# Patient Record
Sex: FEMALE | Race: WHITE | Employment: OTHER | URBAN - METROPOLITAN AREA
[De-identification: names, ages, dates, MRNs, and addresses within clinical notes are randomized per-mention and may not be internally consistent; named-entity substitution may affect disease eponyms.]

---

## 2017-01-01 ENCOUNTER — APPOINTMENT (OUTPATIENT)
Dept: GENERAL RADIOLOGY | Facility: CLINIC | Age: 72
DRG: 003 | End: 2017-01-01
Attending: INTERNAL MEDICINE
Payer: COMMERCIAL

## 2017-01-01 ENCOUNTER — APPOINTMENT (OUTPATIENT)
Dept: PHYSICAL THERAPY | Facility: CLINIC | Age: 72
DRG: 003 | End: 2017-01-01
Payer: COMMERCIAL

## 2017-01-01 ENCOUNTER — TRANSFERRED RECORDS (OUTPATIENT)
Dept: HEALTH INFORMATION MANAGEMENT | Facility: CLINIC | Age: 72
End: 2017-01-01

## 2017-01-01 ENCOUNTER — APPOINTMENT (OUTPATIENT)
Dept: GENERAL RADIOLOGY | Facility: CLINIC | Age: 72
DRG: 177 | End: 2017-01-01
Attending: NURSE PRACTITIONER
Payer: COMMERCIAL

## 2017-01-01 ENCOUNTER — APPOINTMENT (OUTPATIENT)
Dept: CT IMAGING | Facility: CLINIC | Age: 72
DRG: 177 | End: 2017-01-01
Attending: NURSE PRACTITIONER
Payer: COMMERCIAL

## 2017-01-01 ENCOUNTER — APPOINTMENT (OUTPATIENT)
Dept: CT IMAGING | Facility: CLINIC | Age: 72
DRG: 163 | End: 2017-01-01
Attending: INTERNAL MEDICINE
Payer: COMMERCIAL

## 2017-01-01 ENCOUNTER — APPOINTMENT (OUTPATIENT)
Dept: PHYSICAL THERAPY | Facility: CLINIC | Age: 72
DRG: 177 | End: 2017-01-01
Attending: INTERNAL MEDICINE
Payer: COMMERCIAL

## 2017-01-01 ENCOUNTER — APPOINTMENT (OUTPATIENT)
Dept: GENERAL RADIOLOGY | Facility: CLINIC | Age: 72
DRG: 003 | End: 2017-01-01
Attending: SURGERY
Payer: COMMERCIAL

## 2017-01-01 ENCOUNTER — APPOINTMENT (OUTPATIENT)
Dept: SPEECH THERAPY | Facility: CLINIC | Age: 72
DRG: 163 | End: 2017-01-01
Attending: INTERNAL MEDICINE
Payer: COMMERCIAL

## 2017-01-01 ENCOUNTER — APPOINTMENT (OUTPATIENT)
Dept: INTERVENTIONAL RADIOLOGY/VASCULAR | Facility: CLINIC | Age: 72
End: 2017-01-01
Attending: NURSE PRACTITIONER
Payer: COMMERCIAL

## 2017-01-01 ENCOUNTER — APPOINTMENT (OUTPATIENT)
Dept: OCCUPATIONAL THERAPY | Facility: CLINIC | Age: 72
DRG: 163 | End: 2017-01-01
Attending: ANESTHESIOLOGY
Payer: COMMERCIAL

## 2017-01-01 ENCOUNTER — TELEPHONE (OUTPATIENT)
Dept: INTERVENTIONAL RADIOLOGY/VASCULAR | Facility: CLINIC | Age: 72
End: 2017-01-01

## 2017-01-01 ENCOUNTER — APPOINTMENT (OUTPATIENT)
Dept: CT IMAGING | Facility: CLINIC | Age: 72
DRG: 163 | End: 2017-01-01
Attending: SURGERY
Payer: COMMERCIAL

## 2017-01-01 ENCOUNTER — APPOINTMENT (OUTPATIENT)
Dept: PHYSICAL THERAPY | Facility: CLINIC | Age: 72
DRG: 003 | End: 2017-01-01
Attending: PHYSICIAN ASSISTANT
Payer: COMMERCIAL

## 2017-01-01 ENCOUNTER — HOSPITAL ENCOUNTER (INPATIENT)
Facility: CLINIC | Age: 72
LOS: 9 days | Discharge: NURSING, REHAB OR LONG-TERM CARE | DRG: 177 | End: 2017-09-28
Attending: INTERNAL MEDICINE | Admitting: INTERNAL MEDICINE
Payer: COMMERCIAL

## 2017-01-01 ENCOUNTER — APPOINTMENT (OUTPATIENT)
Dept: GENERAL RADIOLOGY | Facility: CLINIC | Age: 72
DRG: 163 | End: 2017-01-01
Attending: INTERNAL MEDICINE
Payer: COMMERCIAL

## 2017-01-01 ENCOUNTER — APPOINTMENT (OUTPATIENT)
Dept: SPEECH THERAPY | Facility: CLINIC | Age: 72
DRG: 003 | End: 2017-01-01
Attending: INTERNAL MEDICINE
Payer: COMMERCIAL

## 2017-01-01 ENCOUNTER — APPOINTMENT (OUTPATIENT)
Dept: PHYSICAL THERAPY | Facility: CLINIC | Age: 72
DRG: 163 | End: 2017-01-01
Attending: INTERNAL MEDICINE
Payer: COMMERCIAL

## 2017-01-01 ENCOUNTER — APPOINTMENT (OUTPATIENT)
Dept: CT IMAGING | Facility: CLINIC | Age: 72
DRG: 003 | End: 2017-01-01
Attending: INTERNAL MEDICINE
Payer: COMMERCIAL

## 2017-01-01 ENCOUNTER — APPOINTMENT (OUTPATIENT)
Dept: GENERAL RADIOLOGY | Facility: CLINIC | Age: 72
DRG: 177 | End: 2017-01-01
Attending: INTERNAL MEDICINE
Payer: COMMERCIAL

## 2017-01-01 ENCOUNTER — APPOINTMENT (OUTPATIENT)
Dept: GENERAL RADIOLOGY | Facility: CLINIC | Age: 72
DRG: 163 | End: 2017-01-01
Attending: PHYSICIAN ASSISTANT
Payer: COMMERCIAL

## 2017-01-01 ENCOUNTER — ANESTHESIA (OUTPATIENT)
Dept: SURGERY | Facility: CLINIC | Age: 72
DRG: 003 | End: 2017-01-01
Payer: COMMERCIAL

## 2017-01-01 ENCOUNTER — APPOINTMENT (OUTPATIENT)
Dept: CT IMAGING | Facility: CLINIC | Age: 72
DRG: 003 | End: 2017-01-01
Attending: EMERGENCY MEDICINE
Payer: COMMERCIAL

## 2017-01-01 ENCOUNTER — APPOINTMENT (OUTPATIENT)
Dept: CARDIOLOGY | Facility: CLINIC | Age: 72
DRG: 003 | End: 2017-01-01
Attending: INTERNAL MEDICINE
Payer: COMMERCIAL

## 2017-01-01 ENCOUNTER — APPOINTMENT (OUTPATIENT)
Dept: GENERAL RADIOLOGY | Facility: CLINIC | Age: 72
DRG: 177 | End: 2017-01-01
Attending: RADIOLOGY
Payer: COMMERCIAL

## 2017-01-01 ENCOUNTER — APPOINTMENT (OUTPATIENT)
Dept: GENERAL RADIOLOGY | Facility: CLINIC | Age: 72
DRG: 003 | End: 2017-01-01
Attending: PHYSICIAN ASSISTANT
Payer: COMMERCIAL

## 2017-01-01 ENCOUNTER — APPOINTMENT (OUTPATIENT)
Dept: GENERAL RADIOLOGY | Facility: CLINIC | Age: 72
DRG: 003 | End: 2017-01-01
Attending: HOSPITALIST
Payer: COMMERCIAL

## 2017-01-01 ENCOUNTER — APPOINTMENT (OUTPATIENT)
Dept: CT IMAGING | Facility: CLINIC | Age: 72
DRG: 177 | End: 2017-01-01
Attending: INTERNAL MEDICINE
Payer: COMMERCIAL

## 2017-01-01 ENCOUNTER — APPOINTMENT (OUTPATIENT)
Dept: ULTRASOUND IMAGING | Facility: CLINIC | Age: 72
DRG: 177 | End: 2017-01-01
Attending: NURSE PRACTITIONER
Payer: COMMERCIAL

## 2017-01-01 ENCOUNTER — APPOINTMENT (OUTPATIENT)
Dept: OCCUPATIONAL THERAPY | Facility: CLINIC | Age: 72
DRG: 163 | End: 2017-01-01
Attending: INTERNAL MEDICINE
Payer: COMMERCIAL

## 2017-01-01 ENCOUNTER — APPOINTMENT (OUTPATIENT)
Dept: GENERAL RADIOLOGY | Facility: CLINIC | Age: 72
DRG: 003 | End: 2017-01-01
Attending: ANESTHESIOLOGY
Payer: COMMERCIAL

## 2017-01-01 ENCOUNTER — HOSPITAL ENCOUNTER (INPATIENT)
Facility: CLINIC | Age: 72
LOS: 8 days | Discharge: NURSING, REHAB OR LONG-TERM CARE | DRG: 163 | End: 2017-08-03
Attending: INTERNAL MEDICINE | Admitting: INTERNAL MEDICINE
Payer: COMMERCIAL

## 2017-01-01 ENCOUNTER — APPOINTMENT (OUTPATIENT)
Dept: INTERVENTIONAL RADIOLOGY/VASCULAR | Facility: CLINIC | Age: 72
End: 2017-01-01
Attending: RADIOLOGY
Payer: COMMERCIAL

## 2017-01-01 ENCOUNTER — ANESTHESIA EVENT (OUTPATIENT)
Dept: SURGERY | Facility: CLINIC | Age: 72
DRG: 003 | End: 2017-01-01
Payer: COMMERCIAL

## 2017-01-01 ENCOUNTER — ANESTHESIA (OUTPATIENT)
Dept: SURGERY | Facility: CLINIC | Age: 72
DRG: 163 | End: 2017-01-01
Payer: COMMERCIAL

## 2017-01-01 ENCOUNTER — APPOINTMENT (OUTPATIENT)
Dept: CT IMAGING | Facility: CLINIC | Age: 72
End: 2017-01-01
Attending: EMERGENCY MEDICINE
Payer: COMMERCIAL

## 2017-01-01 ENCOUNTER — ANESTHESIA (OUTPATIENT)
Dept: INTENSIVE CARE | Facility: CLINIC | Age: 72
DRG: 003 | End: 2017-01-01
Payer: COMMERCIAL

## 2017-01-01 ENCOUNTER — APPOINTMENT (OUTPATIENT)
Dept: GENERAL RADIOLOGY | Facility: CLINIC | Age: 72
DRG: 003 | End: 2017-01-01
Attending: NURSE PRACTITIONER
Payer: COMMERCIAL

## 2017-01-01 ENCOUNTER — HOSPITAL ENCOUNTER (EMERGENCY)
Facility: CLINIC | Age: 72
Discharge: ANOTHER HOSPITAL | End: 2017-06-02
Attending: EMERGENCY MEDICINE | Admitting: EMERGENCY MEDICINE
Payer: COMMERCIAL

## 2017-01-01 ENCOUNTER — HOSPITAL ENCOUNTER (INPATIENT)
Facility: CLINIC | Age: 72
LOS: 18 days | Discharge: NURSING, REHAB OR LONG-TERM CARE | DRG: 003 | End: 2017-06-20
Attending: EMERGENCY MEDICINE | Admitting: SURGERY
Payer: COMMERCIAL

## 2017-01-01 ENCOUNTER — HOSPITAL ENCOUNTER (OUTPATIENT)
Facility: CLINIC | Age: 72
Discharge: NURSING, REHAB OR LONG-TERM CARE | End: 2017-09-14
Attending: RADIOLOGY | Admitting: RADIOLOGY
Payer: COMMERCIAL

## 2017-01-01 ENCOUNTER — APPOINTMENT (OUTPATIENT)
Dept: CARDIOLOGY | Facility: CLINIC | Age: 72
DRG: 177 | End: 2017-01-01
Attending: NURSE PRACTITIONER
Payer: COMMERCIAL

## 2017-01-01 ENCOUNTER — APPOINTMENT (OUTPATIENT)
Dept: INTERVENTIONAL RADIOLOGY/VASCULAR | Facility: CLINIC | Age: 72
DRG: 177 | End: 2017-01-01
Attending: SURGERY
Payer: COMMERCIAL

## 2017-01-01 ENCOUNTER — APPOINTMENT (OUTPATIENT)
Dept: NUCLEAR MEDICINE | Facility: CLINIC | Age: 72
DRG: 003 | End: 2017-01-01
Attending: INTERNAL MEDICINE
Payer: COMMERCIAL

## 2017-01-01 ENCOUNTER — ANESTHESIA EVENT (OUTPATIENT)
Dept: SURGERY | Facility: CLINIC | Age: 72
DRG: 163 | End: 2017-01-01
Payer: COMMERCIAL

## 2017-01-01 ENCOUNTER — ANESTHESIA EVENT (OUTPATIENT)
Dept: INTENSIVE CARE | Facility: CLINIC | Age: 72
DRG: 003 | End: 2017-01-01
Payer: COMMERCIAL

## 2017-01-01 ENCOUNTER — APPOINTMENT (OUTPATIENT)
Dept: SPEECH THERAPY | Facility: CLINIC | Age: 72
DRG: 003 | End: 2017-01-01
Payer: COMMERCIAL

## 2017-01-01 ENCOUNTER — APPOINTMENT (OUTPATIENT)
Dept: SPEECH THERAPY | Facility: CLINIC | Age: 72
DRG: 163 | End: 2017-01-01
Attending: NURSE PRACTITIONER
Payer: COMMERCIAL

## 2017-01-01 VITALS
DIASTOLIC BLOOD PRESSURE: 70 MMHG | SYSTOLIC BLOOD PRESSURE: 135 MMHG | OXYGEN SATURATION: 100 % | WEIGHT: 179.68 LBS | RESPIRATION RATE: 20 BRPM | BODY MASS INDEX: 28.88 KG/M2 | HEIGHT: 66 IN | TEMPERATURE: 98.9 F

## 2017-01-01 VITALS
HEIGHT: 66 IN | TEMPERATURE: 98.8 F | OXYGEN SATURATION: 100 % | SYSTOLIC BLOOD PRESSURE: 97 MMHG | BODY MASS INDEX: 23.7 KG/M2 | RESPIRATION RATE: 14 BRPM | DIASTOLIC BLOOD PRESSURE: 50 MMHG | WEIGHT: 147.49 LBS

## 2017-01-01 VITALS
HEART RATE: 93 BPM | OXYGEN SATURATION: 100 % | RESPIRATION RATE: 15 BRPM | HEIGHT: 67 IN | SYSTOLIC BLOOD PRESSURE: 155 MMHG | WEIGHT: 167.77 LBS | BODY MASS INDEX: 26.33 KG/M2 | DIASTOLIC BLOOD PRESSURE: 80 MMHG | TEMPERATURE: 99.8 F

## 2017-01-01 VITALS
OXYGEN SATURATION: 96 % | DIASTOLIC BLOOD PRESSURE: 57 MMHG | SYSTOLIC BLOOD PRESSURE: 100 MMHG | RESPIRATION RATE: 14 BRPM

## 2017-01-01 VITALS
RESPIRATION RATE: 18 BRPM | SYSTOLIC BLOOD PRESSURE: 106 MMHG | OXYGEN SATURATION: 96 % | HEART RATE: 96 BPM | DIASTOLIC BLOOD PRESSURE: 71 MMHG | TEMPERATURE: 98.4 F

## 2017-01-01 DIAGNOSIS — R21 RASH: Primary | ICD-10-CM

## 2017-01-01 DIAGNOSIS — K65.9: ICD-10-CM

## 2017-01-01 DIAGNOSIS — Z98.890 H/O CHEST TUBE PLACEMENT: ICD-10-CM

## 2017-01-01 DIAGNOSIS — R11.0 NAUSEA: ICD-10-CM

## 2017-01-01 DIAGNOSIS — N28.9 RENAL INSUFFICIENCY: ICD-10-CM

## 2017-01-01 DIAGNOSIS — I10 ESSENTIAL HYPERTENSION: ICD-10-CM

## 2017-01-01 DIAGNOSIS — A04.72 C. DIFFICILE COLITIS: ICD-10-CM

## 2017-01-01 DIAGNOSIS — K59.00 CONSTIPATION, UNSPECIFIED CONSTIPATION TYPE: ICD-10-CM

## 2017-01-01 DIAGNOSIS — Z78.9 DEEP VEIN THROMBOSIS (DVT) PROPHYLAXIS PRESCRIBED AT DISCHARGE: ICD-10-CM

## 2017-01-01 DIAGNOSIS — D72.819 LEUKOPENIA, UNSPECIFIED TYPE: Primary | ICD-10-CM

## 2017-01-01 DIAGNOSIS — R10.84 ABDOMINAL PAIN, GENERALIZED: ICD-10-CM

## 2017-01-01 DIAGNOSIS — T14.8XXA OPEN WOUND: ICD-10-CM

## 2017-01-01 DIAGNOSIS — K65.1 PERITONEAL ABSCESS (H): ICD-10-CM

## 2017-01-01 DIAGNOSIS — B37.9 CANDIDA INFECTION: ICD-10-CM

## 2017-01-01 DIAGNOSIS — E87.70 EDEMA DUE TO HYPERVOLEMIA: ICD-10-CM

## 2017-01-01 DIAGNOSIS — G47.01 INSOMNIA DUE TO MEDICAL CONDITION: ICD-10-CM

## 2017-01-01 DIAGNOSIS — R10.84 GENERALIZED ABDOMINAL PAIN: Primary | ICD-10-CM

## 2017-01-01 DIAGNOSIS — F51.02 ADJUSTMENT INSOMNIA: ICD-10-CM

## 2017-01-01 DIAGNOSIS — E87.29 INCREASED ANION GAP METABOLIC ACIDOSIS: ICD-10-CM

## 2017-01-01 DIAGNOSIS — K55.9 MESENTERIC ISCHEMIA (H): ICD-10-CM

## 2017-01-01 DIAGNOSIS — E46 PROTEIN-CALORIE MALNUTRITION (H): ICD-10-CM

## 2017-01-01 DIAGNOSIS — J90 PLEURAL EFFUSION: ICD-10-CM

## 2017-01-01 DIAGNOSIS — Z99.11 ON MECHANICALLY ASSISTED VENTILATION (H): ICD-10-CM

## 2017-01-01 DIAGNOSIS — K65.1 PERITONEAL ABSCESS (H): Primary | ICD-10-CM

## 2017-01-01 LAB
ABO + RH BLD: NORMAL
ACID FAST STN SPEC QL: NORMAL
ALBUMIN FLD-MCNC: 0 G/DL
ALBUMIN SERPL-MCNC: 1.2 G/DL (ref 3.4–5)
ALBUMIN SERPL-MCNC: 1.3 G/DL (ref 3.4–5)
ALBUMIN SERPL-MCNC: 1.5 G/DL (ref 3.4–5)
ALBUMIN SERPL-MCNC: 1.7 G/DL (ref 3.4–5)
ALBUMIN SERPL-MCNC: 1.7 G/DL (ref 3.4–5)
ALBUMIN SERPL-MCNC: 1.8 G/DL (ref 3.4–5)
ALBUMIN SERPL-MCNC: 1.9 G/DL (ref 3.4–5)
ALBUMIN SERPL-MCNC: 2 G/DL (ref 3.4–5)
ALBUMIN SERPL-MCNC: 2.1 G/DL (ref 3.4–5)
ALBUMIN SERPL-MCNC: 2.2 G/DL (ref 3.4–5)
ALBUMIN SERPL-MCNC: 2.3 G/DL (ref 3.4–5)
ALBUMIN SERPL-MCNC: 2.4 G/DL (ref 3.4–5)
ALBUMIN SERPL-MCNC: 2.6 G/DL (ref 3.4–5)
ALBUMIN SERPL-MCNC: 2.6 G/DL (ref 3.4–5)
ALBUMIN SERPL-MCNC: 2.7 G/DL (ref 3.4–5)
ALBUMIN SERPL-MCNC: 2.7 G/DL (ref 3.4–5)
ALBUMIN SERPL-MCNC: 2.8 G/DL (ref 3.4–5)
ALBUMIN SERPL-MCNC: 3.6 G/DL (ref 3.4–5)
ALBUMIN UR-MCNC: 30 MG/DL
ALBUMIN UR-MCNC: NEGATIVE MG/DL
ALP SERPL-CCNC: 106 U/L (ref 40–150)
ALP SERPL-CCNC: 107 U/L (ref 40–150)
ALP SERPL-CCNC: 108 U/L (ref 40–150)
ALP SERPL-CCNC: 111 U/L (ref 40–150)
ALP SERPL-CCNC: 115 U/L (ref 40–150)
ALP SERPL-CCNC: 123 U/L (ref 40–150)
ALP SERPL-CCNC: 143 U/L (ref 40–150)
ALP SERPL-CCNC: 154 U/L (ref 40–150)
ALP SERPL-CCNC: 187 U/L (ref 40–150)
ALP SERPL-CCNC: 51 U/L (ref 40–150)
ALP SERPL-CCNC: 55 U/L (ref 40–150)
ALP SERPL-CCNC: 57 U/L (ref 40–150)
ALP SERPL-CCNC: 65 U/L (ref 40–150)
ALP SERPL-CCNC: 65 U/L (ref 40–150)
ALP SERPL-CCNC: 67 U/L (ref 40–150)
ALP SERPL-CCNC: 70 U/L (ref 40–150)
ALP SERPL-CCNC: 85 U/L (ref 40–150)
ALP SERPL-CCNC: 86 U/L (ref 40–150)
ALP SERPL-CCNC: 92 U/L (ref 40–150)
ALP SERPL-CCNC: 96 U/L (ref 40–150)
ALP SERPL-CCNC: 96 U/L (ref 40–150)
ALT SERPL W P-5'-P-CCNC: 10 U/L (ref 0–50)
ALT SERPL W P-5'-P-CCNC: 11 U/L (ref 0–50)
ALT SERPL W P-5'-P-CCNC: 12 U/L (ref 0–50)
ALT SERPL W P-5'-P-CCNC: 13 U/L (ref 0–50)
ALT SERPL W P-5'-P-CCNC: 13 U/L (ref 0–50)
ALT SERPL W P-5'-P-CCNC: 16 U/L (ref 0–50)
ALT SERPL W P-5'-P-CCNC: 17 U/L (ref 0–50)
ALT SERPL W P-5'-P-CCNC: 17 U/L (ref 0–50)
ALT SERPL W P-5'-P-CCNC: 19 U/L (ref 0–50)
ALT SERPL W P-5'-P-CCNC: 21 U/L (ref 0–50)
ALT SERPL W P-5'-P-CCNC: 22 U/L (ref 0–50)
ALT SERPL W P-5'-P-CCNC: 26 U/L (ref 0–50)
ALT SERPL W P-5'-P-CCNC: 33 U/L (ref 0–50)
ALT SERPL W P-5'-P-CCNC: 38 U/L (ref 0–50)
ALT SERPL W P-5'-P-CCNC: 42 U/L (ref 0–50)
ALT SERPL W P-5'-P-CCNC: 8 U/L (ref 0–50)
ALT SERPL W P-5'-P-CCNC: 9 U/L (ref 0–50)
ALT SERPL W P-5'-P-CCNC: 9 U/L (ref 0–50)
AMYLASE FLD-CCNC: 10 U/L
AMYLASE FLD-CCNC: 60 U/L
AMYLASE FLD-CCNC: 9 U/L
ANION GAP SERPL CALCULATED.3IONS-SCNC: 10 MMOL/L (ref 3–14)
ANION GAP SERPL CALCULATED.3IONS-SCNC: 11 MMOL/L (ref 3–14)
ANION GAP SERPL CALCULATED.3IONS-SCNC: 12 MMOL/L (ref 3–14)
ANION GAP SERPL CALCULATED.3IONS-SCNC: 12 MMOL/L (ref 3–14)
ANION GAP SERPL CALCULATED.3IONS-SCNC: 13 MMOL/L (ref 3–14)
ANION GAP SERPL CALCULATED.3IONS-SCNC: 15 MMOL/L (ref 3–14)
ANION GAP SERPL CALCULATED.3IONS-SCNC: 6 MMOL/L (ref 3–14)
ANION GAP SERPL CALCULATED.3IONS-SCNC: 7 MMOL/L (ref 3–14)
ANION GAP SERPL CALCULATED.3IONS-SCNC: 8 MMOL/L (ref 3–14)
ANION GAP SERPL CALCULATED.3IONS-SCNC: 9 MMOL/L (ref 3–14)
APPEARANCE FLD: NORMAL
APPEARANCE FLD: NORMAL
APPEARANCE UR: ABNORMAL
APPEARANCE UR: CLEAR
APTT PPP: 46 SEC (ref 22–37)
AST SERPL W P-5'-P-CCNC: 13 U/L (ref 0–45)
AST SERPL W P-5'-P-CCNC: 13 U/L (ref 0–45)
AST SERPL W P-5'-P-CCNC: 14 U/L (ref 0–45)
AST SERPL W P-5'-P-CCNC: 15 U/L (ref 0–45)
AST SERPL W P-5'-P-CCNC: 16 U/L (ref 0–45)
AST SERPL W P-5'-P-CCNC: 17 U/L (ref 0–45)
AST SERPL W P-5'-P-CCNC: 18 U/L (ref 0–45)
AST SERPL W P-5'-P-CCNC: 19 U/L (ref 0–45)
AST SERPL W P-5'-P-CCNC: 19 U/L (ref 0–45)
AST SERPL W P-5'-P-CCNC: 20 U/L (ref 0–45)
AST SERPL W P-5'-P-CCNC: 25 U/L (ref 0–45)
AST SERPL W P-5'-P-CCNC: 29 U/L (ref 0–45)
AST SERPL W P-5'-P-CCNC: 29 U/L (ref 0–45)
AST SERPL W P-5'-P-CCNC: 30 U/L (ref 0–45)
AST SERPL W P-5'-P-CCNC: 39 U/L (ref 0–45)
AST SERPL W P-5'-P-CCNC: 41 U/L (ref 0–45)
AST SERPL W P-5'-P-CCNC: 49 U/L (ref 0–45)
AST SERPL W P-5'-P-CCNC: 52 U/L (ref 0–45)
AST SERPL W P-5'-P-CCNC: 56 U/L (ref 0–45)
AST SERPL W P-5'-P-CCNC: 58 U/L (ref 0–45)
BACTERIA #/AREA URNS HPF: ABNORMAL /HPF
BACTERIA #/AREA URNS HPF: ABNORMAL /HPF
BACTERIA SPEC CULT: ABNORMAL
BACTERIA SPEC CULT: NO GROWTH
BACTERIA SPEC CULT: NORMAL
BASE DEFICIT BLDA-SCNC: 3.6 MMOL/L
BASE DEFICIT BLDA-SCNC: 4.2 MMOL/L
BASE DEFICIT BLDA-SCNC: 5 MMOL/L
BASE DEFICIT BLDA-SCNC: 5.2 MMOL/L
BASE DEFICIT BLDA-SCNC: 5.4 MMOL/L
BASE DEFICIT BLDA-SCNC: 5.7 MMOL/L
BASE DEFICIT BLDA-SCNC: 7.1 MMOL/L
BASE DEFICIT BLDV-SCNC: 1.6 MMOL/L
BASE DEFICIT BLDV-SCNC: 3.4 MMOL/L
BASE DEFICIT BLDV-SCNC: 5 MMOL/L
BASE EXCESS BLDV CALC-SCNC: 1.6 MMOL/L
BASE EXCESS BLDV CALC-SCNC: 5.3 MMOL/L
BASOPHILS # BLD AUTO: 0 10E9/L (ref 0–0.2)
BASOPHILS # BLD AUTO: 0.1 10E9/L (ref 0–0.2)
BASOPHILS NFR BLD AUTO: 0 %
BASOPHILS NFR BLD AUTO: 0.1 %
BASOPHILS NFR BLD AUTO: 0.2 %
BASOPHILS NFR BLD AUTO: 0.2 %
BASOPHILS NFR BLD AUTO: 0.6 %
BASOPHILS NFR BLD AUTO: 0.6 %
BASOPHILS NFR BLD AUTO: 1 %
BASOPHILS NFR BLD AUTO: 1.6 %
BILIRUB DIRECT SERPL-MCNC: 0.1 MG/DL (ref 0–0.2)
BILIRUB DIRECT SERPL-MCNC: 0.2 MG/DL (ref 0–0.2)
BILIRUB DIRECT SERPL-MCNC: <0.1 MG/DL (ref 0–0.2)
BILIRUB FLD-MCNC: 1.6 MG/DL
BILIRUB SERPL-MCNC: 0.2 MG/DL (ref 0.2–1.3)
BILIRUB SERPL-MCNC: 0.2 MG/DL (ref 0.2–1.3)
BILIRUB SERPL-MCNC: 0.3 MG/DL (ref 0.2–1.3)
BILIRUB SERPL-MCNC: 0.4 MG/DL (ref 0.2–1.3)
BILIRUB SERPL-MCNC: 0.5 MG/DL (ref 0.2–1.3)
BILIRUB SERPL-MCNC: 0.9 MG/DL (ref 0.2–1.3)
BILIRUB UR QL STRIP: NEGATIVE
BLD GP AB SCN SERPL QL: NORMAL
BLD PROD TYP BPU: NORMAL
BLD UNIT ID BPU: 0
BLOOD BANK CMNT PATIENT-IMP: NORMAL
BLOOD PRODUCT CODE: NORMAL
BPU ID: NORMAL
BUN SERPL-MCNC: 36 MG/DL (ref 7–30)
BUN SERPL-MCNC: 39 MG/DL (ref 7–30)
BUN SERPL-MCNC: 40 MG/DL (ref 7–30)
BUN SERPL-MCNC: 41 MG/DL (ref 7–30)
BUN SERPL-MCNC: 41 MG/DL (ref 7–30)
BUN SERPL-MCNC: 42 MG/DL (ref 7–30)
BUN SERPL-MCNC: 44 MG/DL (ref 7–30)
BUN SERPL-MCNC: 44 MG/DL (ref 7–30)
BUN SERPL-MCNC: 45 MG/DL (ref 7–30)
BUN SERPL-MCNC: 46 MG/DL (ref 7–30)
BUN SERPL-MCNC: 47 MG/DL (ref 7–30)
BUN SERPL-MCNC: 48 MG/DL (ref 7–30)
BUN SERPL-MCNC: 49 MG/DL (ref 7–30)
BUN SERPL-MCNC: 51 MG/DL (ref 7–30)
BUN SERPL-MCNC: 51 MG/DL (ref 7–30)
BUN SERPL-MCNC: 52 MG/DL (ref 7–30)
BUN SERPL-MCNC: 53 MG/DL (ref 7–30)
BUN SERPL-MCNC: 55 MG/DL (ref 7–30)
BUN SERPL-MCNC: 55 MG/DL (ref 7–30)
BUN SERPL-MCNC: 57 MG/DL (ref 7–30)
BUN SERPL-MCNC: 63 MG/DL (ref 7–30)
BUN SERPL-MCNC: 66 MG/DL (ref 7–30)
BUN SERPL-MCNC: 67 MG/DL (ref 7–30)
BUN SERPL-MCNC: 72 MG/DL (ref 7–30)
BUN SERPL-MCNC: 73 MG/DL (ref 7–30)
BUN SERPL-MCNC: 81 MG/DL (ref 7–30)
BUN SERPL-MCNC: 87 MG/DL (ref 7–30)
BURR CELLS BLD QL SMEAR: SLIGHT
C DIFF TOX B STL QL: NORMAL
C DIFF TOX B STL QL: POSITIVE
CA-I BLD-MCNC: 4.3 MG/DL (ref 4.4–5.2)
CA-I BLD-MCNC: 4.5 MG/DL (ref 4.4–5.2)
CA-I BLD-MCNC: 4.5 MG/DL (ref 4.4–5.2)
CALCIUM SERPL-MCNC: 6.9 MG/DL (ref 8.5–10.1)
CALCIUM SERPL-MCNC: 7.5 MG/DL (ref 8.5–10.1)
CALCIUM SERPL-MCNC: 7.6 MG/DL (ref 8.5–10.1)
CALCIUM SERPL-MCNC: 7.7 MG/DL (ref 8.5–10.1)
CALCIUM SERPL-MCNC: 7.7 MG/DL (ref 8.5–10.1)
CALCIUM SERPL-MCNC: 7.8 MG/DL (ref 8.5–10.1)
CALCIUM SERPL-MCNC: 7.9 MG/DL (ref 8.5–10.1)
CALCIUM SERPL-MCNC: 8 MG/DL (ref 8.5–10.1)
CALCIUM SERPL-MCNC: 8 MG/DL (ref 8.5–10.1)
CALCIUM SERPL-MCNC: 8.1 MG/DL (ref 8.5–10.1)
CALCIUM SERPL-MCNC: 8.2 MG/DL (ref 8.5–10.1)
CALCIUM SERPL-MCNC: 8.3 MG/DL (ref 8.5–10.1)
CALCIUM SERPL-MCNC: 8.4 MG/DL (ref 8.5–10.1)
CALCIUM SERPL-MCNC: 8.4 MG/DL (ref 8.5–10.1)
CALCIUM SERPL-MCNC: 8.5 MG/DL (ref 8.5–10.1)
CALCIUM SERPL-MCNC: 8.6 MG/DL (ref 8.5–10.1)
CALCIUM SERPL-MCNC: 8.6 MG/DL (ref 8.5–10.1)
CALCIUM SERPL-MCNC: 8.7 MG/DL (ref 8.5–10.1)
CALCIUM SERPL-MCNC: 8.7 MG/DL (ref 8.5–10.1)
CALCIUM SERPL-MCNC: 8.8 MG/DL (ref 8.5–10.1)
CALCIUM SERPL-MCNC: 8.9 MG/DL (ref 8.5–10.1)
CALCIUM SERPL-MCNC: 8.9 MG/DL (ref 8.5–10.1)
CALCIUM SERPL-MCNC: 9.4 MG/DL (ref 8.5–10.1)
CHLORIDE SERPL-SCNC: 100 MMOL/L (ref 94–109)
CHLORIDE SERPL-SCNC: 101 MMOL/L (ref 94–109)
CHLORIDE SERPL-SCNC: 102 MMOL/L (ref 94–109)
CHLORIDE SERPL-SCNC: 103 MMOL/L (ref 94–109)
CHLORIDE SERPL-SCNC: 103 MMOL/L (ref 94–109)
CHLORIDE SERPL-SCNC: 104 MMOL/L (ref 94–109)
CHLORIDE SERPL-SCNC: 105 MMOL/L (ref 94–109)
CHLORIDE SERPL-SCNC: 107 MMOL/L (ref 94–109)
CHLORIDE SERPL-SCNC: 109 MMOL/L (ref 94–109)
CHLORIDE SERPL-SCNC: 109 MMOL/L (ref 94–109)
CHLORIDE SERPL-SCNC: 110 MMOL/L (ref 94–109)
CHLORIDE SERPL-SCNC: 111 MMOL/L (ref 94–109)
CHLORIDE SERPL-SCNC: 112 MMOL/L (ref 94–109)
CHLORIDE SERPL-SCNC: 113 MMOL/L (ref 94–109)
CHLORIDE SERPL-SCNC: 114 MMOL/L (ref 94–109)
CHLORIDE SERPL-SCNC: 114 MMOL/L (ref 94–109)
CHLORIDE SERPL-SCNC: 115 MMOL/L (ref 94–109)
CHLORIDE SERPL-SCNC: 93 MMOL/L (ref 94–109)
CHLORIDE SERPL-SCNC: 97 MMOL/L (ref 94–109)
CHLORIDE SERPL-SCNC: 98 MMOL/L (ref 94–109)
CHLORIDE SERPL-SCNC: 98 MMOL/L (ref 94–109)
CHLORIDE SERPL-SCNC: 99 MMOL/L (ref 94–109)
CHLORIDE SERPL-SCNC: 99 MMOL/L (ref 94–109)
CO2 BLD-SCNC: 23 MMOL/L (ref 21–28)
CO2 BLDCOV-SCNC: 16 MMOL/L (ref 21–28)
CO2 SERPL-SCNC: 15 MMOL/L (ref 20–32)
CO2 SERPL-SCNC: 17 MMOL/L (ref 20–32)
CO2 SERPL-SCNC: 18 MMOL/L (ref 20–32)
CO2 SERPL-SCNC: 19 MMOL/L (ref 20–32)
CO2 SERPL-SCNC: 20 MMOL/L (ref 20–32)
CO2 SERPL-SCNC: 21 MMOL/L (ref 20–32)
CO2 SERPL-SCNC: 22 MMOL/L (ref 20–32)
CO2 SERPL-SCNC: 23 MMOL/L (ref 20–32)
CO2 SERPL-SCNC: 24 MMOL/L (ref 20–32)
CO2 SERPL-SCNC: 25 MMOL/L (ref 20–32)
CO2 SERPL-SCNC: 26 MMOL/L (ref 20–32)
CO2 SERPL-SCNC: 27 MMOL/L (ref 20–32)
CO2 SERPL-SCNC: 30 MMOL/L (ref 20–32)
CO2 SERPL-SCNC: 32 MMOL/L (ref 20–32)
CO2 SERPL-SCNC: 33 MMOL/L (ref 20–32)
COLLECT DURATION TIME UR: 1 H
COLLECT DURATION TIME UR: 24 H
COLOR FLD: NORMAL
COLOR FLD: YELLOW
COLOR UR AUTO: ABNORMAL
COLOR UR AUTO: YELLOW
COPATH REPORT: NORMAL
CORTIS SERPL-MCNC: 60.6 UG/DL (ref 4–22)
CREAT 24H UR-MRATE: 0.01 G/(24.H) (ref 0.8–1.8)
CREAT 24H UR-MRATE: 0.3 G/(24.H) (ref 0.8–1.8)
CREAT FLD-MCNC: 1.6 MG/DL
CREAT SERPL-MCNC: 0.82 MG/DL (ref 0.52–1.04)
CREAT SERPL-MCNC: 0.83 MG/DL (ref 0.52–1.04)
CREAT SERPL-MCNC: 0.85 MG/DL (ref 0.52–1.04)
CREAT SERPL-MCNC: 0.86 MG/DL (ref 0.52–1.04)
CREAT SERPL-MCNC: 0.87 MG/DL (ref 0.52–1.04)
CREAT SERPL-MCNC: 0.89 MG/DL (ref 0.52–1.04)
CREAT SERPL-MCNC: 0.9 MG/DL (ref 0.52–1.04)
CREAT SERPL-MCNC: 0.92 MG/DL (ref 0.52–1.04)
CREAT SERPL-MCNC: 0.92 MG/DL (ref 0.52–1.04)
CREAT SERPL-MCNC: 0.96 MG/DL (ref 0.52–1.04)
CREAT SERPL-MCNC: 1.11 MG/DL (ref 0.52–1.04)
CREAT SERPL-MCNC: 1.19 MG/DL (ref 0.52–1.04)
CREAT SERPL-MCNC: 1.21 MG/DL (ref 0.52–1.04)
CREAT SERPL-MCNC: 1.24 MG/DL (ref 0.52–1.04)
CREAT SERPL-MCNC: 1.3 MG/DL (ref 0.52–1.04)
CREAT SERPL-MCNC: 1.36 MG/DL (ref 0.52–1.04)
CREAT SERPL-MCNC: 1.37 MG/DL (ref 0.52–1.04)
CREAT SERPL-MCNC: 1.37 MG/DL (ref 0.52–1.04)
CREAT SERPL-MCNC: 1.38 MG/DL (ref 0.52–1.04)
CREAT SERPL-MCNC: 1.39 MG/DL (ref 0.52–1.04)
CREAT SERPL-MCNC: 1.41 MG/DL (ref 0.52–1.04)
CREAT SERPL-MCNC: 1.41 MG/DL (ref 0.52–1.04)
CREAT SERPL-MCNC: 1.45 MG/DL (ref 0.52–1.04)
CREAT SERPL-MCNC: 1.46 MG/DL (ref 0.52–1.04)
CREAT SERPL-MCNC: 1.49 MG/DL (ref 0.52–1.04)
CREAT SERPL-MCNC: 1.49 MG/DL (ref 0.52–1.04)
CREAT SERPL-MCNC: 1.55 MG/DL (ref 0.52–1.04)
CREAT SERPL-MCNC: 1.55 MG/DL (ref 0.52–1.04)
CREAT SERPL-MCNC: 1.57 MG/DL (ref 0.52–1.04)
CREAT SERPL-MCNC: 1.63 MG/DL (ref 0.52–1.04)
CREAT SERPL-MCNC: 1.64 MG/DL (ref 0.52–1.04)
CREAT SERPL-MCNC: 1.64 MG/DL (ref 0.52–1.04)
CREAT SERPL-MCNC: 1.65 MG/DL (ref 0.52–1.04)
CREAT SERPL-MCNC: 1.77 MG/DL (ref 0.52–1.04)
CREAT SERPL-MCNC: 1.77 MG/DL (ref 0.52–1.04)
CREAT SERPL-MCNC: 1.78 MG/DL (ref 0.52–1.04)
CREAT SERPL-MCNC: 1.87 MG/DL (ref 0.52–1.04)
CREAT SERPL-MCNC: 1.9 MG/DL (ref 0.52–1.04)
CREAT SERPL-MCNC: 1.97 MG/DL (ref 0.52–1.04)
CREAT SERPL-MCNC: 1.98 MG/DL (ref 0.52–1.04)
CREAT SERPL-MCNC: 2.01 MG/DL (ref 0.52–1.04)
CREAT SERPL-MCNC: 2.26 MG/DL (ref 0.52–1.04)
CREAT SERPL-MCNC: 2.31 MG/DL (ref 0.52–1.04)
CREAT SERPL-MCNC: 2.31 MG/DL (ref 0.52–1.04)
CREAT UR-MCNC: 10 MG/DL
CREAT UR-MCNC: 11 MG/DL
CREAT UR-MCNC: 115 MG/DL
CREAT UR-MCNC: 52 MG/DL
CRP SERPL-MCNC: 170 MG/L (ref 0–8)
DACRYOCYTES BLD QL SMEAR: SLIGHT
DEPRECATED CALCIDIOL+CALCIFEROL SERPL-MC: 18 UG/L (ref 20–75)
DIFFERENTIAL METHOD BLD: ABNORMAL
EOSINOPHIL # BLD AUTO: 0 10E9/L (ref 0–0.7)
EOSINOPHIL # BLD AUTO: 0.1 10E9/L (ref 0–0.7)
EOSINOPHIL # BLD AUTO: 0.2 10E9/L (ref 0–0.7)
EOSINOPHIL # BLD AUTO: 0.2 10E9/L (ref 0–0.7)
EOSINOPHIL # BLD AUTO: 0.3 10E9/L (ref 0–0.7)
EOSINOPHIL # BLD AUTO: 0.3 10E9/L (ref 0–0.7)
EOSINOPHIL # BLD AUTO: 0.6 10E9/L (ref 0–0.7)
EOSINOPHIL # BLD AUTO: 0.7 10E9/L (ref 0–0.7)
EOSINOPHIL NFR BLD AUTO: 0 %
EOSINOPHIL NFR BLD AUTO: 0.3 %
EOSINOPHIL NFR BLD AUTO: 0.5 %
EOSINOPHIL NFR BLD AUTO: 1 %
EOSINOPHIL NFR BLD AUTO: 1 %
EOSINOPHIL NFR BLD AUTO: 1.1 %
EOSINOPHIL NFR BLD AUTO: 1.2 %
EOSINOPHIL NFR BLD AUTO: 1.8 %
EOSINOPHIL NFR BLD AUTO: 2.8 %
EOSINOPHIL NFR BLD AUTO: 4 %
EOSINOPHIL NFR BLD AUTO: 4.5 %
EOSINOPHIL NFR BLD AUTO: 5.8 %
EOSINOPHIL SPEC QL WRIGHT STN: NORMAL
ERYTHROCYTE [DISTWIDTH] IN BLOOD BY AUTOMATED COUNT: 15.7 % (ref 10–15)
ERYTHROCYTE [DISTWIDTH] IN BLOOD BY AUTOMATED COUNT: 15.7 % (ref 10–15)
ERYTHROCYTE [DISTWIDTH] IN BLOOD BY AUTOMATED COUNT: 15.9 % (ref 10–15)
ERYTHROCYTE [DISTWIDTH] IN BLOOD BY AUTOMATED COUNT: 16 % (ref 10–15)
ERYTHROCYTE [DISTWIDTH] IN BLOOD BY AUTOMATED COUNT: 16.2 % (ref 10–15)
ERYTHROCYTE [DISTWIDTH] IN BLOOD BY AUTOMATED COUNT: 16.3 % (ref 10–15)
ERYTHROCYTE [DISTWIDTH] IN BLOOD BY AUTOMATED COUNT: 16.3 % (ref 10–15)
ERYTHROCYTE [DISTWIDTH] IN BLOOD BY AUTOMATED COUNT: 16.4 % (ref 10–15)
ERYTHROCYTE [DISTWIDTH] IN BLOOD BY AUTOMATED COUNT: 16.5 % (ref 10–15)
ERYTHROCYTE [DISTWIDTH] IN BLOOD BY AUTOMATED COUNT: 16.6 % (ref 10–15)
ERYTHROCYTE [DISTWIDTH] IN BLOOD BY AUTOMATED COUNT: 16.7 % (ref 10–15)
ERYTHROCYTE [DISTWIDTH] IN BLOOD BY AUTOMATED COUNT: 16.8 % (ref 10–15)
ERYTHROCYTE [DISTWIDTH] IN BLOOD BY AUTOMATED COUNT: 16.9 % (ref 10–15)
ERYTHROCYTE [DISTWIDTH] IN BLOOD BY AUTOMATED COUNT: 17 % (ref 10–15)
ERYTHROCYTE [DISTWIDTH] IN BLOOD BY AUTOMATED COUNT: 17 % (ref 10–15)
ERYTHROCYTE [DISTWIDTH] IN BLOOD BY AUTOMATED COUNT: 17.1 % (ref 10–15)
ERYTHROCYTE [DISTWIDTH] IN BLOOD BY AUTOMATED COUNT: 17.2 % (ref 10–15)
ERYTHROCYTE [DISTWIDTH] IN BLOOD BY AUTOMATED COUNT: 17.3 % (ref 10–15)
ERYTHROCYTE [DISTWIDTH] IN BLOOD BY AUTOMATED COUNT: 17.3 % (ref 10–15)
ERYTHROCYTE [DISTWIDTH] IN BLOOD BY AUTOMATED COUNT: 17.4 % (ref 10–15)
ERYTHROCYTE [DISTWIDTH] IN BLOOD BY AUTOMATED COUNT: 17.4 % (ref 10–15)
ERYTHROCYTE [DISTWIDTH] IN BLOOD BY AUTOMATED COUNT: 17.5 % (ref 10–15)
ERYTHROCYTE [DISTWIDTH] IN BLOOD BY AUTOMATED COUNT: 17.5 % (ref 10–15)
ERYTHROCYTE [DISTWIDTH] IN BLOOD BY AUTOMATED COUNT: 17.6 % (ref 10–15)
FERRITIN SERPL-MCNC: 487 NG/ML (ref 8–252)
FIBRINOGEN PPP-MCNC: 378 MG/DL (ref 200–420)
FRACT EXCRET NA UR+SERPL-RTO: 0.2 %
FRACT EXCRET NA UR+SERPL-RTO: 0.5 %
FUNGUS SPEC CULT: ABNORMAL
FUNGUS SPEC CULT: ABNORMAL
FUNGUS SPEC CULT: NORMAL
GFR SERPL CREATININE-BSD FRML MDRD: 21 ML/MIN/1.7M2
GFR SERPL CREATININE-BSD FRML MDRD: 24 ML/MIN/1.7M2
GFR SERPL CREATININE-BSD FRML MDRD: 25 ML/MIN/1.7M2
GFR SERPL CREATININE-BSD FRML MDRD: 25 ML/MIN/1.7M2
GFR SERPL CREATININE-BSD FRML MDRD: 26 ML/MIN/1.7M2
GFR SERPL CREATININE-BSD FRML MDRD: 26 ML/MIN/1.7M2
GFR SERPL CREATININE-BSD FRML MDRD: 28 ML/MIN/1.7M2
GFR SERPL CREATININE-BSD FRML MDRD: 31 ML/MIN/1.7M2
GFR SERPL CREATININE-BSD FRML MDRD: 32 ML/MIN/1.7M2
GFR SERPL CREATININE-BSD FRML MDRD: 33 ML/MIN/1.7M2
GFR SERPL CREATININE-BSD FRML MDRD: 33 ML/MIN/1.7M2
GFR SERPL CREATININE-BSD FRML MDRD: 34 ML/MIN/1.7M2
GFR SERPL CREATININE-BSD FRML MDRD: 34 ML/MIN/1.7M2
GFR SERPL CREATININE-BSD FRML MDRD: 35 ML/MIN/1.7M2
GFR SERPL CREATININE-BSD FRML MDRD: 36 ML/MIN/1.7M2
GFR SERPL CREATININE-BSD FRML MDRD: 37 ML/MIN/1.7M2
GFR SERPL CREATININE-BSD FRML MDRD: 38 ML/MIN/1.7M2
GFR SERPL CREATININE-BSD FRML MDRD: 40 ML/MIN/1.7M2
GFR SERPL CREATININE-BSD FRML MDRD: 43 ML/MIN/1.7M2
GFR SERPL CREATININE-BSD FRML MDRD: 44 ML/MIN/1.7M2
GFR SERPL CREATININE-BSD FRML MDRD: 45 ML/MIN/1.7M2
GFR SERPL CREATININE-BSD FRML MDRD: 48 ML/MIN/1.7M2
GFR SERPL CREATININE-BSD FRML MDRD: 57 ML/MIN/1.7M2
GFR SERPL CREATININE-BSD FRML MDRD: 60 ML/MIN/1.7M2
GFR SERPL CREATININE-BSD FRML MDRD: 60 ML/MIN/1.7M2
GFR SERPL CREATININE-BSD FRML MDRD: 62 ML/MIN/1.7M2
GFR SERPL CREATININE-BSD FRML MDRD: 63 ML/MIN/1.7M2
GFR SERPL CREATININE-BSD FRML MDRD: 64 ML/MIN/1.7M2
GFR SERPL CREATININE-BSD FRML MDRD: 65 ML/MIN/1.7M2
GFR SERPL CREATININE-BSD FRML MDRD: 66 ML/MIN/1.7M2
GFR SERPL CREATININE-BSD FRML MDRD: 68 ML/MIN/1.7M2
GFR SERPL CREATININE-BSD FRML MDRD: 69 ML/MIN/1.7M2
GLUCOSE BLDC GLUCOMTR-MCNC: 100 MG/DL (ref 70–99)
GLUCOSE BLDC GLUCOMTR-MCNC: 100 MG/DL (ref 70–99)
GLUCOSE BLDC GLUCOMTR-MCNC: 101 MG/DL (ref 70–99)
GLUCOSE BLDC GLUCOMTR-MCNC: 102 MG/DL (ref 70–99)
GLUCOSE BLDC GLUCOMTR-MCNC: 103 MG/DL (ref 70–99)
GLUCOSE BLDC GLUCOMTR-MCNC: 104 MG/DL (ref 70–99)
GLUCOSE BLDC GLUCOMTR-MCNC: 105 MG/DL (ref 70–99)
GLUCOSE BLDC GLUCOMTR-MCNC: 106 MG/DL (ref 70–99)
GLUCOSE BLDC GLUCOMTR-MCNC: 107 MG/DL (ref 70–99)
GLUCOSE BLDC GLUCOMTR-MCNC: 108 MG/DL (ref 70–99)
GLUCOSE BLDC GLUCOMTR-MCNC: 109 MG/DL (ref 70–99)
GLUCOSE BLDC GLUCOMTR-MCNC: 110 MG/DL (ref 70–99)
GLUCOSE BLDC GLUCOMTR-MCNC: 111 MG/DL (ref 70–99)
GLUCOSE BLDC GLUCOMTR-MCNC: 112 MG/DL (ref 70–99)
GLUCOSE BLDC GLUCOMTR-MCNC: 114 MG/DL (ref 70–99)
GLUCOSE BLDC GLUCOMTR-MCNC: 115 MG/DL (ref 70–99)
GLUCOSE BLDC GLUCOMTR-MCNC: 116 MG/DL (ref 70–99)
GLUCOSE BLDC GLUCOMTR-MCNC: 117 MG/DL (ref 70–99)
GLUCOSE BLDC GLUCOMTR-MCNC: 118 MG/DL (ref 70–99)
GLUCOSE BLDC GLUCOMTR-MCNC: 119 MG/DL (ref 70–99)
GLUCOSE BLDC GLUCOMTR-MCNC: 119 MG/DL (ref 70–99)
GLUCOSE BLDC GLUCOMTR-MCNC: 120 MG/DL (ref 70–99)
GLUCOSE BLDC GLUCOMTR-MCNC: 121 MG/DL (ref 70–99)
GLUCOSE BLDC GLUCOMTR-MCNC: 122 MG/DL (ref 70–99)
GLUCOSE BLDC GLUCOMTR-MCNC: 123 MG/DL (ref 70–99)
GLUCOSE BLDC GLUCOMTR-MCNC: 124 MG/DL (ref 70–99)
GLUCOSE BLDC GLUCOMTR-MCNC: 124 MG/DL (ref 70–99)
GLUCOSE BLDC GLUCOMTR-MCNC: 125 MG/DL (ref 70–99)
GLUCOSE BLDC GLUCOMTR-MCNC: 126 MG/DL (ref 70–99)
GLUCOSE BLDC GLUCOMTR-MCNC: 127 MG/DL (ref 70–99)
GLUCOSE BLDC GLUCOMTR-MCNC: 127 MG/DL (ref 70–99)
GLUCOSE BLDC GLUCOMTR-MCNC: 128 MG/DL (ref 70–99)
GLUCOSE BLDC GLUCOMTR-MCNC: 129 MG/DL (ref 70–99)
GLUCOSE BLDC GLUCOMTR-MCNC: 129 MG/DL (ref 70–99)
GLUCOSE BLDC GLUCOMTR-MCNC: 132 MG/DL (ref 70–99)
GLUCOSE BLDC GLUCOMTR-MCNC: 133 MG/DL (ref 70–99)
GLUCOSE BLDC GLUCOMTR-MCNC: 133 MG/DL (ref 70–99)
GLUCOSE BLDC GLUCOMTR-MCNC: 135 MG/DL (ref 70–99)
GLUCOSE BLDC GLUCOMTR-MCNC: 135 MG/DL (ref 70–99)
GLUCOSE BLDC GLUCOMTR-MCNC: 136 MG/DL (ref 70–99)
GLUCOSE BLDC GLUCOMTR-MCNC: 137 MG/DL (ref 70–99)
GLUCOSE BLDC GLUCOMTR-MCNC: 137 MG/DL (ref 70–99)
GLUCOSE BLDC GLUCOMTR-MCNC: 139 MG/DL (ref 70–99)
GLUCOSE BLDC GLUCOMTR-MCNC: 140 MG/DL (ref 70–99)
GLUCOSE BLDC GLUCOMTR-MCNC: 143 MG/DL (ref 70–99)
GLUCOSE BLDC GLUCOMTR-MCNC: 143 MG/DL (ref 70–99)
GLUCOSE BLDC GLUCOMTR-MCNC: 146 MG/DL (ref 70–99)
GLUCOSE BLDC GLUCOMTR-MCNC: 152 MG/DL (ref 70–99)
GLUCOSE BLDC GLUCOMTR-MCNC: 152 MG/DL (ref 70–99)
GLUCOSE BLDC GLUCOMTR-MCNC: 154 MG/DL (ref 70–99)
GLUCOSE BLDC GLUCOMTR-MCNC: 154 MG/DL (ref 70–99)
GLUCOSE BLDC GLUCOMTR-MCNC: 159 MG/DL (ref 70–99)
GLUCOSE BLDC GLUCOMTR-MCNC: 165 MG/DL (ref 70–99)
GLUCOSE BLDC GLUCOMTR-MCNC: 177 MG/DL (ref 70–99)
GLUCOSE BLDC GLUCOMTR-MCNC: 182 MG/DL (ref 70–99)
GLUCOSE BLDC GLUCOMTR-MCNC: 61 MG/DL (ref 70–99)
GLUCOSE BLDC GLUCOMTR-MCNC: 62 MG/DL (ref 70–99)
GLUCOSE BLDC GLUCOMTR-MCNC: 62 MG/DL (ref 70–99)
GLUCOSE BLDC GLUCOMTR-MCNC: 63 MG/DL (ref 70–99)
GLUCOSE BLDC GLUCOMTR-MCNC: 64 MG/DL (ref 70–99)
GLUCOSE BLDC GLUCOMTR-MCNC: 67 MG/DL (ref 70–99)
GLUCOSE BLDC GLUCOMTR-MCNC: 69 MG/DL (ref 70–99)
GLUCOSE BLDC GLUCOMTR-MCNC: 70 MG/DL (ref 70–99)
GLUCOSE BLDC GLUCOMTR-MCNC: 72 MG/DL (ref 70–99)
GLUCOSE BLDC GLUCOMTR-MCNC: 74 MG/DL (ref 70–99)
GLUCOSE BLDC GLUCOMTR-MCNC: 76 MG/DL (ref 70–99)
GLUCOSE BLDC GLUCOMTR-MCNC: 76 MG/DL (ref 70–99)
GLUCOSE BLDC GLUCOMTR-MCNC: 80 MG/DL (ref 70–99)
GLUCOSE BLDC GLUCOMTR-MCNC: 80 MG/DL (ref 70–99)
GLUCOSE BLDC GLUCOMTR-MCNC: 81 MG/DL (ref 70–99)
GLUCOSE BLDC GLUCOMTR-MCNC: 82 MG/DL (ref 70–99)
GLUCOSE BLDC GLUCOMTR-MCNC: 82 MG/DL (ref 70–99)
GLUCOSE BLDC GLUCOMTR-MCNC: 83 MG/DL (ref 70–99)
GLUCOSE BLDC GLUCOMTR-MCNC: 83 MG/DL (ref 70–99)
GLUCOSE BLDC GLUCOMTR-MCNC: 85 MG/DL (ref 70–99)
GLUCOSE BLDC GLUCOMTR-MCNC: 88 MG/DL (ref 70–99)
GLUCOSE BLDC GLUCOMTR-MCNC: 89 MG/DL (ref 70–99)
GLUCOSE BLDC GLUCOMTR-MCNC: 90 MG/DL (ref 70–99)
GLUCOSE BLDC GLUCOMTR-MCNC: 90 MG/DL (ref 70–99)
GLUCOSE BLDC GLUCOMTR-MCNC: 91 MG/DL (ref 70–99)
GLUCOSE BLDC GLUCOMTR-MCNC: 92 MG/DL (ref 70–99)
GLUCOSE BLDC GLUCOMTR-MCNC: 92 MG/DL (ref 70–99)
GLUCOSE BLDC GLUCOMTR-MCNC: 93 MG/DL (ref 70–99)
GLUCOSE BLDC GLUCOMTR-MCNC: 94 MG/DL (ref 70–99)
GLUCOSE BLDC GLUCOMTR-MCNC: 95 MG/DL (ref 70–99)
GLUCOSE BLDC GLUCOMTR-MCNC: 96 MG/DL (ref 70–99)
GLUCOSE BLDC GLUCOMTR-MCNC: 97 MG/DL (ref 70–99)
GLUCOSE BLDC GLUCOMTR-MCNC: 97 MG/DL (ref 70–99)
GLUCOSE BLDC GLUCOMTR-MCNC: 98 MG/DL (ref 70–99)
GLUCOSE BLDC GLUCOMTR-MCNC: 98 MG/DL (ref 70–99)
GLUCOSE BLDC GLUCOMTR-MCNC: 99 MG/DL (ref 70–99)
GLUCOSE FLD-MCNC: 80 MG/DL
GLUCOSE FLD-MCNC: NORMAL MG/DL
GLUCOSE SERPL-MCNC: 104 MG/DL (ref 70–99)
GLUCOSE SERPL-MCNC: 104 MG/DL (ref 70–99)
GLUCOSE SERPL-MCNC: 105 MG/DL (ref 70–99)
GLUCOSE SERPL-MCNC: 107 MG/DL (ref 70–99)
GLUCOSE SERPL-MCNC: 107 MG/DL (ref 70–99)
GLUCOSE SERPL-MCNC: 108 MG/DL (ref 70–99)
GLUCOSE SERPL-MCNC: 108 MG/DL (ref 70–99)
GLUCOSE SERPL-MCNC: 109 MG/DL (ref 70–99)
GLUCOSE SERPL-MCNC: 112 MG/DL (ref 70–99)
GLUCOSE SERPL-MCNC: 117 MG/DL (ref 70–99)
GLUCOSE SERPL-MCNC: 121 MG/DL (ref 70–99)
GLUCOSE SERPL-MCNC: 121 MG/DL (ref 70–99)
GLUCOSE SERPL-MCNC: 122 MG/DL (ref 70–99)
GLUCOSE SERPL-MCNC: 123 MG/DL (ref 70–99)
GLUCOSE SERPL-MCNC: 123 MG/DL (ref 70–99)
GLUCOSE SERPL-MCNC: 125 MG/DL (ref 70–99)
GLUCOSE SERPL-MCNC: 125 MG/DL (ref 70–99)
GLUCOSE SERPL-MCNC: 126 MG/DL (ref 70–99)
GLUCOSE SERPL-MCNC: 127 MG/DL (ref 70–99)
GLUCOSE SERPL-MCNC: 128 MG/DL (ref 70–99)
GLUCOSE SERPL-MCNC: 129 MG/DL (ref 70–99)
GLUCOSE SERPL-MCNC: 132 MG/DL (ref 70–99)
GLUCOSE SERPL-MCNC: 150 MG/DL (ref 70–99)
GLUCOSE SERPL-MCNC: 160 MG/DL (ref 70–99)
GLUCOSE SERPL-MCNC: 175 MG/DL (ref 70–99)
GLUCOSE SERPL-MCNC: 184 MG/DL (ref 70–99)
GLUCOSE SERPL-MCNC: 270 MG/DL (ref 70–99)
GLUCOSE SERPL-MCNC: 50 MG/DL (ref 70–99)
GLUCOSE SERPL-MCNC: 56 MG/DL (ref 70–99)
GLUCOSE SERPL-MCNC: 69 MG/DL (ref 70–99)
GLUCOSE SERPL-MCNC: 74 MG/DL (ref 70–99)
GLUCOSE SERPL-MCNC: 75 MG/DL (ref 70–99)
GLUCOSE SERPL-MCNC: 75 MG/DL (ref 70–99)
GLUCOSE SERPL-MCNC: 77 MG/DL (ref 70–99)
GLUCOSE SERPL-MCNC: 78 MG/DL (ref 70–99)
GLUCOSE SERPL-MCNC: 87 MG/DL (ref 70–99)
GLUCOSE SERPL-MCNC: 91 MG/DL (ref 70–99)
GLUCOSE SERPL-MCNC: 94 MG/DL (ref 70–99)
GLUCOSE SERPL-MCNC: 94 MG/DL (ref 70–99)
GLUCOSE SERPL-MCNC: 98 MG/DL (ref 70–99)
GLUCOSE SERPL-MCNC: NORMAL MG/DL (ref 70–99)
GLUCOSE UR STRIP-MCNC: NEGATIVE MG/DL
GRAM STN SPEC: ABNORMAL
GRAM STN SPEC: NORMAL
GRAN CASTS #/AREA URNS LPF: 3 /LPF
HBA1C MFR BLD: NORMAL % (ref 4.3–6)
HCO3 BLD-SCNC: 21 MMOL/L (ref 21–28)
HCO3 BLD-SCNC: 22 MMOL/L (ref 21–28)
HCO3 BLD-SCNC: 23 MMOL/L (ref 21–28)
HCO3 BLD-SCNC: 24 MMOL/L (ref 21–28)
HCO3 BLDV-SCNC: 23 MMOL/L (ref 21–28)
HCO3 BLDV-SCNC: 24 MMOL/L (ref 21–28)
HCO3 BLDV-SCNC: 25 MMOL/L (ref 21–28)
HCO3 BLDV-SCNC: 28 MMOL/L (ref 21–28)
HCO3 BLDV-SCNC: 30 MMOL/L (ref 21–28)
HCT VFR BLD AUTO: 18.7 % (ref 35–47)
HCT VFR BLD AUTO: 19 % (ref 35–47)
HCT VFR BLD AUTO: 19.8 % (ref 35–47)
HCT VFR BLD AUTO: 20.1 % (ref 35–47)
HCT VFR BLD AUTO: 20.2 % (ref 35–47)
HCT VFR BLD AUTO: 20.6 % (ref 35–47)
HCT VFR BLD AUTO: 20.8 % (ref 35–47)
HCT VFR BLD AUTO: 20.9 % (ref 35–47)
HCT VFR BLD AUTO: 21 % (ref 35–47)
HCT VFR BLD AUTO: 21.1 % (ref 35–47)
HCT VFR BLD AUTO: 21.3 % (ref 35–47)
HCT VFR BLD AUTO: 21.4 % (ref 35–47)
HCT VFR BLD AUTO: 21.4 % (ref 35–47)
HCT VFR BLD AUTO: 21.8 % (ref 35–47)
HCT VFR BLD AUTO: 22.1 % (ref 35–47)
HCT VFR BLD AUTO: 22.2 % (ref 35–47)
HCT VFR BLD AUTO: 22.3 % (ref 35–47)
HCT VFR BLD AUTO: 22.6 % (ref 35–47)
HCT VFR BLD AUTO: 22.8 % (ref 35–47)
HCT VFR BLD AUTO: 22.9 % (ref 35–47)
HCT VFR BLD AUTO: 23 % (ref 35–47)
HCT VFR BLD AUTO: 23.1 % (ref 35–47)
HCT VFR BLD AUTO: 23.2 % (ref 35–47)
HCT VFR BLD AUTO: 23.3 % (ref 35–47)
HCT VFR BLD AUTO: 23.4 % (ref 35–47)
HCT VFR BLD AUTO: 23.4 % (ref 35–47)
HCT VFR BLD AUTO: 23.5 % (ref 35–47)
HCT VFR BLD AUTO: 23.8 % (ref 35–47)
HCT VFR BLD AUTO: 24 % (ref 35–47)
HCT VFR BLD AUTO: 24 % (ref 35–47)
HCT VFR BLD AUTO: 24.3 % (ref 35–47)
HCT VFR BLD AUTO: 24.4 % (ref 35–47)
HCT VFR BLD AUTO: 24.5 % (ref 35–47)
HCT VFR BLD AUTO: 24.8 % (ref 35–47)
HCT VFR BLD AUTO: 25.2 % (ref 35–47)
HCT VFR BLD AUTO: 25.8 % (ref 35–47)
HCT VFR BLD AUTO: 26.9 % (ref 35–47)
HCT VFR BLD AUTO: 27.9 % (ref 35–47)
HCT VFR BLD AUTO: 28.4 % (ref 35–47)
HCT VFR BLD AUTO: 33.9 % (ref 35–47)
HCT VFR BLD CALC: 24 %PCV (ref 35–47)
HGB BLD CALC-MCNC: 8.2 G/DL (ref 11.7–15.7)
HGB BLD-MCNC: 10.8 G/DL (ref 11.7–15.7)
HGB BLD-MCNC: 6.2 G/DL (ref 11.7–15.7)
HGB BLD-MCNC: 6.5 G/DL (ref 11.7–15.7)
HGB BLD-MCNC: 6.6 G/DL (ref 11.7–15.7)
HGB BLD-MCNC: 6.8 G/DL (ref 11.7–15.7)
HGB BLD-MCNC: 6.8 G/DL (ref 11.7–15.7)
HGB BLD-MCNC: 7 G/DL (ref 11.7–15.7)
HGB BLD-MCNC: 7.2 G/DL (ref 11.7–15.7)
HGB BLD-MCNC: 7.2 G/DL (ref 11.7–15.7)
HGB BLD-MCNC: 7.3 G/DL (ref 11.7–15.7)
HGB BLD-MCNC: 7.5 G/DL (ref 11.7–15.7)
HGB BLD-MCNC: 7.6 G/DL (ref 11.7–15.7)
HGB BLD-MCNC: 7.6 G/DL (ref 11.7–15.7)
HGB BLD-MCNC: 7.7 G/DL (ref 11.7–15.7)
HGB BLD-MCNC: 7.8 G/DL (ref 11.7–15.7)
HGB BLD-MCNC: 7.9 G/DL (ref 11.7–15.7)
HGB BLD-MCNC: 8 G/DL (ref 11.7–15.7)
HGB BLD-MCNC: 8.1 G/DL (ref 11.7–15.7)
HGB BLD-MCNC: 8.2 G/DL (ref 11.7–15.7)
HGB BLD-MCNC: 8.2 G/DL (ref 11.7–15.7)
HGB BLD-MCNC: 8.3 G/DL (ref 11.7–15.7)
HGB BLD-MCNC: 8.4 G/DL (ref 11.7–15.7)
HGB BLD-MCNC: 8.4 G/DL (ref 11.7–15.7)
HGB BLD-MCNC: 8.5 G/DL (ref 11.7–15.7)
HGB BLD-MCNC: 8.6 G/DL (ref 11.7–15.7)
HGB BLD-MCNC: 8.6 G/DL (ref 11.7–15.7)
HGB BLD-MCNC: 9.1 G/DL (ref 11.7–15.7)
HGB BLD-MCNC: 9.3 G/DL (ref 11.7–15.7)
HGB UR QL STRIP: ABNORMAL
HGB UR QL STRIP: ABNORMAL
HGB UR QL STRIP: NEGATIVE
HYALINE CASTS #/AREA URNS LPF: 1 /LPF (ref 0–2)
IMM GRANULOCYTES # BLD: 0 10E9/L (ref 0–0.4)
IMM GRANULOCYTES # BLD: 0.1 10E9/L (ref 0–0.4)
IMM GRANULOCYTES # BLD: 0.3 10E9/L (ref 0–0.4)
IMM GRANULOCYTES # BLD: 0.3 10E9/L (ref 0–0.4)
IMM GRANULOCYTES # BLD: 0.4 10E9/L (ref 0–0.4)
IMM GRANULOCYTES NFR BLD: 0 %
IMM GRANULOCYTES NFR BLD: 0 %
IMM GRANULOCYTES NFR BLD: 0.2 %
IMM GRANULOCYTES NFR BLD: 0.3 %
IMM GRANULOCYTES NFR BLD: 0.3 %
IMM GRANULOCYTES NFR BLD: 0.6 %
IMM GRANULOCYTES NFR BLD: 1.5 %
IMM GRANULOCYTES NFR BLD: 2.3 %
IMM GRANULOCYTES NFR BLD: 3.5 %
INR PPP: 1.11 (ref 0.86–1.14)
INR PPP: 1.12 (ref 0.86–1.14)
INR PPP: 1.12 (ref 0.86–1.14)
INR PPP: 1.15 (ref 0.86–1.14)
INR PPP: 1.16 (ref 0.86–1.14)
INR PPP: 1.22 (ref 0.86–1.14)
INR PPP: 1.24 (ref 0.86–1.14)
INR PPP: 1.26 (ref 0.86–1.14)
INR PPP: 1.26 (ref 0.86–1.14)
INR PPP: 1.3 (ref 0.86–1.14)
INR PPP: 1.31 (ref 0.86–1.14)
INR PPP: 1.31 (ref 0.86–1.14)
INR PPP: 1.35 (ref 0.86–1.14)
INR PPP: 1.37 (ref 0.86–1.14)
INR PPP: 1.41 (ref 0.86–1.14)
INR PPP: 1.42 (ref 0.86–1.14)
INR PPP: 1.58 (ref 0.86–1.14)
INTERPRETATION ECG - MUSE: NORMAL
IRON SATN MFR SERPL: 13 % (ref 15–46)
IRON SERPL-MCNC: 20 UG/DL (ref 35–180)
KETONES UR STRIP-MCNC: 5 MG/DL
KETONES UR STRIP-MCNC: NEGATIVE MG/DL
KOH PREP SPEC: NORMAL
LACTATE BLD-SCNC: 0.8 MMOL/L (ref 0.7–2.1)
LACTATE BLD-SCNC: 0.9 MMOL/L (ref 0.7–2.1)
LACTATE BLD-SCNC: 1 MMOL/L (ref 0.7–2.1)
LACTATE BLD-SCNC: 1 MMOL/L (ref 0.7–2.1)
LACTATE BLD-SCNC: 1.1 MMOL/L (ref 0.7–2.1)
LACTATE BLD-SCNC: 1.3 MMOL/L (ref 0.7–2.1)
LACTATE BLD-SCNC: 1.4 MMOL/L (ref 0.7–2.1)
LACTATE BLD-SCNC: 1.5 MMOL/L (ref 0.7–2.1)
LACTATE BLD-SCNC: 1.5 MMOL/L (ref 0.7–2.1)
LACTATE BLD-SCNC: 1.9 MMOL/L (ref 0.7–2.1)
LACTATE BLD-SCNC: 3.1 MMOL/L (ref 0.7–2.1)
LACTATE BLD-SCNC: 3.5 MMOL/L (ref 0.7–2.1)
LDH FLD L TO P-CCNC: 399 U/L
LDH FLD L TO P-CCNC: 63 U/L
LDH SERPL L TO P-CCNC: 337 U/L (ref 81–234)
LEUKOCYTE ESTERASE UR QL STRIP: ABNORMAL
LEUKOCYTE ESTERASE UR QL STRIP: NEGATIVE
LIPASE SERPL-CCNC: 144 U/L (ref 73–393)
LIPASE SERPL-CCNC: 514 U/L (ref 73–393)
LYMPHOCYTES # BLD AUTO: 0.3 10E9/L (ref 0.8–5.3)
LYMPHOCYTES # BLD AUTO: 0.4 10E9/L (ref 0.8–5.3)
LYMPHOCYTES # BLD AUTO: 0.4 10E9/L (ref 0.8–5.3)
LYMPHOCYTES # BLD AUTO: 0.5 10E9/L (ref 0.8–5.3)
LYMPHOCYTES # BLD AUTO: 0.5 10E9/L (ref 0.8–5.3)
LYMPHOCYTES # BLD AUTO: 0.6 10E9/L (ref 0.8–5.3)
LYMPHOCYTES # BLD AUTO: 0.8 10E9/L (ref 0.8–5.3)
LYMPHOCYTES # BLD AUTO: 0.9 10E9/L (ref 0.8–5.3)
LYMPHOCYTES # BLD AUTO: 1.1 10E9/L (ref 0.8–5.3)
LYMPHOCYTES # BLD AUTO: 1.2 10E9/L (ref 0.8–5.3)
LYMPHOCYTES # BLD AUTO: 1.3 10E9/L (ref 0.8–5.3)
LYMPHOCYTES # BLD AUTO: 1.3 10E9/L (ref 0.8–5.3)
LYMPHOCYTES # BLD AUTO: 1.6 10E9/L (ref 0.8–5.3)
LYMPHOCYTES NFR BLD AUTO: 11.6 %
LYMPHOCYTES NFR BLD AUTO: 14 %
LYMPHOCYTES NFR BLD AUTO: 14 %
LYMPHOCYTES NFR BLD AUTO: 16.4 %
LYMPHOCYTES NFR BLD AUTO: 17.4 %
LYMPHOCYTES NFR BLD AUTO: 2 %
LYMPHOCYTES NFR BLD AUTO: 23.4 %
LYMPHOCYTES NFR BLD AUTO: 3.7 %
LYMPHOCYTES NFR BLD AUTO: 6 %
LYMPHOCYTES NFR BLD AUTO: 6.9 %
LYMPHOCYTES NFR BLD AUTO: 61 %
LYMPHOCYTES NFR BLD AUTO: 7.1 %
LYMPHOCYTES NFR BLD AUTO: 8 %
LYMPHOCYTES NFR BLD AUTO: 8.4 %
LYMPHOCYTES NFR BLD AUTO: 9 %
LYMPHOCYTES NFR FLD MANUAL: 42 %
LYMPHOCYTES NFR FLD MANUAL: 54 %
Lab: ABNORMAL
Lab: ABNORMAL
Lab: NORMAL
MAGNESIUM SERPL-MCNC: 1.4 MG/DL (ref 1.6–2.3)
MAGNESIUM SERPL-MCNC: 1.5 MG/DL (ref 1.6–2.3)
MAGNESIUM SERPL-MCNC: 1.6 MG/DL (ref 1.6–2.3)
MAGNESIUM SERPL-MCNC: 1.6 MG/DL (ref 1.6–2.3)
MAGNESIUM SERPL-MCNC: 1.7 MG/DL (ref 1.6–2.3)
MAGNESIUM SERPL-MCNC: 1.7 MG/DL (ref 1.6–2.3)
MAGNESIUM SERPL-MCNC: 1.8 MG/DL (ref 1.6–2.3)
MAGNESIUM SERPL-MCNC: 2 MG/DL (ref 1.6–2.3)
MAGNESIUM SERPL-MCNC: 2.1 MG/DL (ref 1.6–2.3)
MAGNESIUM SERPL-MCNC: 2.2 MG/DL (ref 1.6–2.3)
MAGNESIUM SERPL-MCNC: 2.3 MG/DL (ref 1.6–2.3)
MAGNESIUM SERPL-MCNC: 2.4 MG/DL (ref 1.6–2.3)
MAGNESIUM SERPL-MCNC: 2.6 MG/DL (ref 1.6–2.3)
MAGNESIUM SERPL-MCNC: 2.8 MG/DL (ref 1.6–2.3)
MAGNESIUM SERPL-MCNC: 3.3 MG/DL (ref 1.6–2.3)
MCH RBC QN AUTO: 27.4 PG (ref 26.5–33)
MCH RBC QN AUTO: 27.7 PG (ref 26.5–33)
MCH RBC QN AUTO: 27.8 PG (ref 26.5–33)
MCH RBC QN AUTO: 27.9 PG (ref 26.5–33)
MCH RBC QN AUTO: 28 PG (ref 26.5–33)
MCH RBC QN AUTO: 28.3 PG (ref 26.5–33)
MCH RBC QN AUTO: 28.6 PG (ref 26.5–33)
MCH RBC QN AUTO: 28.7 PG (ref 26.5–33)
MCH RBC QN AUTO: 28.8 PG (ref 26.5–33)
MCH RBC QN AUTO: 28.9 PG (ref 26.5–33)
MCH RBC QN AUTO: 29 PG (ref 26.5–33)
MCH RBC QN AUTO: 29.2 PG (ref 26.5–33)
MCH RBC QN AUTO: 29.2 PG (ref 26.5–33)
MCH RBC QN AUTO: 29.3 PG (ref 26.5–33)
MCH RBC QN AUTO: 29.4 PG (ref 26.5–33)
MCH RBC QN AUTO: 29.4 PG (ref 26.5–33)
MCH RBC QN AUTO: 29.5 PG (ref 26.5–33)
MCH RBC QN AUTO: 29.6 PG (ref 26.5–33)
MCH RBC QN AUTO: 29.6 PG (ref 26.5–33)
MCH RBC QN AUTO: 29.7 PG (ref 26.5–33)
MCH RBC QN AUTO: 29.8 PG (ref 26.5–33)
MCHC RBC AUTO-ENTMCNC: 31.3 G/DL (ref 31.5–36.5)
MCHC RBC AUTO-ENTMCNC: 31.5 G/DL (ref 31.5–36.5)
MCHC RBC AUTO-ENTMCNC: 31.6 G/DL (ref 31.5–36.5)
MCHC RBC AUTO-ENTMCNC: 31.6 G/DL (ref 31.5–36.5)
MCHC RBC AUTO-ENTMCNC: 31.9 G/DL (ref 31.5–36.5)
MCHC RBC AUTO-ENTMCNC: 31.9 G/DL (ref 31.5–36.5)
MCHC RBC AUTO-ENTMCNC: 32 G/DL (ref 31.5–36.5)
MCHC RBC AUTO-ENTMCNC: 32.4 G/DL (ref 31.5–36.5)
MCHC RBC AUTO-ENTMCNC: 32.5 G/DL (ref 31.5–36.5)
MCHC RBC AUTO-ENTMCNC: 32.5 G/DL (ref 31.5–36.5)
MCHC RBC AUTO-ENTMCNC: 32.6 G/DL (ref 31.5–36.5)
MCHC RBC AUTO-ENTMCNC: 32.6 G/DL (ref 31.5–36.5)
MCHC RBC AUTO-ENTMCNC: 32.7 G/DL (ref 31.5–36.5)
MCHC RBC AUTO-ENTMCNC: 32.8 G/DL (ref 31.5–36.5)
MCHC RBC AUTO-ENTMCNC: 32.9 G/DL (ref 31.5–36.5)
MCHC RBC AUTO-ENTMCNC: 33 G/DL (ref 31.5–36.5)
MCHC RBC AUTO-ENTMCNC: 33 G/DL (ref 31.5–36.5)
MCHC RBC AUTO-ENTMCNC: 33.2 G/DL (ref 31.5–36.5)
MCHC RBC AUTO-ENTMCNC: 33.3 G/DL (ref 31.5–36.5)
MCHC RBC AUTO-ENTMCNC: 33.3 G/DL (ref 31.5–36.5)
MCHC RBC AUTO-ENTMCNC: 33.8 G/DL (ref 31.5–36.5)
MCHC RBC AUTO-ENTMCNC: 34.1 G/DL (ref 31.5–36.5)
MCHC RBC AUTO-ENTMCNC: 34.1 G/DL (ref 31.5–36.5)
MCHC RBC AUTO-ENTMCNC: 34.2 G/DL (ref 31.5–36.5)
MCHC RBC AUTO-ENTMCNC: 34.3 G/DL (ref 31.5–36.5)
MCHC RBC AUTO-ENTMCNC: 34.5 G/DL (ref 31.5–36.5)
MCHC RBC AUTO-ENTMCNC: 34.6 G/DL (ref 31.5–36.5)
MCHC RBC AUTO-ENTMCNC: 34.7 G/DL (ref 31.5–36.5)
MCHC RBC AUTO-ENTMCNC: 34.9 G/DL (ref 31.5–36.5)
MCHC RBC AUTO-ENTMCNC: 35 G/DL (ref 31.5–36.5)
MCHC RBC AUTO-ENTMCNC: 35.1 G/DL (ref 31.5–36.5)
MCHC RBC AUTO-ENTMCNC: 35.2 G/DL (ref 31.5–36.5)
MCHC RBC AUTO-ENTMCNC: 35.4 G/DL (ref 31.5–36.5)
MCV RBC AUTO: 82 FL (ref 78–100)
MCV RBC AUTO: 83 FL (ref 78–100)
MCV RBC AUTO: 84 FL (ref 78–100)
MCV RBC AUTO: 85 FL (ref 78–100)
MCV RBC AUTO: 86 FL (ref 78–100)
MCV RBC AUTO: 87 FL (ref 78–100)
MCV RBC AUTO: 88 FL (ref 78–100)
MCV RBC AUTO: 89 FL (ref 78–100)
MCV RBC AUTO: 90 FL (ref 78–100)
MCV RBC AUTO: 91 FL (ref 78–100)
MCV RBC AUTO: 93 FL (ref 78–100)
METAMYELOCYTES # BLD: 0.1 10E9/L
METAMYELOCYTES # BLD: 0.2 10E9/L
METAMYELOCYTES # BLD: 0.4 10E9/L
METAMYELOCYTES # BLD: 0.6 10E9/L
METAMYELOCYTES NFR BLD MANUAL: 2 %
METAMYELOCYTES NFR BLD MANUAL: 3 %
METAMYELOCYTES NFR BLD MANUAL: 4 %
METAMYELOCYTES NFR BLD MANUAL: 8 %
MICRO REPORT STATUS: ABNORMAL
MICRO REPORT STATUS: NORMAL
MICROORGANISM SPEC CULT: ABNORMAL
MONOCYTES # BLD AUTO: 0 10E9/L (ref 0–1.3)
MONOCYTES # BLD AUTO: 0.1 10E9/L (ref 0–1.3)
MONOCYTES # BLD AUTO: 0.3 10E9/L (ref 0–1.3)
MONOCYTES # BLD AUTO: 0.4 10E9/L (ref 0–1.3)
MONOCYTES # BLD AUTO: 0.4 10E9/L (ref 0–1.3)
MONOCYTES # BLD AUTO: 0.5 10E9/L (ref 0–1.3)
MONOCYTES # BLD AUTO: 0.6 10E9/L (ref 0–1.3)
MONOCYTES # BLD AUTO: 0.7 10E9/L (ref 0–1.3)
MONOCYTES NFR BLD AUTO: 0 %
MONOCYTES NFR BLD AUTO: 1 %
MONOCYTES NFR BLD AUTO: 1.3 %
MONOCYTES NFR BLD AUTO: 1.8 %
MONOCYTES NFR BLD AUTO: 2.7 %
MONOCYTES NFR BLD AUTO: 2.7 %
MONOCYTES NFR BLD AUTO: 3 %
MONOCYTES NFR BLD AUTO: 3.3 %
MONOCYTES NFR BLD AUTO: 4 %
MONOCYTES NFR BLD AUTO: 4.1 %
MONOCYTES NFR BLD AUTO: 5.1 %
MONOCYTES NFR BLD AUTO: 5.4 %
MONOCYTES NFR BLD AUTO: 5.5 %
MONOS+MACROS NFR FLD MANUAL: 31 %
MONOS+MACROS NFR FLD MANUAL: 34 %
MRSA DNA SPEC QL NAA+PROBE: NEGATIVE
MRSA DNA SPEC QL NAA+PROBE: NORMAL
MUCOUS THREADS #/AREA URNS LPF: PRESENT /LPF
MYCOBACTERIUM SPEC CULT: NORMAL
MYCOBACTERIUM SPEC CULT: NORMAL
MYELOCYTES # BLD: 0.1 10E9/L
MYELOCYTES # BLD: 0.2 10E9/L
MYELOCYTES # BLD: 0.2 10E9/L
MYELOCYTES NFR BLD MANUAL: 0.5 %
MYELOCYTES NFR BLD MANUAL: 3 %
MYELOCYTES NFR BLD MANUAL: 3 %
NEUTROPHILS # BLD AUTO: 0.7 10E9/L (ref 1.6–8.3)
NEUTROPHILS # BLD AUTO: 1.3 10E9/L (ref 1.6–8.3)
NEUTROPHILS # BLD AUTO: 10.5 10E9/L (ref 1.6–8.3)
NEUTROPHILS # BLD AUTO: 12.2 10E9/L (ref 1.6–8.3)
NEUTROPHILS # BLD AUTO: 12.6 10E9/L (ref 1.6–8.3)
NEUTROPHILS # BLD AUTO: 13 10E9/L (ref 1.6–8.3)
NEUTROPHILS # BLD AUTO: 17.3 10E9/L (ref 1.6–8.3)
NEUTROPHILS # BLD AUTO: 2.6 10E9/L (ref 1.6–8.3)
NEUTROPHILS # BLD AUTO: 2.8 10E9/L (ref 1.6–8.3)
NEUTROPHILS # BLD AUTO: 20.8 10E9/L (ref 1.6–8.3)
NEUTROPHILS # BLD AUTO: 4.5 10E9/L (ref 1.6–8.3)
NEUTROPHILS # BLD AUTO: 5.5 10E9/L (ref 1.6–8.3)
NEUTROPHILS # BLD AUTO: 5.7 10E9/L (ref 1.6–8.3)
NEUTROPHILS # BLD AUTO: 5.7 10E9/L (ref 1.6–8.3)
NEUTROPHILS # BLD AUTO: 8.5 10E9/L (ref 1.6–8.3)
NEUTROPHILS NFR BLD AUTO: 28 %
NEUTROPHILS NFR BLD AUTO: 70.7 %
NEUTROPHILS NFR BLD AUTO: 71.6 %
NEUTROPHILS NFR BLD AUTO: 75 %
NEUTROPHILS NFR BLD AUTO: 79 %
NEUTROPHILS NFR BLD AUTO: 79.2 %
NEUTROPHILS NFR BLD AUTO: 79.9 %
NEUTROPHILS NFR BLD AUTO: 82.6 %
NEUTROPHILS NFR BLD AUTO: 85 %
NEUTROPHILS NFR BLD AUTO: 85 %
NEUTROPHILS NFR BLD AUTO: 87 %
NEUTROPHILS NFR BLD AUTO: 88 %
NEUTROPHILS NFR BLD AUTO: 89.3 %
NEUTROPHILS NFR BLD AUTO: 90.4 %
NEUTROPHILS NFR BLD AUTO: 92 %
NEUTS BAND NFR FLD MANUAL: 10 %
NEUTS BAND NFR FLD MANUAL: 24 %
NITRATE UR QL: NEGATIVE
NRBC # BLD AUTO: 0 10*3/UL
NRBC # BLD AUTO: 0.1 10*3/UL
NRBC BLD AUTO-RTO: 0 /100
NRBC BLD AUTO-RTO: 1 /100
NRBC BLD AUTO-RTO: 1 /100
NUM BPU REQUESTED: 1
NUM BPU REQUESTED: 2
NUM BPU REQUESTED: 3
O2/TOTAL GAS SETTING VFR VENT: 100 %
O2/TOTAL GAS SETTING VFR VENT: 40 %
O2/TOTAL GAS SETTING VFR VENT: 70 %
O2/TOTAL GAS SETTING VFR VENT: ABNORMAL %
OTHER CELLS FLD MANUAL: 5 %
OVALOCYTES BLD QL SMEAR: SLIGHT
OVALOCYTES BLD QL SMEAR: SLIGHT
OXYHGB MFR BLD: 100 % (ref 92–100)
OXYHGB MFR BLD: 85 % (ref 92–100)
OXYHGB MFR BLD: 94 % (ref 92–100)
OXYHGB MFR BLD: 98 % (ref 92–100)
OXYHGB MFR BLD: 99 % (ref 92–100)
OXYHGB MFR BLD: 99 % (ref 92–100)
OXYHGB MFR BLDV: 65 %
OXYHGB MFR BLDV: 72 %
OXYHGB MFR BLDV: 75 %
OXYHGB MFR BLDV: 76 %
OXYHGB MFR BLDV: 76 %
PCO2 BLD: 35 MM HG (ref 35–45)
PCO2 BLD: 37 MM HG (ref 35–45)
PCO2 BLD: 39 MM HG (ref 35–45)
PCO2 BLD: 43 MM HG (ref 35–45)
PCO2 BLD: 44 MM HG (ref 35–45)
PCO2 BLD: 46 MM HG (ref 35–45)
PCO2 BLD: 89 MM HG (ref 35–45)
PCO2 BLD: 89 MM HG (ref 35–45)
PCO2 BLDV: 39 MM HG (ref 40–50)
PCO2 BLDV: 41 MM HG (ref 40–50)
PCO2 BLDV: 45 MM HG (ref 40–50)
PCO2 BLDV: 46 MM HG (ref 40–50)
PCO2 BLDV: 53 MM HG (ref 40–50)
PCO2 BLDV: 87 MM HG (ref 40–50)
PH BLD: 7.02 PH (ref 7.35–7.45)
PH BLD: 7.05 PH (ref 7.35–7.45)
PH BLD: 7.28 PH (ref 7.35–7.45)
PH BLD: 7.29 PH (ref 7.35–7.45)
PH BLD: 7.29 PH (ref 7.35–7.45)
PH BLD: 7.33 PH (ref 7.35–7.45)
PH BLD: 7.39 PH (ref 7.35–7.45)
PH BLD: 7.41 PH (ref 7.35–7.45)
PH BLDV: 7.06 PH (ref 7.32–7.43)
PH BLDV: 7.21 PH (ref 7.32–7.43)
PH BLDV: 7.3 PH (ref 7.32–7.43)
PH BLDV: 7.33 PH (ref 7.32–7.43)
PH BLDV: 7.37 PH (ref 7.32–7.43)
PH BLDV: 7.43 PH (ref 7.32–7.43)
PH UR STRIP: 5 PH (ref 5–7)
PH UR STRIP: 5.5 PH (ref 5–7)
PHOSPHATE SERPL-MCNC: 1.7 MG/DL (ref 2.5–4.5)
PHOSPHATE SERPL-MCNC: 1.9 MG/DL (ref 2.5–4.5)
PHOSPHATE SERPL-MCNC: 2.2 MG/DL (ref 2.5–4.5)
PHOSPHATE SERPL-MCNC: 2.5 MG/DL (ref 2.5–4.5)
PHOSPHATE SERPL-MCNC: 2.5 MG/DL (ref 2.5–4.5)
PHOSPHATE SERPL-MCNC: 2.7 MG/DL (ref 2.5–4.5)
PHOSPHATE SERPL-MCNC: 3 MG/DL (ref 2.5–4.5)
PHOSPHATE SERPL-MCNC: 3.1 MG/DL (ref 2.5–4.5)
PHOSPHATE SERPL-MCNC: 3.1 MG/DL (ref 2.5–4.5)
PHOSPHATE SERPL-MCNC: 3.2 MG/DL (ref 2.5–4.5)
PHOSPHATE SERPL-MCNC: 3.8 MG/DL (ref 2.5–4.5)
PHOSPHATE SERPL-MCNC: 3.9 MG/DL (ref 2.5–4.5)
PHOSPHATE SERPL-MCNC: 4.1 MG/DL (ref 2.5–4.5)
PHOSPHATE SERPL-MCNC: 4.1 MG/DL (ref 2.5–4.5)
PHOSPHATE SERPL-MCNC: 4.2 MG/DL (ref 2.5–4.5)
PHOSPHATE SERPL-MCNC: 4.4 MG/DL (ref 2.5–4.5)
PHOSPHATE SERPL-MCNC: 4.5 MG/DL (ref 2.5–4.5)
PHOSPHATE SERPL-MCNC: 4.5 MG/DL (ref 2.5–4.5)
PHOSPHATE SERPL-MCNC: 4.6 MG/DL (ref 2.5–4.5)
PHOSPHATE SERPL-MCNC: 4.9 MG/DL (ref 2.5–4.5)
PHOSPHATE SERPL-MCNC: 5 MG/DL (ref 2.5–4.5)
PHOSPHATE SERPL-MCNC: 5 MG/DL (ref 2.5–4.5)
PHOSPHATE SERPL-MCNC: 5.1 MG/DL (ref 2.5–4.5)
PHOSPHATE SERPL-MCNC: 5.6 MG/DL (ref 2.5–4.5)
PLATELET # BLD AUTO: 151 10E9/L (ref 150–450)
PLATELET # BLD AUTO: 155 10E9/L (ref 150–450)
PLATELET # BLD AUTO: 170 10E9/L (ref 150–450)
PLATELET # BLD AUTO: 172 10E9/L (ref 150–450)
PLATELET # BLD AUTO: 172 10E9/L (ref 150–450)
PLATELET # BLD AUTO: 173 10E9/L (ref 150–450)
PLATELET # BLD AUTO: 194 10E9/L (ref 150–450)
PLATELET # BLD AUTO: 208 10E9/L (ref 150–450)
PLATELET # BLD AUTO: 209 10E9/L (ref 150–450)
PLATELET # BLD AUTO: 218 10E9/L (ref 150–450)
PLATELET # BLD AUTO: 218 10E9/L (ref 150–450)
PLATELET # BLD AUTO: 225 10E9/L (ref 150–450)
PLATELET # BLD AUTO: 226 10E9/L (ref 150–450)
PLATELET # BLD AUTO: 231 10E9/L (ref 150–450)
PLATELET # BLD AUTO: 237 10E9/L (ref 150–450)
PLATELET # BLD AUTO: 239 10E9/L (ref 150–450)
PLATELET # BLD AUTO: 267 10E9/L (ref 150–450)
PLATELET # BLD AUTO: 273 10E9/L (ref 150–450)
PLATELET # BLD AUTO: 282 10E9/L (ref 150–450)
PLATELET # BLD AUTO: 299 10E9/L (ref 150–450)
PLATELET # BLD AUTO: 300 10E9/L (ref 150–450)
PLATELET # BLD AUTO: 308 10E9/L (ref 150–450)
PLATELET # BLD AUTO: 321 10E9/L (ref 150–450)
PLATELET # BLD AUTO: 329 10E9/L (ref 150–450)
PLATELET # BLD AUTO: 332 10E9/L (ref 150–450)
PLATELET # BLD AUTO: 335 10E9/L (ref 150–450)
PLATELET # BLD AUTO: 337 10E9/L (ref 150–450)
PLATELET # BLD AUTO: 339 10E9/L (ref 150–450)
PLATELET # BLD AUTO: 341 10E9/L (ref 150–450)
PLATELET # BLD AUTO: 368 10E9/L (ref 150–450)
PLATELET # BLD AUTO: 411 10E9/L (ref 150–450)
PLATELET # BLD AUTO: 430 10E9/L (ref 150–450)
PLATELET # BLD AUTO: 453 10E9/L (ref 150–450)
PLATELET # BLD AUTO: 467 10E9/L (ref 150–450)
PLATELET # BLD AUTO: 471 10E9/L (ref 150–450)
PLATELET # BLD AUTO: 474 10E9/L (ref 150–450)
PLATELET # BLD AUTO: 487 10E9/L (ref 150–450)
PLATELET # BLD AUTO: 509 10E9/L (ref 150–450)
PLATELET # BLD AUTO: 518 10E9/L (ref 150–450)
PLATELET # BLD AUTO: 535 10E9/L (ref 150–450)
PLATELET # BLD AUTO: 549 10E9/L (ref 150–450)
PLATELET # BLD AUTO: 557 10E9/L (ref 150–450)
PLATELET # BLD AUTO: 611 10E9/L (ref 150–450)
PLATELET # BLD AUTO: 640 10E9/L (ref 150–450)
PLATELET # BLD EST: ABNORMAL 10*3/UL
PO2 BLD: 114 MM HG (ref 80–105)
PO2 BLD: 118 MM HG (ref 80–105)
PO2 BLD: 126 MM HG (ref 80–105)
PO2 BLD: 305 MM HG (ref 80–105)
PO2 BLD: 69 MM HG (ref 80–105)
PO2 BLD: 76 MM HG (ref 80–105)
PO2 BLD: 85 MM HG (ref 80–105)
PO2 BLD: 97 MM HG (ref 80–105)
PO2 BLDV: 14 MM HG (ref 25–47)
PO2 BLDV: 37 MM HG (ref 25–47)
PO2 BLDV: 39 MM HG (ref 25–47)
PO2 BLDV: 43 MM HG (ref 25–47)
PO2 BLDV: 43 MM HG (ref 25–47)
PO2 BLDV: 45 MM HG (ref 25–47)
POLYCHROMASIA BLD QL SMEAR: SLIGHT
POLYCHROMASIA BLD QL SMEAR: SLIGHT
POTASSIUM BLD-SCNC: 3.4 MMOL/L (ref 3.4–5.3)
POTASSIUM SERPL-SCNC: 3 MMOL/L (ref 3.4–5.3)
POTASSIUM SERPL-SCNC: 3.1 MMOL/L (ref 3.4–5.3)
POTASSIUM SERPL-SCNC: 3.2 MMOL/L (ref 3.4–5.3)
POTASSIUM SERPL-SCNC: 3.3 MMOL/L (ref 3.4–5.3)
POTASSIUM SERPL-SCNC: 3.5 MMOL/L (ref 3.4–5.3)
POTASSIUM SERPL-SCNC: 3.6 MMOL/L (ref 3.4–5.3)
POTASSIUM SERPL-SCNC: 3.7 MMOL/L (ref 3.4–5.3)
POTASSIUM SERPL-SCNC: 3.8 MMOL/L (ref 3.4–5.3)
POTASSIUM SERPL-SCNC: 3.9 MMOL/L (ref 3.4–5.3)
POTASSIUM SERPL-SCNC: 3.9 MMOL/L (ref 3.4–5.3)
POTASSIUM SERPL-SCNC: 4 MMOL/L (ref 3.4–5.3)
POTASSIUM SERPL-SCNC: 4.1 MMOL/L (ref 3.4–5.3)
POTASSIUM SERPL-SCNC: 4.3 MMOL/L (ref 3.4–5.3)
POTASSIUM SERPL-SCNC: 4.4 MMOL/L (ref 3.4–5.3)
POTASSIUM SERPL-SCNC: 4.5 MMOL/L (ref 3.4–5.3)
POTASSIUM SERPL-SCNC: 4.6 MMOL/L (ref 3.4–5.3)
POTASSIUM SERPL-SCNC: 4.7 MMOL/L (ref 3.4–5.3)
POTASSIUM SERPL-SCNC: 4.8 MMOL/L (ref 3.4–5.3)
POTASSIUM SERPL-SCNC: NORMAL MMOL/L (ref 3.4–5.3)
PREALB SERPL IA-MCNC: 10 MG/DL (ref 15–45)
PREALB SERPL IA-MCNC: 11 MG/DL (ref 15–45)
PREALB SERPL IA-MCNC: 4 MG/DL (ref 15–45)
PREALB SERPL IA-MCNC: 4 MG/DL (ref 15–45)
PREALB SERPL IA-MCNC: 6 MG/DL (ref 15–45)
PROCALCITONIN SERPL-MCNC: 0.45 NG/ML
PROCALCITONIN SERPL-MCNC: 0.52 NG/ML
PROCALCITONIN SERPL-MCNC: 0.56 NG/ML
PROCALCITONIN SERPL-MCNC: 0.71 NG/ML
PROCALCITONIN SERPL-MCNC: 0.74 NG/ML
PROCALCITONIN SERPL-MCNC: 1 NG/ML
PROCALCITONIN SERPL-MCNC: 1.56 NG/ML
PROCALCITONIN SERPL-MCNC: 22.18 NG/ML
PROCALCITONIN SERPL-MCNC: 61.38 NG/ML
PROCALCITONIN SERPL-MCNC: 72.24 NG/ML
PROT 24H UR-MRATE: 0.01 G/(24.H) (ref 0.04–0.23)
PROT FLD-MCNC: 0.9 G/DL
PROT FLD-MCNC: 3.2 G/DL
PROT SERPL-MCNC: 3.8 G/DL (ref 6.8–8.8)
PROT SERPL-MCNC: 4.3 G/DL (ref 6.8–8.8)
PROT SERPL-MCNC: 4.4 G/DL (ref 6.8–8.8)
PROT SERPL-MCNC: 4.6 G/DL (ref 6.8–8.8)
PROT SERPL-MCNC: 4.8 G/DL (ref 6.8–8.8)
PROT SERPL-MCNC: 4.9 G/DL (ref 6.8–8.8)
PROT SERPL-MCNC: 5.1 G/DL (ref 6.8–8.8)
PROT SERPL-MCNC: 5.2 G/DL (ref 6.8–8.8)
PROT SERPL-MCNC: 5.7 G/DL (ref 6.8–8.8)
PROT SERPL-MCNC: 6 G/DL (ref 6.8–8.8)
PROT SERPL-MCNC: 6.3 G/DL (ref 6.8–8.8)
PROT SERPL-MCNC: 6.4 G/DL (ref 6.8–8.8)
PROT SERPL-MCNC: 6.6 G/DL (ref 6.8–8.8)
PROT SERPL-MCNC: 6.7 G/DL (ref 6.8–8.8)
PROT SERPL-MCNC: 6.8 G/DL (ref 6.8–8.8)
PROT SERPL-MCNC: 6.9 G/DL (ref 6.8–8.8)
PROT SERPL-MCNC: 7 G/DL (ref 6.8–8.8)
PROT SERPL-MCNC: 7.1 G/DL (ref 6.8–8.8)
PROT UR-MCNC: 0.25 G/L
PROT/CREAT 24H UR: 2.27 G/G CR (ref 0–0.2)
RBC # BLD AUTO: 2.17 10E12/L (ref 3.8–5.2)
RBC # BLD AUTO: 2.25 10E12/L (ref 3.8–5.2)
RBC # BLD AUTO: 2.26 10E12/L (ref 3.8–5.2)
RBC # BLD AUTO: 2.34 10E12/L (ref 3.8–5.2)
RBC # BLD AUTO: 2.35 10E12/L (ref 3.8–5.2)
RBC # BLD AUTO: 2.36 10E12/L (ref 3.8–5.2)
RBC # BLD AUTO: 2.37 10E12/L (ref 3.8–5.2)
RBC # BLD AUTO: 2.37 10E12/L (ref 3.8–5.2)
RBC # BLD AUTO: 2.39 10E12/L (ref 3.8–5.2)
RBC # BLD AUTO: 2.39 10E12/L (ref 3.8–5.2)
RBC # BLD AUTO: 2.42 10E12/L (ref 3.8–5.2)
RBC # BLD AUTO: 2.48 10E12/L (ref 3.8–5.2)
RBC # BLD AUTO: 2.53 10E12/L (ref 3.8–5.2)
RBC # BLD AUTO: 2.54 10E12/L (ref 3.8–5.2)
RBC # BLD AUTO: 2.57 10E12/L (ref 3.8–5.2)
RBC # BLD AUTO: 2.6 10E12/L (ref 3.8–5.2)
RBC # BLD AUTO: 2.61 10E12/L (ref 3.8–5.2)
RBC # BLD AUTO: 2.62 10E12/L (ref 3.8–5.2)
RBC # BLD AUTO: 2.64 10E12/L (ref 3.8–5.2)
RBC # BLD AUTO: 2.65 10E12/L (ref 3.8–5.2)
RBC # BLD AUTO: 2.68 10E12/L (ref 3.8–5.2)
RBC # BLD AUTO: 2.69 10E12/L (ref 3.8–5.2)
RBC # BLD AUTO: 2.7 10E12/L (ref 3.8–5.2)
RBC # BLD AUTO: 2.73 10E12/L (ref 3.8–5.2)
RBC # BLD AUTO: 2.74 10E12/L (ref 3.8–5.2)
RBC # BLD AUTO: 2.74 10E12/L (ref 3.8–5.2)
RBC # BLD AUTO: 2.76 10E12/L (ref 3.8–5.2)
RBC # BLD AUTO: 2.78 10E12/L (ref 3.8–5.2)
RBC # BLD AUTO: 2.78 10E12/L (ref 3.8–5.2)
RBC # BLD AUTO: 2.81 10E12/L (ref 3.8–5.2)
RBC # BLD AUTO: 2.86 10E12/L (ref 3.8–5.2)
RBC # BLD AUTO: 2.87 10E12/L (ref 3.8–5.2)
RBC # BLD AUTO: 2.87 10E12/L (ref 3.8–5.2)
RBC # BLD AUTO: 2.88 10E12/L (ref 3.8–5.2)
RBC # BLD AUTO: 2.88 10E12/L (ref 3.8–5.2)
RBC # BLD AUTO: 2.92 10E12/L (ref 3.8–5.2)
RBC # BLD AUTO: 2.93 10E12/L (ref 3.8–5.2)
RBC # BLD AUTO: 3.09 10E12/L (ref 3.8–5.2)
RBC # BLD AUTO: 3.1 10E12/L (ref 3.8–5.2)
RBC # BLD AUTO: 3.18 10E12/L (ref 3.8–5.2)
RBC # BLD AUTO: 3.66 10E12/L (ref 3.8–5.2)
RBC #/AREA URNS AUTO: 1 /HPF (ref 0–2)
RBC #/AREA URNS AUTO: 4 /HPF (ref 0–2)
RBC #/AREA URNS AUTO: <1 /HPF (ref 0–2)
RBC MORPH BLD: ABNORMAL
RBC MORPH BLD: NORMAL
RBC MORPH BLD: NORMAL
RETICS # AUTO: 91.6 10E9/L (ref 25–95)
RETICS/RBC NFR AUTO: 2.9 % (ref 0.5–2)
SAO2 % BLDA FROM PO2: 98 % (ref 92–100)
SAO2 % BLDV FROM PO2: 13 %
SMUDGE CELLS BLD QL SMEAR: PRESENT
SODIUM BLD-SCNC: 132 MMOL/L (ref 133–144)
SODIUM SERPL-SCNC: 128 MMOL/L (ref 133–144)
SODIUM SERPL-SCNC: 130 MMOL/L (ref 133–144)
SODIUM SERPL-SCNC: 131 MMOL/L (ref 133–144)
SODIUM SERPL-SCNC: 132 MMOL/L (ref 133–144)
SODIUM SERPL-SCNC: 133 MMOL/L (ref 133–144)
SODIUM SERPL-SCNC: 133 MMOL/L (ref 133–144)
SODIUM SERPL-SCNC: 134 MMOL/L (ref 133–144)
SODIUM SERPL-SCNC: 135 MMOL/L (ref 133–144)
SODIUM SERPL-SCNC: 136 MMOL/L (ref 133–144)
SODIUM SERPL-SCNC: 137 MMOL/L (ref 133–144)
SODIUM SERPL-SCNC: 138 MMOL/L (ref 133–144)
SODIUM SERPL-SCNC: 139 MMOL/L (ref 133–144)
SODIUM SERPL-SCNC: 140 MMOL/L (ref 133–144)
SODIUM SERPL-SCNC: 141 MMOL/L (ref 133–144)
SODIUM SERPL-SCNC: 142 MMOL/L (ref 133–144)
SODIUM SERPL-SCNC: 142 MMOL/L (ref 133–144)
SODIUM SERPL-SCNC: 143 MMOL/L (ref 133–144)
SODIUM SERPL-SCNC: 144 MMOL/L (ref 133–144)
SODIUM SERPL-SCNC: 145 MMOL/L (ref 133–144)
SODIUM SERPL-SCNC: 146 MMOL/L (ref 133–144)
SODIUM SERPL-SCNC: 146 MMOL/L (ref 133–144)
SODIUM SERPL-SCNC: 147 MMOL/L (ref 133–144)
SODIUM UR-SCNC: 11 MMOL/L
SODIUM UR-SCNC: 11 MMOL/L
SODIUM UR-SCNC: 27 MMOL/L
SOURCE: ABNORMAL
SOURCE: ABNORMAL
SP GR UR STRIP: 1.01 (ref 1–1.03)
SP GR UR STRIP: 1.02 (ref 1–1.03)
SP GR UR STRIP: 1.03 (ref 1–1.03)
SPECIMEN EXP DATE BLD: NORMAL
SPECIMEN SOURCE FLD: NORMAL
SPECIMEN SOURCE: ABNORMAL
SPECIMEN SOURCE: NORMAL
SPECIMEN VOL UR: 2850 ML
SPECIMEN VOL UR: 50 ML
SQUAMOUS #/AREA URNS AUTO: 1 /HPF (ref 0–1)
SQUAMOUS #/AREA URNS AUTO: 1 /HPF (ref 0–1)
SQUAMOUS #/AREA URNS AUTO: <1 /HPF (ref 0–1)
TARGETS BLD QL SMEAR: ABNORMAL
TARGETS BLD QL SMEAR: ABNORMAL
TARGETS BLD QL SMEAR: SLIGHT
TIBC SERPL-MCNC: 152 UG/DL (ref 240–430)
TOXIC GRANULES BLD QL SMEAR: PRESENT
TRANSFUSION STATUS PATIENT QL: NORMAL
TRIGL SERPL-MCNC: 120 MG/DL
TRIGL SERPL-MCNC: 51 MG/DL
TRIGL SERPL-MCNC: 81 MG/DL
TROPONIN I SERPL-MCNC: NORMAL UG/L (ref 0–0.04)
TSH SERPL DL<=0.005 MIU/L-ACNC: 1.26 MU/L (ref 0.4–4)
URN SPEC COLLECT METH UR: ABNORMAL
UROBILINOGEN UR STRIP-MCNC: 2 MG/DL (ref 0–2)
UROBILINOGEN UR STRIP-MCNC: NORMAL MG/DL (ref 0–2)
VANCOMYCIN SERPL-MCNC: 13.3 MG/L
VANCOMYCIN SERPL-MCNC: 16.8 MG/L
VANCOMYCIN SERPL-MCNC: 19.4 MG/L
VANCOMYCIN SERPL-MCNC: 40.2 MG/L
VARIANT LYMPHS BLD QL SMEAR: PRESENT
WBC # BLD AUTO: 1.8 10E9/L (ref 4–11)
WBC # BLD AUTO: 10.3 10E9/L (ref 4–11)
WBC # BLD AUTO: 10.7 10E9/L (ref 4–11)
WBC # BLD AUTO: 10.8 10E9/L (ref 4–11)
WBC # BLD AUTO: 11 10E9/L (ref 4–11)
WBC # BLD AUTO: 11.1 10E9/L (ref 4–11)
WBC # BLD AUTO: 11.3 10E9/L (ref 4–11)
WBC # BLD AUTO: 11.8 10E9/L (ref 4–11)
WBC # BLD AUTO: 12 10E9/L (ref 4–11)
WBC # BLD AUTO: 12 10E9/L (ref 4–11)
WBC # BLD AUTO: 12.1 10E9/L (ref 4–11)
WBC # BLD AUTO: 12.2 10E9/L (ref 4–11)
WBC # BLD AUTO: 12.3 10E9/L (ref 4–11)
WBC # BLD AUTO: 12.6 10E9/L (ref 4–11)
WBC # BLD AUTO: 12.6 10E9/L (ref 4–11)
WBC # BLD AUTO: 12.7 10E9/L (ref 4–11)
WBC # BLD AUTO: 12.7 10E9/L (ref 4–11)
WBC # BLD AUTO: 13.3 10E9/L (ref 4–11)
WBC # BLD AUTO: 13.7 10E9/L (ref 4–11)
WBC # BLD AUTO: 13.9 10E9/L (ref 4–11)
WBC # BLD AUTO: 14.5 10E9/L (ref 4–11)
WBC # BLD AUTO: 14.8 10E9/L (ref 4–11)
WBC # BLD AUTO: 15.3 10E9/L (ref 4–11)
WBC # BLD AUTO: 15.8 10E9/L (ref 4–11)
WBC # BLD AUTO: 16 10E9/L (ref 4–11)
WBC # BLD AUTO: 16.2 10E9/L (ref 4–11)
WBC # BLD AUTO: 17.2 10E9/L (ref 4–11)
WBC # BLD AUTO: 17.8 10E9/L (ref 4–11)
WBC # BLD AUTO: 18.6 10E9/L (ref 4–11)
WBC # BLD AUTO: 19.1 10E9/L (ref 4–11)
WBC # BLD AUTO: 19.4 10E9/L (ref 4–11)
WBC # BLD AUTO: 2.6 10E9/L (ref 4–11)
WBC # BLD AUTO: 22.6 10E9/L (ref 4–11)
WBC # BLD AUTO: 3.3 10E9/L (ref 4–11)
WBC # BLD AUTO: 3.6 10E9/L (ref 4–11)
WBC # BLD AUTO: 5.3 10E9/L (ref 4–11)
WBC # BLD AUTO: 6 10E9/L (ref 4–11)
WBC # BLD AUTO: 6.3 10E9/L (ref 4–11)
WBC # BLD AUTO: 6.6 10E9/L (ref 4–11)
WBC # BLD AUTO: 7.5 10E9/L (ref 4–11)
WBC # BLD AUTO: 7.6 10E9/L (ref 4–11)
WBC # BLD AUTO: 7.7 10E9/L (ref 4–11)
WBC # BLD AUTO: 9.9 10E9/L (ref 4–11)
WBC # FLD AUTO: 82 /UL
WBC # FLD AUTO: NORMAL /UL
WBC #/AREA URNS AUTO: 1 /HPF (ref 0–2)
WBC #/AREA URNS AUTO: 2 /HPF (ref 0–2)
WBC #/AREA URNS AUTO: 3 /HPF (ref 0–2)
WBC #/AREA URNS AUTO: 6 /HPF (ref 0–2)
WBC #/AREA URNS AUTO: 7 /HPF (ref 0–2)
WBC #/AREA URNS AUTO: 7 /HPF (ref 0–2)
YEAST SPEC QL CULT: NORMAL

## 2017-01-01 PROCEDURE — 25000125 ZZHC RX 250: Performed by: INTERNAL MEDICINE

## 2017-01-01 PROCEDURE — 87040 BLOOD CULTURE FOR BACTERIA: CPT | Performed by: INTERNAL MEDICINE

## 2017-01-01 PROCEDURE — 94002 VENT MGMT INPAT INIT DAY: CPT

## 2017-01-01 PROCEDURE — 25000132 ZZH RX MED GY IP 250 OP 250 PS 637: Performed by: INTERNAL MEDICINE

## 2017-01-01 PROCEDURE — 83735 ASSAY OF MAGNESIUM: CPT | Performed by: PHYSICIAN ASSISTANT

## 2017-01-01 PROCEDURE — 40000275 ZZH STATISTIC RCP TIME EA 10 MIN

## 2017-01-01 PROCEDURE — 71010 XR CHEST PORT 1 VW: CPT

## 2017-01-01 PROCEDURE — 99232 SBSQ HOSP IP/OBS MODERATE 35: CPT | Performed by: INTERNAL MEDICINE

## 2017-01-01 PROCEDURE — 83735 ASSAY OF MAGNESIUM: CPT | Performed by: INTERNAL MEDICINE

## 2017-01-01 PROCEDURE — 25000132 ZZH RX MED GY IP 250 OP 250 PS 637: Performed by: SURGERY

## 2017-01-01 PROCEDURE — 25000132 ZZH RX MED GY IP 250 OP 250 PS 637

## 2017-01-01 PROCEDURE — 27210429 ZZH NUTRITION PRODUCT INTERMEDIATE LITER

## 2017-01-01 PROCEDURE — 40000008 ZZH STATISTIC AIRWAY CARE

## 2017-01-01 PROCEDURE — 80053 COMPREHEN METABOLIC PANEL: CPT | Performed by: INTERNAL MEDICINE

## 2017-01-01 PROCEDURE — 87075 CULTR BACTERIA EXCEPT BLOOD: CPT | Performed by: NURSE PRACTITIONER

## 2017-01-01 PROCEDURE — 40000281 ZZH STATISTIC TRANSPORT TIME EA 15 MIN

## 2017-01-01 PROCEDURE — 99291 CRITICAL CARE FIRST HOUR: CPT | Performed by: NURSE PRACTITIONER

## 2017-01-01 PROCEDURE — 99291 CRITICAL CARE FIRST HOUR: CPT | Performed by: INTERNAL MEDICINE

## 2017-01-01 PROCEDURE — P9047 ALBUMIN (HUMAN), 25%, 50ML: HCPCS | Performed by: INTERNAL MEDICINE

## 2017-01-01 PROCEDURE — 94640 AIRWAY INHALATION TREATMENT: CPT

## 2017-01-01 PROCEDURE — 94640 AIRWAY INHALATION TREATMENT: CPT | Mod: 76

## 2017-01-01 PROCEDURE — 89051 BODY FLUID CELL COUNT: CPT | Performed by: INTERNAL MEDICINE

## 2017-01-01 PROCEDURE — 00000146 ZZHCL STATISTIC GLUCOSE BY METER IP

## 2017-01-01 PROCEDURE — 83605 ASSAY OF LACTIC ACID: CPT | Performed by: INTERNAL MEDICINE

## 2017-01-01 PROCEDURE — 25000128 H RX IP 250 OP 636: Performed by: INTERNAL MEDICINE

## 2017-01-01 PROCEDURE — 94003 VENT MGMT INPAT SUBQ DAY: CPT

## 2017-01-01 PROCEDURE — 85004 AUTOMATED DIFF WBC COUNT: CPT | Performed by: SURGERY

## 2017-01-01 PROCEDURE — 85060 BLOOD SMEAR INTERPRETATION: CPT | Performed by: INTERNAL MEDICINE

## 2017-01-01 PROCEDURE — 40000854 ZZH STATISTIC SIMPLE TUBE INSERTION/CHARGE, PORT, CATH, FISTULOGRAM

## 2017-01-01 PROCEDURE — 0B113F4 BYPASS TRACHEA TO CUTANEOUS WITH TRACHEOSTOMY DEVICE, PERCUTANEOUS APPROACH: ICD-10-PCS | Performed by: THORACIC SURGERY (CARDIOTHORACIC VASCULAR SURGERY)

## 2017-01-01 PROCEDURE — 85610 PROTHROMBIN TIME: CPT | Performed by: INTERNAL MEDICINE

## 2017-01-01 PROCEDURE — 85610 PROTHROMBIN TIME: CPT | Performed by: RADIOLOGY

## 2017-01-01 PROCEDURE — 27210995 ZZH RX 272: Performed by: THORACIC SURGERY (CARDIOTHORACIC VASCULAR SURGERY)

## 2017-01-01 PROCEDURE — 3E1M38Z IRRIGATION OF PERITONEAL CAVITY USING IRRIGATING SUBSTANCE, PERCUTANEOUS APPROACH: ICD-10-PCS | Performed by: SURGERY

## 2017-01-01 PROCEDURE — 86901 BLOOD TYPING SEROLOGIC RH(D): CPT | Performed by: THORACIC SURGERY (CARDIOTHORACIC VASCULAR SURGERY)

## 2017-01-01 PROCEDURE — 25000128 H RX IP 250 OP 636: Performed by: NURSE PRACTITIONER

## 2017-01-01 PROCEDURE — 25000132 ZZH RX MED GY IP 250 OP 250 PS 637: Performed by: NURSE PRACTITIONER

## 2017-01-01 PROCEDURE — 25000128 H RX IP 250 OP 636: Performed by: SURGERY

## 2017-01-01 PROCEDURE — 25800025 ZZH RX 258: Performed by: SURGERY

## 2017-01-01 PROCEDURE — 25000125 ZZHC RX 250: Performed by: NURSE ANESTHETIST, CERTIFIED REGISTERED

## 2017-01-01 PROCEDURE — 85027 COMPLETE CBC AUTOMATED: CPT | Performed by: INTERNAL MEDICINE

## 2017-01-01 PROCEDURE — 83615 LACTATE (LD) (LDH) ENZYME: CPT | Performed by: INTERNAL MEDICINE

## 2017-01-01 PROCEDURE — 87205 SMEAR GRAM STAIN: CPT | Performed by: NURSE PRACTITIONER

## 2017-01-01 PROCEDURE — 20000003 ZZH R&B ICU

## 2017-01-01 PROCEDURE — 85025 COMPLETE CBC W/AUTO DIFF WBC: CPT | Performed by: INTERNAL MEDICINE

## 2017-01-01 PROCEDURE — 86850 RBC ANTIBODY SCREEN: CPT | Performed by: NURSE PRACTITIONER

## 2017-01-01 PROCEDURE — 87070 CULTURE OTHR SPECIMN AEROBIC: CPT | Performed by: INTERNAL MEDICINE

## 2017-01-01 PROCEDURE — 87181 SC STD AGAR DILUTION PER AGT: CPT | Performed by: SURGERY

## 2017-01-01 PROCEDURE — 97530 THERAPEUTIC ACTIVITIES: CPT | Mod: GP | Performed by: PHYSICAL THERAPIST

## 2017-01-01 PROCEDURE — P9047 ALBUMIN (HUMAN), 25%, 50ML: HCPCS | Performed by: NURSE PRACTITIONER

## 2017-01-01 PROCEDURE — 25000125 ZZHC RX 250: Performed by: THORACIC SURGERY (CARDIOTHORACIC VASCULAR SURGERY)

## 2017-01-01 PROCEDURE — 85014 HEMATOCRIT: CPT

## 2017-01-01 PROCEDURE — 83605 ASSAY OF LACTIC ACID: CPT | Performed by: HOSPITALIST

## 2017-01-01 PROCEDURE — 40000239 ZZH STATISTIC VAT ROUNDS

## 2017-01-01 PROCEDURE — 85045 AUTOMATED RETICULOCYTE COUNT: CPT | Performed by: SURGERY

## 2017-01-01 PROCEDURE — 84100 ASSAY OF PHOSPHORUS: CPT | Performed by: INTERNAL MEDICINE

## 2017-01-01 PROCEDURE — 36415 COLL VENOUS BLD VENIPUNCTURE: CPT | Performed by: INTERNAL MEDICINE

## 2017-01-01 PROCEDURE — 25000125 ZZHC RX 250: Performed by: SURGERY

## 2017-01-01 PROCEDURE — 37000009 ZZH ANESTHESIA TECHNICAL FEE, EACH ADDTL 15 MIN: Performed by: SURGERY

## 2017-01-01 PROCEDURE — 88304 TISSUE EXAM BY PATHOLOGIST: CPT | Performed by: SURGERY

## 2017-01-01 PROCEDURE — 36415 COLL VENOUS BLD VENIPUNCTURE: CPT | Performed by: RADIOLOGY

## 2017-01-01 PROCEDURE — 40000264 ECHO COMPLETE WITH OPTISON

## 2017-01-01 PROCEDURE — 87106 FUNGI IDENTIFICATION YEAST: CPT | Performed by: INTERNAL MEDICINE

## 2017-01-01 PROCEDURE — 00000155 ZZHCL STATISTIC H-CELL BLOCK W/STAIN: Performed by: NURSE PRACTITIONER

## 2017-01-01 PROCEDURE — 99222 1ST HOSP IP/OBS MODERATE 55: CPT | Performed by: INTERNAL MEDICINE

## 2017-01-01 PROCEDURE — 36000058 ZZH SURGERY LEVEL 3 EA 15 ADDTL MIN: Performed by: SURGERY

## 2017-01-01 PROCEDURE — 85027 COMPLETE CBC AUTOMATED: CPT | Performed by: SURGERY

## 2017-01-01 PROCEDURE — 97110 THERAPEUTIC EXERCISES: CPT | Mod: GP

## 2017-01-01 PROCEDURE — 40000193 ZZH STATISTIC PT WARD VISIT: Performed by: PHYSICAL THERAPIST

## 2017-01-01 PROCEDURE — 25000128 H RX IP 250 OP 636: Performed by: ANESTHESIOLOGY

## 2017-01-01 PROCEDURE — 81050 URINALYSIS VOLUME MEASURE: CPT | Performed by: NURSE PRACTITIONER

## 2017-01-01 PROCEDURE — 99222 1ST HOSP IP/OBS MODERATE 55: CPT | Mod: 57 | Performed by: SURGERY

## 2017-01-01 PROCEDURE — 40000170 ZZH STATISTIC PRE-PROCEDURE ASSESSMENT II: Performed by: SURGERY

## 2017-01-01 PROCEDURE — 84478 ASSAY OF TRIGLYCERIDES: CPT | Performed by: INTERNAL MEDICINE

## 2017-01-01 PROCEDURE — 83690 ASSAY OF LIPASE: CPT | Performed by: INTERNAL MEDICINE

## 2017-01-01 PROCEDURE — 74174 CTA ABD&PLVS W/CONTRAST: CPT

## 2017-01-01 PROCEDURE — 93005 ELECTROCARDIOGRAM TRACING: CPT

## 2017-01-01 PROCEDURE — 80048 BASIC METABOLIC PNL TOTAL CA: CPT | Performed by: NURSE PRACTITIONER

## 2017-01-01 PROCEDURE — 87205 SMEAR GRAM STAIN: CPT | Performed by: SURGERY

## 2017-01-01 PROCEDURE — 85027 COMPLETE CBC AUTOMATED: CPT | Performed by: NURSE PRACTITIONER

## 2017-01-01 PROCEDURE — 81001 URINALYSIS AUTO W/SCOPE: CPT | Performed by: INTERNAL MEDICINE

## 2017-01-01 PROCEDURE — 80202 ASSAY OF VANCOMYCIN: CPT | Performed by: INTERNAL MEDICINE

## 2017-01-01 PROCEDURE — 87077 CULTURE AEROBIC IDENTIFY: CPT | Performed by: NURSE PRACTITIONER

## 2017-01-01 PROCEDURE — 25000128 H RX IP 250 OP 636

## 2017-01-01 PROCEDURE — 82805 BLOOD GASES W/O2 SATURATION: CPT | Performed by: INTERNAL MEDICINE

## 2017-01-01 PROCEDURE — 86850 RBC ANTIBODY SCREEN: CPT | Performed by: INTERNAL MEDICINE

## 2017-01-01 PROCEDURE — 97162 PT EVAL MOD COMPLEX 30 MIN: CPT | Mod: GP

## 2017-01-01 PROCEDURE — 25000125 ZZHC RX 250: Performed by: PHYSICIAN ASSISTANT

## 2017-01-01 PROCEDURE — 49083 ABD PARACENTESIS W/IMAGING: CPT | Performed by: INTERNAL MEDICINE

## 2017-01-01 PROCEDURE — 71010 XR CHEST PORT 1 VW: CPT | Mod: 77

## 2017-01-01 PROCEDURE — 86900 BLOOD TYPING SEROLOGIC ABO: CPT | Performed by: THORACIC SURGERY (CARDIOTHORACIC VASCULAR SURGERY)

## 2017-01-01 PROCEDURE — 80048 BASIC METABOLIC PNL TOTAL CA: CPT | Performed by: PHYSICIAN ASSISTANT

## 2017-01-01 PROCEDURE — 87075 CULTR BACTERIA EXCEPT BLOOD: CPT | Performed by: SURGERY

## 2017-01-01 PROCEDURE — 75984 XRAY CONTROL CATHETER CHANGE: CPT

## 2017-01-01 PROCEDURE — 27210338 ZZH CIRCUIT HUMID FACE/TRACH MSK

## 2017-01-01 PROCEDURE — 86900 BLOOD TYPING SEROLOGIC ABO: CPT | Performed by: INTERNAL MEDICINE

## 2017-01-01 PROCEDURE — 99233 SBSQ HOSP IP/OBS HIGH 50: CPT | Performed by: INTERNAL MEDICINE

## 2017-01-01 PROCEDURE — 89051 BODY FLUID CELL COUNT: CPT | Performed by: NURSE PRACTITIONER

## 2017-01-01 PROCEDURE — 85610 PROTHROMBIN TIME: CPT | Performed by: NURSE PRACTITIONER

## 2017-01-01 PROCEDURE — 80053 COMPREHEN METABOLIC PANEL: CPT | Performed by: EMERGENCY MEDICINE

## 2017-01-01 PROCEDURE — 84295 ASSAY OF SERUM SODIUM: CPT

## 2017-01-01 PROCEDURE — 27211246 XR FEEDING TUBE PLACEMENT

## 2017-01-01 PROCEDURE — 27210339 ZZH CIRCUIT HUMIDITY W/CPAP BIP

## 2017-01-01 PROCEDURE — 88305 TISSUE EXAM BY PATHOLOGIST: CPT | Mod: 26 | Performed by: INTERNAL MEDICINE

## 2017-01-01 PROCEDURE — 40000940 XR CHEST PORT 1 VW

## 2017-01-01 PROCEDURE — 25000125 ZZHC RX 250

## 2017-01-01 PROCEDURE — 88112 CYTOPATH CELL ENHANCE TECH: CPT | Performed by: NURSE PRACTITIONER

## 2017-01-01 PROCEDURE — 36415 COLL VENOUS BLD VENIPUNCTURE: CPT | Performed by: SURGERY

## 2017-01-01 PROCEDURE — 00000155 ZZHCL STATISTIC H-CELL BLOCK W/STAIN: Performed by: INTERNAL MEDICINE

## 2017-01-01 PROCEDURE — 87186 SC STD MICRODIL/AGAR DIL: CPT | Performed by: NURSE PRACTITIONER

## 2017-01-01 PROCEDURE — 84145 PROCALCITONIN (PCT): CPT | Performed by: INTERNAL MEDICINE

## 2017-01-01 PROCEDURE — 25000125 ZZHC RX 250: Performed by: NURSE PRACTITIONER

## 2017-01-01 PROCEDURE — 99238 HOSP IP/OBS DSCHRG MGMT 30/<: CPT | Performed by: NURSE PRACTITIONER

## 2017-01-01 PROCEDURE — 82805 BLOOD GASES W/O2 SATURATION: CPT | Performed by: PHYSICIAN ASSISTANT

## 2017-01-01 PROCEDURE — 92597 ORAL SPEECH DEVICE EVAL: CPT | Mod: GN | Performed by: SPEECH-LANGUAGE PATHOLOGIST

## 2017-01-01 PROCEDURE — 87040 BLOOD CULTURE FOR BACTERIA: CPT | Performed by: FAMILY MEDICINE

## 2017-01-01 PROCEDURE — S0020 INJECTION, BUPIVICAINE HYDRO: HCPCS | Performed by: THORACIC SURGERY (CARDIOTHORACIC VASCULAR SURGERY)

## 2017-01-01 PROCEDURE — 85027 COMPLETE CBC AUTOMATED: CPT | Performed by: PHYSICIAN ASSISTANT

## 2017-01-01 PROCEDURE — 37000009 ZZH ANESTHESIA TECHNICAL FEE, EACH ADDTL 15 MIN: Performed by: THORACIC SURGERY (CARDIOTHORACIC VASCULAR SURGERY)

## 2017-01-01 PROCEDURE — 87641 MR-STAPH DNA AMP PROBE: CPT | Performed by: INTERNAL MEDICINE

## 2017-01-01 PROCEDURE — 97161 PT EVAL LOW COMPLEX 20 MIN: CPT | Mod: GP

## 2017-01-01 PROCEDURE — 82945 GLUCOSE OTHER FLUID: CPT | Performed by: NURSE PRACTITIONER

## 2017-01-01 PROCEDURE — 99221 1ST HOSP IP/OBS SF/LOW 40: CPT | Performed by: INTERNAL MEDICINE

## 2017-01-01 PROCEDURE — 80048 BASIC METABOLIC PNL TOTAL CA: CPT | Performed by: SURGERY

## 2017-01-01 PROCEDURE — 74176 CT ABD & PELVIS W/O CONTRAST: CPT

## 2017-01-01 PROCEDURE — 5A1945Z RESPIRATORY VENTILATION, 24-96 CONSECUTIVE HOURS: ICD-10-PCS | Performed by: INTERNAL MEDICINE

## 2017-01-01 PROCEDURE — 99231 SBSQ HOSP IP/OBS SF/LOW 25: CPT | Performed by: INTERNAL MEDICINE

## 2017-01-01 PROCEDURE — 97161 PT EVAL LOW COMPLEX 20 MIN: CPT | Mod: GP | Performed by: PHYSICAL THERAPIST

## 2017-01-01 PROCEDURE — 87102 FUNGUS ISOLATION CULTURE: CPT | Performed by: INTERNAL MEDICINE

## 2017-01-01 PROCEDURE — 12000007 ZZH R&B INTERMEDIATE

## 2017-01-01 PROCEDURE — 40000133 ZZH STATISTIC OT WARD VISIT: Performed by: OCCUPATIONAL THERAPIST

## 2017-01-01 PROCEDURE — 80048 BASIC METABOLIC PNL TOTAL CA: CPT | Performed by: INTERNAL MEDICINE

## 2017-01-01 PROCEDURE — 40000193 ZZH STATISTIC PT WARD VISIT

## 2017-01-01 PROCEDURE — 25000128 H RX IP 250 OP 636: Performed by: EMERGENCY MEDICINE

## 2017-01-01 PROCEDURE — C1769 GUIDE WIRE: HCPCS

## 2017-01-01 PROCEDURE — 84100 ASSAY OF PHOSPHORUS: CPT | Performed by: PHYSICIAN ASSISTANT

## 2017-01-01 PROCEDURE — 92507 TX SP LANG VOICE COMM INDIV: CPT | Mod: GN | Performed by: SPEECH-LANGUAGE PATHOLOGIST

## 2017-01-01 PROCEDURE — 86901 BLOOD TYPING SEROLOGIC RH(D): CPT | Performed by: INTERNAL MEDICINE

## 2017-01-01 PROCEDURE — 25000125 ZZHC RX 250: Performed by: EMERGENCY MEDICINE

## 2017-01-01 PROCEDURE — 84300 ASSAY OF URINE SODIUM: CPT | Performed by: INTERNAL MEDICINE

## 2017-01-01 PROCEDURE — 84134 ASSAY OF PREALBUMIN: CPT | Performed by: PHYSICIAN ASSISTANT

## 2017-01-01 PROCEDURE — 84134 ASSAY OF PREALBUMIN: CPT | Performed by: INTERNAL MEDICINE

## 2017-01-01 PROCEDURE — 25500064 ZZH RX 255 OP 636: Performed by: INTERNAL MEDICINE

## 2017-01-01 PROCEDURE — 37000008 ZZH ANESTHESIA TECHNICAL FEE, 1ST 30 MIN: Performed by: SURGERY

## 2017-01-01 PROCEDURE — 71000012 ZZH RECOVERY PHASE 1 LEVEL 1 FIRST HR: Performed by: SURGERY

## 2017-01-01 PROCEDURE — 36415 COLL VENOUS BLD VENIPUNCTURE: CPT | Performed by: NURSE PRACTITIONER

## 2017-01-01 PROCEDURE — 40000671 ZZH STATISTIC ANESTHESIA CASE

## 2017-01-01 PROCEDURE — 3E0L3GC INTRODUCTION OF OTHER THERAPEUTIC SUBSTANCE INTO PLEURAL CAVITY, PERCUTANEOUS APPROACH: ICD-10-PCS | Performed by: INTERNAL MEDICINE

## 2017-01-01 PROCEDURE — 25000128 H RX IP 250 OP 636: Performed by: HOSPITALIST

## 2017-01-01 PROCEDURE — 82570 ASSAY OF URINE CREATININE: CPT | Performed by: NURSE PRACTITIONER

## 2017-01-01 PROCEDURE — 82570 ASSAY OF URINE CREATININE: CPT | Performed by: INTERNAL MEDICINE

## 2017-01-01 PROCEDURE — 5A1955Z RESPIRATORY VENTILATION, GREATER THAN 96 CONSECUTIVE HOURS: ICD-10-PCS | Performed by: INTERNAL MEDICINE

## 2017-01-01 PROCEDURE — 83735 ASSAY OF MAGNESIUM: CPT | Performed by: SURGERY

## 2017-01-01 PROCEDURE — 27210905 ZZH KIT CR7

## 2017-01-01 PROCEDURE — 40000257 ZZH STATISTIC CONSULT NO CHARGE VASC ACCESS

## 2017-01-01 PROCEDURE — 83605 ASSAY OF LACTIC ACID: CPT

## 2017-01-01 PROCEDURE — 93010 ELECTROCARDIOGRAM REPORT: CPT | Performed by: INTERNAL MEDICINE

## 2017-01-01 PROCEDURE — 87077 CULTURE AEROBIC IDENTIFY: CPT | Performed by: INTERNAL MEDICINE

## 2017-01-01 PROCEDURE — 81050 URINALYSIS VOLUME MEASURE: CPT | Performed by: INTERNAL MEDICINE

## 2017-01-01 PROCEDURE — 85610 PROTHROMBIN TIME: CPT | Performed by: EMERGENCY MEDICINE

## 2017-01-01 PROCEDURE — 74000 XR ABDOMEN PORT F1 VW: CPT

## 2017-01-01 PROCEDURE — 85027 COMPLETE CBC AUTOMATED: CPT | Performed by: EMERGENCY MEDICINE

## 2017-01-01 PROCEDURE — 84132 ASSAY OF SERUM POTASSIUM: CPT | Performed by: INTERNAL MEDICINE

## 2017-01-01 PROCEDURE — 85025 COMPLETE CBC W/AUTO DIFF WBC: CPT | Performed by: HOSPITALIST

## 2017-01-01 PROCEDURE — 0FT44ZZ RESECTION OF GALLBLADDER, PERCUTANEOUS ENDOSCOPIC APPROACH: ICD-10-PCS | Performed by: SURGERY

## 2017-01-01 PROCEDURE — 84484 ASSAY OF TROPONIN QUANT: CPT | Performed by: INTERNAL MEDICINE

## 2017-01-01 PROCEDURE — 82803 BLOOD GASES ANY COMBINATION: CPT

## 2017-01-01 PROCEDURE — 80076 HEPATIC FUNCTION PANEL: CPT | Performed by: SURGERY

## 2017-01-01 PROCEDURE — 27210732 ZZH ACCESSORY CR1

## 2017-01-01 PROCEDURE — 83605 ASSAY OF LACTIC ACID: CPT | Performed by: EMERGENCY MEDICINE

## 2017-01-01 PROCEDURE — 3E0436Z INTRODUCTION OF NUTRITIONAL SUBSTANCE INTO CENTRAL VEIN, PERCUTANEOUS APPROACH: ICD-10-PCS | Performed by: INTERNAL MEDICINE

## 2017-01-01 PROCEDURE — 87186 SC STD MICRODIL/AGAR DIL: CPT | Performed by: INTERNAL MEDICINE

## 2017-01-01 PROCEDURE — P9016 RBC LEUKOCYTES REDUCED: HCPCS | Performed by: INTERNAL MEDICINE

## 2017-01-01 PROCEDURE — 84484 ASSAY OF TROPONIN QUANT: CPT | Performed by: EMERGENCY MEDICINE

## 2017-01-01 PROCEDURE — 87070 CULTURE OTHR SPECIMN AEROBIC: CPT | Performed by: NURSE PRACTITIONER

## 2017-01-01 PROCEDURE — 86923 COMPATIBILITY TEST ELECTRIC: CPT | Performed by: INTERNAL MEDICINE

## 2017-01-01 PROCEDURE — 84132 ASSAY OF SERUM POTASSIUM: CPT | Performed by: ANESTHESIOLOGY

## 2017-01-01 PROCEDURE — 87493 C DIFF AMPLIFIED PROBE: CPT | Performed by: NURSE PRACTITIONER

## 2017-01-01 PROCEDURE — 21400002 ZZH R&B CCU CICU CRITICAL

## 2017-01-01 PROCEDURE — 88305 TISSUE EXAM BY PATHOLOGIST: CPT | Mod: 26 | Performed by: NURSE PRACTITIONER

## 2017-01-01 PROCEDURE — 27210222 ZZH KIT SHRLOCK 6FR POWER PICC

## 2017-01-01 PROCEDURE — 25000131 ZZH RX MED GY IP 250 OP 636 PS 637: Performed by: INTERNAL MEDICINE

## 2017-01-01 PROCEDURE — 97110 THERAPEUTIC EXERCISES: CPT | Mod: GP | Performed by: PHYSICAL THERAPIST

## 2017-01-01 PROCEDURE — S0164 INJECTION PANTROPRAZOLE: HCPCS | Performed by: SURGERY

## 2017-01-01 PROCEDURE — 83540 ASSAY OF IRON: CPT | Performed by: INTERNAL MEDICINE

## 2017-01-01 PROCEDURE — 82565 ASSAY OF CREATININE: CPT | Performed by: INTERNAL MEDICINE

## 2017-01-01 PROCEDURE — 99291 CRITICAL CARE FIRST HOUR: CPT | Mod: 25 | Performed by: INTERNAL MEDICINE

## 2017-01-01 PROCEDURE — 99231 SBSQ HOSP IP/OBS SF/LOW 25: CPT | Performed by: SURGERY

## 2017-01-01 PROCEDURE — 86901 BLOOD TYPING SEROLOGIC RH(D): CPT | Performed by: NURSE PRACTITIONER

## 2017-01-01 PROCEDURE — 84145 PROCALCITONIN (PCT): CPT | Performed by: NURSE PRACTITIONER

## 2017-01-01 PROCEDURE — 27211039 ZZH NEEDLE CR2

## 2017-01-01 PROCEDURE — A9537 TC99M MEBROFENIN: HCPCS | Performed by: INTERNAL MEDICINE

## 2017-01-01 PROCEDURE — 0B21XFZ CHANGE TRACHEOSTOMY DEVICE IN TRACHEA, EXTERNAL APPROACH: ICD-10-PCS | Performed by: THORACIC SURGERY (CARDIOTHORACIC VASCULAR SURGERY)

## 2017-01-01 PROCEDURE — 83735 ASSAY OF MAGNESIUM: CPT | Performed by: HOSPITALIST

## 2017-01-01 PROCEDURE — 86923 COMPATIBILITY TEST ELECTRIC: CPT | Performed by: THORACIC SURGERY (CARDIOTHORACIC VASCULAR SURGERY)

## 2017-01-01 PROCEDURE — 88305 TISSUE EXAM BY PATHOLOGIST: CPT | Performed by: INTERNAL MEDICINE

## 2017-01-01 PROCEDURE — 36000050 ZZH SURGERY LEVEL 2 1ST 30 MIN: Performed by: THORACIC SURGERY (CARDIOTHORACIC VASCULAR SURGERY)

## 2017-01-01 PROCEDURE — 97530 THERAPEUTIC ACTIVITIES: CPT | Mod: GO | Performed by: OCCUPATIONAL THERAPIST

## 2017-01-01 PROCEDURE — P9041 ALBUMIN (HUMAN),5%, 50ML: HCPCS | Performed by: INTERNAL MEDICINE

## 2017-01-01 PROCEDURE — 25000128 H RX IP 250 OP 636: Performed by: NURSE ANESTHETIST, CERTIFIED REGISTERED

## 2017-01-01 PROCEDURE — 87075 CULTR BACTERIA EXCEPT BLOOD: CPT | Performed by: INTERNAL MEDICINE

## 2017-01-01 PROCEDURE — 36000056 ZZH SURGERY LEVEL 3 1ST 30 MIN: Performed by: SURGERY

## 2017-01-01 PROCEDURE — 82330 ASSAY OF CALCIUM: CPT | Performed by: INTERNAL MEDICINE

## 2017-01-01 PROCEDURE — 36569 INSJ PICC 5 YR+ W/O IMAGING: CPT

## 2017-01-01 PROCEDURE — 87176 TISSUE HOMOGENIZATION CULTR: CPT | Performed by: THORACIC SURGERY (CARDIOTHORACIC VASCULAR SURGERY)

## 2017-01-01 PROCEDURE — 40000986 XR ABDOMEN PORT F1 VW

## 2017-01-01 PROCEDURE — 87040 BLOOD CULTURE FOR BACTERIA: CPT | Performed by: EMERGENCY MEDICINE

## 2017-01-01 PROCEDURE — 27210432 ZZH NUTRITION PRODUCT RENAL BASIC LITER

## 2017-01-01 PROCEDURE — 85018 HEMOGLOBIN: CPT | Performed by: INTERNAL MEDICINE

## 2017-01-01 PROCEDURE — 84100 ASSAY OF PHOSPHORUS: CPT | Performed by: HOSPITALIST

## 2017-01-01 PROCEDURE — 40000225 ZZH STATISTIC SLP WARD VISIT: Performed by: SPEECH-LANGUAGE PATHOLOGIST

## 2017-01-01 PROCEDURE — 87205 SMEAR GRAM STAIN: CPT | Performed by: INTERNAL MEDICINE

## 2017-01-01 PROCEDURE — 82533 TOTAL CORTISOL: CPT | Performed by: INTERNAL MEDICINE

## 2017-01-01 PROCEDURE — 27210429 ZZH NUTRITION PRODUCT INTERMEDIATE LITER: Performed by: DIETITIAN, REGISTERED

## 2017-01-01 PROCEDURE — 89190 NASAL SMEAR FOR EOSINOPHILS: CPT | Performed by: INTERNAL MEDICINE

## 2017-01-01 PROCEDURE — 86850 RBC ANTIBODY SCREEN: CPT | Performed by: THORACIC SURGERY (CARDIOTHORACIC VASCULAR SURGERY)

## 2017-01-01 PROCEDURE — 87106 FUNGI IDENTIFICATION YEAST: CPT | Performed by: SURGERY

## 2017-01-01 PROCEDURE — 25000128 H RX IP 250 OP 636: Performed by: RADIOLOGY

## 2017-01-01 PROCEDURE — 99212 OFFICE O/P EST SF 10 MIN: CPT

## 2017-01-01 PROCEDURE — 86923 COMPATIBILITY TEST ELECTRIC: CPT | Performed by: NURSE PRACTITIONER

## 2017-01-01 PROCEDURE — 87640 STAPH A DNA AMP PROBE: CPT | Performed by: INTERNAL MEDICINE

## 2017-01-01 PROCEDURE — 36600 WITHDRAWAL OF ARTERIAL BLOOD: CPT

## 2017-01-01 PROCEDURE — 93306 TTE W/DOPPLER COMPLETE: CPT | Mod: 26 | Performed by: INTERNAL MEDICINE

## 2017-01-01 PROCEDURE — 85027 COMPLETE CBC AUTOMATED: CPT | Performed by: THORACIC SURGERY (CARDIOTHORACIC VASCULAR SURGERY)

## 2017-01-01 PROCEDURE — 84295 ASSAY OF SERUM SODIUM: CPT | Performed by: INTERNAL MEDICINE

## 2017-01-01 PROCEDURE — 83605 ASSAY OF LACTIC ACID: CPT | Performed by: NURSE PRACTITIONER

## 2017-01-01 PROCEDURE — 43840 GSTRRPHY SUTR DUOL/GSTR ULCR: CPT | Mod: 80 | Performed by: SURGERY

## 2017-01-01 PROCEDURE — 85014 HEMATOCRIT: CPT | Performed by: INTERNAL MEDICINE

## 2017-01-01 PROCEDURE — 85730 THROMBOPLASTIN TIME PARTIAL: CPT | Performed by: INTERNAL MEDICINE

## 2017-01-01 PROCEDURE — 96374 THER/PROPH/DIAG INJ IV PUSH: CPT

## 2017-01-01 PROCEDURE — 88305 TISSUE EXAM BY PATHOLOGIST: CPT | Performed by: NURSE PRACTITIONER

## 2017-01-01 PROCEDURE — P9041 ALBUMIN (HUMAN),5%, 50ML: HCPCS

## 2017-01-01 PROCEDURE — 84157 ASSAY OF PROTEIN OTHER: CPT | Performed by: INTERNAL MEDICINE

## 2017-01-01 PROCEDURE — 82247 BILIRUBIN TOTAL: CPT | Performed by: INTERNAL MEDICINE

## 2017-01-01 PROCEDURE — 31500 INSERT EMERGENCY AIRWAY: CPT

## 2017-01-01 PROCEDURE — 25000566 ZZH SEVOFLURANE, EA 15 MIN: Performed by: THORACIC SURGERY (CARDIOTHORACIC VASCULAR SURGERY)

## 2017-01-01 PROCEDURE — 87040 BLOOD CULTURE FOR BACTERIA: CPT | Performed by: SURGERY

## 2017-01-01 PROCEDURE — 71250 CT THORAX DX C-: CPT

## 2017-01-01 PROCEDURE — 0W9G4ZX DRAINAGE OF PERITONEAL CAVITY, PERCUTANEOUS ENDOSCOPIC APPROACH, DIAGNOSTIC: ICD-10-PCS | Performed by: SURGERY

## 2017-01-01 PROCEDURE — 87086 URINE CULTURE/COLONY COUNT: CPT | Performed by: SURGERY

## 2017-01-01 PROCEDURE — C1729 CATH, DRAINAGE: HCPCS

## 2017-01-01 PROCEDURE — 82042 OTHER SOURCE ALBUMIN QUAN EA: CPT | Performed by: INTERNAL MEDICINE

## 2017-01-01 PROCEDURE — 80048 BASIC METABOLIC PNL TOTAL CA: CPT | Performed by: HOSPITALIST

## 2017-01-01 PROCEDURE — 86901 BLOOD TYPING SEROLOGIC RH(D): CPT | Performed by: EMERGENCY MEDICINE

## 2017-01-01 PROCEDURE — P9016 RBC LEUKOCYTES REDUCED: HCPCS | Performed by: NURSE PRACTITIONER

## 2017-01-01 PROCEDURE — 99203 OFFICE O/P NEW LOW 30 MIN: CPT | Performed by: SURGERY

## 2017-01-01 PROCEDURE — 87070 CULTURE OTHR SPECIMN AEROBIC: CPT | Performed by: THORACIC SURGERY (CARDIOTHORACIC VASCULAR SURGERY)

## 2017-01-01 PROCEDURE — 83735 ASSAY OF MAGNESIUM: CPT | Performed by: NURSE PRACTITIONER

## 2017-01-01 PROCEDURE — 82150 ASSAY OF AMYLASE: CPT | Performed by: NURSE PRACTITIONER

## 2017-01-01 PROCEDURE — 34300033 ZZH RX 343: Performed by: INTERNAL MEDICINE

## 2017-01-01 PROCEDURE — 99207 ZZC APP CREDIT; MD BILLING SHARED VISIT: CPT | Performed by: NURSE PRACTITIONER

## 2017-01-01 PROCEDURE — 96361 HYDRATE IV INFUSION ADD-ON: CPT

## 2017-01-01 PROCEDURE — 25800025 ZZH RX 258: Performed by: INTERNAL MEDICINE

## 2017-01-01 PROCEDURE — 86140 C-REACTIVE PROTEIN: CPT | Performed by: INTERNAL MEDICINE

## 2017-01-01 PROCEDURE — 99207 ZZC NON-BILLABLE SERV PER CHARTING: CPT | Performed by: INTERNAL MEDICINE

## 2017-01-01 PROCEDURE — 0W2GX0Z CHANGE DRAINAGE DEVICE IN PERITONEAL CAVITY, EXTERNAL APPROACH: ICD-10-PCS | Performed by: RADIOLOGY

## 2017-01-01 PROCEDURE — 25000125 ZZHC RX 250: Performed by: ANESTHESIOLOGY

## 2017-01-01 PROCEDURE — 25800025 ZZH RX 258

## 2017-01-01 PROCEDURE — 86850 RBC ANTIBODY SCREEN: CPT | Performed by: EMERGENCY MEDICINE

## 2017-01-01 PROCEDURE — 31500 INSERT EMERGENCY AIRWAY: CPT | Performed by: INTERNAL MEDICINE

## 2017-01-01 PROCEDURE — 87070 CULTURE OTHR SPECIMN AEROBIC: CPT | Performed by: SURGERY

## 2017-01-01 PROCEDURE — 82805 BLOOD GASES W/O2 SATURATION: CPT | Performed by: HOSPITALIST

## 2017-01-01 PROCEDURE — 87116 MYCOBACTERIA CULTURE: CPT | Performed by: THORACIC SURGERY (CARDIOTHORACIC VASCULAR SURGERY)

## 2017-01-01 PROCEDURE — 40000885 ZZH STATISTIC STEP DOWN HRS EVENING

## 2017-01-01 PROCEDURE — 81001 URINALYSIS AUTO W/SCOPE: CPT | Performed by: SURGERY

## 2017-01-01 PROCEDURE — 76705 ECHO EXAM OF ABDOMEN: CPT

## 2017-01-01 PROCEDURE — 99285 EMERGENCY DEPT VISIT HI MDM: CPT | Mod: 25

## 2017-01-01 PROCEDURE — 83690 ASSAY OF LIPASE: CPT | Performed by: EMERGENCY MEDICINE

## 2017-01-01 PROCEDURE — 84443 ASSAY THYROID STIM HORMONE: CPT | Performed by: INTERNAL MEDICINE

## 2017-01-01 PROCEDURE — 85610 PROTHROMBIN TIME: CPT | Performed by: THORACIC SURGERY (CARDIOTHORACIC VASCULAR SURGERY)

## 2017-01-01 PROCEDURE — 80069 RENAL FUNCTION PANEL: CPT | Performed by: INTERNAL MEDICINE

## 2017-01-01 PROCEDURE — 84156 ASSAY OF PROTEIN URINE: CPT | Performed by: NURSE PRACTITIONER

## 2017-01-01 PROCEDURE — 92610 EVALUATE SWALLOWING FUNCTION: CPT | Mod: GN | Performed by: SPEECH-LANGUAGE PATHOLOGIST

## 2017-01-01 PROCEDURE — 87206 SMEAR FLUORESCENT/ACID STAI: CPT | Performed by: THORACIC SURGERY (CARDIOTHORACIC VASCULAR SURGERY)

## 2017-01-01 PROCEDURE — 92526 ORAL FUNCTION THERAPY: CPT | Mod: GN | Performed by: SPEECH-LANGUAGE PATHOLOGIST

## 2017-01-01 PROCEDURE — 97110 THERAPEUTIC EXERCISES: CPT | Mod: GO | Performed by: OCCUPATIONAL THERAPIST

## 2017-01-01 PROCEDURE — 97167 OT EVAL HIGH COMPLEX 60 MIN: CPT | Mod: GO | Performed by: OCCUPATIONAL THERAPIST

## 2017-01-01 PROCEDURE — 97535 SELF CARE MNGMENT TRAINING: CPT | Mod: GO | Performed by: OCCUPATIONAL THERAPIST

## 2017-01-01 PROCEDURE — 86900 BLOOD TYPING SEROLOGIC ABO: CPT | Performed by: EMERGENCY MEDICINE

## 2017-01-01 PROCEDURE — 99232 SBSQ HOSP IP/OBS MODERATE 35: CPT | Performed by: HOSPITALIST

## 2017-01-01 PROCEDURE — 0BDP4ZZ EXTRACTION OF LEFT PLEURA, PERCUTANEOUS ENDOSCOPIC APPROACH: ICD-10-PCS | Performed by: THORACIC SURGERY (CARDIOTHORACIC VASCULAR SURGERY)

## 2017-01-01 PROCEDURE — 81001 URINALYSIS AUTO W/SCOPE: CPT | Performed by: NURSE PRACTITIONER

## 2017-01-01 PROCEDURE — 99291 CRITICAL CARE FIRST HOUR: CPT | Performed by: ANESTHESIOLOGY

## 2017-01-01 PROCEDURE — 40000847 ZZHCL STATISTIC MORPHOLOGY W/INTERP HISTOLOGY TC 85060: Performed by: INTERNAL MEDICINE

## 2017-01-01 PROCEDURE — 99223 1ST HOSP IP/OBS HIGH 75: CPT | Performed by: NURSE PRACTITIONER

## 2017-01-01 PROCEDURE — 43840 GSTRRPHY SUTR DUOL/GSTR ULCR: CPT | Performed by: SURGERY

## 2017-01-01 PROCEDURE — 74150 CT ABDOMEN W/O CONTRAST: CPT

## 2017-01-01 PROCEDURE — 85049 AUTOMATED PLATELET COUNT: CPT | Performed by: SURGERY

## 2017-01-01 PROCEDURE — 25000307 HC RX OP HPI UCR WEL IP 250: Performed by: INTERNAL MEDICINE

## 2017-01-01 PROCEDURE — 87102 FUNGUS ISOLATION CULTURE: CPT | Performed by: THORACIC SURGERY (CARDIOTHORACIC VASCULAR SURGERY)

## 2017-01-01 PROCEDURE — 85018 HEMOGLOBIN: CPT | Performed by: NURSE PRACTITIONER

## 2017-01-01 PROCEDURE — 82728 ASSAY OF FERRITIN: CPT | Performed by: INTERNAL MEDICINE

## 2017-01-01 PROCEDURE — 80076 HEPATIC FUNCTION PANEL: CPT | Performed by: INTERNAL MEDICINE

## 2017-01-01 PROCEDURE — 36000063 ZZH SURGERY LEVEL 4 EA 15 ADDTL MIN: Performed by: THORACIC SURGERY (CARDIOTHORACIC VASCULAR SURGERY)

## 2017-01-01 PROCEDURE — 94660 CPAP INITIATION&MGMT: CPT

## 2017-01-01 PROCEDURE — 25000566 ZZH SEVOFLURANE, EA 15 MIN: Performed by: SURGERY

## 2017-01-01 PROCEDURE — 83605 ASSAY OF LACTIC ACID: CPT | Mod: 91

## 2017-01-01 PROCEDURE — P9016 RBC LEUKOCYTES REDUCED: HCPCS | Performed by: THORACIC SURGERY (CARDIOTHORACIC VASCULAR SURGERY)

## 2017-01-01 PROCEDURE — 83615 LACTATE (LD) (LDH) ENZYME: CPT | Performed by: NURSE PRACTITIONER

## 2017-01-01 PROCEDURE — 99211 OFF/OP EST MAY X REQ PHY/QHP: CPT

## 2017-01-01 PROCEDURE — 82150 ASSAY OF AMYLASE: CPT | Performed by: INTERNAL MEDICINE

## 2017-01-01 PROCEDURE — 27210347 ZZH DRESSING D STAT DRY WRAP SPEC

## 2017-01-01 PROCEDURE — 87205 SMEAR GRAM STAIN: CPT | Performed by: THORACIC SURGERY (CARDIOTHORACIC VASCULAR SURGERY)

## 2017-01-01 PROCEDURE — 86900 BLOOD TYPING SEROLOGIC ABO: CPT | Performed by: NURSE PRACTITIONER

## 2017-01-01 PROCEDURE — 32557 INSERT CATH PLEURA W/ IMAGE: CPT | Mod: RT

## 2017-01-01 PROCEDURE — 83550 IRON BINDING TEST: CPT | Performed by: INTERNAL MEDICINE

## 2017-01-01 PROCEDURE — 99233 SBSQ HOSP IP/OBS HIGH 50: CPT | Performed by: NURSE PRACTITIONER

## 2017-01-01 PROCEDURE — 31500 INSERT EMERGENCY AIRWAY: CPT | Performed by: NURSE ANESTHETIST, CERTIFIED REGISTERED

## 2017-01-01 PROCEDURE — 99207 ZZC CDG-CRITICAL CARE TIME NOT DOCUMENTED: CPT | Performed by: INTERNAL MEDICINE

## 2017-01-01 PROCEDURE — 37000008 ZZH ANESTHESIA TECHNICAL FEE, 1ST 30 MIN: Performed by: THORACIC SURGERY (CARDIOTHORACIC VASCULAR SURGERY)

## 2017-01-01 PROCEDURE — 84157 ASSAY OF PROTEIN OTHER: CPT | Performed by: NURSE PRACTITIONER

## 2017-01-01 PROCEDURE — 27210794 ZZH OR GENERAL SUPPLY STERILE: Performed by: SURGERY

## 2017-01-01 PROCEDURE — P9041 ALBUMIN (HUMAN),5%, 50ML: HCPCS | Performed by: ANESTHESIOLOGY

## 2017-01-01 PROCEDURE — 40000264 ECHO COMPLETE WITH LUMASON

## 2017-01-01 PROCEDURE — 83605 ASSAY OF LACTIC ACID: CPT | Performed by: PHYSICIAN ASSISTANT

## 2017-01-01 PROCEDURE — 27210995 ZZH RX 272: Performed by: SURGERY

## 2017-01-01 PROCEDURE — 96375 TX/PRO/DX INJ NEW DRUG ADDON: CPT

## 2017-01-01 PROCEDURE — 82803 BLOOD GASES ANY COMBINATION: CPT | Performed by: INTERNAL MEDICINE

## 2017-01-01 PROCEDURE — 36000093 ZZH SURGERY LEVEL 4 1ST 30 MIN: Performed by: THORACIC SURGERY (CARDIOTHORACIC VASCULAR SURGERY)

## 2017-01-01 PROCEDURE — 97110 THERAPEUTIC EXERCISES: CPT | Mod: GO

## 2017-01-01 PROCEDURE — 0DU647Z SUPPLEMENT STOMACH WITH AUTOLOGOUS TISSUE SUBSTITUTE, PERCUTANEOUS ENDOSCOPIC APPROACH: ICD-10-PCS | Performed by: SURGERY

## 2017-01-01 PROCEDURE — 99222 1ST HOSP IP/OBS MODERATE 55: CPT | Performed by: SURGERY

## 2017-01-01 PROCEDURE — 74240 X-RAY XM UPR GI TRC 1CNTRST: CPT

## 2017-01-01 PROCEDURE — 82306 VITAMIN D 25 HYDROXY: CPT | Performed by: PHYSICIAN ASSISTANT

## 2017-01-01 PROCEDURE — 84100 ASSAY OF PHOSPHORUS: CPT | Performed by: NURSE PRACTITIONER

## 2017-01-01 PROCEDURE — 88112 CYTOPATH CELL ENHANCE TECH: CPT | Mod: 26 | Performed by: INTERNAL MEDICINE

## 2017-01-01 PROCEDURE — 0W9G3ZX DRAINAGE OF PERITONEAL CAVITY, PERCUTANEOUS APPROACH, DIAGNOSTIC: ICD-10-PCS | Performed by: INTERNAL MEDICINE

## 2017-01-01 PROCEDURE — 84100 ASSAY OF PHOSPHORUS: CPT | Performed by: SURGERY

## 2017-01-01 PROCEDURE — 83036 HEMOGLOBIN GLYCOSYLATED A1C: CPT | Performed by: INTERNAL MEDICINE

## 2017-01-01 PROCEDURE — 87210 SMEAR WET MOUNT SALINE/INK: CPT | Performed by: THORACIC SURGERY (CARDIOTHORACIC VASCULAR SURGERY)

## 2017-01-01 PROCEDURE — P9047 ALBUMIN (HUMAN), 25%, 50ML: HCPCS

## 2017-01-01 PROCEDURE — 78226 HEPATOBILIARY SYSTEM IMAGING: CPT

## 2017-01-01 PROCEDURE — 82805 BLOOD GASES W/O2 SATURATION: CPT | Performed by: NURSE PRACTITIONER

## 2017-01-01 PROCEDURE — C9399 UNCLASSIFIED DRUGS OR BIOLOG: HCPCS | Performed by: NURSE ANESTHETIST, CERTIFIED REGISTERED

## 2017-01-01 PROCEDURE — 27210794 ZZH OR GENERAL SUPPLY STERILE: Performed by: THORACIC SURGERY (CARDIOTHORACIC VASCULAR SURGERY)

## 2017-01-01 PROCEDURE — 88304 TISSUE EXAM BY PATHOLOGIST: CPT | Mod: 26 | Performed by: SURGERY

## 2017-01-01 PROCEDURE — 0W9G30Z DRAINAGE OF PERITONEAL CAVITY WITH DRAINAGE DEVICE, PERCUTANEOUS APPROACH: ICD-10-PCS | Performed by: RADIOLOGY

## 2017-01-01 PROCEDURE — 47562 LAPAROSCOPIC CHOLECYSTECTOMY: CPT | Performed by: SURGERY

## 2017-01-01 PROCEDURE — 87075 CULTR BACTERIA EXCEPT BLOOD: CPT | Performed by: THORACIC SURGERY (CARDIOTHORACIC VASCULAR SURGERY)

## 2017-01-01 PROCEDURE — 0DU64JZ SUPPLEMENT STOMACH WITH SYNTHETIC SUBSTITUTE, PERCUTANEOUS ENDOSCOPIC APPROACH: ICD-10-PCS | Performed by: SURGERY

## 2017-01-01 PROCEDURE — 88112 CYTOPATH CELL ENHANCE TECH: CPT | Mod: 26 | Performed by: NURSE PRACTITIONER

## 2017-01-01 PROCEDURE — 85018 HEMOGLOBIN: CPT | Performed by: SURGERY

## 2017-01-01 PROCEDURE — 74020 XR ABDOMEN PORT 2 VW: CPT

## 2017-01-01 PROCEDURE — 88112 CYTOPATH CELL ENHANCE TECH: CPT | Performed by: INTERNAL MEDICINE

## 2017-01-01 PROCEDURE — 40000986 XR CHEST PORT 1 VW

## 2017-01-01 PROCEDURE — 87040 BLOOD CULTURE FOR BACTERIA: CPT | Performed by: HOSPITALIST

## 2017-01-01 PROCEDURE — 40000133 ZZH STATISTIC OT WARD VISIT

## 2017-01-01 PROCEDURE — 85025 COMPLETE CBC W/AUTO DIFF WBC: CPT | Performed by: SURGERY

## 2017-01-01 PROCEDURE — 97530 THERAPEUTIC ACTIVITIES: CPT | Mod: GP

## 2017-01-01 PROCEDURE — 84132 ASSAY OF SERUM POTASSIUM: CPT

## 2017-01-01 PROCEDURE — 27210885 ZZH ACCESSORY CR4

## 2017-01-01 PROCEDURE — 25800025 ZZH RX 258: Performed by: EMERGENCY MEDICINE

## 2017-01-01 PROCEDURE — 82945 GLUCOSE OTHER FLUID: CPT | Performed by: INTERNAL MEDICINE

## 2017-01-01 PROCEDURE — 99207 ZZC CONSULT E&M CHANGED TO INITIAL LEVEL: CPT | Performed by: INTERNAL MEDICINE

## 2017-01-01 PROCEDURE — 96376 TX/PRO/DX INJ SAME DRUG ADON: CPT

## 2017-01-01 PROCEDURE — 0DBS4ZZ: ICD-10-PCS | Performed by: SURGERY

## 2017-01-01 PROCEDURE — 85384 FIBRINOGEN ACTIVITY: CPT | Performed by: INTERNAL MEDICINE

## 2017-01-01 PROCEDURE — 40000141 ZZH STATISTIC PERIPHERAL IV START W/O US GUIDANCE

## 2017-01-01 RX ORDER — FLUCONAZOLE 2 MG/ML
400 INJECTION, SOLUTION INTRAVENOUS EVERY 24 HOURS
Status: DISCONTINUED | OUTPATIENT
Start: 2017-01-01 | End: 2017-01-01

## 2017-01-01 RX ORDER — ONDANSETRON 4 MG/1
4 TABLET, ORALLY DISINTEGRATING ORAL EVERY 6 HOURS PRN
Status: DISCONTINUED | OUTPATIENT
Start: 2017-01-01 | End: 2017-01-01

## 2017-01-01 RX ORDER — SODIUM CHLORIDE 9 MG/ML
INJECTION, SOLUTION INTRAVENOUS CONTINUOUS
Status: DISCONTINUED | OUTPATIENT
Start: 2017-01-01 | End: 2017-01-01 | Stop reason: HOSPADM

## 2017-01-01 RX ORDER — HEPARIN SODIUM 5000 [USP'U]/.5ML
5000 INJECTION, SOLUTION INTRAVENOUS; SUBCUTANEOUS EVERY 8 HOURS
DISCHARGE
Start: 2017-01-01

## 2017-01-01 RX ORDER — IPRATROPIUM BROMIDE AND ALBUTEROL SULFATE 2.5; .5 MG/3ML; MG/3ML
1 SOLUTION RESPIRATORY (INHALATION)
Status: DISCONTINUED | OUTPATIENT
Start: 2017-01-01 | End: 2017-01-01

## 2017-01-01 RX ORDER — FENTANYL CITRATE 50 UG/ML
25-50 INJECTION, SOLUTION INTRAMUSCULAR; INTRAVENOUS
Status: DISCONTINUED | OUTPATIENT
Start: 2017-01-01 | End: 2017-01-01 | Stop reason: HOSPADM

## 2017-01-01 RX ORDER — ALBUMIN (HUMAN) 12.5 G/50ML
12.5 SOLUTION INTRAVENOUS EVERY 8 HOURS
Status: COMPLETED | OUTPATIENT
Start: 2017-01-01 | End: 2017-01-01

## 2017-01-01 RX ORDER — HYDROMORPHONE HYDROCHLORIDE 1 MG/ML
0.2 INJECTION, SOLUTION INTRAMUSCULAR; INTRAVENOUS; SUBCUTANEOUS
Status: DISCONTINUED | OUTPATIENT
Start: 2017-01-01 | End: 2017-01-01

## 2017-01-01 RX ORDER — ALBUMIN, HUMAN INJ 5% 5 %
25 SOLUTION INTRAVENOUS EVERY 12 HOURS
Status: COMPLETED | OUTPATIENT
Start: 2017-01-01 | End: 2017-01-01

## 2017-01-01 RX ORDER — SODIUM CHLORIDE 9 MG/ML
INJECTION, SOLUTION INTRAVENOUS CONTINUOUS
Status: DISCONTINUED | OUTPATIENT
Start: 2017-01-01 | End: 2017-01-01

## 2017-01-01 RX ORDER — ALPRAZOLAM 0.25 MG
0.25 TABLET ORAL 3 TIMES DAILY PRN
Status: DISCONTINUED | OUTPATIENT
Start: 2017-01-01 | End: 2017-01-01 | Stop reason: HOSPADM

## 2017-01-01 RX ORDER — LEVOFLOXACIN 5 MG/ML
500 INJECTION, SOLUTION INTRAVENOUS
Status: DISCONTINUED | OUTPATIENT
Start: 2017-01-01 | End: 2017-01-01 | Stop reason: HOSPADM

## 2017-01-01 RX ORDER — ONDANSETRON 2 MG/ML
INJECTION INTRAMUSCULAR; INTRAVENOUS
Status: COMPLETED
Start: 2017-01-01 | End: 2017-01-01

## 2017-01-01 RX ORDER — LIDOCAINE 4 G/G
1 PATCH TOPICAL DAILY
Status: ON HOLD | COMMUNITY
End: 2017-01-01

## 2017-01-01 RX ORDER — OXYCODONE HYDROCHLORIDE 5 MG/1
5-10 TABLET ORAL EVERY 4 HOURS PRN
Status: DISCONTINUED | OUTPATIENT
Start: 2017-01-01 | End: 2017-01-01

## 2017-01-01 RX ORDER — GLYCOPYRROLATE 0.2 MG/ML
INJECTION, SOLUTION INTRAMUSCULAR; INTRAVENOUS PRN
Status: DISCONTINUED | OUTPATIENT
Start: 2017-01-01 | End: 2017-01-01

## 2017-01-01 RX ORDER — NALOXONE HYDROCHLORIDE 0.4 MG/ML
.1-.4 INJECTION, SOLUTION INTRAMUSCULAR; INTRAVENOUS; SUBCUTANEOUS
Status: DISCONTINUED | OUTPATIENT
Start: 2017-01-01 | End: 2017-01-01

## 2017-01-01 RX ORDER — IPRATROPIUM BROMIDE AND ALBUTEROL SULFATE 2.5; .5 MG/3ML; MG/3ML
3 SOLUTION RESPIRATORY (INHALATION) EVERY 6 HOURS
Status: DISCONTINUED | OUTPATIENT
Start: 2017-01-01 | End: 2017-01-01

## 2017-01-01 RX ORDER — ONDANSETRON 2 MG/ML
4 INJECTION INTRAMUSCULAR; INTRAVENOUS EVERY 6 HOURS PRN
Status: DISCONTINUED | OUTPATIENT
Start: 2017-01-01 | End: 2017-01-01 | Stop reason: HOSPADM

## 2017-01-01 RX ORDER — LEVOFLOXACIN 5 MG/ML
750 INJECTION, SOLUTION INTRAVENOUS ONCE
Status: COMPLETED | OUTPATIENT
Start: 2017-01-01 | End: 2017-01-01

## 2017-01-01 RX ORDER — LISINOPRIL 20 MG/1
20 TABLET ORAL DAILY
Status: DISCONTINUED | OUTPATIENT
Start: 2017-01-01 | End: 2017-01-01 | Stop reason: HOSPADM

## 2017-01-01 RX ORDER — LEVOFLOXACIN 5 MG/ML
500 INJECTION, SOLUTION INTRAVENOUS ONCE
Status: COMPLETED | OUTPATIENT
Start: 2017-01-01 | End: 2017-01-01

## 2017-01-01 RX ORDER — POTASSIUM CHLORIDE 1.5 G/1.58G
20-40 POWDER, FOR SOLUTION ORAL
Status: DISCONTINUED | OUTPATIENT
Start: 2017-01-01 | End: 2017-01-01 | Stop reason: HOSPADM

## 2017-01-01 RX ORDER — METOPROLOL TARTRATE 25 MG/1
25 TABLET, FILM COATED ORAL 2 TIMES DAILY
Qty: 60 TABLET | DISCHARGE
Start: 2017-01-01

## 2017-01-01 RX ORDER — HYDROMORPHONE HYDROCHLORIDE 1 MG/ML
.3-.5 INJECTION, SOLUTION INTRAMUSCULAR; INTRAVENOUS; SUBCUTANEOUS
Status: DISCONTINUED | OUTPATIENT
Start: 2017-01-01 | End: 2017-01-01

## 2017-01-01 RX ORDER — HEPARIN SODIUM 5000 [USP'U]/.5ML
5000 INJECTION, SOLUTION INTRAVENOUS; SUBCUTANEOUS EVERY 8 HOURS
Status: DISCONTINUED | OUTPATIENT
Start: 2017-01-01 | End: 2017-01-01 | Stop reason: HOSPADM

## 2017-01-01 RX ORDER — ARFORMOTEROL TARTRATE 15 UG/2ML
15 SOLUTION RESPIRATORY (INHALATION) EVERY 12 HOURS
Status: DISCONTINUED | OUTPATIENT
Start: 2017-01-01 | End: 2017-01-01 | Stop reason: HOSPADM

## 2017-01-01 RX ORDER — FUROSEMIDE 10 MG/ML
20 INJECTION INTRAMUSCULAR; INTRAVENOUS EVERY 8 HOURS
Status: DISCONTINUED | OUTPATIENT
Start: 2017-01-01 | End: 2017-01-01

## 2017-01-01 RX ORDER — LEVOFLOXACIN 5 MG/ML
250 INJECTION, SOLUTION INTRAVENOUS EVERY 24 HOURS
Qty: 1000 ML | Status: ON HOLD | DISCHARGE
Start: 2017-01-01 | End: 2017-01-01

## 2017-01-01 RX ORDER — FUROSEMIDE 10 MG/ML
80 INJECTION INTRAMUSCULAR; INTRAVENOUS ONCE
Status: COMPLETED | OUTPATIENT
Start: 2017-01-01 | End: 2017-01-01

## 2017-01-01 RX ORDER — NYSTATIN 100000/ML
500000 SUSPENSION, ORAL (FINAL DOSE FORM) ORAL 4 TIMES DAILY
Qty: 280 ML | Status: ON HOLD | DISCHARGE
Start: 2017-01-01 | End: 2017-01-01

## 2017-01-01 RX ORDER — ALBUMIN, HUMAN INJ 5% 5 %
SOLUTION INTRAVENOUS CONTINUOUS PRN
Status: DISCONTINUED | OUTPATIENT
Start: 2017-01-01 | End: 2017-01-01

## 2017-01-01 RX ORDER — SODIUM CHLORIDE, SODIUM LACTATE, POTASSIUM CHLORIDE, CALCIUM CHLORIDE 600; 310; 30; 20 MG/100ML; MG/100ML; MG/100ML; MG/100ML
INJECTION, SOLUTION INTRAVENOUS CONTINUOUS PRN
Status: DISCONTINUED | OUTPATIENT
Start: 2017-01-01 | End: 2017-01-01

## 2017-01-01 RX ORDER — ARFORMOTEROL TARTRATE 15 UG/2ML
15 SOLUTION RESPIRATORY (INHALATION) 2 TIMES DAILY
Status: DISCONTINUED | OUTPATIENT
Start: 2017-01-01 | End: 2017-01-01 | Stop reason: HOSPADM

## 2017-01-01 RX ORDER — DIPHENHYDRAMINE HCL 25 MG
25-50 CAPSULE ORAL ONCE
Status: CANCELLED | OUTPATIENT
Start: 2017-01-01 | End: 2017-01-01

## 2017-01-01 RX ORDER — NALOXONE HYDROCHLORIDE 0.4 MG/ML
.1-.4 INJECTION, SOLUTION INTRAMUSCULAR; INTRAVENOUS; SUBCUTANEOUS
Status: DISCONTINUED | OUTPATIENT
Start: 2017-01-01 | End: 2017-01-01 | Stop reason: HOSPADM

## 2017-01-01 RX ORDER — ACETAMINOPHEN 160 MG
TABLET,DISINTEGRATING ORAL
Status: COMPLETED
Start: 2017-01-01 | End: 2017-01-01

## 2017-01-01 RX ORDER — MULTIVITAMIN,THERAPEUTIC
1 TABLET ORAL ONCE
Status: DISCONTINUED | OUTPATIENT
Start: 2017-01-01 | End: 2017-01-01

## 2017-01-01 RX ORDER — ALBUMIN, HUMAN INJ 5% 5 %
12.5 SOLUTION INTRAVENOUS ONCE
Status: COMPLETED | OUTPATIENT
Start: 2017-01-01 | End: 2017-01-01

## 2017-01-01 RX ORDER — FLUCONAZOLE 2 MG/ML
200 INJECTION, SOLUTION INTRAVENOUS EVERY 24 HOURS
Status: DISCONTINUED | OUTPATIENT
Start: 2017-01-01 | End: 2017-01-01

## 2017-01-01 RX ORDER — ATORVASTATIN CALCIUM 10 MG/1
10 TABLET, FILM COATED ORAL EVERY EVENING
Status: DISCONTINUED | OUTPATIENT
Start: 2017-01-01 | End: 2017-01-01 | Stop reason: HOSPADM

## 2017-01-01 RX ORDER — ALPRAZOLAM 0.25 MG
0.25 TABLET ORAL 3 TIMES DAILY
Status: DISCONTINUED | OUTPATIENT
Start: 2017-01-01 | End: 2017-01-01 | Stop reason: HOSPADM

## 2017-01-01 RX ORDER — IPRATROPIUM BROMIDE AND ALBUTEROL SULFATE 2.5; .5 MG/3ML; MG/3ML
3 SOLUTION RESPIRATORY (INHALATION) EVERY 4 HOURS PRN
Status: DISCONTINUED | OUTPATIENT
Start: 2017-01-01 | End: 2017-01-01

## 2017-01-01 RX ORDER — GINSENG 100 MG
CAPSULE ORAL 2 TIMES DAILY
DISCHARGE
Start: 2017-01-01 | End: 2017-01-01

## 2017-01-01 RX ORDER — PROPOFOL 10 MG/ML
INJECTION, EMULSION INTRAVENOUS
Status: COMPLETED
Start: 2017-01-01 | End: 2017-01-01

## 2017-01-01 RX ORDER — METOPROLOL TARTRATE 1 MG/ML
INJECTION, SOLUTION INTRAVENOUS
Status: COMPLETED
Start: 2017-01-01 | End: 2017-01-01

## 2017-01-01 RX ORDER — AMINO ACIDS/PROTEIN HYDROLYS 11G-40/45
1 LIQUID IN PACKET (ML) ORAL EVERY 8 HOURS
Status: DISCONTINUED | OUTPATIENT
Start: 2017-01-01 | End: 2017-01-01 | Stop reason: CLARIF

## 2017-01-01 RX ORDER — SODIUM CHLORIDE 9 MG/ML
INJECTION, SOLUTION INTRAVENOUS CONTINUOUS
Status: DISCONTINUED | OUTPATIENT
Start: 2017-01-01 | End: 2017-01-01 | Stop reason: DRUGHIGH

## 2017-01-01 RX ORDER — MAGNESIUM SULFATE 1 G/100ML
1 INJECTION INTRAVENOUS ONCE
Status: DISCONTINUED | OUTPATIENT
Start: 2017-01-01 | End: 2017-01-01

## 2017-01-01 RX ORDER — FENTANYL CITRATE 50 UG/ML
50 INJECTION, SOLUTION INTRAMUSCULAR; INTRAVENOUS ONCE
Status: COMPLETED | OUTPATIENT
Start: 2017-01-01 | End: 2017-01-01

## 2017-01-01 RX ORDER — METOLAZONE 5 MG/1
5 TABLET ORAL ONCE
Status: COMPLETED | OUTPATIENT
Start: 2017-01-01 | End: 2017-01-01

## 2017-01-01 RX ORDER — VECURONIUM BROMIDE 1 MG/ML
INJECTION, POWDER, LYOPHILIZED, FOR SOLUTION INTRAVENOUS PRN
Status: DISCONTINUED | OUTPATIENT
Start: 2017-01-01 | End: 2017-01-01

## 2017-01-01 RX ORDER — MIRTAZAPINE 7.5 MG/1
22.5 TABLET, FILM COATED ORAL AT BEDTIME
Status: DISCONTINUED | OUTPATIENT
Start: 2017-01-01 | End: 2017-01-01 | Stop reason: HOSPADM

## 2017-01-01 RX ORDER — MICAFUNGIN 20 MG/ML
100 INJECTION, POWDER, LYOPHILIZED, FOR SOLUTION INTRAVENOUS DAILY
Status: DISCONTINUED | OUTPATIENT
Start: 2017-01-01 | End: 2017-01-01 | Stop reason: CLARIF

## 2017-01-01 RX ORDER — IPRATROPIUM BROMIDE AND ALBUTEROL SULFATE 2.5; .5 MG/3ML; MG/3ML
3 SOLUTION RESPIRATORY (INHALATION) EVERY 4 HOURS PRN
Status: DISCONTINUED | OUTPATIENT
Start: 2017-01-01 | End: 2017-01-01 | Stop reason: HOSPADM

## 2017-01-01 RX ORDER — DIPHENHYDRAMINE HYDROCHLORIDE 50 MG/ML
25-50 INJECTION INTRAMUSCULAR; INTRAVENOUS
Status: DISCONTINUED | OUTPATIENT
Start: 2017-01-01 | End: 2017-01-01

## 2017-01-01 RX ORDER — NYSTATIN 100000/ML
500000 SUSPENSION, ORAL (FINAL DOSE FORM) ORAL 4 TIMES DAILY
Status: DISCONTINUED | OUTPATIENT
Start: 2017-01-01 | End: 2017-01-01

## 2017-01-01 RX ORDER — PROPOFOL 10 MG/ML
INJECTION, EMULSION INTRAVENOUS PRN
Status: DISCONTINUED | OUTPATIENT
Start: 2017-01-01 | End: 2017-01-01

## 2017-01-01 RX ORDER — SODIUM CHLORIDE 9 MG/ML
INJECTION, SOLUTION INTRAVENOUS CONTINUOUS PRN
Status: DISCONTINUED | OUTPATIENT
Start: 2017-01-01 | End: 2017-01-01

## 2017-01-01 RX ORDER — ALBUMIN (HUMAN) 12.5 G/50ML
25 SOLUTION INTRAVENOUS
Status: COMPLETED | OUTPATIENT
Start: 2017-01-01 | End: 2017-01-01

## 2017-01-01 RX ORDER — TRAZODONE HYDROCHLORIDE 50 MG/1
50 TABLET, FILM COATED ORAL
Status: DISCONTINUED | OUTPATIENT
Start: 2017-01-01 | End: 2017-01-01 | Stop reason: HOSPADM

## 2017-01-01 RX ORDER — LIDOCAINE 40 MG/G
CREAM TOPICAL
Status: DISCONTINUED | OUTPATIENT
Start: 2017-01-01 | End: 2017-01-01 | Stop reason: HOSPADM

## 2017-01-01 RX ORDER — BUPIVACAINE HYDROCHLORIDE 5 MG/ML
INJECTION, SOLUTION PERINEURAL PRN
Status: DISCONTINUED | OUTPATIENT
Start: 2017-01-01 | End: 2017-01-01 | Stop reason: HOSPADM

## 2017-01-01 RX ORDER — ACETAMINOPHEN 160 MG
TABLET,DISINTEGRATING ORAL
Status: DISPENSED
Start: 2017-01-01 | End: 2017-01-01

## 2017-01-01 RX ORDER — BUDESONIDE 0.5 MG/2ML
0.5 INHALANT ORAL 2 TIMES DAILY
COMMUNITY

## 2017-01-01 RX ORDER — ALBUTEROL SULFATE 0.83 MG/ML
1 SOLUTION RESPIRATORY (INHALATION)
Status: DISCONTINUED | OUTPATIENT
Start: 2017-01-01 | End: 2017-01-01 | Stop reason: HOSPADM

## 2017-01-01 RX ORDER — CHLORHEXIDINE GLUCONATE ORAL RINSE 1.2 MG/ML
15 SOLUTION DENTAL EVERY 12 HOURS
Status: CANCELLED | OUTPATIENT
Start: 2017-01-01

## 2017-01-01 RX ORDER — LIDOCAINE HYDROCHLORIDE 10 MG/ML
1-30 INJECTION, SOLUTION EPIDURAL; INFILTRATION; INTRACAUDAL; PERINEURAL
Status: COMPLETED | OUTPATIENT
Start: 2017-01-01 | End: 2017-01-01

## 2017-01-01 RX ORDER — ALBUMIN (HUMAN) 12.5 G/50ML
50 SOLUTION INTRAVENOUS ONCE
Status: COMPLETED | OUTPATIENT
Start: 2017-01-01 | End: 2017-01-01

## 2017-01-01 RX ORDER — DIPHENHYDRAMINE HCL 25 MG
25-50 CAPSULE ORAL ONCE
Status: DISCONTINUED | OUTPATIENT
Start: 2017-01-01 | End: 2017-01-01 | Stop reason: HOSPADM

## 2017-01-01 RX ORDER — FENTANYL CITRATE 50 UG/ML
25-50 INJECTION, SOLUTION INTRAMUSCULAR; INTRAVENOUS EVERY 5 MIN PRN
Status: DISCONTINUED | OUTPATIENT
Start: 2017-01-01 | End: 2017-01-01 | Stop reason: HOSPADM

## 2017-01-01 RX ORDER — POTASSIUM CHLORIDE 29.8 MG/ML
20 INJECTION INTRAVENOUS
Status: DISCONTINUED | OUTPATIENT
Start: 2017-01-01 | End: 2017-01-01 | Stop reason: HOSPADM

## 2017-01-01 RX ORDER — FENTANYL CITRATE 50 UG/ML
25-50 INJECTION, SOLUTION INTRAMUSCULAR; INTRAVENOUS
Status: DISCONTINUED | OUTPATIENT
Start: 2017-01-01 | End: 2017-01-01

## 2017-01-01 RX ORDER — HYDRALAZINE HYDROCHLORIDE 20 MG/ML
INJECTION INTRAMUSCULAR; INTRAVENOUS
Status: COMPLETED
Start: 2017-01-01 | End: 2017-01-01

## 2017-01-01 RX ORDER — SODIUM CHLORIDE, SODIUM LACTATE, POTASSIUM CHLORIDE, CALCIUM CHLORIDE 600; 310; 30; 20 MG/100ML; MG/100ML; MG/100ML; MG/100ML
INJECTION, SOLUTION INTRAVENOUS CONTINUOUS
Status: DISCONTINUED | OUTPATIENT
Start: 2017-01-01 | End: 2017-01-01 | Stop reason: HOSPADM

## 2017-01-01 RX ORDER — BENAZEPRIL HYDROCHLORIDE 10 MG/1
10 TABLET ORAL DAILY
Status: DISCONTINUED | OUTPATIENT
Start: 2017-01-01 | End: 2017-01-01

## 2017-01-01 RX ORDER — BISACODYL 10 MG
10 SUPPOSITORY, RECTAL RECTAL ONCE
Status: DISCONTINUED | OUTPATIENT
Start: 2017-01-01 | End: 2017-01-01

## 2017-01-01 RX ORDER — LIDOCAINE HYDROCHLORIDE 10 MG/ML
INJECTION, SOLUTION INFILTRATION; PERINEURAL
Status: DISCONTINUED
Start: 2017-01-01 | End: 2017-01-01 | Stop reason: HOSPADM

## 2017-01-01 RX ORDER — MAGNESIUM HYDROXIDE 1200 MG/15ML
LIQUID ORAL PRN
Status: DISCONTINUED | OUTPATIENT
Start: 2017-01-01 | End: 2017-01-01 | Stop reason: HOSPADM

## 2017-01-01 RX ORDER — ONDANSETRON 2 MG/ML
4 INJECTION INTRAMUSCULAR; INTRAVENOUS EVERY 30 MIN PRN
Status: DISCONTINUED | OUTPATIENT
Start: 2017-01-01 | End: 2017-01-01 | Stop reason: HOSPADM

## 2017-01-01 RX ORDER — LINEZOLID 2 MG/ML
600 INJECTION, SOLUTION INTRAVENOUS EVERY 12 HOURS
Status: DISCONTINUED | OUTPATIENT
Start: 2017-01-01 | End: 2017-01-01

## 2017-01-01 RX ORDER — DESMOPRESSIN ACETATE 4 UG/ML
27.28 INJECTION, SOLUTION INTRAVENOUS; SUBCUTANEOUS ONCE
Status: COMPLETED | OUTPATIENT
Start: 2017-01-01 | End: 2017-01-01

## 2017-01-01 RX ORDER — MICAFUNGIN 20 MG/ML
100 INJECTION, POWDER, LYOPHILIZED, FOR SOLUTION INTRAVENOUS DAILY
Status: ON HOLD | COMMUNITY
End: 2017-01-01

## 2017-01-01 RX ORDER — CYCLOBENZAPRINE HCL 5 MG
5 TABLET ORAL 3 TIMES DAILY PRN
Status: DISCONTINUED | OUTPATIENT
Start: 2017-01-01 | End: 2017-01-01 | Stop reason: HOSPADM

## 2017-01-01 RX ORDER — BISACODYL 10 MG
10 SUPPOSITORY, RECTAL RECTAL DAILY PRN
Status: DISCONTINUED | OUTPATIENT
Start: 2017-01-01 | End: 2017-01-01 | Stop reason: HOSPADM

## 2017-01-01 RX ORDER — POLYETHYLENE GLYCOL 3350 17 G/17G
17 POWDER, FOR SOLUTION ORAL DAILY PRN
Status: DISCONTINUED | OUTPATIENT
Start: 2017-01-01 | End: 2017-01-01

## 2017-01-01 RX ORDER — POLYETHYLENE GLYCOL 3350 17 G/17G
17 POWDER, FOR SOLUTION ORAL DAILY PRN
Status: DISCONTINUED | OUTPATIENT
Start: 2017-01-01 | End: 2017-01-01 | Stop reason: HOSPADM

## 2017-01-01 RX ORDER — HYDROMORPHONE HYDROCHLORIDE 1 MG/ML
.3-.5 INJECTION, SOLUTION INTRAMUSCULAR; INTRAVENOUS; SUBCUTANEOUS EVERY 5 MIN PRN
Status: DISCONTINUED | OUTPATIENT
Start: 2017-01-01 | End: 2017-01-01 | Stop reason: HOSPADM

## 2017-01-01 RX ORDER — METOPROLOL TARTRATE 1 MG/ML
5 INJECTION, SOLUTION INTRAVENOUS ONCE
Status: COMPLETED | OUTPATIENT
Start: 2017-01-01 | End: 2017-01-01

## 2017-01-01 RX ORDER — POTASSIUM CHLORIDE 1500 MG/1
20-40 TABLET, EXTENDED RELEASE ORAL
Status: DISCONTINUED | OUTPATIENT
Start: 2017-01-01 | End: 2017-01-01 | Stop reason: HOSPADM

## 2017-01-01 RX ORDER — DEXTROSE MONOHYDRATE 25 G/50ML
INJECTION, SOLUTION INTRAVENOUS
Status: COMPLETED
Start: 2017-01-01 | End: 2017-01-01

## 2017-01-01 RX ORDER — METOPROLOL TARTRATE 25 MG/1
25 TABLET, FILM COATED ORAL 2 TIMES DAILY
Status: DISCONTINUED | OUTPATIENT
Start: 2017-01-01 | End: 2017-01-01 | Stop reason: HOSPADM

## 2017-01-01 RX ORDER — MEROPENEM 500 MG/1
500 INJECTION, POWDER, FOR SOLUTION INTRAVENOUS EVERY 8 HOURS
Status: DISCONTINUED | OUTPATIENT
Start: 2017-01-01 | End: 2017-01-01

## 2017-01-01 RX ORDER — FUROSEMIDE 10 MG/ML
60 INJECTION INTRAMUSCULAR; INTRAVENOUS
Status: DISCONTINUED | OUTPATIENT
Start: 2017-01-01 | End: 2017-01-01

## 2017-01-01 RX ORDER — FUROSEMIDE 10 MG/ML
40 INJECTION INTRAMUSCULAR; INTRAVENOUS
Qty: 60 ML | DISCHARGE
Start: 2017-01-01

## 2017-01-01 RX ORDER — NEOSTIGMINE METHYLSULFATE 1 MG/ML
VIAL (ML) INJECTION PRN
Status: DISCONTINUED | OUTPATIENT
Start: 2017-01-01 | End: 2017-01-01

## 2017-01-01 RX ORDER — QUETIAPINE FUMARATE 25 MG/1
25-50 TABLET, FILM COATED ORAL AT BEDTIME
Qty: 60 TABLET | DISCHARGE
Start: 2017-01-01 | End: 2017-01-01

## 2017-01-01 RX ORDER — POLYETHYLENE GLYCOL 3350 17 G/17G
17 POWDER, FOR SOLUTION ORAL DAILY PRN
Qty: 7 PACKET | Status: ON HOLD | DISCHARGE
Start: 2017-01-01 | End: 2017-01-01

## 2017-01-01 RX ORDER — ONDANSETRON 4 MG/1
4 TABLET, ORALLY DISINTEGRATING ORAL EVERY 30 MIN PRN
Status: DISCONTINUED | OUTPATIENT
Start: 2017-01-01 | End: 2017-01-01 | Stop reason: HOSPADM

## 2017-01-01 RX ORDER — AMOXICILLIN 250 MG
1-2 CAPSULE ORAL 2 TIMES DAILY PRN
Status: DISCONTINUED | OUTPATIENT
Start: 2017-01-01 | End: 2017-01-01 | Stop reason: HOSPADM

## 2017-01-01 RX ORDER — IOPAMIDOL 612 MG/ML
50 INJECTION, SOLUTION INTRAVASCULAR ONCE
Status: COMPLETED | OUTPATIENT
Start: 2017-01-01 | End: 2017-01-01

## 2017-01-01 RX ORDER — NICOTINE POLACRILEX 4 MG
15-30 LOZENGE BUCCAL
Status: DISCONTINUED | OUTPATIENT
Start: 2017-01-01 | End: 2017-01-01 | Stop reason: HOSPADM

## 2017-01-01 RX ORDER — DEXTROSE MONOHYDRATE 25 G/50ML
25-50 INJECTION, SOLUTION INTRAVENOUS
Status: DISCONTINUED | OUTPATIENT
Start: 2017-01-01 | End: 2017-01-01

## 2017-01-01 RX ORDER — MEROPENEM 500 MG/1
500 INJECTION, POWDER, FOR SOLUTION INTRAVENOUS EVERY 12 HOURS
Status: DISCONTINUED | OUTPATIENT
Start: 2017-01-01 | End: 2017-01-01

## 2017-01-01 RX ORDER — BUPIVACAINE HYDROCHLORIDE AND EPINEPHRINE 5; 5 MG/ML; UG/ML
INJECTION, SOLUTION PERINEURAL PRN
Status: DISCONTINUED | OUTPATIENT
Start: 2017-01-01 | End: 2017-01-01 | Stop reason: HOSPADM

## 2017-01-01 RX ORDER — HYDRALAZINE HYDROCHLORIDE 20 MG/ML
20 INJECTION INTRAMUSCULAR; INTRAVENOUS EVERY 6 HOURS PRN
Status: DISCONTINUED | OUTPATIENT
Start: 2017-01-01 | End: 2017-01-01 | Stop reason: HOSPADM

## 2017-01-01 RX ORDER — HYDRALAZINE HYDROCHLORIDE 25 MG/1
25 TABLET, FILM COATED ORAL EVERY 6 HOURS
Status: DISCONTINUED | OUTPATIENT
Start: 2017-01-01 | End: 2017-01-01 | Stop reason: HOSPADM

## 2017-01-01 RX ORDER — POTASSIUM CL/LIDO/0.9 % NACL 10MEQ/0.1L
10 INTRAVENOUS SOLUTION, PIGGYBACK (ML) INTRAVENOUS
Status: DISCONTINUED | OUTPATIENT
Start: 2017-01-01 | End: 2017-01-01 | Stop reason: HOSPADM

## 2017-01-01 RX ORDER — ONDANSETRON 4 MG/1
4 TABLET, ORALLY DISINTEGRATING ORAL EVERY 6 HOURS PRN
Status: DISCONTINUED | OUTPATIENT
Start: 2017-01-01 | End: 2017-01-01 | Stop reason: HOSPADM

## 2017-01-01 RX ORDER — MEROPENEM 1 G/1
1 INJECTION, POWDER, FOR SOLUTION INTRAVENOUS EVERY 12 HOURS
Status: DISCONTINUED | OUTPATIENT
Start: 2017-01-01 | End: 2017-01-01

## 2017-01-01 RX ORDER — AMLODIPINE BESYLATE 10 MG/1
10 TABLET ORAL DAILY
Status: DISCONTINUED | OUTPATIENT
Start: 2017-01-01 | End: 2017-01-01

## 2017-01-01 RX ORDER — MAGNESIUM SULFATE HEPTAHYDRATE 40 MG/ML
4 INJECTION, SOLUTION INTRAVENOUS EVERY 4 HOURS PRN
Status: DISCONTINUED | OUTPATIENT
Start: 2017-01-01 | End: 2017-01-01 | Stop reason: HOSPADM

## 2017-01-01 RX ORDER — SERTRALINE HYDROCHLORIDE 100 MG/1
100 TABLET, FILM COATED ORAL DAILY
Status: DISCONTINUED | OUTPATIENT
Start: 2017-01-01 | End: 2017-01-01

## 2017-01-01 RX ORDER — BUDESONIDE AND FORMOTEROL FUMARATE DIHYDRATE 80; 4.5 UG/1; UG/1
1 AEROSOL RESPIRATORY (INHALATION) 2 TIMES DAILY
Status: DISCONTINUED | OUTPATIENT
Start: 2017-01-01 | End: 2017-01-01

## 2017-01-01 RX ORDER — SIMVASTATIN 10 MG
10 TABLET ORAL EVERY MORNING
Status: DISCONTINUED | OUTPATIENT
Start: 2017-01-01 | End: 2017-01-01 | Stop reason: HOSPADM

## 2017-01-01 RX ORDER — METOCLOPRAMIDE 5 MG/1
5 TABLET ORAL EVERY 6 HOURS PRN
Status: DISCONTINUED | OUTPATIENT
Start: 2017-01-01 | End: 2017-01-01 | Stop reason: HOSPADM

## 2017-01-01 RX ORDER — CHLORHEXIDINE GLUCONATE ORAL RINSE 1.2 MG/ML
15 SOLUTION DENTAL EVERY 12 HOURS
Status: DISCONTINUED | OUTPATIENT
Start: 2017-01-01 | End: 2017-01-01 | Stop reason: HOSPADM

## 2017-01-01 RX ORDER — HYDROMORPHONE HYDROCHLORIDE 1 MG/ML
.3-.5 INJECTION, SOLUTION INTRAMUSCULAR; INTRAVENOUS; SUBCUTANEOUS
Status: DISCONTINUED | OUTPATIENT
Start: 2017-01-01 | End: 2017-01-01 | Stop reason: HOSPADM

## 2017-01-01 RX ORDER — HEPARIN SODIUM 5000 [USP'U]/.5ML
5000 INJECTION, SOLUTION INTRAVENOUS; SUBCUTANEOUS EVERY 8 HOURS
Status: ON HOLD | DISCHARGE
Start: 2017-01-01 | End: 2017-01-01

## 2017-01-01 RX ORDER — IPRATROPIUM BROMIDE AND ALBUTEROL SULFATE 2.5; .5 MG/3ML; MG/3ML
3 SOLUTION RESPIRATORY (INHALATION)
Status: DISCONTINUED | OUTPATIENT
Start: 2017-01-01 | End: 2017-01-01 | Stop reason: HOSPADM

## 2017-01-01 RX ORDER — PROPOFOL 10 MG/ML
5-40 INJECTION, EMULSION INTRAVENOUS CONTINUOUS
Status: DISCONTINUED | OUTPATIENT
Start: 2017-01-01 | End: 2017-01-01

## 2017-01-01 RX ORDER — FUROSEMIDE 10 MG/ML
40 INJECTION INTRAMUSCULAR; INTRAVENOUS
Qty: 60 ML | Status: ON HOLD | DISCHARGE
Start: 2017-01-01 | End: 2017-01-01

## 2017-01-01 RX ORDER — POTASSIUM CHLORIDE 7.45 MG/ML
10 INJECTION INTRAVENOUS
Status: DISCONTINUED | OUTPATIENT
Start: 2017-01-01 | End: 2017-01-01 | Stop reason: HOSPADM

## 2017-01-01 RX ORDER — SODIUM CHLORIDE 9 MG/ML
INJECTION, SOLUTION INTRAVENOUS CONTINUOUS
Status: DISCONTINUED | OUTPATIENT
Start: 2017-01-01 | End: 2017-01-01 | Stop reason: CLARIF

## 2017-01-01 RX ORDER — LIDOCAINE HYDROCHLORIDE 20 MG/ML
INJECTION, SOLUTION INFILTRATION; PERINEURAL PRN
Status: DISCONTINUED | OUTPATIENT
Start: 2017-01-01 | End: 2017-01-01

## 2017-01-01 RX ORDER — PROCHLORPERAZINE 25 MG
12.5 SUPPOSITORY, RECTAL RECTAL EVERY 12 HOURS PRN
Status: DISCONTINUED | OUTPATIENT
Start: 2017-01-01 | End: 2017-01-01 | Stop reason: HOSPADM

## 2017-01-01 RX ORDER — OXYCODONE HYDROCHLORIDE 5 MG/1
2.5 TABLET ORAL
Qty: 30 TABLET | Status: ON HOLD | DISCHARGE
Start: 2017-01-01 | End: 2017-01-01

## 2017-01-01 RX ORDER — FUROSEMIDE 10 MG/ML
20 INJECTION INTRAMUSCULAR; INTRAVENOUS
Status: DISCONTINUED | OUTPATIENT
Start: 2017-01-01 | End: 2017-01-01

## 2017-01-01 RX ORDER — AMINO ACIDS/PROTEIN HYDROLYS 11G-40/45
1 LIQUID IN PACKET (ML) ORAL EVERY 12 HOURS
Status: DISCONTINUED | OUTPATIENT
Start: 2017-01-01 | End: 2017-01-01 | Stop reason: HOSPADM

## 2017-01-01 RX ORDER — CIPROFLOXACIN 2 MG/ML
400 INJECTION, SOLUTION INTRAVENOUS EVERY 24 HOURS
Status: DISCONTINUED | OUTPATIENT
Start: 2017-01-01 | End: 2017-01-01

## 2017-01-01 RX ORDER — DIPHENHYDRAMINE HYDROCHLORIDE 50 MG/ML
50 INJECTION INTRAMUSCULAR; INTRAVENOUS ONCE
Status: COMPLETED | OUTPATIENT
Start: 2017-01-01 | End: 2017-01-01

## 2017-01-01 RX ORDER — ACETAMINOPHEN 650 MG/1
650 SUPPOSITORY RECTAL EVERY 8 HOURS
Status: DISCONTINUED | OUTPATIENT
Start: 2017-01-01 | End: 2017-01-01 | Stop reason: HOSPADM

## 2017-01-01 RX ORDER — LIDOCAINE 50 MG/G
1 PATCH TOPICAL DAILY
Status: DISCONTINUED | OUTPATIENT
Start: 2017-01-01 | End: 2017-01-01 | Stop reason: HOSPADM

## 2017-01-01 RX ORDER — ERTAPENEM 1 G/1
1 INJECTION, POWDER, LYOPHILIZED, FOR SOLUTION INTRAMUSCULAR; INTRAVENOUS EVERY 24 HOURS
Status: COMPLETED | OUTPATIENT
Start: 2017-01-01 | End: 2017-01-01

## 2017-01-01 RX ORDER — CALCIUM CARBONATE 500 MG/1
1 TABLET, CHEWABLE ORAL 3 TIMES DAILY
COMMUNITY

## 2017-01-01 RX ORDER — ONDANSETRON 2 MG/ML
INJECTION INTRAMUSCULAR; INTRAVENOUS PRN
Status: DISCONTINUED | OUTPATIENT
Start: 2017-01-01 | End: 2017-01-01

## 2017-01-01 RX ORDER — FUROSEMIDE 10 MG/ML
40 INJECTION INTRAMUSCULAR; INTRAVENOUS ONCE
Status: COMPLETED | OUTPATIENT
Start: 2017-01-01 | End: 2017-01-01

## 2017-01-01 RX ORDER — ALBUMIN (HUMAN) 12.5 G/50ML
12.5 SOLUTION INTRAVENOUS EVERY 8 HOURS
Status: DISCONTINUED | OUTPATIENT
Start: 2017-01-01 | End: 2017-01-01

## 2017-01-01 RX ORDER — DILTIAZEM HYDROCHLORIDE 5 MG/ML
10 INJECTION INTRAVENOUS ONCE
Status: COMPLETED | OUTPATIENT
Start: 2017-01-01 | End: 2017-01-01

## 2017-01-01 RX ORDER — MEROPENEM 1 G/1
1 INJECTION, POWDER, FOR SOLUTION INTRAVENOUS EVERY 8 HOURS
Qty: 30 EACH | Status: ON HOLD | DISCHARGE
Start: 2017-01-01 | End: 2017-01-01

## 2017-01-01 RX ORDER — FLUCONAZOLE 2 MG/ML
400 INJECTION, SOLUTION INTRAVENOUS EVERY 24 HOURS
Status: DISCONTINUED | OUTPATIENT
Start: 2017-01-01 | End: 2017-01-01 | Stop reason: HOSPADM

## 2017-01-01 RX ORDER — METOPROLOL TARTRATE 1 MG/ML
5 INJECTION, SOLUTION INTRAVENOUS EVERY 6 HOURS PRN
Status: DISCONTINUED | OUTPATIENT
Start: 2017-01-01 | End: 2017-01-01 | Stop reason: HOSPADM

## 2017-01-01 RX ORDER — GINSENG 100 MG
CAPSULE ORAL 3 TIMES DAILY
Status: DISCONTINUED | OUTPATIENT
Start: 2017-01-01 | End: 2017-01-01 | Stop reason: HOSPADM

## 2017-01-01 RX ORDER — CEFAZOLIN SODIUM 1 G/50ML
1250 SOLUTION INTRAVENOUS ONCE
Status: COMPLETED | OUTPATIENT
Start: 2017-01-01 | End: 2017-01-01

## 2017-01-01 RX ORDER — KETAMINE HYDROCHLORIDE 10 MG/ML
INJECTION, SOLUTION INTRAMUSCULAR; INTRAVENOUS PRN
Status: DISCONTINUED | OUTPATIENT
Start: 2017-01-01 | End: 2017-01-01

## 2017-01-01 RX ORDER — IPRATROPIUM BROMIDE AND ALBUTEROL SULFATE 2.5; .5 MG/3ML; MG/3ML
1 SOLUTION RESPIRATORY (INHALATION) 4 TIMES DAILY
Status: DISCONTINUED | OUTPATIENT
Start: 2017-01-01 | End: 2017-01-01 | Stop reason: HOSPADM

## 2017-01-01 RX ORDER — IOPAMIDOL 755 MG/ML
60 INJECTION, SOLUTION INTRAVASCULAR ONCE
Status: COMPLETED | OUTPATIENT
Start: 2017-01-01 | End: 2017-01-01

## 2017-01-01 RX ORDER — GINSENG 100 MG
CAPSULE ORAL 2 TIMES DAILY
Status: DISCONTINUED | OUTPATIENT
Start: 2017-01-01 | End: 2017-01-01 | Stop reason: HOSPADM

## 2017-01-01 RX ORDER — ALBUTEROL SULFATE 0.83 MG/ML
SOLUTION RESPIRATORY (INHALATION)
Status: COMPLETED
Start: 2017-01-01 | End: 2017-01-01

## 2017-01-01 RX ORDER — PIPERACILLIN SODIUM, TAZOBACTAM SODIUM 4; .5 G/20ML; G/20ML
4.5 INJECTION, POWDER, LYOPHILIZED, FOR SOLUTION INTRAVENOUS EVERY 8 HOURS
Status: ON HOLD | COMMUNITY
End: 2017-01-01

## 2017-01-01 RX ORDER — AMOXICILLIN 250 MG
1-2 CAPSULE ORAL 2 TIMES DAILY
Status: DISCONTINUED | OUTPATIENT
Start: 2017-01-01 | End: 2017-01-01 | Stop reason: HOSPADM

## 2017-01-01 RX ORDER — ALBUTEROL SULFATE 5 MG/ML
2.5 SOLUTION RESPIRATORY (INHALATION) EVERY 6 HOURS PRN
Status: DISCONTINUED | OUTPATIENT
Start: 2017-01-01 | End: 2017-01-01 | Stop reason: HOSPADM

## 2017-01-01 RX ORDER — DIPHENHYDRAMINE HYDROCHLORIDE 50 MG/ML
12.5 INJECTION INTRAMUSCULAR; INTRAVENOUS EVERY 6 HOURS PRN
Status: DISCONTINUED | OUTPATIENT
Start: 2017-01-01 | End: 2017-01-01

## 2017-01-01 RX ORDER — MULTIVIT WITH MINERALS/LUTEIN
1 TABLET ORAL EVERY MORNING
Status: ON HOLD | COMMUNITY
End: 2017-01-01

## 2017-01-01 RX ORDER — HYDROMORPHONE HYDROCHLORIDE 1 MG/ML
0.5 INJECTION, SOLUTION INTRAMUSCULAR; INTRAVENOUS; SUBCUTANEOUS ONCE
Status: COMPLETED | OUTPATIENT
Start: 2017-01-01 | End: 2017-01-01

## 2017-01-01 RX ORDER — HYDRALAZINE HYDROCHLORIDE 20 MG/ML
2 INJECTION INTRAMUSCULAR; INTRAVENOUS EVERY 4 HOURS PRN
Status: DISCONTINUED | OUTPATIENT
Start: 2017-01-01 | End: 2017-01-01

## 2017-01-01 RX ORDER — ALBUMIN, HUMAN INJ 5% 5 %
25 SOLUTION INTRAVENOUS ONCE
Status: COMPLETED | OUTPATIENT
Start: 2017-01-01 | End: 2017-01-01

## 2017-01-01 RX ORDER — DIPHENHYDRAMINE HYDROCHLORIDE 50 MG/ML
25-50 INJECTION INTRAMUSCULAR; INTRAVENOUS EVERY 12 HOURS PRN
Qty: 0.5 ML | DISCHARGE
Start: 2017-01-01

## 2017-01-01 RX ORDER — PHYTONADIONE 1 MG/.5ML
1 INJECTION, EMULSION INTRAMUSCULAR; INTRAVENOUS; SUBCUTANEOUS ONCE
Status: DISCONTINUED | OUTPATIENT
Start: 2017-01-01 | End: 2017-01-01

## 2017-01-01 RX ORDER — METOPROLOL TARTRATE 1 MG/ML
5 INJECTION, SOLUTION INTRAVENOUS ONCE
Status: CANCELLED | OUTPATIENT
Start: 2017-01-01 | End: 2017-01-01

## 2017-01-01 RX ORDER — QUETIAPINE FUMARATE 25 MG/1
25-50 TABLET, FILM COATED ORAL
Status: DISCONTINUED | OUTPATIENT
Start: 2017-01-01 | End: 2017-01-01 | Stop reason: HOSPADM

## 2017-01-01 RX ORDER — MEROPENEM 1 G/1
1 INJECTION, POWDER, FOR SOLUTION INTRAVENOUS EVERY 8 HOURS
Status: DISCONTINUED | OUTPATIENT
Start: 2017-01-01 | End: 2017-01-01 | Stop reason: HOSPADM

## 2017-01-01 RX ORDER — PANTOPRAZOLE SODIUM 40 MG/1
40 TABLET, DELAYED RELEASE ORAL EVERY MORNING
Status: DISCONTINUED | OUTPATIENT
Start: 2017-01-01 | End: 2017-01-01 | Stop reason: ALTCHOICE

## 2017-01-01 RX ORDER — BUDESONIDE 0.5 MG/2ML
0.5 INHALANT ORAL 2 TIMES DAILY
Status: DISCONTINUED | OUTPATIENT
Start: 2017-01-01 | End: 2017-01-01 | Stop reason: HOSPADM

## 2017-01-01 RX ORDER — FENTANYL CITRATE 50 UG/ML
INJECTION, SOLUTION INTRAMUSCULAR; INTRAVENOUS PRN
Status: DISCONTINUED | OUTPATIENT
Start: 2017-01-01 | End: 2017-01-01

## 2017-01-01 RX ORDER — FUROSEMIDE 10 MG/ML
40 INJECTION INTRAMUSCULAR; INTRAVENOUS
Status: DISCONTINUED | OUTPATIENT
Start: 2017-01-01 | End: 2017-01-01 | Stop reason: HOSPADM

## 2017-01-01 RX ORDER — NALOXONE HYDROCHLORIDE 0.4 MG/ML
0.01 INJECTION, SOLUTION INTRAMUSCULAR; INTRAVENOUS; SUBCUTANEOUS
Status: DISCONTINUED | OUTPATIENT
Start: 2017-01-01 | End: 2017-01-01

## 2017-01-01 RX ORDER — ALBUTEROL SULFATE 0.83 MG/ML
2.5 SOLUTION RESPIRATORY (INHALATION)
Status: DISCONTINUED | OUTPATIENT
Start: 2017-01-01 | End: 2017-01-01 | Stop reason: HOSPADM

## 2017-01-01 RX ORDER — OXYCODONE HYDROCHLORIDE 5 MG/1
5-10 TABLET ORAL EVERY 4 HOURS PRN
Refills: 0 | Status: ON HOLD | DISCHARGE
Start: 2017-01-01 | End: 2017-01-01

## 2017-01-01 RX ORDER — ACETAMINOPHEN 500 MG
1000 TABLET ORAL EVERY 8 HOURS PRN
Status: DISCONTINUED | OUTPATIENT
Start: 2017-01-01 | End: 2017-01-01 | Stop reason: HOSPADM

## 2017-01-01 RX ORDER — ONDANSETRON 4 MG/1
4 TABLET, ORALLY DISINTEGRATING ORAL EVERY 6 HOURS PRN
Status: CANCELLED | OUTPATIENT
Start: 2017-01-01

## 2017-01-01 RX ORDER — AMINO ACIDS/PROTEIN HYDROLYS 11G-40/45
1 LIQUID IN PACKET (ML) ORAL EVERY 12 HOURS
DISCHARGE
Start: 2017-01-01

## 2017-01-01 RX ORDER — LANOLIN ALCOHOL/MO/W.PET/CERES
200 CREAM (GRAM) TOPICAL DAILY
Status: DISCONTINUED | OUTPATIENT
Start: 2017-01-01 | End: 2017-01-01 | Stop reason: HOSPADM

## 2017-01-01 RX ORDER — QUETIAPINE FUMARATE 25 MG/1
25-50 TABLET, FILM COATED ORAL
Qty: 60 TABLET | DISCHARGE
Start: 2017-01-01

## 2017-01-01 RX ORDER — QUETIAPINE FUMARATE 25 MG/1
25-50 TABLET, FILM COATED ORAL AT BEDTIME
Status: DISCONTINUED | OUTPATIENT
Start: 2017-01-01 | End: 2017-01-01

## 2017-01-01 RX ORDER — LIDOCAINE 40 MG/G
CREAM TOPICAL
Status: DISCONTINUED | OUTPATIENT
Start: 2017-01-01 | End: 2017-01-01

## 2017-01-01 RX ORDER — QUETIAPINE FUMARATE 25 MG/1
25 TABLET, FILM COATED ORAL AT BEDTIME
Status: DISCONTINUED | OUTPATIENT
Start: 2017-01-01 | End: 2017-01-01 | Stop reason: HOSPADM

## 2017-01-01 RX ORDER — ONDANSETRON 4 MG/1
4 TABLET, ORALLY DISINTEGRATING ORAL EVERY 6 HOURS PRN
Qty: 30 TABLET | DISCHARGE
Start: 2017-01-01

## 2017-01-01 RX ORDER — ACETAMINOPHEN 500 MG
1000 TABLET ORAL EVERY 8 HOURS
Status: DISCONTINUED | OUTPATIENT
Start: 2017-01-01 | End: 2017-01-01 | Stop reason: HOSPADM

## 2017-01-01 RX ORDER — SIMVASTATIN 10 MG
10 TABLET ORAL AT BEDTIME
Status: DISCONTINUED | OUTPATIENT
Start: 2017-01-01 | End: 2017-01-01

## 2017-01-01 RX ORDER — GINSENG 100 MG
CAPSULE ORAL 2 TIMES DAILY
Status: ON HOLD | DISCHARGE
Start: 2017-01-01 | End: 2017-01-01

## 2017-01-01 RX ORDER — LOVASTATIN 20 MG
20 TABLET ORAL EVERY MORNING
Status: DISCONTINUED | OUTPATIENT
Start: 2017-01-01 | End: 2017-01-01

## 2017-01-01 RX ORDER — BACLOFEN 10 MG/1
5 TABLET ORAL EVERY MORNING
Status: DISCONTINUED | OUTPATIENT
Start: 2017-01-01 | End: 2017-01-01 | Stop reason: HOSPADM

## 2017-01-01 RX ORDER — ALBUTEROL SULFATE 0.83 MG/ML
1 SOLUTION RESPIRATORY (INHALATION)
COMMUNITY

## 2017-01-01 RX ORDER — LABETALOL HYDROCHLORIDE 5 MG/ML
10 INJECTION, SOLUTION INTRAVENOUS
Status: DISCONTINUED | OUTPATIENT
Start: 2017-01-01 | End: 2017-01-01

## 2017-01-01 RX ORDER — LISINOPRIL 20 MG/1
20 TABLET ORAL DAILY
Status: DISCONTINUED | OUTPATIENT
Start: 2017-01-01 | End: 2017-01-01

## 2017-01-01 RX ORDER — FENTANYL CITRATE 50 UG/ML
50-100 INJECTION, SOLUTION INTRAMUSCULAR; INTRAVENOUS
Status: DISCONTINUED | OUTPATIENT
Start: 2017-01-01 | End: 2017-01-01 | Stop reason: HOSPADM

## 2017-01-01 RX ORDER — BUDESONIDE 0.5 MG/2ML
1 INHALANT ORAL 2 TIMES DAILY
Status: DISCONTINUED | OUTPATIENT
Start: 2017-01-01 | End: 2017-01-01 | Stop reason: HOSPADM

## 2017-01-01 RX ORDER — LINEZOLID 2 MG/ML
600 INJECTION, SOLUTION INTRAVENOUS ONCE
Status: COMPLETED | OUTPATIENT
Start: 2017-01-01 | End: 2017-01-01

## 2017-01-01 RX ORDER — DEXTROSE MONOHYDRATE 25 G/50ML
25-50 INJECTION, SOLUTION INTRAVENOUS
Status: DISCONTINUED | OUTPATIENT
Start: 2017-01-01 | End: 2017-01-01 | Stop reason: HOSPADM

## 2017-01-01 RX ORDER — AMOXICILLIN 250 MG
1-2 CAPSULE ORAL 2 TIMES DAILY PRN
Qty: 100 TABLET | Status: ON HOLD | DISCHARGE
Start: 2017-01-01 | End: 2017-01-01

## 2017-01-01 RX ORDER — DIPHENHYDRAMINE HCL 12.5MG/5ML
12.5 LIQUID (ML) ORAL EVERY 6 HOURS PRN
Status: DISCONTINUED | OUTPATIENT
Start: 2017-01-01 | End: 2017-01-01

## 2017-01-01 RX ORDER — FORMOTEROL FUMARATE DIHYDRATE 20 UG/2ML
20 SOLUTION RESPIRATORY (INHALATION) EVERY 12 HOURS
Status: ON HOLD | COMMUNITY
End: 2017-01-01

## 2017-01-01 RX ORDER — HEPARIN SODIUM 5000 [USP'U]/.5ML
5000 INJECTION, SOLUTION INTRAVENOUS; SUBCUTANEOUS EVERY 12 HOURS
Status: DISCONTINUED | OUTPATIENT
Start: 2017-01-01 | End: 2017-01-01

## 2017-01-01 RX ORDER — POTASSIUM CHLORIDE 29.8 MG/ML
20 INJECTION INTRAVENOUS ONCE
Status: COMPLETED | OUTPATIENT
Start: 2017-01-01 | End: 2017-01-01

## 2017-01-01 RX ORDER — CIPROFLOXACIN 2 MG/ML
400 INJECTION, SOLUTION INTRAVENOUS EVERY 12 HOURS
Status: DISCONTINUED | OUTPATIENT
Start: 2017-01-01 | End: 2017-01-01

## 2017-01-01 RX ORDER — FUROSEMIDE 10 MG/ML
80 INJECTION INTRAMUSCULAR; INTRAVENOUS ONCE
Status: CANCELLED | OUTPATIENT
Start: 2017-01-01

## 2017-01-01 RX ORDER — HYDRALAZINE HYDROCHLORIDE 20 MG/ML
5 INJECTION INTRAMUSCULAR; INTRAVENOUS EVERY 4 HOURS PRN
Status: DISCONTINUED | OUTPATIENT
Start: 2017-01-01 | End: 2017-01-01

## 2017-01-01 RX ORDER — KIT FOR THE PREPARATION OF TECHNETIUM TC 99M MEBROFENIN 45 MG/10ML
6 INJECTION, POWDER, LYOPHILIZED, FOR SOLUTION INTRAVENOUS ONCE
Status: COMPLETED | OUTPATIENT
Start: 2017-01-01 | End: 2017-01-01

## 2017-01-01 RX ORDER — CHLORHEXIDINE GLUCONATE ORAL RINSE 1.2 MG/ML
15 SOLUTION DENTAL 2 TIMES DAILY
Status: DISCONTINUED | OUTPATIENT
Start: 2017-01-01 | End: 2017-01-01 | Stop reason: HOSPADM

## 2017-01-01 RX ORDER — FUROSEMIDE 10 MG/ML
60 INJECTION INTRAMUSCULAR; INTRAVENOUS EVERY 8 HOURS
Status: DISCONTINUED | OUTPATIENT
Start: 2017-01-01 | End: 2017-01-01

## 2017-01-01 RX ORDER — ACETAMINOPHEN 325 MG/1
975 TABLET ORAL EVERY 8 HOURS
Status: DISPENSED | OUTPATIENT
Start: 2017-01-01 | End: 2017-01-01

## 2017-01-01 RX ORDER — DEXAMETHASONE SODIUM PHOSPHATE 4 MG/ML
INJECTION, SOLUTION INTRA-ARTICULAR; INTRALESIONAL; INTRAMUSCULAR; INTRAVENOUS; SOFT TISSUE PRN
Status: DISCONTINUED | OUTPATIENT
Start: 2017-01-01 | End: 2017-01-01

## 2017-01-01 RX ORDER — LIDOCAINE 40 MG/G
CREAM TOPICAL
Status: CANCELLED | OUTPATIENT
Start: 2017-01-01

## 2017-01-01 RX ORDER — PROPOFOL 10 MG/ML
20 INJECTION, EMULSION INTRAVENOUS ONCE
Status: COMPLETED | OUTPATIENT
Start: 2017-01-01 | End: 2017-01-01

## 2017-01-01 RX ORDER — NYSTATIN 100000/ML
500000 SUSPENSION, ORAL (FINAL DOSE FORM) ORAL 4 TIMES DAILY
Status: DISCONTINUED | OUTPATIENT
Start: 2017-01-01 | End: 2017-01-01 | Stop reason: HOSPADM

## 2017-01-01 RX ORDER — FLUCONAZOLE 2 MG/ML
400 INJECTION, SOLUTION INTRAVENOUS EVERY 24 HOURS
Qty: 3000 ML | Status: ON HOLD | DISCHARGE
Start: 2017-01-01 | End: 2017-01-01

## 2017-01-01 RX ORDER — QUETIAPINE FUMARATE 25 MG/1
25 TABLET, FILM COATED ORAL AT BEDTIME
Status: DISCONTINUED | OUTPATIENT
Start: 2017-01-01 | End: 2017-01-01

## 2017-01-01 RX ORDER — OXYCODONE HYDROCHLORIDE 5 MG/1
5-10 TABLET ORAL EVERY 4 HOURS PRN
Status: DISCONTINUED | OUTPATIENT
Start: 2017-01-01 | End: 2017-01-01 | Stop reason: HOSPADM

## 2017-01-01 RX ORDER — SODIUM CHLORIDE, SODIUM LACTATE, POTASSIUM CHLORIDE, CALCIUM CHLORIDE 600; 310; 30; 20 MG/100ML; MG/100ML; MG/100ML; MG/100ML
INJECTION, SOLUTION INTRAVENOUS CONTINUOUS
Status: DISCONTINUED | OUTPATIENT
Start: 2017-01-01 | End: 2017-01-01

## 2017-01-01 RX ORDER — IPRATROPIUM BROMIDE AND ALBUTEROL SULFATE 2.5; .5 MG/3ML; MG/3ML
1 SOLUTION RESPIRATORY (INHALATION) 4 TIMES DAILY
COMMUNITY

## 2017-01-01 RX ORDER — PIPERACILLIN SODIUM, TAZOBACTAM SODIUM 4; .5 G/20ML; G/20ML
4.5 INJECTION, POWDER, LYOPHILIZED, FOR SOLUTION INTRAVENOUS EVERY 8 HOURS
Status: CANCELLED | OUTPATIENT
Start: 2017-01-01

## 2017-01-01 RX ORDER — IPRATROPIUM BROMIDE AND ALBUTEROL SULFATE 2.5; .5 MG/3ML; MG/3ML
1 SOLUTION RESPIRATORY (INHALATION) 2 TIMES DAILY PRN
Status: DISCONTINUED | OUTPATIENT
Start: 2017-01-01 | End: 2017-01-01

## 2017-01-01 RX ORDER — IPRATROPIUM BROMIDE AND ALBUTEROL SULFATE 2.5; .5 MG/3ML; MG/3ML
3 SOLUTION RESPIRATORY (INHALATION) EVERY 4 HOURS PRN
Qty: 360 ML | DISCHARGE
Start: 2017-01-01

## 2017-01-01 RX ORDER — FLUMAZENIL 0.1 MG/ML
0.2 INJECTION, SOLUTION INTRAVENOUS
Status: DISCONTINUED | OUTPATIENT
Start: 2017-01-01 | End: 2017-01-01 | Stop reason: HOSPADM

## 2017-01-01 RX ORDER — PROCHLORPERAZINE MALEATE 5 MG
5 TABLET ORAL EVERY 6 HOURS PRN
Status: DISCONTINUED | OUTPATIENT
Start: 2017-01-01 | End: 2017-01-01

## 2017-01-01 RX ORDER — ALBUTEROL SULFATE 0.83 MG/ML
SOLUTION RESPIRATORY (INHALATION)
Status: DISCONTINUED
Start: 2017-01-01 | End: 2017-01-01 | Stop reason: HOSPADM

## 2017-01-01 RX ORDER — FUROSEMIDE 10 MG/ML
INJECTION INTRAMUSCULAR; INTRAVENOUS
Status: DISCONTINUED
Start: 2017-01-01 | End: 2017-01-01 | Stop reason: WASHOUT

## 2017-01-01 RX ORDER — EPHEDRINE SULFATE 50 MG/ML
INJECTION, SOLUTION INTRAMUSCULAR; INTRAVENOUS; SUBCUTANEOUS PRN
Status: DISCONTINUED | OUTPATIENT
Start: 2017-01-01 | End: 2017-01-01

## 2017-01-01 RX ORDER — NICOTINE POLACRILEX 4 MG
15-30 LOZENGE BUCCAL
Status: DISCONTINUED | OUTPATIENT
Start: 2017-01-01 | End: 2017-01-01

## 2017-01-01 RX ORDER — DIPHENHYDRAMINE HYDROCHLORIDE 50 MG/ML
25-50 INJECTION INTRAMUSCULAR; INTRAVENOUS EVERY 12 HOURS PRN
Status: DISCONTINUED | OUTPATIENT
Start: 2017-01-01 | End: 2017-01-01 | Stop reason: HOSPADM

## 2017-01-01 RX ORDER — BISACODYL 10 MG
10 SUPPOSITORY, RECTAL RECTAL ONCE
Status: COMPLETED | OUTPATIENT
Start: 2017-01-01 | End: 2017-01-01

## 2017-01-01 RX ORDER — LEVOFLOXACIN 5 MG/ML
250 INJECTION, SOLUTION INTRAVENOUS EVERY 24 HOURS
Status: DISCONTINUED | OUTPATIENT
Start: 2017-01-01 | End: 2017-01-01 | Stop reason: HOSPADM

## 2017-01-01 RX ORDER — METOCLOPRAMIDE HYDROCHLORIDE 5 MG/ML
5 INJECTION INTRAMUSCULAR; INTRAVENOUS EVERY 6 HOURS PRN
Status: DISCONTINUED | OUTPATIENT
Start: 2017-01-01 | End: 2017-01-01 | Stop reason: HOSPADM

## 2017-01-01 RX ORDER — PROCHLORPERAZINE MALEATE 5 MG
5 TABLET ORAL EVERY 6 HOURS PRN
Status: DISCONTINUED | OUTPATIENT
Start: 2017-01-01 | End: 2017-01-01 | Stop reason: HOSPADM

## 2017-01-01 RX ORDER — OLANZAPINE 5 MG/1
5 TABLET, ORALLY DISINTEGRATING ORAL EVERY 6 HOURS
Status: DISCONTINUED | OUTPATIENT
Start: 2017-01-01 | End: 2017-01-01

## 2017-01-01 RX ORDER — LEVALBUTEROL 1.25 MG/.5ML
1.25 SOLUTION, CONCENTRATE RESPIRATORY (INHALATION) ONCE
Status: COMPLETED | OUTPATIENT
Start: 2017-01-01 | End: 2017-01-01

## 2017-01-01 RX ORDER — CALCIUM CARBONATE 500 MG/1
500 TABLET, CHEWABLE ORAL 3 TIMES DAILY
Status: DISCONTINUED | OUTPATIENT
Start: 2017-01-01 | End: 2017-01-01 | Stop reason: HOSPADM

## 2017-01-01 RX ORDER — ALBUMIN, HUMAN INJ 5% 5 %
SOLUTION INTRAVENOUS
Status: COMPLETED
Start: 2017-01-01 | End: 2017-01-01

## 2017-01-01 RX ORDER — CHLORHEXIDINE GLUCONATE ORAL RINSE 1.2 MG/ML
15 SOLUTION DENTAL EVERY 12 HOURS
DISCHARGE
Start: 2017-01-01

## 2017-01-01 RX ORDER — DOCUSATE SODIUM 100 MG/1
100 CAPSULE, LIQUID FILLED ORAL 2 TIMES DAILY
Status: DISCONTINUED | OUTPATIENT
Start: 2017-01-01 | End: 2017-01-01

## 2017-01-01 RX ORDER — LANOLIN ALCOHOL/MO/W.PET/CERES
100 CREAM (GRAM) TOPICAL ONCE
Status: DISCONTINUED | OUTPATIENT
Start: 2017-01-01 | End: 2017-01-01

## 2017-01-01 RX ORDER — ACETAMINOPHEN 325 MG/1
650 TABLET ORAL EVERY 4 HOURS PRN
Status: DISCONTINUED | OUTPATIENT
Start: 2017-01-01 | End: 2017-01-01

## 2017-01-01 RX ORDER — FOLIC ACID 1 MG/1
1 TABLET ORAL ONCE
Status: DISCONTINUED | OUTPATIENT
Start: 2017-01-01 | End: 2017-01-01

## 2017-01-01 RX ORDER — ALBUMIN (HUMAN) 12.5 G/50ML
12.5 SOLUTION INTRAVENOUS ONCE
Status: CANCELLED | OUTPATIENT
Start: 2017-01-01 | End: 2017-01-01

## 2017-01-01 RX ORDER — ALPRAZOLAM 0.25 MG
0.25 TABLET ORAL 3 TIMES DAILY PRN
Status: CANCELLED | OUTPATIENT
Start: 2017-01-01

## 2017-01-01 RX ORDER — METOPROLOL TARTRATE 50 MG
50 TABLET ORAL 2 TIMES DAILY
Status: DISCONTINUED | OUTPATIENT
Start: 2017-01-01 | End: 2017-01-01 | Stop reason: HOSPADM

## 2017-01-01 RX ORDER — FLUCONAZOLE 2 MG/ML
400 INJECTION, SOLUTION INTRAVENOUS ONCE
Status: DISCONTINUED | OUTPATIENT
Start: 2017-01-01 | End: 2017-01-01

## 2017-01-01 RX ORDER — DEXTROSE MONOHYDRATE 25 G/50ML
25 INJECTION, SOLUTION INTRAVENOUS ONCE
Status: COMPLETED | OUTPATIENT
Start: 2017-01-01 | End: 2017-01-01

## 2017-01-01 RX ORDER — HYDRALAZINE HYDROCHLORIDE 25 MG/1
25 TABLET, FILM COATED ORAL EVERY 6 HOURS
Status: DISCONTINUED | OUTPATIENT
Start: 2017-01-01 | End: 2017-01-01

## 2017-01-01 RX ORDER — NITROGLYCERIN 0.4 MG/1
0.4 TABLET SUBLINGUAL EVERY 5 MIN PRN
Status: DISCONTINUED | OUTPATIENT
Start: 2017-01-01 | End: 2017-01-01 | Stop reason: HOSPADM

## 2017-01-01 RX ORDER — BUSPIRONE HYDROCHLORIDE 7.5 MG/1
7.5 TABLET ORAL 2 TIMES DAILY
Status: DISCONTINUED | OUTPATIENT
Start: 2017-01-01 | End: 2017-01-01 | Stop reason: HOSPADM

## 2017-01-01 RX ORDER — FUROSEMIDE 10 MG/ML
40 INJECTION INTRAMUSCULAR; INTRAVENOUS
Status: COMPLETED | OUTPATIENT
Start: 2017-01-01 | End: 2017-01-01

## 2017-01-01 RX ORDER — ACETAMINOPHEN 160 MG
TABLET,DISINTEGRATING ORAL
Status: DISCONTINUED
Start: 2017-01-01 | End: 2017-01-01 | Stop reason: HOSPADM

## 2017-01-01 RX ORDER — METOPROLOL TARTRATE 50 MG
50 TABLET ORAL 2 TIMES DAILY
Status: DISCONTINUED | OUTPATIENT
Start: 2017-01-01 | End: 2017-01-01

## 2017-01-01 RX ORDER — ERTAPENEM 1 G/1
1 INJECTION, POWDER, LYOPHILIZED, FOR SOLUTION INTRAMUSCULAR; INTRAVENOUS EVERY 24 HOURS
Status: DISCONTINUED | OUTPATIENT
Start: 2017-01-01 | End: 2017-01-01

## 2017-01-01 RX ORDER — ERTAPENEM 1 G/1
INJECTION, POWDER, LYOPHILIZED, FOR SOLUTION INTRAMUSCULAR; INTRAVENOUS PRN
Status: DISCONTINUED | OUTPATIENT
Start: 2017-01-01 | End: 2017-01-01

## 2017-01-01 RX ORDER — ALBUMIN (HUMAN) 12.5 G/50ML
SOLUTION INTRAVENOUS
Status: COMPLETED
Start: 2017-01-01 | End: 2017-01-01

## 2017-01-01 RX ORDER — FUROSEMIDE 10 MG/ML
40 INJECTION INTRAMUSCULAR; INTRAVENOUS EVERY 8 HOURS
Status: DISCONTINUED | OUTPATIENT
Start: 2017-01-01 | End: 2017-01-01

## 2017-01-01 RX ORDER — BUDESONIDE 1 MG/2ML
1 INHALANT ORAL 2 TIMES DAILY
Status: DISCONTINUED | OUTPATIENT
Start: 2017-01-01 | End: 2017-01-01

## 2017-01-01 RX ORDER — FENTANYL CITRATE 50 UG/ML
INJECTION, SOLUTION INTRAMUSCULAR; INTRAVENOUS
Status: COMPLETED
Start: 2017-01-01 | End: 2017-01-01

## 2017-01-01 RX ADMIN — METOPROLOL TARTRATE 50 MG: 50 TABLET, FILM COATED ORAL at 21:40

## 2017-01-01 RX ADMIN — HEPARIN SODIUM 5000 UNITS: 5000 INJECTION, SOLUTION INTRAVENOUS; SUBCUTANEOUS at 14:54

## 2017-01-01 RX ADMIN — METRONIDAZOLE 500 MG: 500 INJECTION, SOLUTION INTRAVENOUS at 11:42

## 2017-01-01 RX ADMIN — CYCLOBENZAPRINE HYDROCHLORIDE 5 MG: 5 TABLET, FILM COATED ORAL at 22:15

## 2017-01-01 RX ADMIN — FENTANYL CITRATE 50 MCG: 50 INJECTION, SOLUTION INTRAMUSCULAR; INTRAVENOUS at 16:30

## 2017-01-01 RX ADMIN — IPRATROPIUM BROMIDE AND ALBUTEROL SULFATE 3 ML: .5; 3 SOLUTION RESPIRATORY (INHALATION) at 12:28

## 2017-01-01 RX ADMIN — NYSTATIN 500000 UNITS: 100000 SUSPENSION ORAL at 08:57

## 2017-01-01 RX ADMIN — CALCIUM CARBONATE (ANTACID) CHEW TAB 500 MG 500 MG: 500 CHEW TAB at 08:25

## 2017-01-01 RX ADMIN — HYDROMORPHONE HYDROCHLORIDE 0.2 MG: 1 INJECTION, SOLUTION INTRAMUSCULAR; INTRAVENOUS; SUBCUTANEOUS at 12:05

## 2017-01-01 RX ADMIN — Medication 0.1 MCG/KG/MIN: at 08:04

## 2017-01-01 RX ADMIN — HEPARIN SODIUM 5000 UNITS: 5000 INJECTION, SOLUTION INTRAVENOUS; SUBCUTANEOUS at 04:08

## 2017-01-01 RX ADMIN — ACETAMINOPHEN 975 MG: 325 TABLET, FILM COATED ORAL at 21:50

## 2017-01-01 RX ADMIN — ACETAMINOPHEN 650 MG: 650 SUPPOSITORY RECTAL at 01:13

## 2017-01-01 RX ADMIN — Medication 2.5 MG: at 05:03

## 2017-01-01 RX ADMIN — PROPOFOL 90 MG: 10 INJECTION, EMULSION INTRAVENOUS at 13:21

## 2017-01-01 RX ADMIN — SERTRALINE HYDROCHLORIDE 150 MG: 50 TABLET ORAL at 08:03

## 2017-01-01 RX ADMIN — DEXMEDETOMIDINE HYDROCHLORIDE 0.4 MCG/KG/HR: 100 INJECTION, SOLUTION INTRAVENOUS at 20:53

## 2017-01-01 RX ADMIN — BUDESONIDE 1 MG: 0.5 INHALANT RESPIRATORY (INHALATION) at 19:32

## 2017-01-01 RX ADMIN — PROPOFOL 40 MCG/KG/MIN: 10 INJECTION, EMULSION INTRAVENOUS at 06:17

## 2017-01-01 RX ADMIN — ARFORMOTEROL TARTRATE 15 MCG: 15 SOLUTION RESPIRATORY (INHALATION) at 19:18

## 2017-01-01 RX ADMIN — PHENYLEPHRINE HYDROCHLORIDE 100 MCG: 10 INJECTION, SOLUTION INTRAMUSCULAR; INTRAVENOUS; SUBCUTANEOUS at 18:04

## 2017-01-01 RX ADMIN — BUDESONIDE 0.5 MG: 0.5 INHALANT RESPIRATORY (INHALATION) at 20:01

## 2017-01-01 RX ADMIN — Medication 1 PACKET: at 09:14

## 2017-01-01 RX ADMIN — PANTOPRAZOLE SODIUM 40 MG: 40 INJECTION, POWDER, FOR SOLUTION INTRAVENOUS at 21:23

## 2017-01-01 RX ADMIN — PANTOPRAZOLE SODIUM 40 MG: 40 INJECTION, POWDER, FOR SOLUTION INTRAVENOUS at 09:36

## 2017-01-01 RX ADMIN — MICONAZOLE NITRATE: 2 POWDER TOPICAL at 09:14

## 2017-01-01 RX ADMIN — Medication 125 MG: at 12:57

## 2017-01-01 RX ADMIN — HYDROMORPHONE HYDROCHLORIDE 0.5 MG: 1 INJECTION, SOLUTION INTRAMUSCULAR; INTRAVENOUS; SUBCUTANEOUS at 09:05

## 2017-01-01 RX ADMIN — PANTOPRAZOLE SODIUM 40 MG: 40 INJECTION, POWDER, FOR SOLUTION INTRAVENOUS at 08:30

## 2017-01-01 RX ADMIN — SODIUM CHLORIDE 1000 ML: 9 INJECTION, SOLUTION INTRAVENOUS at 12:22

## 2017-01-01 RX ADMIN — FENTANYL CITRATE 50 MCG: 50 INJECTION, SOLUTION INTRAMUSCULAR; INTRAVENOUS at 17:05

## 2017-01-01 RX ADMIN — SULFUR HEXAFLUORIDE 5 ML: KIT at 09:45

## 2017-01-01 RX ADMIN — FENTANYL CITRATE 50 MCG: 50 INJECTION, SOLUTION INTRAMUSCULAR; INTRAVENOUS at 22:29

## 2017-01-01 RX ADMIN — METOPROLOL TARTRATE 25 MG: 25 TABLET ORAL at 10:55

## 2017-01-01 RX ADMIN — PHENYLEPHRINE HYDROCHLORIDE 100 MCG: 10 INJECTION, SOLUTION INTRAMUSCULAR; INTRAVENOUS; SUBCUTANEOUS at 17:41

## 2017-01-01 RX ADMIN — BUDESONIDE 1 MG: 0.5 INHALANT RESPIRATORY (INHALATION) at 07:18

## 2017-01-01 RX ADMIN — POTASSIUM CHLORIDE: 2 INJECTION, SOLUTION, CONCENTRATE INTRAVENOUS at 20:23

## 2017-01-01 RX ADMIN — HYDRALAZINE HYDROCHLORIDE 25 MG: 25 TABLET ORAL at 15:45

## 2017-01-01 RX ADMIN — ACETAMINOPHEN 1000 MG: 500 TABLET, FILM COATED ORAL at 04:08

## 2017-01-01 RX ADMIN — PHENYLEPHRINE HYDROCHLORIDE 150 MCG: 10 INJECTION, SOLUTION INTRAMUSCULAR; INTRAVENOUS; SUBCUTANEOUS at 12:34

## 2017-01-01 RX ADMIN — PANTOPRAZOLE SODIUM 40 MG: 40 INJECTION, POWDER, FOR SOLUTION INTRAVENOUS at 08:25

## 2017-01-01 RX ADMIN — MIDAZOLAM HYDROCHLORIDE 2 MG: 1 INJECTION, SOLUTION INTRAMUSCULAR; INTRAVENOUS at 18:10

## 2017-01-01 RX ADMIN — METOPROLOL TARTRATE 5 MG: 5 INJECTION INTRAVENOUS at 22:28

## 2017-01-01 RX ADMIN — SODIUM CHLORIDE, POTASSIUM CHLORIDE, SODIUM LACTATE AND CALCIUM CHLORIDE: 600; 310; 30; 20 INJECTION, SOLUTION INTRAVENOUS at 13:15

## 2017-01-01 RX ADMIN — FENTANYL CITRATE 50 MCG: 50 INJECTION, SOLUTION INTRAMUSCULAR; INTRAVENOUS at 17:13

## 2017-01-01 RX ADMIN — BUDESONIDE 0.5 MG: 0.5 INHALANT RESPIRATORY (INHALATION) at 20:07

## 2017-01-01 RX ADMIN — POTASSIUM CHLORIDE 40 MEQ: 1.5 POWDER, FOR SOLUTION ORAL at 04:37

## 2017-01-01 RX ADMIN — SIMVASTATIN 10 MG: 10 TABLET, FILM COATED ORAL at 09:39

## 2017-01-01 RX ADMIN — Medication 0.5 MG: at 10:37

## 2017-01-01 RX ADMIN — NYSTATIN 500000 UNITS: 100000 SUSPENSION ORAL at 21:19

## 2017-01-01 RX ADMIN — MICAFUNGIN SODIUM 100 MG: 10 INJECTION, POWDER, LYOPHILIZED, FOR SOLUTION INTRAVENOUS at 08:40

## 2017-01-01 RX ADMIN — Medication 50 ML: at 22:11

## 2017-01-01 RX ADMIN — SODIUM CHLORIDE: 9 INJECTION, SOLUTION INTRAVENOUS at 11:54

## 2017-01-01 RX ADMIN — SENNOSIDES AND DOCUSATE SODIUM 1 TABLET: 8.6; 5 TABLET ORAL at 08:47

## 2017-01-01 RX ADMIN — MIDAZOLAM HYDROCHLORIDE 1 MG: 1 INJECTION, SOLUTION INTRAMUSCULAR; INTRAVENOUS at 13:45

## 2017-01-01 RX ADMIN — CHLORHEXIDINE GLUCONATE 15 ML: 1.2 RINSE ORAL at 20:44

## 2017-01-01 RX ADMIN — MEROPENEM 500 MG: 500 INJECTION, POWDER, FOR SOLUTION INTRAVENOUS at 15:26

## 2017-01-01 RX ADMIN — BACITRACIN: 500 OINTMENT TOPICAL at 12:21

## 2017-01-01 RX ADMIN — HEPARIN SODIUM 5000 UNITS: 5000 INJECTION, SOLUTION INTRAVENOUS; SUBCUTANEOUS at 13:42

## 2017-01-01 RX ADMIN — HYDROGEN PEROXIDE: 2.65 LIQUID TOPICAL at 20:00

## 2017-01-01 RX ADMIN — HYDROMORPHONE HYDROCHLORIDE 0.5 MG: 1 INJECTION, SOLUTION INTRAMUSCULAR; INTRAVENOUS; SUBCUTANEOUS at 06:40

## 2017-01-01 RX ADMIN — ACETAMINOPHEN 975 MG: 325 SOLUTION ORAL at 01:06

## 2017-01-01 RX ADMIN — ACETAMINOPHEN 1000 MG: 500 TABLET, FILM COATED ORAL at 12:04

## 2017-01-01 RX ADMIN — HYDRALAZINE HYDROCHLORIDE 25 MG: 25 TABLET ORAL at 20:09

## 2017-01-01 RX ADMIN — Medication 125 MG: at 21:00

## 2017-01-01 RX ADMIN — HEPARIN SODIUM 5000 UNITS: 5000 INJECTION, SOLUTION INTRAVENOUS; SUBCUTANEOUS at 18:23

## 2017-01-01 RX ADMIN — ACETAMINOPHEN 1000 MG: 500 TABLET, FILM COATED ORAL at 12:41

## 2017-01-01 RX ADMIN — CHLORHEXIDINE GLUCONATE 15 ML: 1.2 RINSE ORAL at 20:20

## 2017-01-01 RX ADMIN — METOPROLOL TARTRATE 50 MG: 50 TABLET, FILM COATED ORAL at 21:11

## 2017-01-01 RX ADMIN — CHLORHEXIDINE GLUCONATE 15 ML: 1.2 RINSE ORAL at 08:37

## 2017-01-01 RX ADMIN — METOPROLOL TARTRATE 5 MG: 5 INJECTION INTRAVENOUS at 12:31

## 2017-01-01 RX ADMIN — Medication 1 PACKET: at 14:59

## 2017-01-01 RX ADMIN — BUDESONIDE 0.5 MG: 0.5 INHALANT RESPIRATORY (INHALATION) at 07:30

## 2017-01-01 RX ADMIN — BACITRACIN: 500 OINTMENT TOPICAL at 20:44

## 2017-01-01 RX ADMIN — FUROSEMIDE 60 MG: 10 INJECTION, SOLUTION INTRAVENOUS at 15:56

## 2017-01-01 RX ADMIN — HEPARIN SODIUM 5000 UNITS: 5000 INJECTION, SOLUTION INTRAVENOUS; SUBCUTANEOUS at 21:15

## 2017-01-01 RX ADMIN — ALBUMIN (HUMAN) 25 G: 12.5 SOLUTION INTRAVENOUS at 00:40

## 2017-01-01 RX ADMIN — ARFORMOTEROL TARTRATE 15 MCG: 15 SOLUTION RESPIRATORY (INHALATION) at 20:16

## 2017-01-01 RX ADMIN — Medication 125 MG: at 17:44

## 2017-01-01 RX ADMIN — VITAMIN D, TAB 1000IU (100/BT) 1000 UNITS: 25 TAB at 14:32

## 2017-01-01 RX ADMIN — POTASSIUM CHLORIDE 20 MEQ: 29.8 INJECTION, SOLUTION INTRAVENOUS at 08:08

## 2017-01-01 RX ADMIN — VANCOMYCIN HYDROCHLORIDE 1250 MG: 5 INJECTION, POWDER, LYOPHILIZED, FOR SOLUTION INTRAVENOUS at 15:47

## 2017-01-01 RX ADMIN — LIDOCAINE HYDROCHLORIDE 70 MG: 10 INJECTION, SOLUTION INFILTRATION; PERINEURAL at 11:19

## 2017-01-01 RX ADMIN — NYSTATIN 500000 UNITS: 100000 SUSPENSION ORAL at 13:13

## 2017-01-01 RX ADMIN — PROPOFOL 10 MG: 10 INJECTION, EMULSION INTRAVENOUS at 15:16

## 2017-01-01 RX ADMIN — BACITRACIN: 500 OINTMENT TOPICAL at 21:04

## 2017-01-01 RX ADMIN — FUROSEMIDE 40 MG: 10 INJECTION, SOLUTION INTRAVENOUS at 14:45

## 2017-01-01 RX ADMIN — NYSTATIN 500000 UNITS: 100000 SUSPENSION ORAL at 17:24

## 2017-01-01 RX ADMIN — Medication 125 MG: at 13:04

## 2017-01-01 RX ADMIN — SODIUM CHLORIDE: 9 INJECTION, SOLUTION INTRAVENOUS at 19:58

## 2017-01-01 RX ADMIN — ARFORMOTEROL TARTRATE 15 MCG: 15 SOLUTION RESPIRATORY (INHALATION) at 07:43

## 2017-01-01 RX ADMIN — IPRATROPIUM BROMIDE AND ALBUTEROL SULFATE 3 ML: .5; 3 SOLUTION RESPIRATORY (INHALATION) at 15:43

## 2017-01-01 RX ADMIN — HYDROMORPHONE HYDROCHLORIDE 0.5 MG: 1 INJECTION, SOLUTION INTRAMUSCULAR; INTRAVENOUS; SUBCUTANEOUS at 19:00

## 2017-01-01 RX ADMIN — HYDROMORPHONE HYDROCHLORIDE 0.2 MG: 1 INJECTION, SOLUTION INTRAMUSCULAR; INTRAVENOUS; SUBCUTANEOUS at 08:25

## 2017-01-01 RX ADMIN — HYDROMORPHONE HYDROCHLORIDE 0.2 MG: 1 INJECTION, SOLUTION INTRAMUSCULAR; INTRAVENOUS; SUBCUTANEOUS at 21:55

## 2017-01-01 RX ADMIN — CHLORHEXIDINE GLUCONATE 15 ML: 1.2 RINSE ORAL at 08:51

## 2017-01-01 RX ADMIN — BACITRACIN: 500 OINTMENT TOPICAL at 08:26

## 2017-01-01 RX ADMIN — ARFORMOTEROL TARTRATE 15 MCG: 15 SOLUTION RESPIRATORY (INHALATION) at 07:46

## 2017-01-01 RX ADMIN — Medication 1 SPRAY: at 20:20

## 2017-01-01 RX ADMIN — MICAFUNGIN SODIUM 100 MG: 10 INJECTION, POWDER, LYOPHILIZED, FOR SOLUTION INTRAVENOUS at 09:25

## 2017-01-01 RX ADMIN — SERTRALINE HYDROCHLORIDE 150 MG: 50 TABLET ORAL at 09:04

## 2017-01-01 RX ADMIN — CHLORHEXIDINE GLUCONATE 15 ML: 1.2 RINSE ORAL at 08:27

## 2017-01-01 RX ADMIN — MICAFUNGIN SODIUM 100 MG: 10 INJECTION, POWDER, LYOPHILIZED, FOR SOLUTION INTRAVENOUS at 08:53

## 2017-01-01 RX ADMIN — FUROSEMIDE 40 MG: 10 INJECTION, SOLUTION INTRAVENOUS at 09:42

## 2017-01-01 RX ADMIN — I.V. FAT EMULSION 250 ML: 20 EMULSION INTRAVENOUS at 20:06

## 2017-01-01 RX ADMIN — PHENYLEPHRINE HYDROCHLORIDE 100 MCG: 10 INJECTION, SOLUTION INTRAMUSCULAR; INTRAVENOUS; SUBCUTANEOUS at 16:32

## 2017-01-01 RX ADMIN — CYCLOBENZAPRINE HYDROCHLORIDE 5 MG: 5 TABLET, FILM COATED ORAL at 05:40

## 2017-01-01 RX ADMIN — NYSTATIN 500000 UNITS: 100000 SUSPENSION ORAL at 12:59

## 2017-01-01 RX ADMIN — DAPTOMYCIN 400 MG: 500 INJECTION, POWDER, LYOPHILIZED, FOR SOLUTION INTRAVENOUS at 04:41

## 2017-01-01 RX ADMIN — FENTANYL CITRATE 25 MCG: 50 INJECTION, SOLUTION INTRAMUSCULAR; INTRAVENOUS at 08:36

## 2017-01-01 RX ADMIN — CALCIUM CARBONATE (ANTACID) CHEW TAB 500 MG 500 MG: 500 CHEW TAB at 15:32

## 2017-01-01 RX ADMIN — ALBUMIN (HUMAN) 25 G: 12.5 SOLUTION INTRAVENOUS at 09:04

## 2017-01-01 RX ADMIN — ARFORMOTEROL TARTRATE 15 MCG: 15 SOLUTION RESPIRATORY (INHALATION) at 07:40

## 2017-01-01 RX ADMIN — SODIUM CHLORIDE, PRESERVATIVE FREE: 5 INJECTION INTRAVENOUS at 04:10

## 2017-01-01 RX ADMIN — CALCIUM CARBONATE (ANTACID) CHEW TAB 500 MG 500 MG: 500 CHEW TAB at 22:10

## 2017-01-01 RX ADMIN — MEROPENEM 1 G: 1 INJECTION, POWDER, FOR SOLUTION INTRAVENOUS at 06:31

## 2017-01-01 RX ADMIN — ARFORMOTEROL TARTRATE 15 MCG: 15 SOLUTION RESPIRATORY (INHALATION) at 07:11

## 2017-01-01 RX ADMIN — Medication 50 ML: at 09:33

## 2017-01-01 RX ADMIN — SODIUM CHLORIDE, POTASSIUM CHLORIDE, SODIUM LACTATE AND CALCIUM CHLORIDE: 600; 310; 30; 20 INJECTION, SOLUTION INTRAVENOUS at 16:55

## 2017-01-01 RX ADMIN — ACETAMINOPHEN 1000 MG: 500 TABLET, FILM COATED ORAL at 19:02

## 2017-01-01 RX ADMIN — NYSTATIN 500000 UNITS: 100000 SUSPENSION ORAL at 08:56

## 2017-01-01 RX ADMIN — ARFORMOTEROL TARTRATE 15 MCG: 15 SOLUTION RESPIRATORY (INHALATION) at 07:17

## 2017-01-01 RX ADMIN — FENTANYL CITRATE 50 MCG: 50 INJECTION, SOLUTION INTRAMUSCULAR; INTRAVENOUS at 20:54

## 2017-01-01 RX ADMIN — THIAMINE HYDROCHLORIDE 250 MG: 100 INJECTION, SOLUTION INTRAMUSCULAR; INTRAVENOUS at 08:53

## 2017-01-01 RX ADMIN — OXYCODONE HYDROCHLORIDE 5 MG: 5 TABLET ORAL at 20:51

## 2017-01-01 RX ADMIN — HEPARIN SODIUM 5000 UNITS: 5000 INJECTION, SOLUTION INTRAVENOUS; SUBCUTANEOUS at 06:30

## 2017-01-01 RX ADMIN — MIDAZOLAM HYDROCHLORIDE 2 MG: 1 INJECTION, SOLUTION INTRAMUSCULAR; INTRAVENOUS at 21:16

## 2017-01-01 RX ADMIN — FUROSEMIDE 60 MG: 10 INJECTION, SOLUTION INTRAVENOUS at 08:26

## 2017-01-01 RX ADMIN — ALPRAZOLAM 0.25 MG: 0.25 TABLET ORAL at 21:06

## 2017-01-01 RX ADMIN — HYDROMORPHONE HYDROCHLORIDE 0.5 MG: 1 INJECTION, SOLUTION INTRAMUSCULAR; INTRAVENOUS; SUBCUTANEOUS at 08:45

## 2017-01-01 RX ADMIN — NYSTATIN 500000 UNITS: 100000 SUSPENSION ORAL at 19:58

## 2017-01-01 RX ADMIN — PHENYLEPHRINE HYDROCHLORIDE 100 MCG: 10 INJECTION, SOLUTION INTRAMUSCULAR; INTRAVENOUS; SUBCUTANEOUS at 16:40

## 2017-01-01 RX ADMIN — FENTANYL CITRATE 50 MCG: 50 INJECTION, SOLUTION INTRAMUSCULAR; INTRAVENOUS at 16:18

## 2017-01-01 RX ADMIN — Medication 40 MG: at 10:03

## 2017-01-01 RX ADMIN — ARFORMOTEROL TARTRATE 15 MCG: 15 SOLUTION RESPIRATORY (INHALATION) at 19:34

## 2017-01-01 RX ADMIN — FUROSEMIDE 60 MG: 10 INJECTION, SOLUTION INTRAVENOUS at 23:55

## 2017-01-01 RX ADMIN — ALPRAZOLAM 0.25 MG: 0.25 TABLET ORAL at 09:36

## 2017-01-01 RX ADMIN — TRAZODONE HYDROCHLORIDE 50 MG: 50 TABLET ORAL at 22:10

## 2017-01-01 RX ADMIN — CHLORHEXIDINE GLUCONATE 15 ML: 1.2 RINSE ORAL at 09:18

## 2017-01-01 RX ADMIN — ALBUMIN (HUMAN) 25 G: 12.5 SOLUTION INTRAVENOUS at 07:40

## 2017-01-01 RX ADMIN — ARFORMOTEROL TARTRATE 15 MCG: 15 SOLUTION RESPIRATORY (INHALATION) at 07:32

## 2017-01-01 RX ADMIN — NYSTATIN 500000 UNITS: 100000 SUSPENSION ORAL at 18:38

## 2017-01-01 RX ADMIN — HEPARIN SODIUM 5000 UNITS: 5000 INJECTION, SOLUTION INTRAVENOUS; SUBCUTANEOUS at 09:09

## 2017-01-01 RX ADMIN — Medication 40 MG: at 22:32

## 2017-01-01 RX ADMIN — HYDROMORPHONE HYDROCHLORIDE 0.2 MG: 1 INJECTION, SOLUTION INTRAMUSCULAR; INTRAVENOUS; SUBCUTANEOUS at 11:38

## 2017-01-01 RX ADMIN — Medication 5 MG: at 09:56

## 2017-01-01 RX ADMIN — IPRATROPIUM BROMIDE AND ALBUTEROL SULFATE 3 ML: .5; 3 SOLUTION RESPIRATORY (INHALATION) at 07:35

## 2017-01-01 RX ADMIN — HEPARIN SODIUM 5000 UNITS: 5000 INJECTION, SOLUTION INTRAVENOUS; SUBCUTANEOUS at 21:00

## 2017-01-01 RX ADMIN — MICAFUNGIN SODIUM 100 MG: 10 INJECTION, POWDER, LYOPHILIZED, FOR SOLUTION INTRAVENOUS at 10:36

## 2017-01-01 RX ADMIN — CALCIUM CARBONATE (ANTACID) CHEW TAB 500 MG 500 MG: 500 CHEW TAB at 08:03

## 2017-01-01 RX ADMIN — HEPARIN SODIUM 5000 UNITS: 5000 INJECTION, SOLUTION INTRAVENOUS; SUBCUTANEOUS at 20:27

## 2017-01-01 RX ADMIN — PHENYLEPHRINE HYDROCHLORIDE 100 MCG: 10 INJECTION, SOLUTION INTRAMUSCULAR; INTRAVENOUS; SUBCUTANEOUS at 16:47

## 2017-01-01 RX ADMIN — Medication 1 MG: at 16:42

## 2017-01-01 RX ADMIN — Medication 40 MG: at 09:20

## 2017-01-01 RX ADMIN — BUDESONIDE 0.5 MG: 0.5 INHALANT RESPIRATORY (INHALATION) at 07:25

## 2017-01-01 RX ADMIN — METOPROLOL TARTRATE 50 MG: 50 TABLET, FILM COATED ORAL at 08:06

## 2017-01-01 RX ADMIN — Medication 1 PACKET: at 22:34

## 2017-01-01 RX ADMIN — PANTOPRAZOLE SODIUM 40 MG: 40 INJECTION, POWDER, FOR SOLUTION INTRAVENOUS at 20:08

## 2017-01-01 RX ADMIN — SODIUM CHLORIDE 500 ML: 9 INJECTION, SOLUTION INTRAVENOUS at 13:58

## 2017-01-01 RX ADMIN — VANCOMYCIN HYDROCHLORIDE 1750 MG: 5 INJECTION, POWDER, LYOPHILIZED, FOR SOLUTION INTRAVENOUS at 14:57

## 2017-01-01 RX ADMIN — LEVOFLOXACIN 500 MG: 5 INJECTION, SOLUTION INTRAVENOUS at 11:04

## 2017-01-01 RX ADMIN — MEROPENEM 1 G: 1 INJECTION, POWDER, FOR SOLUTION INTRAVENOUS at 02:14

## 2017-01-01 RX ADMIN — ALPRAZOLAM 0.25 MG: 0.25 TABLET ORAL at 08:44

## 2017-01-01 RX ADMIN — Medication 1 PACKET: at 08:03

## 2017-01-01 RX ADMIN — FENTANYL CITRATE 100 MCG: 50 INJECTION, SOLUTION INTRAMUSCULAR; INTRAVENOUS at 04:25

## 2017-01-01 RX ADMIN — FENTANYL CITRATE 100 MCG: 50 INJECTION, SOLUTION INTRAMUSCULAR; INTRAVENOUS at 07:47

## 2017-01-01 RX ADMIN — VANCOMYCIN HYDROCHLORIDE 1500 MG: 5 INJECTION, POWDER, LYOPHILIZED, FOR SOLUTION INTRAVENOUS at 18:46

## 2017-01-01 RX ADMIN — BUDESONIDE 1 MG: 0.5 INHALANT RESPIRATORY (INHALATION) at 19:03

## 2017-01-01 RX ADMIN — SERTRALINE HYDROCHLORIDE 50 MG: 50 TABLET ORAL at 08:05

## 2017-01-01 RX ADMIN — OXYCODONE HYDROCHLORIDE 2.5 MG: 5 TABLET ORAL at 19:58

## 2017-01-01 RX ADMIN — SERTRALINE HYDROCHLORIDE 50 MG: 50 TABLET ORAL at 09:05

## 2017-01-01 RX ADMIN — BUSPIRONE HYDROCHLORIDE 7.5 MG: 7.5 TABLET ORAL at 20:28

## 2017-01-01 RX ADMIN — CHLORHEXIDINE GLUCONATE 15 ML: 1.2 RINSE ORAL at 20:09

## 2017-01-01 RX ADMIN — SODIUM CHLORIDE 250 ML: 9 INJECTION, SOLUTION INTRAVENOUS at 06:33

## 2017-01-01 RX ADMIN — HEPARIN SODIUM 5000 UNITS: 5000 INJECTION, SOLUTION INTRAVENOUS; SUBCUTANEOUS at 01:25

## 2017-01-01 RX ADMIN — LEVOFLOXACIN 250 MG: 5 INJECTION, SOLUTION INTRAVENOUS at 13:29

## 2017-01-01 RX ADMIN — PROCHLORPERAZINE EDISYLATE 5 MG: 5 INJECTION INTRAMUSCULAR; INTRAVENOUS at 01:42

## 2017-01-01 RX ADMIN — SIMVASTATIN 10 MG: 10 TABLET, FILM COATED ORAL at 08:05

## 2017-01-01 RX ADMIN — PROPOFOL 20 MG: 10 INJECTION, EMULSION INTRAVENOUS at 12:30

## 2017-01-01 RX ADMIN — Medication 5 MG: at 08:45

## 2017-01-01 RX ADMIN — Medication 125 MG: at 09:31

## 2017-01-01 RX ADMIN — ONDANSETRON 4 MG: 2 INJECTION INTRAMUSCULAR; INTRAVENOUS at 16:46

## 2017-01-01 RX ADMIN — ARFORMOTEROL TARTRATE 15 MCG: 15 SOLUTION RESPIRATORY (INHALATION) at 19:32

## 2017-01-01 RX ADMIN — HEPARIN SODIUM 5000 UNITS: 5000 INJECTION, SOLUTION INTRAVENOUS; SUBCUTANEOUS at 00:27

## 2017-01-01 RX ADMIN — FENTANYL CITRATE 100 MCG: 50 INJECTION, SOLUTION INTRAMUSCULAR; INTRAVENOUS at 19:23

## 2017-01-01 RX ADMIN — I.V. FAT EMULSION 250 ML: 20 EMULSION INTRAVENOUS at 21:14

## 2017-01-01 RX ADMIN — Medication 1 PACKET: at 20:40

## 2017-01-01 RX ADMIN — HYDRALAZINE HYDROCHLORIDE 25 MG: 25 TABLET ORAL at 10:04

## 2017-01-01 RX ADMIN — CHLORHEXIDINE GLUCONATE 15 ML: 1.2 RINSE ORAL at 22:29

## 2017-01-01 RX ADMIN — INSULIN ASPART 1 UNITS: 100 INJECTION, SOLUTION INTRAVENOUS; SUBCUTANEOUS at 02:58

## 2017-01-01 RX ADMIN — SODIUM CHLORIDE 500 MG: 9 INJECTION, SOLUTION INTRAVENOUS at 09:55

## 2017-01-01 RX ADMIN — VITAMIN D, TAB 1000IU (100/BT) 1000 UNITS: 25 TAB at 08:04

## 2017-01-01 RX ADMIN — PANTOPRAZOLE SODIUM 40 MG: 40 TABLET, DELAYED RELEASE ORAL at 08:57

## 2017-01-01 RX ADMIN — DEXTROSE MONOHYDRATE 25 ML: 500 INJECTION PARENTERAL at 08:26

## 2017-01-01 RX ADMIN — HEPARIN SODIUM 5000 UNITS: 5000 INJECTION, SOLUTION INTRAVENOUS; SUBCUTANEOUS at 13:06

## 2017-01-01 RX ADMIN — NALOXONE HYDROCHLORIDE 0.1 MG: 0.4 INJECTION, SOLUTION INTRAMUSCULAR; INTRAVENOUS; SUBCUTANEOUS at 05:54

## 2017-01-01 RX ADMIN — BUDESONIDE 0.5 MG: 0.5 INHALANT RESPIRATORY (INHALATION) at 19:54

## 2017-01-01 RX ADMIN — ACETAMINOPHEN 1000 MG: 500 TABLET, FILM COATED ORAL at 11:35

## 2017-01-01 RX ADMIN — SIMVASTATIN 10 MG: 10 TABLET, FILM COATED ORAL at 09:42

## 2017-01-01 RX ADMIN — BUDESONIDE 1 MG: 0.5 INHALANT RESPIRATORY (INHALATION) at 07:32

## 2017-01-01 RX ADMIN — DEXMEDETOMIDINE HYDROCHLORIDE 0.7 MCG/KG/HR: 100 INJECTION, SOLUTION INTRAVENOUS at 12:33

## 2017-01-01 RX ADMIN — Medication 15 ML: at 08:26

## 2017-01-01 RX ADMIN — ACETAMINOPHEN 1000 MG: 500 TABLET, FILM COATED ORAL at 09:19

## 2017-01-01 RX ADMIN — ALBUMIN (HUMAN) 12.5 G: 12.5 SOLUTION INTRAVENOUS at 20:09

## 2017-01-01 RX ADMIN — ARFORMOTEROL TARTRATE 15 MCG: 15 SOLUTION RESPIRATORY (INHALATION) at 19:54

## 2017-01-01 RX ADMIN — Medication 2.5 MG: at 15:43

## 2017-01-01 RX ADMIN — SODIUM CHLORIDE 1000 ML: 9 INJECTION, SOLUTION INTRAVENOUS at 10:55

## 2017-01-01 RX ADMIN — MEROPENEM 1 G: 1 INJECTION, POWDER, FOR SOLUTION INTRAVENOUS at 15:43

## 2017-01-01 RX ADMIN — ALBUMIN (HUMAN) 25 G: 12.5 SOLUTION INTRAVENOUS at 19:45

## 2017-01-01 RX ADMIN — HEPARIN SODIUM 5000 UNITS: 5000 INJECTION, SOLUTION INTRAVENOUS; SUBCUTANEOUS at 05:23

## 2017-01-01 RX ADMIN — PANTOPRAZOLE SODIUM 40 MG: 40 INJECTION, POWDER, FOR SOLUTION INTRAVENOUS at 07:40

## 2017-01-01 RX ADMIN — INSULIN ASPART 1 UNITS: 100 INJECTION, SOLUTION INTRAVENOUS; SUBCUTANEOUS at 04:00

## 2017-01-01 RX ADMIN — FENTANYL CITRATE 100 MCG: 50 INJECTION, SOLUTION INTRAMUSCULAR; INTRAVENOUS at 14:10

## 2017-01-01 RX ADMIN — HEPARIN SODIUM 5000 UNITS: 5000 INJECTION, SOLUTION INTRAVENOUS; SUBCUTANEOUS at 08:08

## 2017-01-01 RX ADMIN — BACITRACIN: 500 OINTMENT TOPICAL at 20:15

## 2017-01-01 RX ADMIN — HEPARIN SODIUM 5000 UNITS: 5000 INJECTION, SOLUTION INTRAVENOUS; SUBCUTANEOUS at 14:32

## 2017-01-01 RX ADMIN — Medication 1 PACKET: at 22:50

## 2017-01-01 RX ADMIN — CYCLOBENZAPRINE HYDROCHLORIDE 5 MG: 5 TABLET, FILM COATED ORAL at 03:15

## 2017-01-01 RX ADMIN — ACETAMINOPHEN 1000 MG: 500 TABLET, FILM COATED ORAL at 16:23

## 2017-01-01 RX ADMIN — NYSTATIN 500000 UNITS: 100000 SUSPENSION ORAL at 08:27

## 2017-01-01 RX ADMIN — BUDESONIDE 0.5 MG: 0.5 INHALANT RESPIRATORY (INHALATION) at 07:45

## 2017-01-01 RX ADMIN — OLANZAPINE 5 MG: 5 TABLET, ORALLY DISINTEGRATING ORAL at 14:14

## 2017-01-01 RX ADMIN — CALCIUM CARBONATE (ANTACID) CHEW TAB 500 MG 500 MG: 500 CHEW TAB at 09:06

## 2017-01-01 RX ADMIN — SODIUM CHLORIDE, PRESERVATIVE FREE: 5 INJECTION INTRAVENOUS at 08:20

## 2017-01-01 RX ADMIN — FENTANYL CITRATE 50 MCG: 50 INJECTION, SOLUTION INTRAMUSCULAR; INTRAVENOUS at 12:00

## 2017-01-01 RX ADMIN — LIDOCAINE 1 PATCH: 50 PATCH CUTANEOUS at 08:53

## 2017-01-01 RX ADMIN — POTASSIUM CHLORIDE 20 MEQ: 29.8 INJECTION, SOLUTION INTRAVENOUS at 10:09

## 2017-01-01 RX ADMIN — HYDROMORPHONE HYDROCHLORIDE 0.5 MG: 1 INJECTION, SOLUTION INTRAMUSCULAR; INTRAVENOUS; SUBCUTANEOUS at 15:58

## 2017-01-01 RX ADMIN — FENTANYL CITRATE 50 MCG: 50 INJECTION, SOLUTION INTRAMUSCULAR; INTRAVENOUS at 22:56

## 2017-01-01 RX ADMIN — Medication 125 MG: at 12:20

## 2017-01-01 RX ADMIN — Medication 2 G: at 06:37

## 2017-01-01 RX ADMIN — LIDOCAINE 1 PATCH: 50 PATCH CUTANEOUS at 08:06

## 2017-01-01 RX ADMIN — NYSTATIN 500000 UNITS: 100000 SUSPENSION ORAL at 12:25

## 2017-01-01 RX ADMIN — IPRATROPIUM BROMIDE AND ALBUTEROL SULFATE 3 ML: .5; 3 SOLUTION RESPIRATORY (INHALATION) at 00:33

## 2017-01-01 RX ADMIN — SODIUM CHLORIDE, POTASSIUM CHLORIDE, SODIUM LACTATE AND CALCIUM CHLORIDE: 600; 310; 30; 20 INJECTION, SOLUTION INTRAVENOUS at 17:09

## 2017-01-01 RX ADMIN — ACETAMINOPHEN 1000 MG: 325 SOLUTION ORAL at 16:41

## 2017-01-01 RX ADMIN — FUROSEMIDE 40 MG: 10 INJECTION, SOLUTION INTRAVENOUS at 08:13

## 2017-01-01 RX ADMIN — HEPARIN SODIUM 5000 UNITS: 5000 INJECTION, SOLUTION INTRAVENOUS; SUBCUTANEOUS at 15:31

## 2017-01-01 RX ADMIN — SERTRALINE HYDROCHLORIDE 150 MG: 50 TABLET ORAL at 08:56

## 2017-01-01 RX ADMIN — BACITRACIN: 500 OINTMENT TOPICAL at 22:33

## 2017-01-01 RX ADMIN — FLUCONAZOLE 400 MG: 2 INJECTION INTRAVENOUS at 10:43

## 2017-01-01 RX ADMIN — HEPARIN SODIUM 5000 UNITS: 5000 INJECTION, SOLUTION INTRAVENOUS; SUBCUTANEOUS at 05:26

## 2017-01-01 RX ADMIN — HEPARIN SODIUM 5000 UNITS: 5000 INJECTION, SOLUTION INTRAVENOUS; SUBCUTANEOUS at 08:19

## 2017-01-01 RX ADMIN — ARFORMOTEROL TARTRATE 15 MCG: 15 SOLUTION RESPIRATORY (INHALATION) at 19:27

## 2017-01-01 RX ADMIN — CEFEPIME HYDROCHLORIDE 2 G: 2 INJECTION, POWDER, FOR SOLUTION INTRAVENOUS at 17:46

## 2017-01-01 RX ADMIN — FENTANYL CITRATE 100 MCG: 50 INJECTION, SOLUTION INTRAMUSCULAR; INTRAVENOUS at 07:35

## 2017-01-01 RX ADMIN — MAGNESIUM SULFATE HEPTAHYDRATE 1 G: 500 INJECTION, SOLUTION INTRAMUSCULAR; INTRAVENOUS at 13:13

## 2017-01-01 RX ADMIN — HYDRALAZINE HYDROCHLORIDE 25 MG: 25 TABLET ORAL at 17:09

## 2017-01-01 RX ADMIN — POTASSIUM CHLORIDE: 2 INJECTION, SOLUTION, CONCENTRATE INTRAVENOUS at 20:39

## 2017-01-01 RX ADMIN — BACITRACIN: 500 OINTMENT TOPICAL at 20:56

## 2017-01-01 RX ADMIN — ALPRAZOLAM 0.25 MG: 0.25 TABLET ORAL at 22:13

## 2017-01-01 RX ADMIN — Medication 40 MG: at 21:41

## 2017-01-01 RX ADMIN — MIRTAZAPINE 22.5 MG: 7.5 TABLET, FILM COATED ORAL at 21:44

## 2017-01-01 RX ADMIN — Medication 0.3 MCG/KG/MIN: at 16:09

## 2017-01-01 RX ADMIN — SODIUM CHLORIDE 500 ML: 9 INJECTION, SOLUTION INTRAVENOUS at 08:38

## 2017-01-01 RX ADMIN — ALPRAZOLAM 0.25 MG: 0.25 TABLET ORAL at 21:03

## 2017-01-01 RX ADMIN — Medication 125 MG: at 17:17

## 2017-01-01 RX ADMIN — HEPARIN SODIUM 5000 UNITS: 5000 INJECTION, SOLUTION INTRAVENOUS; SUBCUTANEOUS at 04:09

## 2017-01-01 RX ADMIN — NYSTATIN 500000 UNITS: 100000 SUSPENSION ORAL at 18:05

## 2017-01-01 RX ADMIN — SERTRALINE HYDROCHLORIDE 50 MG: 50 TABLET ORAL at 08:12

## 2017-01-01 RX ADMIN — FENTANYL CITRATE 50 MCG: 50 INJECTION, SOLUTION INTRAMUSCULAR; INTRAVENOUS at 11:10

## 2017-01-01 RX ADMIN — ACETAMINOPHEN 1000 MG: 500 TABLET, FILM COATED ORAL at 04:24

## 2017-01-01 RX ADMIN — MICONAZOLE NITRATE: 2 POWDER TOPICAL at 15:15

## 2017-01-01 RX ADMIN — BUSPIRONE HYDROCHLORIDE 7.5 MG: 7.5 TABLET ORAL at 20:51

## 2017-01-01 RX ADMIN — Medication 1 PACKET: at 14:02

## 2017-01-01 RX ADMIN — ALBUMIN (HUMAN) 25 G: 12.5 SOLUTION INTRAVENOUS at 20:20

## 2017-01-01 RX ADMIN — ALPRAZOLAM 0.25 MG: 0.25 TABLET ORAL at 14:43

## 2017-01-01 RX ADMIN — ARFORMOTEROL TARTRATE 15 MCG: 15 SOLUTION RESPIRATORY (INHALATION) at 20:18

## 2017-01-01 RX ADMIN — Medication 1 PACKET: at 05:57

## 2017-01-01 RX ADMIN — MAGNESIUM SULFATE IN WATER 4 G: 40 INJECTION, SOLUTION INTRAVENOUS at 11:49

## 2017-01-01 RX ADMIN — NYSTATIN 500000 UNITS: 100000 SUSPENSION ORAL at 09:36

## 2017-01-01 RX ADMIN — HYDROMORPHONE HYDROCHLORIDE: 10 INJECTION, SOLUTION INTRAMUSCULAR; INTRAVENOUS; SUBCUTANEOUS at 10:33

## 2017-01-01 RX ADMIN — Medication 125 MG: at 10:55

## 2017-01-01 RX ADMIN — SIMVASTATIN 10 MG: 10 TABLET, FILM COATED ORAL at 09:25

## 2017-01-01 RX ADMIN — ALPRAZOLAM 0.25 MG: 0.25 TABLET ORAL at 10:02

## 2017-01-01 RX ADMIN — BUDESONIDE 0.5 MG: 0.5 INHALANT RESPIRATORY (INHALATION) at 07:42

## 2017-01-01 RX ADMIN — NEOSTIGMINE METHYLSULFATE 3.5 MG: 1 INJECTION INTRAMUSCULAR; INTRAVENOUS; SUBCUTANEOUS at 14:51

## 2017-01-01 RX ADMIN — HEPARIN SODIUM 5000 UNITS: 5000 INJECTION, SOLUTION INTRAVENOUS; SUBCUTANEOUS at 06:12

## 2017-01-01 RX ADMIN — Medication 125 MG: at 12:04

## 2017-01-01 RX ADMIN — NYSTATIN 500000 UNITS: 100000 SUSPENSION ORAL at 13:25

## 2017-01-01 RX ADMIN — ALBUMIN (HUMAN) 12.5 G: 12.5 SOLUTION INTRAVENOUS at 14:46

## 2017-01-01 RX ADMIN — ONDANSETRON 4 MG: 2 SOLUTION INTRAMUSCULAR; INTRAVENOUS at 00:40

## 2017-01-01 RX ADMIN — HEPARIN SODIUM 5000 UNITS: 5000 INJECTION, SOLUTION INTRAVENOUS; SUBCUTANEOUS at 16:18

## 2017-01-01 RX ADMIN — TRAZODONE HYDROCHLORIDE 50 MG: 50 TABLET ORAL at 00:27

## 2017-01-01 RX ADMIN — Medication 1 MG: at 21:15

## 2017-01-01 RX ADMIN — NYSTATIN 500000 UNITS: 100000 SUSPENSION ORAL at 22:32

## 2017-01-01 RX ADMIN — BUDESONIDE 1 MG: 0.5 INHALANT RESPIRATORY (INHALATION) at 07:17

## 2017-01-01 RX ADMIN — BACITRACIN: 500 OINTMENT TOPICAL at 11:00

## 2017-01-01 RX ADMIN — SENNOSIDES AND DOCUSATE SODIUM 2 TABLET: 8.6; 5 TABLET ORAL at 09:42

## 2017-01-01 RX ADMIN — BUDESONIDE 0.5 MG: 0.5 INHALANT RESPIRATORY (INHALATION) at 08:27

## 2017-01-01 RX ADMIN — SERTRALINE HYDROCHLORIDE 50 MG: 50 TABLET ORAL at 09:42

## 2017-01-01 RX ADMIN — Medication 40 MG: at 21:00

## 2017-01-01 RX ADMIN — HYDROMORPHONE HYDROCHLORIDE 0.3 MG: 1 INJECTION, SOLUTION INTRAMUSCULAR; INTRAVENOUS; SUBCUTANEOUS at 02:49

## 2017-01-01 RX ADMIN — ACETAMINOPHEN 975 MG: 325 TABLET, FILM COATED ORAL at 21:37

## 2017-01-01 RX ADMIN — PHENYLEPHRINE HYDROCHLORIDE 150 MCG: 10 INJECTION, SOLUTION INTRAMUSCULAR; INTRAVENOUS; SUBCUTANEOUS at 12:41

## 2017-01-01 RX ADMIN — IPRATROPIUM BROMIDE AND ALBUTEROL SULFATE 3 ML: .5; 3 SOLUTION RESPIRATORY (INHALATION) at 12:23

## 2017-01-01 RX ADMIN — BUDESONIDE 1 MG: 0.5 INHALANT RESPIRATORY (INHALATION) at 07:43

## 2017-01-01 RX ADMIN — SODIUM CHLORIDE 500 ML: 9 INJECTION, SOLUTION INTRAVENOUS at 17:09

## 2017-01-01 RX ADMIN — FUROSEMIDE 40 MG: 10 INJECTION, SOLUTION INTRAVENOUS at 07:48

## 2017-01-01 RX ADMIN — HEPARIN SODIUM 5000 UNITS: 5000 INJECTION, SOLUTION INTRAVENOUS; SUBCUTANEOUS at 22:32

## 2017-01-01 RX ADMIN — ARFORMOTEROL TARTRATE 15 MCG: 15 SOLUTION RESPIRATORY (INHALATION) at 07:05

## 2017-01-01 RX ADMIN — BUDESONIDE 0.5 MG: 0.5 INHALANT RESPIRATORY (INHALATION) at 19:15

## 2017-01-01 RX ADMIN — MIDAZOLAM HYDROCHLORIDE 1 MG: 1 INJECTION, SOLUTION INTRAMUSCULAR; INTRAVENOUS at 11:10

## 2017-01-01 RX ADMIN — PANTOPRAZOLE SODIUM 40 MG: 40 INJECTION, POWDER, FOR SOLUTION INTRAVENOUS at 20:24

## 2017-01-01 RX ADMIN — ACETAMINOPHEN 1000 MG: 500 TABLET, FILM COATED ORAL at 14:01

## 2017-01-01 RX ADMIN — ERTAPENEM SODIUM 1 G: 1 INJECTION, POWDER, LYOPHILIZED, FOR SOLUTION INTRAMUSCULAR; INTRAVENOUS at 08:36

## 2017-01-01 RX ADMIN — BACITRACIN: 500 OINTMENT TOPICAL at 08:10

## 2017-01-01 RX ADMIN — FENTANYL CITRATE 50 MCG: 50 INJECTION, SOLUTION INTRAMUSCULAR; INTRAVENOUS at 01:41

## 2017-01-01 RX ADMIN — HEPARIN SODIUM 5000 UNITS: 5000 INJECTION, SOLUTION INTRAVENOUS; SUBCUTANEOUS at 12:42

## 2017-01-01 RX ADMIN — ACETAMINOPHEN 650 MG: 650 SUPPOSITORY RECTAL at 08:42

## 2017-01-01 RX ADMIN — NYSTATIN 500000 UNITS: 100000 SUSPENSION ORAL at 17:00

## 2017-01-01 RX ADMIN — METOPROLOL TARTRATE 50 MG: 50 TABLET, FILM COATED ORAL at 09:55

## 2017-01-01 RX ADMIN — HEPARIN SODIUM 5000 UNITS: 5000 INJECTION, SOLUTION INTRAVENOUS; SUBCUTANEOUS at 08:13

## 2017-01-01 RX ADMIN — HYDROMORPHONE HYDROCHLORIDE 0.5 MG: 1 INJECTION, SOLUTION INTRAMUSCULAR; INTRAVENOUS; SUBCUTANEOUS at 02:10

## 2017-01-01 RX ADMIN — ACETAMINOPHEN 1000 MG: 325 SOLUTION ORAL at 09:39

## 2017-01-01 RX ADMIN — BACITRACIN: 500 OINTMENT TOPICAL at 21:55

## 2017-01-01 RX ADMIN — OXYCODONE HYDROCHLORIDE 5 MG: 5 TABLET ORAL at 09:05

## 2017-01-01 RX ADMIN — ARFORMOTEROL TARTRATE 15 MCG: 15 SOLUTION RESPIRATORY (INHALATION) at 07:57

## 2017-01-01 RX ADMIN — BUDESONIDE 1 MG: 0.5 INHALANT RESPIRATORY (INHALATION) at 19:52

## 2017-01-01 RX ADMIN — HYDROMORPHONE HYDROCHLORIDE 0.2 MG: 1 INJECTION, SOLUTION INTRAMUSCULAR; INTRAVENOUS; SUBCUTANEOUS at 04:23

## 2017-01-01 RX ADMIN — FENTANYL CITRATE 50 MCG: 50 INJECTION, SOLUTION INTRAMUSCULAR; INTRAVENOUS at 16:33

## 2017-01-01 RX ADMIN — LIDOCAINE HYDROCHLORIDE 4 ML: 20 JELLY TOPICAL at 14:29

## 2017-01-01 RX ADMIN — Medication 50 ML: at 09:14

## 2017-01-01 RX ADMIN — ACETAMINOPHEN 650 MG: 650 SUPPOSITORY RECTAL at 01:34

## 2017-01-01 RX ADMIN — LEVALBUTEROL HYDROCHLORIDE 1.25 MG: 1.25 SOLUTION, CONCENTRATE RESPIRATORY (INHALATION) at 11:54

## 2017-01-01 RX ADMIN — PROPOFOL 40 MCG/KG/MIN: 10 INJECTION, EMULSION INTRAVENOUS at 19:20

## 2017-01-01 RX ADMIN — CALCIUM CARBONATE (ANTACID) CHEW TAB 500 MG 500 MG: 500 CHEW TAB at 08:26

## 2017-01-01 RX ADMIN — ACETAMINOPHEN 1000 MG: 500 TABLET, FILM COATED ORAL at 19:58

## 2017-01-01 RX ADMIN — OXYCODONE HYDROCHLORIDE 10 MG: 5 TABLET ORAL at 21:35

## 2017-01-01 RX ADMIN — I.V. FAT EMULSION 250 ML: 20 EMULSION INTRAVENOUS at 20:44

## 2017-01-01 RX ADMIN — Medication 125 MG: at 17:00

## 2017-01-01 RX ADMIN — Medication 0.03 MCG/KG/MIN: at 15:42

## 2017-01-01 RX ADMIN — PHENYLEPHRINE HYDROCHLORIDE 150 MCG: 10 INJECTION, SOLUTION INTRAMUSCULAR; INTRAVENOUS; SUBCUTANEOUS at 12:38

## 2017-01-01 RX ADMIN — MIDAZOLAM HYDROCHLORIDE 2 MG: 1 INJECTION, SOLUTION INTRAMUSCULAR; INTRAVENOUS at 09:02

## 2017-01-01 RX ADMIN — NYSTATIN 500000 UNITS: 100000 SUSPENSION ORAL at 17:09

## 2017-01-01 RX ADMIN — HYDROMORPHONE HYDROCHLORIDE 0.5 MG: 1 INJECTION, SOLUTION INTRAMUSCULAR; INTRAVENOUS; SUBCUTANEOUS at 04:47

## 2017-01-01 RX ADMIN — VITAMIN D, TAB 1000IU (100/BT) 1000 UNITS: 25 TAB at 08:57

## 2017-01-01 RX ADMIN — TRAZODONE HYDROCHLORIDE 50 MG: 50 TABLET ORAL at 22:13

## 2017-01-01 RX ADMIN — ALBUMIN (HUMAN) 12.5 G: 12.5 SOLUTION INTRAVENOUS at 06:23

## 2017-01-01 RX ADMIN — Medication 15 ML: at 08:24

## 2017-01-01 RX ADMIN — HEPARIN SODIUM 5000 UNITS: 5000 INJECTION, SOLUTION INTRAVENOUS; SUBCUTANEOUS at 02:35

## 2017-01-01 RX ADMIN — PANTOPRAZOLE SODIUM 40 MG: 40 INJECTION, POWDER, FOR SOLUTION INTRAVENOUS at 07:31

## 2017-01-01 RX ADMIN — NYSTATIN 500000 UNITS: 100000 SUSPENSION ORAL at 18:04

## 2017-01-01 RX ADMIN — HYDROMORPHONE HYDROCHLORIDE 0.5 MG: 1 INJECTION, SOLUTION INTRAMUSCULAR; INTRAVENOUS; SUBCUTANEOUS at 16:27

## 2017-01-01 RX ADMIN — LISINOPRIL 20 MG: 20 TABLET ORAL at 10:02

## 2017-01-01 RX ADMIN — PANTOPRAZOLE SODIUM 40 MG: 40 INJECTION, POWDER, FOR SOLUTION INTRAVENOUS at 20:20

## 2017-01-01 RX ADMIN — MEROPENEM 1 G: 1 INJECTION, POWDER, FOR SOLUTION INTRAVENOUS at 14:54

## 2017-01-01 RX ADMIN — SODIUM CHLORIDE: 9 INJECTION, SOLUTION INTRAVENOUS at 03:14

## 2017-01-01 RX ADMIN — IPRATROPIUM BROMIDE AND ALBUTEROL SULFATE 3 ML: .5; 3 SOLUTION RESPIRATORY (INHALATION) at 20:07

## 2017-01-01 RX ADMIN — HEPARIN SODIUM 5000 UNITS: 5000 INJECTION, SOLUTION INTRAVENOUS; SUBCUTANEOUS at 20:20

## 2017-01-01 RX ADMIN — BUDESONIDE 1 MG: 0.5 INHALANT RESPIRATORY (INHALATION) at 19:55

## 2017-01-01 RX ADMIN — DEXMEDETOMIDINE HYDROCHLORIDE 0.7 MCG/KG/HR: 100 INJECTION, SOLUTION INTRAVENOUS at 05:13

## 2017-01-01 RX ADMIN — MEROPENEM 1 G: 1 INJECTION, POWDER, FOR SOLUTION INTRAVENOUS at 02:05

## 2017-01-01 RX ADMIN — ALBUMIN (HUMAN) 12.5 G: 12.5 SOLUTION INTRAVENOUS at 09:20

## 2017-01-01 RX ADMIN — Medication 5 MG: at 08:18

## 2017-01-01 RX ADMIN — DEXTROSE MONOHYDRATE 50 ML: 25 INJECTION, SOLUTION INTRAVENOUS at 08:33

## 2017-01-01 RX ADMIN — FUROSEMIDE 40 MG: 10 INJECTION, SOLUTION INTRAVENOUS at 20:54

## 2017-01-01 RX ADMIN — LIDOCAINE HYDROCHLORIDE 6 ML: 10 INJECTION, SOLUTION EPIDURAL; INFILTRATION; INTRACAUDAL; PERINEURAL at 14:23

## 2017-01-01 RX ADMIN — IPRATROPIUM BROMIDE AND ALBUTEROL SULFATE 3 ML: .5; 3 SOLUTION RESPIRATORY (INHALATION) at 11:48

## 2017-01-01 RX ADMIN — HEPARIN SODIUM 5000 UNITS: 5000 INJECTION, SOLUTION INTRAVENOUS; SUBCUTANEOUS at 04:41

## 2017-01-01 RX ADMIN — FUROSEMIDE 60 MG: 10 INJECTION, SOLUTION INTRAVENOUS at 08:28

## 2017-01-01 RX ADMIN — ACETAMINOPHEN 1000 MG: 325 SOLUTION ORAL at 00:27

## 2017-01-01 RX ADMIN — CALCIUM CARBONATE (ANTACID) CHEW TAB 500 MG 500 MG: 500 CHEW TAB at 21:16

## 2017-01-01 RX ADMIN — ALBUMIN (HUMAN) 25 G: 12.5 SOLUTION INTRAVENOUS at 10:30

## 2017-01-01 RX ADMIN — LIDOCAINE 1 PATCH: 50 PATCH CUTANEOUS at 08:18

## 2017-01-01 RX ADMIN — HYDROMORPHONE HYDROCHLORIDE: 10 INJECTION, SOLUTION INTRAMUSCULAR; INTRAVENOUS; SUBCUTANEOUS at 05:57

## 2017-01-01 RX ADMIN — NYSTATIN 500000 UNITS: 100000 SUSPENSION ORAL at 21:25

## 2017-01-01 RX ADMIN — ACETAMINOPHEN 975 MG: 325 TABLET, FILM COATED ORAL at 05:50

## 2017-01-01 RX ADMIN — HYDROMORPHONE HYDROCHLORIDE 0.2 MG: 1 INJECTION, SOLUTION INTRAMUSCULAR; INTRAVENOUS; SUBCUTANEOUS at 18:11

## 2017-01-01 RX ADMIN — FUROSEMIDE 60 MG: 10 INJECTION, SOLUTION INTRAVENOUS at 00:26

## 2017-01-01 RX ADMIN — Medication 125 MG: at 11:53

## 2017-01-01 RX ADMIN — SODIUM CHLORIDE 1000 ML: 9 INJECTION, SOLUTION INTRAVENOUS at 15:04

## 2017-01-01 RX ADMIN — SODIUM CHLORIDE, PRESERVATIVE FREE: 5 INJECTION INTRAVENOUS at 15:41

## 2017-01-01 RX ADMIN — MIDAZOLAM HYDROCHLORIDE 2 MG: 1 INJECTION, SOLUTION INTRAMUSCULAR; INTRAVENOUS at 12:16

## 2017-01-01 RX ADMIN — METOLAZONE 5 MG: 5 TABLET ORAL at 12:09

## 2017-01-01 RX ADMIN — MIDAZOLAM HYDROCHLORIDE 2 MG: 1 INJECTION, SOLUTION INTRAMUSCULAR; INTRAVENOUS at 07:35

## 2017-01-01 RX ADMIN — ARFORMOTEROL TARTRATE 15 MCG: 15 SOLUTION RESPIRATORY (INHALATION) at 20:27

## 2017-01-01 RX ADMIN — IPRATROPIUM BROMIDE AND ALBUTEROL SULFATE 3 ML: .5; 3 SOLUTION RESPIRATORY (INHALATION) at 10:50

## 2017-01-01 RX ADMIN — BUSPIRONE HYDROCHLORIDE 7.5 MG: 7.5 TABLET ORAL at 21:11

## 2017-01-01 RX ADMIN — DESMOPRESSIN ACETATE 27.28 MCG: 4 INJECTION INTRAVENOUS at 14:51

## 2017-01-01 RX ADMIN — Medication 125 MG: at 18:00

## 2017-01-01 RX ADMIN — SODIUM CHLORIDE, PRESERVATIVE FREE: 5 INJECTION INTRAVENOUS at 08:40

## 2017-01-01 RX ADMIN — Medication 40 MG: at 21:18

## 2017-01-01 RX ADMIN — CEFEPIME HYDROCHLORIDE 2 G: 2 INJECTION, POWDER, FOR SOLUTION INTRAVENOUS at 06:30

## 2017-01-01 RX ADMIN — HYDROMORPHONE HYDROCHLORIDE 0.2 MG: 1 INJECTION, SOLUTION INTRAMUSCULAR; INTRAVENOUS; SUBCUTANEOUS at 06:53

## 2017-01-01 RX ADMIN — ARFORMOTEROL TARTRATE 15 MCG: 15 SOLUTION RESPIRATORY (INHALATION) at 07:59

## 2017-01-01 RX ADMIN — CHLORHEXIDINE GLUCONATE 15 ML: 1.2 RINSE ORAL at 08:05

## 2017-01-01 RX ADMIN — OLANZAPINE 5 MG: 5 TABLET, ORALLY DISINTEGRATING ORAL at 02:49

## 2017-01-01 RX ADMIN — FUROSEMIDE 40 MG: 10 INJECTION, SOLUTION INTRAVENOUS at 20:25

## 2017-01-01 RX ADMIN — FENTANYL CITRATE 100 MCG: 50 INJECTION, SOLUTION INTRAMUSCULAR; INTRAVENOUS at 09:23

## 2017-01-01 RX ADMIN — SODIUM BICARBONATE: 84 INJECTION, SOLUTION INTRAVENOUS at 21:39

## 2017-01-01 RX ADMIN — MEROPENEM 1 G: 1 INJECTION, POWDER, FOR SOLUTION INTRAVENOUS at 21:01

## 2017-01-01 RX ADMIN — NYSTATIN 500000 UNITS: 100000 SUSPENSION ORAL at 22:15

## 2017-01-01 RX ADMIN — ALBUTEROL SULFATE: 2.5 SOLUTION RESPIRATORY (INHALATION) at 04:59

## 2017-01-01 RX ADMIN — SODIUM CHLORIDE, PRESERVATIVE FREE: 5 INJECTION INTRAVENOUS at 04:25

## 2017-01-01 RX ADMIN — HEPARIN SODIUM 5000 UNITS: 5000 INJECTION, SOLUTION INTRAVENOUS; SUBCUTANEOUS at 01:00

## 2017-01-01 RX ADMIN — HEPARIN SODIUM 5000 UNITS: 5000 INJECTION, SOLUTION INTRAVENOUS; SUBCUTANEOUS at 16:14

## 2017-01-01 RX ADMIN — PHYTONADIONE 1 MG: 2 INJECTION, EMULSION INTRAMUSCULAR; INTRAVENOUS; SUBCUTANEOUS at 14:51

## 2017-01-01 RX ADMIN — ACETAMINOPHEN 1000 MG: 500 TABLET, FILM COATED ORAL at 20:09

## 2017-01-01 RX ADMIN — ALPRAZOLAM 0.25 MG: 0.25 TABLET ORAL at 05:23

## 2017-01-01 RX ADMIN — IPRATROPIUM BROMIDE AND ALBUTEROL SULFATE 3 ML: .5; 3 SOLUTION RESPIRATORY (INHALATION) at 19:16

## 2017-01-01 RX ADMIN — Medication 40 MG: at 08:05

## 2017-01-01 RX ADMIN — HEPARIN SODIUM 5000 UNITS: 5000 INJECTION, SOLUTION INTRAVENOUS; SUBCUTANEOUS at 15:01

## 2017-01-01 RX ADMIN — MEROPENEM 1 G: 1 INJECTION, POWDER, FOR SOLUTION INTRAVENOUS at 05:04

## 2017-01-01 RX ADMIN — HYDRALAZINE HYDROCHLORIDE 25 MG: 25 TABLET ORAL at 08:05

## 2017-01-01 RX ADMIN — BUDESONIDE 0.5 MG: 0.5 INHALANT RESPIRATORY (INHALATION) at 20:27

## 2017-01-01 RX ADMIN — Medication 15 ML: at 20:54

## 2017-01-01 RX ADMIN — HYDRALAZINE HYDROCHLORIDE 2 MG: 20 INJECTION INTRAMUSCULAR; INTRAVENOUS at 22:40

## 2017-01-01 RX ADMIN — ALBUTEROL SULFATE 2.5 MG: 2.5 SOLUTION RESPIRATORY (INHALATION) at 02:35

## 2017-01-01 RX ADMIN — ARFORMOTEROL TARTRATE 15 MCG: 15 SOLUTION RESPIRATORY (INHALATION) at 09:02

## 2017-01-01 RX ADMIN — IPRATROPIUM BROMIDE AND ALBUTEROL SULFATE 3 ML: .5; 3 SOLUTION RESPIRATORY (INHALATION) at 12:30

## 2017-01-01 RX ADMIN — MIDAZOLAM HYDROCHLORIDE 2 MG: 1 INJECTION, SOLUTION INTRAMUSCULAR; INTRAVENOUS at 02:35

## 2017-01-01 RX ADMIN — METOPROLOL TARTRATE 5 MG: 1 INJECTION, SOLUTION INTRAVENOUS at 12:31

## 2017-01-01 RX ADMIN — Medication 40 MG: at 12:09

## 2017-01-01 RX ADMIN — Medication 40 MG: at 08:18

## 2017-01-01 RX ADMIN — PANTOPRAZOLE SODIUM 40 MG: 40 INJECTION, POWDER, FOR SOLUTION INTRAVENOUS at 19:51

## 2017-01-01 RX ADMIN — PROPOFOL 120 MG: 10 INJECTION, EMULSION INTRAVENOUS at 16:30

## 2017-01-01 RX ADMIN — DOCUSATE SODIUM 100 MG: 100 CAPSULE, LIQUID FILLED ORAL at 20:10

## 2017-01-01 RX ADMIN — ACETAMINOPHEN 1000 MG: 500 TABLET, FILM COATED ORAL at 08:03

## 2017-01-01 RX ADMIN — Medication 125 MG: at 09:04

## 2017-01-01 RX ADMIN — HYDRALAZINE HYDROCHLORIDE 25 MG: 25 TABLET ORAL at 04:24

## 2017-01-01 RX ADMIN — SIMVASTATIN 10 MG: 10 TABLET, FILM COATED ORAL at 08:12

## 2017-01-01 RX ADMIN — TRAZODONE HYDROCHLORIDE 50 MG: 50 TABLET ORAL at 01:31

## 2017-01-01 RX ADMIN — Medication 2.5 MG: at 20:11

## 2017-01-01 RX ADMIN — PHENYLEPHRINE HYDROCHLORIDE 100 MCG: 10 INJECTION, SOLUTION INTRAMUSCULAR; INTRAVENOUS; SUBCUTANEOUS at 17:38

## 2017-01-01 RX ADMIN — ALPRAZOLAM 0.25 MG: 0.25 TABLET ORAL at 08:18

## 2017-01-01 RX ADMIN — FENTANYL CITRATE 50 MCG: 50 INJECTION, SOLUTION INTRAMUSCULAR; INTRAVENOUS at 02:50

## 2017-01-01 RX ADMIN — MIDAZOLAM HYDROCHLORIDE 2 MG: 1 INJECTION, SOLUTION INTRAMUSCULAR; INTRAVENOUS at 01:39

## 2017-01-01 RX ADMIN — Medication 1 PACKET: at 22:10

## 2017-01-01 RX ADMIN — PANTOPRAZOLE SODIUM 40 MG: 40 INJECTION, POWDER, FOR SOLUTION INTRAVENOUS at 08:23

## 2017-01-01 RX ADMIN — HYDROMORPHONE HYDROCHLORIDE 0.5 MG: 1 INJECTION, SOLUTION INTRAMUSCULAR; INTRAVENOUS; SUBCUTANEOUS at 08:54

## 2017-01-01 RX ADMIN — BUSPIRONE HYDROCHLORIDE 7.5 MG: 7.5 TABLET ORAL at 09:32

## 2017-01-01 RX ADMIN — POTASSIUM CHLORIDE 20 MEQ: 1.5 POWDER, FOR SOLUTION ORAL at 06:19

## 2017-01-01 RX ADMIN — HEPARIN SODIUM 5000 UNITS: 5000 INJECTION, SOLUTION INTRAVENOUS; SUBCUTANEOUS at 18:08

## 2017-01-01 RX ADMIN — VANCOMYCIN HYDROCHLORIDE 1500 MG: 5 INJECTION, POWDER, LYOPHILIZED, FOR SOLUTION INTRAVENOUS at 09:28

## 2017-01-01 RX ADMIN — Medication 40 MG: at 08:45

## 2017-01-01 RX ADMIN — MICAFUNGIN SODIUM 100 MG: 10 INJECTION, POWDER, LYOPHILIZED, FOR SOLUTION INTRAVENOUS at 12:04

## 2017-01-01 RX ADMIN — ACETAMINOPHEN 1000 MG: 325 SOLUTION ORAL at 17:05

## 2017-01-01 RX ADMIN — POTASSIUM CHLORIDE 20 MEQ: 1.5 POWDER, FOR SOLUTION ORAL at 08:14

## 2017-01-01 RX ADMIN — Medication 40 MG: at 20:21

## 2017-01-01 RX ADMIN — Medication 125 MG: at 14:01

## 2017-01-01 RX ADMIN — POTASSIUM CHLORIDE 20 MEQ: 29.8 INJECTION, SOLUTION INTRAVENOUS at 21:23

## 2017-01-01 RX ADMIN — SODIUM CHLORIDE 500 MG: 9 INJECTION, SOLUTION INTRAVENOUS at 08:48

## 2017-01-01 RX ADMIN — HEPARIN SODIUM 5000 UNITS: 5000 INJECTION, SOLUTION INTRAVENOUS; SUBCUTANEOUS at 12:31

## 2017-01-01 RX ADMIN — OXYCODONE HYDROCHLORIDE 10 MG: 5 TABLET ORAL at 06:42

## 2017-01-01 RX ADMIN — Medication 200 MG: at 09:05

## 2017-01-01 RX ADMIN — HEPARIN SODIUM 5000 UNITS: 5000 INJECTION, SOLUTION INTRAVENOUS; SUBCUTANEOUS at 17:53

## 2017-01-01 RX ADMIN — ACETAMINOPHEN 1000 MG: 500 TABLET, FILM COATED ORAL at 04:41

## 2017-01-01 RX ADMIN — I.V. FAT EMULSION 250 ML: 20 EMULSION INTRAVENOUS at 20:23

## 2017-01-01 RX ADMIN — ACETAMINOPHEN 1000 MG: 500 TABLET, FILM COATED ORAL at 21:00

## 2017-01-01 RX ADMIN — Medication 1 PACKET: at 09:36

## 2017-01-01 RX ADMIN — FENTANYL CITRATE 100 MCG: 50 INJECTION, SOLUTION INTRAMUSCULAR; INTRAVENOUS at 20:27

## 2017-01-01 RX ADMIN — IPRATROPIUM BROMIDE AND ALBUTEROL SULFATE 3 ML: .5; 3 SOLUTION RESPIRATORY (INHALATION) at 07:45

## 2017-01-01 RX ADMIN — ALPRAZOLAM 0.25 MG: 0.25 TABLET ORAL at 08:28

## 2017-01-01 RX ADMIN — ARFORMOTEROL TARTRATE 15 MCG: 15 SOLUTION RESPIRATORY (INHALATION) at 00:03

## 2017-01-01 RX ADMIN — Medication 125 MG: at 21:02

## 2017-01-01 RX ADMIN — INSULIN ASPART 1 UNITS: 100 INJECTION, SOLUTION INTRAVENOUS; SUBCUTANEOUS at 17:59

## 2017-01-01 RX ADMIN — FENTANYL CITRATE 75 MCG: 50 INJECTION, SOLUTION INTRAMUSCULAR; INTRAVENOUS at 08:47

## 2017-01-01 RX ADMIN — ARFORMOTEROL TARTRATE 15 MCG: 15 SOLUTION RESPIRATORY (INHALATION) at 20:11

## 2017-01-01 RX ADMIN — PANTOPRAZOLE SODIUM 40 MG: 40 INJECTION, POWDER, FOR SOLUTION INTRAVENOUS at 07:49

## 2017-01-01 RX ADMIN — ALPRAZOLAM 0.25 MG: 0.25 TABLET ORAL at 20:14

## 2017-01-01 RX ADMIN — ACETAMINOPHEN 1000 MG: 325 SOLUTION ORAL at 18:06

## 2017-01-01 RX ADMIN — CHLORHEXIDINE GLUCONATE 15 ML: 1.2 RINSE ORAL at 20:59

## 2017-01-01 RX ADMIN — MIRTAZAPINE 22.5 MG: 7.5 TABLET, FILM COATED ORAL at 21:01

## 2017-01-01 RX ADMIN — QUETIAPINE FUMARATE 25 MG: 25 TABLET, FILM COATED ORAL at 22:18

## 2017-01-01 RX ADMIN — FUROSEMIDE 40 MG: 10 INJECTION, SOLUTION INTRAVENOUS at 19:51

## 2017-01-01 RX ADMIN — Medication 125 MG: at 22:53

## 2017-01-01 RX ADMIN — HYDROMORPHONE HYDROCHLORIDE 0.2 MG: 1 INJECTION, SOLUTION INTRAMUSCULAR; INTRAVENOUS; SUBCUTANEOUS at 12:33

## 2017-01-01 RX ADMIN — PANTOPRAZOLE SODIUM 40 MG: 40 INJECTION, POWDER, FOR SOLUTION INTRAVENOUS at 20:27

## 2017-01-01 RX ADMIN — MEROPENEM 1 G: 1 INJECTION, POWDER, FOR SOLUTION INTRAVENOUS at 14:31

## 2017-01-01 RX ADMIN — Medication 125 MG: at 05:03

## 2017-01-01 RX ADMIN — FENTANYL CITRATE 50 MCG: 50 INJECTION, SOLUTION INTRAMUSCULAR; INTRAVENOUS at 01:30

## 2017-01-01 RX ADMIN — VITAMIN D, TAB 1000IU (100/BT) 1000 UNITS: 25 TAB at 09:31

## 2017-01-01 RX ADMIN — SERTRALINE HYDROCHLORIDE 50 MG: 50 TABLET ORAL at 08:59

## 2017-01-01 RX ADMIN — BACITRACIN: 500 OINTMENT TOPICAL at 08:03

## 2017-01-01 RX ADMIN — BACITRACIN: 500 OINTMENT TOPICAL at 20:54

## 2017-01-01 RX ADMIN — PANTOPRAZOLE SODIUM 40 MG: 40 INJECTION, POWDER, FOR SOLUTION INTRAVENOUS at 08:51

## 2017-01-01 RX ADMIN — BUSPIRONE HYDROCHLORIDE 7.5 MG: 7.5 TABLET ORAL at 14:31

## 2017-01-01 RX ADMIN — Medication 50 ML: at 21:06

## 2017-01-01 RX ADMIN — PHENYLEPHRINE HYDROCHLORIDE 100 MCG: 10 INJECTION, SOLUTION INTRAMUSCULAR; INTRAVENOUS; SUBCUTANEOUS at 14:25

## 2017-01-01 RX ADMIN — CEFEPIME HYDROCHLORIDE 2 G: 2 INJECTION, POWDER, FOR SOLUTION INTRAVENOUS at 06:23

## 2017-01-01 RX ADMIN — OXYCODONE HYDROCHLORIDE 10 MG: 5 TABLET ORAL at 01:13

## 2017-01-01 RX ADMIN — SODIUM CHLORIDE 1 G: 9 INJECTION, SOLUTION INTRAVENOUS at 09:42

## 2017-01-01 RX ADMIN — NYSTATIN 500000 UNITS: 100000 SUSPENSION ORAL at 08:05

## 2017-01-01 RX ADMIN — Medication 125 MG: at 21:04

## 2017-01-01 RX ADMIN — Medication 200 MG: at 08:05

## 2017-01-01 RX ADMIN — ACETAMINOPHEN 1000 MG: 500 TABLET, FILM COATED ORAL at 12:42

## 2017-01-01 RX ADMIN — NYSTATIN 500000 UNITS: 100000 SUSPENSION ORAL at 22:13

## 2017-01-01 RX ADMIN — MAGNESIUM SULFATE IN WATER 4 G: 40 INJECTION, SOLUTION INTRAVENOUS at 08:42

## 2017-01-01 RX ADMIN — IPRATROPIUM BROMIDE AND ALBUTEROL SULFATE 3 ML: .5; 3 SOLUTION RESPIRATORY (INHALATION) at 07:38

## 2017-01-01 RX ADMIN — IPRATROPIUM BROMIDE AND ALBUTEROL SULFATE 3 ML: .5; 3 SOLUTION RESPIRATORY (INHALATION) at 07:03

## 2017-01-01 RX ADMIN — ERTAPENEM SODIUM 1 G: 1 INJECTION, POWDER, LYOPHILIZED, FOR SOLUTION INTRAMUSCULAR; INTRAVENOUS at 08:51

## 2017-01-01 RX ADMIN — BUDESONIDE 1 MG: 0.5 INHALANT RESPIRATORY (INHALATION) at 19:27

## 2017-01-01 RX ADMIN — HEPARIN SODIUM 5000 UNITS: 5000 INJECTION, SOLUTION INTRAVENOUS; SUBCUTANEOUS at 21:41

## 2017-01-01 RX ADMIN — BUDESONIDE 0.5 MG: 0.5 INHALANT RESPIRATORY (INHALATION) at 07:46

## 2017-01-01 RX ADMIN — OLANZAPINE 5 MG: 5 TABLET, ORALLY DISINTEGRATING ORAL at 08:15

## 2017-01-01 RX ADMIN — ALBUMIN (HUMAN) 25 G: 12.5 SOLUTION INTRAVENOUS at 19:30

## 2017-01-01 RX ADMIN — BUDESONIDE 0.5 MG: 0.5 INHALANT RESPIRATORY (INHALATION) at 20:28

## 2017-01-01 RX ADMIN — MICAFUNGIN SODIUM 100 MG: 10 INJECTION, POWDER, LYOPHILIZED, FOR SOLUTION INTRAVENOUS at 08:10

## 2017-01-01 RX ADMIN — BUSPIRONE HYDROCHLORIDE 7.5 MG: 7.5 TABLET ORAL at 08:57

## 2017-01-01 RX ADMIN — ACETAMINOPHEN 1000 MG: 325 SOLUTION ORAL at 08:06

## 2017-01-01 RX ADMIN — HEPARIN SODIUM 5000 UNITS: 5000 INJECTION, SOLUTION INTRAVENOUS; SUBCUTANEOUS at 21:06

## 2017-01-01 RX ADMIN — FENTANYL CITRATE 100 MCG: 50 INJECTION, SOLUTION INTRAMUSCULAR; INTRAVENOUS at 15:18

## 2017-01-01 RX ADMIN — GLYCOPYRROLATE 2.5 MG: 0.2 INJECTION, SOLUTION INTRAMUSCULAR; INTRAVENOUS at 14:51

## 2017-01-01 RX ADMIN — ACETAMINOPHEN 1000 MG: 500 TABLET, FILM COATED ORAL at 12:31

## 2017-01-01 RX ADMIN — DEXMEDETOMIDINE HYDROCHLORIDE 0.3 MCG/KG/HR: 4 INJECTION, SOLUTION INTRAVENOUS at 16:28

## 2017-01-01 RX ADMIN — OXYCODONE HYDROCHLORIDE 5 MG: 5 TABLET ORAL at 16:42

## 2017-01-01 RX ADMIN — ACETAMINOPHEN 1000 MG: 325 SOLUTION ORAL at 02:40

## 2017-01-01 RX ADMIN — BUDESONIDE 0.5 MG: 0.5 INHALANT RESPIRATORY (INHALATION) at 19:47

## 2017-01-01 RX ADMIN — SIMVASTATIN 10 MG: 10 TABLET, FILM COATED ORAL at 22:13

## 2017-01-01 RX ADMIN — SERTRALINE HYDROCHLORIDE 150 MG: 50 TABLET ORAL at 09:31

## 2017-01-01 RX ADMIN — MIRTAZAPINE 22.5 MG: 7.5 TABLET, FILM COATED ORAL at 21:41

## 2017-01-01 RX ADMIN — Medication 125 MG: at 21:26

## 2017-01-01 RX ADMIN — Medication 5 MG: at 08:06

## 2017-01-01 RX ADMIN — CHLORHEXIDINE GLUCONATE 15 ML: 1.2 RINSE ORAL at 10:01

## 2017-01-01 RX ADMIN — SIMVASTATIN 10 MG: 10 TABLET, FILM COATED ORAL at 08:41

## 2017-01-01 RX ADMIN — FUROSEMIDE 40 MG: 10 INJECTION, SOLUTION INTRAVENOUS at 20:20

## 2017-01-01 RX ADMIN — ALBUMIN (HUMAN) 12.5 G: 12.5 SOLUTION INTRAVENOUS at 04:20

## 2017-01-01 RX ADMIN — CHLORHEXIDINE GLUCONATE 15 ML: 1.2 RINSE ORAL at 08:04

## 2017-01-01 RX ADMIN — Medication 12.5 MG: at 08:41

## 2017-01-01 RX ADMIN — IPRATROPIUM BROMIDE AND ALBUTEROL SULFATE 3 ML: .5; 3 SOLUTION RESPIRATORY (INHALATION) at 23:32

## 2017-01-01 RX ADMIN — Medication 15 ML: at 21:00

## 2017-01-01 RX ADMIN — MIRTAZAPINE 22.5 MG: 7.5 TABLET, FILM COATED ORAL at 21:03

## 2017-01-01 RX ADMIN — ARFORMOTEROL TARTRATE 15 MCG: 15 SOLUTION RESPIRATORY (INHALATION) at 19:38

## 2017-01-01 RX ADMIN — FENTANYL CITRATE 100 MCG: 50 INJECTION, SOLUTION INTRAMUSCULAR; INTRAVENOUS at 06:14

## 2017-01-01 RX ADMIN — BUDESONIDE 0.5 MG: 0.5 INHALANT RESPIRATORY (INHALATION) at 19:16

## 2017-01-01 RX ADMIN — HYDROMORPHONE HYDROCHLORIDE 0.2 MG: 1 INJECTION, SOLUTION INTRAMUSCULAR; INTRAVENOUS; SUBCUTANEOUS at 05:50

## 2017-01-01 RX ADMIN — ONDANSETRON 4 MG: 2 INJECTION INTRAMUSCULAR; INTRAVENOUS at 19:02

## 2017-01-01 RX ADMIN — NYSTATIN 500000 UNITS: 100000 SUSPENSION ORAL at 18:08

## 2017-01-01 RX ADMIN — ARFORMOTEROL TARTRATE 15 MCG: 15 SOLUTION RESPIRATORY (INHALATION) at 19:29

## 2017-01-01 RX ADMIN — CHLORHEXIDINE GLUCONATE 15 ML: 1.2 RINSE ORAL at 20:11

## 2017-01-01 RX ADMIN — ALPRAZOLAM 0.25 MG: 0.25 TABLET ORAL at 09:55

## 2017-01-01 RX ADMIN — Medication 2.5 MG: at 22:32

## 2017-01-01 RX ADMIN — ERTAPENEM SODIUM 1 G: 1 INJECTION, POWDER, LYOPHILIZED, FOR SOLUTION INTRAMUSCULAR; INTRAVENOUS at 08:18

## 2017-01-01 RX ADMIN — Medication 1 PACKET: at 20:52

## 2017-01-01 RX ADMIN — HYDROMORPHONE HYDROCHLORIDE 0.5 MG: 1 INJECTION, SOLUTION INTRAMUSCULAR; INTRAVENOUS; SUBCUTANEOUS at 22:38

## 2017-01-01 RX ADMIN — BUSPIRONE HYDROCHLORIDE 7.5 MG: 7.5 TABLET ORAL at 09:05

## 2017-01-01 RX ADMIN — TRAZODONE HYDROCHLORIDE 50 MG: 50 TABLET ORAL at 23:19

## 2017-01-01 RX ADMIN — LEVOFLOXACIN 750 MG: 5 INJECTION, SOLUTION INTRAVENOUS at 12:06

## 2017-01-01 RX ADMIN — HYDRALAZINE HYDROCHLORIDE 25 MG: 25 TABLET ORAL at 08:27

## 2017-01-01 RX ADMIN — DEXTROSE AND SODIUM CHLORIDE: 5; 900 INJECTION, SOLUTION INTRAVENOUS at 08:50

## 2017-01-01 RX ADMIN — Medication 125 MG: at 17:24

## 2017-01-01 RX ADMIN — ACETAMINOPHEN 975 MG: 325 TABLET, FILM COATED ORAL at 21:20

## 2017-01-01 RX ADMIN — Medication 12.5 MG: at 08:05

## 2017-01-01 RX ADMIN — METOPROLOL TARTRATE 50 MG: 50 TABLET, FILM COATED ORAL at 20:21

## 2017-01-01 RX ADMIN — BUDESONIDE 0.5 MG: 0.5 INHALANT RESPIRATORY (INHALATION) at 07:41

## 2017-01-01 RX ADMIN — ALPRAZOLAM 0.25 MG: 0.25 TABLET ORAL at 16:48

## 2017-01-01 RX ADMIN — CEFEPIME HYDROCHLORIDE 2 G: 2 INJECTION, POWDER, FOR SOLUTION INTRAVENOUS at 14:26

## 2017-01-01 RX ADMIN — MIDAZOLAM HYDROCHLORIDE 2 MG: 1 INJECTION, SOLUTION INTRAMUSCULAR; INTRAVENOUS at 01:23

## 2017-01-01 RX ADMIN — Medication 1 PACKET: at 09:21

## 2017-01-01 RX ADMIN — MEROPENEM 500 MG: 500 INJECTION, POWDER, FOR SOLUTION INTRAVENOUS at 02:49

## 2017-01-01 RX ADMIN — Medication 50 ML: at 21:08

## 2017-01-01 RX ADMIN — PHENYLEPHRINE HYDROCHLORIDE 100 MCG: 10 INJECTION, SOLUTION INTRAMUSCULAR; INTRAVENOUS; SUBCUTANEOUS at 16:59

## 2017-01-01 RX ADMIN — Medication 2.5 MG: at 11:35

## 2017-01-01 RX ADMIN — MEROPENEM 500 MG: 500 INJECTION, POWDER, FOR SOLUTION INTRAVENOUS at 02:22

## 2017-01-01 RX ADMIN — Medication 40 MG: at 22:15

## 2017-01-01 RX ADMIN — Medication 15 ML: at 21:03

## 2017-01-01 RX ADMIN — DEXAMETHASONE SODIUM PHOSPHATE 8 MG: 4 INJECTION, SOLUTION INTRA-ARTICULAR; INTRALESIONAL; INTRAMUSCULAR; INTRAVENOUS; SOFT TISSUE at 16:46

## 2017-01-01 RX ADMIN — POTASSIUM CHLORIDE 40 MEQ: 1.5 POWDER, FOR SOLUTION ORAL at 17:45

## 2017-01-01 RX ADMIN — Medication 125 MG: at 22:36

## 2017-01-01 RX ADMIN — CYCLOBENZAPRINE HYDROCHLORIDE 5 MG: 5 TABLET, FILM COATED ORAL at 21:41

## 2017-01-01 RX ADMIN — IPRATROPIUM BROMIDE AND ALBUTEROL SULFATE 3 ML: .5; 3 SOLUTION RESPIRATORY (INHALATION) at 16:14

## 2017-01-01 RX ADMIN — FUROSEMIDE 40 MG: 10 INJECTION, SOLUTION INTRAVENOUS at 21:04

## 2017-01-01 RX ADMIN — Medication 15 ML: at 20:15

## 2017-01-01 RX ADMIN — PROPOFOL 50 MG: 10 INJECTION, EMULSION INTRAVENOUS at 12:28

## 2017-01-01 RX ADMIN — ARFORMOTEROL TARTRATE 15 MCG: 15 SOLUTION RESPIRATORY (INHALATION) at 07:16

## 2017-01-01 RX ADMIN — BUDESONIDE 1 MG: 0.5 INHALANT RESPIRATORY (INHALATION) at 09:02

## 2017-01-01 RX ADMIN — NEOSTIGMINE METHYLSULFATE 4 MG: 1 INJECTION INTRAMUSCULAR; INTRAVENOUS; SUBCUTANEOUS at 17:00

## 2017-01-01 RX ADMIN — BUDESONIDE 0.5 MG: 0.5 INHALANT RESPIRATORY (INHALATION) at 20:15

## 2017-01-01 RX ADMIN — LISINOPRIL 20 MG: 20 TABLET ORAL at 08:27

## 2017-01-01 RX ADMIN — FENTANYL CITRATE 50 MCG: 50 INJECTION, SOLUTION INTRAMUSCULAR; INTRAVENOUS at 06:22

## 2017-01-01 RX ADMIN — FUROSEMIDE 40 MG: 10 INJECTION, SOLUTION INTRAVENOUS at 10:11

## 2017-01-01 RX ADMIN — PROCHLORPERAZINE EDISYLATE 5 MG: 5 INJECTION INTRAMUSCULAR; INTRAVENOUS at 04:24

## 2017-01-01 RX ADMIN — NYSTATIN 500000 UNITS: 100000 SUSPENSION ORAL at 18:00

## 2017-01-01 RX ADMIN — ALPRAZOLAM 0.25 MG: 0.25 TABLET ORAL at 15:41

## 2017-01-01 RX ADMIN — FENTANYL CITRATE 50 MCG: 50 INJECTION, SOLUTION INTRAMUSCULAR; INTRAVENOUS at 18:45

## 2017-01-01 RX ADMIN — BACITRACIN: 500 OINTMENT TOPICAL at 14:39

## 2017-01-01 RX ADMIN — LINEZOLID 600 MG: 600 INJECTION, SOLUTION INTRAVENOUS at 17:17

## 2017-01-01 RX ADMIN — HYDROMORPHONE HYDROCHLORIDE 0.2 MG: 1 INJECTION, SOLUTION INTRAMUSCULAR; INTRAVENOUS; SUBCUTANEOUS at 14:23

## 2017-01-01 RX ADMIN — Medication 200 MG: at 09:12

## 2017-01-01 RX ADMIN — SIMVASTATIN 10 MG: 10 TABLET, FILM COATED ORAL at 22:15

## 2017-01-01 RX ADMIN — FLUTICASONE FUROATE AND VILANTEROL TRIFENATATE 1 PUFF: 100; 25 POWDER RESPIRATORY (INHALATION) at 13:05

## 2017-01-01 RX ADMIN — LEVOFLOXACIN 500 MG: 5 INJECTION, SOLUTION INTRAVENOUS at 13:58

## 2017-01-01 RX ADMIN — FENTANYL CITRATE 100 MCG: 50 INJECTION, SOLUTION INTRAMUSCULAR; INTRAVENOUS at 05:04

## 2017-01-01 RX ADMIN — Medication 15 ML: at 08:03

## 2017-01-01 RX ADMIN — Medication 200 MG: at 09:40

## 2017-01-01 RX ADMIN — FUROSEMIDE 40 MG: 10 INJECTION, SOLUTION INTRAVENOUS at 20:38

## 2017-01-01 RX ADMIN — HEPARIN SODIUM 5000 UNITS: 5000 INJECTION, SOLUTION INTRAVENOUS; SUBCUTANEOUS at 08:45

## 2017-01-01 RX ADMIN — HYDROMORPHONE HYDROCHLORIDE 0.2 MG: 1 INJECTION, SOLUTION INTRAMUSCULAR; INTRAVENOUS; SUBCUTANEOUS at 01:55

## 2017-01-01 RX ADMIN — FENTANYL CITRATE 100 MCG: 50 INJECTION, SOLUTION INTRAMUSCULAR; INTRAVENOUS at 00:35

## 2017-01-01 RX ADMIN — CALCIUM CARBONATE (ANTACID) CHEW TAB 500 MG 500 MG: 500 CHEW TAB at 08:02

## 2017-01-01 RX ADMIN — NYSTATIN 500000 UNITS: 100000 SUSPENSION ORAL at 22:33

## 2017-01-01 RX ADMIN — POTASSIUM PHOSPHATE, MONOBASIC AND POTASSIUM PHOSPHATE, DIBASIC 20 MMOL: 224; 236 INJECTION, SOLUTION INTRAVENOUS at 09:52

## 2017-01-01 RX ADMIN — BUDESONIDE 1 MG: 0.5 INHALANT RESPIRATORY (INHALATION) at 07:54

## 2017-01-01 RX ADMIN — CIPROFLOXACIN 400 MG: 2 INJECTION, SOLUTION INTRAVENOUS at 03:10

## 2017-01-01 RX ADMIN — HYDRALAZINE HYDROCHLORIDE 25 MG: 25 TABLET ORAL at 05:03

## 2017-01-01 RX ADMIN — FENTANYL CITRATE 100 MCG: 50 INJECTION, SOLUTION INTRAMUSCULAR; INTRAVENOUS at 11:56

## 2017-01-01 RX ADMIN — ACETAMINOPHEN 1000 MG: 325 SOLUTION ORAL at 00:16

## 2017-01-01 RX ADMIN — INSULIN ASPART 1 UNITS: 100 INJECTION, SOLUTION INTRAVENOUS; SUBCUTANEOUS at 04:19

## 2017-01-01 RX ADMIN — METOPROLOL TARTRATE 50 MG: 50 TABLET, FILM COATED ORAL at 10:05

## 2017-01-01 RX ADMIN — Medication 40 MG: at 09:35

## 2017-01-01 RX ADMIN — BUDESONIDE 0.5 MG: 0.5 INHALANT RESPIRATORY (INHALATION) at 19:34

## 2017-01-01 RX ADMIN — VANCOMYCIN HYDROCHLORIDE 1500 MG: 5 INJECTION, POWDER, LYOPHILIZED, FOR SOLUTION INTRAVENOUS at 12:17

## 2017-01-01 RX ADMIN — CEFEPIME HYDROCHLORIDE 2 G: 2 INJECTION, POWDER, FOR SOLUTION INTRAVENOUS at 21:17

## 2017-01-01 RX ADMIN — CHLORHEXIDINE GLUCONATE 15 ML: 1.2 RINSE ORAL at 19:01

## 2017-01-01 RX ADMIN — HEPARIN SODIUM 5000 UNITS: 5000 INJECTION, SOLUTION INTRAVENOUS; SUBCUTANEOUS at 02:50

## 2017-01-01 RX ADMIN — SIMVASTATIN 10 MG: 10 TABLET, FILM COATED ORAL at 21:01

## 2017-01-01 RX ADMIN — HYDRALAZINE HYDROCHLORIDE 20 MG: 20 INJECTION INTRAMUSCULAR; INTRAVENOUS at 18:14

## 2017-01-01 RX ADMIN — HYDRALAZINE HYDROCHLORIDE 5 MG: 20 INJECTION INTRAMUSCULAR; INTRAVENOUS at 12:09

## 2017-01-01 RX ADMIN — NYSTATIN 500000 UNITS: 100000 SUSPENSION ORAL at 21:41

## 2017-01-01 RX ADMIN — IPRATROPIUM BROMIDE AND ALBUTEROL SULFATE 3 ML: .5; 3 SOLUTION RESPIRATORY (INHALATION) at 11:29

## 2017-01-01 RX ADMIN — CHLORHEXIDINE GLUCONATE 15 ML: 1.2 RINSE ORAL at 20:42

## 2017-01-01 RX ADMIN — CHLORHEXIDINE GLUCONATE 15 ML: 1.2 RINSE ORAL at 01:38

## 2017-01-01 RX ADMIN — OXYCODONE HYDROCHLORIDE 10 MG: 5 TABLET ORAL at 02:18

## 2017-01-01 RX ADMIN — ALPRAZOLAM 0.25 MG: 0.25 TABLET ORAL at 16:15

## 2017-01-01 RX ADMIN — BUDESONIDE 0.5 MG: 0.5 INHALANT RESPIRATORY (INHALATION) at 07:03

## 2017-01-01 RX ADMIN — FENTANYL CITRATE 50 MCG: 50 INJECTION, SOLUTION INTRAMUSCULAR; INTRAVENOUS at 05:20

## 2017-01-01 RX ADMIN — SODIUM CHLORIDE: 9 INJECTION, SOLUTION INTRAVENOUS at 21:17

## 2017-01-01 RX ADMIN — ACETAMINOPHEN 1000 MG: 325 SOLUTION ORAL at 16:31

## 2017-01-01 RX ADMIN — Medication 40 MG: at 08:03

## 2017-01-01 RX ADMIN — HEPARIN SODIUM 5000 UNITS: 5000 INJECTION, SOLUTION INTRAVENOUS; SUBCUTANEOUS at 08:16

## 2017-01-01 RX ADMIN — I.V. FAT EMULSION 250 ML: 20 EMULSION INTRAVENOUS at 20:08

## 2017-01-01 RX ADMIN — IPRATROPIUM BROMIDE AND ALBUTEROL SULFATE 3 ML: .5; 3 SOLUTION RESPIRATORY (INHALATION) at 19:11

## 2017-01-01 RX ADMIN — HEPARIN SODIUM 5000 UNITS: 5000 INJECTION, SOLUTION INTRAVENOUS; SUBCUTANEOUS at 21:12

## 2017-01-01 RX ADMIN — SODIUM CHLORIDE: 9 INJECTION, SOLUTION INTRAVENOUS at 11:03

## 2017-01-01 RX ADMIN — ARFORMOTEROL TARTRATE 15 MCG: 15 SOLUTION RESPIRATORY (INHALATION) at 19:52

## 2017-01-01 RX ADMIN — CHLORHEXIDINE GLUCONATE 15 ML: 1.2 RINSE ORAL at 21:48

## 2017-01-01 RX ADMIN — HYDROMORPHONE HYDROCHLORIDE 0.5 MG: 1 INJECTION, SOLUTION INTRAMUSCULAR; INTRAVENOUS; SUBCUTANEOUS at 10:00

## 2017-01-01 RX ADMIN — FUROSEMIDE 40 MG: 10 INJECTION, SOLUTION INTRAVENOUS at 20:36

## 2017-01-01 RX ADMIN — MIDAZOLAM HYDROCHLORIDE 1 MG: 1 INJECTION, SOLUTION INTRAMUSCULAR; INTRAVENOUS at 08:37

## 2017-01-01 RX ADMIN — Medication 1 PACKET: at 23:51

## 2017-01-01 RX ADMIN — FUROSEMIDE 40 MG: 10 INJECTION, SOLUTION INTRAVENOUS at 08:53

## 2017-01-01 RX ADMIN — ROCURONIUM BROMIDE 5 MG: 10 INJECTION INTRAVENOUS at 16:30

## 2017-01-01 RX ADMIN — ARFORMOTEROL TARTRATE 15 MCG: 15 SOLUTION RESPIRATORY (INHALATION) at 19:47

## 2017-01-01 RX ADMIN — HYDRALAZINE HYDROCHLORIDE 25 MG: 25 TABLET ORAL at 03:14

## 2017-01-01 RX ADMIN — LIDOCAINE HYDROCHLORIDE 40 MG: 10 INJECTION, SOLUTION INFILTRATION; PERINEURAL at 11:10

## 2017-01-01 RX ADMIN — SODIUM CHLORIDE 1000 ML: 9 INJECTION, SOLUTION INTRAVENOUS at 10:01

## 2017-01-01 RX ADMIN — PHENYLEPHRINE HYDROCHLORIDE 150 MCG: 10 INJECTION, SOLUTION INTRAMUSCULAR; INTRAVENOUS; SUBCUTANEOUS at 12:36

## 2017-01-01 RX ADMIN — DAPTOMYCIN 400 MG: 500 INJECTION, POWDER, LYOPHILIZED, FOR SOLUTION INTRAVENOUS at 04:08

## 2017-01-01 RX ADMIN — Medication 125 MG: at 19:58

## 2017-01-01 RX ADMIN — CEFEPIME HYDROCHLORIDE 2 G: 2 INJECTION, POWDER, FOR SOLUTION INTRAVENOUS at 13:38

## 2017-01-01 RX ADMIN — NYSTATIN 500000 UNITS: 100000 SUSPENSION ORAL at 10:01

## 2017-01-01 RX ADMIN — HEPARIN SODIUM 5000 UNITS: 5000 INJECTION, SOLUTION INTRAVENOUS; SUBCUTANEOUS at 14:31

## 2017-01-01 RX ADMIN — DEXMEDETOMIDINE HYDROCHLORIDE 0.7 MCG/KG/HR: 100 INJECTION, SOLUTION INTRAVENOUS at 14:47

## 2017-01-01 RX ADMIN — SERTRALINE HYDROCHLORIDE 50 MG: 50 TABLET ORAL at 09:39

## 2017-01-01 RX ADMIN — BUDESONIDE 0.5 MG: 0.5 INHALANT RESPIRATORY (INHALATION) at 08:03

## 2017-01-01 RX ADMIN — MICONAZOLE NITRATE: 2 POWDER TOPICAL at 22:30

## 2017-01-01 RX ADMIN — HEPARIN SODIUM 5000 UNITS: 5000 INJECTION, SOLUTION INTRAVENOUS; SUBCUTANEOUS at 10:24

## 2017-01-01 RX ADMIN — DEXMEDETOMIDINE HYDROCHLORIDE 0.7 MCG/KG/HR: 100 INJECTION, SOLUTION INTRAVENOUS at 22:05

## 2017-01-01 RX ADMIN — Medication 125 MG: at 06:17

## 2017-01-01 RX ADMIN — HEPARIN SODIUM 5000 UNITS: 5000 INJECTION, SOLUTION INTRAVENOUS; SUBCUTANEOUS at 21:04

## 2017-01-01 RX ADMIN — Medication 200 MG: at 08:12

## 2017-01-01 RX ADMIN — BUSPIRONE HYDROCHLORIDE 7.5 MG: 7.5 TABLET ORAL at 08:02

## 2017-01-01 RX ADMIN — TRAZODONE HYDROCHLORIDE 50 MG: 50 TABLET ORAL at 05:01

## 2017-01-01 RX ADMIN — NYSTATIN 500000 UNITS: 100000 SUSPENSION ORAL at 13:57

## 2017-01-01 RX ADMIN — ACETAMINOPHEN 1000 MG: 500 TABLET, FILM COATED ORAL at 20:59

## 2017-01-01 RX ADMIN — Medication 2.5 MG: at 04:24

## 2017-01-01 RX ADMIN — FENTANYL CITRATE 50 MCG: 50 INJECTION, SOLUTION INTRAMUSCULAR; INTRAVENOUS at 10:32

## 2017-01-01 RX ADMIN — DEXMEDETOMIDINE HYDROCHLORIDE 8 MCG: 100 INJECTION, SOLUTION INTRAVENOUS at 13:17

## 2017-01-01 RX ADMIN — MEROPENEM 1 G: 1 INJECTION, POWDER, FOR SOLUTION INTRAVENOUS at 05:03

## 2017-01-01 RX ADMIN — ALBUMIN (HUMAN) 50 G: 12.5 SOLUTION INTRAVENOUS at 17:01

## 2017-01-01 RX ADMIN — OXYCODONE HYDROCHLORIDE 2.5 MG: 5 TABLET ORAL at 03:14

## 2017-01-01 RX ADMIN — ARFORMOTEROL TARTRATE 15 MCG: 15 SOLUTION RESPIRATORY (INHALATION) at 20:02

## 2017-01-01 RX ADMIN — SIMVASTATIN 10 MG: 10 TABLET, FILM COATED ORAL at 08:59

## 2017-01-01 RX ADMIN — FENTANYL CITRATE 50 MCG: 50 INJECTION, SOLUTION INTRAMUSCULAR; INTRAVENOUS at 13:33

## 2017-01-01 RX ADMIN — HEPARIN SODIUM 5000 UNITS: 5000 INJECTION, SOLUTION INTRAVENOUS; SUBCUTANEOUS at 20:29

## 2017-01-01 RX ADMIN — HYDROMORPHONE HYDROCHLORIDE 0.3 MG: 1 INJECTION, SOLUTION INTRAMUSCULAR; INTRAVENOUS; SUBCUTANEOUS at 23:47

## 2017-01-01 RX ADMIN — MEBROFENIN 6.6 MILLICURIE: 45 INJECTION, POWDER, LYOPHILIZED, FOR SOLUTION INTRAVENOUS at 13:52

## 2017-01-01 RX ADMIN — SIMVASTATIN 10 MG: 10 TABLET, FILM COATED ORAL at 10:26

## 2017-01-01 RX ADMIN — IPRATROPIUM BROMIDE AND ALBUTEROL SULFATE 3 ML: .5; 3 SOLUTION RESPIRATORY (INHALATION) at 14:22

## 2017-01-01 RX ADMIN — Medication 1 PACKET: at 14:18

## 2017-01-01 RX ADMIN — PROPOFOL 20 MCG/KG/MIN: 10 INJECTION, EMULSION INTRAVENOUS at 12:30

## 2017-01-01 RX ADMIN — MICAFUNGIN SODIUM 100 MG: 10 INJECTION, POWDER, LYOPHILIZED, FOR SOLUTION INTRAVENOUS at 12:15

## 2017-01-01 RX ADMIN — TRAZODONE HYDROCHLORIDE 50 MG: 50 TABLET ORAL at 21:06

## 2017-01-01 RX ADMIN — ARFORMOTEROL TARTRATE 15 MCG: 15 SOLUTION RESPIRATORY (INHALATION) at 19:13

## 2017-01-01 RX ADMIN — HEPARIN SODIUM 5000 UNITS: 5000 INJECTION, SOLUTION INTRAVENOUS; SUBCUTANEOUS at 13:00

## 2017-01-01 RX ADMIN — IPRATROPIUM BROMIDE AND ALBUTEROL SULFATE 3 ML: .5; 3 SOLUTION RESPIRATORY (INHALATION) at 16:05

## 2017-01-01 RX ADMIN — HYDROMORPHONE HYDROCHLORIDE 0.2 MG: 1 INJECTION, SOLUTION INTRAMUSCULAR; INTRAVENOUS; SUBCUTANEOUS at 15:08

## 2017-01-01 RX ADMIN — VANCOMYCIN HYDROCHLORIDE 1750 MG: 5 INJECTION, POWDER, LYOPHILIZED, FOR SOLUTION INTRAVENOUS at 15:35

## 2017-01-01 RX ADMIN — HEPARIN SODIUM 5000 UNITS: 5000 INJECTION, SOLUTION INTRAVENOUS; SUBCUTANEOUS at 13:34

## 2017-01-01 RX ADMIN — CHLORHEXIDINE GLUCONATE 15 ML: 1.2 RINSE ORAL at 08:14

## 2017-01-01 RX ADMIN — ACETAMINOPHEN 1000 MG: 325 SOLUTION ORAL at 09:00

## 2017-01-01 RX ADMIN — BUDESONIDE 0.5 MG: 0.5 INHALANT RESPIRATORY (INHALATION) at 19:28

## 2017-01-01 RX ADMIN — CHLORHEXIDINE GLUCONATE 15 ML: 1.2 RINSE ORAL at 20:23

## 2017-01-01 RX ADMIN — BACITRACIN: 500 OINTMENT TOPICAL at 08:19

## 2017-01-01 RX ADMIN — HYDROMORPHONE HYDROCHLORIDE 0.2 MG: 1 INJECTION, SOLUTION INTRAMUSCULAR; INTRAVENOUS; SUBCUTANEOUS at 14:42

## 2017-01-01 RX ADMIN — OXYCODONE HYDROCHLORIDE 5 MG: 5 TABLET ORAL at 09:52

## 2017-01-01 RX ADMIN — ATORVASTATIN CALCIUM 10 MG: 10 TABLET, FILM COATED ORAL at 20:28

## 2017-01-01 RX ADMIN — PHENYLEPHRINE HYDROCHLORIDE 100 MCG: 10 INJECTION, SOLUTION INTRAMUSCULAR; INTRAVENOUS; SUBCUTANEOUS at 17:23

## 2017-01-01 RX ADMIN — HYDRALAZINE HYDROCHLORIDE 25 MG: 25 TABLET ORAL at 09:36

## 2017-01-01 RX ADMIN — HEPARIN SODIUM 5000 UNITS: 5000 INJECTION, SOLUTION INTRAVENOUS; SUBCUTANEOUS at 17:17

## 2017-01-01 RX ADMIN — DILTIAZEM HYDROCHLORIDE 10 MG: 5 INJECTION INTRAVENOUS at 14:17

## 2017-01-01 RX ADMIN — ATORVASTATIN CALCIUM 10 MG: 10 TABLET, FILM COATED ORAL at 20:52

## 2017-01-01 RX ADMIN — FUROSEMIDE 60 MG: 10 INJECTION, SOLUTION INTRAVENOUS at 09:09

## 2017-01-01 RX ADMIN — IPRATROPIUM BROMIDE AND ALBUTEROL SULFATE 3 ML: .5; 3 SOLUTION RESPIRATORY (INHALATION) at 15:46

## 2017-01-01 RX ADMIN — NYSTATIN 500000 UNITS: 100000 SUSPENSION ORAL at 13:39

## 2017-01-01 RX ADMIN — HYDRALAZINE HYDROCHLORIDE 25 MG: 25 TABLET ORAL at 14:59

## 2017-01-01 RX ADMIN — HYDROMORPHONE HYDROCHLORIDE 0.5 MG: 1 INJECTION, SOLUTION INTRAMUSCULAR; INTRAVENOUS; SUBCUTANEOUS at 08:04

## 2017-01-01 RX ADMIN — ONDANSETRON 4 MG: 2 SOLUTION INTRAMUSCULAR; INTRAVENOUS at 20:11

## 2017-01-01 RX ADMIN — LIDOCAINE 1 PATCH: 50 PATCH CUTANEOUS at 08:03

## 2017-01-01 RX ADMIN — ARFORMOTEROL TARTRATE 15 MCG: 15 SOLUTION RESPIRATORY (INHALATION) at 08:03

## 2017-01-01 RX ADMIN — ERTAPENEM SODIUM 1 G: 1 INJECTION, POWDER, LYOPHILIZED, FOR SOLUTION INTRAMUSCULAR; INTRAVENOUS at 08:16

## 2017-01-01 RX ADMIN — TRAZODONE HYDROCHLORIDE 50 MG: 50 TABLET ORAL at 22:15

## 2017-01-01 RX ADMIN — ACETAMINOPHEN 1000 MG: 500 TABLET, FILM COATED ORAL at 05:03

## 2017-01-01 RX ADMIN — PANTOPRAZOLE SODIUM 40 MG: 40 INJECTION, POWDER, FOR SOLUTION INTRAVENOUS at 08:42

## 2017-01-01 RX ADMIN — ACETAMINOPHEN 1000 MG: 325 SOLUTION ORAL at 16:14

## 2017-01-01 RX ADMIN — Medication 12.5 MG: at 09:05

## 2017-01-01 RX ADMIN — SODIUM CHLORIDE 500 ML: 9 INJECTION, SOLUTION INTRAVENOUS at 10:19

## 2017-01-01 RX ADMIN — ALBUMIN (HUMAN) 12.5 G: 12.5 SOLUTION INTRAVENOUS at 06:10

## 2017-01-01 RX ADMIN — ARFORMOTEROL TARTRATE 15 MCG: 15 SOLUTION RESPIRATORY (INHALATION) at 07:35

## 2017-01-01 RX ADMIN — DEXMEDETOMIDINE HYDROCHLORIDE 24 MCG: 100 INJECTION, SOLUTION INTRAVENOUS at 16:30

## 2017-01-01 RX ADMIN — MEROPENEM 1 G: 1 INJECTION, POWDER, FOR SOLUTION INTRAVENOUS at 21:41

## 2017-01-01 RX ADMIN — CALCIUM CARBONATE (ANTACID) CHEW TAB 500 MG 500 MG: 500 CHEW TAB at 21:00

## 2017-01-01 RX ADMIN — SERTRALINE HYDROCHLORIDE 50 MG: 50 TABLET ORAL at 08:41

## 2017-01-01 RX ADMIN — METOPROLOL TARTRATE 50 MG: 50 TABLET, FILM COATED ORAL at 14:32

## 2017-01-01 RX ADMIN — ATORVASTATIN CALCIUM 10 MG: 10 TABLET, FILM COATED ORAL at 21:00

## 2017-01-01 RX ADMIN — ALBUMIN (HUMAN) 12.5 G: 12.5 SOLUTION INTRAVENOUS at 22:12

## 2017-01-01 RX ADMIN — Medication 40 MG: at 08:04

## 2017-01-01 RX ADMIN — NYSTATIN 500000 UNITS: 100000 SUSPENSION ORAL at 21:44

## 2017-01-01 RX ADMIN — FUROSEMIDE 40 MG: 10 INJECTION, SOLUTION INTRAVENOUS at 15:43

## 2017-01-01 RX ADMIN — FENTANYL CITRATE 50 MCG: 50 INJECTION, SOLUTION INTRAMUSCULAR; INTRAVENOUS at 14:55

## 2017-01-01 RX ADMIN — IOPAMIDOL 5 ML: 612 INJECTION, SOLUTION INTRAVENOUS at 11:33

## 2017-01-01 RX ADMIN — BUSPIRONE HYDROCHLORIDE 7.5 MG: 7.5 TABLET ORAL at 08:25

## 2017-01-01 RX ADMIN — CIPROFLOXACIN 400 MG: 2 INJECTION, SOLUTION INTRAVENOUS at 03:30

## 2017-01-01 RX ADMIN — NYSTATIN 500000 UNITS: 100000 SUSPENSION ORAL at 21:29

## 2017-01-01 RX ADMIN — ACETAMINOPHEN 1000 MG: 325 SOLUTION ORAL at 08:40

## 2017-01-01 RX ADMIN — Medication 15 ML: at 08:57

## 2017-01-01 RX ADMIN — FENTANYL CITRATE 100 MCG: 50 INJECTION, SOLUTION INTRAMUSCULAR; INTRAVENOUS at 13:29

## 2017-01-01 RX ADMIN — FENTANYL CITRATE 50 MCG: 50 INJECTION, SOLUTION INTRAMUSCULAR; INTRAVENOUS at 13:16

## 2017-01-01 RX ADMIN — POTASSIUM CHLORIDE 40 MEQ: 1.5 POWDER, FOR SOLUTION ORAL at 06:32

## 2017-01-01 RX ADMIN — NYSTATIN 500000 UNITS: 100000 SUSPENSION ORAL at 08:03

## 2017-01-01 RX ADMIN — HEPARIN SODIUM 5000 UNITS: 5000 INJECTION, SOLUTION INTRAVENOUS; SUBCUTANEOUS at 08:57

## 2017-01-01 RX ADMIN — POTASSIUM CHLORIDE 20 MEQ: 1.5 POWDER, FOR SOLUTION ORAL at 18:15

## 2017-01-01 RX ADMIN — IPRATROPIUM BROMIDE AND ALBUTEROL SULFATE 3 ML: .5; 3 SOLUTION RESPIRATORY (INHALATION) at 07:25

## 2017-01-01 RX ADMIN — ALBUTEROL SULFATE 2.5 MG: 2.5 SOLUTION RESPIRATORY (INHALATION) at 03:43

## 2017-01-01 RX ADMIN — LIDOCAINE 1 PATCH: 50 PATCH CUTANEOUS at 09:41

## 2017-01-01 RX ADMIN — HYDROMORPHONE HYDROCHLORIDE 0.2 MG: 1 INJECTION, SOLUTION INTRAMUSCULAR; INTRAVENOUS; SUBCUTANEOUS at 19:20

## 2017-01-01 RX ADMIN — FUROSEMIDE 40 MG: 10 INJECTION, SOLUTION INTRAVENOUS at 09:40

## 2017-01-01 RX ADMIN — POTASSIUM PHOSPHATE, MONOBASIC AND POTASSIUM PHOSPHATE, DIBASIC 15 MMOL: 224; 236 INJECTION, SOLUTION INTRAVENOUS at 08:19

## 2017-01-01 RX ADMIN — SIMVASTATIN 10 MG: 10 TABLET, FILM COATED ORAL at 21:28

## 2017-01-01 RX ADMIN — PHENYLEPHRINE HYDROCHLORIDE 0.8 MCG/KG/MIN: 10 INJECTION, SOLUTION INTRAMUSCULAR; INTRAVENOUS; SUBCUTANEOUS at 16:37

## 2017-01-01 RX ADMIN — THIAMINE HYDROCHLORIDE 250 MG: 100 INJECTION, SOLUTION INTRAMUSCULAR; INTRAVENOUS at 09:57

## 2017-01-01 RX ADMIN — MIDAZOLAM HYDROCHLORIDE 1 MG: 1 INJECTION, SOLUTION INTRAMUSCULAR; INTRAVENOUS at 08:48

## 2017-01-01 RX ADMIN — FENTANYL CITRATE 50 MCG: 50 INJECTION, SOLUTION INTRAMUSCULAR; INTRAVENOUS at 02:41

## 2017-01-01 RX ADMIN — IPRATROPIUM BROMIDE AND ALBUTEROL SULFATE 3 ML: .5; 3 SOLUTION RESPIRATORY (INHALATION) at 11:43

## 2017-01-01 RX ADMIN — SODIUM CHLORIDE: 9 INJECTION, SOLUTION INTRAVENOUS at 13:57

## 2017-01-01 RX ADMIN — NYSTATIN 500000 UNITS: 100000 SUSPENSION ORAL at 12:31

## 2017-01-01 RX ADMIN — DEXMEDETOMIDINE HYDROCHLORIDE 0.7 MCG/KG/HR: 100 INJECTION, SOLUTION INTRAVENOUS at 20:39

## 2017-01-01 RX ADMIN — BUSPIRONE HYDROCHLORIDE 7.5 MG: 7.5 TABLET ORAL at 21:03

## 2017-01-01 RX ADMIN — SODIUM CHLORIDE 1000 ML: 9 INJECTION, SOLUTION INTRAVENOUS at 11:16

## 2017-01-01 RX ADMIN — SENNOSIDES AND DOCUSATE SODIUM 2 TABLET: 8.6; 5 TABLET ORAL at 10:26

## 2017-01-01 RX ADMIN — BUDESONIDE 0.5 MG: 0.5 INHALANT RESPIRATORY (INHALATION) at 20:11

## 2017-01-01 RX ADMIN — DEXMEDETOMIDINE HYDROCHLORIDE 0.2 MCG/KG/HR: 100 INJECTION, SOLUTION INTRAVENOUS at 10:30

## 2017-01-01 RX ADMIN — ATORVASTATIN CALCIUM 10 MG: 10 TABLET, FILM COATED ORAL at 21:03

## 2017-01-01 RX ADMIN — ARFORMOTEROL TARTRATE 15 MCG: 15 SOLUTION RESPIRATORY (INHALATION) at 19:28

## 2017-01-01 RX ADMIN — MAGNESIUM SULFATE IN WATER 4 G: 40 INJECTION, SOLUTION INTRAVENOUS at 09:09

## 2017-01-01 RX ADMIN — SENNOSIDES AND DOCUSATE SODIUM 2 TABLET: 8.6; 5 TABLET ORAL at 21:20

## 2017-01-01 RX ADMIN — IPRATROPIUM BROMIDE AND ALBUTEROL SULFATE 3 ML: .5; 3 SOLUTION RESPIRATORY (INHALATION) at 19:34

## 2017-01-01 RX ADMIN — LIDOCAINE HYDROCHLORIDE 70 MG: 10 INJECTION, SOLUTION EPIDURAL; INFILTRATION; INTRACAUDAL; PERINEURAL at 11:19

## 2017-01-01 RX ADMIN — Medication 5 MG: at 08:28

## 2017-01-01 RX ADMIN — Medication 200 MG: at 09:25

## 2017-01-01 RX ADMIN — HYDROMORPHONE HYDROCHLORIDE 0.5 MG: 1 INJECTION, SOLUTION INTRAMUSCULAR; INTRAVENOUS; SUBCUTANEOUS at 20:42

## 2017-01-01 RX ADMIN — ONDANSETRON 4 MG: 2 SOLUTION INTRAMUSCULAR; INTRAVENOUS at 14:31

## 2017-01-01 RX ADMIN — ALPRAZOLAM 0.25 MG: 0.25 TABLET ORAL at 17:24

## 2017-01-01 RX ADMIN — CALCIUM CARBONATE (ANTACID) CHEW TAB 500 MG 500 MG: 500 CHEW TAB at 21:10

## 2017-01-01 RX ADMIN — POTASSIUM CHLORIDE: 2 INJECTION, SOLUTION, CONCENTRATE INTRAVENOUS at 20:06

## 2017-01-01 RX ADMIN — MIRTAZAPINE 22.5 MG: 7.5 TABLET, FILM COATED ORAL at 22:15

## 2017-01-01 RX ADMIN — Medication 40 MG: at 08:25

## 2017-01-01 RX ADMIN — SODIUM CHLORIDE 80 ML: 9 INJECTION, SOLUTION INTRAVENOUS at 14:52

## 2017-01-01 RX ADMIN — Medication 1 PACKET: at 08:04

## 2017-01-01 RX ADMIN — HEPARIN SODIUM 5000 UNITS: 5000 INJECTION, SOLUTION INTRAVENOUS; SUBCUTANEOUS at 06:52

## 2017-01-01 RX ADMIN — ALPRAZOLAM 0.25 MG: 0.25 TABLET ORAL at 21:09

## 2017-01-01 RX ADMIN — DEXMEDETOMIDINE HYDROCHLORIDE 0.7 MCG/KG/HR: 100 INJECTION, SOLUTION INTRAVENOUS at 12:58

## 2017-01-01 RX ADMIN — SODIUM CHLORIDE, POTASSIUM CHLORIDE, SODIUM LACTATE AND CALCIUM CHLORIDE: 600; 310; 30; 20 INJECTION, SOLUTION INTRAVENOUS at 16:25

## 2017-01-01 RX ADMIN — QUETIAPINE FUMARATE 25 MG: 25 TABLET, FILM COATED ORAL at 00:09

## 2017-01-01 RX ADMIN — FLUCONAZOLE 200 MG: 2 INJECTION, SOLUTION INTRAVENOUS at 08:16

## 2017-01-01 RX ADMIN — HEPARIN SODIUM 5000 UNITS: 5000 INJECTION, SOLUTION INTRAVENOUS; SUBCUTANEOUS at 06:23

## 2017-01-01 RX ADMIN — FUROSEMIDE 60 MG: 10 INJECTION, SOLUTION INTRAVENOUS at 15:14

## 2017-01-01 RX ADMIN — ARFORMOTEROL TARTRATE 15 MCG: 15 SOLUTION RESPIRATORY (INHALATION) at 07:54

## 2017-01-01 RX ADMIN — HYDROMORPHONE HYDROCHLORIDE 0.2 MG: 1 INJECTION, SOLUTION INTRAMUSCULAR; INTRAVENOUS; SUBCUTANEOUS at 08:51

## 2017-01-01 RX ADMIN — Medication 5 MG: at 08:55

## 2017-01-01 RX ADMIN — ARFORMOTEROL TARTRATE 15 MCG: 15 SOLUTION RESPIRATORY (INHALATION) at 07:30

## 2017-01-01 RX ADMIN — PHENYLEPHRINE HYDROCHLORIDE 100 MCG: 10 INJECTION, SOLUTION INTRAMUSCULAR; INTRAVENOUS; SUBCUTANEOUS at 14:40

## 2017-01-01 RX ADMIN — FENTANYL CITRATE 100 MCG: 50 INJECTION, SOLUTION INTRAMUSCULAR; INTRAVENOUS at 07:23

## 2017-01-01 RX ADMIN — CHLORHEXIDINE GLUCONATE 15 ML: 1.2 RINSE ORAL at 08:42

## 2017-01-01 RX ADMIN — NYSTATIN 500000 UNITS: 100000 SUSPENSION ORAL at 14:42

## 2017-01-01 RX ADMIN — LIDOCAINE HYDROCHLORIDE 40 MG: 20 INJECTION, SOLUTION INFILTRATION; PERINEURAL at 16:30

## 2017-01-01 RX ADMIN — NYSTATIN 500000 UNITS: 100000 SUSPENSION ORAL at 14:31

## 2017-01-01 RX ADMIN — HYDRALAZINE HYDROCHLORIDE 25 MG: 25 TABLET ORAL at 20:27

## 2017-01-01 RX ADMIN — HYDROMORPHONE HYDROCHLORIDE 0.5 MG: 1 INJECTION, SOLUTION INTRAMUSCULAR; INTRAVENOUS; SUBCUTANEOUS at 05:08

## 2017-01-01 RX ADMIN — DEXMEDETOMIDINE HYDROCHLORIDE 0.3 MCG/KG/HR: 100 INJECTION, SOLUTION INTRAVENOUS at 16:30

## 2017-01-01 RX ADMIN — NYSTATIN 500000 UNITS: 100000 SUSPENSION ORAL at 08:26

## 2017-01-01 RX ADMIN — INSULIN ASPART 1 UNITS: 100 INJECTION, SOLUTION INTRAVENOUS; SUBCUTANEOUS at 21:47

## 2017-01-01 RX ADMIN — IPRATROPIUM BROMIDE AND ALBUTEROL SULFATE 3 ML: .5; 3 SOLUTION RESPIRATORY (INHALATION) at 15:58

## 2017-01-01 RX ADMIN — HEPARIN SODIUM 5000 UNITS: 5000 INJECTION, SOLUTION INTRAVENOUS; SUBCUTANEOUS at 05:43

## 2017-01-01 RX ADMIN — BUSPIRONE HYDROCHLORIDE 7.5 MG: 7.5 TABLET ORAL at 09:19

## 2017-01-01 RX ADMIN — BUDESONIDE 0.5 MG: 0.5 INHALANT RESPIRATORY (INHALATION) at 20:17

## 2017-01-01 RX ADMIN — TRAZODONE HYDROCHLORIDE 50 MG: 50 TABLET ORAL at 21:44

## 2017-01-01 RX ADMIN — METOPROLOL TARTRATE 50 MG: 50 TABLET, FILM COATED ORAL at 21:44

## 2017-01-01 RX ADMIN — CHLORHEXIDINE GLUCONATE 15 ML: 1.2 RINSE ORAL at 08:18

## 2017-01-01 RX ADMIN — Medication 12.5 MG: at 09:12

## 2017-01-01 RX ADMIN — ROCURONIUM BROMIDE 20 MG: 10 INJECTION INTRAVENOUS at 16:34

## 2017-01-01 RX ADMIN — HYDRALAZINE HYDROCHLORIDE 25 MG: 25 TABLET ORAL at 13:57

## 2017-01-01 RX ADMIN — SODIUM CHLORIDE, PRESERVATIVE FREE: 5 INJECTION INTRAVENOUS at 07:30

## 2017-01-01 RX ADMIN — Medication 1 PACKET: at 05:03

## 2017-01-01 RX ADMIN — HEPARIN SODIUM 5000 UNITS: 5000 INJECTION, SOLUTION INTRAVENOUS; SUBCUTANEOUS at 06:31

## 2017-01-01 RX ADMIN — ACETAMINOPHEN 1000 MG: 325 SOLUTION ORAL at 09:11

## 2017-01-01 RX ADMIN — FLUCONAZOLE 200 MG: 2 INJECTION, SOLUTION INTRAVENOUS at 08:48

## 2017-01-01 RX ADMIN — ALPRAZOLAM 0.25 MG: 0.25 TABLET ORAL at 08:05

## 2017-01-01 RX ADMIN — QUETIAPINE FUMARATE 25 MG: 25 TABLET, FILM COATED ORAL at 21:16

## 2017-01-01 RX ADMIN — HEPARIN SODIUM 5000 UNITS: 5000 INJECTION, SOLUTION INTRAVENOUS; SUBCUTANEOUS at 09:42

## 2017-01-01 RX ADMIN — ACETAMINOPHEN 1000 MG: 500 TABLET, FILM COATED ORAL at 09:04

## 2017-01-01 RX ADMIN — Medication 125 MG: at 18:10

## 2017-01-01 RX ADMIN — HYDRALAZINE HYDROCHLORIDE 25 MG: 25 TABLET ORAL at 23:19

## 2017-01-01 RX ADMIN — Medication 5 MG: at 09:36

## 2017-01-01 RX ADMIN — IPRATROPIUM BROMIDE AND ALBUTEROL SULFATE 3 ML: .5; 3 SOLUTION RESPIRATORY (INHALATION) at 00:40

## 2017-01-01 RX ADMIN — CALCIUM CARBONATE (ANTACID) CHEW TAB 500 MG 500 MG: 500 CHEW TAB at 14:32

## 2017-01-01 RX ADMIN — ALPRAZOLAM 0.25 MG: 0.25 TABLET ORAL at 02:01

## 2017-01-01 RX ADMIN — ACETAMINOPHEN 975 MG: 325 TABLET, FILM COATED ORAL at 06:03

## 2017-01-01 RX ADMIN — NYSTATIN 500000 UNITS: 100000 SUSPENSION ORAL at 21:01

## 2017-01-01 RX ADMIN — BUDESONIDE 0.5 MG: 0.5 INHALANT RESPIRATORY (INHALATION) at 19:51

## 2017-01-01 RX ADMIN — ACETAMINOPHEN 1000 MG: 325 SOLUTION ORAL at 02:14

## 2017-01-01 RX ADMIN — BACITRACIN: 500 OINTMENT TOPICAL at 08:39

## 2017-01-01 RX ADMIN — HEPARIN SODIUM 5000 UNITS: 5000 INJECTION, SOLUTION INTRAVENOUS; SUBCUTANEOUS at 21:10

## 2017-01-01 RX ADMIN — PANTOPRAZOLE SODIUM 40 MG: 40 INJECTION, POWDER, FOR SOLUTION INTRAVENOUS at 20:09

## 2017-01-01 RX ADMIN — HYDROMORPHONE HYDROCHLORIDE 0.5 MG: 1 INJECTION, SOLUTION INTRAMUSCULAR; INTRAVENOUS; SUBCUTANEOUS at 13:42

## 2017-01-01 RX ADMIN — ACETAMINOPHEN 1000 MG: 500 TABLET, FILM COATED ORAL at 20:20

## 2017-01-01 RX ADMIN — THIAMINE HYDROCHLORIDE 250 MG: 100 INJECTION, SOLUTION INTRAMUSCULAR; INTRAVENOUS at 10:08

## 2017-01-01 RX ADMIN — Medication 12.5 MG: at 09:00

## 2017-01-01 RX ADMIN — HYDROMORPHONE HYDROCHLORIDE 0.5 MG: 1 INJECTION, SOLUTION INTRAMUSCULAR; INTRAVENOUS; SUBCUTANEOUS at 23:50

## 2017-01-01 RX ADMIN — Medication 0.5 MG: at 11:41

## 2017-01-01 RX ADMIN — CEFEPIME HYDROCHLORIDE 2 G: 2 INJECTION, POWDER, FOR SOLUTION INTRAVENOUS at 21:21

## 2017-01-01 RX ADMIN — Medication 40 MG: at 08:27

## 2017-01-01 RX ADMIN — SODIUM CHLORIDE, PRESERVATIVE FREE: 5 INJECTION INTRAVENOUS at 10:57

## 2017-01-01 RX ADMIN — OLANZAPINE 5 MG: 5 TABLET, ORALLY DISINTEGRATING ORAL at 20:23

## 2017-01-01 RX ADMIN — SERTRALINE HYDROCHLORIDE 150 MG: 50 TABLET ORAL at 08:57

## 2017-01-01 RX ADMIN — SERTRALINE HYDROCHLORIDE 150 MG: 50 TABLET ORAL at 09:36

## 2017-01-01 RX ADMIN — IPRATROPIUM BROMIDE AND ALBUTEROL SULFATE 3 ML: .5; 3 SOLUTION RESPIRATORY (INHALATION) at 07:34

## 2017-01-01 RX ADMIN — MICAFUNGIN SODIUM 100 MG: 10 INJECTION, POWDER, LYOPHILIZED, FOR SOLUTION INTRAVENOUS at 08:44

## 2017-01-01 RX ADMIN — HYDROMORPHONE HYDROCHLORIDE 0.2 MG: 1 INJECTION, SOLUTION INTRAMUSCULAR; INTRAVENOUS; SUBCUTANEOUS at 08:58

## 2017-01-01 RX ADMIN — HYDRALAZINE HYDROCHLORIDE 25 MG: 25 TABLET ORAL at 09:56

## 2017-01-01 RX ADMIN — ARFORMOTEROL TARTRATE 15 MCG: 15 SOLUTION RESPIRATORY (INHALATION) at 19:41

## 2017-01-01 RX ADMIN — QUETIAPINE FUMARATE 25 MG: 25 TABLET, FILM COATED ORAL at 21:54

## 2017-01-01 RX ADMIN — INSULIN ASPART 1 UNITS: 100 INJECTION, SOLUTION INTRAVENOUS; SUBCUTANEOUS at 16:00

## 2017-01-01 RX ADMIN — QUETIAPINE FUMARATE 25 MG: 25 TABLET, FILM COATED ORAL at 20:52

## 2017-01-01 RX ADMIN — HEPARIN SODIUM 5000 UNITS: 5000 INJECTION, SOLUTION INTRAVENOUS; SUBCUTANEOUS at 14:25

## 2017-01-01 RX ADMIN — IPRATROPIUM BROMIDE AND ALBUTEROL SULFATE 3 ML: .5; 3 SOLUTION RESPIRATORY (INHALATION) at 11:33

## 2017-01-01 RX ADMIN — HYDROMORPHONE HYDROCHLORIDE 0.5 MG: 1 INJECTION, SOLUTION INTRAMUSCULAR; INTRAVENOUS; SUBCUTANEOUS at 20:09

## 2017-01-01 RX ADMIN — POTASSIUM CHLORIDE 20 MEQ: 29.8 INJECTION, SOLUTION INTRAVENOUS at 07:31

## 2017-01-01 RX ADMIN — BACITRACIN: 500 OINTMENT TOPICAL at 18:35

## 2017-01-01 RX ADMIN — SERTRALINE HYDROCHLORIDE 150 MG: 50 TABLET ORAL at 08:26

## 2017-01-01 RX ADMIN — ACETAMINOPHEN 1000 MG: 500 TABLET, FILM COATED ORAL at 13:04

## 2017-01-01 RX ADMIN — NYSTATIN 500000 UNITS: 100000 SUSPENSION ORAL at 19:01

## 2017-01-01 RX ADMIN — BUSPIRONE HYDROCHLORIDE 7.5 MG: 7.5 TABLET ORAL at 21:00

## 2017-01-01 RX ADMIN — ALPRAZOLAM 0.25 MG: 0.25 TABLET ORAL at 21:40

## 2017-01-01 RX ADMIN — PANTOPRAZOLE SODIUM 40 MG: 40 INJECTION, POWDER, FOR SOLUTION INTRAVENOUS at 19:44

## 2017-01-01 RX ADMIN — SODIUM BICARBONATE: 84 INJECTION, SOLUTION INTRAVENOUS at 13:16

## 2017-01-01 RX ADMIN — IPRATROPIUM BROMIDE AND ALBUTEROL SULFATE 3 ML: .5; 3 SOLUTION RESPIRATORY (INHALATION) at 15:48

## 2017-01-01 RX ADMIN — CALCIUM CARBONATE (ANTACID) CHEW TAB 500 MG 500 MG: 500 CHEW TAB at 18:04

## 2017-01-01 RX ADMIN — HEPARIN SODIUM 5000 UNITS: 5000 INJECTION, SOLUTION INTRAVENOUS; SUBCUTANEOUS at 16:30

## 2017-01-01 RX ADMIN — Medication 1 PACKET: at 10:39

## 2017-01-01 RX ADMIN — SODIUM CHLORIDE, POTASSIUM CHLORIDE, SODIUM LACTATE AND CALCIUM CHLORIDE: 600; 310; 30; 20 INJECTION, SOLUTION INTRAVENOUS at 01:46

## 2017-01-01 RX ADMIN — Medication 125 MG: at 19:01

## 2017-01-01 RX ADMIN — OXYCODONE HYDROCHLORIDE 10 MG: 5 TABLET ORAL at 10:48

## 2017-01-01 RX ADMIN — SERTRALINE HYDROCHLORIDE 150 MG: 50 TABLET ORAL at 08:44

## 2017-01-01 RX ADMIN — ALBUMIN (HUMAN) 25 G: 12.5 SOLUTION INTRAVENOUS at 12:31

## 2017-01-01 RX ADMIN — HYDROMORPHONE HYDROCHLORIDE 0.2 MG: 1 INJECTION, SOLUTION INTRAMUSCULAR; INTRAVENOUS; SUBCUTANEOUS at 16:58

## 2017-01-01 RX ADMIN — FUROSEMIDE 20 MG: 10 INJECTION, SOLUTION INTRAVENOUS at 11:19

## 2017-01-01 RX ADMIN — IPRATROPIUM BROMIDE AND ALBUTEROL SULFATE 3 ML: .5; 3 SOLUTION RESPIRATORY (INHALATION) at 15:15

## 2017-01-01 RX ADMIN — LIDOCAINE 1 PATCH: 50 PATCH CUTANEOUS at 09:37

## 2017-01-01 RX ADMIN — HEPARIN SODIUM 5000 UNITS: 5000 INJECTION, SOLUTION INTRAVENOUS; SUBCUTANEOUS at 13:39

## 2017-01-01 RX ADMIN — SERTRALINE HYDROCHLORIDE 50 MG: 50 TABLET ORAL at 08:45

## 2017-01-01 RX ADMIN — ATORVASTATIN CALCIUM 10 MG: 10 TABLET, FILM COATED ORAL at 21:06

## 2017-01-01 RX ADMIN — SODIUM BICARBONATE: 84 INJECTION, SOLUTION INTRAVENOUS at 10:32

## 2017-01-01 RX ADMIN — LISINOPRIL 20 MG: 20 TABLET ORAL at 08:19

## 2017-01-01 RX ADMIN — FUROSEMIDE 40 MG: 10 INJECTION, SOLUTION INTRAVENOUS at 11:15

## 2017-01-01 RX ADMIN — NYSTATIN 500000 UNITS: 100000 SUSPENSION ORAL at 17:17

## 2017-01-01 RX ADMIN — BUDESONIDE 0.5 MG: 0.5 INHALANT RESPIRATORY (INHALATION) at 07:33

## 2017-01-01 RX ADMIN — HYDROMORPHONE HYDROCHLORIDE 0.5 MG: 1 INJECTION, SOLUTION INTRAMUSCULAR; INTRAVENOUS; SUBCUTANEOUS at 14:08

## 2017-01-01 RX ADMIN — IOPAMIDOL 60 ML: 755 INJECTION, SOLUTION INTRAVENOUS at 14:52

## 2017-01-01 RX ADMIN — PANTOPRAZOLE SODIUM 40 MG: 40 INJECTION, POWDER, FOR SOLUTION INTRAVENOUS at 08:12

## 2017-01-01 RX ADMIN — DEXMEDETOMIDINE HYDROCHLORIDE 0.3 MCG/KG/HR: 100 INJECTION, SOLUTION INTRAVENOUS at 13:48

## 2017-01-01 RX ADMIN — MICONAZOLE NITRATE: 2 POWDER TOPICAL at 04:32

## 2017-01-01 RX ADMIN — CHLORHEXIDINE GLUCONATE 15 ML: 1.2 RINSE ORAL at 20:10

## 2017-01-01 RX ADMIN — Medication 125 MG: at 14:51

## 2017-01-01 RX ADMIN — FENTANYL CITRATE 50 MCG: 50 INJECTION, SOLUTION INTRAMUSCULAR; INTRAVENOUS at 11:20

## 2017-01-01 RX ADMIN — FENTANYL CITRATE 25 MCG: 50 INJECTION, SOLUTION INTRAMUSCULAR; INTRAVENOUS at 15:11

## 2017-01-01 RX ADMIN — ALBUMIN (HUMAN) 25 G: 12.5 SOLUTION INTRAVENOUS at 20:06

## 2017-01-01 RX ADMIN — ALBUMIN (HUMAN) 12.5 G: 12.5 SOLUTION INTRAVENOUS at 11:56

## 2017-01-01 RX ADMIN — FENTANYL CITRATE 100 MCG: 50 INJECTION, SOLUTION INTRAMUSCULAR; INTRAVENOUS at 00:27

## 2017-01-01 RX ADMIN — NYSTATIN 500000 UNITS: 100000 SUSPENSION ORAL at 13:06

## 2017-01-01 RX ADMIN — SUGAMMADEX 200 MG: 100 INJECTION, SOLUTION INTRAVENOUS at 17:49

## 2017-01-01 RX ADMIN — MIDAZOLAM HYDROCHLORIDE 2 MG: 1 INJECTION, SOLUTION INTRAMUSCULAR; INTRAVENOUS at 08:41

## 2017-01-01 RX ADMIN — ALPRAZOLAM 0.25 MG: 0.25 TABLET ORAL at 21:41

## 2017-01-01 RX ADMIN — DEXTROSE MONOHYDRATE 25 ML: 25 INJECTION, SOLUTION INTRAVENOUS at 04:47

## 2017-01-01 RX ADMIN — ACETAMINOPHEN 1000 MG: 325 SOLUTION ORAL at 17:17

## 2017-01-01 RX ADMIN — FUROSEMIDE 60 MG: 10 INJECTION, SOLUTION INTRAVENOUS at 23:12

## 2017-01-01 RX ADMIN — ACETAMINOPHEN 650 MG: 650 SUPPOSITORY RECTAL at 02:49

## 2017-01-01 RX ADMIN — PHENYLEPHRINE HYDROCHLORIDE 100 MCG: 10 INJECTION, SOLUTION INTRAMUSCULAR; INTRAVENOUS; SUBCUTANEOUS at 16:52

## 2017-01-01 RX ADMIN — FUROSEMIDE 60 MG: 10 INJECTION, SOLUTION INTRAVENOUS at 07:58

## 2017-01-01 RX ADMIN — VECURONIUM BROMIDE 2 MG: 1 INJECTION, POWDER, LYOPHILIZED, FOR SOLUTION INTRAVENOUS at 17:08

## 2017-01-01 RX ADMIN — METOPROLOL TARTRATE 50 MG: 50 TABLET, FILM COATED ORAL at 08:27

## 2017-01-01 RX ADMIN — HEPARIN SODIUM 5000 UNITS: 5000 INJECTION, SOLUTION INTRAVENOUS; SUBCUTANEOUS at 13:02

## 2017-01-01 RX ADMIN — Medication 125 MG: at 12:54

## 2017-01-01 RX ADMIN — CHLORHEXIDINE GLUCONATE 15 ML: 1.2 RINSE ORAL at 08:40

## 2017-01-01 RX ADMIN — Medication 1 SPRAY: at 23:33

## 2017-01-01 RX ADMIN — MICAFUNGIN SODIUM 100 MG: 10 INJECTION, POWDER, LYOPHILIZED, FOR SOLUTION INTRAVENOUS at 10:04

## 2017-01-01 RX ADMIN — Medication 15 ML: at 08:00

## 2017-01-01 RX ADMIN — ALBUMIN (HUMAN) 12.5 G: 12.5 SOLUTION INTRAVENOUS at 14:44

## 2017-01-01 RX ADMIN — NYSTATIN 500000 UNITS: 100000 SUSPENSION ORAL at 08:18

## 2017-01-01 RX ADMIN — LIDOCAINE HYDROCHLORIDE 40 MG: 10 INJECTION, SOLUTION EPIDURAL; INFILTRATION; INTRACAUDAL; PERINEURAL at 11:10

## 2017-01-01 RX ADMIN — METOPROLOL TARTRATE 50 MG: 50 TABLET, FILM COATED ORAL at 22:15

## 2017-01-01 RX ADMIN — HYDROMORPHONE HYDROCHLORIDE 0.5 MG: 1 INJECTION, SOLUTION INTRAMUSCULAR; INTRAVENOUS; SUBCUTANEOUS at 18:18

## 2017-01-01 RX ADMIN — ACETAMINOPHEN 650 MG: 650 SUPPOSITORY RECTAL at 09:55

## 2017-01-01 RX ADMIN — Medication 125 MG: at 08:26

## 2017-01-01 RX ADMIN — HEPARIN SODIUM 5000 UNITS: 5000 INJECTION, SOLUTION INTRAVENOUS; SUBCUTANEOUS at 04:24

## 2017-01-01 RX ADMIN — VITAMIN D, TAB 1000IU (100/BT) 1000 UNITS: 25 TAB at 09:20

## 2017-01-01 RX ADMIN — Medication 125 MG: at 21:44

## 2017-01-01 RX ADMIN — ARFORMOTEROL TARTRATE 15 MCG: 15 SOLUTION RESPIRATORY (INHALATION) at 07:53

## 2017-01-01 RX ADMIN — SODIUM CHLORIDE: 9 INJECTION, SOLUTION INTRAVENOUS at 16:35

## 2017-01-01 RX ADMIN — SERTRALINE HYDROCHLORIDE 50 MG: 50 TABLET ORAL at 09:25

## 2017-01-01 RX ADMIN — ERTAPENEM SODIUM 1 G: 1 INJECTION, POWDER, LYOPHILIZED, FOR SOLUTION INTRAMUSCULAR; INTRAVENOUS at 09:25

## 2017-01-01 RX ADMIN — MAGNESIUM SULFATE IN WATER 4 G: 40 INJECTION, SOLUTION INTRAVENOUS at 08:17

## 2017-01-01 RX ADMIN — HEPARIN SODIUM 5000 UNITS: 5000 INJECTION, SOLUTION INTRAVENOUS; SUBCUTANEOUS at 05:03

## 2017-01-01 RX ADMIN — ROCURONIUM BROMIDE 30 MG: 10 INJECTION INTRAVENOUS at 16:22

## 2017-01-01 RX ADMIN — SODIUM CHLORIDE, PRESERVATIVE FREE: 5 INJECTION INTRAVENOUS at 06:10

## 2017-01-01 RX ADMIN — IPRATROPIUM BROMIDE AND ALBUTEROL SULFATE 3 ML: .5; 3 SOLUTION RESPIRATORY (INHALATION) at 20:08

## 2017-01-01 RX ADMIN — CHLORHEXIDINE GLUCONATE 15 ML: 1.2 RINSE ORAL at 08:16

## 2017-01-01 RX ADMIN — FUROSEMIDE 40 MG: 10 INJECTION, SOLUTION INTRAVENOUS at 08:25

## 2017-01-01 RX ADMIN — POTASSIUM CHLORIDE 20 MEQ: 1.5 POWDER, FOR SOLUTION ORAL at 20:54

## 2017-01-01 RX ADMIN — Medication 200 MG: at 08:59

## 2017-01-01 RX ADMIN — Medication 40 MG: at 08:55

## 2017-01-01 RX ADMIN — HYDRALAZINE HYDROCHLORIDE 25 MG: 25 TABLET ORAL at 14:31

## 2017-01-01 RX ADMIN — HEPARIN SODIUM 5000 UNITS: 5000 INJECTION, SOLUTION INTRAVENOUS; SUBCUTANEOUS at 12:10

## 2017-01-01 RX ADMIN — FENTANYL CITRATE 50 MCG: 50 INJECTION, SOLUTION INTRAMUSCULAR; INTRAVENOUS at 16:39

## 2017-01-01 RX ADMIN — SIMVASTATIN 10 MG: 10 TABLET, FILM COATED ORAL at 08:45

## 2017-01-01 RX ADMIN — BACITRACIN: 500 OINTMENT TOPICAL at 10:13

## 2017-01-01 RX ADMIN — OLANZAPINE 5 MG: 5 TABLET, ORALLY DISINTEGRATING ORAL at 21:23

## 2017-01-01 RX ADMIN — NYSTATIN 500000 UNITS: 100000 SUSPENSION ORAL at 09:56

## 2017-01-01 RX ADMIN — NYSTATIN 500000 UNITS: 100000 SUSPENSION ORAL at 09:19

## 2017-01-01 RX ADMIN — NYSTATIN 500000 UNITS: 100000 SUSPENSION ORAL at 15:03

## 2017-01-01 RX ADMIN — DEXTROSE MONOHYDRATE 25 ML: 500 INJECTION PARENTERAL at 11:20

## 2017-01-01 RX ADMIN — DEXMEDETOMIDINE HYDROCHLORIDE 0.7 MCG/KG/HR: 100 INJECTION, SOLUTION INTRAVENOUS at 04:07

## 2017-01-01 RX ADMIN — BUDESONIDE 1 MG: 0.5 INHALANT RESPIRATORY (INHALATION) at 11:54

## 2017-01-01 RX ADMIN — THIAMINE HYDROCHLORIDE 250 MG: 100 INJECTION, SOLUTION INTRAMUSCULAR; INTRAVENOUS at 08:48

## 2017-01-01 RX ADMIN — BACITRACIN: 500 OINTMENT TOPICAL at 22:13

## 2017-01-01 RX ADMIN — NYSTATIN 500000 UNITS: 100000 SUSPENSION ORAL at 22:38

## 2017-01-01 RX ADMIN — HYDROMORPHONE HYDROCHLORIDE: 10 INJECTION, SOLUTION INTRAMUSCULAR; INTRAVENOUS; SUBCUTANEOUS at 10:56

## 2017-01-01 RX ADMIN — CEFEPIME HYDROCHLORIDE 2 G: 2 INJECTION, POWDER, FOR SOLUTION INTRAVENOUS at 06:07

## 2017-01-01 RX ADMIN — HEPARIN SODIUM 5000 UNITS: 5000 INJECTION, SOLUTION INTRAVENOUS; SUBCUTANEOUS at 06:07

## 2017-01-01 RX ADMIN — HEPARIN SODIUM 5000 UNITS: 5000 INJECTION, SOLUTION INTRAVENOUS; SUBCUTANEOUS at 14:46

## 2017-01-01 RX ADMIN — HYDROMORPHONE HYDROCHLORIDE 0.3 MG: 1 INJECTION, SOLUTION INTRAMUSCULAR; INTRAVENOUS; SUBCUTANEOUS at 06:16

## 2017-01-01 RX ADMIN — SERTRALINE HYDROCHLORIDE 50 MG: 50 TABLET ORAL at 09:12

## 2017-01-01 RX ADMIN — SODIUM CHLORIDE: 9 INJECTION, SOLUTION INTRAVENOUS at 14:00

## 2017-01-01 RX ADMIN — QUETIAPINE FUMARATE 25 MG: 25 TABLET, FILM COATED ORAL at 21:45

## 2017-01-01 RX ADMIN — HEPARIN SODIUM 5000 UNITS: 5000 INJECTION, SOLUTION INTRAVENOUS; SUBCUTANEOUS at 21:50

## 2017-01-01 RX ADMIN — ARFORMOTEROL TARTRATE 15 MCG: 15 SOLUTION RESPIRATORY (INHALATION) at 07:41

## 2017-01-01 RX ADMIN — SODIUM CHLORIDE, PRESERVATIVE FREE: 5 INJECTION INTRAVENOUS at 20:21

## 2017-01-01 RX ADMIN — DIPHENHYDRAMINE HYDROCHLORIDE 25 MG: 50 INJECTION, SOLUTION INTRAMUSCULAR; INTRAVENOUS at 00:58

## 2017-01-01 RX ADMIN — ALBUMIN (HUMAN) 12.5 G: 12.5 SOLUTION INTRAVENOUS at 12:57

## 2017-01-01 RX ADMIN — OXYCODONE HYDROCHLORIDE 2.5 MG: 5 TABLET ORAL at 10:23

## 2017-01-01 RX ADMIN — Medication 200 MG: at 08:40

## 2017-01-01 RX ADMIN — HYDRALAZINE HYDROCHLORIDE 25 MG: 25 TABLET ORAL at 08:04

## 2017-01-01 RX ADMIN — HEPARIN SODIUM 5000 UNITS: 5000 INJECTION, SOLUTION INTRAVENOUS; SUBCUTANEOUS at 20:56

## 2017-01-01 RX ADMIN — ALBUMIN (HUMAN) 25 G: 12.5 SOLUTION INTRAVENOUS at 09:11

## 2017-01-01 RX ADMIN — BUDESONIDE 1 MG: 0.5 INHALANT RESPIRATORY (INHALATION) at 07:57

## 2017-01-01 RX ADMIN — CALCIUM CARBONATE (ANTACID) CHEW TAB 500 MG 500 MG: 500 CHEW TAB at 21:06

## 2017-01-01 RX ADMIN — BUSPIRONE HYDROCHLORIDE 7.5 MG: 7.5 TABLET ORAL at 20:52

## 2017-01-01 RX ADMIN — HYDROMORPHONE HYDROCHLORIDE 0.5 MG: 1 INJECTION, SOLUTION INTRAMUSCULAR; INTRAVENOUS; SUBCUTANEOUS at 04:50

## 2017-01-01 RX ADMIN — POTASSIUM CHLORIDE 20 MEQ: 29.8 INJECTION, SOLUTION INTRAVENOUS at 22:27

## 2017-01-01 RX ADMIN — POTASSIUM CHLORIDE 20 MEQ: 29.8 INJECTION, SOLUTION INTRAVENOUS at 09:35

## 2017-01-01 RX ADMIN — Medication 125 MG: at 08:04

## 2017-01-01 RX ADMIN — ACETAMINOPHEN 1000 MG: 325 SOLUTION ORAL at 08:12

## 2017-01-01 RX ADMIN — HEPARIN SODIUM 5000 UNITS: 5000 INJECTION, SOLUTION INTRAVENOUS; SUBCUTANEOUS at 08:51

## 2017-01-01 RX ADMIN — Medication 1 PACKET: at 21:10

## 2017-01-01 RX ADMIN — ONDANSETRON 4 MG: 2 SOLUTION INTRAMUSCULAR; INTRAVENOUS at 19:02

## 2017-01-01 RX ADMIN — CHLORHEXIDINE GLUCONATE 15 ML: 1.2 RINSE ORAL at 08:52

## 2017-01-01 RX ADMIN — PANTOPRAZOLE SODIUM 40 MG: 40 INJECTION, POWDER, FOR SOLUTION INTRAVENOUS at 08:15

## 2017-01-01 RX ADMIN — MEROPENEM 1 G: 1 INJECTION, POWDER, FOR SOLUTION INTRAVENOUS at 22:13

## 2017-01-01 RX ADMIN — MICONAZOLE NITRATE: 2 POWDER TOPICAL at 16:59

## 2017-01-01 RX ADMIN — MIRTAZAPINE 22.5 MG: 7.5 TABLET, FILM COATED ORAL at 22:12

## 2017-01-01 RX ADMIN — Medication 125 MG: at 18:06

## 2017-01-01 RX ADMIN — ACETAMINOPHEN 1000 MG: 500 TABLET, FILM COATED ORAL at 03:15

## 2017-01-01 RX ADMIN — MICAFUNGIN SODIUM 100 MG: 10 INJECTION, POWDER, LYOPHILIZED, FOR SOLUTION INTRAVENOUS at 13:06

## 2017-01-01 RX ADMIN — ALBUMIN (HUMAN): 12.5 SOLUTION INTRAVENOUS at 17:52

## 2017-01-01 RX ADMIN — MICAFUNGIN SODIUM 100 MG: 10 INJECTION, POWDER, LYOPHILIZED, FOR SOLUTION INTRAVENOUS at 08:14

## 2017-01-01 RX ADMIN — ACETAMINOPHEN 1000 MG: 325 SOLUTION ORAL at 01:27

## 2017-01-01 RX ADMIN — LISINOPRIL 20 MG: 20 TABLET ORAL at 09:57

## 2017-01-01 RX ADMIN — BUSPIRONE HYDROCHLORIDE 7.5 MG: 7.5 TABLET ORAL at 08:26

## 2017-01-01 RX ADMIN — Medication 125 MG: at 18:47

## 2017-01-01 RX ADMIN — PROPOFOL 30 MG: 10 INJECTION, EMULSION INTRAVENOUS at 12:32

## 2017-01-01 RX ADMIN — BUDESONIDE 1 MG: 0.5 INHALANT RESPIRATORY (INHALATION) at 20:18

## 2017-01-01 RX ADMIN — MIDAZOLAM HYDROCHLORIDE 2 MG: 1 INJECTION, SOLUTION INTRAMUSCULAR; INTRAVENOUS at 13:29

## 2017-01-01 RX ADMIN — MIDAZOLAM HYDROCHLORIDE 2 MG: 1 INJECTION, SOLUTION INTRAMUSCULAR; INTRAVENOUS at 20:26

## 2017-01-01 RX ADMIN — ALBUMIN (HUMAN) 25 G: 12.5 SOLUTION INTRAVENOUS at 19:51

## 2017-01-01 RX ADMIN — HYDRALAZINE HYDROCHLORIDE 25 MG: 25 TABLET ORAL at 19:58

## 2017-01-01 RX ADMIN — SERTRALINE HYDROCHLORIDE 150 MG: 50 TABLET ORAL at 08:18

## 2017-01-01 RX ADMIN — ALPRAZOLAM 0.25 MG: 0.25 TABLET ORAL at 21:44

## 2017-01-01 RX ADMIN — HYDROMORPHONE HYDROCHLORIDE 0.3 MG: 1 INJECTION, SOLUTION INTRAMUSCULAR; INTRAVENOUS; SUBCUTANEOUS at 20:53

## 2017-01-01 RX ADMIN — HEPARIN SODIUM 5000 UNITS: 5000 INJECTION, SOLUTION INTRAVENOUS; SUBCUTANEOUS at 07:49

## 2017-01-01 RX ADMIN — VITAMIN D, TAB 1000IU (100/BT) 1000 UNITS: 25 TAB at 08:25

## 2017-01-01 RX ADMIN — FUROSEMIDE 60 MG: 10 INJECTION, SOLUTION INTRAVENOUS at 22:23

## 2017-01-01 RX ADMIN — HYDROMORPHONE HYDROCHLORIDE 0.2 MG: 1 INJECTION, SOLUTION INTRAMUSCULAR; INTRAVENOUS; SUBCUTANEOUS at 15:06

## 2017-01-01 RX ADMIN — ALPRAZOLAM 0.25 MG: 0.25 TABLET ORAL at 22:32

## 2017-01-01 RX ADMIN — BUDESONIDE 1 MG: 0.5 INHALANT RESPIRATORY (INHALATION) at 07:30

## 2017-01-01 RX ADMIN — MIDAZOLAM HYDROCHLORIDE 2 MG: 1 INJECTION, SOLUTION INTRAMUSCULAR; INTRAVENOUS at 22:29

## 2017-01-01 RX ADMIN — MEROPENEM 1 G: 1 INJECTION, POWDER, FOR SOLUTION INTRAVENOUS at 22:32

## 2017-01-01 RX ADMIN — HEPARIN SODIUM 5000 UNITS: 5000 INJECTION, SOLUTION INTRAVENOUS; SUBCUTANEOUS at 15:50

## 2017-01-01 RX ADMIN — SODIUM BICARBONATE: 84 INJECTION, SOLUTION INTRAVENOUS at 23:39

## 2017-01-01 RX ADMIN — HEPARIN SODIUM 5000 UNITS: 5000 INJECTION, SOLUTION INTRAVENOUS; SUBCUTANEOUS at 13:25

## 2017-01-01 RX ADMIN — Medication 125 MG: at 18:08

## 2017-01-01 RX ADMIN — MIDAZOLAM HYDROCHLORIDE 2 MG: 1 INJECTION, SOLUTION INTRAMUSCULAR; INTRAVENOUS at 01:30

## 2017-01-01 RX ADMIN — NYSTATIN 500000 UNITS: 100000 SUSPENSION ORAL at 18:10

## 2017-01-01 RX ADMIN — MIDAZOLAM HYDROCHLORIDE 2 MG: 1 INJECTION, SOLUTION INTRAMUSCULAR; INTRAVENOUS at 16:16

## 2017-01-01 RX ADMIN — SODIUM CHLORIDE: 9 INJECTION, SOLUTION INTRAVENOUS at 01:34

## 2017-01-01 RX ADMIN — QUETIAPINE FUMARATE 25 MG: 25 TABLET, FILM COATED ORAL at 21:35

## 2017-01-01 RX ADMIN — NYSTATIN 500000 UNITS: 100000 SUSPENSION ORAL at 21:00

## 2017-01-01 RX ADMIN — Medication 1 PACKET: at 20:38

## 2017-01-01 RX ADMIN — BUDESONIDE 0.5 MG: 0.5 INHALANT RESPIRATORY (INHALATION) at 19:42

## 2017-01-01 RX ADMIN — HEPARIN SODIUM 5000 UNITS: 5000 INJECTION, SOLUTION INTRAVENOUS; SUBCUTANEOUS at 01:46

## 2017-01-01 RX ADMIN — IPRATROPIUM BROMIDE AND ALBUTEROL SULFATE 3 ML: .5; 3 SOLUTION RESPIRATORY (INHALATION) at 08:27

## 2017-01-01 RX ADMIN — ALPRAZOLAM 0.25 MG: 0.25 TABLET ORAL at 16:09

## 2017-01-01 RX ADMIN — MIRTAZAPINE 22.5 MG: 7.5 TABLET, FILM COATED ORAL at 21:16

## 2017-01-01 RX ADMIN — HYDROMORPHONE HYDROCHLORIDE 0.5 MG: 1 INJECTION, SOLUTION INTRAMUSCULAR; INTRAVENOUS; SUBCUTANEOUS at 11:18

## 2017-01-01 RX ADMIN — MEROPENEM 1 G: 1 INJECTION, POWDER, FOR SOLUTION INTRAVENOUS at 14:56

## 2017-01-01 RX ADMIN — SODIUM CHLORIDE: 9 INJECTION, SOLUTION INTRAVENOUS at 19:34

## 2017-01-01 RX ADMIN — FLUCONAZOLE 400 MG: 2 INJECTION, SOLUTION INTRAVENOUS at 18:10

## 2017-01-01 RX ADMIN — ALBUMIN (HUMAN) 25 G: 12.5 SOLUTION INTRAVENOUS at 08:29

## 2017-01-01 RX ADMIN — SODIUM CHLORIDE: 9 INJECTION, SOLUTION INTRAVENOUS at 16:24

## 2017-01-01 RX ADMIN — ACETAMINOPHEN 975 MG: 325 TABLET, FILM COATED ORAL at 06:47

## 2017-01-01 RX ADMIN — SODIUM BICARBONATE: 84 INJECTION, SOLUTION INTRAVENOUS at 08:23

## 2017-01-01 RX ADMIN — CHLORHEXIDINE GLUCONATE 15 ML: 1.2 RINSE ORAL at 09:42

## 2017-01-01 RX ADMIN — TRAZODONE HYDROCHLORIDE 50 MG: 50 TABLET ORAL at 02:17

## 2017-01-01 RX ADMIN — Medication 2.5 MG: at 12:22

## 2017-01-01 RX ADMIN — Medication 15 ML: at 09:09

## 2017-01-01 RX ADMIN — FENTANYL CITRATE 100 MCG: 50 INJECTION, SOLUTION INTRAMUSCULAR; INTRAVENOUS at 05:44

## 2017-01-01 RX ADMIN — HUMAN ALBUMIN MICROSPHERES AND PERFLUTREN 2 ML: 10; .22 INJECTION, SOLUTION INTRAVENOUS at 15:10

## 2017-01-01 RX ADMIN — CHLORHEXIDINE GLUCONATE 15 ML: 1.2 RINSE ORAL at 19:58

## 2017-01-01 RX ADMIN — SIMVASTATIN 10 MG: 10 TABLET, FILM COATED ORAL at 21:03

## 2017-01-01 RX ADMIN — FUROSEMIDE 60 MG: 10 INJECTION, SOLUTION INTRAVENOUS at 14:58

## 2017-01-01 RX ADMIN — ERTAPENEM SODIUM 1 G: 1 INJECTION, POWDER, LYOPHILIZED, FOR SOLUTION INTRAMUSCULAR; INTRAVENOUS at 09:28

## 2017-01-01 RX ADMIN — IPRATROPIUM BROMIDE AND ALBUTEROL SULFATE 3 ML: .5; 3 SOLUTION RESPIRATORY (INHALATION) at 07:42

## 2017-01-01 RX ADMIN — POTASSIUM PHOSPHATE, MONOBASIC AND POTASSIUM PHOSPHATE, DIBASIC 20 MMOL: 224; 236 INJECTION, SOLUTION INTRAVENOUS at 07:16

## 2017-01-01 RX ADMIN — MICAFUNGIN SODIUM 100 MG: 10 INJECTION, POWDER, LYOPHILIZED, FOR SOLUTION INTRAVENOUS at 07:42

## 2017-01-01 RX ADMIN — DIPHENHYDRAMINE HYDROCHLORIDE 25 MG: 50 INJECTION, SOLUTION INTRAMUSCULAR; INTRAVENOUS at 04:15

## 2017-01-01 RX ADMIN — ATORVASTATIN CALCIUM 10 MG: 10 TABLET, FILM COATED ORAL at 20:51

## 2017-01-01 RX ADMIN — PANTOPRAZOLE SODIUM 40 MG: 40 INJECTION, POWDER, FOR SOLUTION INTRAVENOUS at 21:48

## 2017-01-01 RX ADMIN — HYDRALAZINE HYDROCHLORIDE 20 MG: 20 INJECTION INTRAMUSCULAR; INTRAVENOUS at 15:11

## 2017-01-01 RX ADMIN — ATORVASTATIN CALCIUM 10 MG: 10 TABLET, FILM COATED ORAL at 21:11

## 2017-01-01 RX ADMIN — DIATRIZOATE MEGLUMINE AND DIATRIZOATE SODIUM 150 ML: 660; 100 SOLUTION ORAL; RECTAL at 14:35

## 2017-01-01 RX ADMIN — FUROSEMIDE 40 MG: 10 INJECTION, SOLUTION INTRAVENOUS at 21:43

## 2017-01-01 RX ADMIN — IPRATROPIUM BROMIDE AND ALBUTEROL SULFATE 3 ML: .5; 3 SOLUTION RESPIRATORY (INHALATION) at 07:05

## 2017-01-01 RX ADMIN — DEXTROSE MONOHYDRATE 50 ML: 500 INJECTION PARENTERAL at 13:25

## 2017-01-01 RX ADMIN — ALPRAZOLAM 0.25 MG: 0.25 TABLET ORAL at 09:32

## 2017-01-01 RX ADMIN — FUROSEMIDE 40 MG: 10 INJECTION, SOLUTION INTRAVENOUS at 19:38

## 2017-01-01 RX ADMIN — IPRATROPIUM BROMIDE AND ALBUTEROL SULFATE 3 ML: .5; 3 SOLUTION RESPIRATORY (INHALATION) at 19:15

## 2017-01-01 RX ADMIN — IOPAMIDOL 45 ML: 612 INJECTION, SOLUTION INTRAVENOUS at 11:20

## 2017-01-01 RX ADMIN — BACITRACIN: 500 OINTMENT TOPICAL at 09:07

## 2017-01-01 RX ADMIN — HYDROMORPHONE HYDROCHLORIDE 0.5 MG: 1 INJECTION, SOLUTION INTRAMUSCULAR; INTRAVENOUS; SUBCUTANEOUS at 12:08

## 2017-01-01 RX ADMIN — ACETAMINOPHEN 1000 MG: 325 SOLUTION ORAL at 15:50

## 2017-01-01 RX ADMIN — FENTANYL CITRATE 100 MCG: 50 INJECTION, SOLUTION INTRAMUSCULAR; INTRAVENOUS at 17:17

## 2017-01-01 RX ADMIN — MICAFUNGIN SODIUM 100 MG: 10 INJECTION, POWDER, LYOPHILIZED, FOR SOLUTION INTRAVENOUS at 11:53

## 2017-01-01 RX ADMIN — ATORVASTATIN CALCIUM 10 MG: 10 TABLET, FILM COATED ORAL at 20:14

## 2017-01-01 RX ADMIN — HYDROMORPHONE HYDROCHLORIDE 0.2 MG: 1 INJECTION, SOLUTION INTRAMUSCULAR; INTRAVENOUS; SUBCUTANEOUS at 09:25

## 2017-01-01 RX ADMIN — FENTANYL CITRATE 25 MCG: 50 INJECTION, SOLUTION INTRAMUSCULAR; INTRAVENOUS at 15:06

## 2017-01-01 RX ADMIN — HYDROCORTISONE SODIUM SUCCINATE 300 MG: 100 INJECTION, POWDER, FOR SOLUTION INTRAMUSCULAR; INTRAVENOUS at 12:59

## 2017-01-01 RX ADMIN — ACETAMINOPHEN 1000 MG: 500 TABLET, FILM COATED ORAL at 12:20

## 2017-01-01 RX ADMIN — DAPTOMYCIN 400 MG: 500 INJECTION, POWDER, LYOPHILIZED, FOR SOLUTION INTRAVENOUS at 05:29

## 2017-01-01 RX ADMIN — DIPHENHYDRAMINE HYDROCHLORIDE 25 MG: 50 INJECTION, SOLUTION INTRAMUSCULAR; INTRAVENOUS at 21:29

## 2017-01-01 RX ADMIN — FENTANYL CITRATE 100 MCG: 50 INJECTION, SOLUTION INTRAMUSCULAR; INTRAVENOUS at 00:20

## 2017-01-01 RX ADMIN — SODIUM CHLORIDE: 9 INJECTION, SOLUTION INTRAVENOUS at 21:00

## 2017-01-01 RX ADMIN — MICAFUNGIN SODIUM 100 MG: 10 INJECTION, POWDER, LYOPHILIZED, FOR SOLUTION INTRAVENOUS at 08:37

## 2017-01-01 RX ADMIN — POTASSIUM CHLORIDE: 2 INJECTION, SOLUTION, CONCENTRATE INTRAVENOUS at 20:44

## 2017-01-01 RX ADMIN — LISINOPRIL 20 MG: 20 TABLET ORAL at 08:54

## 2017-01-01 RX ADMIN — VITAMIN D, TAB 1000IU (100/BT) 1000 UNITS: 25 TAB at 08:26

## 2017-01-01 RX ADMIN — ALPRAZOLAM 0.25 MG: 0.25 TABLET ORAL at 08:54

## 2017-01-01 RX ADMIN — SERTRALINE HYDROCHLORIDE 150 MG: 50 TABLET ORAL at 08:05

## 2017-01-01 RX ADMIN — FUROSEMIDE 80 MG: 10 INJECTION, SOLUTION INTRAVENOUS at 08:54

## 2017-01-01 RX ADMIN — HYDROMORPHONE HYDROCHLORIDE 0.5 MG: 1 INJECTION, SOLUTION INTRAMUSCULAR; INTRAVENOUS; SUBCUTANEOUS at 09:09

## 2017-01-01 RX ADMIN — FENTANYL CITRATE 100 MCG: 50 INJECTION, SOLUTION INTRAMUSCULAR; INTRAVENOUS at 12:19

## 2017-01-01 RX ADMIN — ALBUMIN (HUMAN) 25 G: 12.5 SOLUTION INTRAVENOUS at 08:46

## 2017-01-01 RX ADMIN — ACETAMINOPHEN 1000 MG: 500 TABLET, FILM COATED ORAL at 04:30

## 2017-01-01 RX ADMIN — PROCHLORPERAZINE EDISYLATE 5 MG: 5 INJECTION INTRAMUSCULAR; INTRAVENOUS at 21:41

## 2017-01-01 RX ADMIN — Medication 1 MG: at 09:31

## 2017-01-01 RX ADMIN — CHLORHEXIDINE GLUCONATE 15 ML: 1.2 RINSE ORAL at 20:27

## 2017-01-01 RX ADMIN — Medication 2.5 MG: at 01:31

## 2017-01-01 RX ADMIN — PROPOFOL 40 MCG/KG/MIN: 10 INJECTION, EMULSION INTRAVENOUS at 01:16

## 2017-01-01 RX ADMIN — MEROPENEM 1 G: 1 INJECTION, POWDER, FOR SOLUTION INTRAVENOUS at 14:55

## 2017-01-01 RX ADMIN — SODIUM CHLORIDE 1000 ML: 9 INJECTION, SOLUTION INTRAVENOUS at 08:19

## 2017-01-01 RX ADMIN — FENTANYL CITRATE 100 MCG: 50 INJECTION, SOLUTION INTRAMUSCULAR; INTRAVENOUS at 15:47

## 2017-01-01 RX ADMIN — KETAMINE HYDROCHLORIDE 20 MG: 10 INJECTION INTRAMUSCULAR; INTRAVENOUS at 14:09

## 2017-01-01 RX ADMIN — DEXMEDETOMIDINE HYDROCHLORIDE 12 MCG: 100 INJECTION, SOLUTION INTRAVENOUS at 15:00

## 2017-01-01 RX ADMIN — Medication 15 ML: at 20:48

## 2017-01-01 RX ADMIN — INSULIN ASPART 1 UNITS: 100 INJECTION, SOLUTION INTRAVENOUS; SUBCUTANEOUS at 00:21

## 2017-01-01 RX ADMIN — CHLORHEXIDINE GLUCONATE 15 ML: 1.2 RINSE ORAL at 08:59

## 2017-01-01 RX ADMIN — FENTANYL CITRATE 100 MCG: 50 INJECTION, SOLUTION INTRAMUSCULAR; INTRAVENOUS at 08:41

## 2017-01-01 RX ADMIN — BUDESONIDE 1 MG: 0.5 INHALANT RESPIRATORY (INHALATION) at 19:11

## 2017-01-01 RX ADMIN — POTASSIUM CHLORIDE: 2 INJECTION, SOLUTION, CONCENTRATE INTRAVENOUS at 21:16

## 2017-01-01 RX ADMIN — HEPARIN SODIUM 5000 UNITS: 5000 INJECTION, SOLUTION INTRAVENOUS; SUBCUTANEOUS at 14:37

## 2017-01-01 RX ADMIN — PHENYLEPHRINE HYDROCHLORIDE 150 MCG: 10 INJECTION, SOLUTION INTRAMUSCULAR; INTRAVENOUS; SUBCUTANEOUS at 12:39

## 2017-01-01 RX ADMIN — Medication 125 MG: at 05:29

## 2017-01-01 RX ADMIN — OXYCODONE HYDROCHLORIDE 5 MG: 5 TABLET ORAL at 18:07

## 2017-01-01 RX ADMIN — POTASSIUM CHLORIDE: 2 INJECTION, SOLUTION, CONCENTRATE INTRAVENOUS at 20:24

## 2017-01-01 RX ADMIN — BUDESONIDE 0.5 MG: 0.5 INHALANT RESPIRATORY (INHALATION) at 07:35

## 2017-01-01 RX ADMIN — ACETAMINOPHEN 1000 MG: 500 TABLET, FILM COATED ORAL at 04:05

## 2017-01-01 RX ADMIN — SODIUM CHLORIDE, PRESERVATIVE FREE: 5 INJECTION INTRAVENOUS at 06:33

## 2017-01-01 RX ADMIN — HEPARIN SODIUM 5000 UNITS: 5000 INJECTION, SOLUTION INTRAVENOUS; SUBCUTANEOUS at 21:16

## 2017-01-01 RX ADMIN — ALPRAZOLAM 0.25 MG: 0.25 TABLET ORAL at 15:43

## 2017-01-01 RX ADMIN — Medication 5 MG: at 12:39

## 2017-01-01 RX ADMIN — MEROPENEM 1 G: 1 INJECTION, POWDER, FOR SOLUTION INTRAVENOUS at 14:37

## 2017-01-01 RX ADMIN — FENTANYL CITRATE 50 MCG: 50 INJECTION, SOLUTION INTRAMUSCULAR; INTRAVENOUS at 18:10

## 2017-01-01 RX ADMIN — CHLORHEXIDINE GLUCONATE 15 ML: 1.2 RINSE ORAL at 09:36

## 2017-01-01 RX ADMIN — MIRTAZAPINE 22.5 MG: 7.5 TABLET, FILM COATED ORAL at 22:32

## 2017-01-01 RX ADMIN — PANTOPRAZOLE SODIUM 40 MG: 40 INJECTION, POWDER, FOR SOLUTION INTRAVENOUS at 20:36

## 2017-01-01 RX ADMIN — HYDROMORPHONE HYDROCHLORIDE: 10 INJECTION, SOLUTION INTRAMUSCULAR; INTRAVENOUS; SUBCUTANEOUS at 10:22

## 2017-01-01 RX ADMIN — OLANZAPINE 5 MG: 5 TABLET, ORALLY DISINTEGRATING ORAL at 14:25

## 2017-01-01 RX ADMIN — ALPRAZOLAM 0.25 MG: 0.25 TABLET ORAL at 09:19

## 2017-01-01 RX ADMIN — ARFORMOTEROL TARTRATE 15 MCG: 15 SOLUTION RESPIRATORY (INHALATION) at 07:03

## 2017-01-01 RX ADMIN — ALBUMIN (HUMAN) 50 G: 12.5 SOLUTION INTRAVENOUS at 20:06

## 2017-01-01 RX ADMIN — SERTRALINE HYDROCHLORIDE 150 MG: 50 TABLET ORAL at 08:27

## 2017-01-01 RX ADMIN — ALPRAZOLAM 0.25 MG: 0.25 TABLET ORAL at 22:15

## 2017-01-01 RX ADMIN — ARFORMOTEROL TARTRATE 15 MCG: 15 SOLUTION RESPIRATORY (INHALATION) at 07:18

## 2017-01-01 RX ADMIN — CIPROFLOXACIN 400 MG: 2 INJECTION, SOLUTION INTRAVENOUS at 02:24

## 2017-01-01 RX ADMIN — SERTRALINE HYDROCHLORIDE 150 MG: 50 TABLET ORAL at 09:19

## 2017-01-01 RX ADMIN — SODIUM CHLORIDE, PRESERVATIVE FREE: 5 INJECTION INTRAVENOUS at 07:54

## 2017-01-01 RX ADMIN — FENTANYL CITRATE 100 MCG: 50 INJECTION, SOLUTION INTRAMUSCULAR; INTRAVENOUS at 14:55

## 2017-01-01 RX ADMIN — MICAFUNGIN SODIUM 100 MG: 10 INJECTION, POWDER, LYOPHILIZED, FOR SOLUTION INTRAVENOUS at 08:17

## 2017-01-01 RX ADMIN — BACITRACIN: 500 OINTMENT TOPICAL at 21:28

## 2017-01-01 RX ADMIN — Medication 40 MG: at 21:44

## 2017-01-01 RX ADMIN — CALCIUM CARBONATE (ANTACID) CHEW TAB 500 MG 500 MG: 500 CHEW TAB at 09:31

## 2017-01-01 RX ADMIN — Medication 125 MG: at 22:25

## 2017-01-01 RX ADMIN — FENTANYL CITRATE 50 MCG: 50 INJECTION, SOLUTION INTRAMUSCULAR; INTRAVENOUS at 10:40

## 2017-01-01 RX ADMIN — PANTOPRAZOLE SODIUM 40 MG: 40 INJECTION, POWDER, FOR SOLUTION INTRAVENOUS at 19:57

## 2017-01-01 RX ADMIN — BUDESONIDE 0.5 MG: 0.5 INHALANT RESPIRATORY (INHALATION) at 07:27

## 2017-01-01 RX ADMIN — LEVOFLOXACIN 250 MG: 5 INJECTION, SOLUTION INTRAVENOUS at 14:48

## 2017-01-01 RX ADMIN — Medication 12.5 MG: at 09:40

## 2017-01-01 RX ADMIN — NYSTATIN 500000 UNITS: 100000 SUSPENSION ORAL at 18:06

## 2017-01-01 RX ADMIN — IPRATROPIUM BROMIDE AND ALBUTEROL SULFATE 3 ML: .5; 3 SOLUTION RESPIRATORY (INHALATION) at 13:29

## 2017-01-01 RX ADMIN — ACETAMINOPHEN 1000 MG: 500 TABLET, FILM COATED ORAL at 04:15

## 2017-01-01 RX ADMIN — HYDROMORPHONE HYDROCHLORIDE 0.3 MG: 1 INJECTION, SOLUTION INTRAMUSCULAR; INTRAVENOUS; SUBCUTANEOUS at 14:26

## 2017-01-01 RX ADMIN — PANTOPRAZOLE SODIUM 40 MG: 40 INJECTION, POWDER, FOR SOLUTION INTRAVENOUS at 19:38

## 2017-01-01 RX ADMIN — ONDANSETRON 4 MG: 2 SOLUTION INTRAMUSCULAR; INTRAVENOUS at 12:25

## 2017-01-01 RX ADMIN — ACETAMINOPHEN 1000 MG: 325 SOLUTION ORAL at 09:42

## 2017-01-01 RX ADMIN — CALCIUM CARBONATE (ANTACID) CHEW TAB 500 MG 500 MG: 500 CHEW TAB at 21:30

## 2017-01-01 RX ADMIN — LISINOPRIL 20 MG: 20 TABLET ORAL at 09:36

## 2017-01-01 RX ADMIN — LISINOPRIL 20 MG: 20 TABLET ORAL at 08:06

## 2017-01-01 RX ADMIN — CALCIUM CARBONATE (ANTACID) CHEW TAB 500 MG 500 MG: 500 CHEW TAB at 16:42

## 2017-01-01 RX ADMIN — HYDROMORPHONE HYDROCHLORIDE 0.5 MG: 1 INJECTION, SOLUTION INTRAMUSCULAR; INTRAVENOUS; SUBCUTANEOUS at 15:03

## 2017-01-01 RX ADMIN — Medication 50 ML: at 09:04

## 2017-01-01 RX ADMIN — Medication 125 MG: at 12:40

## 2017-01-01 RX ADMIN — HYDROMORPHONE HYDROCHLORIDE 0.5 MG: 1 INJECTION, SOLUTION INTRAMUSCULAR; INTRAVENOUS; SUBCUTANEOUS at 23:00

## 2017-01-01 RX ADMIN — FENTANYL CITRATE 100 MCG: 50 INJECTION, SOLUTION INTRAMUSCULAR; INTRAVENOUS at 12:16

## 2017-01-01 RX ADMIN — SERTRALINE HYDROCHLORIDE 150 MG: 50 TABLET ORAL at 08:24

## 2017-01-01 RX ADMIN — BUDESONIDE 1 MG: 0.5 INHALANT RESPIRATORY (INHALATION) at 07:11

## 2017-01-01 RX ADMIN — Medication 2.5 MG: at 16:09

## 2017-01-01 RX ADMIN — SIMVASTATIN 10 MG: 10 TABLET, FILM COATED ORAL at 21:44

## 2017-01-01 RX ADMIN — FUROSEMIDE 40 MG: 10 INJECTION, SOLUTION INTRAVENOUS at 20:06

## 2017-01-01 RX ADMIN — VECURONIUM BROMIDE 1 MG: 1 INJECTION, POWDER, LYOPHILIZED, FOR SOLUTION INTRAVENOUS at 14:21

## 2017-01-01 RX ADMIN — FENTANYL CITRATE 50 MCG: 50 INJECTION, SOLUTION INTRAMUSCULAR; INTRAVENOUS at 20:08

## 2017-01-01 RX ADMIN — PROPOFOL 20 MG: 10 INJECTION, EMULSION INTRAVENOUS at 08:17

## 2017-01-01 RX ADMIN — BUDESONIDE 0.5 MG: 0.5 INHALANT RESPIRATORY (INHALATION) at 07:53

## 2017-01-01 RX ADMIN — FUROSEMIDE 40 MG: 10 INJECTION, SOLUTION INTRAVENOUS at 11:55

## 2017-01-01 RX ADMIN — FENTANYL CITRATE 50 MCG: 50 INJECTION, SOLUTION INTRAMUSCULAR; INTRAVENOUS at 11:25

## 2017-01-01 RX ADMIN — ROCURONIUM BROMIDE 25 MG: 10 INJECTION INTRAVENOUS at 16:59

## 2017-01-01 RX ADMIN — MICAFUNGIN SODIUM 100 MG: 10 INJECTION, POWDER, LYOPHILIZED, FOR SOLUTION INTRAVENOUS at 09:51

## 2017-01-01 RX ADMIN — DEXMEDETOMIDINE HYDROCHLORIDE 20 MCG: 100 INJECTION, SOLUTION INTRAVENOUS at 12:25

## 2017-01-01 RX ADMIN — ARFORMOTEROL TARTRATE 15 MCG: 15 SOLUTION RESPIRATORY (INHALATION) at 19:11

## 2017-01-01 RX ADMIN — SENNOSIDES AND DOCUSATE SODIUM 1 TABLET: 8.6; 5 TABLET ORAL at 08:41

## 2017-01-01 RX ADMIN — ALBUMIN (HUMAN) 12.5 G: 12.5 SOLUTION INTRAVENOUS at 13:00

## 2017-01-01 RX ADMIN — MIDAZOLAM HYDROCHLORIDE 2 MG: 1 INJECTION, SOLUTION INTRAMUSCULAR; INTRAVENOUS at 03:38

## 2017-01-01 RX ADMIN — BUDESONIDE 1 MG: 0.5 INHALANT RESPIRATORY (INHALATION) at 07:05

## 2017-01-01 RX ADMIN — SERTRALINE HYDROCHLORIDE 50 MG: 50 TABLET ORAL at 10:26

## 2017-01-01 RX ADMIN — IPRATROPIUM BROMIDE AND ALBUTEROL SULFATE 3 ML: .5; 3 SOLUTION RESPIRATORY (INHALATION) at 02:51

## 2017-01-01 RX ADMIN — SERTRALINE HYDROCHLORIDE 150 MG: 50 TABLET ORAL at 09:55

## 2017-01-01 RX ADMIN — SODIUM CHLORIDE: 9 INJECTION, SOLUTION INTRAVENOUS at 02:01

## 2017-01-01 RX ADMIN — SENNOSIDES AND DOCUSATE SODIUM 2 TABLET: 8.6; 5 TABLET ORAL at 22:18

## 2017-01-01 RX ADMIN — MEROPENEM 1 G: 1 INJECTION, POWDER, FOR SOLUTION INTRAVENOUS at 14:17

## 2017-01-01 RX ADMIN — ALPRAZOLAM 0.25 MG: 0.25 TABLET ORAL at 09:25

## 2017-01-01 RX ADMIN — BUDESONIDE 0.5 MG: 0.5 INHALANT RESPIRATORY (INHALATION) at 20:02

## 2017-01-01 RX ADMIN — FENTANYL CITRATE 50 MCG: 50 INJECTION, SOLUTION INTRAMUSCULAR; INTRAVENOUS at 09:22

## 2017-01-01 RX ADMIN — ACETAMINOPHEN 1000 MG: 325 SOLUTION ORAL at 09:04

## 2017-01-01 RX ADMIN — BACITRACIN: 500 OINTMENT TOPICAL at 15:41

## 2017-01-01 RX ADMIN — POTASSIUM CHLORIDE 40 MEQ: 1.5 POWDER, FOR SOLUTION ORAL at 15:58

## 2017-01-01 RX ADMIN — SERTRALINE HYDROCHLORIDE 150 MG: 50 TABLET ORAL at 14:31

## 2017-01-01 RX ADMIN — CALCIUM CARBONATE (ANTACID) CHEW TAB 500 MG 500 MG: 500 CHEW TAB at 17:24

## 2017-01-01 RX ADMIN — MAGNESIUM SULFATE IN WATER 4 G: 40 INJECTION, SOLUTION INTRAVENOUS at 11:03

## 2017-01-01 RX ADMIN — Medication 15 ML: at 09:31

## 2017-01-01 RX ADMIN — METOPROLOL TARTRATE 50 MG: 50 TABLET, FILM COATED ORAL at 08:45

## 2017-01-01 RX ADMIN — VANCOMYCIN HYDROCHLORIDE 1750 MG: 5 INJECTION, POWDER, LYOPHILIZED, FOR SOLUTION INTRAVENOUS at 15:08

## 2017-01-01 RX ADMIN — ARFORMOTEROL TARTRATE 15 MCG: 15 SOLUTION RESPIRATORY (INHALATION) at 19:55

## 2017-01-01 RX ADMIN — TRAZODONE HYDROCHLORIDE 50 MG: 50 TABLET ORAL at 00:09

## 2017-01-01 RX ADMIN — BUDESONIDE 1 MG: 0.5 INHALANT RESPIRATORY (INHALATION) at 07:21

## 2017-01-01 RX ADMIN — FUROSEMIDE 40 MG: 10 INJECTION, SOLUTION INTRAVENOUS at 08:41

## 2017-01-01 RX ADMIN — VANCOMYCIN HYDROCHLORIDE 1500 MG: 5 INJECTION, POWDER, LYOPHILIZED, FOR SOLUTION INTRAVENOUS at 17:17

## 2017-01-01 RX ADMIN — PANTOPRAZOLE SODIUM 40 MG: 40 INJECTION, POWDER, FOR SOLUTION INTRAVENOUS at 08:44

## 2017-01-01 RX ADMIN — HYDROMORPHONE HYDROCHLORIDE 0.2 MG: 1 INJECTION, SOLUTION INTRAMUSCULAR; INTRAVENOUS; SUBCUTANEOUS at 21:18

## 2017-01-01 RX ADMIN — METOPROLOL TARTRATE 50 MG: 50 TABLET, FILM COATED ORAL at 10:07

## 2017-01-01 RX ADMIN — HEPARIN SODIUM 5000 UNITS: 5000 INJECTION, SOLUTION INTRAVENOUS; SUBCUTANEOUS at 18:01

## 2017-01-01 RX ADMIN — CALCIUM CARBONATE (ANTACID) CHEW TAB 500 MG 500 MG: 500 CHEW TAB at 21:03

## 2017-01-01 RX ADMIN — VECURONIUM BROMIDE 5 MG: 1 INJECTION, POWDER, LYOPHILIZED, FOR SOLUTION INTRAVENOUS at 13:21

## 2017-01-01 RX ADMIN — DEXMEDETOMIDINE HYDROCHLORIDE 0.7 MCG/KG/HR: 100 INJECTION, SOLUTION INTRAVENOUS at 20:44

## 2017-01-01 RX ADMIN — Medication 15 ML: at 21:06

## 2017-01-01 RX ADMIN — DAPTOMYCIN 400 MG: 500 INJECTION, POWDER, LYOPHILIZED, FOR SOLUTION INTRAVENOUS at 04:26

## 2017-01-01 RX ADMIN — FLUCONAZOLE 400 MG: 2 INJECTION INTRAVENOUS at 11:42

## 2017-01-01 RX ADMIN — BUDESONIDE 1 MG: 0.5 INHALANT RESPIRATORY (INHALATION) at 19:38

## 2017-01-01 RX ADMIN — IPRATROPIUM BROMIDE AND ALBUTEROL SULFATE 3 ML: .5; 3 SOLUTION RESPIRATORY (INHALATION) at 07:59

## 2017-01-01 RX ADMIN — ALBUMIN (HUMAN) 50 G: 12.5 SOLUTION INTRAVENOUS at 09:18

## 2017-01-01 RX ADMIN — CIPROFLOXACIN 400 MG: 2 INJECTION, SOLUTION INTRAVENOUS at 14:21

## 2017-01-01 RX ADMIN — SIMVASTATIN 10 MG: 10 TABLET, FILM COATED ORAL at 09:05

## 2017-01-01 RX ADMIN — Medication: at 14:04

## 2017-01-01 RX ADMIN — ALBUMIN (HUMAN) 25 G: 12.5 SOLUTION INTRAVENOUS at 20:54

## 2017-01-01 RX ADMIN — MIDAZOLAM HYDROCHLORIDE 2 MG: 1 INJECTION, SOLUTION INTRAMUSCULAR; INTRAVENOUS at 17:17

## 2017-01-01 RX ADMIN — HEPARIN SODIUM 5000 UNITS: 5000 INJECTION, SOLUTION INTRAVENOUS; SUBCUTANEOUS at 00:35

## 2017-01-01 RX ADMIN — BUDESONIDE 0.5 MG: 0.5 INHALANT RESPIRATORY (INHALATION) at 19:18

## 2017-01-01 RX ADMIN — HEPARIN SODIUM 5000 UNITS: 5000 INJECTION, SOLUTION INTRAVENOUS; SUBCUTANEOUS at 08:55

## 2017-01-01 RX ADMIN — METOPROLOL TARTRATE 25 MG: 25 TABLET ORAL at 21:03

## 2017-01-01 RX ADMIN — DEXMEDETOMIDINE HYDROCHLORIDE 0.7 MCG/KG/HR: 100 INJECTION, SOLUTION INTRAVENOUS at 04:26

## 2017-01-01 RX ADMIN — BUDESONIDE 0.5 MG: 0.5 INHALANT RESPIRATORY (INHALATION) at 07:58

## 2017-01-01 RX ADMIN — Medication 125 MG: at 09:19

## 2017-01-01 RX ADMIN — BACITRACIN: 500 OINTMENT TOPICAL at 08:28

## 2017-01-01 RX ADMIN — NYSTATIN 500000 UNITS: 100000 SUSPENSION ORAL at 09:06

## 2017-01-01 RX ADMIN — GLYCOPYRROLATE 0.6 MG: 0.2 INJECTION, SOLUTION INTRAMUSCULAR; INTRAVENOUS at 17:00

## 2017-01-01 RX ADMIN — ACETAMINOPHEN 650 MG: 650 SUPPOSITORY RECTAL at 08:16

## 2017-01-01 RX ADMIN — ACETAMINOPHEN 1000 MG: 325 SOLUTION ORAL at 00:17

## 2017-01-01 RX ADMIN — BACITRACIN: 500 OINTMENT TOPICAL at 15:35

## 2017-01-01 RX ADMIN — BUSPIRONE HYDROCHLORIDE 7.5 MG: 7.5 TABLET ORAL at 20:14

## 2017-01-01 RX ADMIN — BACITRACIN: 500 OINTMENT TOPICAL at 16:12

## 2017-01-01 RX ADMIN — PHENYLEPHRINE HYDROCHLORIDE 100 MCG: 10 INJECTION, SOLUTION INTRAMUSCULAR; INTRAVENOUS; SUBCUTANEOUS at 14:17

## 2017-01-01 RX ADMIN — Medication 15 ML: at 20:39

## 2017-01-01 RX ADMIN — ACETAMINOPHEN 1000 MG: 325 SOLUTION ORAL at 09:24

## 2017-01-01 RX ADMIN — ACETAMINOPHEN 1000 MG: 500 TABLET, FILM COATED ORAL at 20:21

## 2017-01-01 RX ADMIN — CEFEPIME HYDROCHLORIDE 2 G: 2 INJECTION, POWDER, FOR SOLUTION INTRAVENOUS at 18:04

## 2017-01-01 RX ADMIN — CALCIUM CARBONATE (ANTACID) CHEW TAB 500 MG 500 MG: 500 CHEW TAB at 15:14

## 2017-01-01 RX ADMIN — Medication 15 ML: at 21:44

## 2017-01-01 RX ADMIN — BUDESONIDE 0.5 MG: 0.5 INHALANT RESPIRATORY (INHALATION) at 07:38

## 2017-01-01 RX ADMIN — Medication 125 MG: at 21:03

## 2017-01-01 RX ADMIN — VECURONIUM BROMIDE 1 MG: 1 INJECTION, POWDER, LYOPHILIZED, FOR SOLUTION INTRAVENOUS at 14:14

## 2017-01-01 RX ADMIN — ROCURONIUM BROMIDE 20 MG: 10 INJECTION INTRAVENOUS at 16:49

## 2017-01-01 RX ADMIN — VITAMIN D, TAB 1000IU (100/BT) 1000 UNITS: 25 TAB at 09:06

## 2017-01-01 RX ADMIN — Medication 40 MG: at 09:31

## 2017-01-01 RX ADMIN — Medication 15 ML: at 09:30

## 2017-01-01 RX ADMIN — Medication 50 ML: at 20:48

## 2017-01-01 RX ADMIN — PANTOPRAZOLE SODIUM 40 MG: 40 INJECTION, POWDER, FOR SOLUTION INTRAVENOUS at 08:14

## 2017-01-01 RX ADMIN — INSULIN ASPART 1 UNITS: 100 INJECTION, SOLUTION INTRAVENOUS; SUBCUTANEOUS at 20:01

## 2017-01-01 RX ADMIN — ACETAMINOPHEN 1000 MG: 325 SOLUTION ORAL at 00:14

## 2017-01-01 RX ADMIN — ALBUMIN (HUMAN) 12.5 G: 12.5 SOLUTION INTRAVENOUS at 20:27

## 2017-01-01 RX ADMIN — BACITRACIN: 500 OINTMENT TOPICAL at 09:22

## 2017-01-01 RX ADMIN — OXYCODONE HYDROCHLORIDE 10 MG: 5 TABLET ORAL at 07:49

## 2017-01-01 RX ADMIN — MIDAZOLAM HYDROCHLORIDE 2 MG: 1 INJECTION, SOLUTION INTRAMUSCULAR; INTRAVENOUS at 17:31

## 2017-01-01 RX ADMIN — HYDRALAZINE HYDROCHLORIDE 25 MG: 25 TABLET ORAL at 02:10

## 2017-01-01 RX ADMIN — DEXMEDETOMIDINE HYDROCHLORIDE 0.2 MCG/KG/HR: 100 INJECTION, SOLUTION INTRAVENOUS at 09:13

## 2017-01-01 RX ADMIN — ARFORMOTEROL TARTRATE 15 MCG: 15 SOLUTION RESPIRATORY (INHALATION) at 20:15

## 2017-01-01 RX ADMIN — NALOXONE HYDROCHLORIDE 0.05 MG: 0.4 INJECTION, SOLUTION INTRAMUSCULAR; INTRAVENOUS; SUBCUTANEOUS at 04:42

## 2017-01-01 RX ADMIN — MICAFUNGIN SODIUM 100 MG: 10 INJECTION, POWDER, LYOPHILIZED, FOR SOLUTION INTRAVENOUS at 12:58

## 2017-01-01 RX ADMIN — FUROSEMIDE 60 MG: 10 INJECTION, SOLUTION INTRAVENOUS at 16:41

## 2017-01-01 RX ADMIN — SERTRALINE HYDROCHLORIDE 50 MG: 50 TABLET ORAL at 21:37

## 2017-01-01 RX ADMIN — SODIUM CHLORIDE: 9 INJECTION, SOLUTION INTRAVENOUS at 01:10

## 2017-01-01 RX ADMIN — ERTAPENEM SODIUM 1 G: 1 INJECTION, POWDER, LYOPHILIZED, FOR SOLUTION INTRAMUSCULAR; INTRAVENOUS at 16:59

## 2017-01-01 RX ADMIN — ALBUMIN (HUMAN) 25 G: 12.5 SOLUTION INTRAVENOUS at 20:58

## 2017-01-01 RX ADMIN — ERTAPENEM SODIUM 1 G: 1 INJECTION, POWDER, LYOPHILIZED, FOR SOLUTION INTRAMUSCULAR; INTRAVENOUS at 10:33

## 2017-01-01 RX ADMIN — Medication 1 PACKET: at 08:40

## 2017-01-01 RX ADMIN — OXYCODONE HYDROCHLORIDE 5 MG: 5 TABLET ORAL at 21:16

## 2017-01-01 RX ADMIN — I.V. FAT EMULSION 250 ML: 20 EMULSION INTRAVENOUS at 20:24

## 2017-01-01 RX ADMIN — FUROSEMIDE 40 MG: 10 INJECTION, SOLUTION INTRAVENOUS at 09:50

## 2017-01-01 RX ADMIN — ARFORMOTEROL TARTRATE 15 MCG: 15 SOLUTION RESPIRATORY (INHALATION) at 07:38

## 2017-01-01 RX ADMIN — ROCURONIUM BROMIDE 50 MG: 10 INJECTION INTRAVENOUS at 16:30

## 2017-01-01 RX ADMIN — FENTANYL CITRATE 100 MCG: 50 INJECTION, SOLUTION INTRAMUSCULAR; INTRAVENOUS at 09:00

## 2017-01-01 RX ADMIN — MIDAZOLAM HYDROCHLORIDE 2 MG: 1 INJECTION, SOLUTION INTRAMUSCULAR; INTRAVENOUS at 09:54

## 2017-01-01 RX ADMIN — IPRATROPIUM BROMIDE AND ALBUTEROL SULFATE 3 ML: .5; 3 SOLUTION RESPIRATORY (INHALATION) at 19:56

## 2017-01-01 RX ADMIN — CHLORHEXIDINE GLUCONATE 15 ML: 1.2 RINSE ORAL at 21:44

## 2017-01-01 RX ADMIN — ARFORMOTEROL TARTRATE 15 MCG: 15 SOLUTION RESPIRATORY (INHALATION) at 19:51

## 2017-01-01 RX ADMIN — Medication 50 ML: at 12:28

## 2017-01-01 RX ADMIN — CHLORHEXIDINE GLUCONATE 15 ML: 1.2 RINSE ORAL at 09:11

## 2017-01-01 RX ADMIN — ARFORMOTEROL TARTRATE 15 MCG: 15 SOLUTION RESPIRATORY (INHALATION) at 19:03

## 2017-01-01 RX ADMIN — CHLORHEXIDINE GLUCONATE 15 ML: 1.2 RINSE ORAL at 09:39

## 2017-01-01 RX ADMIN — CALCIUM CARBONATE (ANTACID) CHEW TAB 500 MG 500 MG: 500 CHEW TAB at 09:19

## 2017-01-01 RX ADMIN — HEPARIN SODIUM 5000 UNITS: 5000 INJECTION, SOLUTION INTRAVENOUS; SUBCUTANEOUS at 13:57

## 2017-01-01 RX ADMIN — Medication 125 MG: at 06:52

## 2017-01-01 RX ADMIN — METOPROLOL TARTRATE 25 MG: 25 TABLET ORAL at 08:25

## 2017-01-01 RX ADMIN — IPRATROPIUM BROMIDE AND ALBUTEROL SULFATE 3 ML: .5; 3 SOLUTION RESPIRATORY (INHALATION) at 20:28

## 2017-01-01 RX ADMIN — Medication 50 ML: at 09:25

## 2017-01-01 RX ADMIN — NYSTATIN 500000 UNITS: 100000 SUSPENSION ORAL at 21:06

## 2017-01-01 RX ADMIN — HEPARIN SODIUM 5000 UNITS: 5000 INJECTION, SOLUTION INTRAVENOUS; SUBCUTANEOUS at 04:47

## 2017-01-01 RX ADMIN — SERTRALINE HYDROCHLORIDE 150 MG: 50 TABLET ORAL at 10:05

## 2017-01-01 RX ADMIN — Medication 125 MG: at 05:45

## 2017-01-01 RX ADMIN — HYDROMORPHONE HYDROCHLORIDE 0.5 MG: 1 INJECTION, SOLUTION INTRAMUSCULAR; INTRAVENOUS; SUBCUTANEOUS at 18:00

## 2017-01-01 RX ADMIN — BUDESONIDE 0.5 MG: 0.5 INHALANT RESPIRATORY (INHALATION) at 07:32

## 2017-01-01 RX ADMIN — ALBUMIN HUMAN 25 G: 50 SOLUTION INTRAVENOUS at 12:31

## 2017-01-01 RX ADMIN — ALBUMIN (HUMAN) 12.5 G: 12.5 INJECTION, SOLUTION INTRAVENOUS at 17:49

## 2017-01-01 RX ADMIN — HEPARIN SODIUM 5000 UNITS: 5000 INJECTION, SOLUTION INTRAVENOUS; SUBCUTANEOUS at 00:09

## 2017-01-01 RX ADMIN — Medication 5 MG: at 10:02

## 2017-01-01 RX ADMIN — Medication 125 MG: at 06:25

## 2017-01-01 RX ADMIN — Medication 125 MG: at 20:25

## 2017-01-01 RX ADMIN — HEPARIN SODIUM 5000 UNITS: 5000 INJECTION, SOLUTION INTRAVENOUS; SUBCUTANEOUS at 00:07

## 2017-01-01 RX ADMIN — TRAZODONE HYDROCHLORIDE 50 MG: 50 TABLET ORAL at 00:28

## 2017-01-01 RX ADMIN — I.V. FAT EMULSION 250 ML: 20 EMULSION INTRAVENOUS at 20:42

## 2017-01-01 RX ADMIN — LIDOCAINE 1 PATCH: 50 PATCH CUTANEOUS at 08:28

## 2017-01-01 RX ADMIN — HEPARIN SODIUM 5000 UNITS: 5000 INJECTION, SOLUTION INTRAVENOUS; SUBCUTANEOUS at 08:41

## 2017-01-01 RX ADMIN — FENTANYL CITRATE 100 MCG: 50 INJECTION, SOLUTION INTRAMUSCULAR; INTRAVENOUS at 10:01

## 2017-01-01 RX ADMIN — Medication 125 MG: at 08:25

## 2017-01-01 RX ADMIN — HEPARIN SODIUM 5000 UNITS: 5000 INJECTION, SOLUTION INTRAVENOUS; SUBCUTANEOUS at 21:44

## 2017-01-01 RX ADMIN — BACITRACIN: 500 OINTMENT TOPICAL at 21:42

## 2017-01-01 RX ADMIN — BUDESONIDE 0.5 MG: 0.5 INHALANT RESPIRATORY (INHALATION) at 07:18

## 2017-01-01 RX ADMIN — HYDROMORPHONE HYDROCHLORIDE 0.2 MG: 1 INJECTION, SOLUTION INTRAMUSCULAR; INTRAVENOUS; SUBCUTANEOUS at 10:16

## 2017-01-01 RX ADMIN — NYSTATIN 500000 UNITS: 100000 SUSPENSION ORAL at 08:45

## 2017-01-01 RX ADMIN — ACETAMINOPHEN 650 MG: 650 SUPPOSITORY RECTAL at 17:17

## 2017-01-01 RX ADMIN — HYDROMORPHONE HYDROCHLORIDE 0.2 MG: 1 INJECTION, SOLUTION INTRAMUSCULAR; INTRAVENOUS; SUBCUTANEOUS at 22:09

## 2017-01-01 RX ADMIN — Medication 125 MG: at 21:40

## 2017-01-01 RX ADMIN — FUROSEMIDE 40 MG: 10 INJECTION, SOLUTION INTRAVENOUS at 08:51

## 2017-01-01 RX ADMIN — DIPHENHYDRAMINE HYDROCHLORIDE 25 MG: 50 INJECTION, SOLUTION INTRAMUSCULAR; INTRAVENOUS at 04:00

## 2017-01-01 RX ADMIN — PHENYLEPHRINE HYDROCHLORIDE 100 MCG: 10 INJECTION, SOLUTION INTRAMUSCULAR; INTRAVENOUS; SUBCUTANEOUS at 14:27

## 2017-01-01 RX ADMIN — HYDRALAZINE HYDROCHLORIDE 25 MG: 25 TABLET ORAL at 12:40

## 2017-01-01 RX ADMIN — Medication 1 PACKET: at 15:44

## 2017-01-01 RX ADMIN — ARFORMOTEROL TARTRATE 15 MCG: 15 SOLUTION RESPIRATORY (INHALATION) at 07:33

## 2017-01-01 RX ADMIN — CHLORHEXIDINE GLUCONATE 15 ML: 1.2 RINSE ORAL at 20:58

## 2017-01-01 RX ADMIN — LIDOCAINE 1 PATCH: 50 PATCH CUTANEOUS at 10:03

## 2017-01-01 RX ADMIN — Medication 40 MG: at 09:56

## 2017-01-01 RX ADMIN — FENTANYL CITRATE 100 MCG: 50 INJECTION, SOLUTION INTRAMUSCULAR; INTRAVENOUS at 13:21

## 2017-01-01 RX ADMIN — BUDESONIDE 0.5 MG: 0.5 INHALANT RESPIRATORY (INHALATION) at 19:56

## 2017-01-01 RX ADMIN — HEPARIN SODIUM 5000 UNITS: 5000 INJECTION, SOLUTION INTRAVENOUS; SUBCUTANEOUS at 16:54

## 2017-01-01 RX ADMIN — HYDROMORPHONE HYDROCHLORIDE 0.5 MG: 1 INJECTION, SOLUTION INTRAMUSCULAR; INTRAVENOUS; SUBCUTANEOUS at 03:24

## 2017-01-01 RX ADMIN — CHLORHEXIDINE GLUCONATE 15 ML: 1.2 RINSE ORAL at 09:24

## 2017-01-01 RX ADMIN — FUROSEMIDE 60 MG: 10 INJECTION, SOLUTION INTRAVENOUS at 16:20

## 2017-01-01 RX ADMIN — BUSPIRONE HYDROCHLORIDE 7.5 MG: 7.5 TABLET ORAL at 21:06

## 2017-01-01 RX ADMIN — HYDRALAZINE HYDROCHLORIDE 25 MG: 25 TABLET ORAL at 21:41

## 2017-01-01 RX ADMIN — HYDROMORPHONE HYDROCHLORIDE 0.2 MG: 1 INJECTION, SOLUTION INTRAMUSCULAR; INTRAVENOUS; SUBCUTANEOUS at 20:31

## 2017-01-01 RX ADMIN — HYDROMORPHONE HYDROCHLORIDE 0.5 MG: 1 INJECTION, SOLUTION INTRAMUSCULAR; INTRAVENOUS; SUBCUTANEOUS at 22:40

## 2017-01-01 RX ADMIN — ACETAMINOPHEN 1000 MG: 500 TABLET, FILM COATED ORAL at 20:11

## 2017-01-01 RX ADMIN — IPRATROPIUM BROMIDE AND ALBUTEROL SULFATE 3 ML: .5; 3 SOLUTION RESPIRATORY (INHALATION) at 15:55

## 2017-01-01 RX ADMIN — ACETAMINOPHEN 975 MG: 325 TABLET, FILM COATED ORAL at 14:02

## 2017-01-01 RX ADMIN — ALBUMIN (HUMAN) 12.5 G: 12.5 SOLUTION INTRAVENOUS at 23:20

## 2017-01-01 RX ADMIN — VITAMIN D, TAB 1000IU (100/BT) 1000 UNITS: 25 TAB at 08:03

## 2017-01-01 RX ADMIN — BISACODYL 10 MG: 10 SUPPOSITORY RECTAL at 10:33

## 2017-01-01 RX ADMIN — HEPARIN SODIUM 5000 UNITS: 5000 INJECTION, SOLUTION INTRAVENOUS; SUBCUTANEOUS at 08:30

## 2017-01-01 RX ADMIN — Medication 15 ML: at 21:19

## 2017-01-01 RX ADMIN — HYDROMORPHONE HYDROCHLORIDE 0.5 MG: 1 INJECTION, SOLUTION INTRAMUSCULAR; INTRAVENOUS; SUBCUTANEOUS at 02:48

## 2017-01-01 RX ADMIN — FUROSEMIDE 60 MG: 10 INJECTION, SOLUTION INTRAVENOUS at 10:56

## 2017-01-01 RX ADMIN — BACITRACIN: 500 OINTMENT TOPICAL at 10:04

## 2017-01-01 RX ADMIN — Medication 125 MG: at 06:04

## 2017-01-01 RX ADMIN — HEPARIN SODIUM 5000 UNITS: 5000 INJECTION, SOLUTION INTRAVENOUS; SUBCUTANEOUS at 21:29

## 2017-01-01 RX ADMIN — Medication 40 MG: at 08:26

## 2017-01-01 RX ADMIN — IPRATROPIUM BROMIDE AND ALBUTEROL SULFATE 3 ML: .5; 3 SOLUTION RESPIRATORY (INHALATION) at 07:18

## 2017-01-01 RX ADMIN — FENTANYL CITRATE 50 MCG: 50 INJECTION, SOLUTION INTRAMUSCULAR; INTRAVENOUS at 22:34

## 2017-01-01 RX ADMIN — PANTOPRAZOLE SODIUM 40 MG: 40 INJECTION, POWDER, FOR SOLUTION INTRAVENOUS at 09:22

## 2017-01-01 RX ADMIN — CALCIUM CARBONATE (ANTACID) CHEW TAB 500 MG 500 MG: 500 CHEW TAB at 15:56

## 2017-01-01 RX ADMIN — INSULIN ASPART 5 UNITS: 100 INJECTION, SOLUTION INTRAVENOUS; SUBCUTANEOUS at 05:05

## 2017-01-01 RX ADMIN — Medication 1 PACKET: at 20:53

## 2017-01-01 RX ADMIN — MEROPENEM 1 G: 1 INJECTION, POWDER, FOR SOLUTION INTRAVENOUS at 05:56

## 2017-01-01 RX ADMIN — HEPARIN SODIUM 5000 UNITS: 5000 INJECTION, SOLUTION INTRAVENOUS; SUBCUTANEOUS at 06:25

## 2017-01-01 RX ADMIN — FENTANYL CITRATE 50 MCG: 50 INJECTION, SOLUTION INTRAMUSCULAR; INTRAVENOUS at 15:32

## 2017-01-01 RX ADMIN — MEROPENEM 500 MG: 500 INJECTION, POWDER, FOR SOLUTION INTRAVENOUS at 15:24

## 2017-01-01 RX ADMIN — SENNOSIDES AND DOCUSATE SODIUM 2 TABLET: 8.6; 5 TABLET ORAL at 08:59

## 2017-01-01 RX ADMIN — BACITRACIN: 500 OINTMENT TOPICAL at 09:39

## 2017-01-01 RX ADMIN — Medication 125 MG: at 21:49

## 2017-01-01 RX ADMIN — HEPARIN SODIUM 5000 UNITS: 5000 INJECTION, SOLUTION INTRAVENOUS; SUBCUTANEOUS at 16:27

## 2017-01-01 RX ADMIN — BUDESONIDE 0.5 MG: 0.5 INHALANT RESPIRATORY (INHALATION) at 19:27

## 2017-01-01 RX ADMIN — BACITRACIN: 500 OINTMENT TOPICAL at 15:45

## 2017-01-01 RX ADMIN — HYDRALAZINE HYDROCHLORIDE 25 MG: 25 TABLET ORAL at 16:09

## 2017-01-01 RX ADMIN — LISINOPRIL 20 MG: 20 TABLET ORAL at 08:45

## 2017-01-01 RX ADMIN — SODIUM CHLORIDE: 9 INJECTION, SOLUTION INTRAVENOUS at 16:08

## 2017-01-01 RX ADMIN — IPRATROPIUM BROMIDE AND ALBUTEROL SULFATE 3 ML: .5; 3 SOLUTION RESPIRATORY (INHALATION) at 13:07

## 2017-01-01 RX ADMIN — PANTOPRAZOLE SODIUM 40 MG: 40 INJECTION, POWDER, FOR SOLUTION INTRAVENOUS at 11:45

## 2017-01-01 RX ADMIN — PANTOPRAZOLE SODIUM 40 MG: 40 INJECTION, POWDER, FOR SOLUTION INTRAVENOUS at 08:41

## 2017-01-01 RX ADMIN — DIPHENHYDRAMINE HYDROCHLORIDE 50 MG: 50 INJECTION, SOLUTION INTRAMUSCULAR; INTRAVENOUS at 12:58

## 2017-01-01 RX ADMIN — THIAMINE HYDROCHLORIDE 250 MG: 100 INJECTION, SOLUTION INTRAMUSCULAR; INTRAVENOUS at 08:16

## 2017-01-01 RX ADMIN — METOPROLOL TARTRATE 50 MG: 50 TABLET, FILM COATED ORAL at 08:54

## 2017-01-01 RX ADMIN — ALBUMIN (HUMAN) 12.5 G: 12.5 SOLUTION INTRAVENOUS at 21:29

## 2017-01-01 RX ADMIN — ALPRAZOLAM 0.25 MG: 0.25 TABLET ORAL at 13:07

## 2017-01-01 RX ADMIN — BUSPIRONE HYDROCHLORIDE 7.5 MG: 7.5 TABLET ORAL at 08:03

## 2017-01-01 RX ADMIN — FLUCONAZOLE 400 MG: 2 INJECTION INTRAVENOUS at 12:40

## 2017-01-01 ASSESSMENT — ACTIVITIES OF DAILY LIVING (ADL)
SWALLOWING: 0-->SWALLOWS FOODS/LIQUIDS WITHOUT DIFFICULTY
AMBULATION: 0-->INDEPENDENT
TRANSFERRING: 3 - ASSISTIVE EQUIPMENT AND PERSON
DRESS: 2 - ASSISTIVE PERSON
TRANSFERRING: 3-->ASSISTIVE EQUIPMENT AND PERSON
TOILETING: 0-->INDEPENDENT
AMBULATION: 3-->ASSISTIVE EQUIPMENT AND PERSON
NUMBER_OF_TIMES_PATIENT_HAS_FALLEN_WITHIN_LAST_SIX_MONTHS: 2
SWALLOWING: 2-->DIFFICULTY SWALLOWING LIQUIDS/FOODS
SWALLOWING: 2 - DIFFICULTY SWALLOWING FOODS
COGNITION: 0 - NO COGNITION ISSUES REPORTED
RETIRED_COMMUNICATION: 0-->UNDERSTANDS/COMMUNICATES WITHOUT DIFFICULTY
DRESS: 0-->INDEPENDENT
RETIRED_EATING: 0-->INDEPENDENT
BATHING: 0-->INDEPENDENT
COMMUNICATION: 0 - UNDERSTANDS/COMMUNICATES WITHOUT DIFFICULTY
FALL_HISTORY_WITHIN_LAST_SIX_MONTHS: YES
COGNITION: 1 - ATTENTION OR MEMORY DEFICITS
TOILETING: 4-->COMPLETELY DEPENDENT
RETIRED_EATING: 3-->ASSISTIVE EQUIPMENT AND PERSON
BATHING: 3 - ASSISTIVE EQUIPMENT AND PERSON
BATHING: 3-->ASSISTIVE EQUIPMENT AND PERSON
TRANSFERRING: 0-->INDEPENDENT
TOILETING: 3 - ASSISTIVE EQUIPMENT AND PERSON
AMBULATION: 3 - ASSISTIVE EQUIPMENT AND PERSON
EATING: 3 - ASSISTIVE EQUIPMENT AND PERSON
DRESS: 2-->ASSISTIVE PERSON
CHANGE_IN_FUNCTIONAL_STATUS_SINCE_ONSET_OF_CURRENT_ILLNESS/INJURY: YES
RETIRED_COMMUNICATION: 0-->UNDERSTANDS/COMMUNICATES WITHOUT DIFFICULTY
FALL_HISTORY_WITHIN_LAST_SIX_MONTHS: NO

## 2017-01-01 ASSESSMENT — PAIN DESCRIPTION - DESCRIPTORS
DESCRIPTORS: ACHING
DESCRIPTORS: PATIENT UNABLE TO DESCRIBE
DESCRIPTORS: PATIENT UNABLE TO DESCRIBE
DESCRIPTORS: ACHING
DESCRIPTORS: PATIENT UNABLE TO DESCRIBE
DESCRIPTORS: CONSTANT
DESCRIPTORS: PATIENT UNABLE TO DESCRIBE
DESCRIPTORS: ACHING
DESCRIPTORS: ACHING
DESCRIPTORS: CONSTANT
DESCRIPTORS: ACHING
DESCRIPTORS: ACHING;CONSTANT
DESCRIPTORS: ACHING
DESCRIPTORS: CRAMPING
DESCRIPTORS: PATIENT UNABLE TO DESCRIBE
DESCRIPTORS: SHARP
DESCRIPTORS: PATIENT UNABLE TO DESCRIBE
DESCRIPTORS: ACHING
DESCRIPTORS: PATIENT UNABLE TO DESCRIBE
DESCRIPTORS: PATIENT UNABLE TO DESCRIBE
DESCRIPTORS: ACHING
DESCRIPTORS: SHARP
DESCRIPTORS: ACHING
DESCRIPTORS: ACHING
DESCRIPTORS: SHARP
DESCRIPTORS: ACHING
DESCRIPTORS: PATIENT UNABLE TO DESCRIBE
DESCRIPTORS: CRAMPING
DESCRIPTORS: SHARP

## 2017-01-01 ASSESSMENT — ENCOUNTER SYMPTOMS
DYSRHYTHMIAS: 1
NAUSEA: 1
SHORTNESS OF BREATH: 0
VOMITING: 1
BLOOD IN STOOL: 0
SEIZURES: 0
DIARRHEA: 1
DYSRHYTHMIAS: 1
DYSRHYTHMIAS: 1
SEIZURES: 0
ABDOMINAL PAIN: 1
DYSRHYTHMIAS: 1

## 2017-01-01 ASSESSMENT — COPD QUESTIONNAIRES
COPD: 1
COPD: 1
CAT_SEVERITY: SEVERE
COPD: 1
COPD: 1

## 2017-01-01 ASSESSMENT — LIFESTYLE VARIABLES
TOBACCO_USE: 1
TOBACCO_USE: 1

## 2017-06-02 PROBLEM — R10.9 ABDOMINAL PAIN: Status: ACTIVE | Noted: 2017-01-01

## 2017-06-02 NOTE — ED NOTES
Bed: ED06  Expected date:   Expected time:   Means of arrival:   Comments:  HE - 71 abd pain from [place] eta 1337

## 2017-06-02 NOTE — PROGRESS NOTES
Called to see pt for abd pain. Started this am abruptly. >10/10. No pain like this before. Periodically will intensify spontaneously and intense pain with any palpation or movement. Aortic calcifications on CT. Was done without contrast due to reported anaphylactic reaction to iodine. She describes a CV and vascular workup with angiography but I cannot immediately find these records.    PE  /72  Pulse 96  Temp 98.4  F (36.9  C) (Oral)  Resp 18  SpO2 (!) 80%  Gen: holding still, appears uncomfortable  Heart: irregular with premature beats  Lungs: clear  Abd: soft, exquisitely tender to palpation    Data:  CT without IV contrast with no explanatory findings for pain. Aortic calcifications. Calcifications at SMA takeoff to my eye.   Metabolic acidosis and elevated lactate    Imp:  I share Dr. Funez's suspicion for mesenteric ischemia and I agree that she should have evaluation by a vascular specialist emergently. This is quite concerning and I recommend transfer to a center with the capability to revascularize the patient if this ultimately is needed.    Pj Oliveira MD  [phone] (c)  [phone] (p)     This note was created using voice recognition software. Undetected word substitutions or other errors may have occurred.

## 2017-06-02 NOTE — IP AVS SNAPSHOT
"[facility] INTENSIVE CARE UNIT: [phone]                                              INTERAGENCY TRANSFER FORM - PHYSICIAN ORDERS   2017                    Hospital Admission Date: 2017  BEN PALOMINO   : 1945  Sex: Female        Attending Provider: Tam Ugalde MD     Allergies:  Iodine I 131 Tositumomab, Penicillins, Sulfa Drugs    Infection:  None   Service:  HOSPITALIST    Ht:  1.702 m (5' 7\")   Wt:  76.1 kg (167 lb 12.3 oz)   Admission Wt:  63.4 kg (139 lb 12.4 oz)    BMI:  26.28 kg/m 2   BSA:  1.9 m 2            Patient PCP Information     Provider PCP Type    Shar James DO General      ED Clinical Impression     Diagnosis Description Comment Added By Time Added    Mesenteric ischemia (H) [K55.9] Mesenteric ischemia (H) [K55.9]  Ceci Conte 2017  3:10 PM      Hospital Problems as of 2017              Priority Class Noted POA    Abdominal pain Medium  2017 Yes      Non-Hospital Problems as of 2017              Priority Class Noted    Osteoarthritis of hip   2012    Non union subtrochanteric fracture right hip   2012    COPD (chronic obstructive pulmonary disease) (H)   2012    Hyperlipidemia   2012    Hypertension   2012    Anemia due to blood loss, acute   2012      Code Status History     Date Active Date Inactive Code Status Order ID Comments User Context    2017  2:48 PM  Full Code [id number]  Stu Esquivel MD Outpatient    2017  7:23 PM 2017  2:48 PM Full Code [id number]  Estelita Peck MD Inpatient    2012 12:45 PM 2012  3:06 PM Full Code [id number]  Feli Gilmore, RN Inpatient         Medication Review      START taking        Dose / Directions Comments    furosemide 10 MG/ML injection   Commonly known as:  LASIX   Used for:  Mesenteric ischemia (H)        Dose:  40 mg   Inject 4 mLs (40 mg) into the vein 2 times daily   Quantity:  60 mL   Refills:  0    Continue " at the discretion of nephrology       heparin sodium PF 5000 UNIT/0.5ML injection   Used for:  Mesenteric ischemia (H)        Dose:  5000 Units   Inject 0.5 mLs (5,000 Units) Subcutaneous every 8 hours   Refills:  0    Discontinue upon discharge from inpatient setting or when patient is not sedentary       insulin aspart 100 UNIT/ML injection   Commonly known as:  NovoLOG PEN   Used for:  Mesenteric ischemia (H)        Dose:  1-12 Units   Inject 1-12 Units Subcutaneous every 4 hours   Refills:  0        levofloxacin 250 MG/50ML infusion   Commonly known as:  LEVAQUIN   Indication:  Pneumonia caused by Inhaling a Substance Into the Lungs   Used for:  Mesenteric ischemia (H)        Dose:  250 mg   Inject 50 mLs (250 mg) into the vein every 24 hours   Quantity:  1000 mL   Refills:  0    Continue at the discretion of infectious diseases       oxyCODONE 5 MG IR tablet   Commonly known as:  ROXICODONE   Used for:  Mesenteric ischemia (H)        Dose:  5-10 mg   1-2 tablets (5-10 mg) by Oral or Feeding Tube route every 4 hours as needed for moderate to severe pain   Refills:  0        pantoprazole 40 MG injection   Commonly known as:  PROTONIX   Used for:  Mesenteric ischemia (H)        Dose:  40 mg   Inject 40 mg into the vein 2 times daily   Quantity:  30 each   Refills:  0          CONTINUE these medications which have NOT CHANGED        Dose / Directions Comments    ACETAMINOPHEN PO        Dose:  650 mg   Take 650 mg by mouth every morning   Refills:  0        ASPIRIN EC PO        Dose:  81 mg   Take 81 mg by mouth daily   Refills:  0        BACLOFEN PO        Dose:  5 mg   Take 5 mg by mouth every morning RX is 5 mg (1/2 of 10 mg tab) 2 x daily but she takes daily due to drowsiness.   Refills:  0        BENAZEPRIL HCL PO        Dose:  20 mg   Take 20 mg by mouth every morning   Refills:  0        BREO ELLIPTA 100-25 MCG/INH oral inhaler   Generic drug:  fluticasone-vilanterol        Dose:  1 puff   Inhale 1 puff into  the lungs daily   Refills:  0        CENTRUM SILVER per tablet        Dose:  1 tablet   Take 1 tablet by mouth every morning   Refills:  0        * ipratropium - albuterol 0.5 mg/2.5 mg/3 mL 0.5-2.5 (3) MG/3ML neb solution   Commonly known as:  DUONEB        Dose:  1 vial   Take 1 vial by nebulization 2 times daily as needed for shortness of breath / dyspnea or wheezing   Refills:  0        * COMBIVENT RESPIMAT  MCG/ACT inhaler   Generic drug:  Ipratropium-Albuterol        Dose:  1 puff   Inhale 1 puff into the lungs 4 times daily as needed for shortness of breath / dyspnea or wheezing   Refills:  0        LOVASTATIN PO        Dose:  20 mg   Take 20 mg by mouth every morning   Refills:  0        MELOXICAM PO        Dose:  15 mg   Take 15 mg by mouth daily   Refills:  0        sertraline 50 MG tablet   Commonly known as:  ZOLOFT        Dose:  50 mg   Take 1 tablet by mouth daily.   Quantity:  90 tablet   Refills:  0        TRAZODONE HCL PO        Dose:  50 mg   Take 50 mg by mouth nightly as needed for sleep   Refills:  0        * Notice:  This list has 2 medication(s) that are the same as other medications prescribed for you. Read the directions carefully, and ask your doctor or other care provider to review them with you.            Summary of Visit     Reason for your hospital stay       71-year-old female who had a laparoscopic cholecystectomy followed by worsening respiratory failure with return to the operating room where she was found to have a gastric perforation now status post repair who then subsequently failed extubation and ultimately she had a tracheostomy performed.  She has persistently grown yeast from her sputum as well as her abdominal fluid therefore decision not to move forward with a PEG tube.  Planning to change her to Diflucan given sensitivities were run on the Candida species.  Also now sputum going back Stenotrophomonas maltophilia sensitive to levofloxacin.  Discontinue vancomycin  and start levofloxacin.                After Care     Activity - Up with nursing assistance           Advance Diet as Tolerated       Follow this diet upon discharge: Orders Placed This Encounter  TF via nasoduodenal FT - Isosource 1.5 (or equivalent product) at 45 mL/hr x 24 hrs continuously.  Free H20 150 mL every 4 hrs.  Tube Feeding Flush Frequency: At least 15-30 mL water before and after medication administration and with tube clogging.       Fall precautions           Crawford catheter       To straight gravity drainage. Change catheter every 2 weeks and PRN for leaking or decreased uring output with signs of bladder distention. DO NOT change catheter without a specific MD order IF diagnosis of benign prostatic hypertrophy (BPH), neurogenic bladder, or other urological conditions       General info for SNF       Length of Stay Estimate: Short Term Care: Estimated # of Days <30  Condition at Discharge: Improving  Level of care:skilled   Rehabilitation Potential: Fair  Admission H&P remains valid and up-to-date: Yes  Recent Chemotherapy: N/A  Use Nursing Home Standing Orders: Yes       Glucose monitor nursing POCT       Before meals and at bedtime       IV access       Peripheral IV.       Intake and output       Every shift       Mantoux instructions       Give two-step Mantoux (PPD) Per Facility Policy Yes             Procedures     Ventilator       Ventilation Mode: CMV/AC  FiO2 (%): 40 %  Rate Set (breaths/minute): 20 breaths/min  Tidal Volume Set (mL): 500 mL  PEEP (cm H2O): 5 cmH2O  Oxygen Concentration (%): 40 %  Resp: 25             Referrals     Occupational Therapy Adult Consult       Evaluate and treat as clinically indicated.    Reason:  Critical therapy       Physical Therapy Adult Consult       Evaluate and treat as clinically indicated.    Reason:  Critical illness             Supplies     AntiEmbolism Stockings       Bilateral below knee length.On in the morning, off at night       Pneumatic  Compression Device        Bilateral calf. Remove 30 mins BID.             Follow-Up Appointment Instructions     Future Labs/Procedures    Follow Up and recommended labs and tests     Comments:    Continue to see nephrology and ID physicians      Follow-Up Appointment Instructions     Follow Up and recommended labs and tests       Continue to see nephrology and ID physicians             Statement of Approval     Ordered          06/20/17 0939  I have reviewed and agree with all the recommendations and orders detailed in this document.  EFFECTIVE NOW     Approved and electronically signed by:  Stu Esquivel MD

## 2017-06-02 NOTE — ANESTHESIA PREPROCEDURE EVALUATION
Procedure: Procedure(s):  LAPAROTOMY EXPLORATORY  Preop diagnosis: .    Allergies   Allergen Reactions     Iodine I 131 Tositumomab Anaphylaxis     Can tolerate topical iodine if it is wahed off after surgery      Penicillins Rash     Swollen  lymph nodes, flushed overheating      Sulfa Drugs Nausea and Vomiting     Past Medical History:   Diagnosis Date     Asthma      COPD (chronic obstructive pulmonary disease) (H)      Past Surgical History:   Procedure Laterality Date     ARTHROPLASTY HIP  6/4/2012    Procedure:ARTHROPLASTY HIP; Surgeon:DAVIAN FENG; Location: OR     ENT SURGERY      tonsils     GYN SURGERY      hyst     ORTHOPEDIC SURGERY      hip pinning   femur fracture knee surgery      REMOVE HARDWARE HIP NAILING  6/4/2012    Procedure:REMOVE HARDWARE HIP NAILING; REMOVAL OF GAMMA NAIL AND SCREWS FROM RIGHT HIP, RIGHT TOTAL HIP ARTHROPLASTY(GAMMA NAIL EQUIPMENT, [employer] TOTAL HIP)^; Surgeon:DAVIAN FENG; Location: OR     Prior to Admission medications    Medication Sig Start Date End Date Taking? Authorizing Provider   benazepril (LOTENSIN) 10 MG tablet Take 1 tablet by mouth daily. 7/4/13   Alvin Rader MD   sertraline (ZOLOFT) 100 MG tablet Take 1 tablet by mouth daily. 7/4/13   Alvin Rader MD   Ipratropium-Albuterol (COMBIVENT RESPIMAT)  MCG/ACT inhaler Inhale 1 puff into the lungs 4 times daily. Not to exceed 6 doses per day. 7/4/13   Alvin Rader MD   budesonide-formoterol (SYMBICORT) 80-4.5 MCG/ACT inhaler Inhale 2 puffs into the lungs 2 times daily. 7/4/13   Alvin Rader MD   levalbuterol (XOPENEX) 0.31 MG/3ML nebulizer solution Take 3 mLs by nebulization every 6 hours as needed for shortness of breath / dyspnea. 7/4/13   Alvin Rader MD   fluticasone (FLOVENT DISKUS) 100 MCG/BLIST AEPB Inhale 1 puff into the lungs 2 times daily. 7/4/13   Alvin Rader MD   amLODIPine (NORVASC) 10 MG tablet Take 1 tablet by mouth daily. 6/7/12   Davian Feng  MD Courtney   budesonide-formoterol (SYMBICORT) 80-4.5 MCG/ACT inhaler Inhale 1 puff into the lungs 2 times daily. 6/7/12   Sky Dwyer MD   fexofenadine (ALLEGRA) 180 MG tablet Take 1 tablet by mouth daily. 6/7/12   Sky Dwyer MD   albuterol-ipratropium (COMBIVENT)  MCG/ACT inhaler Inhale 1 puff into the lungs 2 times daily. 6/7/12   Sky Dwyer MD   levalbuterol (XOPENEX HFA) 45 MCG/ACT inhaler Inhale 2 puffs into the lungs every 3 hours as needed. 6/7/12   Sky Dwyer MD   montelukast (SINGULAIR) 10 MG tablet Take 1 tablet by mouth At Bedtime. 6/7/12   Sky Dwyer MD   sertraline (ZOLOFT) 50 MG tablet Take 1 tablet by mouth daily. 6/7/12   Sky Dwyer MD   fluticasone (FLOVENT DISKUS) 100 MCG/BLIST AEPB Inhale 1 puff into the lungs 2 times daily as needed. Indications: Asthma    Reported, Patient   acetaminophen (TYLENOL) 325 MG tablet Take 2 tablets by mouth every 4 hours as needed. 6/6/12   Sky Dwyer MD   diazepam (VALIUM) 5 MG tablet Take 0.5-1 tablets by mouth every 6 hours as needed. 6/6/12   Sky Dwyer MD   Warfarin Sodium (WARFARIN THERAPY REMINDER) 1 each continuous prn. 6/6/12   Sky Dwyer MD   lisinopril (PRINIVIL,ZESTRIL) 20 MG tablet Take 1 tablet by mouth daily. 6/6/12   Sky Dwyer MD   senna-docusate (SENOKOT-S;PERICOLACE) 8.6-50 MG per tablet Take 1-2 tablets by mouth 2 times daily as needed for constipation. 6/6/12   Sky Dwyer MD     Current Facility-Administered Medications Ordered in Epic   Medication Dose Route Frequency Last Rate Last Dose     0.9% sodium chloride BOLUS  1,000 mL Intravenous Once         HYDROmorphone (PF) (DILAUDID) injection 0.5 mg  0.5 mg Intravenous Once         Current Outpatient Prescriptions Ordered in Epic   Medication     benazepril (LOTENSIN) 10 MG tablet     sertraline (ZOLOFT) 100 MG tablet     Ipratropium-Albuterol (COMBIVENT RESPIMAT)   MCG/ACT inhaler     budesonide-formoterol (SYMBICORT) 80-4.5 MCG/ACT inhaler     levalbuterol (XOPENEX) 0.31 MG/3ML nebulizer solution     fluticasone (FLOVENT DISKUS) 100 MCG/BLIST AEPB     amLODIPine (NORVASC) 10 MG tablet     budesonide-formoterol (SYMBICORT) 80-4.5 MCG/ACT inhaler     fexofenadine (ALLEGRA) 180 MG tablet     albuterol-ipratropium (COMBIVENT)  MCG/ACT inhaler     levalbuterol (XOPENEX HFA) 45 MCG/ACT inhaler     montelukast (SINGULAIR) 10 MG tablet     sertraline (ZOLOFT) 50 MG tablet     fluticasone (FLOVENT DISKUS) 100 MCG/BLIST AEPB     acetaminophen (TYLENOL) 325 MG tablet     diazepam (VALIUM) 5 MG tablet     Warfarin Sodium (WARFARIN THERAPY REMINDER)     lisinopril (PRINIVIL,ZESTRIL) 20 MG tablet     senna-docusate (SENOKOT-S;PERICOLACE) 8.6-50 MG per tablet     Wt Readings from Last 1 Encounters:   06/04/12 65.8 kg (145 lb)     Temp Readings from Last 1 Encounters:   06/02/17 37  C (98.6  F) (Oral)     BP Readings from Last 6 Encounters:   06/02/17 104/76   06/02/17 106/71   07/04/13 110/79   06/07/12 102/57     Pulse Readings from Last 4 Encounters:   06/02/17 96   06/06/12 108     Resp Readings from Last 1 Encounters:   06/02/17 16     SpO2 Readings from Last 1 Encounters:   06/02/17 100%     Recent Labs   Lab Test  06/02/17   1015  07/04/13   1714   NA  137  143   POTASSIUM  4.7  4.5   CHLORIDE  104  100   CO2  18*  26   ANIONGAP  15*  17   GLC  50*  88   BUN  53*  19   CR  1.55*  0.79   CARYL  9.4  8.8     Recent Labs   Lab Test  06/02/17   1015  07/04/13   1714   WBC  10.3  7.0   HGB  10.8*  10.6*   PLT  339  270     Recent Labs   Lab Test  06/02/17   1420  07/04/13   1714   INR  1.11  0.86      ECHO 3-29-17:   Final Impressions:   1. Technically limited exam.   2. Normal LV size, normal wall thickness, normal global systolic function with an estimated EF of 55 - 60%.   3. Right ventricular cavity size is normal, global systolic RV function is normal.   4. The aortic valve is not  well visualized, no stenosis and mild regurgitation.    Comparison  Compared to prior exam of 3/4/17:  - The left ventricular function has improved.    Chamber Sizes and Function  Normal left ventricular size, normal wall thickness, normal global systolic function with an estimated EF of 55 - 60%. No definite resting regional wall motion abnormality seen. Left atrial size is normal. Right ventricular cavity size is normal, global systolic RV function is normal. The right atrium is normal. Right atrial volume index is 17 ml/m . The pulmonary artery is not well visualized. The sinus of Valsalva is normal sized. The ascending aorta is normal sized. The aortic arch is normal.    Valves, RV Pressures and Diastolic Function    The aortic valve is not well visualized , no stenosis and mild regurgitation. The mitral valve is sclerotic, trace mitral regurgitation. Indeterminate pattern of LV diastolic filling. The tricuspid valve is not well visualized. Tricuspid regurgitation is trace. Unable to assess right ventricular systolic pressure. The pulmonic valve is not well visualized. No pulmonic regurgitation.    Masses, Effusion, Shunts  There is no pericardial effusion. The inferior vena cava is not well visualized, respiratory size variation not well visualized. No left to right shunting was detected by limited color flow Doppler interrogation of the interatrial septum.         Anesthesia Evaluation     . Pt has had prior anesthetic. Type: General    No history of anesthetic complications          ROS/MED HX    ENT/Pulmonary:     (+)tobacco use, Current use asthma severe COPD, , recent URI resolved . .   (-) sleep apnea   Neurologic:      (-) seizures, CVA, Neuropathy and migraines   Cardiovascular: Comment: Paroxysmal afib  Takasubo's cardiomyopathy    (+) Dyslipidemia, hypertension--CAD, --. Taking blood thinners Pt has not received instructions: . . . :. dysrhythmias a-fib, .      (-) angina, past MI, valvular  problems/murmurs, angina, past MI and CABG   METS/Exercise Tolerance:  >4 METS   Hematologic:        (-) history of blood clots, anemia and other hematologic disorder   Musculoskeletal:   (+) arthritis, , , -       GI/Hepatic:        (-) GERD and liver disease   Renal/Genitourinary:      (-) renal disease   Endo:      (-) Type I DM, Type II DM and thyroid disease   Psychiatric:         Infectious Disease:        (-) Recent Fever   Malignancy:         Other:                     Physical Exam      Airway   Mallampati: III  TM distance: >3 FB  Neck ROM: full    Dental   (+) upper dentures and lower dentures    Cardiovascular   Rhythm and rate: regular and abnormal  (-) no murmur    Pulmonary (+) wheezes and rales                       Anesthesia Plan      History & Physical Review      ASA Status:  2 .    NPO Status:  > 8 hours    Plan for RSI, General and ETT with Propofol induction. Maintenance will be Balanced.    PONV prophylaxis:  Ondansetron (or other 5HT-3) and Dexamethasone or Solumedrol  precedex infusion  8 mg dexamethasone        Postoperative Care  Postoperative pain management:  IV analgesics and Oral pain medications.      Consents  Anesthetic plan, risks, benefits and alternatives discussed with:  Patient..                          .

## 2017-06-02 NOTE — ED NOTES
Pt transferred from [facility] for abd ct and possible surgery.  MD to MD report given.  Pt premedicated for Ct contrast at [facility].

## 2017-06-02 NOTE — OP NOTE
General Surgery Operative Note    PREOPERATIVE DIAGNOSIS:  abdominal pain with peritonitis.    POSTOPERATIVE DIAGNOSIS:  cholecystitis    PROCEDURE:   1. Diagnostic laparoscopy  2. Laparoscopic Cholecystectomy  3. Abdominal lavage    SURGEON:  Estelita Peck MD    ASSISTANT:  Magali Brock MD, [facility] [place]  The  Assistant was medically necessary to assist in prepping, positioning, camera operation, retraction/exposure and closure of port site    ANESTHESIA:  General.    BLOOD LOSS: 10ml    FINDINGS: purulence noted in the right upper quadrant, mildly thickened gallbladder wall, reactive changes noted with fibrinous exudate on anterior surface of stomach, adhesive omental band to anterior abdominal wall. Small bowel appeared normal. Colon normal.     INDICATIONS:   Ms. Stanley presented with severe abdominal pain to [facility] and due to concern for possible mesenteric ischemia was transferred to [facility]. She had a CTA abd/pelvis which did not show evidence of mesenteric ischemia but did show gallstones and perinephric fluid. Given her abdominal exam we discussed a diagnostic laparoscopy, laparoscopic cholecystectomy.  I explained the risks, benefits, complications including but not limited to bleeding, infection, possible need to open, possible postop hematoma, seroma, bowel, bladder or bile duct injury, MI, PE, and if any of these occurred the patient would require additional procedures. The patient agreed and did sign consent.    DETAILS OF PROCEDURE:   A small skin incision was made in the left upper quadrant. A 5mm 0degree laparoscope was used with a visiport to obtain access to the abdomen. Insufflation was connected and flowed freely. Insufflation pressure was sent to 15mmhg. The abdomen was surveyed and there was purulent fluid noted in the right upper quadrant. There was an adhesion of omentum to the anterior abdominal wall. The gallbladder was mildly distended with wall thickening and  surrounding fibrinous exudate. There was also fibrinous exudate noted on the anterior surface of the stomach which appeared reactive. The ascending colon appeared normal. There was no injury from abdominal entrance noted. A 12mm port was placed in the midline just below the umbilicus. A 10mm laparoscope was inserted and revealed no trocar injuries. Local was injected to the upper abdomen and two 5s were placed in the right upper quadrant under direct visualization. The omental band was taken down with electrocautery. The hepatic flexure was mobilized to allow improved visualization of the anterior surface of the duodenum. This appeared normal. The remainder of the stomach appeared normal. We then ran the small bowel from the terminal ileum proximally. The appendix was present and appeared normal. the terminal ileum was normal. The remainder of the small bowel was normal. The gallbladder was then retracted superiorly and laterally. The gallbladder was mildly inflamed. Cautery was used to take down the peritoneal attachments. I was able to delineate the artery and duct and got under the undersurface of the gallbladder to help declinate the duct and artery. This was taken up high to confirm the critical view on multiple views. Two clips were placed proximally on the cystic duct and one distally. Two clips were placed proximally on the artery and one clip distally. Both the cystic artery and cystic duct were then completely transected with laparoscopic scissors.     The gallbladder was taken off the liver bed with cautery. There was no bile spillage or significant bleeding. The gallbladder was then placed in an Endocatch bag and removed through the umbilical trocar site. The camera was reinserted which showed no bleeding or bile spillage. Copious irrigation was used until clear. The wound bed was inspected multiple times showing that it was completely dry and that the clips were in place. The omentum was packed into the  perihepatic fossa. We examined the pelvis with the patient in trendelenberg. there was a small amount of murky ascitic fluid which was aspirated. The pelvis was irrigated until clear. The 12mm port fascia closed with 0 Vicryl in figure-of-eight fashion using the daysi margo device. All gas was expelled and the trocars were taken out under direct visualization. Marcaine 0.5% were injected to all the wounds. The skin was closed with a 4-0 Monocryl in a subcuticular fashion. Steri-strips and sterile dressings were applied. The patient tolerated the procedure well. There were no complications. They were awoken from anesthesia and transferred to PACU in stable condition. All sponge, instrument and needle counts were correct.       CHASIDY GUZMAN MD    Please route or send letter to:  Primary Care Provider (PCP) and Referring Provider

## 2017-06-02 NOTE — ANESTHESIA CARE TRANSFER NOTE
Patient: Ara Stanley    Procedure(s):  EXPLORATORY LAPAROSCOPY, CHOLECYCTECTOMY - Wound Class: II-Clean Contaminated   - Wound Class: II-Clean Contaminated    Diagnosis: .  Diagnosis Additional Information: No value filed.    Anesthesia Type:   RSI, General, ETT     Note:  Airway :Face Mask  Patient transferred to:PACU  Comments: VSS      Vitals: (Last set prior to Anesthesia Care Transfer)    CRNA VITALS  6/2/2017 1730 - 6/2/2017 1805      6/2/2017             NIBP: 135/86    Pulse: 111    NIBP Mean: 109    Ht Rate: 111    SpO2: 100 %    Resp Rate (set): 10                Electronically Signed By: OTTO Keyes CRNA  June 2, 2017  6:05 PM

## 2017-06-02 NOTE — PROGRESS NOTES
Surgery Update    Given the patient's extensive medical history and multiple comorbidities, plan for admission to Hospitalist service post operatively.   - ok for clear liquid diet and advance as tolerated  - no indication for ongoing abx from surgical perspective, may benefit from coverage for possible UTI given UA and perinephric fluid seen on CTA.   - ok for dvt ppx tomorrow am.   - surgery will continue to follow closely along    Estelita Peck MD  [facility]  [phone]

## 2017-06-02 NOTE — ED PROVIDER NOTES
History     Chief Complaint:  Abdominal Pain      HPI   Ara Stanley is an anticoagulated 71 year old female previously on Coumadin with history of paroxysmal atrial fibrillation during recent hospital stay in March of 2017, hypertension, and hyperlipidemia who presents via EMS with abdominal pain. The patient states that she went to bed last night feeling fine, other than the diarrhea she has been having for the last day and a half. Then, at 0500 she awoke to sharp stabbing pain in her abdomen.  Abdominal pain is diffuse in nature, without localizing tenderness.  She contacted EMS who provided 4 mg Zofran en route to hospital. Currently she rates her pain at 10/10 in severity. Along with this pain has come nausea and vomiting. Patient denies fever. Patient denies chest pain or shortness of breath. Patient denies blood in stool. Patient denies history of known aortic enlargement or aortic aneurysm. Patient denies all other complaints.  She has never had pain similar to this in the past.  Of note, patient was recently admitted to OSH for SOB, and was diagnosed with Takatsubo cardiomyopathy.  EF at that time at 35%, which has since improved.  During that hospital stay, it looks as though she developed paroxysmal atrial fibrillation.  She is no longer on coumadin.      Allergies:  Iodine I 131 Tositumomab  Penicillins  Sulfa Drugs      Medications:    Lotensin  Zoloft  Albuterol inhaler  Symbicort  Xopenex  Flovent diskus  Amlodipine  Allegra  Singulair  Valium  Warfarin  Zestril  Senokot    Past Medical History:    Asthma  Atrial fibrillation  COPD  Osteoarthritis of hip  Non union subtrochanteric fracture right hip  Hyperlipidemia  Hypertension    Past Surgical History:    Arthroplasty Hip  Tonsillectomy   Hysterectomy  Orthopedic Surgery - Hip Pinning, right  Remove Hardware Hip Nailing, right    Family History:    History reviewed. No pertinent family history.      Social History:  Presents via EMS     Tobacco use: Former 1/2 pack a day smoker for 20 years. QD: 5/31/1992  Alcohol use: No  PCP: Shar James    Marital Status:          Review of Systems   Respiratory: Negative for shortness of breath.    Cardiovascular: Negative for chest pain.   Gastrointestinal: Positive for abdominal pain, diarrhea, nausea and vomiting. Negative for blood in stool.   All other systems reviewed and are negative.      Physical Exam     Patient Vitals for the past 24 hrs:   BP Temp Temp src Pulse Heart Rate Resp SpO2   06/02/17 1305 106/71 - - - - - -   06/02/17 1230 111/59 - - - 98 - 96 %   06/02/17 1210 109/79 - - - - - (!) 46 %   06/02/17 1200 129/57 - - - 100 - (!) 73 %   06/02/17 1130 128/72 - - - - - -   06/02/17 1124 111/76 - - - 93 - (!) 80 %   06/02/17 1100 (!) 86/56 - - - 97 - 95 %   06/02/17 1045 (!) 79/59 - - - 105 - 100 %   06/02/17 1013 (!) 83/57 98.4  F (36.9  C) Oral 96 - 18 92 %        Physical Exam  General:                        Well-nourished                        Speaking in full sentences             Appears uncomfortable, writhing on gurney intermittently  Eyes:                        Conjunctiva without injection or scleral icterus                        PERRL  ENT:                        Moist mucous membranes                        Posterior oropharynx clear without erythema or exudate                        Nares patent                        Pinnae normal  Neck:                        Full ROM                        No stiffness appreciated  Resp:                        Lungs CTAB                        No crackles, wheezing or audible rubs                        Good air movement  CV:                                        Tachycardicrate, regular rhythm                        S1 and S2 present                        No murmur, gallop or rub  GI:                        BS present                        Abdomen soft without distention                        Diffuse tenderness throughout                         Well healed surgical scars                        Diffuse guarding to light  / deep palpation throughout  Skin:                        Warm, dry, well perfused                        No rashes or open wounds on exposed skin  MSK:                        Moves all extremities                        No focal deformities or swelling  Neuro:                        Alert                        Answers questions appropriately                        Moves all extremities equally                        Gait stable  Psych:                        Normal affect, normal mood    Emergency Department Course   ECG (09:55:32):  Rate 98 bpm. WA interval 166. QRS duration 78. QT/QTc 372/474. P-R-T axes 81 57 79. Sinus rhythm with premature atrial complexes. Otherwise normal ECG. Interpreted at 1001 by Myron Funez MD    Imaging:  Radiographic findings were communicated with the patient who voiced understanding of the findings.  CT Chest Abdomen Pelvis w/ Contrast:  IMPRESSION:  1. Diffuse fatty infiltration of liver and cholelithiasis. Trace amount of free pelvic fluid. No other acute changes are evident in the chest, abdomen or pelvis to account for patient's symptoms.  2. Slight stranding in the omentum without discrete nodularity. This is indeterminate. No obvious changes of peritoneal metastatic disease. Follow-up as clinically warranted.  3. Scattered tiny indeterminate lung nodules. No new or enlarging lung nodules are appreciated compared to prior study.    KENTON LOVE MD  Results per radiology.     Laboratory:  CBC: HGB 10.8 (L) o/w WNL (WBC 10.3, )    CMP: Carbon Dioxide 18 (L), Anion Gap 15 (H), Glucose 50 (LL), BUN 53 (H) GFR Estimate: 33 (L), AST 49 (H) (Creatinine 1.55 (H)) o/w WNL    Recent Labs  Lab 06/02/17  1203 06/02/17  1015   GLC  --  50*   BGM 94  --      Venous Blood Gas    Lab 06/02/17  1015   PHV 7.21*   PCO2V 39*   PO2V 14*   HCO3V 16*   Lactic  3.1*      1015: Lactic Acid:  3.5 (H)  Lipase:514 (H)    10:15:00: Troponin I: <0.015     UA with micro: pending collection    Blood Culture: pending      Procedures:     Limited Bedside ED Aorta Ultrasound Procedure Note:     PROCEDURE: PERFORMED BY: Dr. Myron Funez  INDICATIONS:  Abdominal Pain and Hypotension    PROBE:  Low frequency convex probe  BODY LOCATION: Abdomen  FINDINGS:  The ultrasound was performed using transverse views.   Normal: Abdominal aorta < 4 cm.  MEASUREMENT:  <2 cm throughout   No evidence of free fluid in hepatorenal (Morison s pouch), perisplenic, or and pelvic areas.   INTERPRETATION:  The evaluation of the aorta was of normal caliber (ie < 4cm in the transverse/longitudinal views) without evidence of aneurysm or dilation.  Aorta visualized in entirety from insertion into the intra-abdominal cavity to bifurcation into iliac vessels. There was no abdominal free fluid.  IMAGE DOCUMENTATION: Images were archived to hard drive.       Interventions:  1001: NS 1L IV Bolus   1037: Dilaudid 0.5 mg IV  1055: NS 1L IV Bolus   1120: Dextrose 25 mL IV  1141: Dilaudid 0.5 mg IV  1142: Flagyl 500 mg IV Infusion  1206: Levaquin 750 mg IV  1258: Benadryl 50 mg IV  1259: Solu-cortef 300 mg IV       The patient's symptoms were improved with parenteral narcotics.    Emergency Department Course:  Past medical records, nursing notes, and vitals reviewed.  0951: I performed an exam of the patient and obtained history as documented above.   Above workup undertaken.  1040: I rechecked the patient. Explained findings to patient.  1102: Discussed patient with Dr. Oliveira from surgery  1123: I rechecked the patient. Explained findings to patient.    1124: Discussed patient with  of vascular surgery.  He has graciously accepted patent in transfer, though patient will be sent through ER to assess on need for surgical intervention.  1135: I rechecked the patient. Explained plan to patient and Dr. Oliveira was in the room.   I  personally reviewed the laboratory results with the Patient and daughter and answered all related questions prior to transfer.  Findings and plan explained to the Patient.    1203: Patient will be transferred to [facility] via EMS. Discussed the case with Dr. Gil, in the emergency department who will admit the patient to a monitored bed for further monitoring, evaluation, and treatment.           Impression & Plan      SEVERE SEPSIS and SEPTIC SHOCK EARLY MANAGEMENT BUNDLE    SEVERE SEPSIS:     Ara Stanley meets criteria for severe sepsis as evidenced by:     1. 2 SIRS criteria, AND    2. Suspected infection, AND     3.  ACUTE Organ dysfunction:  SBP <90, Lactic Acid >2   Time severe sepsis present = 1028    SEPTIC SHOCK:     Ara Stanley does not meet criteria for septic shock (hypotension despite adequate fluid administration or Lactic Acid >4).       Time septic shock present = NA      3 Hour Bundle Completion (Severe Sepsis and Septic Shock):  1. Initial Lactic Acid Result: 3.5  2. Blood Cultures before Antibiotics: Yes  3. Broad Spectrum Antibiotics Administered: Yes       Anti-infectives (Future)    Start     Dose/Rate Route Frequency Ordered Stop    06/02/17 1051  Flagyl  500 mg infusion IV ONCE 06/02/17 1050     06/02/17 1051  levofloxacin (LEVAQUIN) infusion 750 mg      750 mg  100 mL/hr over 90 Minutes Intravenous ONCE 06/02/17 1050          4. Administer 30 mL/kg for hypotension or Lactate > 4 mmol/L: Not applicable         Medical Decision Making:  Ara Stanley is a 71 year old female with a past medical history significant for COPD, hypertension, paroxysmal a-fib, and recent takotsubo cardiomyopathy who presents to the emergency department with acute onset generalized abdominal pain.  Vital signs on presentation reveal blood pressure of 73/57 which improved following IV fluid administration.  Differential diagnosis is broad including, though not limited to, acute aortic  pathology, mesenteric ischemia, colitis, bowel obstruction, perforation, biliary colic, cholecystitis, malignancy, among others.  I immediately utilized bedside US to evaluate the abdominal aorta and identified no evidence of aortic enlargement.  I do not feel this indicative of aortic aneurysm nor aortic rupture.  Given the acute onset of symptoms, generalized abdominal discomfort, peritoneal abdominal exam, and elevated lactate at 3.3 I have concern for mesenteric ischemia.  Case was discussed with Dr. Oliveira of general surgery who has graciously seen and evaluated the patient in our ER.  He is in agreement with her examination findings.  I discussed the case with Dr. Carpenter of vascular surgery who has graciously accepted the patient in transferr to [facility] for further consideration of surgical intervention versus IR intervention.  Laboratory studies are notable for anion gap acidosis, likely secondary to increased lactic acid.  Blood sugar was low at 50 for which she was provided 1/2 amp of D50.  (This was because patient is NPO).  Hemoglobin is stable at 10.8. CT of the chest/abdomen/pelvis without contrast demonstrates fatty infiltrate of the liver and cholelithiasis with trace amount of free fluid.  No other acute findings.  There is slight stranding in the omentum without discrete nodularity as well as tiny indeterminate lung nodules. None of these are new when compared to prior study.  Results of the above studies were discussed with the patient.  We were limited with IV contrast given history of anaphylactic reaction to Iodinated contrast studies.  She was provided IV fluids with improvements in blood pressure.  Patient was informed of these findings.  She will be transferred through the ER at [facility] for vascular surgery consultation.  Case discussed with Dr. Kyrie Gil who has accepted the patient in transfer through the ER.  She was provided steroids and antihistamines for  pre-treatment of possible contrast study in discussion with vascular surgery prior to transfer.  She will be transferred via ALS ambulance.       Diagnosis:    ICD-10-CM   1. Abdominal pain, generalized R10.84   2. Renal insufficiency N28.9   3. Increased anion gap metabolic acidosis E87.2       Disposition:  Transferred to [facility].       Karla Singleton  6/2/2017   [facility] EMERGENCY DEPARTMENT  I, Karla Singleton, am serving as a scribe at 9:51 AM on 6/2/2017 to document services personally performed by Myron Funez MD based on my observations and the provider's statements to me.       Myron Funez MD  06/02/17 1423

## 2017-06-02 NOTE — IP AVS SNAPSHOT
` [facility] INTENSIVE CARE UNIT: [phone]            Medication Administration Report for Ara Stanley as of 06/20/17 1627   Legend:    Given Hold Not Given Due Canceled Entry Other Actions    Time Time (Time) Time  Time-Action       Inactive    Active    Linked        Medications 06/14/17 06/15/17 06/16/17 06/17/17 06/18/17 06/19/17 06/20/17    0.9% sodium chloride infusion  Rate: 10 mL/hr Freq: CONTINUOUS Route: IV  Last Dose: Stopped (06/20/17 1558)  Start: 06/17/17 2115       2117 ( )-New Bag         0813 ( )-Rate/Dose Verify       1326 ( )-Rate/Dose Verify       1817 ( )-Rate/Dose Verify        0134 ( )-New Bag       0800 ( )-Rate/Dose Verify       1558-Stopped           acetaminophen (TYLENOL) solution 1,000 mg  Dose: 1,000 mg Freq: EVERY 8 HOURS Route: PO  Start: 06/06/17 0915   Admin Instructions: Maximum acetaminophen dose from all sources= 75 mg/kg/day not to exceed 4 grams/day.     0240 (1,000 mg)-Given       0924 (1,000 mg)-Given       1705 (1,000 mg)-Given        0127 (1,000 mg)-Given       0904 (1,000 mg)-Given       1806 (1,000 mg)-Given        0214 (1,000 mg)-Given       0900 (1,000 mg)-Given       1717 (1,000 mg)-Given        0017 (1,000 mg)-Given       0911 (1,000 mg)-Given       1631 (1,000 mg)-Given        0027 (1,000 mg)-Given       0939 (1,000 mg)-Given       1641 (1,000 mg)-Given        0016 (1,000 mg)-Given       0806 (1,000 mg)-Given       1614 (1,000 mg)-Given        0014 (1,000 mg)-Given       0840 (1,000 mg)-Given       1550 (1,000 mg)-Given          Or  acetaminophen (TYLENOL) Suppository 650 mg  Dose: 650 mg Freq: EVERY 8 HOURS Route: RE  Start: 06/06/17 0915   Admin Instructions: Maximum acetaminophen dose from all sources = 75 mg/kg/day not to exceed 4 grams/day.                                                                                                                                                                  albuterol (PROVENTIL) neb solution  2.5 mg  Dose: 2.5 mg Freq: EVERY 6 HOURS PRN Route: NEBULIZATION  PRN Reason: shortness of breath / dyspnea  Start: 06/02/17 1923   Admin Instructions: Auto sub for Levalbuterol neb               arformoterol (BROVANA) neb solution 15 mcg  Dose: 15 mcg Freq: 2 TIMES DAILY Route: NEBULIZATION  Start: 06/04/17 1500    0716 (15 mcg)-Given       1952 (15 mcg)-Given        0732 (15 mcg)-Given       1932 (15 mcg)-Given        0740 (15 mcg)-Given       1938 (15 mcg)-Given        0718 (15 mcg)-Given       1928 (15 mcg)-Given        0730 (15 mcg)-Given       1918 (15 mcg)-Given        0711 (15 mcg)-Given       1903 (15 mcg)-Given        0732 (15 mcg)-Given       [ ] 1900           artificial saliva (BIOTENE MT) spray 1 spray  Dose: 1 spray Freq: EVERY 6 HOURS PRN Route: MT  PRN Reason: dry mouth  Start: 06/13/17 1818    2333 (1 spray)-Given                 benzocaine-menthol (CHLORASEPTIC) 6-10 MG lozenge 1-2 lozenge  Dose: 1-2 lozenge Freq: EVERY 1 HOUR PRN Route: BU  PRN Reason: sore throat  Start: 06/02/17 1923   Admin Instructions: For sore throat without fever.               budesonide (PULMICORT) neb solution 1 mg  Dose: 1 mg Freq: 2 TIMES DAILY Route: NEBULIZATION  Start: 06/05/17 0945    0721 (1 mg)-Given       1952 (1 mg)-Given        0732 (0.5 mg)-Given       1932 (1 mg)-Given        0741 (0.5 mg)-Given       1938 (1 mg)-Given        0718 (1 mg)-Given       1928 (0.5 mg)-Given       1934 (0.5 mg)-Given        0730 (1 mg)-Given       1918 (0.5 mg)-Given        0711 (1 mg)-Given       1903 (1 mg)-Given        0732 (1 mg)-Given       [ ] 1900           chlorhexidine (PERIDEX) 0.12 % solution 15 mL  Dose: 15 mL Freq: 2 TIMES DAILY Route: SWISH & SPIT  Start: 06/07/17 0015    0924 (15 mL)-Given       2010 (15 mL)-Given        0840 (15 mL)-Given       2058 (15 mL)-Given        0859 (15 mL)-Given       2020 (15 mL)-Given        0911 (15 mL)-Given       2020 (15 mL)-Given        0939 (15 mL)-Given       2011 (15  mL)-Given        0805 (15 mL)-Given       2144 (15 mL)-Given        0840 (15 mL)-Given       [ ] 2100           dextrose 10 % 1,000 mL infusion  Freq: CONTINUOUS PRN Route: IV  PRN Comment:    Last Dose: Stopped (06/08/17 0450)  Start: 06/07/17 1027   Admin Instructions: For Hypoglycemia Prevention for patients on long-acting subcutaneous basal insulin (Glargine, Detemir, NPH) or continuous insulin infusion. Whenever nutrition support is held or interrupted:  1) Infuse IV D10W at nutrition support rate   2) Notify provider for further instructions               fentaNYL Citrate (PF) (SUBLIMAZE) injection  mcg  Dose:  mcg Freq: EVERY 1 HOUR PRN Route: IV  PRN Reason: moderate to severe pain  Start: 06/09/17 0815    0504 (100 mcg)-Given       0900 (100 mcg)-Given       1713 (50 mcg)-Given                 furosemide (LASIX) injection 40 mg  Dose: 40 mg Freq: 2 TIMES DAILY. Route: IV  Start: 06/11/17 1200    1115 (40 mg)-Given       1951 (40 mg)-Given        0942 (40 mg)-Given [C]       2006 (40 mg)-Given        0950 (40 mg)-Given [C]       2020 (40 mg)-Given        1011 (40 mg)-Given [C]       2025 (40 mg)-Given        0940 (40 mg)-Given       2054 (40 mg)-Given        0748 (40 mg)-Given       1938 (40 mg)-Given        0841 (40 mg)-Given       [ ] 2000           glucose 40 % gel 15-30 g  Dose: 15-30 g Freq: EVERY 15 MIN PRN Route: PO  PRN Reason: low blood sugar  Start: 06/07/17 1052   Admin Instructions: Give 15 g for BG 51 to 69 mg/dL IF patient is conscious and able to swallow. Give 30 g for BG less than or equal to 50 mg/dL IF patient is conscious and able to swallow. Do NOT give glucose gel via enteral tube.  IF patient has enteral tube: give apple juice 120 mL (4 oz or 15 g of CHO) via enteral tube for BG 51 to 69 mg/dL.  Give apple juice 240 mL (8 oz or 30 g of CHO) via enteral tube for BG less than or equal to 50 mg/dL.              Or  dextrose 50 % injection 25-50 mL  Dose: 25-50 mL Freq: EVERY 15  MIN PRN Route: IV  PRN Reason: low blood sugar  Start: 06/07/17 1052   Admin Instructions: Use if have IV access, BG less than 70 mg/dL and meet dose criteria below:  Dose if conscious and alert (or disorientated) and NPO = 25 mL  Dose if unconscious / not alert = 50 mL  Vesicant.              Or  glucagon injection 1 mg  Dose: 1 mg Freq: EVERY 15 MIN PRN Route: SC  PRN Reason: low blood sugar  PRN Comment: May repeat x 1 only  Start: 06/07/17 1052   Admin Instructions: May give SQ or IM. IF BG less than or equal to 50 mg/dL and no IV access.  ONLY use glucagon IF patient has NO IV access AND is UNABLE to swallow.               heparin sodium PF injection 5,000 Units  Dose: 5,000 Units Freq: EVERY 8 HOURS Route: SC  Start: 06/05/17 0945    0543 (5,000 Units)-Given       1242 (5,000 Units)-Given       2056 (5,000 Units)-Given        0526 (5,000 Units)-Given       1454 (5,000 Units)-Given       2100 (5,000 Units)-Given        0631 (5,000 Units)-Given       1334 (5,000 Units)-Given       2020 (5,000 Units)-Given        0808 (5,000 Units)-Given       1627 (5,000 Units)-Given        0035 (5,000 Units)-Given       0830 (5,000 Units)-Given       1630 (5,000 Units)-Given        0009 (5,000 Units)-Given       0749 (5,000 Units)-Given       1614 (5,000 Units)-Given        0007 (5,000 Units)-Given       0841 (5,000 Units)-Given       1550 (5,000 Units)-Given           hydrALAZINE (APRESOLINE) injection 20 mg  Dose: 20 mg Freq: EVERY 6 HOURS PRN Route: IV  PRN Reason: high blood pressure  Start: 06/15/17 1433     1511 (20 mg)-Given        1814 (20 mg)-Given               HYDROmorphone (PF) (DILAUDID) injection 0.3-0.5 mg  Dose: 0.3-0.5 mg Freq: EVERY 2 HOURS PRN Route: IV  PRN Reason: moderate to severe pain  Start: 06/13/17 0800    2347 (0.3 mg)-Given        0616 (0.3 mg)-Given       1208 (0.5 mg)-Given                hypromellose-dextran (ARTIFICAL TEARS) ophthalmic solution 1 drop  Dose: 1 drop Freq: EVERY 1 HOUR PRN Route:  Both Eyes  PRN Reason: dry eyes  Start: 06/09/17 0538              insulin aspart (NovoLOG) inj (RAPID ACTING)  Dose: 1-12 Units Freq: EVERY 4 HOURS Route: SC  Start: 06/07/17 2000   Admin Instructions: Correction Scale - HIGH INSULIN RESISTANCE DOSING     Do Not give Correction Insulin if BG less than 140  For  - 164 give 1 unit.  For  - 189 give 2 units.  For  - 214 give 3 units.  For  - 239 give 4 units.  For  - 264 give 5 units.  For  - 289 give 6 units.  For  - 314 give 7 units.  For  - 339 give 8 units  For  - 364 give 9 units  For  - 389 give 10 units  For  - 414 give 11 units  For BG greater than or equal to 415 give 12 units  Check blood glucose Q4H and administer based on blood glucose.  Notify provider if glucose greater than or equal to 350 mg/dL after administration of correction dose.  If given at mealtime, must be administered 5 min before meal or immediately after.     (0022)-Not Given       0505 (5 Units)-Given       (0937)-Not Given       (1159)-Not Given       (1609)-Not Given       (2010)-Not Given       (2353)-Not Given        (0358)-Not Given       (0906)-Not Given       (1153)-Not Given [C]       (1622)-Not Given [C]       (2001)-Not Given        (0031)-Not Given       (0520)-Not Given       (0828)-Not Given       (1231)-Not Given       (1548)-Not Given       (2044)-Not Given        (0151)-Not Given       (0424)-Not Given       (0812)-Not Given       (1217)-Not Given       (1642)-Not Given       (2030)-Not Given [C]        (0045)-Not Given [C]       (0501)-Not Given [C]       (0843)-Not Given       (1205)-Not Given       1759 (1 Units)-Given       (2054)-Not Given        (0013)-Not Given       (0529)-Not Given       (0810)-Not Given       (1200)-Not Given       (1620)-Not Given       (1942)-Not Given        (0009)-Not Given       (0420)-Not Given       (0852)-Not Given [C]       (4670)-Not Given [C]       (3081)-Not Given      "  [ ] 2000           levofloxacin (LEVAQUIN) infusion 250 mg  Dose: 250 mg Freq: EVERY 24 HOURS Route: IV  Indications of Use: ASPIRATION PNEUMONIA  Last Dose: 250 mg (06/20/17 1448)  Start: 06/19/17 1400   Admin Instructions: Dose adjusted per renal dosing policy.  Estimated CrCl = 20-49 mL/min.  Irritant. Administer at a rate of no greater than 100 mL/hr          1329 (250 mg)-New Bag        1448 (250 mg)-New Bag           lidocaine (LMX4) cream  Freq: EVERY 1 HOUR PRN Route: Top  PRN Reason: pain  PRN Comment: with VAD insertion or accessing implanted port.  Start: 06/08/17 1631   Admin Instructions: Do NOT give if patient has a history of allergy to any local anesthetic or any \"joseph\" product.   Apply 30 minutes prior to VAD insertion or port access.  MAX Dose:  2.5 g (  of 5 g tube)               lidocaine 1 % 1 mL  Dose: 1 mL Freq: EVERY 1 HOUR PRN Route: OTHER  PRN Comment: mild pain with VAD insertion or accessing implanted port  Start: 06/08/17 1631   Admin Instructions: Do NOT give if patient has a history of allergy to any local anesthetic or any \"joseph\" product. MAX dose 1 mL subcutaneous OR intradermal in divided doses.               magnesium sulfate 4 g in 100 mL sterile water (premade)  Dose: 4 g Freq: EVERY 4 HOURS PRN Route: IV  PRN Reason: magnesium supplementation  Start: 06/03/17 0842   Admin Instructions: For serum Mg++ less than 1.6 mg/dL  Give 4 g and recheck magnesium level 2 hours after dose, and next AM.        0817 (4 g)-New Bag          1149 (4 g)-New Bag           micafungin (MYCAMINE) 100 mg in D5W 100 mL intermittent infusion  Dose: 100 mg Freq: EVERY 24 HOURS Route: IV  Indications of Use: CANDIDEMIA  Start: 06/17/17 0800   Admin Instructions: Infuse over 1 hour; do not administer as an IV bolus injection; an existing IV line should be flushed with NS prior to infusion.  Protect from light.        0817 (100 mg)-New Bag        0837 (100 mg)-New Bag        0810 (100 mg)-New Bag        " 0844 (100 mg)-New Bag           miconazole (MICATIN; MICRO GUARD) 2 % powder  Freq: EVERY 1 HOUR PRN Route: Top  PRN Reason: other  PRN Comment: topical candidiasis  Start: 06/09/17 1302   Admin Instructions: Apply to supra-pubic area, labia and bilateral groin TID and prn      0432 ( )-Given       0914 ( )-Given                naloxone (NARCAN) injection 0.1-0.4 mg  Dose: 0.1-0.4 mg Freq: EVERY 2 MIN PRN Route: IV  PRN Reason: opioid reversal  Start: 06/02/17 1923   Admin Instructions: For respiratory rate LESS than or EQUAL to 8.  Partial reversal dose:  0.1 mg titrated q 2 minutes for Analgesia Side Effects Monitoring Sedation Level of 3 (frequently drowsy, arousable, drifts to sleep during conversation).Full reversal dose:  0.4 mg bolus for Analgesia Side Effects Monitoring Sedation Level of 4 (somnolent, minimal or no response to stimulation).               nitroglycerin (NITROSTAT) sublingual tablet 0.4 mg  Dose: 0.4 mg Freq: EVERY 5 MIN PRN Route: SL  PRN Reason: chest pain  Start: 06/08/17 1631   Admin Instructions: Maximum 3 doses in 15 minutes.  Notify provider if no relief after 3 doses.    Do NOT give nitroglycerin SLIF the patient has taken avanafil (STENDRA), sildenafil (VIAGRA) or (REVATIO) within the last 8 hours, vardenafil (LEVITRA) or (STAXYN) within the last 18 hours, tadalafil (CIALIS) or (ADCIRCA) within the last 36 hours. Inform provider if patient has taken one of these medications.  If patient is still having acute angina requiring treatment, an alternative treatment option may be used such as: IV beta-blocker [2.5 mg - 5 mg metoprolol (LOPRESSOR)] if ordered by a provider.               No lozenges or gum should be given while patient on BIPAP  Freq: CONTINUOUS PRN Route: XX  Start: 06/09/17 0538              ondansetron (ZOFRAN) injection 4 mg  Dose: 4 mg Freq: EVERY 6 HOURS PRN Route: IV  PRN Reasons: nausea,vomiting  Start: 06/02/17 1923   Admin Instructions: This is Step 1 of nausea and  vomiting management.  If nausea not resolved in 15 minutes, go to Step 2 prochlorperazine (COMPAZINE).  Irritant.               oxyCODONE (ROXICODONE) IR tablet 5-10 mg  Dose: 5-10 mg Freq: EVERY 4 HOURS PRN Route: ORAL OR FEED  PRN Reason: moderate to severe pain  Start: 06/15/17 0655     0905 (5 mg)-Given       1807 (5 mg)-Given       2135 (10 mg)-Given        0952 (5 mg)-Given        2116 (5 mg)-Given        0218 (10 mg)-Given       1642 (5 mg)-Given        0749 (10 mg)-Given        1048 (10 mg)-Given           pantoprazole (PROTONIX) 40 mg IV push injection  Dose: 40 mg Freq: 2 TIMES DAILY Route: IV  Start: 06/06/17 2000   Admin Instructions: Reconstitute vial with 10mLs Saline and administer IV Push  Irritant.     0936 (40 mg)-Given       1951 (40 mg)-Given        0844 (40 mg)-Given       1957 (40 mg)-Given        0814 (40 mg)-Given       2020 (40 mg)-Given        0812 (40 mg)-Given       2024 (40 mg)-Given        0830 (40 mg)-Given       2009 (40 mg)-Given        0749 (40 mg)-Given       1938 (40 mg)-Given        0841 (40 mg)-Given       [ ] 2000           potassium chloride (KLOR-CON) Packet 20-40 mEq  Dose: 20-40 mEq Freq: EVERY 2 HOURS PRN Route: ORAL OR FEED  PRN Reason: potassium supplementation  Start: 06/12/17 0826   Admin Instructions: Use if unable to tolerate tablets.  If Serum K+ 3.0-3.3, dose = 60 mEq po total dose (40 mEq x1 followed in 2 hours by 20 mEq x1). Recheck K+ level 4 hours after dose and the next AM.  If Serum K+ 2.5-2.9, dose = 80 mEq po total dose (40 mEq Q2H x2). Recheck K+ level 4 hours after dose and the next AM.  If Serum K+ less than 2.5, See IV order.  Dissolve packet contents in 4-8 ounces of cold water or juice.      0437 (40 mEq)-Given       0619 (20 mEq)-Given        0632 (40 mEq)-Given       0814 (20 mEq)-Given       1558 (40 mEq)-Given       1815 (20 mEq)-Given         1745 (40 mEq)-Given       2054 (20 mEq)-Given             potassium chloride 10 mEq in 100 mL  intermittent infusion  Dose: 10 mEq Freq: EVERY 1 HOUR PRN Route: IV  PRN Reason: potassium supplementation  Start: 06/12/17 0826   Admin Instructions: Infuse via PERIPHERAL LINE or CENTRAL LINE. Use for central line replacement if patient weight less than 65 kg, if patient is on TPN with high potassium content or if unit does not stock 20 mEq bags.   If Serum K+ 3.0-3.3, dose = 10 mEq/hr x4 doses (40 mEq IV total dose). Recheck K+ level 2 hours after dose and the next AM.   If Serum K+ less than 3.0, dose = 10 mEq/hr x6 doses (60 mEq IV total dose). Recheck K+ level 2 hours after dose and the next AM.               potassium chloride 10 mEq in 100 mL intermittent infusion with 10 mg lidocaine  Dose: 10 mEq Freq: EVERY 1 HOUR PRN Route: IV  PRN Reason: potassium supplementation  Start: 06/12/17 0826   Admin Instructions: Infuse via PERIPHERAL LINE. Use potassium with lidocaine for pain with peripheral administration.  If Serum K+ 3.0-3.3, dose = 10 mEq/hr x4 doses (40 mEq IV total dose). Recheck K+ level 2 hours after dose and the next AM.  If Serum K+ less than 3.0, dose = 10 mEq/hr x6 doses (60 mEq IV total dose). Recheck K+ level 2 hours after dose and the next AM.               potassium chloride 20 mEq in 50 mL intermittent infusion  Dose: 20 mEq Freq: EVERY 1 HOUR PRN Route: IV  PRN Reason: potassium supplementation  Last Dose: 20 mEq (06/17/17 2227)  Start: 06/12/17 0826   Admin Instructions: Infuse via CENTRAL LINE Only. May need EKG if less than 65 kg or on TPN - Max rate is 0.3 mEq/kg/hr for patients not on EKG monitoring.   If Serum K+ 3.0-3.3, dose = 20 mEq/hr x2 doses (40 mEq IV total dose). Recheck K+ level 2 hours after dose and the next AM.  If Serum K+ less than 3.0, dose = 20 mEq/hr x3 doses (60 mEq IV total dose). Recheck K+ level 2 hours after dose and the next AM.        2123 (20 mEq)-New Bag       2227 (20 mEq)-New Bag              potassium chloride SA (K-DUR/KLOR-CON M) CR tablet 20-40  mEq  Dose: 20-40 mEq Freq: EVERY 2 HOURS PRN Route: PO  PRN Reason: potassium supplementation  Start: 06/12/17 0826   Admin Instructions: Use if able to take PO.   If Serum K+ 3.0-3.3, dose = 60 mEq po total dose (40 mEq x1 followed in 2 hours by 20 mEq x1). Recheck K+ level 4 hours after dose and the next AM.  If Serum K+ 2.5-2.9, dose = 80 mEq po total dose (40 mEq Q2H x2). Recheck K+ level 4 hours after dose and the next AM.  If Serum K+ less than 2.5, See IV order.  DO NOT CRUSH               potassium phosphate 15 mmol in D5W 250 mL intermittent infusion  Dose: 15 mmol Freq: DAILY PRN Route: IV  PRN Reason: phosphorous supplementation  Start: 06/10/17 0808   Admin Instructions: For serum phosphorus level 2-2.4  Do not infuse Phosphorus in the same line as TPN.   Give 15 mmol and recheck phosphorus level next AM.          0819 (15 mmol)-New Bag            potassium phosphate 20 mmol in D5W 250 mL intermittent infusion  Dose: 20 mmol Freq: EVERY 6 HOURS PRN Route: IV  PRN Reason: phosphorous supplementation  Start: 06/10/17 0808   Admin Instructions: For serum phosphorus level 1.1-1.9  For CENTRAL Line ONLY  Do not infuse Phosphorus in the same line as TPN.   Give 20 mmol and recheck phosphorus level 2 hours after last dose and next AM.               potassium phosphate 20 mmol in D5W 500 mL intermittent infusion  Dose: 20 mmol Freq: EVERY 6 HOURS PRN Route: IV  PRN Reason: phosphorous supplementation  Start: 06/10/17 0808   Admin Instructions: For serum phosphorus level 1.1-1.9  For Peripheral Line  Do not infuse Phosphorus in the same line as TPN.   Give 20 mmol and recheck phosphorus level 2 hours after last dose and next AM.               potassium phosphate 25 mmol in D5W 500 mL intermittent infusion  Dose: 25 mmol Freq: EVERY 8 HOURS PRN Route: IV  PRN Reason: phosphorous supplementation  Start: 06/10/17 0808   Admin Instructions: For serum phosphorus level less than 1.1  Do not infuse Phosphorus in the  same line as TPN.   Give 25 mmol and recheck phosphorus level 2 hours after last dose and next AM.               QUEtiapine (SEROquel) half-tab 12.5 mg  Dose: 12.5 mg Freq: EVERY MORNING Route: PO  Start: 06/15/17 0900     0905 (12.5 mg)-Given        0900 (12.5 mg)-Given        0912 (12.5 mg)-Given        0940 (12.5 mg)-Given        0805 (12.5 mg)-Given        0841 (12.5 mg)-Given           QUEtiapine (SEROquel) tablet 25 mg  Dose: 25 mg Freq: AT BEDTIME Route: PO  Start: 06/14/17 2200    2154 (25 mg)-Given        2135 (25 mg)-Given        2218 (25 mg)-Given        2116 (25 mg)-Given         0009 (25 mg)-Given       2145 (25 mg)-Given        [ ] 2200           senna-docusate (SENOKOT-S;PERICOLACE) 8.6-50 MG per tablet 1-2 tablet  Dose: 1-2 tablet Freq: 2 TIMES DAILY Route: PO  Start: 06/02/17 2100   Admin Instructions: Start with 1 tablet PO BID, If no bowel movement in 24 hours, increase to 2 tablets PO BID.  Hold for loose stools.     (0937)-Not Given       (2117)-Not Given        (0905)-Not Given       (2058)-Not Given [C]        0859 (2 tablet)-Given       2218 (2 tablet)-Given        (0912)-Not Given [C]       (2022)-Not Given [C]        (1020)-Not Given [C]       (2054)-Not Given [C]        (0805)-Not Given       (2118)-Not Given        (0841)-Not Given       [ ] 2100           sertraline (ZOLOFT) tablet 50 mg  Dose: 50 mg Freq: DAILY Route: PO  Start: 06/02/17 2045    0925 (50 mg)-Given        0905 (50 mg)-Given        0859 (50 mg)-Given        0912 (50 mg)-Given        0939 (50 mg)-Given        0805 (50 mg)-Given        0841 (50 mg)-Given           simvastatin (ZOCOR) tablet 10 mg  Dose: 10 mg Freq: EVERY MORNING Route: PO  Start: 06/03/17 0900   Admin Instructions: Auto sub for Lovastatin 20 mg.     0925 (10 mg)-Given        0905 (10 mg)-Given        0859 (10 mg)-Given        0925 (10 mg)-Given        0939 (10 mg)-Given        0805 (10 mg)-Given        0841 (10 mg)-Given           sodium chloride (PF) 0.9%  PF flush 10 mL  Dose: 10 mL Freq: EVERY 8 HOURS Route: IK  Start: 06/04/17 1145   Admin Instructions: And Q1H PRN, to lock CVC - Open Ended (Tunneled and Non-Tunneled) dormant lumen.     (0325)-Not Given       (1200)-Not Given       2117 (10 mL)-Given        0635 (10 mL)-Given       1455 (10 mL)-Given       2112 (10 mL)-Given        (0520)-Not Given       1438 (10 mL)-Given       2234 (10 mL)-Given        0627 (10 mL)-Given       1458 (10 mL)-Given       2117 (10 mL)-Given        0501 (10 mL)-Given [C]       1358 (10 mL)-Given        0010 (10 mL)-Given       0529 (10 mL)-Given       1329 (10 mL)-Given       2220 (10 mL)-Given        0529 (10 mL)-Given       1448 (10 mL)-Given       [ ] 2200           sodium chloride (PF) 0.9% PF flush 10-20 mL  Dose: 10-20 mL Freq: EVERY 1 HOUR PRN Route: IK  PRN Reasons: line flush,post meds or blood draw  Start: 06/04/17 1132   Admin Instructions: to flush CVC - Open Ended (Tunneled and Non-Tunneled).  10 mL post IV meds; 20 mL post blood draw.               sodium chloride (PF) 0.9% PF flush 3 mL  Dose: 3 mL Freq: EVERY 1 HOUR PRN Route: IK  PRN Reason: line flush  PRN Comment: for peripheral IV flush post IV meds  Start: 06/08/17 1631              sodium chloride (PF) 0.9% PF flush 3 mL  Dose: 3 mL Freq: EVERY 1 HOUR PRN Route: IK  PRN Reason: line flush  PRN Comment: for peripheral IV flush post IV meds  Start: 06/02/17 1923              sodium chloride 0.9% (bottle) irrigation  Freq: PRN  Start: 06/13/17 1633              thiamine tablet 200 mg  Dose: 200 mg Freq: DAILY Route: PO  Start: 06/11/17 0900    0925 (200 mg)-Given        0905 (200 mg)-Given        0859 (200 mg)-Given        0912 (200 mg)-Given        0940 (200 mg)-Given        0805 (200 mg)-Given        0840 (200 mg)-Given           traZODone (DESYREL) tablet 50 mg  Dose: 50 mg Freq: AT BEDTIME PRN Route: PO  PRN Reason: sleep  Start: 06/02/17 2103 0027 (50 mg)-Given        0009 (50 mg)-Given            Completed Medications  Medications 06/14/17 06/15/17 06/16/17 06/17/17 06/18/17 06/19/17 06/20/17         Dose: 25 g Freq: 2 TIMES DAILY. Route: IV  Start: 06/18/17 0830   End: 06/18/17 2006   Admin Instructions: Give prior to iv furosemide         0829 (25 g)-New Bag       2006 (25 g)-New Bag               Dose: 25 g Freq: 2 TIMES DAILY. Route: IV  Last Dose: 25 g (06/17/17 1930)  Start: 06/17/17 0900   End: 06/17/17 1930   Admin Instructions: Give prior to iv furosemide        0911 (25 g)-New Bag       1930 (25 g)-New Bag                Dose: 500 mg Freq: ONCE Route: IV  Indications of Use: ASPIRATION PNEUMONIA  Last Dose: 500 mg (06/18/17 1358)  Start: 06/18/17 1400   End: 06/18/17 1458   Admin Instructions: Dose adjusted per renal dosing policy.  Estimated CrCl = 20-49 mL/min.   Irritant. Administer at a rate of no greater than 100 mL/hr         1358 (500 mg)-New Bag            Discontinued Medications  Medications 06/14/17 06/15/17 06/16/17 06/17/17 06/18/17 06/19/17 06/20/17         Dose: 650 mg Freq: EVERY 4 HOURS PRN Route: PO  PRN Reason: other  PRN Comment: surgical pain  Start: 06/05/17 2200   End: 06/18/17 1137   Admin Instructions: May give first dose 4 hours after last scheduled dose of acetaminophen.  Maximum acetaminophen dose from all sources = 75 mg/kg/day not to exceed 4 grams/day.         1137-Med Discontinued           Dose: 1 g Freq: EVERY 12 HOURS Route: IV  Indications of Use: BACTEREMIA  Last Dose: 1 g (06/18/17 0205)  Start: 06/16/17 1400   End: 06/18/17 1016   Admin Instructions: Dose adjusted per renal dosing policy.  Estimated CrCl = 26-50 mL/min.       1437 (1 g)-New Bag        0214 (1 g)-New Bag       1456 (1 g)-New Bag        0205 (1 g)-New Bag       1016-Med Discontinued           Dose: 1,750 mg (central catheter) Freq: EVERY 24 HOURS Route: IV  Indications of Use: BACTEREMIA  Start: 06/16/17 1500   End: 06/18/17 1016   Admin Instructions: For an adult with peripheral catheter and  dose of 2-2.5 g, infuse over 2 hours.  CENTRAL LINE ONLY. IF central catheter, doses below 2 g may be given over 1 hour.       1535 (1,750 mg)-New Bag        1457 (1,750 mg)-New Bag        1016-Med Discontinued

## 2017-06-02 NOTE — H&P
[facility] General Surgery Consultation    Ara Stanley MRN# [id number]   YOB: 1945 Age: 71 year old      Date of Admission:  6/2/2017  Date of Consult: 6/2/2017         Assessment and Plan:   Patient is a 71 year old female with acute abdominal pain and initial concern for possible embolic SMA occlusion prompting transfer. CTA on arrival does not show evidence of occlusion. She continues to have severe abdominal pain with peritoneal signs. Her gallbladder is enlarged and thickened on her CTA, this could be the potential source. Given her concerning exam, I recommend laparoscopic exploration, possible lap alexandre, possible open with evaluation of her bowel to rule out ischemia.     PLAN:  As above, diagnostic laparoscopy, possible lap alexandre, possible open, possible bowel resection         Requesting Physician:      none        Chief Complaint:     Chief Complaint   Patient presents with     Abdominal Pain     Pt has had severe abdominal pain since 0500 this am          History of Present Illness:   Ara Stanley is a 71 year old female who was seen in consultation who presented with severe abdominal pain to [facility]. She was found to have an elevated lactate and acute renal failure on lab evaluation. She was evaluated by Dr. Oliveira with general surgery who was concerned for possible mesenteric ischemia and she was transferred to [facility]. CTA completed and did not show evidence of   Embolic occlusion of mesenteric vessels. She continues to have 20/10 abdominal pain. Per the chart, she has had diarrhea the past couple days. She awoke at 5am with sharp stabbing pain in her abdomen.           Physical Exam:   Blood pressure 101/68, pulse 100, temperature 98.6  F (37  C), temperature source Temporal, resp. rate 16, SpO2 100 %.  0 lbs 0 oz  General: significant distress  Psych: Alert and Oriented.  Normal affect  Neurological: grossly intact  Eyes: Sclera  clear  Respiratory:  labored breathing  Cardiovascular:  tachycardic  GI: abdomen soft, diffusely tender to palpation, guarding and rebound present.    Lymphatic/Hematologic/Immune:  No femoral or cervical lymphadenopathy.  Integumentary:  No rashes         Past Medical History:     Past Medical History:   Diagnosis Date     Asthma      COPD (chronic obstructive pulmonary disease) (H)             Past Surgical History:     Past Surgical History:   Procedure Laterality Date     ARTHROPLASTY HIP  6/4/2012    Procedure:ARTHROPLASTY HIP; Surgeon:DAVIAN FENG; Location: OR     ENT SURGERY      tonsils     GYN SURGERY      hyst     ORTHOPEDIC SURGERY      hip pinning   femur fracture knee surgery      REMOVE HARDWARE HIP NAILING  6/4/2012    Procedure:REMOVE HARDWARE HIP NAILING; REMOVAL OF GAMMA NAIL AND SCREWS FROM RIGHT HIP, RIGHT TOTAL HIP ARTHROPLASTY(GAMMA NAIL EQUIPMENT, [employer] TOTAL HIP)^; Surgeon:DAVIAN FENG; Location: OR            Current Medications:            lidocaine, ipratropium - albuterol 0.5 mg/2.5 mg/3 mL         Home Medications:     Prior to Admission medications    Medication Sig Last Dose Taking? Auth Provider   benazepril (LOTENSIN) 10 MG tablet Take 1 tablet by mouth daily.   Alvin Rader MD   sertraline (ZOLOFT) 100 MG tablet Take 1 tablet by mouth daily.   Alvin Rader MD   Ipratropium-Albuterol (COMBIVENT RESPIMAT)  MCG/ACT inhaler Inhale 1 puff into the lungs 4 times daily. Not to exceed 6 doses per day.   Alvin Rader MD   budesonide-formoterol (SYMBICORT) 80-4.5 MCG/ACT inhaler Inhale 2 puffs into the lungs 2 times daily.   Alvin Rader MD   levalbuterol (XOPENEX) 0.31 MG/3ML nebulizer solution Take 3 mLs by nebulization every 6 hours as needed for shortness of breath / dyspnea.   Alvin Rader MD   fluticasone (FLOVENT DISKUS) 100 MCG/BLIST AEPB Inhale 1 puff into the lungs 2 times daily.   Alvin Rader MD   amLODIPine (NORVASC) 10  MG tablet Take 1 tablet by mouth daily.   Sky Dwyer MD   budesonide-formoterol (SYMBICORT) 80-4.5 MCG/ACT inhaler Inhale 1 puff into the lungs 2 times daily.   Sky Dwyer MD   fexofenadine (ALLEGRA) 180 MG tablet Take 1 tablet by mouth daily.   Sky Dwyer MD   albuterol-ipratropium (COMBIVENT)  MCG/ACT inhaler Inhale 1 puff into the lungs 2 times daily.   Sky Dwyer MD   levalbuterol (XOPENEX HFA) 45 MCG/ACT inhaler Inhale 2 puffs into the lungs every 3 hours as needed.   Sky Dwyer MD   montelukast (SINGULAIR) 10 MG tablet Take 1 tablet by mouth At Bedtime.   Sky Dwyer MD   sertraline (ZOLOFT) 50 MG tablet Take 1 tablet by mouth daily.   Sky Dwyer MD   fluticasone (FLOVENT DISKUS) 100 MCG/BLIST AEPB Inhale 1 puff into the lungs 2 times daily as needed. Indications: Asthma   Reported, Patient   acetaminophen (TYLENOL) 325 MG tablet Take 2 tablets by mouth every 4 hours as needed.   Sky Dwyer MD   diazepam (VALIUM) 5 MG tablet Take 0.5-1 tablets by mouth every 6 hours as needed.   Sky Dwyer MD   Warfarin Sodium (WARFARIN THERAPY REMINDER) 1 each continuous prn.   Sky Dwyer MD   lisinopril (PRINIVIL,ZESTRIL) 20 MG tablet Take 1 tablet by mouth daily.   Sky Dwyer MD   senna-docusate (SENOKOT-S;PERICOLACE) 8.6-50 MG per tablet Take 1-2 tablets by mouth 2 times daily as needed for constipation.   Sky Dwyer MD            Allergies:     Allergies   Allergen Reactions     Iodine I 131 Tositumomab Anaphylaxis     Can tolerate topical iodine if it is wahed off after surgery      Penicillins Rash     Swollen  lymph nodes, flushed overheating      Sulfa Drugs Nausea and Vomiting            Family History:   No family history on file.        Social History:   Ara Stanley  reports that she quit smoking about 25 years ago. Her smoking use included Cigarettes. She has a 10.00  pack-year smoking history. She does not have any smokeless tobacco history on file. She reports that she does not drink alcohol or use illicit drugs.          Review of Systems:   The 10 point Review of Systems is negative other than noted in the HPI.         Labs/Imaging   All new lab and imaging data was reviewed.   I have personally reviewed the imaging studies  .  TIME SPENT:  30 minutes was spent with patient and greater than 50% of the time was spent counseling and coordination of care.      Estelita Peck MD

## 2017-06-02 NOTE — ANESTHESIA CARE TRANSFER NOTE
Patient: Ara Stanley    Procedure(s):  EXPLORATORY LAPAROTOMY. - Wound Class: II-Clean Contaminated    Diagnosis: .  Diagnosis Additional Information: No value filed.    Anesthesia Type:   No value filed.     Note:  Airway :Nasal Cannula  Patient transferred to:PACU  Comments: Pt exhibits spont resps, all monitors and alarms on in pacu, report given to RN, vss.      Vitals: (Last set prior to Anesthesia Care Transfer)              Electronically Signed By: OTTO Reddy CRNA  June 2, 2017  3:19 PM

## 2017-06-03 NOTE — ED PROVIDER NOTES
CHIEF COMPLAINT:  Abdominal pain.      HISTORY OF PRESENT ILLNESS:  Ara Stanley is a 71-year-old female who presented to the Emergency Department with abdominal pain.  She was seen at [facility] earlier today, had a CT without contrast given an allergy, but her lactate was elevated, and there was concern for mesenteric ischemia, so she was sent here for further evaluation.  She was premedicated with Solu-Medrol and Benadryl at the outside hospital.  Dr. Carpenter from Vascular Surgery has already seen the patient here in the Emergency Department, and the plan is surgery, but CT angio abdomen first. No vomiting, fever. Pain started suddenly.     ALLERGIES:  Iodine.      MEDICATIONS:  Tylenol as needed, Combivent, Norvasc, Symbicort, Valium, Allegra, Flovent, Xopenex, lisinopril, Singular, Zoloft.      PAST MEDICAL HISTORY:  COPD, stress-induced cardiomyopathy, hypertension, hyperlipidemia.      PAST SURGICAL HISTORY:  Arthroplasty, left hip.      FAMILY HISTORY:  No pertinent family history.      SOCIAL HISTORY:  Former smoker.      REVIEW OF SYSTEMS:  Ten-point review of systems obtained and negative other than as mentioned above.      PHYSICAL EXAMINATION:   VITAL SIGNS:  Blood pressure 101/68, pulse 100, temperature 98.6, oxygen saturation 100% on room air.   GENERAL:  Appears uncomfortable lying in the bed.   EYES:  Pupils are equal and round.   EARS, NOSE, THROAT:  Moist mucous membranes.   NECK:  Normal range of motion.   CARDIOVASCULAR:  Tachycardic rate and regular rhythm. Skin warm and well perfused  SKIN:  Warm and well perfused.   RESPIRATORY:  Lungs are clear bilaterally.  Nonlabored breathing.  No rales or wheezing.   GASTROINTESTINAL:  Abdomen is soft, but there is diffuse abdominal tenderness with guarding.   MUSCULOSKELETAL:  Normal muscular tone.    SKIN:  No rash or acute skin lesions noted.    NEUROLOGIC:  Awake and alert, answering questions appropriately, moving all extremities equally.  Face is  symmetric.   PSYCH:  Normal affect.      LABORATORY VALUES:  I reviewed the laboratory values obtained at the outside facility, including troponin, lipase, lactic acid, CMP, CBC, blood culture.  Laboratory values obtained here include a type and screen, INR which was normal and lactic acid is pending.  EKG was obtained at an outside facility.  CT angio study of abdomen obtained here in the Emergency Department, but results pending.      MEDICAL DECISION MAKING:    Ara Palomino is a 71-year-old female who presents to the emergency room with abdominal pain and concern for mesenteric ischemia.   Vital signs noted for mild tachycardia.  She has diffuse abdominal tenderness on exam with guarding.  Concern is for mesenteric ischemia.  Dr. Carpenter from Vascular Surgery has already seen the patient here in the Emergency Department, and per nursing, plan is to go to the OR. Discussed risks of CT with patient given contrast allergy and WOODY but given clinically history need to rule out mesenteric ischemia. Antibiotics already given at [facility]. Patient given additional IV fluids in ED here. The patient went to CT first, and the results of the CT are pending by the time the OR was calling for her.  The patient went to the OR.  Dr. Carpenter will be the surgeon, and he will speak to the hospitalist after surgery.  INR and type and screen performed before surgery and rest of laboratory values were obtained at [facility].  The patient was updated on plan.      DIAGNOSIS:    1. Elevated lactic acid  2. Abdominal pain  3. Tachycardia.   4. WOODY  5. Sepsis        KOSTA LYNCH MD             D: 2017 16:32   T: 2017 20:55   MT: [id number]      Name:     ARA PALOMINO   MRN:      [id number]        Account:      [id number]   :      1945           Visit Date:   2017      Document: [id number]

## 2017-06-03 NOTE — PROGRESS NOTES
"General Surgery Progress Note    Subjective: pt with ongoing abdominal pain. Improved compared to prior to surgery. Passing gas. No nausea. Concerned about possible UTI/pyleonephritis     Objective: /64 (BP Location: Left arm)  Pulse 100  Temp 99  F (37.2  C) (Oral)  Resp 16  Ht 5' 7\" (1.702 m)  Wt 139 lb 12.4 oz (63.4 kg)  SpO2 95%  BMI 21.89 kg/m2  Gen: alert, tearful  CV: RRR  Pulm: nonlabored breathing  Abd: soft, ecchymosis surrounding umbilical incision  Ext: WWP    Assessment/Plan:   Ara Stanley  is a 71 year old female s/p lap alexandre and abdominal exploration with lavage.   - start dilaudid pca for pain control  - appreciate hospitalist recommendations/care -?need for abx for possible pyelonephritis (pt has h/o pyelonephritis with double ureter on right - did have perinephric stranding on ct. Ucx pending.   - ok to d/c tanner catheter  - continue heparin  - f/u am labs.     Addendum: repeat UA with ongoing positive LE and pt w symptoms - will start cipro 400mg Q12 IV. Discussed with pharmD given her renal dysfunction.     Estelita Peck MD  [facility]  [phone]              "

## 2017-06-03 NOTE — PLAN OF CARE
Problem: Goal Outcome Summary  Goal: Goal Outcome Summary  Outcome: Improving  VSS. A/O. O2 wean to RA. Dangled by bedside. abdo lap incisions WDL. Oxy prn given for pain. Anxious/sad from recent lost of . Tolerating clears. No N/V. Patients home meds in the medbox in pts room.

## 2017-06-03 NOTE — PROGRESS NOTES
[facility]    Hospitalist Progress Note    Date of Service (when I saw the patient): 06/03/2017    Assessment & Plan   Ara Stanley is a 71 year old female with a past medical history notable for hypertension, dyslipidemia, chronic obstructive pulmonary disease, depression/anxiety, history of stress-induced cardiomyopathy with most recent EF normal, questionable paroxysmal atrial fibrillation, osteoarthritis, smoking and GI bleeding in 03/2017 who originally presented to the emergency department at [facility] for acute abdomen and sepsis-like picture.  Eventually she was found to have acute cholecystitis and underwent cholecystectomy 6/2/17. Hospitalists consulted for medical co-management.    Sepsis secondary to acute cholecystitis, s/p diagnostic laparoscopy, laparoscopic cholecystectomy, abdominal lavage  Presented with several days of abdominal pain, initially unclear etiology with concern for mesenteric ischemia given elevated lactic acid and description of pain with history of paroxysmal atrial fibrillation. CT w/ contrast was performed w/ pre-medication due to contrast allergy which did not reveal evidence of mesenteric ischemia, and therefore underwent diagnostic laparoscopy, found to have findings of cholecystitis with laparoscopic cholecystectomy and abdominal lavage performed.  - Started on PCA per surgery service  - Continues to have diffuse abdominal pain and tenderness, which she actually reports is worse following surgery. See below regarding possible UTI, otherwise does not appear to have clear etiology of this despite extensive abdominal imaging and diagnostic laparoscopy. She did appear to be comfortable in bed watching TV when I initially entered room 6/3/2017, but then barely made it through interview due to paroxysms of pain. She also frequently brings up the death of her  and subsequent stress from that throughout interview, often w/o prompting,  and question if stress/anxiety is contributing (at a minimum to her ability to cope with pain, as she did have clear pathology noted with cholecystitis, lactic acid elevation and would expect some post-operative pain).   - Continue IV fluids  - On antibiotics as below    Possible UTI  UA abnormal with moderate LE, 7 WBC. Noted to have perinephric stranding on surgeons review of CT. Does not have CVA tenderness, fevers, n/v to strongly suggest pyelonephritis. No hydronephrosis noted on CT.   - Agree with continuing ciprofloxacin for now  - Follow-up UC and BC results    Acute kidney injury on chronic kidney disease stage III  Creatinine upon admission was 1.55, suspected to have possible contribution from sepsis.   - Creatinine increasing, did receive IV contrast and may reflect developing contrast nephropathy  - UA with minimal WBC (7) and no RBC  - No evidence of hydronephrosis on CT   - Continue Crawford overnight to monitor I/O strictly, RN feels would not be able to measure non-invasively at this point  - Avoid nephrotoxins  - Continue holding prior to admission ACE-i  - Monitor for development of hyperkalemia on LR    Non anion gap metabolic acidosis  May be secondary to NS. Per [facility] her baseline bicarbonate does tend to be mildly low. No diarrhea noted. Changed IV fluids to lactated ringers for lower chloride content.   - Monitor BMP    Hypertension  - Blood pressures borderline hypotensive since admission. Holding prior to admission ACE-i as below     Chronic obstructive pulmonary disease  Continue prior to admission Breo Ellipta and Combivent.  DuoNeb available PRN     Dyslipidemia  Continue prior to admission lovastatin 20 mg p.o. at bedtime.     Questionable paroxysmal atrial fibrillation  Per most recent cardiology clinic evaluation 4/24/17, there was concern during her preceding hospitalization for possible atrial fibrillation on telemetry, although never documented on EKG and when re-evaluated at  one point felt more consistent with sinus rhythm with premature atrial complexes. Per that note it was recommended she wear an event monitor, although there are no results of this available in [facility]. EKG this admission with sinus rhythm with PACs  - Check EKG if irregular rhythm noted on exam, presently regular  - No indication for anticoagulation at this time, would defer to planned outpatient monitoring and follow-up as outlined by her current cardiologist     Stress-induced cardiomyopathy  Diagnosed in 03/2017.  Coronary angiogram at that time was normal.  The repeat echocardiogram on 03/29 showed normalization of LV function with EF of 55% to 60%.  Due to acute kidney injury, prior to admission benazepril is on hold.   - Monitor volume status    Depression  Anxiety  Continue with prior to admission Zoloft 50 mg p.o. daily.   - She has significant anxiety, particularly surrounding the death of her  and subsequent stressors resulting from this  - She reports  has been involved here, no notes available, encouraged her to utilize these services  - May benefit from psychiatry consult, however may not receive full benefit from this at this time based on participation limitations due to pain    Tobacco use  The patient states she had quit smoking 15 years ago; however, after the death of her  in 03/2017 she started smoking again.  Nicotine patch has been offered, which was declined.     Alcohol use  The patient indicates  that she does not drink more than 1 beer and 1 mixed vodka with lemonade, and not everyday.  She might underplay the amount of her alcohol consumption.    - No clear signs of alcohol withdrawal presenting, and if she is accurately reporting her alcohol use would be low risk to develop this.     Normocytic anemia  Hemoglobin was 10.8 upon admission.  Reviewing her chart shows hemoglobin of 8.5 in 03/2017 when she had GI bleed.  Upper endoscopy at that time showed minimal  gastric and duodenal inflammatory changes and no real source of GI blood loss.   - Monitor hemoglobin    DVT Prophylaxis: Defer to primary service. Currently on heparin SQ.  Code Status: Full Code    Disposition: Expected discharge per surgical service. Hospitalists will continue to follow along.    Adonis Daigle    Interval History   Continues to have ongoing diffuse abdominal pain with sharp, shooting paroxysms. Frequently talks about the death of her  during the interview. Denies any back or flank pain.    -Data reviewed today: I reviewed all new labs and imaging results over the last 24 hours. I personally reviewed no images or EKG's today.    Physical Exam   Temp: 97.8  F (36.6  C) Temp src: Oral BP: 93/56 Pulse: 103 Heart Rate: 101 Resp: 16 SpO2: 96 % O2 Device: None (Room air) Oxygen Delivery: 1 LPM  Vitals:    06/02/17 2100   Weight: 63.4 kg (139 lb 12.4 oz)     Vital Signs with Ranges  Temp:  [97.8  F (36.6  C)-99  F (37.2  C)] 97.8  F (36.6  C)  Pulse:  [] 103  Heart Rate:  [] 101  Resp:  [11-18] 16  BP: ()/(44-71) 93/56  SpO2:  [95 %-100 %] 96 %  I/O last 3 completed shifts:  In: 5123 [P.O.:1300; I.V.:3593; IV Piggyback:230]  Out: 735 [Urine:725; Blood:10]    Constitutional: Elderly female, NAD  Respiratory: Clear to auscultation bilaterally, good air movement bilaterally  Cardiovascular: RRR, no m/r/g. No peripheral edema.  GI: Soft, diffusely tender to palpation  Back: No CVA tenderness  Skin/Integumen: Warm, dry  Other:     Medications     NaCl 125 mL/hr at 06/03/17 1357       HYDROmorphone   Intravenous PCA     sodium chloride 0.9%  500 mL Intravenous Once     ciprofloxacin  400 mg Intravenous Q12H     sodium chloride (PF)  3 mL Intracatheter Q8H     heparin  5,000 Units Subcutaneous Q12H     acetaminophen  975 mg Oral Q8H     senna-docusate  1-2 tablet Oral BID     sertraline  50 mg Oral Daily     fluticasone-vilanterol  1 puff Inhalation Daily     simvastatin  10 mg Oral  QA       Data     Recent Labs  Lab 06/03/17  0732 06/02/17  2041 06/02/17  1420 06/02/17  1015   WBC 3.3*  --   --  10.3   HGB 9.1*  --   --  10.8*   MCV 90  --   --  93    218  --  339   INR  --   --  1.11  --      --   --  137   POTASSIUM 4.7  --   --  4.7   CHLORIDE 109  --   --  104   CO2 18*  --   --  18*   BUN 46*  --   --  53*   CR 1.64*  --   --  1.55*   ANIONGAP 10  --   --  15*   CARYL 7.8*  --   --  9.4   *  --   --  50*   ALBUMIN 2.7*  --   --  3.6   PROTTOTAL 5.7*  --   --  7.1   BILITOTAL 0.3  --   --  0.4   ALKPHOS 67  --   --  96   ALT 38  --   --  42   AST 52*  --   --  49*   LIPASE  --   --   --  514*   TROPI  --   --   --  <0.015The 99th percentile for upper reference range is 0.045 ug/L.  Troponin values in the range of 0.045 - 0.120 ug/L may be associated with risks of adverse clinical events.       No results found for this or any previous visit (from the past 24 hour(s)).

## 2017-06-03 NOTE — ANESTHESIA POSTPROCEDURE EVALUATION
Patient: Ara Stanley    Procedure(s):  EXPLORATORY LAPAROSCOPY, CHOLECYCTECTOMY - Wound Class: II-Clean Contaminated   - Wound Class: II-Clean Contaminated    Diagnosis:.  Diagnosis Additional Information: No value filed.    Anesthesia Type:  RSI, General, ETT    Note:  Anesthesia Post Evaluation    Patient location during evaluation: PACU  Patient participation: Able to fully participate in evaluation  Level of consciousness: awake and alert  Pain management: satisfactory to patient  Airway patency: patent  Cardiovascular status: hemodynamically stable  Respiratory status: acceptable and unassisted  Hydration status: balanced  PONV: none     Anesthetic complications: None          Last vitals:  Vitals:    06/02/17 1900 06/02/17 1925 06/02/17 1951   BP: 94/52 99/50 96/51   Pulse:      Resp: 11  16   Temp:  36.6  C (97.9  F)    SpO2: 100%  100%         Electronically Signed By: Fernando Weber MD  June 2, 2017  8:24 PM

## 2017-06-03 NOTE — CONSULTS
VASCULAR SURGICAL CONSULTATION       REQUESTING PHYSICIAN:  None stated.        DATE OF SERVICE:  06/02/2017      CHIEF COMPLAINT:  Severe abdominal pain.      HISTORY OF PRESENT ILLNESS:  Ara Stanley is a 71-year-old patient who presented to [facility] Emergency Department this morning.  She has several medical problems including an episode of paroxysmal atrial fibrillation, not on anticoagulation.  She had some loose diarrheal stools yesterday.  She otherwise was feeling fine, but awoke at 0500 with sharp stabbing pain to her entire abdomen.  This was not localized to any quadrant.  This was not associated with fever and chills.  She was complaining of some nausea and a small amount of vomiting.  She presented to the Emergency Department and was seen by Dr. Myron Funez.  He noted diffuse abdominal tenderness.      With the acute onset, he was concerned about the possibility of a significant abdominal problem including the possibility of a mesenteric embolus.  She has a contrast allergy; therefore, CT scan of the abdomen was performed.  He called us in surgical consultation.  Due to the extent of her pain and the possibility that this could be ischemic bowel from a cardiac embolus or other source (atherosclerotic disease), it was felt that she should be transferred to [facility].  We wanted to perform further contrast studies to evaluate this further, and she was given Solu-Cortef 300 mg IV and Benadryl 50 mg IV in the [facility] emergency Department prior to leaving.  She was also hydrated and given 1 dose of levofloxacin.       ALLERGIES:  Iodine contrast.  This happened over 30 years ago where she develop breathing problems and hives with marked swelling.  She had undergone a coronary angiogram at [facility] in March for takotsubo cardiomyopathy.  She received premedications and had no problems with the angiogram at that time.  She is also allergic to penicillin and sulfa drugs.       MEDICATIONS PRIOR TO ADMISSION:  Lotensin, Zoloft, Symbicort, albuterol inhaler, Flovent Diskus, amlodipine, Singular, Valium.  She had previously been on Coumadin but no longer.      SURGICAL HISTORY:   1.  NAHID/BSO, appendectomy in approximately .   2.  Pinning to her right hip several years ago following a fracture with removal of hardware.   3.  Right total hip replacement.   4.  Tonsillectomy.      PAST MEDICAL HISTORY:   1.  Takotsubo cardiomyopathy.  This was evaluated recently at [facility] in 2017 with coronary angiogram.  Ejection fraction was 35% at that time, improved.  The patient had been admitted for pneumonia initially and this is resolving, though she still has some shortness of breath.  This reported that she had paroxysmal atrial fibrillation during the hospitalization; that has resolved.     2.  Asthma, COPD.   Smoking is 1/2 pack of cigarettes a day for 20 years but quit in . Recently restarted after 's sudden death--now several packs daily.  3.  Hyperlipidemia.   4.  Hypertension.   5.  Osteoarthritis of the right hip following a nonunion of a subtrochanteric hip fracture, now with a total hip arthroplasty.        SOCIAL HISTORY:  The patient  recently  suddenly. Primary care physician is Dr. Shar James.  She does not use alcohol.      REVIEW OF SYSTEMS:  The patient infrequently gets pneumonia.  The most recent episode in March was more severe.  She had not had pneumonia for at least 10 years prior to this.  She denies a history of claudication.  She denies a history of abdominal pain.  No history of peptic ulcer disease or gallbladder disease.      PHYSICAL EXAMINATION:   GENERAL:  The patient was examined in the Emergency Department at [facility] upon arrival.  She is alert and appropriate.     VITAL SIGNS:  Temperature 98.5 degrees, pulse 100, blood pressure 110/80.  Respiratory rate is 18.  O2 saturations on oxygen 100%.  She moved very  slowly from the ambulance gurney to the bed due to her abdominal pain.   CHEST:  Rhonchi on auscultation with no wheezing.   CARDIOVASCULAR:  Regular rate.   ABDOMEN:  Well-healed infraumbilical midline incision.  Marked diffuse tenderness with guarding is noted throughout the abdomen.  This is not more prominent in any quadrant including the right upper quadrant.   EXTREMITIES:  No distal edema.  +3 radial, femoral pulses bilaterally.  I cannot palpate the popliteal pulses.  The patient has biphasic Doppler flow in the right dorsalis pedis and posterior tibial arteries and the left dorsalis pedis artery with a very weak posterior tibial signal on the left.  There are no ischemic changes to her feet.  Sensation is intact.      RESULTS:  I reviewed the CT scan from [facility].  This reveals diffuse mild calcification of the aorta.  There is no obvious calcification in the celiac or SMA arteries, but the filming technique was not defined for these vessels.  There is no aneurysm or dilatation.  She has multiple gallstones with some thickening of the gallbladder wall.  There is no edematous bowel appreciated.  Iliac arteries are also calcified.  There is no free air.  Pancreas is unremarkable.      ADMISSION LABORATORY DATA:  Sodium 137, potassium 4.7, serum creatinine 1.55.  Albumin 3.6, total bilirubin 0.4.  Liver function tests normal.  Lactic acid elevated at 3.1 with a repeat of 3.5.  Lipase is elevated at 5.14.  Negative troponin.  Glucose initially 50 and 94 on recheck.  White blood count of 10,300, hemoglobin 10.8.  Blood cultures have been sent.      Rhythm strips at the Emergency Department and EKG revealed normal sinus rhythm with occasional PACs.  There was no evidence of atrial fibrillation.      Due to the patient's ongoing severe abdominal pain we did want to rule out acute mesenteric ischemia.  We therefore felt a CT would be the most efficacious way to do this.  The patient had received  the Solu-Cortef and Benadryl prior to leaving the [facility] Emergency Department, and now this has been in her system for at least 45 minutes.     CTA scan was then performed with contrast.  This did reveal mild aortic and iliac calcification but no significant narrowing.  There was good flow in both celiac and superior mesenteric artery which had no significant stenosis. No evidence of emboli or thrombus. Renal arteries were also normal.      IMPRESSION:  Acute onset of severe abdominal pain that is persistent.  The patient was initially hypotensive upon arrival in the Emergency Department, now normotensive with hydration.  Her lactic acid has remained elevated.  She does have evidence of cholelithiasis and possibly acute cholecystitis, but her pain is much more severe than one would suspect from this.  There is no evidence of acute mesenteric ischemia on the scans with good blood flow noted at least to the secondary and tertiary branches of the celiac and SMA arteries.  We therefore will take the patient to the operating room.  Initially a diagnostic laparoscopy will be performed.  Further treatment will depend on these findings.  Very likely a cholecystectomy will be performed.  This has been discussed with the patient, and all questions answered.         MALENA FELIX MD             D: 2017 15:52   T: 2017 07:01   MT: ramon      Name:     BEN PALOMINO   MRN:      [id number]        Account:       [id number]   :      1945           Consult Date:  2017      Document: [id number]

## 2017-06-03 NOTE — CONSULTS
DATE OF CONSULTATION:  06/02/2017      MEDICAL CONSULTATION       PRIMARY CARE PROVIDER:  Shar James DO      REQUESTING PHYSICIAN:  Estelita Peck MD of Surgical Service      REASON FOR CONSULTATION:  Assistance with medical co-management including COPD as well as concern for alcohol withdrawal.      HISTORY OF PRESENT ILLNESS:  Ms. Ara Stanley is a 71-year-old  lady with a past medical history notable for hypertension, dyslipidemia, depression/anxiety, stress-induced cardiomyopathy, paroxysmal atrial fibrillation anticoagulated with warfarin, osteoarthritis and COPD who presented to the emergency department at [facility] earlier today with acute abdominal pain and sepsis-like picture.  Lactic acid was elevated at 3.1 and she had renal failure with a creatinine of 1.55.  Hematology profile showed white count of 10.3 and hemoglobin of 10.8.  CT chest, abdomen/pelvis without contrast revealed diffuse fatty infiltration of the liver and cholelithiasis, trace amount of free fluid in the pelvis, slight stranding in the omentum without discrete nodularity and scattered tiny indeterminate lung nodules.  The patient was transferred to [facility] under the care of Surgical Service for consideration of a mesenteric artery embolic occlusion.  The patient was evaluated by Dr. Lavell Ventura and underwent CT angiogram of the abdomen and pelvis which showed no evidence for embolic occlusion of the mesenteric arteries and visualized branches.  She subsequently underwent diagnostic laparoscopy and was found to have acute cholecystitis for which cholecystectomy was performed.  The hospitalist service is consulted to assist with medical co-management including COPD.  There is also concern for potential alcohol withdrawal.  On direct questioning, the patient reports adequate surgical pain control.  She reports no fever, chills, cough, sputum, wheezing or shortness of breath.   She states after the passing of her  in 2017 she has started to drink alcohol.  When I asked the patient to elaborate, she indicated that she would not drink more than 1 beer a day plus 1 mixed vodka and lemonade.      PAST MEDICAL HISTORY:   1.  Hypertension.   2.  Dyslipidemia.   3.  Depression.   4.  Stress-induced cardiomyopathy in 2017.  Coronary angiogram showed nonobstructive coronary artery disease.  EF at that time was around 35%.  Repeat echocardiogram on 2017 revealed normalization of the LV systolic function with EF of 55% to 60%.   5.  COPD.   6.  Paroxysmal atrial fibrillation, anticoagulated with warfarin.   7.  Tobacco use.   8.  Osteoarthritis.   9.  History of GI bleed and anemia in 2017.   Upper endoscopy on 2017 showed minimal gastric and duodenal inflammatory changes and no real source of GI blood loss.     PAST SURGICAL HISTORY:   1.  Laparoscopic cholecystectomy on 2017 as outlined above.   2.  Tonsillectomy.   3.  Hysterectomy.   4.  Right femur open reduction and fixation for fracture.   5.  Right hip pinning.   6.  Right knee arthroscopic surgery for meniscectomy.      FAMILY HISTORY:  Mother had diabetes and  at the age of 94.  Father  of old age at the age of 90.      SOCIAL HISTORY:  The patient is .  She had quit smoking 15 years ago.  However, after the passing of her  in 2017 she has started smoking.  Currently she drinks around 1 beer as well as 1 mixed vodka and lemonade a day.      MEDICATIONS:    Prescriptions Prior to Admission   Medication Sig Dispense Refill Last Dose     ASPIRIN EC PO Take 81 mg by mouth daily   2017 at am     MELOXICAM PO Take 15 mg by mouth daily   2017 at am     LOVASTATIN PO Take 20 mg by mouth every morning   2017 at am     ipratropium - albuterol 0.5 mg/2.5 mg/3 mL (DUONEB) 0.5-2.5 (3) MG/3ML neb solution Take 1 vial by nebulization 2 times daily as needed for shortness of breath /  dyspnea or wheezing   6/1/2017 at am     ACETAMINOPHEN PO Take 650 mg by mouth every morning   6/1/2017 at am     BACLOFEN PO Take 5 mg by mouth every morning RX is 5 mg (1/2 of 10 mg tab) 2 x daily but she takes daily due to drowsiness.   6/1/2017 at am     Multiple Vitamins-Minerals (CENTRUM SILVER) per tablet Take 1 tablet by mouth every morning   6/1/2017 at am     TRAZODONE HCL PO Take 50 mg by mouth nightly as needed for sleep   prn     BENAZEPRIL HCL PO Take 20 mg by mouth every morning   6/1/2017 at am     fluticasone-vilanterol (BREO ELLIPTA) 100-25 MCG/INH oral inhaler Inhale 1 puff into the lungs daily   6/1/2017 at am     Ipratropium-Albuterol (COMBIVENT RESPIMAT)  MCG/ACT inhaler Inhale 1 puff into the lungs 4 times daily as needed for shortness of breath / dyspnea or wheezing   prn     sertraline (ZOLOFT) 50 MG tablet Take 1 tablet by mouth daily. 90 tablet  6/1/2017 at am          ALLERGIES AND INTOLERANCES:  Penicillin, sulfa drugs, iodine 131.      REVIEW OF SYSTEMS:  A 10-point review of system was performed thoroughly and was negative except as stated in History of Present Illness.      PHYSICAL EXAMINATION:   GENERAL:  This patient is a very pleasant elderly lady who is in no acute distress.   VITAL SIGNS:  Blood pressure 99/50, heart rate 84, respiratory rate 18, temperature 97.9 degrees Fahrenheit, oxygen saturation 100% on 1 liter.   HEENT:  The pupils are round, equal, reactive to light bilaterally.  There is mild conjunctival pallor.  The sclerae are anicteric.  The oral mucosa appears moist.   NECK:  Supple.  No elevated JVP.   LUNGS:  Clear to auscultation bilaterally.  I could not appreciate any crackles, wheezing or rhonchi.   CARDIOVASCULAR:  There is normal S1 and S2 with regular rate and rhythm.   ABDOMEN:  Nondistended.  Bowel sounds are present.   EXTREMITIES:  There is no calf tenderness or lower extremity edema.   SKIN:  There is no jaundice, cyanosis or acute rash.    NEUROLOGIC:  She is awake, alert, oriented x3.  There is no focal deficit on gross examination.      DATA:   1.  Chemistry profile:  Sodium 137, potassium 4.7, BUN 53, creatinine 1.55, calcium 9.4, ALT 42, AST 49, total bilirubin 0.4, lactic acid 1.5.  Lipase 514.  Troponin I less than 0.015.  Glucose 94.   2.  Hematology profile:  White count 10.3, hemoglobin 10.8, platelet count 339,000, MCV 93.     3.  Upper endoscopy on 03/29/2017 demonstrated minimal gastric-duodenal inflammatory change and no real source of GI blood loss found.      ASSESSMENT AND PLAN:  Ms. Ara Stanley is a 71-year-old  lady with a past medical history notable for hypertension, dyslipidemia, chronic obstructive pulmonary disease, depression/anxiety, stress-induced cardiomyopathy, paroxysmal atrial fibrillation, osteoarthritis, smoking and GI bleeding in 03/2017 who originally presented to the emergency department at [facility] for acute abdomen and sepsis-like picture.  Eventually she was found to have acute cholecystitis and underwent cholecystectomy with Dr. Peck.  Hospitalist Service is consulted to assist with medical co-management including chronic obstructive pulmonary disease.  There is also concern for possible excessive alcohol use and potential alcohol withdrawal.   1.  Acute cholecystitis with sepsis:  The patient underwent laparoscopic cholecystectomy earlier today with Dr. Estelita Peck.  The patient is currently started on clear liquids as tolerated.  I do agree with continuing IV fluids at a rate of 125 mL per hour.  Further management will be deferred to the surgical service including pain control.   2.  Acute kidney injury:  Creatinine upon admission was 1.55 consistent with acute kidney injury.  I suspect it is due to sepsis.  We will avoid NSAIDs or other nephrotoxins.  Unfortunately, the patient received contrast earlier today.  She is therefore at the risk of contrast nephropathy.  Her  renal function will be closely monitored.  I will hold her prior to admission benazepril.   3.  Hypertension:  At this juncture, the blood pressure is running soft due to sepsis.  I will hold her prior to admission benazepril 20 mg p.o. Daily.  Will have IV labetalol available p.r.n. for systolic blood pressure more than 170.  Her blood pressure will be closely monitored.   4.  Chronic obstructive pulmonary disease:  It appears to be stable.  She will continue prior to admission Breo Ellipta and Combivent.  In addition, I will have DuoNeb available p.r.n.   5.  Dyslipidemia:  She will continue with prior to admission lovastatin 20 mg p.o. at bedtime.   6.  Paroxysmal atrial fibrillation:  This is per her medical record.  Her [id number] score is 3.  She is anticoagulated with warfarin.  This can be resumed once deemed safe from a surgical standpoint.  At this point, she is in normal sinus rhythm.   7.  Stress-induced cardiomyopathy:  It was diagnosed in 03/2017.  Coronary angiogram at that time was normal.  The repeat echocardiogram on 03/29 showed normalization of LV function with EF of 55% to 60%.  Due to acute kidney injury, I will hold her prior to admission benazepril.   8.  Depression/anxiety:  She will continue with prior to admission Zoloft 50 mg p.o. daily.  During the examination, the patient was noted to be substantially anxious.  She might benefit from a psychiatric evaluation.   9.  Tobacco use:  The patient states she had quit smoking 15 years ago; however, after the death of her  in 03/2017 she started smoking again.  I offered a nicotine patch which was declined.   10.  Alcohol use:  The patient indicates  that she does not drink more than 1 beer and 1 mixed vodka with lemonade a day.  She might underplay the amount of her alcohol consumption.  We will closely watch the patient for development of alcohol withdrawal.   11.  Anemia:  Hemoglobin was 10.8 upon admission.  Reviewing her chart  shows hemoglobin of 8.5 in 2017 when she had reportedly GI bleed.  Upper endoscopy at that time showed minimal gastric and duodenal inflammatory changes and no real source of GI blood loss.  At this juncture, there is no clinical evidence for bleed. EBL was 10 ml. Her hemoglobin will be monitored.   12.  Deep venous thrombosis prophylaxis:  Heparin subcu has been started by the Surgical Service.      CODE STATUS:  It was discussed and a full code was established.      DISPOSITION:  Expectant discharge per Surgical Service.      I would like to thank Dr. Estelita Peck for allowing the Hospitalist Service to participate in the care of this patient.  The hospitalist service will follow the patient along with you during the course of this hospitalization.         JEN APODACA MD             D: 2017 21:03   T: 2017 22:28   MT:       Name:     BEN PALOMINO   MRN:      [id number]        Account:       [id number]   :      1945           Consult Date:  2017      Document: [id number]       cc: Estelita Peck MD       [facility]

## 2017-06-03 NOTE — PROGRESS NOTES
SPIRITUAL HEALTH SERVICES Progress Note  [facility]    PRIMARY FOCUS:      Symptom/pain management    Support for coping    ILLNESS CIRCUMSTANCES:    Reviewed documentation. Reflective conversation shared with Ara which integrated elements of illness and family narratives.         Context of Serious Illness/Symptom(s) -  Ara complains of significant pain and recent abdominal surgery.  She is grieving loss of her spouse two months ago.    Resources for Support - Not discussed      DISTRESS:      Emotional/Existential/Relational Distress - Pt says that she held herself together for the first month or so since her 's sudden death, however then she started having serious medical troubles and multiple hospitalizations.  Her understanding of her circumstance is that her body took such a toll that first month that it can no longer handle her grief.  She says that she has spoken to chaplains at each hospital in which she has been inpatient, but that their support ends after her hospitalization and that she cannot manage on her own without support.  She says that she has never experienced pain this severe for this long before, and she previously had three children delivered.    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       SPIRITUAL/Bahai (Coping):      Mandaeism/Maureen - Orthodoxy    Spiritual Practice(s) - Not discussed    Emotional/Existential/ Relationall/Connections - Not discussed      GOALS OF CARE:    Goals of Care - Pt desperately seeks relief of the pain she currently experiences, which causes her to cry out frequently.    Meaning/Sense-Making - To continue to work through her grief and manage her life without her spouse.      PLAN:  provided emotional support and a brief blessing.  [facility] team will follow this patient  throughout her stay.                                                                                                                 Taya Bojorquez M.Div.  formerly [facility] Resident  Pager [phone]

## 2017-06-03 NOTE — PLAN OF CARE
Problem: Goal Outcome Summary  Goal: Goal Outcome Summary  Outcome: No Change  A&O. AVSS.  Pain consistently rated 10/10.  Minimal use of PCA pump, encouraged patient to press button.  Regular diet, little appetite.  Patient is tearful and states she is lonely,  support requested.  Abx started.  Ambulated x1, experienced some dizziness.  BP soft,  ml, bolus ordered & given.  Magnesium replaced per protocol. Frequent requests.

## 2017-06-04 NOTE — CONSULTS
[facility]    Hematology/Oncology Consult Note      Date of Admission:  6/2/2017  Date of Consult:  06/04/17  Reason for Consult: leukopenia      ASSESSMENT/PLAN:  Ara Stanley is a 71 year old female, in the ICU, admitted with abdominal pain, found to have acute cholecystitis with sepsis picture, s/p laparoscopic cholecystectomy on 6/2/17, now with acute renal failure and declining WBC.  She had normal WBC on admission, and has declined over the last 2 days.  She has a mildly decreased neutrophil count and lymphoyte count, otherwise differential is unremarkable.  It is likely from her acute illness and sepsis.  Will send a peripheral smear.  Continue to treat her acute illness and supportive cares for now, and will continue to monitor CBC with differential daily.  If her ANC continues to decline <1, in setting of septic picture and critical illness, can consider Neupogen support.  We will continue to follow.        HISTORY OF PRESENT ILLNESS: Ara Stanley is a 71 year old female with PMHx of HTN, COPD, stress-induced cardiomyopathy, who presents with abdominal pain with sepsis picture, s/p diagnostic laparoscopy with laparoscopic cholecystectomy on 6/2/17, found to have cholecystitis and sepsis.  She developed acute renal failure and hypotension, as well as worsening leukopenia, and transferred to the ICU this afternoon.  Patient remains to be in much pain and is on Dilaudid PCA.     Patient had normal WBC prior to this hospitalization, and started trending down after her surgery.  WBC was 3.3K on 6/3/17, and 1.8K this morning.  Differential this morning shows, mildly low neutrophil and lymphocyte count, otherwise normal.        REVIEW OF SYSTEMS:   14 point ROS was reviewed and is negative other than as noted above in HPI.       MEDICATIONS:  Current Facility-Administered Medications   Medication     bisacodyl (DULCOLAX) Suppository 10 mg     HYDROmorphone (DILAUDID) PCA 1  mg/mL     [START ON 6/5/2017] ciprofloxacin (CIPRO) infusion 400 mg     sodium chloride (PF) 0.9% PF flush 10 mL     sodium bicarbonate 150 mEq in 5% dextrose for infusion     sodium chloride (PF) 0.9% PF flush 10-20 mL     sodium chloride (PF) 0.9% PF flush 10 mL     dextrose 50 % injection     magnesium sulfate 4 g in 100 mL sterile water (premade)     umeclidinium-vilanterol (ANORO ELLIPTA) 62.5-25 MCG/INH oral inhaler 1 puff     albuterol (PROVENTIL) neb solution 2.5 mg     lidocaine 1 % 1 mL     lidocaine (LMX4) cream     sodium chloride (PF) 0.9% PF flush 3 mL     sodium chloride (PF) 0.9% PF flush 3 mL     naloxone (NARCAN) injection 0.1-0.4 mg     heparin sodium PF injection 5,000 Units     benzocaine-menthol (CHLORASEPTIC) 6-10 MG lozenge 1-2 lozenge     acetaminophen (TYLENOL) tablet 975 mg     [START ON 6/5/2017] acetaminophen (TYLENOL) tablet 650 mg     oxyCODONE (ROXICODONE) IR tablet 5-10 mg     diphenhydrAMINE (BENADRYL) solution 12.5 mg    Or     diphenhydrAMINE (BENADRYL) injection 12.5 mg     ondansetron (ZOFRAN-ODT) ODT tab 4 mg    Or     ondansetron (ZOFRAN) injection 4 mg     prochlorperazine (COMPAZINE) injection 5 mg    Or     prochlorperazine (COMPAZINE) tablet 5 mg     senna-docusate (SENOKOT-S;PERICOLACE) 8.6-50 MG per tablet 1-2 tablet     HYDROmorphone (PF) (DILAUDID) injection 0.3-0.5 mg     sertraline (ZOLOFT) tablet 50 mg     labetalol (NORMODYNE/TRANDATE) injection 10 mg     fluticasone-vilanterol (BREO ELLIPTA) 100-25 MCG/INH oral inhaler 1 puff     traZODone (DESYREL) tablet 50 mg     simvastatin (ZOCOR) tablet 10 mg         ALLERGIES:  Allergies   Allergen Reactions     Iodine I 131 Tositumomab Anaphylaxis     Can tolerate topical iodine if it is wahed off after surgery      Penicillins Rash and Anaphylaxis     Swollen  lymph nodes, flushed overheating      Sulfa Drugs Nausea and Vomiting, Diarrhea and Rash         PAST MEDICAL HISTORY:  Past Medical History:   Diagnosis Date      "Asthma      COPD (chronic obstructive pulmonary disease) (H)          PAST SURGICAL HISTORY:  Past Surgical History:   Procedure Laterality Date     ARTHROPLASTY HIP  6/4/2012    Procedure:ARTHROPLASTY HIP; Surgeon:DAVIAN FENG; Location: OR     ENT SURGERY      tonsils     GYN SURGERY      hyst     ORTHOPEDIC SURGERY      hip pinning   femur fracture knee surgery      REMOVE HARDWARE HIP NAILING  6/4/2012    Procedure:REMOVE HARDWARE HIP NAILING; REMOVAL OF GAMMA NAIL AND SCREWS FROM RIGHT HIP, RIGHT TOTAL HIP ARTHROPLASTY(GAMMA NAIL EQUIPMENT, SMITH & NEPHEW TOTAL HIP)^; Surgeon:DAVIAN FENG; Location: OR         SOCIAL HISTORY:  Social History     Social History     Marital status:      Spouse name: N/A     Number of children: N/A     Years of education: N/A     Occupational History     Not on file.     Social History Main Topics     Smoking status: Former Smoker     Packs/day: 0.50     Years: 20.00     Types: Cigarettes     Quit date: 5/31/1992     Smokeless tobacco: Not on file     Alcohol use No     Drug use: No     Sexual activity: Not on file     Other Topics Concern     Not on file     Social History Narrative         FAMILY HISTORY:  No family history on file.      PHYSICAL EXAM:  Vital signs:  Temp: 97.3  F (36.3  C) Temp src: Oral BP: 92/52 Pulse: 99 Heart Rate: 105 Resp: 18 SpO2: (!) 86 % O2 Device: Nasal cannula Oxygen Delivery: 3 LPM Height: 170.2 cm (5' 7\") Weight: 74.2 kg (163 lb 9.3 oz)  Estimated body mass index is 25.62 kg/(m^2) as calculated from the following:    Height as of this encounter: 1.702 m (5' 7\").    Weight as of this encounter: 74.2 kg (163 lb 9.3 oz).    GENERAL/CONSTITUTIONAL: No acute distress.   RESPIRATORY: Wheezing bilaterally.  CARDIOVASCULAR: Regular rate and rhythm without murmurs, gallops, or rubs.  GASTROINTESTINAL: Soft, tender to palpation diffusely, surgical scars noted, hypoactive bowel sounds.  MUSCULOSKELETAL: Warm and well-perfused, edema " bilaterally.  NEUROLOGIC: Alert, oriented, answers questions appropriately.  INTEGUMENTARY: No jaundice.      LABS:  CBC RESULTS:   Recent Labs   Lab Test  06/04/17   1147  06/04/17   0725   WBC   --   1.8*   RBC   --   3.18*   HGB  8.6*  9.3*   HCT   --   28.4*   MCV   --   89   MCH   --   29.2   MCHC   --   32.7   RDW   --   16.8*   PLT   --   208       Recent Labs   Lab Test  06/04/17   0725  06/03/17   1535   NA  130*  134   POTASSIUM  4.4  4.7   CHLORIDE  103  107   CO2  15*  17*   ANIONGAP  12  10   GLC  75  117*   BUN  47*  46*   CR  2.26*  1.77*   CARYL  8.1*  7.8*         Thank you for the opportunity to participate in this patient's care.  Please call with any questions.    Joy Laird MD  Hematology/Oncology  [facility]

## 2017-06-04 NOTE — CONSULTS
[facility]  CRITICAL CARE ADMISSION/CONSULTATION    Ara Stanley MRN: [id number]  1945  Date of Admission:6/2/2017            HPI   Ara Stanley IS a 71 year old female admitted on 6/2/2017 with significant history for HTN, COPD, depression/anxiety, stress CDM who was admitted 6/2 for abdominal pain. She had CTA a/p which demonstrated no signs for ischemic colitis. Given persistent abdominal pain and ?cholecystitis on imaging, she underwent exp lap 6/2 in which only cholecystectomy was performed. Despite surgery, she felt that her abdominal pain was worse. She had been on opiate PCA without benefit. Her post-op course had been complicated by episodes of hypotension which were volume responsive. Today, she has had persistent hypotension with SBP<80 despite IVF. Lactic acid has been normal. She has developed WOODY/ATN which is presumed 2/2 YVONNE. ICU team consulted for hypotension/sepsis.     At the bedside, she is calm, awake and alert. She does c/o cough, but seems more productive now. No fever/chills/CP. She has abdominal pain which has not changed since admission.          Past Medical History:      Past Medical History:   Diagnosis Date     Asthma      COPD (chronic obstructive pulmonary disease) (H)              Past Surgical History:      Past Surgical History:   Procedure Laterality Date     ARTHROPLASTY HIP  6/4/2012    Procedure:ARTHROPLASTY HIP; Surgeon:DAVIAN FENG; Location: OR     ENT SURGERY      tonsils     GYN SURGERY      hyst     ORTHOPEDIC SURGERY      hip pinning   femur fracture knee surgery      REMOVE HARDWARE HIP NAILING  6/4/2012    Procedure:REMOVE HARDWARE HIP NAILING; REMOVAL OF GAMMA NAIL AND SCREWS FROM RIGHT HIP, RIGHT TOTAL HIP ARTHROPLASTY(GAMMA NAIL EQUIPMENT, SMITH [employer] NEPHEW TOTAL HIP)^; Surgeon:DAVIAN FENG; Location: OR            Social History:     Social History   Substance Use Topics     Smoking status: Former Smoker     Packs/day:  0.50     Years: 20.00     Types: Cigarettes     Quit date: 5/31/1992     Smokeless tobacco: Not on file     Alcohol use No            Family History:   No family history on file.          Allergies:   Please see allergy list which was reviewed this admission.         Medications:       bisacodyl  10 mg Rectal Once     HYDROmorphone   Intravenous PCA     [START ON 6/5/2017] ciprofloxacin  400 mg Intravenous Q24H     sodium chloride (PF)  10 mL Intravenous Once     sodium chloride (PF)  10 mL Intracatheter Q8H     dextrose         sodium chloride (PF)  3 mL Intracatheter Q8H     heparin  5,000 Units Subcutaneous Q12H     acetaminophen  975 mg Oral Q8H     senna-docusate  1-2 tablet Oral BID     sertraline  50 mg Oral Daily     fluticasone-vilanterol  1 puff Inhalation Daily     simvastatin  10 mg Oral QAM     sodium chloride (PF), magnesium sulfate, umeclidinium-vilanterol, albuterol, lidocaine, lidocaine 4%, sodium chloride (PF), naloxone, sore throat lozenge, [START ON 6/5/2017] acetaminophen, oxyCODONE, diphenhydrAMINE **OR** diphenhydrAMINE, ondansetron **OR** ondansetron, prochlorperazine **OR** prochlorperazine, HYDROmorphone, labetalol, traZODone (DESYREL) tablet 50 mg         Review of Systems:   CONSTITUTIONAL: negative for fever, chills, change in weight  INTEGUMENTARY/SKIN: no rash or obvious new lesions  ENT/MOUTH: no sore throat, new sinus pain or nasal drainage  RESP: see interval history  CV: negative for chest pain, palpitations or peripheral edema  GI: no nausea, vomiting, change in stools  : no dysuria  MUSCULOSKELETAL: no myalgias, arthralgias  ENDOCRINE: blood sugars with adequate control  PSYCHIATRIC: mood stable  LYMPHATIC: no new lymphadenopathy  HEME: no bleeding or easy bruisability  NEURO: no numbness, weakness, headaches         Physical Examination:   Temp:  [97.3  F (36.3  C)-98  F (36.7  C)] 97.3  F (36.3  C)  Pulse:  [] 99  Heart Rate:  [] 105  Resp:  [12-20] 18  BP:  ()/(30-85) 92/52  SpO2:  [85 %-96 %] 86 %    Intake/Output Summary (Last 24 hours) at 06/04/17 1413  Last data filed at 06/04/17 1100   Gross per 24 hour   Intake             3521 ml   Output              330 ml   Net             3191 ml     Wt Readings from Last 4 Encounters:   06/04/17 74.2 kg (163 lb 9.3 oz)   06/04/12 65.8 kg (145 lb)     BP - Mean:  [60-92] 60  Resp: 18  No lab results found in last 7 days.    GEN: no acute distress   HEENT: head ncat, sclera anicteric, OP patent, trachea midline   PULM: unlabored synchronous with vent, clear anteriorly    CV/COR: RRR S1S2 no gallop,  No rub, no murmur  ABD: soft, mildly tender. BS present.   EXT:  Edema   warm  NEURO: grossly intact  SKIN: no obvious rash      Assessment and plan :     Cardiovascular/Hemodynamics/Pulmonary/ID/Disease/Renal/FEN/GI  1. Hypotension. At this point, I would treat her empirically for sepsis. She has leukopenia and LLL infiltrate on CXR. I will order broad spectrum abx and cont cipro. She has productive cough and will get sputum cx in addition to pan cx. Her BP is holding steady and does not need vasopressors at this time. I agree with bicarb gtt for now given acidosis. Would check daily BMP, CBC and lactic acid. She will get PICC line today.   2. NPO per surgery.  3. Hem/onc consulted for leukopenia. Would be OK for GCSF.  4. COPD. On breo-ellipta at home. Given inc O2 requirement, we will provide DEVORAH/LAMA/LABA nebs here and DC inhaler.     Endocrine/Hematology/Oncology:   1. ICU glucose protocol    Disposition/Code Status/Other  1. Cont supportive care  2. Code: full    ICU Prophylaxis:   1. DVT: Hep Subq  2. VAP: HOB 30 degrees, chlorhexidine rinse  3. Stress Ulcer: PPI/H2 blocker  4. Restraints: Nonviolent soft two point restraints required and necessary for patient safety and continued cares and good effect as patient continues to pull at necessary lines, tubes despite education and distraction. Will readdress daily.   5.  IV Access - central access required and necessary for continued patient cares  6. Feeding - NPO    I have personally reviewed the daily labs, imaging studies, cultures and discussed the case with referring physician and consulting physicians.     This patient is critically ill and I have provided 30 minutes of critical care time on June 4, 2017.     Carroll Neely MD   Critical Care Staff  [phone]         Data:   All data and imaging reviewed     ROUTINE ICU LABS (Last four results)  CMP  Recent Labs  Lab 06/04/17  0725 06/03/17  1535 06/03/17  0732 06/02/17  1015   * 134 137 137   POTASSIUM 4.4 4.7 4.7 4.7   CHLORIDE 103 107 109 104   CO2 15* 17* 18* 18*   ANIONGAP 12 10 10 15*   GLC 75 117* 107* 50*   BUN 47* 46* 46* 53*   CR 2.26* 1.77* 1.64* 1.55*   GFRESTIMATED 21* 28* 31* 33*   GFRESTBLACK 26* 34* 37* 40*   CARYL 8.1* 7.8* 7.8* 9.4   MAG 2.8* 3.3* 1.5*  --    PHOS 5.0*  --  3.8  --    PROTTOTAL 5.2*  --  5.7* 7.1   ALBUMIN 2.3*  --  2.7* 3.6   BILITOTAL 0.3  --  0.3 0.4   ALKPHOS 65  --  67 96   AST 56*  --  52* 49*   ALT 33  --  38 42     CBC  Recent Labs  Lab 06/04/17  1147 06/04/17  0725 06/03/17  0732 06/02/17  2041 06/02/17  1015   WBC  --  1.8* 3.3*  --  10.3   RBC  --  3.18* 3.10*  --  3.66*   HGB 8.6* 9.3* 9.1*  --  10.8*   HCT  --  28.4* 27.9*  --  33.9*   MCV  --  89 90  --  93   MCH  --  29.2 29.4  --  29.5   MCHC  --  32.7 32.6  --  31.9   RDW  --  16.8* 16.9*  --  17.3*   PLT  --  208 225 218 339     INR  Recent Labs  Lab 06/02/17  1420   INR 1.11     Arterial Blood GasNo lab results found in last 7 days.    All cultures:    Recent Labs  Lab 06/03/17  1320 06/02/17  1015   CULT Culture negative < 24 hours, reincubate No growth after 2 days     Recent Results (from the past 24 hour(s))   XR Chest Port 1 View    Narrative    CHEST PORTABLE ONE VIEW  6/4/2017 11:46 AM     HISTORY: Evaluate for edema and infiltrate.      Impression    IMPRESSION: The patient is rotated towards the left on the  radiograph.  There is opacification at the lung bases, left greater than right,  which could be related to a combination of pleural fluid and/or  parenchymal opacification.    NIKKI CASTLE MD   XR Abdomen Port 1 View    Narrative    ABDOMEN PORTABLE ONE VIEW  6/4/2017 11:48 AM     HISTORY: Mild abdominal distention. Evaluate for postop ileus (status  post laparoscopic cholecystectomy two days ago).       Impression    IMPRESSION: Nonspecific bowel gas pattern with a paucity of gas in the  abdomen. There is an amorphous loop of bowel in the right mid abdomen.  Right hip arthroplasty is incidentally noted.    NIKKI CASTLE MD

## 2017-06-04 NOTE — PROGRESS NOTES
Update:    Paged by RN, remains hypotensive despite fluid bolus. No other new clinical change. Continues to have no UOP.     A/P  - Discussed with intensivist, will transfer to ICU, intensivist consult placed  - Start bicarbonate gtt in event acidosis contributing to hypotension   - STAT PICC  - Echocardiogram pending  - AXR and CXR previously, ordered, now changed to portable films due to hypotension  - Will obtain STAT hemoglobin, blood cultures, lactic acid   - Nephrology consult pending    Adonis Daigle MD   Hospitalist  [phone]

## 2017-06-04 NOTE — PROGRESS NOTES
Pt A&O yet anxious.  Pain 10/10 with  PCA. BP remaining low 70-80/30-40s. Tachycardia.  Oxygen sats low 90s on Oxygen 3 L NC. Lungs diminished and course.  Loose, non productive cough. Refusing nebulizer/inhaler. Refusing IS. Abd soft, no nausea, positive BS, states positive flatus. No urine in tanner bag.  Bladder scanned for 150 and 50 cc. Abd incision good yet bruised. Up to side of bed frequently to cough.  Refusing to lay flat. Will transfer to ICU when ready.

## 2017-06-04 NOTE — PROGRESS NOTES
X cover    Called about low urine output again, lactic acid normal,  Will bolus another 250 cc.    Kvng Barba MD

## 2017-06-04 NOTE — PROGRESS NOTES
Still no UOP, Crawford patency verified with instill/withdrawal 10ml sterile NS.  Bladder scan showed ~39ml.

## 2017-06-04 NOTE — PROGRESS NOTES
MD Notification    Notified Person:  MD    Notified Persons Name: Freda     Notification Date/Time: 6/3/17 0830    Notification Interaction:  Talked with Physician    Purpose of Notification:Low urine output, Flagging for Sepsis.     Orders Received: 250 NS bolus, Order Lactic acid.     Comments:

## 2017-06-04 NOTE — PLAN OF CARE
Problem: Goal Outcome Summary  Goal: Goal Outcome Summary  Outcome: No Change  A/o x4. Hypotension.Sepsis BPA initiated, Lactic Acid level stat ordered.  Monitored VS q 30 min x1 hr. Encouraging PO intake. Pain managed on PCA, scheduled tyenol. IVF. Incision CDI. Ecchymotic. Regular diet poor appetite. Dangled/Stood by bedside x1.  was by. Yvette.Orders to D/c in AM. Low urine output. MD aware. Verbal order for bolus given.

## 2017-06-04 NOTE — PROVIDER NOTIFICATION
Spoke with Dr. Daigle to update on pts unchanged condition.  BP 70-80s/30-40s after bolus. See new orders for additional bolus/BCs.

## 2017-06-04 NOTE — PLAN OF CARE
Problem: Goal Outcome Summary  Goal: Goal Outcome Summary  Outcome: No Change  Pt A/O, VSS ex BP's intermittently soft (80's/40's), monitored throughout shift, tachycardic.  Pain not well controlled with PCA Dilaudid, scheduled tylenol.  Pt anxious and restless for entire shift, gave trazodone, no effect, repositioned frequently, applied heat pads.  LS coarse crackles, frequent productive cough, frequent requests for assistance in positioning for coughing.  Refused IS.  BS hypoactive, + flatus, low urine output (50mL), bladder scan 217mL.  Attempted to reposition catheter.  Replaced catheter in case of blockage, no output from new catheter. Contacted hospitalist, 250mL bolus ordered.  Abd has irregular contour, non distended.  Nausea episode overnight, zofran and compazine given, nausea resolved.  Pt ambulated to chair, asst x2, walker, gait belt.

## 2017-06-04 NOTE — PHARMACY-VANCOMYCIN DOSING SERVICE
Pharmacy Vancomycin Initial Note  Date of Service 2017  Patient's  1945  71 year old, female    Indication: Aspiration Pneumonia    Current estimated CrCl = Estimated Creatinine Clearance: 24 mL/min (based on Cr of 2.26).    Creatinine for last 3 days  2017: 10:15 AM Creatinine 1.55 mg/dL  6/3/2017:  7:32 AM Creatinine 1.64 mg/dL;  3:35 PM Creatinine 1.77 mg/dL  2017:  7:25 AM Creatinine 2.26 mg/dL    Recent Vancomycin Level(s) for last 3 days  No results found for requested labs within last 72 hours.      Vancomycin IV Administrations (past 72 hours)      No vancomycin orders with administrations in past 72 hours.                Nephrotoxins and other renal medications (Future)    Start     Dose/Rate Route Frequency Ordered Stop    17 1600  vancomycin (VANCOCIN) 1500 mg in 0.9% NaCl 250 mL PREMIX      1,500 mg  over 60 Minutes Intravenous ONCE 17 1450      17 1450  vancomycin place alvarez - receiving intermittent dosing      1 each Does not apply SEE ADMIN INSTRUCTIONS 17 1450            Contrast Orders - past 72 hours (72h ago through future)    Start     Dose/Rate Route Frequency Ordered Stop    17 0945  sulfur hexafluoride microsphere (LUMASON) 60.7-25 MG injection 5 mL      5 mL Intravenous ONCE 17 0941 17 0945    17 1442  iopamidol (ISOVUE-370) solution 60 mL      60 mL Intravenous ONCE 17 1441 17 1452                Plan:  1.  Start vancomycin  1500 mg IV once, then intermittent dosing.   2.  Goal Trough Level: 15-20 mg/L   3.  Pharmacy will check trough levels as appropriate in 1-3 Days.    4. Serum creatinine levels will be ordered daily for the first week of therapy and at least twice weekly for subsequent weeks.    5. Gilchrist method utilized to dose vancomycin therapy: Method 1    Cooper Fernandez

## 2017-06-04 NOTE — PROVIDER NOTIFICATION
Spoke with Dr. Chaudhry at 0730 and at approx 1045 re pts condition.  Low BPs 70-80s/30-40s.  Tachycardia. No urine in tanner bag. Pain 10/10.  See new orders.  Also instructed to update Dr. Daigle.

## 2017-06-04 NOTE — PROGRESS NOTES
"General Surgery Progress Note    Subjective: pt having ongoing abdominal pain.unable to sleep overnight. Intermittently hypotensive and responds to fluid bolus. Renal function worsening and no UOP since new tanner placed overnight.  Worsening leukopenia. She did have nausea and an episode of emesis overnight.     Objective: /64 (BP Location: Left arm)  Pulse 103  Temp 97.6  F (36.4  C) (Oral)  Resp 20  Ht 5' 7\" (1.702 m)  Wt 139 lb 12.4 oz (63.4 kg)  SpO2 95%  BMI 21.89 kg/m2  Gen: alert, mild distress  CV: RRR  Pulm: nonlabored breathing  Abd: soft, tender near incisions, voluntary guarding, no rebound  Ext: WWP    Assessment/Plan:   Ara Stanley  is a 71 year old female POD2 s/p diagnostic laparoscopy with laparoscopic cholecystectomy, remainder of abdomen appeared normal. Pt with acute renal failure and no UOP overnight. Unclear etiology of worsening leukopenia. Volume status is unclear - she is intermittently hypotensive and seems to respond to fluid challenges; however pulmonary status slightly worse. With emesis concerned about possible ileus.   - Transfer to Hospitalist service for ongoing management of this complex patient  - CXR/KUB now  - NPO, IVF  - likely nephrology consult/possible Urology consult  - may ultimately require CCU vs ICU pending course.     Estelita Peck MD  [facility]  [phone]              "

## 2017-06-05 NOTE — PROGRESS NOTES
[facility] Hospitalist Progress Note    Date of Service (when I saw the patient): 06/05/2017    Assessment & Plan   Ara Stanley is a 71 year old female with history of hypertension, dyslipidemia, chronic obstructive pulmonary disease, depression/anxiety, questionable paroxysmal atrial fibrillation, osteoarthritis, smoking, Etoh and GI bleeding in 03/2017 who presented with acute abdomen and sepsis-like picture. Dx: acute cholecystitis and underwent cholecystectomy 6/2/17. Hospitalists consulted for medical co-management.    Sepsis secondary to acute cholecystitis, s/p diagnostic laparoscopy, laparoscopic cholecystectomy, abdominal lavage   Presented with several days of abdominal pain, with concern for mesenteric ischemia given elevated lactic acid and description of pain with history of paroxysmal atrial fibrillation. CT w/ contrast was performed w/ pre-medication due to contrast allergy did not reveal evidence of mesenteric ischemia,  therefore underwent diagnostic laparoscopy, found to have findings of cholecystitis with laparoscopic cholecystectomy and abdominal lavage performed.  - Continues to report diffuse abdominal pain and tenderness,  otherwise does not appear to have clear etiology of this despite extensive abdominal imaging and diagnostic laparoscopy.   -Minimal bowel sounds  possible ileus has been NPO except for sips of water  -  Fluids per nephrology  - On broad-spectrum antibiotics   - Question of some signs of aspiration with sips of water, consult speech therapy eval    Hypotension   Intermittently hypotensive into the 80-90s SBP, asymptomatic,  Afebrile. WBC improving. Previous blood cultures remain no growth. Hemoglobin continues with slight drop- Lactic acid wnl 6/3/17  Echocardiogram-normal EF.  Wean pressors as tolerated.  ?  If albumin or 1 unit of packed red blood cells with help intravascular volume    Leukopenia   WBC count decreasing 10.3->3.3->1.8->3.6  since  admission.   - Heme/onc consulted: Felt secondary to sepsis, now improving       Acute kidney injury, oliguric, on chronic kidney disease stage III  Creatinine upon admission was 1.55, suspected to have possible contribution from sepsis at that time with initial steady rise and anuria, UA with minimal WBC (7) and no RBC.   - Received contrast load on admission, as well as repeated episodes of significant hypotension; nephrology following       Non anion gap metabolic acidosis   Per CareEverywhere her baseline bicarbonate does tend to be mildly low to low normal, suspect secondary to worsening renal failure    Hypertension - Current issues with hypotension as above.     Dyspnea, hypoxia: History of COPD, no wheezes.  Has developed significant left basilar atelectasis with a moderately large left pleural effusion, aggravated by patient's low oncotic pressures and volume overload    Questionable paroxysmal atrial fibrillation  Per most recent cardiology clinic evaluation 4/24/17, there was concern during her preceding hospitalization for possible atrial fibrillation on telemetry, although never documented on EKG and when re-evaluated at one point felt more consistent with sinus rhythm with premature atrial complexes.   - No indication for anticoagulation at this time, would defer to planned outpatient monitoring and follow-up as outlined by her current cardiologist     Depression/Anxiety  Continue with prior to admission Zoloft 50 mg p.o. daily. She has significant anxiety, particularly surrounding the death of her  and subsequent stressors resulting from this.  has been involved here.  Patient appears to be coping well considering her multiple medical problems    Tobacco use  had quit smoking 15 years ago; however, after the death of her  in 03/2017 she started smoking again.  Nicotine patch has been offered, which was declined.     Alcohol use  The patient indicates  that she does not drink more  than 1 beer and 1 mixed vodka with lemonade, and not everyday.  No clear signs of alcohol withdrawal presently, and if she is accurately reporting her alcohol use would be low risk to develop this.     Normocytic anemia  Hemoglobin was 10.8 g/dl upon admission.  Reviewing her chart shows hemoglobin of 8.5 in 03/2017 when she had GI bleed.  Upper endoscopy at that time showed minimal gastric and duodenal inflammatory changes and no real source of GI blood loss.  Monitor hemoglobin, transfuse when< 7, unless nephrology recommends sooner    DVT Prophylaxis: Heparin SQ.  Code Status: Full Code    Disposition: 5-7 days once acute illness stabilizes and diet resumes    Lane Lewis MD    Interval History   Patient states she's feeling much better, still has diffuse abdominal pain but denies nausea.  Nighttime nurse concern with possible aspiration symptoms when drinking sips of water.  Increased shortness of breath last night, chest x-ray showed increasing left pleural effusion.  Patient is eager to get out of bed and start short walks when able.    -Data reviewed today: I reviewed all new labs and imaging results over the last 24 hours. I personally reviewed no images or EKG's today.    Physical Exam   Temp: 99.3  F (37.4  C) Temp src: Bladder BP: 112/56 Pulse: 99 Heart Rate: 115 Resp: (!) 44 SpO2: 97 % O2 Device: Oxymizer cannula Oxygen Delivery: 6 LPM  Vitals:    06/02/17 2100 06/04/17 1300   Weight: 63.4 kg (139 lb 12.4 oz) 74.2 kg (163 lb 9.3 oz)     Vital Signs with Ranges  Temp:  [98.2  F (36.8  C)-99.5  F (37.5  C)] 99.3  F (37.4  C)  Pulse:  [] 99  Heart Rate:  [104-129] 115  Resp:  [0-94] 44  BP: ()/() 112/56  SpO2:  [73 %-100 %] 97 %  I/O last 3 completed shifts:  In: 2893.63 [P.O.:60; I.V.:2583.63]  Out: 223 [Urine:223]    Constitutional: Elderly female, NAD, pleasant and in good spirits oriented ×3  Respiratory: Clear to auscultation with reduced breath sounds lower half left side, no  wheezes   Cardiovascular: RRR, 1/6 systolic m,  1-2+ thigh and sacral edema  GI: Soft, diffusely tender to palpation, no clear rebound or guarding. BS hypoactive.   Skin/Integumen: Warm, dry      Medications     sodium bicabonate in 5% dextrose for infusion 100 mL/hr at 06/04/17 2339     norepinephrine 0.09 mcg/kg/min (06/04/17 2035)     NaCl 10 mL/hr at 06/04/17 1400       bisacodyl  10 mg Rectal Once     HYDROmorphone   Intravenous PCA     ciprofloxacin  400 mg Intravenous Q24H     sodium chloride (PF)  10 mL Intravenous Once     sodium chloride (PF)  10 mL Intracatheter Q8H     meropenem  500 mg Intravenous Q12H     ipratropium - albuterol 0.5 mg/2.5 mg/3 mL  3 mL Nebulization Q6H     arformoterol  15 mcg Nebulization BID     vancomycin place alvarez - receiving intermittent dosing  1 each Does not apply See Admin Instructions     sodium chloride (PF)  3 mL Intracatheter Q8H     acetaminophen  975 mg Oral Q8H     senna-docusate  1-2 tablet Oral BID     sertraline  50 mg Oral Daily     simvastatin  10 mg Oral QAM       Data     Recent Labs  Lab 06/05/17  0303 06/04/17  1946 06/04/17  1455 06/04/17  1428  06/04/17  0725  06/02/17  1420 06/02/17  1015   WBC 3.6* 2.6*  --   --   --  1.8*  < >  --  10.3   HGB 7.9* 8.5* 8.2*  --   < > 9.3*  < >  --  10.8*   MCV 87 88  --   --   --  89  < >  --  93    239  --   --   --  208  < >  --  339   INR  --   --   --   --   --   --   --  1.11  --    * 132*  --   --   --  130*  < >  --  137   POTASSIUM 4.3 4.6  --   --   --  4.4  < >  --  4.7   CHLORIDE 102 105  --   --   --  103  < >  --  104   CO2 19* 18*  --   --   --  15*  < >  --  18*   BUN 49* 48*  --   --   --  47*  < >  --  53*   CR 2.31* 2.31*  --   --   --  2.26*  < >  --  1.55*   ANIONGAP 11 9  --   --   --  12  < >  --  15*   CARYL 7.5* 7.8*  --   --   --  8.1*  < >  --  9.4   * 109*  --   --   --  75  < >  --  50*   ALBUMIN 2.0* 1.9*  --   --   --  2.3*  < >  --  3.6   PROTTOTAL 4.4*  --   --   --    --  5.2*  < >  --  7.1   BILITOTAL 0.3  --   --   --   --  0.3  < >  --  0.4   ALKPHOS 51  --   --   --   --  65  < >  --  96   ALT 26  --   --   --   --  33  < >  --  42   AST 58*  --   --   --   --  56*  < >  --  49*   LIPASE  --   --   --   --   --   --   --   --  514*   TROPI  --   --   --  <0.015The 99th percentile for upper reference range is 0.045 ug/L.  Troponin values in the range of 0.045 - 0.120 ug/L may be associated with risks of adverse clinical events.  --   --   --   --  <0.015The 99th percentile for upper reference range is 0.045 ug/L.  Troponin values in the range of 0.045 - 0.120 ug/L may be associated with risks of adverse clinical events.   < > = values in this interval not displayed.    Recent Results (from the past 24 hour(s))   XR Chest Port 1 View    Narrative    CHEST PORTABLE ONE VIEW  6/4/2017 11:46 AM     HISTORY: Evaluate for edema and infiltrate.      Impression    IMPRESSION: The patient is rotated towards the left on the radiograph.  There is opacification at the lung bases, left greater than right,  which could be related to a combination of pleural fluid and/or  parenchymal opacification.    NIKKI CASTLE MD   XR Abdomen Port 1 View    Narrative    ABDOMEN PORTABLE ONE VIEW  6/4/2017 11:48 AM     HISTORY: Mild abdominal distention. Evaluate for postop ileus (status  post laparoscopic cholecystectomy two days ago).       Impression    IMPRESSION: Nonspecific bowel gas pattern with a paucity of gas in the  abdomen. There is an amorphous loop of bowel in the right mid abdomen.  Right hip arthroplasty is incidentally noted.    NIKKI CASTLE MD   XR Chest Port 1 View    Narrative    XR CHEST PORT 1 VW  6/5/2017 2:25 AM     HISTORY:  SOB, patient status post cholecystectomy.    COMPARISON: None.    FINDINGS:  Again noted is lucency beneath the right diaphragm  compatible with free air. This is consistent with the patient's recent  surgery. A left pleural effusion is present. This is  increased since  6/4/2017. There is associated left basilar infiltrate/atelectasis.      Impression    IMPRESSION:  1. Lucency beneath the right diaphragm consistent with free peritoneal  air. This is compatible with the patient's recent surgery.  2. Increased left pleural effusion and increased associated left  basilar infiltrate/atelectasis.

## 2017-06-05 NOTE — PHARMACY-VANCOMYCIN DOSING SERVICE
Pharmacy Vancomycin Note  Date of Service 2017  Patient's  1945   71 year old, female    Indication: Aspiration Pneumonia  Goal Trough Level: 15-20 mg/L  Day of Therapy: 3  Current Vancomycin regimen:  Intermittent dosing    Current estimated CrCl = Estimated Creatinine Clearance: 23.5 mL/min (based on Cr of 2.31).    Creatinine for last 3 days  6/3/2017:  7:32 AM Creatinine 1.64 mg/dL;  3:35 PM Creatinine 1.77 mg/dL  2017:  7:25 AM Creatinine 2.26 mg/dL;  7:46 PM Creatinine 2.31 mg/dL  2017:  3:03 AM Creatinine 2.31 mg/dL    Recent Vancomycin Levels (past 3 days)  2017:  5:00 PM Vancomycin Level 13.3 mg/L    Vancomycin IV Administrations (past 72 hours)                   vancomycin (VANCOCIN) 1500 mg in 0.9% NaCl 250 mL PREMIX (mg) 1,500 mg New Bag 17 1717                Nephrotoxins and other renal medications (Future)    Start     Dose/Rate Route Frequency Ordered Stop    17 1800  vancomycin (VANCOCIN) 1500 mg in 0.9% NaCl 250 mL PREMIX      1,500 mg Intravenous ONCE 17 1759      17 1530  norepinephrine (LEVOPHED) 16 mg in D5W 250 mL infusion      0.03-0.4 mcg/kg/min × 74.2 kg  2.1-27.8 mL/hr  Intravenous CONTINUOUS 17 1518      17 1450  vancomycin place alvarez - receiving intermittent dosing      1 each Does not apply SEE ADMIN INSTRUCTIONS 17 1450               Contrast Orders - past 72 hours (72h ago through future)    Start     Dose/Rate Route Frequency Ordered Stop    17 0945  sulfur hexafluoride microsphere (LUMASON) 60.7-25 MG injection 5 mL      5 mL Intravenous ONCE 17 0941 17 0945          Interpretation of levels and current regimen:  Trough level is  Subtherapeutic    Has serum creatinine changed > 50% in last 72 hours: No    Urine output:  diminished urine output    Renal Function: ARF    Plan:  1.  Re-dose with 1500 mg x 1  2.  Pharmacy will check trough levels as appropriate in 1-3 Days.    3. Serum creatinine  levels will be ordered daily for the first week of therapy and at least twice weekly for subsequent weeks.      Jadyn Shirley        .

## 2017-06-05 NOTE — PLAN OF CARE
Problem: Goal Outcome Summary  Goal: Goal Outcome Summary  Outcome: Declining  Pt had increasing O2 needs overnight - tele hub notified - chest xray ordered. Pt not tolerating turning to left side. Crawford replaced and urine output minimal. Pt states that she feels better after sleeping. VSS

## 2017-06-05 NOTE — CONSULTS
[facility]    RENAL CONSULTATION NOTE    REFERRING MD:  Dr. Estelita Peck    REASON FOR CONSULTATION:  Oliguric WOODY    DATE OF CONSULTATION: 06/04/17    SHORTHAND KEY FOR MY NOTES:  c = with, s = without, p = after, a = before, x = except, asx = asymptomatic, tx = transplant or treatment, sx = symptoms or symptomatic, cx = canceled or culture, rxn = reaction, yday = yesterday, nl = normal, abx = antibiotics, fxn = function, dx = diagnosis, dz = disease, m/h = melena/hematochezia, c/d/l/ha = cramping/dizziness/lightheadedness/headache, d/c = discharge or diarrhea/constipation, f/c/n/v = fevers/chills/nausea/vomiting, cp/sob = chest pain/shortness of breath.    HPI: Ara Stanley is a 71 year old female c h/o EtOh abuse, COPD who was admitted on 6/2/2017 c abd pain and found to have cholecystitis and had a lap alexandre.  Now, she has anuric WOODY and is hypotensive on pressors in the ICU.    Pt was at home when she developed sudden, acute abd pain.  She activated EMS who gave her Zofran en route to [facility].  At [facility], she presented c BPs in the 80s and she was tx c 2L IVF, which brought the BP up a bit.  An abd CT s contrast was performed, which was unremarkable.  However, the lactate was in the 3s, so there was concern for mesenteric ischemia.  As a result, [facility] c a CT angio.  She was pre-medicated for the contrast due to an allergy and rec'd 60 cc of dye.  This didn't show any embolic occlusions, so she eventually went to the OR for a lap alexandre.    In the OR, her BP was low and she required the use of phenylephrine.  She had 200 cc of uo during the surgery.  Post-op, the pt was on the 3rd floor and continued to have low uo.  Today, her BP was in the 80s and she responded to intermittent boluses of fluids.  She became a bit more acidotic and she was started on a bicarb gtt.  Due to persistent hypotension and progressive anuria, she was transferred to the  ICU for further eval and mgmt.    In the ICU, she has not made any urine.  Her BP is in the 100s and she is on norepi.  She is on multiple abx as well.  An echo was done today that showed hyperdynamic LV fxn and some diastolic dysfxn.  RV syst fxn is ok.    Currently, the pt is still having abd pain and other pains in her legs.  She is breathing ok.  Denies any withdrawal sx.  Her last drink was prior to admission, but she states she had cut down dramatically since her   2 mos ago.  She is a smoker.    ROS:  A complete review of systems was performed and is x as noted above.    PMH:    Past Medical History:   Diagnosis Date     Asthma      COPD (chronic obstructive pulmonary disease) (H)    HTN  Hyperlipidemia  Depression  Cardiomyopathy  PAF  EtOH abuse  GIB/anemia    PSH:    Past Surgical History:   Procedure Laterality Date     ARTHROPLASTY HIP  2012    Procedure:ARTHROPLASTY HIP; Surgeon:DAVIAN FENG; Location: OR     ENT SURGERY      tonsils     GYN SURGERY      hyst     ORTHOPEDIC SURGERY      hip pinning   femur fracture knee surgery      REMOVE HARDWARE HIP NAILING  2012    Procedure:REMOVE HARDWARE HIP NAILING; REMOVAL OF GAMMA NAIL AND SCREWS FROM RIGHT HIP, RIGHT TOTAL HIP ARTHROPLASTY(GAMMA NAIL EQUIPMENT, [employer] TOTAL HIP)^; Surgeon:DAVIAN FENG; Location: OR     MEDICATIONS:      bisacodyl  10 mg Rectal Once     HYDROmorphone   Intravenous PCA     [START ON 2017] ciprofloxacin  400 mg Intravenous Q24H     sodium chloride (PF)  10 mL Intravenous Once     sodium chloride (PF)  10 mL Intracatheter Q8H     meropenem  500 mg Intravenous Q12H     ipratropium - albuterol 0.5 mg/2.5 mg/3 mL  3 mL Nebulization Q6H     arformoterol  15 mcg Nebulization BID     vancomycin place alvarez - receiving intermittent dosing  1 each Does not apply See Admin Instructions     sodium chloride (PF)  3 mL Intracatheter Q8H     acetaminophen  975 mg Oral Q8H     senna-docusate   "1-2 tablet Oral BID     sertraline  50 mg Oral Daily     simvastatin  10 mg Oral QAM     ALLERGIES:    Allergies as of 06/02/2017 - Sky as Reviewed 06/02/2017   Allergen Reaction Noted     Iodine i 131 tositumomab Anaphylaxis 05/31/2012     Penicillins Rash 05/31/2012     Sulfa drugs Nausea and Vomiting 06/02/2017     FH:    No family history on file.   R/NC    SH:    Social History     Social History     Marital status:      Spouse name: N/A     Number of children: N/A     Years of education: N/A     Occupational History     Not on file.     Social History Main Topics     Smoking status: Former Smoker     Packs/day: 0.50     Years: 20.00     Types: Cigarettes     Quit date: 5/31/1992     Smokeless tobacco: Not on file     Alcohol use No     Drug use: No     Sexual activity: Not on file     Other Topics Concern     Not on file     Social History Narrative   Drinks alcohol - 1-2/day  Smokes daily     PHYSICAL EXAM:    /60  Pulse 99  Temp 98.7  F (37.1  C) (Axillary)  Resp 17  Ht 1.702 m (5' 7\")  Wt 74.2 kg (163 lb 9.3 oz)  SpO2 96%  BMI 25.62 kg/m2    GENERAL: awake, alert, NAD, interactive  HEENT:  Normocephalic. No gross abnormalities.  MMM.  Upper dentures, edentulous on bottom.  Pupils equal.  EOMI.  No scleral icterus.  CV: Reg, tachy, no obv murmurs, no clicks, gallops, or rubs, 1-2+ foot/ankle, tr ble edema.  RESP: Clear bilaterally with good efforts, no crackles, + nc o2  GI: Abdomen s/nd, + tender to palpation, bruised, port sites ok  : + tanner c no urine in it; female genitalia  MUSCULOSKELETAL: extremities nl - no gross deformities noted  SKIN: some bruising of skin  NEURO:  Strength ok and symmetric.   PSYCH: mood good, affect appropriate  LYMPH: No palpable ant/post cervical and supraclavicular adenopathy    LABS:      CBC RESULTS:     Recent Labs  Lab 06/04/17  1455 06/04/17  1147 06/04/17  0725 06/03/17  0732 06/02/17  2041 06/02/17  1015   WBC  --   --  1.8* 3.3*  --  10.3   RBC "  --   --  3.18* 3.10*  --  3.66*   HGB 8.2* 8.6* 9.3* 9.1*  --  10.8*   HCT  --   --  28.4* 27.9*  --  33.9*   PLT  --   --  208 225 218 339     BMP RESULTS:    Recent Labs  Lab 06/04/17  0725 06/03/17  1535 06/03/17  0732 06/02/17  1015   * 134 137 137   POTASSIUM 4.4 4.7 4.7 4.7   CHLORIDE 103 107 109 104   CO2 15* 17* 18* 18*   BUN 47* 46* 46* 53*   CR 2.26* 1.77* 1.64* 1.55*   GLC 75 117* 107* 50*   CARYL 8.1* 7.8* 7.8* 9.4     INR  Recent Labs  Lab 06/02/17  1420   INR 1.11      DIAGNOSTICS:  Personally reviewed CXR, abd CT - R renal cyst    A/P:  Ara Stanley is a 71 year old female c anuric WOODY 2 dye, hypotension and sepsis.    1.  Anuric WOODY 2 ATN.  Pt's cr was nl a couple of mos ago.  She presented c a cr of 1.6 and it has progressed since admission.  She was hypotensive on arrival, had intra-op hypotension requiring pressors, and post-op hypotension.  In addition, she rec'd dye and is now septic requiring pressors.  Despite fluids earlier today, she has not made any urine.  Chemistries are ok x she is becoming more acidotic, though k is ok.  Her TBV is not as high as one would expect c her low albumin.  She doesn't need HD, yet, but it could become possible this admission.  A.  Follow labs, uo, sx.  B.  Adjust pressors to keep SBP > 110.     2.  Sepsis syndrome/leucopenia.  Pt has a low BP and is on pressors and broad abx.  Procalcitonin is high.  She is tachycardic.  WBC is low, prob from sepsis.  H/O involved - they are considering GCSF if WBC continues to fall.  A.  Adjust pressors as above.  B.  Broad abx per renal dose.  C.  Alb 5% 250 cc bolus.    3.  Anemia.  Pt's hb has been trending down, but no signs of bleeding noted.    A.  Follow hb, clinically.  B.  Transfuse prn.    4.  Hyponatremia.  Her Na is down to 130.  Could be depletional.  A.  Check Isiah when she makes urine.    5.  Possible UTI.  Pt is on abx.  A.  Follow clinically.    6.  Met acidosis.  Bicarb is down to 15 on the  panel.  As a result, a bicarb gtt was started.  Lactate has been nl.  Likely RTA from her WOODY.  A.  Agree c bicarb gtt.  B.  Follow labs.    7.  FEN.  Electrolytes are ok.  Ca corrects for low alb.    Thank you for this consultation. We will follow c you.  Please call if any questions.    Attestation:   I have reviewed today's relevant vital signs, notes, medications, labs and imaging.    Calin Moon MD  [facility] Nephrology  [phone]

## 2017-06-05 NOTE — PROGRESS NOTES
Per order from  pt was intubated at 1234 by Anesthesia Dept..  Had been becoming more tachycardic, more SOB and increasing O2 demands and reported exhaustion.    During intubation was given sedation by CRNA, SBP dipped to 60's.  The CRNA pushed Ishaan with BP recovery to .  After the CRNA's left pt's BP did again drift, to the 70's, and the levophed was titrated up to achieve MAP>65 and is now infusing at 0.4mcg/kg/min.

## 2017-06-05 NOTE — PROVIDER NOTIFICATION
Pt was desaturating on 6L oxymizer, increased flow to 10LPM.  SpO2 still low 80's.  Switched to oxymask and increased flow to 12L.  Went to get , upon returning to room a minute later SpO2 now mid 90's.  Aftab at bedside to assess patient. Discussed with RT.  Will get neb and EKG.  While pt still at rest and not speaking the SpO2 again drifting to mid 80's.  Awaiting neb, will continue to closely monitor.

## 2017-06-05 NOTE — PROGRESS NOTES
Pt's oxygenation status is worsening, there is potential she may need ETT/MV today.    Per pt request called her son Laen Adams [phone], who was aware she had a recent hospitalization but did not know she was admitted again 6/2 for surgery.      This RN gave him detailed update, informed him that she was transferred to ICU yesterday for low BP and low SpO2, likely has a pneumonia and may need to be intubated if condition worsens further.    Lane thanked for update and asked for another phone update later today.

## 2017-06-05 NOTE — PROGRESS NOTES
Patient is 6L oxymizer with SpO2 94%, BBS coarse crackles. All nebs tx is given as ordered. Will continue to monitor.    Cynthia Correia RT  6/5/2017

## 2017-06-05 NOTE — PROGRESS NOTES
Spiritual Assessment Progress Note  [facility] ICU      PRIMARY FOCUS:      Emotional/spiritual/Confucianism distress    Support for coping    ILLNESS CIRCUMSTANCES:    Reviewed documentation. Reflective conversation shared with Ara which integrated elements of illness and family narratives.         Context of Serious Illness/Symptom(s) - Pt is here dealing with abdominal pain.  She expresses that dealing with the grief of losing her  in the last months literally made her sick.  Pt says she has been holding the grief in.  She mentions that she has lots of emotional support from family and neighbors.      Resources for Support - family and neighbors      DISTRESS:      Emotional/ ExistentialRelational Distress - grief issues    Spiritual/Mandaeism Distress - not discussed    Social/Cultural/EconomicDistress - pt mentioned that she is pretty much broke.       SPIRITUAL/Quaker (Coping):      Jewish/Maureen - grew up [employer] but identifies with [employer].    Spiritual Practice(s) - prayer    Emotional/Existential/ Relationall/Connections - family and friends      GOALS OF CARE:    Goals of Care - learning how to deal with her grief    Meaning/Sense-Making - not discussed      PLAN: continue to follow pt and give her some grief group resources.      Deya Romero  Chaplain Resident  Pager [phone]

## 2017-06-05 NOTE — PROGRESS NOTES
Daily ICU Progress Note         Admission Date:  6/2/2017    Active Problem List:   -->  COPD  -->  Sepsis  -->  PNA  -->  UTI  -->  Status post cholecystectomy, laparoscopic  -->  Abdominal pain    24 Hour Events:  Increasing oxygen requirements - now back to 6 LPM, CXR, nephrology consultation, no fevers, pressors weaned off.     Subjective: chart reviewed..  This is a 71-year-old female who is chronically anticoagulated.  She presented to the emergency department with abdominal discomfortand was found to be hypotensive and started on broad spectrum antibiotics.   Her lactate level was elevated and there was increasing concern over possible bowel ischemia.  Patient went to the operating room  On 6/2/2017 for a diagnostic laparoscopy and laparoscopic cholecystectomy and abdominal lavage.  She has had a increase in her creatinine level.  She has a reported history of COPD, no pulmonary function tests on file.  She does have ongoing tobacco use.  During this hospitalization she was also started on antibiotics for possible UTI. On 6/4/2017 noted to have worsening acidosis and episodes of hypotension.  ICU consultation on 6/4/2017 who recommended broadening antibiotics for  Concerning sepsis..  Central access was also obtained with a PICC line.  Worsening oliguria and nephrology was consulted on 6/4/2017.  Hey agreed with additional fluid  Challenge with albumin  And bicarbonate drip.      ROS:  10 point ROS performed with pertinent findings noted above.     Pertinent Inpatient Medications:     HYDROmorphone (DILAUDID) PCA 1 mg/mL     ciprofloxacin (CIPRO) infusion 400 mg     meropenem (MERREM) 500 mg vial to attach to  mL bag for ADULTS or 25 mL bag for PEDS     ipratropium - albuterol 0.5 mg/2.5 mg/3 mL (DUONEB) neb solution 3 mL     arformoterol (BROVANA) neb solution 15 mcg     vancomycin place alvarez - receiving intermittent dosing     albuterol (PROVENTIL) neb solution 2.5 mg     oxyCODONE (ROXICODONE) IR  "tablet 5-10 mg     HYDROmorphone (PF) (DILAUDID) injection 0.3-0.5 mg     sertraline (ZOLOFT) tablet 50 mg     traZODone (DESYREL) tablet 50 mg     simvastatin (ZOCOR) tablet 10 mg     Objective:     /56  Pulse 99  Temp 99.3  F (37.4  C)  Resp (!) 44  Ht 1.702 m (5' 7\")  Wt 74.2 kg (163 lb 9.3 oz)  SpO2 97%  BMI 25.62 kg/m2    Intake/Output Summary (Last 24 hours) at 06/05/17 0823  Last data filed at 06/05/17 0700   Gross per 24 hour   Intake          3019.93 ml   Output              223 ml   Net          2796.93 ml     General: Adult female, appears older than stated age, no acute distress but with obvious pain with any movement.   HEENT: NCAT; EOMI; no icterus; dry mucus membranes  Neck: No LAD  Pulm: Decreased BS on left, coarse on right  CV: Tachy, no murmur  Abdomen: Soft, laparoscopic sites noted, bruising below the umbilicus,   : [place]  Extremities: + edema  Skin: Warm to touch  Neuro: Alert and oriented, anxious    Pertinent Labs: All labs reviewed.  Most pertinent labs noted below    BMP: 1132/4.3/102/19/49//2.31  CBC: 3.6/7.9/231  Albumin - 2.4    Microbiology / Cultures:     UA: moderate leukocyte esterase  Cultures all negative to date including sputum, blood, urine    Imaging:  Imaging was reviewed by me personally.  Pertinent positives noted below.     Chest x-ray - intra-abdominal air.  Worsened left-sided effusion with basilar atelectasis, right picc line noted    Assessment and Plan:   This is a 71-year-old female initially presented with abdominal discomforstatus post endoscopic cholecystectomy and ex lap now with increasing shortness of breath and episodes of hypotension concerning for sepsis.  She is on broad-spectrum antibiotics including ciprofloxacin, meropenem, vancomycin for possible healthcare associated pneumonia and worsening renal function with anuric acute renal failure. Her abdominal pain continues to be marked and worsening left sided opacity on CXR concerning for " effusion but effusion does not appear as large on bedside ultrasound as expected.     Neuro:   1.  Depression / anxiety - patient is on Zoloft and trazodone at home.  Continue.    2.  Increased anxiety - patient was admitted on 6/2/2017 with questionable history of greater than reported alcohol use with multiple inconsistent reportings.  Increased risk for EtOH w/d. Given her history of alcohol I will start her on multivitamin thiamine and folate.  --> MV, thiamine, folic acid  --> monitor closely for alcohol withdrawal, hypotension may be limiting    Pulm:   1. COPD - again, they were inconsistent reports about how much she is actually smoking.  We do not have pulmonary function tests on file though she reports that she is followed by [facility] and carries a diagnosis of COPD.  We will continue her on long-acting beta agonist as well as inhaled corticosteroid with p.r.n. Bronchodilators.  No indication for steroids at this time.  We will obtain outside pulmonary records.   --> combination nebs  --> bronchodilator  --> no indication for steorids    2.  Left-sided effusion - with possible pneumonia with poor  Pulmonary toilet secondary to abdominal discomfort. I would expect the left-sided effusion to be quite large on ultrasound but did not appear very largetherefore I wonder if there is more here consolidation and effusion alone.  Agree with broad-spectrum antibiotics.   No recommendation for drainage at this time    CV:  1.  Hypotension - iven recent abdominal surgery as well as peripheral edema I would suspect that there is a fair amount of third spacing.  If additional fluids needed trial of 25% albumin would be most beneficial.  Continue pressors to avoid further insult to kidneys.  Acidosis likely contributing to hypotension therefore continue bicarbonate drip.  --> abx  --> levophed  --> next transfusion should be PRBC or albumin, prefer PRBC given anemia    Renal:   1.  Acute renal failure - suspect  this is related to hypotensive episodes with surgery.  Nephrology is following.  Avoiding further hypotension.  A urine sodium sent per request of nephrology.  Would keep a goal map of 65.  Given dyspnea can consider converting bicarbonate drip to a higher concentrated bicarbonate  Drip given she has central access. Repeat ABG this morning.    ID:  1.  PNA - cont abx    GI:   1.  Acute abdominal pain - ppatient had no obvious source of the abdominal discomfort on laparoscopy. She did undergo cholecystectomy but unsure of this was initial cause of discomfort.  Continue pain management.  --> cont pca  --> discuss with surgery intra-op findings    Heme / Onc:   1.  Anemia - hemoglobin with slow trend down at 10.8, nine, eight, now 7.9.  No indication for transfusion.      Endocrine:   1. Glc - controlled    Musculoskeletal:   1.  No acute issues, monitor    Nutrition:   1. NPO except ice chips    Prophylaxis:  DVT Prophylaxis:  SQ heparin  GI Prophylaxis: PPI  Ventilator / PNA Prophylaxis:  n/a  PTOT:  ordered  Restraints: not needed    Disposition: MICU    Critical Care Time:  35 min    Tam Ugalde MD  Pulmonary and Critical Care  [facility]  Pager:  [phone]

## 2017-06-05 NOTE — PLAN OF CARE
Problem: Goal Outcome Summary  Goal: Goal Outcome Summary  PT: Orders received, chart reviewed. RN reports patient is not appropriate for PT today. Will continue to follow.

## 2017-06-05 NOTE — PROGRESS NOTES
"General Surgery Progress Note    Subjective: pt reports she is feeling better this morning. She remains on levophed. Her creatinine is stable today. Ongoing slight drop in hgb. Abdominal pain improved. Being treated for pna. She is passing gas, no bowel movements    Objective: /55  Pulse 99  Temp 99.7  F (37.6  C)  Resp 16  Ht 5' 7\" (1.702 m)  Wt 163 lb 9.3 oz (74.2 kg)  SpO2 98%  BMI 25.62 kg/m2  Gen: alert, sitting up in bed doing nebulizer  CV: RRR  Pulm: nonlabored breathing  Abd: soft, tender to palpation throughout, primarily at incisions, ecchymosis surrounding umbilical incision stable.   Ext: WWP    Assessment/Plan:   Ara Stanley  is a 71 year old female POD 3 s/p diagnostic laparoscopy with laparoscopic cholecystectomy. Purulence noted in RUQ and mild wall thickening of gallbladder; however, remainder of abdomen appeared normal. Pt had worsening respiratory status, hypotension and renal failure over weekend and was transferred to ICU yesterday.   - continue NPO until bowel function  - ok for ice chips  - dulcolax suppository today  - appreciate hospitalist and intensivist cares of this complex patient.   - ok for dvt chemoppx from surgical standpoint    Estelita Peck MD  [facility]  [phone]              "

## 2017-06-05 NOTE — PROGRESS NOTES
Renal Medicine Progress Note            Assessment/Plan:     1. Patient with a Scr of 1 mg/dl in March.    2. Pt with was admitted with a Scr of 1.55mg/dl. WOODY due to hypotension from shock. Oliguric and stable.     3. Septic shock with MODS   -meropenem, vancomycin and cipro   -sodium bicarb gtt is running at 100 cc/hr    3. Respiratory failure. PNA. CXR with worsening infiltrate.     4. S/p lap cholecystectomy    5. Metabolic and respiratory acidosis.     Plan.   1. Consider 25 grams of 5% albumin bid  2. PCO2 is too high. Need to adjust vent  3. No urgent indication for RRT        Interval History:     Pt just returned radiology. Pt was intubated. She is on NE. Afebrile.           Medications and Allergies:       multivitamin, therapeutic  1 tablet Oral Once     folic acid  1 mg Oral Once     thiamine  100 mg Oral Once     heparin  5,000 Units Subcutaneous Q8H     [START ON 6/6/2017] pantoprazole  40 mg Intravenous QAM AC     budesonide  1 mg Nebulization BID     ciprofloxacin  400 mg Intravenous Q24H     sodium chloride (PF)  10 mL Intracatheter Q8H     meropenem  500 mg Intravenous Q12H     ipratropium - albuterol 0.5 mg/2.5 mg/3 mL  3 mL Nebulization Q6H     arformoterol  15 mcg Nebulization BID     vancomycin place alvarez - receiving intermittent dosing  1 each Does not apply See Admin Instructions     sodium chloride (PF)  3 mL Intracatheter Q8H     acetaminophen  975 mg Oral Q8H     senna-docusate  1-2 tablet Oral BID     sertraline  50 mg Oral Daily     simvastatin  10 mg Oral QAM        Allergies   Allergen Reactions     Iodine I 131 Tositumomab Anaphylaxis     Can tolerate topical iodine if it is wahed off after surgery      Penicillins Rash and Anaphylaxis     Swollen  lymph nodes, flushed overheating      Sulfa Drugs Nausea and Vomiting, Diarrhea and Rash            Physical Exam:   Vitals were reviewed  Heart Rate: 118, Blood pressure 116/47, pulse 99, temperature 99.5  F (37.5  C), resp. rate 13,  "height 1.702 m (5' 7\"), weight 74.2 kg (163 lb 9.3 oz), SpO2 98 %.    Wt Readings from Last 3 Encounters:   06/04/17 74.2 kg (163 lb 9.3 oz)   06/04/12 65.8 kg (145 lb)       Intake/Output Summary (Last 24 hours) at 06/05/17 1534  Last data filed at 06/05/17 1324   Gross per 24 hour   Intake           2973.2 ml   Output              295 ml   Net           2678.2 ml       GENERAL APPEARANCE: critical ill  HEENT:  intubated  RESP: BS diminish L base. No wheezes.  CV: RRR, nl S1/S2  ABDOMEN: obese, soft, NT  EXTREMITIES/SKIN: no rashes/lesions on observed skin; 1+ edema         Data:     CBC RESULTS:     Recent Labs  Lab 06/05/17  0303 06/04/17  1946 06/04/17  1455 06/04/17  1147 06/04/17  0725 06/03/17  0732 06/02/17  2041 06/02/17  1015   WBC 3.6* 2.6*  --   --  1.8* 3.3*  --  10.3   RBC 2.68* 2.93*  --   --  3.18* 3.10*  --  3.66*   HGB 7.9* 8.5* 8.2* 8.6* 9.3* 9.1*  --  10.8*   HCT 23.4* 25.8*  --   --  28.4* 27.9*  --  33.9*    239  --   --  208 225 218 339       Basic Metabolic Panel:    Recent Labs  Lab 06/05/17  0303 06/04/17  1946 06/04/17  0725 06/03/17  1535 06/03/17  0732 06/02/17  1015   * 132* 130* 134 137 137   POTASSIUM 4.3 4.6 4.4 4.7 4.7 4.7   CHLORIDE 102 105 103 107 109 104   CO2 19* 18* 15* 17* 18* 18*   BUN 49* 48* 47* 46* 46* 53*   CR 2.31* 2.31* 2.26* 1.77* 1.64* 1.55*   * 109* 75 117* 107* 50*   CARYL 7.5* 7.8* 8.1* 7.8* 7.8* 9.4       INR  Recent Labs  Lab 06/02/17  1420   INR 1.11      Attestation:   I have reviewed today's relevant vital signs, notes, medications, labs and imaging.    Onesimo Pena MD  [facility]  Office phone :[phone]  Pager: [phone]  "

## 2017-06-05 NOTE — PROGRESS NOTES
"CLINICAL NUTRITION SERVICES  -  ASSESSMENT NOTE      Future/Additional Recommendations:   Pending diet advancement and SLP texture recommendations, would recommend texture-appropriate supplements BID between meals.    If no diet order within next 24h may need to consider nutrition support.    Malnutrition: Severe in the context of acute illness and social circumstances.        REASON FOR ASSESSMENT  Ara Stanley is a 71 year old female seen by Registered Dietitian for Admission Nutrition Risk Screen - unintentional loss of 10lbs or more in the past 2 months      NUTRITION HISTORY  - Information obtained from patient. Ara shares that her   two months ago. For the first month she \"held it together\" but the second month she feels her grief manifested itself in the form of many health problems. She states that pneumonia, coughing and vomiting caused her to be limited to broth, water and occasional carrots. She does have a glass of beer or wine from time to time, but not every day. She does endorse that she has lost weight - states her normal weight at baseline was 153-158 lbs.      Pt s/p laparoscopic cholecystectomy. Previously tolerating clear liquids however transferred to ICU yesterday and now NPO until improved bowel function. Note history of CKD (stage III).            CURRENT NUTRITION ORDERS  Diet Order:     NPO       PHYSICAL FINDINGS  Observed  Muscle Wasting - generalized upper body  Obtained from Chart/Interdisciplinary Team  Hypoactive bowel sounds; possible ileus  Potential aspiration w/sips of water; SLP eval pending  +2 edema ankles, +1 edema feet  Spoke w/SLP who reports that pt okay for modified diet however MD desires to continue NPO until improved bowel function.    ANTHROPOMETRICS  Height: 5' 7\"  Weight: 163 lbs 9.3 oz (74.2 kg)  Body mass index is 25.62 kg/(m^2).  Weight Status:  Overweight BMI 25-29.9  IBW: 61.4 kg  % IBW: 121%  Weight History: Baseline weight 153-158 lbs, " believes her lowest weight PTA was 131 lbs - 14% weight loss over the last month. Note admit weight of 139lbs and current weight of 163 lbs (? Accuracy)  Vitals:    06/02/17 2100 06/04/17 1300   Weight: 63.4 kg (139 lb 12.4 oz) 74.2 kg (163 lb 9.3 oz)     LABS  Labs reviewed    Na 132(L)  BG   Phos 5.1 (H)  Mg 2.8 (H)      MEDICATIONS  Medications reviewed    Dulcolax + Senna  Norepi drip  NaBicarb in D5; provides 408kcals/24h    Dosing Weight  63.4 kg (lowest weight for current admission)    ASSESSED NUTRITION NEEDS PER APPROVED PRACTICE GUIDELINES:  Estimated Energy Needs: 0284-8157 kcals (30-35 Kcal/Kg)  Justification: repletion  Estimated Protein Needs:  74-89 grams protein (1-1.2 g pro/Kg)  Justification: post-op, hypercatabolism with acute illness and CKD  Estimated Fluid Needs: 9899-4804  mL (1 mL/Kcal)  Justification: maintenance    MALNUTRITION:  % Weight Loss:  > 5% in 1 month (severe malnutrition)  % Intake:  </= 50% for >/= 1 month (severe malnutrition)  Subcutaneous Fat Loss:  None observed  Muscle Loss:  Temporal region mild depletion, Clavicle bone region mild depletion and Acromion bone region mild depletion  Fluid Retention:  Mild - feet and ankles      Malnutrition Diagnosis: Severe malnutrition  In Context of:  Acute illness or injury  Environmental or social circumstances    NUTRITION DIAGNOSIS:  Unintended weight loss related to grief and acute illness as evidenced by history of recent death of spouse followed by significant weight loss and decreased intake, as well as onset of multiple medical problems.       NUTRITION INTERVENTIONS  Recommendations / Nutrition Prescription  Pending diet advancement and SLP texture recommendations, would recommend texture-appropriate supplements BID between meals.    If no diet order within next 24h may need to consider nutrition support.       Implementation  Nutrition education: Per Provider order if indicated   Collaboration and Referral of Nutrition  care; will continue to discuss plan via interdisciplinary rounds.   .      Nutrition Goals  Diet advancement within 24h vs nutrition support.       MONITORING AND EVALUATION:  Progress towards goals will be monitored and evaluated per protocol and Practice Guidelines      Andra Albert RD, LD  Clinical Dietitian

## 2017-06-05 NOTE — PROGRESS NOTES
06/05/17 1102   General Information   Onset Date 06/02/17   Start of Care Date 06/05/17   Referring Physician Dr. Nielson   Patient Profile Review/OT: Additional Occupational Profile Info See Profile for full history and prior level of function   Patient/Family Goals Statement To eat/drink.  Patient agreed to POC goals and video swallow study when able.   Swallowing Evaluation Bedside swallow evaluation  (After RN consult and ok to give po trials)   Behaviorial Observations Alert   Mode of current nutrition NPO   Respiratory Status O2 Supply   Type of O2 supply (oxymizer 6 L)   Comments Per MD note: Ara Stanley is a 71 year old female with history of hypertension, dyslipidemia, chronic obstructive pulmonary disease, depression/anxiety, questionable paroxysmal atrial fibrillation, osteoarthritis, smoking, Etoh and GI bleeding in 03/2017 who presented with acute abdomen and sepsis-like picture. Dx: acute cholecystitis and underwent cholecystectomy 6/2/17.      Patient states she has had recent pneumonias and vague report of swallowing difficulty.  No previous SLP swallow evaluation records found.  RN reports patient coughing with thin liquids 6/4.  Patient tolerated meds with honey thick this am per RN report.   Clinical Swallow Evaluation   Oral Musculature generally intact  (mild decreased ROM/coordination)   Dentition upper dentures;lower dentures  (lower dentures not in place during eval)   Mucosal Quality dry   Laryngeal Function Cough;Throat clear;Swallow;Dry swallow palpated;Voicing initiated  (Hoarse voice, weak cough)   Clinical Swallow Eval: Thin Liquid Texture Trial   Mode of Presentation, Thin Liquids cup   Volume of Liquid or Food Presented sip x 1, ice chips x 3   Oral Phase of Swallow Premature pharyngeal entry   Pharyngeal Phase of Swallow (delay)   Diagnostic Statement gurgly voice and overt cough with sip x 1; delayed throat clear after ice chips   Clinical Swallow Eval: [place] Thick  Liquid Texture Trial   Mode of Presentation, Nectar spoon   Volume of Nectar Presented tsps x 3   Oral Phase, Nectar Premature pharyngeal entry;Poor AP movement   Pharyngeal Phase, Nectar (delay)   Diagnostic Statement throat clear/cough after each trial   Clinical Swallow Eval: Honey Thick Liquid Texture Trial   Mode of Presentation, Honey spoon   Volume of Honey Presented tsps x 3   Oral Phase, Honey Premature pharyngeal entry   Pharyngeal Phase, Honey (delay)   Diagnostic Statement no overt signs of aspiration after swallows   Clinical Swallow Eval: Puree Solid Texture Trial   Mode of Presentation, Puree spoon   Volume of Puree Presented tsps x 3   Oral Phase, Puree Poor AP movement;Premature pharyngeal entry   Pharyngeal Phase, Puree (delay)   Diagnostic Statement no overt signs of aspiration after swallows   Swallow Compensations   Swallow Compensations Effortful swallow;Pacing;Reduce amounts;Alternate viscosity of consistencies   Swallow Eval: Clinical Impressions   Skilled Criteria for Therapy Intervention Skilled criteria met.  Treatment indicated.   Functional Assessment Scale (FAS) 2   Treatment Diagnosis mod-severe oral-pharygneal dysphagai   Diet texture recommendations NPO  (Except honey/puree by spoon for crushed meds, see surgery NPO)   Recommended Feeding/Eating Techniques (see below)   Demonstrates Need for Referral to Another Service occupational therapy;physical therapy   Therapy Frequency daily   Predicted Duration of Therapy Intervention (days/wks) 1 week   Anticipated Discharge Disposition inpatient rehabilitation facility   Risks and Benefits of Treatment have been explained. Yes   Patient, family and/or staff in agreement with Plan of Care Yes   Clinical Impression Comments Patient presents with moderate to moderate-severe oral-pharyngeal dysphagia at bedside.  Difficult to assess baseline cough vs aspiration with po trials.  Deficits/risk factors include SOB/COPD, decreased dentition,  fatigue, tongue pumping, delayed swallow reflex, overt coughing with thin liquids, and throat clear/cough after nectar thick liquids.  No immediate signs of asipration noted after honey or puree during today's evaluation; however, recommend caution with all po intake until video swallow study can be completed.  RN notes patient to continue NPO status per ha following improved GI function.  If po meds/small amounts of po to be given, recommend crushed meds with honey thick liquids by spoon and/or applesauce, sit up at 90 degrees, slow rate, swallow hard.  Swallow Tx with education was provided after the evaluation.  A video swallow study is recommended prior to initiation of a po diet to fully assess pharyngeal function and aspiration risk.  Will check surgery notes regarding GI function to determine when a video swallow study can be completed.   Total Evaluation Time   Total Evaluation Time (Minutes) 15

## 2017-06-05 NOTE — PLAN OF CARE
Problem: Individualization  Goal: Patient Preferences  Outcome: Declining  To ICU from [facility] at about 1330.  AOx4, anxious yet cooperative, c/o much abd and back pain.   Arrived with decent BP but after BP drifted to 70's was started on Levo, titrated to 0.07  O2 needs increased, went from NC to 10L by oxymizer.  Desats when talking.  Minimally productive cough, LS dim and coarse.  BS hypo, no BM.  Abd bruised, lap sites WDL.

## 2017-06-05 NOTE — PLAN OF CARE
Problem: Goal Outcome Summary  Goal: Goal Outcome Summary     SLP - EPIC notes were reviewed.  Given intubation, plan to cancel SLP orders at this time.  Please send new orders if patient extubated and stable/appropriate for po intake status post extubation.

## 2017-06-05 NOTE — PROGRESS NOTES
Ms. Ara Palomino is a 71-year-old female who had a laparoscopic cholecystectomy on 2017 for acute cholecystitis with sepsis picture.  Hematology is following for leukopenia.      On 2017, WBC of 10.3 with hemoglobin of 10.8 and platelets of 339,000.  On 2017, WBC of 1.8, hemoglobin of 9.3, platelet of 208,000 and ANC of 1.3.  ANC decreased to 0.7 on 2017. Patient's leukopenia is due to sepsis.      The patient was seen in the ICU. Overall condition has deteriorated and she is now intubated.     LABS: Reviewed.  WBC is better at 3.6.  ANC is normal at 2.8.  Platelet is normal.  Hemoglobin low at 7.9.      ASSESSMENT:     1.  A 71-year-old female with leukopenia.  This is secondary to sepsis. Leukopenia is improving.  2.  Anemia.   3.  Sepsis.        PLAN:   -Patient's white count and ANC are better. Leukopenia is secondary to sepsis.  As sepsis improves, leukopenia should resolve.   -CBC will be monitored.  We will follow the patient intermittently in the hospital.         HALEIGH WAGONER MD             D: 2017 16:28   T: 2017 17:06   MT: [id number]      Name:     ARA PALOMINO   MRN:      [id number]        Account:      [id number]   :      1945           Service Date: 2017      Document: [id number]

## 2017-06-05 NOTE — PLAN OF CARE
Problem: Goal Outcome Summary  Goal: Goal Outcome Summary        SLP - A bedside swallow evaluation was completed this am.  Patient presents with moderate to moderate-severe oral-pharyngeal dysphagia at bedside.  Difficult to assess baseline cough vs aspiration with po trials.  Deficits/risk factors include SOB/COPD, decreased dentition, fatigue, tongue pumping, delayed swallow reflex, overt coughing with thin liquids, and throat clear/cough after nectar thick liquids.  No immediate signs of aspiration noted after honey or puree during today's evaluation; however, recommend caution with all po intake until video swallow study can be completed.  RN notes patient to continue NPO status per ha following improved GI function.  If po meds/small amounts of po to be given, recommend crushed meds with honey thick liquids by spoon and/or applesauce, sit up at 90 degrees, slow rate, swallow hard.  Swallow Tx with education was provided after the evaluation.  A video swallow study is recommended prior to initiation of a po diet to fully assess pharyngeal function and aspiration risk.  Will check surgery notes regarding GI function to determine when a video swallow study can be completed.  Full discharge plan to be determined.  SLP swallow Tx to continue until study can be completed.

## 2017-06-06 NOTE — CONSULTS
Saw Pt. D/W Dr. Peck.  No active GI need at this point.  Plan for Exp. Lap today.    Thank you for asking us. If situation change, we will revisit.    [facility]  [phone]

## 2017-06-06 NOTE — BRIEF OP NOTE
[facility] Brief Operative Note    Pre-operative diagnosis: UNKNOWN   Post-operative diagnosis Duodenal Ulcer Perforation     Procedure: Procedure(s):  DIAGNOSTIC LAPAROSCOPY, REPAIR OF PERFORATED GASTRIC ULCER, ABDOMINAL LAVAGE - Wound Class: I-Clean   Surgeon(s): Surgeon(s) and Role:     * Estelita Peck MD - Primary     * Kvng Avila MD - Assisting     * Alfredo Mackenzie PA-C - Assisting   Estimated blood loss: 5mL   Specimens:   ID Type Source Tests Collected by Time Destination   1 : Abdominal Fluid Fluid Abdomen ANAEROBIC BACTERIAL CULTURE, FLUID CULTURE AEROBIC BACTERIAL, GRAM STAIN Estelita Peck MD 6/6/2017  4:42 PM       Findings: Abdomen full of bile and gastric secretions.  Gram stain found hyphae.  Diflucan (renal dose) given intraoperatively  Oversew of perforation + Evicel + Benson patch  19F Round FUNMI drain left in area  See Operative Report for full details

## 2017-06-06 NOTE — PROGRESS NOTES
Renal Medicine Progress Note            Assessment/Plan:     1. Patient with a Scr of 1 mg/dl in March.     2. Pt was admitted with a Scr of 1.55 mg/dl. WOODY due to hypotension from shock. BUN/Scr slightly better. She is making urine.     3. Septic shock with MODS                         -ertapenem, vancomycin                          -NE at 0.02 mcg     3. Respiratory failure. PNA and pleural effusion. FIO2 at 50%.     4. S/p lap cholecystectomy    5. Anemia    -transfuse as needed       Plan.   1. No new recommendations from our team. Continue supportive care.        Interval History:     Pt is awake and looks comfortable on the ventilator. Afebrile and no leukocytosis. NE is only at 0.02 mcg.           Medications and Allergies:       thiamine  250 mg Intravenous Daily    Followed by     [START ON 6/11/2017] vitamin  B-1  200 mg Oral Daily     ertapenem (INVanz) IV  500 mg Intravenous Q24H     acetaminophen  1,000 mg Oral Q8H    Or     acetaminophen  650 mg Rectal Q8H     multivitamin, therapeutic  1 tablet Oral Once     folic acid  1 mg Oral Once     heparin  5,000 Units Subcutaneous Q8H     pantoprazole  40 mg Intravenous QAM AC     budesonide  1 mg Nebulization BID     albumin human  25 g Intravenous Q12H     sodium chloride (PF)  10 mL Intracatheter Q8H     ipratropium - albuterol 0.5 mg/2.5 mg/3 mL  3 mL Nebulization Q6H     arformoterol  15 mcg Nebulization BID     sodium chloride (PF)  3 mL Intracatheter Q8H     senna-docusate  1-2 tablet Oral BID     sertraline  50 mg Oral Daily     simvastatin  10 mg Oral QAM        Allergies   Allergen Reactions     Iodine I 131 Tositumomab Anaphylaxis     Can tolerate topical iodine if it is wahed off after surgery      Penicillins Rash and Anaphylaxis     Swollen  lymph nodes, flushed overheating      Sulfa Drugs Nausea and Vomiting, Diarrhea and Rash            Physical Exam:   Vitals were reviewed  Heart Rate: 117, Blood pressure 126/65, pulse 99, temperature 99.9  " F (37.7  C), resp. rate 15, height 1.702 m (5' 7\"), weight 77.3 kg (170 lb 6.7 oz), SpO2 97 %.    Wt Readings from Last 3 Encounters:   06/06/17 77.3 kg (170 lb 6.7 oz)   06/04/12 65.8 kg (145 lb)       Intake/Output Summary (Last 24 hours) at 06/06/17 1006  Last data filed at 06/06/17 1000   Gross per 24 hour   Intake          3886.36 ml   Output              855 ml   Net          3031.36 ml       GENERAL APPEARANCE: Looks comfortable  HEENT:  Eyes/ears/nose/neck grossly normal. Intubated  RESP: CTAB. No wheezes.  CV: RRR, nl S1/S2, no m/r/g   ABDOMEN: obese, soft  EXTREMITIES/SKIN: Minimal edema.   Neuro: awake and alert, follows verbal commands         Data:     CBC RESULTS:     Recent Labs  Lab 06/06/17  0411 06/05/17  0303 06/04/17  1946 06/04/17  1455 06/04/17  1147 06/04/17  0725 06/03/17  0732 06/02/17  2041 06/02/17  1015   WBC 5.3 3.6* 2.6*  --   --  1.8* 3.3*  --  10.3   RBC 2.42* 2.68* 2.93*  --   --  3.18* 3.10*  --  3.66*   HGB 7.0* 7.9* 8.5* 8.2* 8.6* 9.3* 9.1*  --  10.8*   HCT 20.2* 23.4* 25.8*  --   --  28.4* 27.9*  --  33.9*    231 239  --   --  208 225 218 339       Basic Metabolic Panel:    Recent Labs  Lab 06/06/17  0411 06/05/17  0303 06/04/17  1946 06/04/17  0725 06/03/17  1535 06/03/17  0732   * 132* 132* 130* 134 137   POTASSIUM 3.8 4.3 4.6 4.4 4.7 4.7   CHLORIDE 93* 102 105 103 107 109   CO2 27 19* 18* 15* 17* 18*   BUN 45* 49* 48* 47* 46* 46*   CR 2.01* 2.31* 2.31* 2.26* 1.77* 1.64*   * 122* 109* 75 117* 107*   CARYL 7.5* 7.5* 7.8* 8.1* 7.8* 7.8*       INR  Recent Labs  Lab 06/06/17  0411 06/02/17  1420   INR 1.31* 1.11      Attestation:   I have reviewed today's relevant vital signs, notes, medications, labs and imaging.    Onesimo Pena MD  [facility]  Office phone :[phone]  Pager: [phone]  "

## 2017-06-06 NOTE — PROGRESS NOTES
"General Surgery Progress Note    Subjective: pt intubated yesterday. Had CT abd/pelvis in the pm which revealed a large amount of intraabdominal fluid and air. A bedside paracentesis was completed this morning showing bilious fluid. Pt has had low grade fevers. She is mildly tachycardic and requiring levophed for pressor support. Her lactate is normal.     Objective: /54  Pulse 103  Temp 99.9  F (37.7  C)  Resp 20  Ht 5' 7\" (1.702 m)  Wt 170 lb 6.7 oz (77.3 kg)  SpO2 98%  BMI 26.69 kg/m2  Gen: opens eyes to voice.   CV: tachycardic  Pulm: mechanical ventilation  Abd: distended, tender to palpaton, ecchymosis surrounding umbilical incision  Ext: WWP    Assessment/Plan:   Ara Stanley  is a 71 year old female POD4 s/p diagnostic laparoscopy, lap alexandre and abdominal lavage due to peritonitis on admission and ruling out mesenteric ischemia on CTA. CT and paracentesis concerning for possible bile leak vs perforated gastric/duodenal ulcer vs missed bowel injury.   - Plan for stat HIDA scan to evaluate for possible bile leak - pending results possible diagnostic laparoscopy with abdominal washout    Estelita Peck MD  [facility]  [phone]    Addendum: Prelim review of HIDA negative for biliary leak. Plan for OR for diagnostic laparoscopy, possible laparotomy    Estelita Peck MD  [facility]  [phone]              "

## 2017-06-06 NOTE — OP NOTE
General Surgery Operative Note    PREOPERATIVE DIAGNOSIS:  peritonitis     POSTOPERATIVE DIAGNOSIS: perforated gastric ulcer    PROCEDURE:    1. Diagnostic laparoscopy  2. Repair of perforated gastric ulcer and omental patch  3. Abdominal lavage    SURGEON:  Estelita Peck MD    ASSISTANT:  Kvng Avila MD, Alfredo Mackenzie PA-C,  The Physician Assistant was medically necessary to assist in prepping, positioning, camera operation, retraction/exposure and closure of port site    ANESTHESIA:  General.    BLOOD LOSS: 5ml    FINDINGS: significant bilious fluid throughout abdomen, 1cm perforation in the prepyloric anterior wall of the stomach repaired primarily with omental patch. Fluid positive for budding yeast and patient given 400mg diflucan intraoperatively.     INDICATIONS:   Ms. Stanley is a 71yof who underwent a diagnostic laparoscopy and cholecystectomy on 6.2.2017 after she was transferred to [facility] for possible mesenteric ischemia. She had a negative CTA and was taken for laparoscopic exploration. On her initial CT the only abnormality noted was gallstones and mild thickening of the gallbladder wall. There was no evidence of free fluid or free air. She initially was stable post operatively. On POD 3 she had worsening multisystem organ failure and a repeat CT was obtained which revealed significant amount of intraabdominal fluid and air. A bedside paracentesis was performed and revealed bilious fluid. A HIDA scan was obtained and negative for biliary leak. Given the concern for possible perforated ulcer vs missed bowel injury a discussion was held with her friend who consented to proceed with diagnostic laparoscopy, possible laparotomy.     DETAILS OF PROCEDURE:   A small skin incision was made in the left upper quadrant utilizing her prior port site. A 5mm 0degree laparoscope was used with a visiport to obtain access to the abdomen. Insufflation was connected and flowed freely. Insufflation  pressure was sent to 15mmhg. The abdomen was surveyed and there were omental adhesions to the undersurface of the peritoneum with bilious fluid throughout the abdomen. There was also fibrinous exudate present over the liver, in the right upper quadrant and over the small bowel on the anterior surface. There was no injury from abdominal entrance noted. A 12mm port was placed in the midline just beneath the umbilicus. Two more 5mm ports were placed, one in the left mid abdomen and one in the right lateral abdominal wall utilizing her prior laparoscopy ports. Fluid was aspirated with a needle aspirator from the right upper quadrant and sent for stat gram stain and culture. The adhesions between loops of bowel and omentum and the abdominal wall were taken down with blunt dissection using an atraumatic grasper. We were able to elevate the liver to inspect the gallbladder fossa. The gastric antrum and pre pyloric region was immediately within our field and there was a 1cm perforation on the anterior surface of the stomach in the antrum. This area on her prior laparoscopy had been seen and evaluated and at that time felt to represent fibrinous exudate secondary to her cholecystitis as there was no evidence of perforation at that time.     The gastric ulcer was repaired primarily in a laparoscopic fashion using four 2-0 vicryl sutures in an interrupted fashion to close the ulcer. There was excellent reapproximation of tissue and the surrounding tissue was pliable with minimal induration. The ulcer was covered with Eviceal. The greater omentum was mobilized and a tongue of omentum was secured to the stomach using a 2-0 vicryl suture to cover the repair.     The abdomen was then irrigated with 5L of warm sterile saline until the irrigant was clear. A majority of the fibrinous exudate covering the liver surface and on the bowel was removed. A 19Fr round edilberto drain was placed through the right lateral port and positioned in  the right upper quadrant under the free aspect of the liver. Anesthesia placed an NGT and secured it.     The 12mm port fascia closed with 0 Vicryl in figure-of-eight fashion using the daysi margo device. All gas was expelled and the trocars were taken out under direct visualization. Marcaine 0.5% were injected to all the wounds. The skin was closed with a 4-0 Monocryl in a subcuticular fashion. Steri-strips and sterile dressings were applied. The patient tolerated the procedure well. There were no complications. They were awoken from anesthesia and transferred to PACU in stable condition. All sponge, instrument and needle counts were correct.       CHASIDY GUZMAN MD    Please route or send letter to:  Primary Care Provider (PCP) and Referring Provider

## 2017-06-06 NOTE — PROGRESS NOTES
[facility]    Critical Care Service  Progress Note    Date of Service (when I saw the patient): 06/06/2017     Assessment & Plan    This is a 71-year-old female initially presented to ICU with abdominal discomfort status post endoscopic cholecystectomy and ex lap, SOB and hypotension. She continued to decline despite broad-spectrum antibiotics including ciprofloxacin, meropenem, vancomycin for possible healthcare associated pneumonia and worsening renal function with anuric acute renal failure. Her abdominal pain continues to be marked and worsening left sided opacity on CXR concerning for effusion but effusion does not appear as large on bedside ultrasound as expected.      Neuro  Anxiety with hx or depression/anxiety  Acute pain  Sedation  Potential alcohol abuse  Plan:  -- Scheduled acetaminophen, abd pain still unusually severe for normal post op course, rx with dialudid PRN for now while we further investigate  -- Propofol, seems to drop pressures, will add precedex for sedation as needed until extubation  -- Delirium prevention as able  -- Continue home meds zoloft/trazodone  -- IV thiamine, monitor for s/sy withdrawal      Resp:  Acute respiratory failure  COPD - again, they were inconsistent reports about how much she is actually smoking.  We do not have pulmonary function tests on file though she reports that she is followed by [facility] and carries a diagnosis of COPD.  We will continue her on long-acting beta agonist as well as inhaled corticosteroid with p.r.n. Bronchodilators.  No indication for steroids at this time.  We will obtain outside pulmonary records.     Left-sided effusion -  Pneumonia vs 3rd spacing with albumin <2.  Did not appear large on US. Monitor.   Plan:  -- Current settings are:  Ac 16*470 +5, hold off on extubation until metabolic process better defined  -- PST daily, vent bundle  -- Monitor effusion, no current plan for drainage.   Conitnue:  --> combination  nebs  --> bronchodilator  --> no indication for steorids    CV  Septic shock, likely abdominal source.  Still requiring norepi for BP. Source eval below.   Albumin BID started yesterday.    Plan:  -- Wean pressors as able  -- Monitor hemodynamics closely.  Keep MAP > 65  -- Resuscitate as needed.  Hb low this am, will transfuse PRBC     Renal  Acute kidney injury  Lactic acidosis  Hyponatremia, urine sodium 11  Plan:  -- Serum bicarb 27 this am., dc bicarb gtt at this time  -- VBG/Lactate pending    ID  Sepsis  Source unclear, PNA vs intra-abdominal vs ?  Plan:  -- Currently on vanco, cipro and meropenum.  CXR not particularly concerning for aspiration, will narrow to cover pulm and gut organisms to ertapenum (PCN allergy)  -- Follow cultures.   -- CT abd done yesterday showing  Pneumoperitoneum and mod fluid throughout the abdomen, RN spoke with surgery team await any further recommendations.  Possible diagnostic paracentesis?      GI/Nutrition  S/p Laparoscopic cholecystectomy, with normal abd findings ( Dr Peck, 6/2/17)  Acute on chronic Malnutrition pro/fay deficit  Plan:  -- NPO, place NG to suction   --  CT done yesterday, await rec's    Endocrine  1.  Adrenal insufficiency??  Plan:  -- Cortisol level was 60, 2 days after hydrocortizone/dexamethasone administration.  If additional issues weaning off pressors could consider stress dose steroid course.   -- Keep BG  <180 for optimal healing, Insulin gtt per protocol if indicated     Heme / Onc:   1.  Acute on chronic, drift since admission, likely some hemodilution component.  Plan:  -- Transfuse 1 unit PRBC today given hb 7 and symptomatic with hypotesnion.  Keep Hb > 7.0     Musculoskeletal:   1.  No acute issues, monitor  -- PT when appropriate, off pressors     General cares:  DVT Prophylaxis: Heparin SQ  GI Prophylaxis: PPI  Restraints: Restraints for medical healing needed: YES  Family update by me today: No  Current lines are required for patient  management, Fol  Access: PICC 6/4/17  Right basilic     Britni Couch    Time Spent on this Encounter   Billing:  I spent 60 minutes bedside and on the inpatient unit today managing the critical care of Ara Stanley in relation to the issues listed in this note.    Main Plans for Today    PST  Stop HCO3  PRBC  Consider diagnostic paracentesis    Interval History   No acute events.  Pt rested on vent overnight.  Norepi weaned some.  Pt AAA c/o chest pain, sob +++ abd pain.  Nods seemingly appropriately, unable get further ROS 2nd to ETT    Physical Exam   Temp: 100  F (37.8  C) Temp src: Bladder Temp  Min: 98.6  F (37  C)  Max: 100  F (37.8  C) BP: 108/76   Heart Rate: 113 Resp: 22 SpO2: 98 % O2 Device: Mechanical Ventilator    Vitals:    06/02/17 2100 06/04/17 1300 06/06/17 0400   Weight: 63.4 kg (139 lb 12.4 oz) 74.2 kg (163 lb 9.3 oz) 77.3 kg (170 lb 6.7 oz)     I/O last 3 completed shifts:  In: 3654.86 [I.V.:3154.86]  Out: 740 [Urine:740]    GEN: AA  EYES: PERRL, Anicteric sclera.   HEENT:  Normocephalic, atraumatic, trachea midline, ETT secure  CV: RRR, no gallops, rubs, or murmurs  PULM/CHEST: Clear breath sounds bilaterally without rhonchi, crackles or wheeze, symmetric chest rise  GI: no bowel sounds, soft, tender,+guarding  : tanner catheter in place, urine yellow and clear  EXTREMITIES: + peripheral edema, moving all extremities, peripheral pulses intact  NEURO: Cranial nerves II-XII grossly intact, no focal deficits noted  SKIN: warm and dry  PSYCH:  Affect:  Anxious, otherwise RUKHSANA  Imaging personally reviewed: CT abd pel  ECG  SR    Medications     propofol (DIPRIVAN) infusion Stopped (06/06/17 0757)     norepinephrine 0.04 mcg/kg/min (06/06/17 0822)     NaCl 10 mL/hr at 06/06/17 0758       thiamine  250 mg Intravenous Daily    Followed by     [START ON 6/11/2017] vitamin  B-1  200 mg Oral Daily     ertapenem (INVanz) IV  1 g Intravenous Q24H     multivitamin, therapeutic  1 tablet Oral  Once     folic acid  1 mg Oral Once     heparin  5,000 Units Subcutaneous Q8H     pantoprazole  40 mg Intravenous QAM AC     budesonide  1 mg Nebulization BID     albumin human  25 g Intravenous Q12H     sodium chloride (PF)  10 mL Intracatheter Q8H     ipratropium - albuterol 0.5 mg/2.5 mg/3 mL  3 mL Nebulization Q6H     arformoterol  15 mcg Nebulization BID     sodium chloride (PF)  3 mL Intracatheter Q8H     senna-docusate  1-2 tablet Oral BID     sertraline  50 mg Oral Daily     simvastatin  10 mg Oral QAM       Data     Recent Labs  Lab 06/06/17  0411 06/05/17  0303 06/04/17  1946  06/04/17  1428  06/02/17  1420 06/02/17  1015   WBC 5.3 3.6* 2.6*  --   --   < >  --  10.3   HGB 7.0* 7.9* 8.5*  < >  --   < >  --  10.8*   MCV 84 87 88  --   --   < >  --  93    231 239  --   --   < >  --  339   INR 1.31*  --   --   --   --   --  1.11  --    * 132* 132*  --   --   < >  --  137   POTASSIUM 3.8 4.3 4.6  --   --   < >  --  4.7   CHLORIDE 93* 102 105  --   --   < >  --  104   CO2 27 19* 18*  --   --   < >  --  18*   BUN 45* 49* 48*  --   --   < >  --  53*   CR 2.01* 2.31* 2.31*  --   --   < >  --  1.55*   ANIONGAP 8 11 9  --   --   < >  --  15*   CARYL 7.5* 7.5* 7.8*  --   --   < >  --  9.4   * 122* 109*  --   --   < >  --  50*   ALBUMIN 1.9* 2.0* 1.9*  --   --   < >  --  3.6   PROTTOTAL 4.4* 4.4*  --   --   --   < >  --  7.1   BILITOTAL 0.4 0.3  --   --   --   < >  --  0.4   ALKPHOS 55 51  --   --   --   < >  --  96   ALT 19 26  --   --   --   < >  --  42   AST 39 58*  --   --   --   < >  --  49*   TROPI  --   --   --   --  <0.015The 99th percentile for upper reference range is 0.045 ug/L.  Troponin values in the range of 0.045 - 0.120 ug/L may be associated with risks of adverse clinical events.  --   --  <0.015The 99th percentile for upper reference range is 0.045 ug/L.  Troponin values in the range of 0.045 - 0.120 ug/L may be associated with risks of adverse clinical events.   < > = values in this  interval not displayed.  Recent Results (from the past 24 hour(s))   XR Chest Port 1 View    Narrative    CHEST ONE VIEW UPRIGHT  6/5/2017 1:36 PM     HISTORY:  Lines and tubes, intubated patient.    COMPARISON: 6/5/2017.      Impression    IMPRESSION: Worsening infiltrates on the left since the comparison  study. Trace atelectasis and/or infiltrate at the right base.  Endotracheal tube tip mid trachea. Right PICC tip in the low SVC.    YEVGENIY BAI MD   CT Chest Abdomen Pelvis w/o Contrast    Narrative    CT CHEST, ABDOMEN, AND PELVIS WITHOUT CONTRAST 6/5/2017 2:39 PM     HISTORY: Evaluate for left lower lobe effusion versus consolidation.  Also, has worsening abdominal pain after cholecystectomy and exp  laparoscopy without clear etiology. Evaluate for pneumatosis.    COMPARISON: Abdomen and pelvis CT from 6/2/2017    TECHNIQUE: Volumetric helical acquisition of CT images from the lung  apices through the symphysis pubis without contrast. Radiation dose  for this scan was reduced using automated exposure control, adjustment  of the mA and/or kV according to patient size, or iterative  reconstruction technique.    FINDINGS:     Chest: Centrally lobar atelectasis and/or consolidation is seen on the  left. Small to moderate bilateral effusions left worse than right.  Left base atelectasis and/or infiltrate. There are moderate  atherosclerotic changes of the visualized aorta and its branches.  There is no evidence of aortic aneurysm. There are mild coronary  vascular calcifications consistent with coronary artery disease.  Normal heart size.    Abdomen and pelvis: Moderate air and fluid throughout the abdomen,  more than typically seen post surgery. No bowel obstruction. Increased  density of the kidney is noted which may be retained contrast. Liver,  spleen, adrenal glands, and pancreas grossly negative for worrisome  focal lesion. There are moderate atherosclerotic changes of the  visualized aorta and its branches.  There is no evidence of aortic  aneurysm. No hydronephrosis. No definite extravasated oral contrast.  Oral contrast is seen in the distal small bowel. No pneumatosis  demonstrated.      Impression    IMPRESSION:  1. Essentially lobar atelectasis and/or consolidation in the left  lower lobe.  2. Small to moderate bilateral effusions left worse than right. Mild  right base atelectasis and/or infiltrate.  3. Moderate amount of air and fluid throughout the abdomen, more than  is typically seen after surgery, of uncertain etiology. Oral contrast  is present in the distal small bowel without evidence of definite  extraluminal oral contrast. Consider diagnostic paracentesis to  evaluate for infection.    YEVGENIY BAI MD   XR Chest Port 1 View    Narrative    CHEST PORTABLE ONE VIEW June 6, 2017 5:34 AM     HISTORY: Lines and tubes, intubated patient.    COMPARISON: 6/5/2017.    FINDINGS: ET tube tip well above the jeanmarie. PICC line with tip in the  SVC. Haziness over the left lung likely represents some layered out  pleural fluid. No definite interval change.      Impression    IMPRESSION: Support hardware remains in good position. Left pleural  effusion. No interval change.    SHAYE JOSEHP MD       This critically ill patient was seen and examined with the resident physician.  I have reviewed the above note and agree with the findings, assessment, and plan as documented.  The plan was formulated in conjunction with the house staff.  I personally examined and evaluated the patient today.  I have evaluated all laboratory values and imaging studies for the past 24 hours.  A brief overview, key findings and key decisions made today include the following:  This is a 71-year-old female who was initially admitted for abdominal pain status post cholecystectomy and exploratory laparotomy but no definitive source though there was some abnormalities of the gallbladder which was removed.  She continued to have significant pain  and pneumoperitoneum being to respiratory failure secondary to inability to take deep breaths and clear her airway from the abdominal pain.  She was intubated.  CT scan demonstrated fluid and air in the abdomen with question of this was postoperative fluid from the lavage versus another source.  Diagnostic paracentesis was performed at bedside which showed bilious fluid.  Patient sent for a stat hida scan and ultimately taken back to the operating room where she was found to have a perforation of the stomach.  Repair is performed.  Patient remains critically ill.     I have reviewed all the consults that have been ordered and are active for this patient.  Pertinent procedures / interventions / care plan changes include:  Surgical intervention, ventilator management    Critical Care Time: 40 min.  I spent this time (excluding procedures) personally providing and directing critical care services at the bedside and on the critical care unit.      Tam Ugalde MD  Pulmonary and Critical Care  [facility]  Pager:  [phone]

## 2017-06-06 NOTE — PROVIDER NOTIFICATION
Call placed to surgery office for Dr. Peck to make sure MD aware of CT results from late afternoon yesterday.  Office personal indicated that Dr. Peck is in a meeting until 12:30 and Dr. Beck is managing critical call.  Asked office staff to have MD look at CT and call ICU if any orders were required.

## 2017-06-06 NOTE — PROGRESS NOTES
Surgery Update    Pt found to have perforated gastric ulcer with significant contamination throughout abdomen. Ulcer repaired and omental patch placed. I discussed findings and surgery with her friend, Courtney over the phone.     Post operative plan:  NPO, NGT to LIS - reposition NGT only under fluoroscopy to not disrupt repair  Gastrograffin swallow POD3  Renally dosed Diflucan to cover yeast seen on gram stain  BID PPI  FUNMI to bulb suction - to remain in place until tolerating diet  Will need endoscopy in 6-8 weeks to document healing and obtain biopsies    Estelita Peck MD  [facility]  [phone]

## 2017-06-06 NOTE — PROGRESS NOTES
[facility] ICU RESPIRATORY NOTE  Date of Admission: 6/2/17   Date of Intubation (most recent): 6/5/17  Reason for Mechanical Ventilation: Airway protection   Number of Days on Mechanical Ventilation: 1  Met Criteria for Pressure Support Trial: No  Length of Pressure Support Trial:  Reason for Stopping Pressure Support Trial:  Reason for No Pressure Support Trial: Per MD     Significant Events Today: Intubated this afternoon    ABG Results:     Recent Labs  Lab 06/05/17  1325 06/05/17  0903   PH 7.29* 7.33*   PCO2 46* 39   PO2 118* 114*   HCO3 22 21   O2PER 70 10L     Ventilation Mode: CMV/AC  FiO2 (%): 50 %  Rate Set (breaths/minute): 16 breaths/min  Tidal Volume Set (mL): 470 mL  PEEP (cm H2O): 5 cmH2O  Oxygen Concentration (%): 50 %  Resp: 18      ETT appearance on chest x-ray: In good position     Plan:  Continue full support     Alvin Galeano RT

## 2017-06-06 NOTE — ANESTHESIA CARE TRANSFER NOTE
Patient: Ara Stanley    Procedure(s):  DIAGNOSTIC LAPAROSCOPY, REPAIR OF PERFORATED GASTRIC ULCER, ABDOMINAL LAVAGE - Wound Class: I-Clean    Diagnosis: UNKNOWN  Diagnosis Additional Information: No value filed.    Anesthesia Type:   General     Note:  Airway :ETT  Patient transferred to:ICU  Comments: Transferred to ICU, ETT in place, ambu bag with 15L oxygen for transport, all monitors and alarms on for transport, IV/lines patent and drips continued per anesthesia record.  Placed on ventilator per RT in ICU. Placed on ICU monitors per ICU RN, alarms on, VSS.  Report to ICU RN. Pt stable. All questions answered.       Vitals: (Last set prior to Anesthesia Care Transfer)    CRNA VITALS  6/6/2017 1753 - 6/6/2017 1836      6/6/2017             Pulse: 98    Ht Rate: 98    SpO2: 100 %                Electronically Signed By: OTTO Higgins CRNA  June 6, 2017  6:36 PM

## 2017-06-06 NOTE — PLAN OF CARE
Problem: Goal Outcome Summary  Goal: Goal Outcome Summary  PT: Per conversation with nurse, patient not appropriate for therapy eval today.

## 2017-06-06 NOTE — PROGRESS NOTES
Chart review, discussed plan with intensivist and surgeon. Ongoing peritonitis with bilious paracentesis, surgical eval this afternoon.

## 2017-06-06 NOTE — PROGRESS NOTES
Surgery    I was asked to see Ara this morning to review her imaging studies performed yesterday. She had a CT scan of her abdomen and pelvis yesterday due to increased abdominal pain. She was also acidotic and had respiratory compromise and required intubation. Her CT showed a moderate amount of air and fluid which was more than expected given her laparoscopic surgery. She is currently off of sedation and pressors. She is awake and complains of significant abdominal pain and tenderness. She has not had a high fever. Her hemoglobin is 7.0 and she is going to be receiving a unit of packed RBCs. Her lactic acid has been normal.    Abdomen-incision sites with some ecchymosis. Mild distention. Diffuse tenderness throughout. No significant rebound or guarding.    A/P  I have personally reviewed the imaging studies which show a moderate amount of fluid and air of unknown etiology. She has increased abdominal pain but has been more hemodynamically stable since yesterday. Possible etiologies of the fluid could be a possible bowel injury, bile leak, purulent fluid collection, or irrigation from the lavage. She has significant tenderness but is more hemodynamically stable and does not need in emergent operation. I would recommend obtaining a diagnostic paracentesis to help delineate the diagnosis and will formulate a treatment plan once it is obtained.    Stefano Marcano M.D.  [facility]  [phone]

## 2017-06-06 NOTE — PLAN OF CARE
Problem: Goal Outcome Summary  Goal: Goal Outcome Summary  Outcome: No Change  Remains on Levophed at 0.1 mcg/kg/min. Follows commands, WORTHY, PERRL. Urine output has increased overnight averaging 50 ml/hr.

## 2017-06-06 NOTE — PROCEDURES
Procedure:Paracentesis  Indication:Ascites, possible bile leak, possible bleeding  Permit:Discussed with the patient,urgent  Timeout: Performed    Maneuver:A bedside ultrasound was utilized to visualize the ascitic fluid.  The site was marked. The right abdomen was prepped with chloraprep x 3 and then draped in sterile fashion. Maximum barrier technique was utilized.  The 1% lidocaine was injected into the subcutaneous tissue (10 ml) and as the needle was advanced bilious fluid was aspirated. Next the paracentesis needle was inserted after a small skin nick and the catheter was advanced over the needle. 400 ml of bilious fluid was aspirated. Samples sent to the lab. Unable to send anaerobic culture sample in anaerobic culture as the lab no longer stocks these bottles and they will inoculate a culture bottle when sample arrives. The catheter was removed and pressure dressing placed over insertion site.     Complications: none  Blood loss: < 1 ml    Tam Ugalde MD  Pulmonary and Critical Care  [facility]  Pager:  [phone]

## 2017-06-06 NOTE — PROGRESS NOTES
On license of [facility] ICU RESPIRATORY NOTE  Date of Admission: 6/2/17                Date of Intubation (most recent): 6/5/17  Reason for Mechanical Ventilation: Airway protection   Number of Days on Mechanical Ventilation: 2  Met Criteria for Pressure Support Trial: yes  Length of Pressure Support Trial:  Reason for Stopping Pressure Support Trial:  Reason for No Pressure Support Trial: Per MD      Significant Events Today: None overnight     ABG Results:      Ventilation Mode: CMV/AC  FiO2 (%): 50 %  Rate Set (breaths/minute): 16 breaths/min  Tidal Volume Set (mL): 470 mL  PEEP (cm H2O): 5 cmH2O  Oxygen Concentration (%): 50 %  Resp: 18      Recent Labs  Lab 06/05/17  1325 06/05/17  0903   PH 7.29* 7.33*   PCO2 46* 39   PO2 118* 114*   HCO3 22 21   O2PER 70 10L             ETT appearance on chest x-ray: In good position      Plan:  Continue full support      6/6/2017  Eliot Cameron

## 2017-06-07 NOTE — PROGRESS NOTES
Pt was extubated today @1140 and placed aerosol mask 40%, no complication post extubated. Will continue to monitor the pt.     RT Bettye.

## 2017-06-07 NOTE — PLAN OF CARE
Problem: Goal Outcome Summary  Goal: Goal Outcome Summary  Outcome: Declining  Pt transitioned off propofol and levophed this AM. Pt alert and following commands.  Precedex gtt added for sedation and fentanyl PRN for pain.  Pt c/o abdominal pain 10/10 at beginning of shift. MD notified. Pts h/h dropped last night so one unit of PRBC's given.  Paracentesis done at bedside and found large amounts of bile. Levophed added again to keep MAP >65.  Pt went for HIDA scan and then to Surgery for washout. Pts friend Courtney updated and was able to provide consent. Pt arrived back to unit at 1845. Sedated unable to arouse. J/p, art line and NG placed in OR.

## 2017-06-07 NOTE — PROGRESS NOTES
Hospitalist Note  6/7/2017    Chart reviewed. Patient remains intubated and sedated. Precedex being weaned and plan is to attempt extubation this afternoon. Discussed with ICU nurses and will check in on patient later today. Will continue to follow progress.     Lety Kahn MD  Text Page

## 2017-06-07 NOTE — PROGRESS NOTES
" Renal Medicine Progress Note            Assessment/Plan:     1. Patient with a Scr of 1 mg/dl in March.      2. Pt was admitted with a Scr of 1.55 mg/dl. WOODY due to hypotension from shock. Improving.      3. Septic shock with MODS. Resolved.                         -ertapenem      4. Respiratory failure. PNA and pleural effusion. Likely will be extubated later today.      5. S/p lap cholecystectomy and repair of perforated gastric ulcer.     Plan  1. Okay to stop albumin infusion.         Interval History:     Patient is on CPAP. She wants something to eat.           Medications and Allergies:       chlorhexidine  15 mL Swish & Spit BID     [START ON 6/8/2017] ertapenem (INVanz) IV  1 g Intravenous Q24H     lipids  250 mL Intravenous Q24H     insulin aspart  1-12 Units Subcutaneous Q4H     thiamine  250 mg Intravenous Daily    Followed by     [START ON 6/11/2017] vitamin  B-1  200 mg Oral Daily     acetaminophen  1,000 mg Oral Q8H    Or     acetaminophen  650 mg Rectal Q8H     pantoprazole  40 mg Intravenous BID     fluconazole  200 mg Intravenous Q24H     folic acid  1 mg Oral Once     heparin  5,000 Units Subcutaneous Q8H     budesonide  1 mg Nebulization BID     sodium chloride (PF)  10 mL Intracatheter Q8H     ipratropium - albuterol 0.5 mg/2.5 mg/3 mL  3 mL Nebulization Q6H     arformoterol  15 mcg Nebulization BID     sodium chloride (PF)  3 mL Intracatheter Q8H     senna-docusate  1-2 tablet Oral BID     sertraline  50 mg Oral Daily     simvastatin  10 mg Oral QAM        Allergies   Allergen Reactions     Iodine I 131 Tositumomab Anaphylaxis     Can tolerate topical iodine if it is wahed off after surgery      Penicillins Rash and Anaphylaxis     Swollen  lymph nodes, flushed overheating      Sulfa Drugs Nausea and Vomiting, Diarrhea and Rash            Physical Exam:   Vitals were reviewed  Heart Rate: 92, Blood pressure 123/66, pulse 97, temperature 99.7  F (37.6  C), resp. rate 24, height 1.702 m (5' 7\"), " weight 78.7 kg (173 lb 8 oz), SpO2 98 %.    Wt Readings from Last 3 Encounters:   06/07/17 78.7 kg (173 lb 8 oz)   06/04/12 65.8 kg (145 lb)       Intake/Output Summary (Last 24 hours) at 06/07/17 1123  Last data filed at 06/07/17 1000   Gross per 24 hour   Intake          1268.07 ml   Output             1548 ml   Net          -279.93 ml     GENERAL APPEARANCE: Looks comfortable  HEENT:  Eyes/ears/nose/neck grossly normal. Intubated  RESP: CTAB. No wheezes.  CV: RRR, nl S1/S2, no m/r/g   ABDOMEN: obese, soft  EXTREMITIES/SKIN: 1+ edema  Neuro: awake and alert, follows verbal commands         Data:     CBC RESULTS:     Recent Labs  Lab 06/07/17  0504 06/06/17 2023 06/06/17 1739 06/06/17  0411 06/05/17  0303 06/04/17 1946 06/04/17  0725   WBC 6.3 6.0  --  5.3 3.6* 2.6*  --  1.8*   RBC 2.70* 2.74*  --  2.42* 2.68* 2.93*  --  3.18*   HGB 7.9* 8.1* 8.2* 7.0* 7.9* 8.5*  < > 9.3*   HCT 22.6* 22.9*  --  20.2* 23.4* 25.8*  --  28.4*    172  --  209 231 239  --  208   < > = values in this interval not displayed.    Basic Metabolic Panel:    Recent Labs  Lab 06/07/17  0504 06/06/17 1739 06/06/17 0411 06/05/17  0303 06/04/17  1946 06/04/17  0725 06/03/17  1535    132* 128* 132* 132* 130* 134   POTASSIUM 3.5 3.4 3.8 4.3 4.6 4.4 4.7   CHLORIDE 102  --  93* 102 105 103 107   CO2 24  --  27 19* 18* 15* 17*   BUN 39*  --  45* 49* 48* 47* 46*   CR 1.57*  --  2.01* 2.31* 2.31* 2.26* 1.77*   *  --  126* 122* 109* 75 117*   CARYL 7.5*  --  7.5* 7.5* 7.8* 8.1* 7.8*       INR  Recent Labs  Lab 06/07/17  0504 06/06/17  0411 06/02/17  1420   INR 1.37* 1.31* 1.11      Attestation:   I have reviewed today's relevant vital signs, notes, medications, labs and imaging.    Onesimo Pena MD  [facility] - Nephrology  Office phone :[phone]  Pager: [phone]

## 2017-06-07 NOTE — CONSULTS
CLINICAL NUTRITION SERVICES - REASSESSMENT NOTE      Recommendations Ordered by Registered Dietitian (RD): Begin custom concentrated TPN tonight --> Run at 45 mL/hr (1080 mL/day), 76 grams Amino Acid/day, 180 grams Dextrose/day.  After 24 hours, increase to 270 grams Dextrose/day.  Lipid 250 mL daily (run at 21 mL/hr from 8 pm to 8 am).  Total = 1722 kcal (27 kcal per kg), 76 g protein (1.2 g per kg).  Total IVF 50 mL/hr, therefore once TPN starts, decrease D5 to 10 mL/hr= 12 g CHO, 41 kcal   Total (TPN/Lipid + IVF)= 1763 kcal (28 kcal per kg), 282 g CHO        EVALUATION OF PROGRESS TOWARD GOALS   Diet:  NPO on vent  Nutrition Support:  Plan to start TPN today     NEW FINDINGS:   Patient with D5 IVF at 50 mL/hr= 60 g CHO, 204 kcal   BUN 39 (H), Cr 1.57 (H), Phos 5.0 (H) - Plan for custom TPN with CKD   BGM < 150   Weight today 78.7 kg (elevated due to fluids)    6/6:  Paracentesis done with 400 mL off  S/P surgery 6/6 --> Diagnostic laparoscopy, repair of perforated gastric ulcer and omental patch, abdominal lavage  Remains vented post-op, may be extubated today    Dosing Weight  63.4 kg (lowest weight for current admission)     ASSESSED NUTRITION NEEDS PER APPROVED PRACTICE GUIDELINES:  Estimated Energy Needs: 8438-9484 kcals (25-30 Kcal/Kg)  Justification: maintenance, vented  Estimated Protein Needs:  63-76 grams protein (1-1.2 g pro/Kg)  Justification: post-op, hypercatabolism with acute illness and CKD      Previous Goals (6/5):   Diet advancement within 24h vs nutrition support  Evaluation: Not met    Previous Nutrition Diagnosis (6/5):   Unintended weight loss related to grief and acute illness as evidenced by history of recent death of spouse followed by significant weight loss and decreased intake, as well as onset of multiple medical problems  Evaluation: No change      CURRENT NUTRITION DIAGNOSIS  Inadequate protein-energy intake related to NPO on vent, TPN to start tonight as evidenced by meeting 0%  protein and 12% energy needs via D5 IVF     INTERVENTIONS  Recommendations / Nutrition Prescription  Begin custom concentrated TPN tonight --> Run at 45 mL/hr (1080 mL/day), 76 grams Amino Acid/day, 180 grams Dextrose/day.  After 24 hours, increase to 270 grams Dextrose/day.  Lipid 250 mL daily (run at 21 mL/hr from 8 pm to 8 am).  Total = 1722 kcal (27 kcal per kg), 76 g protein (1.2 g per kg).  Total IVF 50 mL/hr, therefore once TPN starts, decrease D5 to 10 mL/hr= 12 g CHO, 41 kcal   Total (TPN/Lipid + IVF)= 1763 kcal (28 kcal per kg), 282 g CHO     Implementation  PN Composition, PN Schedule - Above TPN regimen ordered   Collaboration and Referral of Nutrition care - Patient discussed during interdisciplinary bedside rounds.  Also discussed TPN regimen (concentrated) with Dr. Ugalde and Pharmacist who were in agreement with plan.    Goals  TPN/Lipid + D5 IVF to meet % needs    MONITORING AND EVALUATION:  Progress towards goals will be monitored and evaluated per protocol and Practice Guidelines    Bisi Brumfield, RD, LD, CNSC   Clinical Dietitian - [facility]

## 2017-06-07 NOTE — ANESTHESIA PROCEDURE NOTES
ARTERIAL LINE PROCEDURE NOTE:   Pre-Procedure  Performed by SONYA FERNÁNDEZ  Location: OR      Pre-Anesthestic Checklist: patient identified, risks and benefits discussed, informed consent, pre-op evaluation and at physician/surgeon's request    Timeout  Correct Patient: Yes   Correct Procedure: Yes   Correct Site: Yes         .   Procedure Documentation  Procedure: arterial line    Supine  Insertion Site:right, radial.Skin infiltrated with 2 mL of 1% lidocaine. Injection technique: ultrasound guided, Seldinger Technique and Bogdan's test completed  .  .  Patient Prep;all elements of maximal sterile barrier technique followed, mask, hat, sterile gown, sterile gloves, draped, hand hygiene, chlorhexidine gluconate and isopropyl alcohol, patient draped    Assessment/Narrative    Catheter: 20 gauge, 12 cm     Secured by suture  Tegaderm dressing used.    Arterial waveform: Yes     Comments:  Times one.  Good blood flow.  No complications.

## 2017-06-07 NOTE — PROGRESS NOTES
"General Surgery Progress Note    Subjective: pt intubated. Opens eyes to voice. Denies pain.     Objective: /58  Pulse 97  Temp 100.2  F (37.9  C)  Resp 18  Ht 5' 7\" (1.702 m)  Wt 173 lb 8 oz (78.7 kg)  SpO2 97%  BMI 27.17 kg/m2  Gen: sedated  CV: RRR  Pulm:mech vent  Abd: soft, incisions c/d/i, FUNMI with serosanguinous drainage.   Ext: WWP    Assessment/Plan:   Ara Stanley  is a 71 year old female POD 1 s/p diagnostic laparoscopy, repair of perforated gastric ulcer with omental patch and abdominal lavage, POD5 s/p diagnostic laparoscopy and lap alexandre. Pt with improving renal function, off pressors, minimal ventilatory settings.   - hopefully able to extubate today.   - keep NGT in place for gastric decompression s/p ulcer repair  - start TPN  - will obtain a gastrograffin swallow on POD 3 to evaluate for leak  - continue abx/antifungals  - ok for dvt ppx    Estelita Peck MD  [facility]  [phone]              "

## 2017-06-07 NOTE — PROGRESS NOTES
Daily ICU Progress Note         Admission Date:  6/2/2017    Active Problem List:   -->  COPD  -->  Sepsis  -->  Possible PNA  -->  UTI  -->  Status post cholecystectomy, laparoscopic  -->  Gastric ulcer with perforation status post omental patch    24 Hour Events:  Paracentesis, return operating room for exploratory laparotomy where a gastric ulcer with perforation was found.  Repair is performed.  Positive budding yeast with pseudohyphae found in the Gram stain on the paracentesis fluid.  Diflucan started.  Given more definitive source of infection antibiotics narrowed.  One unit PRBC. PPI started.     Subjective: The chart was reviewed.  Anesthesiology note reviewed, surgical note reviewed, hematology note reviewed, gastroenterology note reviewed.  Gastroenterology was consult prior to the decisions to the patient to the operating room.    ROS:  10 point ROS performed with pertinent findings noted above.     Pertinent Inpatient Medications:     thiamine (B-1) 250 mg in NaCl 0.9 % 50 mL intermittent infusion     [START ON 6/11/2017] thiamine tablet 200 mg     ertapenem (INVanz) 500 mg in NaCl 0.9 % 50 mL intermittent infusion     pantoprazole (PROTONIX) 40 mg IV push injection     fluconazole (DIFLUCAN) intermittent infusion 200 mg in NaCl     multivitamin, therapeutic (THERA-VIT) tablet 1 tablet     folic acid (FOLVITE) tablet 1 mg     heparin sodium PF injection 5,000 Units     budesonide (PULMICORT) neb solution 1 mg     midazolam (VERSED) injection 2-4 mg     fentaNYL Citrate (PF) (SUBLIMAZE) injection 25-50 mcg     ipratropium - albuterol 0.5 mg/2.5 mg/3 mL (DUONEB) neb solution 3 mL     arformoterol (BROVANA) neb solution 15 mcg     norepinephrine (LEVOPHED) 16 mg in D5W 250 mL infusion     albuterol (PROVENTIL) neb solution 2.5 mg     oxyCODONE (ROXICODONE) IR tablet 5-10 mg     senna-docusate (SENOKOT-S;PERICOLACE) 8.6-50 MG per tablet 1-2 tablet     sertraline (ZOLOFT) tablet 50 mg     traZODone  "(DESYREL) tablet 50 mg     simvastatin (ZOCOR) tablet 10 mg     Objective:     /58  Pulse 97  Temp 100.2  F (37.9  C)  Resp 18  Ht 1.702 m (5' 7\")  Wt 78.7 kg (173 lb 8 oz)  SpO2 97%  BMI 27.17 kg/m2    Intake/Output Summary (Last 24 hours) at 06/07/17 0757  Last data filed at 06/07/17 0600   Gross per 24 hour   Intake          1947.09 ml   Output             1533 ml   Net           414.09 ml     General: Adult female, appears older than stated age, no acute distress, intubated  HEENT: NCAT; EOMI; no icterus; dry mucus membranes  Neck: No LAD  Pulm: Decreased BS on left, coarse on right  CV: Tachy, no murmur  Abdomen: Soft, laparoscopic sites noted, bruising below the umbilicus, less tender. FUNMI drain with mostly clear, mild green tinge  : [place]  Extremities: + edema  Skin: Warm to touch  Neuro: Alert and oriented, anxious    Pertinent Labs: All labs reviewed.  Most pertinent labs noted below    BMP: 135/3.5/102/24/39/1.57  CBC: 6.3/7.9/151  INR: 1.37    Gallbladder pathology showed adenomyomatosis, cholelithiasis, benign lymph node    Peripheral smear: Normochromic normocytic anemia with neutropenia and mild neutrophilic left shift with toxic changes    Microbiology / Cultures:     Paracentesis Gram stain with budding yeast and pseudohyphae  Sputum Gram stain with yeast    Paracentesis fluid with 1035 white cells.  These were 42% lymphocytes and 24% neutrophils.    Imaging:  Imaging was reviewed by me personally.  Pertinent positives noted below.     Chest x-ray - IMPRESSION: Endotracheal tube tip in the mid trachea. Right upper  extremity PICC tip in the low SVC. Enteric tube courses below the  diaphragm, tip not included in this image. Small bilateral pleural  effusions with associated bibasilar atelectasis are not significantly  changed. No pneumothorax identified.    Assessment and Plan:   This is a 71-year-old female initially presented with abdominal discomfort status post cholecystectomy and " expiratory laparotomy with increasing abdominal pain and subsequent respiratory failure leading to intubation with repeat CT scans showing pneumoperitoneum and increased intra-abdominal fluid concerning for possible bile leak ultimately taken back to the operating room found to have a perforated stomach status post repair.     Neuro:   1.  Depression / anxiety - patient is on Zoloft and trazodone at home.    --> d/c precedex  --> prn versed and fentanyl    2.  Sedation / analgesia - prn versed and fentanyl  --> d/c precedex    Pulm:   1. COPD - again, they were inconsistent reports about how much she is actually smoking.  We do not have pulmonary function tests on file though she reports that she is followed by [facility] and carries a diagnosis of COPD.  We will continue her on long-acting beta agonist as well as inhaled corticosteroid with p.r.n. Bronchodilators.  No indication for steroids at this time.  We will obtain outside pulmonary records.   --> combination nebs  --> bronchodilator  --> no indication for steorids    2.  Left-sided effusion - improved.     3.  COPD - patient has a diagnosis of COPD.  I did review outside pulmonary function tests which demonstrate an FEV1 FEC ratio of 73% predicted.  FEV1 is 2.3 L which is 93% predicted and FVC is 3.23 L which is 96% predicted.  Her DLCO is 15.3 which is 67% predicted.  There is very mild obstruction if really any better primarily emphysematous changes with a decreased DLCO.  -->  For now we'll continue the combination inhaled corticosteroid as well as long-acting beta agonist with p.r.n. bronchodilators.    CV:  1.  Sepsis - initially concern for cholecystitis but with worsening abdominal discomfort and suspected initial insult was actually a gastric perforation.  She is now had a repeat trip to the operating room with repair of this.  She is currently on ertapenem and fluconazole.  --> cont ertapenem and diflucan    Renal:   1.  Acute renal failure -  suspect this is related to hypotensive episodes with surgery and sepsis.  Slow improvement in renal function.  Goal map of 65 or greater.  She initially required a bicarbonate drip which is now been discontinued.    ID:  1.  PNA - without definitive source initially she was brought in for possible pneumonia with a left-sided effusion and opacity which is now improved.  As noted above abx narrowed primarily for intra-abdominal source of sepsis.  --> cont ertapenem and Diflucan    GI:   1.  Gastric perforation - patient is currently n.p.o.  Nasogastric tube in place and low intermittent suction.  Surgery no specified that this should not be repositioned except for under fluoroscopic guidance.  She will have to have a Gastrografin swallow study on 6/9/2017.  FUNMI drain to suction.  Recommendation of repeat endoscopy in 6-8 weeks.  --> bid ppi    Heme / Onc:   1.  Anemia - stable. 1 unit PRBC yesterday.     Endocrine:   1. Glc - episode of hypoglycemia -     Musculoskeletal:   1.  No acute issues, monitor    Nutrition:   1. NPO     Prophylaxis:  DVT Prophylaxis:  SQ heparin  GI Prophylaxis: PPI tx dose  Ventilator / PNA Prophylaxis:  HOB elevation, oral cares, icu protocol  PTOT:  ordered  Restraints: UE restraints needed    Disposition: MICU    Critical Care Time:  40 min    Tam Ugalde MD  Pulmonary and Critical Care  [facility]  Pager:  [phone]

## 2017-06-07 NOTE — ANESTHESIA POSTPROCEDURE EVALUATION
Patient: Ara Stanley    Procedure(s):  DIAGNOSTIC LAPAROSCOPY, REPAIR OF PERFORATED GASTRIC ULCER, ABDOMINAL LAVAGE - Wound Class: I-Clean    Diagnosis:UNKNOWN  Diagnosis Additional Information: No value filed.    Anesthesia Type:  General    Note:  Anesthesia Post Evaluation    Patient location during evaluation: ICU  Patient participation: Unable to participate in evaluation secondary to underlying medical condition  Post-procedure mental status: sedated intubated.  Pain management: adequate  Airway patency: patent (intubated)  Cardiovascular status: acceptable  Respiratory status: acceptable  Hydration status: acceptable  PONV: controlled     Anesthetic complications: None    Comments: Report to ICU MD        Last vitals:  Vitals:    06/06/17 1845 06/06/17 1900 06/06/17 1921   BP: 155/74 108/54    Pulse:      Resp: 16 16    Temp: 36  C (96.8  F) 36  C (96.8  F)    SpO2: 99% 99% 97%         Electronically Signed By: Binta Snyder MD  June 6, 2017  7:54 PM

## 2017-06-07 NOTE — PLAN OF CARE
Problem: Individualization  Goal: Patient Preferences  Outcome: Improving  Pt remains on full vent support overnight. Lungs coarse. MAP >65 throughout night. Right radial arterial line became occluded, removed- sight soft, no hematoma. Abdomen soft- 4 lap sites CDI. FUNMI draining serous-serous/yellow/green fluid. NG at 55cm, orders to not touch, green/bile drainage. Precedex for sedation. Fentanyl 50mcg IV PRN given x3 for CPOT score >3. Pt became hypoglycemia ~0445- BS 63. Gave 25cc D50 IV due to pt being NPO. After 15 minutes, . Checked BS at 0610- 83. Will continue to monitor closely.

## 2017-06-07 NOTE — PLAN OF CARE
Problem: Goal Outcome Summary  Goal: Goal Outcome Summary  PT: Per conversation with nursing, patient will potentially be extubated today. Holding PT eval until after extubation.

## 2017-06-07 NOTE — PLAN OF CARE
Problem: Goal Outcome Summary  Goal: Goal Outcome Summary  Outcome: Improving  Precedex titrated off. Pt able to to be extubated to 4L NC with no issues.  Pain and anxiety controlled by Zyprexa and Dilaudid. Pt states she feels more calm and pain is controlled. TPN ordered and will start this evening for nutritional support. Pt is to stay NPO with frequent oral care.

## 2017-06-07 NOTE — ANESTHESIA PREPROCEDURE EVALUATION
Anesthesia Evaluation     .             ROS/MED HX    ENT/Pulmonary: Comment: resp failure    (+)tobacco use, asthma COPD, , . .   (-) sleep apnea   Neurologic:       Cardiovascular: Comment: H/O CM    (+) Dyslipidemia, hypertension--CAD, --. : . . . :. dysrhythmias a-fib, .       METS/Exercise Tolerance:     Hematologic:     (+) Anemia, -      Musculoskeletal:   (+) arthritis, , , -       GI/Hepatic:        (-) GERD   Renal/Genitourinary: Comment: Hyponatremia     (+) chronic renal disease, type: ARF,       Endo:         Psychiatric:     (+) psychiatric history anxiety      Infectious Disease: Comment: Septic shock         Malignancy:         Other:                     Physical Exam      Airway   Comment: intubated    Dental   Comment: intubated    Cardiovascular   Rhythm and rate: regular      Pulmonary    breath sounds clear to auscultation                    Anesthesia Plan      History & Physical Review  History and physical reviewed and following examination; no interval change.    ASA Status:  4 emergent.        Plan for General          Postoperative Care      Consents                          .  Procedure: Procedure(s):  LAPAROSCOPY DIAGNOSTIC (GENERAL)  Preop diagnosis: UNKNOWN    Allergies   Allergen Reactions     Iodine I 131 Tositumomab Anaphylaxis     Can tolerate topical iodine if it is wahed off after surgery      Penicillins Rash and Anaphylaxis     Swollen  lymph nodes, flushed overheating      Sulfa Drugs Nausea and Vomiting, Diarrhea and Rash     Past Medical History:   Diagnosis Date     Asthma      COPD (chronic obstructive pulmonary disease) (H)      Past Surgical History:   Procedure Laterality Date     ARTHROPLASTY HIP  6/4/2012    Procedure:ARTHROPLASTY HIP; Surgeon:DAVIAN FENG; Location:[facility] OR     ENT SURGERY      tonsils     GYN SURGERY      hyst     LAPAROSCOPIC CHOLECYSTECTOMY N/A 6/2/2017    Procedure: LAPAROSCOPIC CHOLECYSTECTOMY;;  Surgeon: Lavell Carpenter MD;   Location:  OR     LAPAROTOMY EXPLORATORY N/A 6/2/2017    Procedure: LAPAROTOMY EXPLORATORY;  EXPLORATORY LAPAROSCOPY, CHOLECYCTECTOMY;  Surgeon: Lavell Carpenter MD;  Location:  OR     ORTHOPEDIC SURGERY      hip pinning   femur fracture knee surgery      REMOVE HARDWARE HIP NAILING  6/4/2012    Procedure:REMOVE HARDWARE HIP NAILING; REMOVAL OF GAMMA NAIL AND SCREWS FROM RIGHT HIP, RIGHT TOTAL HIP ARTHROPLASTY(GAMMA NAIL EQUIPMENT, [employer] TOTAL HIP)^; Surgeon:DAVIAN FENG; Location: OR     Prior to Admission medications    Medication Sig Start Date End Date Taking? Authorizing Provider   ASPIRIN EC PO Take 81 mg by mouth daily   Yes Unknown, Entered By History   MELOXICAM PO Take 15 mg by mouth daily   Yes Unknown, Entered By History   LOVASTATIN PO Take 20 mg by mouth every morning   Yes Unknown, Entered By History   ipratropium - albuterol 0.5 mg/2.5 mg/3 mL (DUONEB) 0.5-2.5 (3) MG/3ML neb solution Take 1 vial by nebulization 2 times daily as needed for shortness of breath / dyspnea or wheezing   Yes Unknown, Entered By History   ACETAMINOPHEN PO Take 650 mg by mouth every morning   Yes Unknown, Entered By History   BACLOFEN PO Take 5 mg by mouth every morning RX is 5 mg (1/2 of 10 mg tab) 2 x daily but she takes daily due to drowsiness.   Yes Unknown, Entered By History   Multiple Vitamins-Minerals (CENTRUM SILVER) per tablet Take 1 tablet by mouth every morning   Yes Unknown, Entered By History   TRAZODONE HCL PO Take 50 mg by mouth nightly as needed for sleep   Yes Unknown, Entered By History   BENAZEPRIL HCL PO Take 20 mg by mouth every morning   Yes Unknown, Entered By History   fluticasone-vilanterol (BREO ELLIPTA) 100-25 MCG/INH oral inhaler Inhale 1 puff into the lungs daily   Yes Unknown, Entered By History   Ipratropium-Albuterol (COMBIVENT RESPIMAT)  MCG/ACT inhaler Inhale 1 puff into the lungs 4 times daily as needed for shortness of breath / dyspnea or wheezing   Yes  Unknown, Entered By History   sertraline (ZOLOFT) 50 MG tablet Take 1 tablet by mouth daily. 6/7/12  Yes Sky Dwyer MD     Current Facility-Administered Medications Ordered in Epic   Medication Dose Route Frequency Last Rate Last Dose     thiamine (B-1) 250 mg in NaCl 0.9 % 50 mL intermittent infusion  250 mg Intravenous Daily 100 mL/hr at 06/06/17 0957 250 mg at 06/06/17 0957    Followed by     [START ON 6/11/2017] thiamine tablet 200 mg  200 mg Oral Daily         ertapenem (INVanz) 500 mg in NaCl 0.9 % 50 mL intermittent infusion  500 mg Intravenous Q24H   500 mg at 06/06/17 0955     acetaminophen (TYLENOL) solution 1,000 mg  1,000 mg Oral Q8H        Or     acetaminophen (TYLENOL) Suppository 650 mg  650 mg Rectal Q8H   650 mg at 06/06/17 0955     dexmedetomidine (PRECEDEX) 400 mcg in NaCl 0.9 % 100 mL infusion  0.2-0.7 mcg/kg/hr Intravenous Continuous 7.7 mL/hr at 06/06/17 1943 0.4 mcg/kg/hr at 06/06/17 1943     pantoprazole (PROTONIX) 40 mg IV push injection  40 mg Intravenous BID   40 mg at 06/06/17 1944     [START ON 6/7/2017] fluconazole (DIFLUCAN) intermittent infusion 200 mg in NaCl  200 mg Intravenous Q24H         multivitamin, therapeutic (THERA-VIT) tablet 1 tablet  1 tablet Oral Once         folic acid (FOLVITE) tablet 1 mg  1 mg Oral Once         heparin sodium PF injection 5,000 Units  5,000 Units Subcutaneous Q8H   5,000 Units at 06/06/17 0909     budesonide (PULMICORT) neb solution 1 mg  1 mg Nebulization BID   1 mg at 06/06/17 1911     midazolam (VERSED) injection 2-4 mg  2-4 mg Intravenous Q1H PRN         fentaNYL Citrate (PF) (SUBLIMAZE) injection 25-50 mcg  25-50 mcg Intravenous Q1H PRN   50 mcg at 06/06/17 1333     albumin human 5 % injection 25 g  25 g Intravenous Q12H   25 g at 06/06/17 0829     sodium chloride (PF) 0.9% PF flush 10-20 mL  10-20 mL Intracatheter Q1H PRN         sodium chloride (PF) 0.9% PF flush 10 mL  10 mL Intracatheter Q8H   10 mL at 06/06/17 1931      ipratropium - albuterol 0.5 mg/2.5 mg/3 mL (DUONEB) neb solution 3 mL  3 mL Nebulization Q6H   3 mL at 06/06/17 1911     arformoterol (BROVANA) neb solution 15 mcg  15 mcg Nebulization BID   15 mcg at 06/06/17 1911     norepinephrine (LEVOPHED) 16 mg in D5W 250 mL infusion  0.03-0.4 mcg/kg/min Intravenous Continuous   Stopped at 06/06/17 1852     0.9% sodium chloride infusion   Intravenous Continuous 10 mL/hr at 06/06/17 1943       magnesium sulfate 4 g in 100 mL sterile water (premade)  4 g Intravenous Q4H PRN   4 g at 06/03/17 1103     albuterol (PROVENTIL) neb solution 2.5 mg  2.5 mg Nebulization Q6H PRN         lidocaine 1 % 1 mL  1 mL Other Q1H PRN         lidocaine (LMX4) cream   Topical Q1H PRN         sodium chloride (PF) 0.9% PF flush 3 mL  3 mL Intracatheter Q1H PRN   3 mL at 06/03/17 0512     sodium chloride (PF) 0.9% PF flush 3 mL  3 mL Intracatheter Q8H   3 mL at 06/06/17 1547     naloxone (NARCAN) injection 0.1-0.4 mg  0.1-0.4 mg Intravenous Q2 Min PRN         benzocaine-menthol (CHLORASEPTIC) 6-10 MG lozenge 1-2 lozenge  1-2 lozenge Buccal Q1H PRN         acetaminophen (TYLENOL) tablet 650 mg  650 mg Oral Q4H PRN         oxyCODONE (ROXICODONE) IR tablet 5-10 mg  5-10 mg Oral Q4H PRN   10 mg at 06/03/17 0642     ondansetron (ZOFRAN) injection 4 mg  4 mg Intravenous Q6H PRN   4 mg at 06/04/17 0040     prochlorperazine (COMPAZINE) injection 5 mg  5 mg Intravenous Q6H PRN   5 mg at 06/04/17 0142     senna-docusate (SENOKOT-S;PERICOLACE) 8.6-50 MG per tablet 1-2 tablet  1-2 tablet Oral BID   1 tablet at 06/05/17 0841     sertraline (ZOLOFT) tablet 50 mg  50 mg Oral Daily   50 mg at 06/05/17 0841     traZODone (DESYREL) tablet 50 mg  50 mg Oral At Bedtime PRN   50 mg at 06/04/17 0501     simvastatin (ZOCOR) tablet 10 mg  10 mg Oral QAM   10 mg at 06/05/17 0841     No current [facility]-ordered outpatient prescriptions on file.     Wt Readings from Last 1 Encounters:   06/06/17 77.3 kg (170 lb 6.7 oz)     Temp  Readings from Last 1 Encounters:   06/06/17 36  C (96.8  F)     BP Readings from Last 6 Encounters:   06/06/17 108/54   06/02/17 106/71   07/04/13 110/79   06/07/12 102/57     Pulse Readings from Last 4 Encounters:   06/06/17 97   06/02/17 96   06/06/12 108     Resp Readings from Last 1 Encounters:   06/06/17 16     SpO2 Readings from Last 1 Encounters:   06/06/17 97%     Recent Labs   Lab Test  06/06/17   0411  06/05/17   0303   NA  128*  132*   POTASSIUM  3.8  4.3   CHLORIDE  93*  102   CO2  27  19*   ANIONGAP  8  11   GLC  126*  122*   BUN  45*  49*   CR  2.01*  2.31*   CARYL  7.5*  7.5*     Recent Labs   Lab Test  06/06/17   0411  06/05/17   0303   WBC  5.3  3.6*   HGB  7.0*  7.9*   PLT  209  231     Recent Labs   Lab Test  06/06/17   0411  06/02/17   1420   INR  1.31*  1.11      RECENT LABS:   ECG:   ECHO:   CXR:

## 2017-06-07 NOTE — PROGRESS NOTES
[facility] ICU RESPIRATORY NOTE  Date of Admission:6/2/2017  Date of Intubation (most recent):6/5/2017  Reason for Mechanical Ventilation:Airway protection  Number of Days on Mechanical Ventilation:3  Met Criteria for Pressure Support Trial:No  Reason for No Pressure Support Trial:To rest overnight after surgery    Ventilation Mode: CMV/AC  FiO2 (%): 40 %  Rate Set (breaths/minute): 16 breaths/min  Tidal Volume Set (mL): 470 mL  PEEP (cm H2O): 5 cmH2O  Oxygen Concentration (%): 40 %  Resp: 17      Significant Events Today:None overnight    ABG Results:    Recent Labs  Lab 06/05/17  1325 06/05/17  0903   PH 7.29* 7.33*   PCO2 46* 39   PO2 118* 114*   HCO3 22 21   O2PER 70 10L         ETT appearance on chest x-ray: In good position    Plan:  Will cont full vent support and assess for daily PS trial this morning.  6/7/2017  Pallavi Hammond RRT

## 2017-06-08 NOTE — PROGRESS NOTES
"General Surgery Progress Note    Subjective: pt with tachycardia and intermittently low BP. Extubated yesterday. FUNMI w serosanguinous drainage. NGT to gravity with moderate output. Afebrile. Pain controlled.     Objective: BP 93/69  Pulse 97  Temp 99.1  F (37.3  C)  Resp 14  Ht 5' 7\" (1.702 m)  Wt 178 lb 2.1 oz (80.8 kg)  SpO2 97%  BMI 27.9 kg/m2  Gen: alert, no distress  CV: RRR  Pulm: nonlabored breathing  Abd: soft, incisions c/d/i, ecchymosis near umbilical port improving.   Ext: WWP    Assessment/Plan:   Ara Stanley  is a 71 year old female POD 2 s/p repair of perforated gastric ulcer, POD6 s/p diagnostic laparoscopy with lap alexandre. Improving. May be slightly hypovolemic with NGT output/insensible losses with mild tachycardia and soft bp.   - increase mIVF  - upper GI gastrograffin swallow tomorrow am to evaluate for leak, continue strict NPO   - await bowel function.  - continue antifungal/abx  - continue drain and NGT.     Estelita Peck MD  [facility]  [phone]              "

## 2017-06-08 NOTE — PLAN OF CARE
Problem: Goal Outcome Summary  Goal: Goal Outcome Summary  Outcome: Improving  Pt oriented X4 tolerating being on 4LNC. Zyprexa used for anxiety.  VSS. Pt more tachycardic today will continue to monitor. Pt PAN cultured due to an increase in white count. Physical therapy worked with pt and she was able to sit on the side of the bed with two person assist. Transfer orders to room 321. Transferred with out difficulties.

## 2017-06-08 NOTE — PLAN OF CARE
"Problem: Goal Outcome Summary  Goal: Goal Outcome Summary  OT: Orders rec'd. Attempted to see this PM. Patient barely opened eyes. Difficulty understanding her speech. Stated \"I need sleep\". Canceling OT eval today.      "

## 2017-06-08 NOTE — PROGRESS NOTES
" Renal Medicine Progress Note            Assessment/Plan:     1. Patient with a Scr of 1 mg/dl in March.      2. Pt was admitted with a Scr of 1.55 mg/dl. WOODY due to hypotension from shock. Improving.      3. Septic shock with MODS. Resolved.                         -ertapenem      4. Respiratory failure. PNA and pleural effusion.       5. S/p lap cholecystectomy and repair of perforated gastric ulcer.      Plan  1. Continue supportive care.        Interval History:     Pt was extubated. She has some pain in the abdomen, but it is stable.           Medications and Allergies:       albuterol         chlorhexidine  15 mL Swish & Spit BID     ertapenem (INVanz) IV  1 g Intravenous Q24H     insulin aspart  1-12 Units Subcutaneous Q4H     lipids  250 mL Intravenous Q24H     OLANZapine zydis  5 mg Oral Q6H     thiamine  250 mg Intravenous Daily    Followed by     [START ON 6/11/2017] vitamin  B-1  200 mg Oral Daily     acetaminophen  1,000 mg Oral Q8H    Or     acetaminophen  650 mg Rectal Q8H     pantoprazole  40 mg Intravenous BID     fluconazole  200 mg Intravenous Q24H     folic acid  1 mg Oral Once     heparin  5,000 Units Subcutaneous Q8H     budesonide  1 mg Nebulization BID     sodium chloride (PF)  10 mL Intracatheter Q8H     arformoterol  15 mcg Nebulization BID     senna-docusate  1-2 tablet Oral BID     sertraline  50 mg Oral Daily     simvastatin  10 mg Oral QAM        Allergies   Allergen Reactions     Iodine I 131 Tositumomab Anaphylaxis     Can tolerate topical iodine if it is wahed off after surgery      Penicillins Rash and Anaphylaxis     Swollen  lymph nodes, flushed overheating      Sulfa Drugs Nausea and Vomiting, Diarrhea and Rash            Physical Exam:   Vitals were reviewed  Heart Rate: 107, Blood pressure 126/66, pulse 97, temperature 99  F (37.2  C), resp. rate 21, height 1.702 m (5' 7\"), weight 80.8 kg (178 lb 2.1 oz), SpO2 92 %.    Wt Readings from Last 3 Encounters:   06/08/17 80.8 kg (178 " lb 2.1 oz)   06/04/12 65.8 kg (145 lb)       Intake/Output Summary (Last 24 hours) at 06/08/17 1109  Last data filed at 06/08/17 0800   Gross per 24 hour   Intake           1956.4 ml   Output             1060 ml   Net            896.4 ml     GENERAL APPEARANCE: Looks comfortable  HEENT:  Eyes/ears/nose/neck grossly normal. + NG tube  RESP: CTAB. No wheezes.  CV: RRR, nl S1/S2, no m/r/g   ABDOMEN: obese, soft  EXTREMITIES/SKIN: some edema at the thighs  Neuro: awake and alert, follows verbal commands         Data:     CBC RESULTS:     Recent Labs  Lab 06/08/17  0500 06/07/17  0504 06/06/17 2023 06/06/17 1739 06/06/17  0411 06/05/17  0303 06/04/17 1946   WBC 13.9* 6.3 6.0  --  5.3 3.6* 2.6*   RBC 2.62* 2.70* 2.74*  --  2.42* 2.68* 2.93*   HGB 7.8* 7.9* 8.1* 8.2* 7.0* 7.9* 8.5*   HCT 22.2* 22.6* 22.9*  --  20.2* 23.4* 25.8*    151 172  --  209 231 239       Basic Metabolic Panel:    Recent Labs  Lab 06/08/17  0500 06/07/17  0504 06/06/17 1739 06/06/17 0411 06/05/17  0303 06/04/17 1946 06/04/17  0725    135 132* 128* 132* 132* 130*   POTASSIUM 3.6 3.5 3.4 3.8 4.3 4.6 4.4   CHLORIDE 105 102  --  93* 102 105 103   CO2 25 24  --  27 19* 18* 15*   BUN 36* 39*  --  45* 49* 48* 47*   CR 1.39* 1.57*  --  2.01* 2.31* 2.31* 2.26*   * 104*  --  126* 122* 109* 75   CARYL 7.6* 7.5*  --  7.5* 7.5* 7.8* 8.1*       INR  Recent Labs  Lab 06/08/17  0500 06/07/17  1735 06/07/17  0504 06/06/17  0411   INR 1.26* 1.24* 1.37* 1.31*      Attestation:   I have reviewed today's relevant vital signs, notes, medications, labs and imaging.    Onesimo Pena MD  [facility]  Office phone :[phone]  Pager: [phone]

## 2017-06-08 NOTE — PLAN OF CARE
Problem: Individualization  Goal: Patient Preferences  Outcome: Improving  Pt neurologically intact, A & O x4. Pt remained on 4L NC, lungs coarse throughout. VSS. NG to LIS. Crawford draining urine at 25-30cc/hr. FUNMI drain intact, serous fluid. Lipids and TPN started for nutrition. Blood glucose WNL, no insulin coverage given. Diluadid given x2 for abdomen pain. Able to call and make needs known.

## 2017-06-08 NOTE — PLAN OF CARE
Problem: Goal Outcome Summary  Goal: Goal Outcome Summary  PT-Patient seen for initial eval and treatment initiated. Patient lives in a house alone (her  passed away suddenly about 2 months ago per patient). She is modified independent with all ADL's using a cane inside and a walker when she goes to the store. She does her own shopping, cooking and cleaning and she has 4 neighbors that help with her yard work.   Discharge Planner PT   Patient plan for discharge: None stated       Current status: Patient with generalized weakness throughout UE's and LE's but right UE and LE weaker than left. She is limited by pain as well and needed max assist of 2 for sup to/from sit and lift used to reposition in bed. Patient tolerated sitting for 15 min and eventually about to maintain balance without assist. Posture impaired as patient tends to keep head tilted to the right and leans to the left. VSS throughout session. BP stable in sitting although patient complaining of dizziness. Gets anxious and needs cues to relax and breathe deeply.      Barriers to return to prior living situation: Significant generalized weakness but more pronounced in right UE and LE, amount of assist needed for all functional mobility, lives alone, has steps to enter.   Recommendations for discharge: ARU   Rationale for recommendations: Patient would benefit from continued skilled PT after discharge to address limitations with functional mobility, decreased activity tolerance and weakness.        Entered by: Cinthia Myers 06/08/2017 9:48 AM

## 2017-06-08 NOTE — PROGRESS NOTES
Pt on 4LNC, SPO2 mid to upper 90's. BBS coarse. All schedule neb txs given as ordered. Will continue to follow.  6/8/2017  Saba Kelly

## 2017-06-08 NOTE — PROGRESS NOTES
06/08/17 0847   Quick Adds   Type of Visit Initial PT Evaluation   Living Environment   Lives With alone  (Her  passed away suddenly 2 months ago.)   Living Arrangements house   Home Accessibility stairs to enter home  (3 with one rail. )   Number of Stairs to Enter Home 3   Number of Stairs Within Home 17   Transportation Available car   Self-Care   Dominant Hand right   Usual Activity Tolerance good   Current Activity Tolerance poor  (Due to pain. )   Activity/Exercise/Self-Care Comment Patient has a walk in shower with a tub bench. She has a raised toilet seat with arms. Uses a reacher and a sock aid as well.    Functional Level Prior   Ambulation 1-->assistive equipment  (Uses a cane inside and a walker when she goes to the store. )   Transferring 0-->independent   Toileting 1-->assistive equipment  (raised toilet seat. )   Bathing 1-->assistive equipment  (Shower chair. )   Dressing 1-->assistive equipment  (Sock aid. )   Eating 0-->independent   Communication 0-->understands/communicates without difficulty   Swallowing 0-->swallows foods/liquids without difficulty   Cognition 0 - no cognition issues reported   Fall history within last six months yes   Number of times patient has fallen within last six months 1  (Her 300 pound dog stood up and knocked her down. )   Which of the above functional risks had a recent onset or change? bathing   General Information   Onset of Illness/Injury or Date of Surgery - Date 06/02/17   Referring Physician Tam Ugalde   Patient/Family Goals Statement Patient didn't state.   Pertinent History of Current Problem (include personal factors and/or comorbidities that impact the POC) Ara Stanley  is a 71 year old female POD 2 s/p repair of perforated gastric ulcer, POD6 s/p diagnostic laparoscopy with lap alexandre   Precautions/Limitations fall precautions;oxygen therapy device and L/min  (On 4 liters oxygen.)   Weight-Bearing Status - LUE full weight-bearing    Weight-Bearing Status - RUE full weight-bearing   Weight-Bearing Status - LLE full weight-bearing   Weight-Bearing Status - RLE full weight-bearing   General Observations Patient grimacing in pain with sitting initially.    General Info Comments Reports she has started a process for an elderly waiver and getting help with her mortgage.    Cognitive Status Examination   Orientation orientation to person, place and time   Level of Consciousness alert   Follows Commands and Answers Questions 100% of the time   Personal Safety and Judgment intact   Memory intact   Pain Assessment   Patient Currently in Pain Yes, see Vital Sign flowsheet  (4)   Posture    Posture Comments Patient keeps her head tilted to the right and leans to the left. Some improvement with cues.    Range of Motion (ROM)   ROM Comment Bilateral DF limited to about 5 degrees plantarflexion. Otherwise range is functional.     Strength   Strength Comments Right hip flex 2-/5, left 3-/5, bilateral quads 5/5, right DF 2-/5, left 3-/5. Able to lift left UE against gravity. Has difficulty lifting right arm but can lift off the bed.    Bed Mobility   Bed Mobility Comments Sup to sit with max assist of 2. Sit to sup with mod assist of 1.    Transfer Skills   Transfer Comments Unable to assess sit to stand due to pain and weakness.    Balance   Balance Comments Impaired sitting balance. Patient leans to the left. Educated her to lean right by reaching to the right and successful with this. Patieint keeps her head down and tilated to he right.    Sensory Examination   Sensory Perception no deficits were identified   General Therapy Interventions   Planned Therapy Interventions bed mobility training;gait training;neuromuscular re-education;strengthening;transfer training   Clinical Impression   Criteria for Skilled Therapeutic Intervention yes, treatment indicated   PT Diagnosis Impaired functional independence and endurance.    Influenced by the following  "impairments Decreased UE and LE strength especially right UE and LE.    Functional limitations due to impairments Needs assist for all functional mobility.    Clinical Presentation Evolving/Changing   Clinical Presentation Rationale Patient still in ICU, dizzy with sitting, needing to monitor vitals.    Clinical Decision Making (Complexity) Moderate complexity   Therapy Frequency` daily   Predicted Duration of Therapy Intervention (days/wks) 10   Anticipated Equipment Needs at Discharge (To be determined by rehab facility. )   Anticipated Discharge Disposition Acute Rehabilitation Facility   Risk & Benefits of therapy have been explained Yes   Patient, Family & other staff in agreement with plan of care Yes   [facility] TM \"6 Clicks\"   2016, Trustees of [facility], under license to [employer].  All rights reserved.   6 Clicks Short Forms Basic Mobility Inpatient Short Form   [facility]  \"6 Clicks\" V.2 Basic Mobility Inpatient Short Form   1. Turning from your back to your side while in a flat bed without using bedrails? 2 - A Lot   2. Moving from lying on your back to sitting on the side of a flat bed without using bedrails? 2 - A Lot   3. Moving to and from a bed to a chair (including a wheelchair)? 1 - Total   4. Standing up from a chair using your arms (e.g., wheelchair, or bedside chair)? 1 - Total   5. To walk in hospital room? 1 - Total   6. Climbing 3-5 steps with a railing? 1 - Total   Basic Mobility Raw Score (Score out of 24.Lower scores equate to lower levels of function) 8   Total Evaluation Time   Total Evaluation Time (Minutes) 20     "

## 2017-06-08 NOTE — PROGRESS NOTES
Ms. Ara Stanley is a 71-year-old female who had a laparoscopic cholecystectomy on 06/02/2017 for acute cholecystitis with sepsis picture.  Hematology was consulted for leukopenia.    On 06/02/2017, WBC of 10.3 with hemoglobin of 10.8 and platelets of 339,000.  On 06/04/2017, WBC of 1.8, hemoglobin of 9.3, platelet of 208,000 and ANC of 1.3.  ANC decreased to 0.7 on 06/05/2017. Patient's leukopenia was due to sepsis. Leukopenia has resolved.  Labs from today reveals WBC of 13.9 with ANC of 12.2.  Normal platelet.      Saw the patient in the ICU.  Overall her condition has improved.  Breathing is better.  Pain is controlled.  She feels little stronger.        LABS: Reviewed.      Assessment and plan:     71-year-old female had leukopenia secondary to sepsis.  Leukopenia has resolved.  We will sign off. Please call us with any questions.

## 2017-06-08 NOTE — PROGRESS NOTES
"  Daily ICU Progress Note         Admission Date:  6/2/2017    Active Problem List:   -->  COPD  -->  Sepsis, improving  -->  Possible PNA  -->  UTI   -->  Status post cholecystectomy, laparoscopic  -->  Gastric ulcer with perforation status post omental patch    24 Hour Events:  No acute events overnight.  Patient extubated.    Subjective: Patient complains of four out of four abdominal discomfort but much improved over last 48 hours.  She has been up to the edge of bed when not ambulating.  She continues to cough up thick tenacious sputum which is clear in color.  No fevers or chills.  No other complaints this morning.    ROS:  10 point ROS performed with pertinent findings noted above.     Pertinent Inpatient Medications:     albuterol (2.5 MG/3ML) 0.083% neb solution     chlorhexidine (PERIDEX) 0.12 % solution 15 mL     ertapenem (INVanz) 1 g vial to attach to  mL bag     insulin aspart (NovoLOG) inj (RAPID ACTING)     lipids (INTRALIPID) 20 % infusion 250 mL     parenteral nutrition - ADULT compounded formula     OLANZapine zydis (zyPREXA) ODT tab 5 mg     HYDROmorphone (PF) (DILAUDID) injection 0.3-0.5 mg     thiamine (B-1) 250 mg in NaCl 0.9 % 50 mL intermittent infusion     pantoprazole (PROTONIX) 40 mg IV push injection     fluconazole (DIFLUCAN) intermittent infusion 200 mg in NaCl     folic acid (FOLVITE) tablet 1 mg     heparin sodium PF injection 5,000 Units     budesonide (PULMICORT) neb solution 1 mg     albuterol (PROVENTIL) neb solution 2.5 mg     sertraline (ZOLOFT) tablet 50 mg     traZODone (DESYREL) tablet 50 mg     simvastatin (ZOCOR) tablet 10 mg     Objective:     BP 93/69  Pulse 97  Temp 99.1  F (37.3  C)  Resp 14  Ht 1.702 m (5' 7\")  Wt 80.8 kg (178 lb 2.1 oz)  SpO2 97%  BMI 27.9 kg/m2    Intake/Output Summary (Last 24 hours) at 06/08/17 1141  Last data filed at 06/08/17 1100   Gross per 24 hour   Intake           2366.4 ml   Output             1135 ml   Net           1231.4 ml "     General: Adult female, appears older than stated age, no acute distress, speaks in full sentences  HEENT: NCAT; EOMI; no icterus; dry mucus membranes  Neck: No LAD  Pulm: No wheeze, coarse BS bilaterally  CV: Tachy, no murmur  Abdomen: Soft, laparoscopic sites noted, bruising below the umbilicus, less tender. FUNMI drain with mostly clear  : Crawford  Extremities: + edema  Skin: Warm to touch  Neuro: Alert and oriented, anxious    Pertinent Labs: All labs reviewed.  Most pertinent labs noted below    BMP: 138/3.6/105/25/36/1.39  CBC: 13.9/7.8/170  INR 1.26    Gallbladder pathology showed adenomyomatosis, cholelithiasis, benign lymph node    Peripheral smear: Normochromic normocytic anemia with neutropenia and mild neutrophilic left shift with toxic changes    Microbiology / Cultures:     Heavy growth of yeast from abdominal fluid    Imaging:  Imaging was reviewed by me personally.  Pertinent positives noted below.     No new imaging.      Assessment and Plan:   This is a 71-year-old female initially presented with abdominal discomfort status post cholecystectomy and expiratory laparotomy with increasing abdominal pain and subsequent respiratory failure leading to intubation with repeat CT scans showing pneumoperitoneum and increased intra-abdominal fluid concerning for possible bile leak ultimately taken back to the operating room found to have a perforated stomach status post repair.  She continues to clinically improve though her white count is elevated today.  No fevers overnight.  Repeat pan cultures today.  Renal function slowly improving.    Neuro:   1.  Depression / anxiety - patient is on Zoloft and trazodone at home.    --> prn zyprexa  --> low dose narcotics    Pulm:   1. COPD - again, they were inconsistent reports about how much she is actually smoking.  No indication for steroids.  Continue her inhaled corticosteroid and long-acting beta agonist with p.r.n. Bronchodilators.  She needs a more aggressive  pulmonary toilet with greater emphasis on ambulation PT and OT. Outside PFT's: demonstrate an FEV1 FEC ratio of 73% predicted.  FEV1 is 2.3 L which is 93% predicted and FVC is 3.23 L which is 96% predicted.  Her DLCO is 15.3 which is 67% predicted.  There is very mild obstruction if really any better primarily emphysematous changes with a decreased DLCO.  --> combination nebs  --> bronchodilator  --> no indication for steorids    2.  Left-sided effusion - improved.     CV:  1.  Sepsis - initially concern for cholecystitis but with worsening abdominal discomfort and suspected initial insult was actually a gastric perforation.  She is now had a repeat trip to the operating room with repair of this.  She is currently on ertapenem and fluconazole.  Her white count is increasing though clinically she continues to improve.  Post monitoring.  Repeat pan cultures today.  Continue ertapenem and Diflucan  --> cont ertapenem and diflucan    Renal:   1.  Acute renal failure - suspect this is related to hypotensive episodes with surgery and sepsis.  Slow improvement in renal function.     ID:  1.  PNA - without definitive source initially she was brought in for possible pneumonia with a left-sided effusion and opacity which is now improved.   --> cont ertapenem and Diflucan    GI:   1.  Gastric perforation - patient is currently n.p.o.  Nasogastric tube in place and low intermittent suction.  Surgery noted that this should not be repositioned except for under fluoroscopic guidance.  She will have to have a Gastrografin swallow study on 6/9/2017.  FUNMI drain to suction.  Recommendation of repeat endoscopy in 6-8 weeks from 06/07/2017  --> bid ppi    Heme / Onc:   1.  Anemia - stable.    Endocrine:   1. Glc - glc controlled. On tpn    Musculoskeletal:   1.  Deconditioning.     Nutrition:   1. NPO   --> TPN    Prophylaxis:  DVT Prophylaxis:  SQ heparin  GI Prophylaxis: PPI tx dose  PTOT:  ordered  Restraints: not  needed    Disposition: tx out of MICU    Critical Care Time:  35 min    Tam Ugalde MD  Pulmonary and Critical Care  [facility]  Pager:  [phone]

## 2017-06-09 NOTE — PROGRESS NOTES
"General Surgery Progress Note    Subjective: pt profoundly hypercarbic last night and hypotensive. Elevated WBC. Transferred to ICU and intubated. Improvement in ABG today. Broad spectrum abx initiated. Branch cultured yesterday. Yeast growing from multiple sources. CXR with worsening bilateral effusions.     Objective: BP 94/59  Pulse 97  Temp 97.3  F (36.3  C)  Resp 20  Ht 5' 7\" (1.702 m)  Wt 177 lb 7.5 oz (80.5 kg)  SpO2 100%  BMI 27.8 kg/m2  Gen: intubated  CV: RRR  Pulm: nonlabored breathing  Abd: soft, drain clear, incisions c/d/i  Ext: WWP    Assessment/Plan:   Ara Stanley  is a 71 year old female s/p diagnostic laparoscopy and repair of perforated gastric ulcer and lap alexandre. Worsening septic picture overnight as well as respiratory acidosis. Agree with broad spectrum abx and micafungin. Consider titration of fluids to CVP vs repeat TTE to assess intravascular volume status. May need arterial line if continues to need pressor support for titration and multiple ABGs. NGT in good position on am CXR. Receiving 1U prbc today for hgb of 7 on pressors. WBC improving with broadened abx. If she is not improving in the next day or so with vent requirements - may require drainage of bilateral pleural effusions.   - continue NPO, IVF, NGT decompression  - continue FUNMI drain - most recent cultures negative (6/9/17)  - gastrograffin UGI prior to initiating feeds - may obtain over weekend and start gastric feeds if remains intubated vs PO.   - appreciate intensivist, nephrology and hospitalist management    Estelita Peck MD  [facility]  [phone]              "

## 2017-06-09 NOTE — PLAN OF CARE
Problem: Goal Outcome Summary  Goal: Goal Outcome Summary  Outcome: Improving  VSS except soft BP, tachy. A/O. Turned Q2. abdo incisions CDI. FUNMI intact. TPN running, IV fluid infusing. Called hospitalist & ok to cont IV fluid @ 50 ml/hr with TPN @ 45 ml/hr. On 4 L O2. Hypo BS, passing gas. LE edema. R arm bruises. Denies pain. Strict NPO. U Lab called, cancelled sputum test due to oral contamination.

## 2017-06-09 NOTE — PROGRESS NOTES
Renal Medicine Progress Note            Assessment/Plan:     1. Patient with a Scr of 1 mg/dl in March.      2. Pt was admitted with a Scr of 1.55 mg/dl. WOODY due to hypotension from shock. Improved.      3. Septic shock with MODS. She improve, and she was transferred to the floor yesterday. She was transferred back to the ICU this morning for severe acidosis from CO retention and shock. She is back on NE. She is on ertapenem, vancomycin and micafungin      4. Respiratory failure. PNA and pleural effusion.       5. S/p lap cholecystectomy and repair of perforated gastric ulcer.    6. Anemia and getting blood transfusion      Plan  1. Fluid resuscitation per ICU. Consider albumin infusion. No other recommendations with regarding to WOODY        Interval History:     Pt was transferred back to the ICU with AMS. She was found to have a PCO of 90 and severely hypotension.   She was re-intubated. Currently, she is getting blood transfusion and NE at 0.08 mcg. Getting TPN.          Medications and Allergies:       sodium chloride 0.9%  250 mL Intravenous Once     sodium chloride 0.9%  250 mL Intravenous Once     vancomycin place alvarez - receiving intermittent dosing  1 each Does not apply See Admin Instructions     sodium chloride 0.9%  2,000 mL Intravenous Once     micafungin  100 mg Intravenous Q24H     sodium chloride 0.9%  500 mL Intravenous Once     sodium chloride (PF)  3 mL Intracatheter Q8H     chlorhexidine  15 mL Swish & Spit BID     ertapenem (INVanz) IV  1 g Intravenous Q24H     insulin aspart  1-12 Units Subcutaneous Q4H     lipids  250 mL Intravenous Q24H     thiamine  250 mg Intravenous Daily    Followed by     [START ON 6/11/2017] vitamin  B-1  200 mg Oral Daily     acetaminophen  1,000 mg Oral Q8H    Or     acetaminophen  650 mg Rectal Q8H     pantoprazole  40 mg Intravenous BID     folic acid  1 mg Oral Once     heparin  5,000 Units Subcutaneous Q8H     budesonide  1 mg Nebulization BID     sodium  "chloride (PF)  10 mL Intracatheter Q8H     arformoterol  15 mcg Nebulization BID     senna-docusate  1-2 tablet Oral BID     sertraline  50 mg Oral Daily     simvastatin  10 mg Oral QAM        Allergies   Allergen Reactions     Iodine I 131 Tositumomab Anaphylaxis     Can tolerate topical iodine if it is wahed off after surgery      Penicillins Rash and Anaphylaxis     Swollen  lymph nodes, flushed overheating      Sulfa Drugs Nausea and Vomiting, Diarrhea and Rash            Physical Exam:   Vitals were reviewed  Heart Rate: 90, Blood pressure 114/54, pulse 97, temperature 96.6  F (35.9  C), resp. rate 20, height 1.702 m (5' 7\"), weight 80.5 kg (177 lb 7.5 oz), SpO2 95 %.    Wt Readings from Last 3 Encounters:   06/09/17 80.5 kg (177 lb 7.5 oz)   06/04/12 65.8 kg (145 lb)       Intake/Output Summary (Last 24 hours) at 06/09/17 1117  Last data filed at 06/09/17 1000   Gross per 24 hour   Intake          5690.54 ml   Output              490 ml   Net          5200.54 ml       GENERAL APPEARANCE: critically ill  HEENT: intubated  RESP: lungs cta b c good efforts, no crackles, rhonchi or wheezes  CV: RRR, nl S1/S2, no m/r/g   ABDOMEN: soft  EXTREMITIES/SKIN: some edema at the thighs but not bad         Data:     CBC RESULTS:     Recent Labs  Lab 06/09/17  0825 06/09/17  0500 06/08/17  0500 06/07/17  0504 06/06/17  2023 06/06/17  1739 06/06/17  0411   WBC 17.8* 22.6* 13.9* 6.3 6.0  --  5.3   RBC 2.37* 2.73* 2.62* 2.70* 2.74*  --  2.42*   HGB 7.0* 8.1* 7.8* 7.9* 8.1* 8.2* 7.0*   HCT 21.1* 24.3* 22.2* 22.6* 22.9*  --  20.2*    172 170 151 172  --  209       Basic Metabolic Panel:    Recent Labs  Lab 06/09/17  0825 06/09/17  0500 06/08/17  0500 06/07/17  0504 06/06/17  1739 06/06/17  0411 06/05/17  0303    141 138 135 132* 128* 132*   POTASSIUM 3.8 3.8 3.6 3.5 3.4 3.8 4.3   CHLORIDE 113* 109 105 102  --  93* 102   CO2 23 26 25 24  --  27 19*   BUN 41* 41* 36* 39*  --  45* 49*   CR 1.45* 1.55* 1.39* 1.57*  --  " 2.01* 2.31*   *  Canceled, Test credited Duplicate requestCORRECTED ON 06/09 AT 0904: PREVIOUSLY REPORTED  175* 132* 104*  --  126* 122*   CARYL 6.9* 7.6* 7.6* 7.5*  --  7.5* 7.5*       INR  Recent Labs  Lab 06/09/17  0825 06/08/17  0500 06/07/17  1735 06/07/17  0504   INR 1.12 1.26* 1.24* 1.37*      Attestation:   I have reviewed today's relevant vital signs, notes, medications, labs and imaging.    Onesimo Pena MD  [facility]  Office phone :[phone]  Pager: [phone]

## 2017-06-09 NOTE — PROGRESS NOTES
Daily ICU Progress Note         Admission Date:  6/2/2017    Active Problem List:   -->  COPD  -->  Sepsis  -->  Possible PNA  -->  UTI   -->  Status post cholecystectomy, laparoscopic  -->  Gastric ulcer with perforation status post omental patch  -->  Acute hypercapneic respiratory failure    24 Hour Events:  Had multiple RRT was called overnight.  Hypotensive.  Altered mental status.  250 cc bolus given.  BiPAP attempted.  Worsening mental status with a pCO2 found to be almost 90.  No changes in antibiotics.  Worsening blood pressure to systolic of 60 and diastolic of 40.  Patient transferred to ICU.  Intubated on arrival to ICU.  Diflucan discontinued.  Micafungin started.  Vancomycin added.  Fluid bolus.    Subjective: Chart reviewed.  RT no reviewed.  Oncology note reviewed.  Nephrology note reviewed.  Surgical no reviewed.  See above for acute events.    ROS:  Unable to obtain 2/2 critical illness.     Pertinent Inpatient Medications:     OLANZapine zydis (zyPREXA) ODT half-tab 2.5 mg     HYDROmorphone (PF) (DILAUDID) injection 0.2 mg     norepinephrine (LEVOPHED) 16 mg in D5W 250 mL infusion     vancomycin place alvarez - receiving intermittent dosing     vancomycin (VANCOCIN) 1500 mg in 0.9% NaCl 250 mL PREMIX     midazolam (VERSED) injection 2-4 mg     fentaNYL Citrate (PF) (SUBLIMAZE) injection  mcg     micafungin (MYCAMINE) 100 mg in NaCl 0.9 % 100 mL intermittent infusion     albumin human 25 % injection 50 g     parenteral nutrition - ADULT compounded formula     ertapenem (INVanz) 1 g vial to attach to  mL bag     insulin aspart (NovoLOG) inj (RAPID ACTING)     lipids (INTRALIPID) 20 % infusion 250 mL     thiamine (B-1) 250 mg in NaCl 0.9 % 50 mL intermittent infusion    Followed by     [START ON 6/11/2017] thiamine tablet 200 mg     pantoprazole (PROTONIX) 40 mg IV push injection     folic acid (FOLVITE) tablet 1 mg     heparin sodium PF injection 5,000 Units     budesonide (PULMICORT)  "neb solution 1 mg     arformoterol (BROVANA) neb solution 15 mcg     sertraline (ZOLOFT) tablet 50 mg     traZODone (DESYREL) tablet 50 mg     simvastatin (ZOCOR) tablet 10 mg     Objective:     /65  Pulse 97  Temp 97.6  F (36.4  C) (Axillary)  Resp 24  Ht 1.702 m (5' 7\")  Wt 80.5 kg (177 lb 7.5 oz)  SpO2 (!) 67%  BMI 27.8 kg/m2    Intake/Output Summary (Last 24 hours) at 06/09/17 0848  Last data filed at 06/09/17 0600   Gross per 24 hour   Intake             3140 ml   Output              555 ml   Net             2585 ml     Ventilation Mode: CMV/AC  FiO2 (%): 100 %  Rate Set (breaths/minute): 20 breaths/min  Tidal Volume Set (mL): 500 mL  PEEP (cm H2O): 5 cmH2O  Oxygen Concentration (%): 100 %  Resp: 18    General: Adult female, somnolent, only responds to painful stimuli  HEENT: NCAT; EOMI; no icterus; extremely dry mucous membranes  Neck: No LAD  Pulm: Coarse BS bilaterally  CV: RRR, no m/g  Abdomen: Soft abdomen, multiple areas of ecchymosis, FUNMI drain in place  : Crawford  Extremities: + edema  Skin: Warm to touch  Neuro: somnolent    Pertinent Labs: All labs reviewed.  Most pertinent labs noted below    Metabolic panel: 141/3.8/109/26/41/1.55 (previous creatinine 1.39)  Glucose 177  Arterial blood gas: 7.05/89/85  CBC: 22.64/8.14/172    Microbiology / Cultures:     Abdominal fluid culture with heavy growth of Candida albicans/dublinienesis    Imaging:  Imaging was reviewed by me personally.  Pertinent positives noted below.     CXR - Bilateral effusions.  Endotracheal tube at the jeanmarie.  (Has already been pulled back 2 cm).    Assessment and Plan:   This is a 71-year-old female who was initially admitted for abdominal pain status post laparoscopic expiratory laparotomy and cholecystectomy with subsequent respiratory failure requiring intubation and taken back to the operating room and found to have a gastric perforation.  This was repaired.  Subsequently extubated without incident.  She had anuric " renal failure with improving urine output yesterday.  Overnight she had multiple RRT was called for hypotensive episodes and worsening respiratory status.  She was found to have a pCO2 of almost 90 and blood pressures 60/40.  Transferred to ICU where she received fluid bolus and intubation for severe acidosis.  At this point I would recommend broadening her antimicrobial coverage given the heavy growth of yeast in the abdominal fluid were changed to micafungin and given her recent procedures including abdominal surgery, paracentesis I would add vancomycin for gram-positive coverage.  If this is new or worsening sepsis she needs more aggressive fluid resuscitation 50 g of albumin and 1 L of fluid upon arrival to the ICU.  Recommend reculture with sputum, blood, FUNMI drain, urine.    Neuro:   1.  Depression / anxiety - patient is on Zoloft and trazodone at home.    --> Continue baseline sertraline and trazodone    2.  Analgesia/sedation - p.r.n. Versed and fentanyl    Pulm:   1. COPD - she does have underlying COPD with very mild obstruction and a mild reduction in diffusion capacity.  Plan to continue her current outpatient regimen.  No indication for steroids.    2.  bilateral effusion - stable     3.  Acute hypercapnic respiratory failure - with worsening white count and hypotension would be concern for worsening sepsis as noted above will broaden her antimicrobial coverage and antifungal coverage.  Reculture sent.    CV:  1.  Hypotension / Sepsis - she had some rhonchorous breath sounds upon arrival at time of intubation concerning for possible pneumonia given the stability of her effusions on chest x-ray less likely the underlying source.  Given the known intra-abdominal contamination would be concerned about worsening fungal infection and given previous invasive procedures will add greater gram-positive coverage.  Add vancomycin, discontinue Diflucan, start micafungin, continue ertapenem.  Repeat  cultures.    Renal:   1.  Acute renal failure - Patient with acute anuric renal failure with improved urine output yesterday.  She may be third spacing secondary to her recent abdominal surgery as well as worsening sepsis.  Increase fluid and albumin.    ID:  1.  Sepsis - see above    GI:   1.  Gastric perforation - patient is currently n.p.o.  Nasogastric tube in place and low intermittent suction.  Surgery noted that this should not be repositioned except for under fluoroscopic guidance.  It was recommended patient have a Gastrografin swallow study on 6/9/2017 which artery placed on hold.  She will need a repeat endoscopy in 6-8 weeks from 6/7/2017.  She is on twice a day PPI.  No free air under the diaphragm.    Heme / Onc:   1.  Anemia - stable.    Endocrine:   1. Glc - glc controlled. On tpn    Musculoskeletal:   1.  Deconditioning - not appropriate for ptot at this time    Nutrition:   1. NPO   --> TPN    Prophylaxis:  DVT Prophylaxis:  SQ heparin  GI Prophylaxis: PPI tx dose  PTOT:  ordered  Restraints: UE restraints needed    Disposition: admit to MICU    Critical Care Time:  35 min    Tam Ugalde MD  Pulmonary and Critical Care  [facility]  Pager:  [phone]

## 2017-06-09 NOTE — PROGRESS NOTES
[facility] Nurse Inpatient Adult Pressure Injury (PI) Assessment     Initial Assessment of PI(s) on pt's:   Suspected coccyx PI    Data:   Patient History:      Admission Date:  6/2/2017     Active Problem List:   -->  COPD  -->  Sepsis  -->  Possible PNA  -->  UTI   -->  Status post cholecystectomy, laparoscopic  -->  Gastric ulcer with perforation status post omental patch  -->  Acute hypercapneic respiratory failure     24 Hour Events:  Had multiple RRT was called overnight.  Hypotensive.  Altered mental status.  250 cc bolus given.  BiPAP attempted.  Worsening mental status with a pCO2 found to be almost 90.  No changes in antibiotics.  Worsening blood pressure to systolic of 60 and diastolic of 40.  Patient transferred to ICU.  Intubated on arrival to ICU.  Diflucan discontinued.  Micafungin started.  Vancomycin added.  Fluid bolus.    Current mattress:  Atmos air  Current pressure relieving devices: Pillows and prevalon heel lift boots      Moisture Management: Crawford for UIC / Incontinence protocol for [facility]    Catheter secured? Yes      Current Diet / Nutrition:  TPN       Mark Assessment and sub scores:   Mark Score  Avg: 15.8  Min: 12  Max: 22          Vented, , restrained, dietitian following; multiple surgeries, Current LOS: 7 days; Comorbidities:          CPOD; sepsis, UTI    Labs:   Recent Labs   Lab Test  06/09/17   0825   06/08/17   0500   06/04/12   0527   ALBUMIN  1.3*   --   1.8*   < >   --    HGB  7.0*   < >  7.8*   < >  12.9   INR  1.12   --   1.26*   < >  0.97   WBC  17.8*   < >  13.9*   < >  7.6   A1C   --    --   Canceled, Test credited   UNABLE TO CALCULATE % HBA1C DUE TO LOW HGB AND/OR LOW HGBA1C  NOTIFIED RASHAD IN ICU AT 0634     --    --    CRP   --    --    --    --   11.4*    < > = values in this interval not displayed.                                                                                                                          Pressure Injury  "Assessment  (location):   Suspected coccyx PI      On exam base of coccyx crease with 1.0cm x .2cm light pink blanchable skin. All skin intact to area    SP area, labia and bilateral groin:  Bright erythema with skin intact. Palpation of SP area pt winched in pain.          Intervention:     Patient's chart evaluated.      Mark Interventions:  Current Mark Interventions and Care Plan reviewed and updated, appropriate at this time.         Coccyx:  Area inspected                        Skin prep to skin                         Mepilex sacrum to coccyx for Prevention    Orders  In Epic    Supplies  In floor supply room  Discussed plan of care with Nursing. No family present         Assessment:     Coccyx: no PI noted on assessment today.    Mark Risk    SP, bilateral labia/groin:  Possible developing candida.          Plan:     Nursing to notify the Provider(s) and re-consult the WO Nurse if wound(s) deteriorate(s)or if the wound care plan needs reevaluation.    If pt is refusing to turn or reposition they must be educated on the  potential injury from not off loading pressure.  Then this \"educated refusal\" needs to be documented as an \"educated refusal to turn/ reposition\" and document if alert, etc.         PIP POC:        1. Diligent turning every 2 hours.          2.  Consider pulsate mattress for worsening condition, moisture issues, Pt needing HOB above 30+ degrees for breathing        3. Continue bilateral prevalon boots        4.  Dietitian following        5.  HOB @ 30 degrees for VAP's         6.  Check under all devices during skin inspections and reposition tubes if possible        7. Incontinence protocol: antifungal powder to mono-area as ordered        8.  Mark Risk: document interventions        9.  Full skin inspections BID and document      WOC Nurse will return: weekly and prn  Face to face time: 20 minutes    "

## 2017-06-09 NOTE — PROGRESS NOTES
"Hospitalist Interval Note  6/9/2017    Chart reviewed and discussed with charge nurse. Overnight patient had several RRTs called and flying squad remained with patient most of the night. Patient hypotensive, hypoxic, acidotic. BiPAP attempted with minimal improvement in oxygen and respiratory support.  BP (!) 72/46  Pulse 97  Temp 97.6  F (36.4  C) (Axillary)  Resp 15  Ht 1.702 m (5' 7\")  Wt 79.4 kg (175 lb 0.7 oz)  SpO2 96%  BMI 27.42 kg/m2  On BiPAP    Unable to maintain adequate care on IMC unit. Transfer to ICU now and consult critical care medicine.     Addendum: 2 PM. Chart reviewed. Patient intubated this morning. Hospitalist service will sign-off. Please notify our service when patient ready to be moved out of the ICU.     Lety Kahn MD  "

## 2017-06-09 NOTE — PROGRESS NOTES
CLINICAL NUTRITION SERVICES - REASSESSMENT NOTE      EVALUATION OF PROGRESS TOWARD GOALS   Diet:  NPO on vent    Nutrition Support:  TPN was started on  at 45 mL/hr with lower CHO amount (180 gm) and increased to goal CHO amount (270 gm) on  as below:    Nutrition Support Parenteral:  Type of Access:  Central line  Parenteral Frequency:  Continuous  Parenteral Regimen:  D25 AA7 at 45 mL/hr + Lipids 250 mL daily  Total Parenteral Provisions:  1722 kcals (27 kcal/kg), 76 gm pro (1.2 gm/kg), 270 gm CHO, 29% fat    Pt is no longer on D5 IVF.  The IVF is currently running NaCl at 100 mL/hr - for hypotension    Intake:    Accuchecks:  122, 114, 124k 152, 175, 177, 125 - Pt on High Resistant SSI for glycemic control  BUN 41 (H)  Cr 1.45 (H) - Pt with ARF on CKD  Wt:  80.5 kg (up 17.1 kg since admit).  Edema +1-2 extremities.  N mL.  Drains:  20 mL.    Dosing Weight  63.4 kg (lowest weight for current admission)      ASSESSED NUTRITION NEEDS PER APPROVED PRACTICE GUIDELINES:  Estimated Energy Needs: 5289-1610 kcals (25-30 Kcal/Kg)  Justification: maintenance, vented  Estimated Protein Needs:  63-76 grams protein (1-1.2 g pro/Kg)  Justification: post-op, hypercatabolism with acute illness and CKD    NEW FINDINGS:   :  Extubated  :  Re-intubated after RRT (transferred back to ICU this morning)  Norepinephrine - for hypotension  Thiamine - to end      Previous Goals ():   TPN/Lipid + D5 IVF to meet % needs  Evaluation: Met    Previous Nutrition Diagnosis ():   Inadequate protein-energy intake related to NPO on vent, TPN to start tonight as evidenced by meeting 0% protein and 12% energy needs via D5 IVF   Evaluation:  Resolved      CURRENT NUTRITION DIAGNOSIS  No nutrition diagnosis identified at this time     INTERVENTIONS  Recommendations / Nutrition Prescription  Continue TPN/Lipids as above    Implementation  Collaboration and Referral of Nutrition care - Discussed with Pharmacist who will  continue the same TPN regimen.    Goals  TPN/Lipids will continue to meet % estimated needs.    MONITORING AND EVALUATION:  Progress towards goals will be monitored and evaluated per protocol and Practice Guidelines    Kayleigh Roman RD, LD, CNSC

## 2017-06-09 NOTE — PROVIDER NOTIFICATION
Notified Dr. Barba about pt not voiding since tanner removed 6/8/17 at 1300. Bladder scan for 160 at 6/8/17 2330. Per MD, increase IVF from 50ml/hr to 75ml/hr.     Platelets are 170, about 50% of baseline 339, per MD hold heparin and evaluate in the morning. Pt wearing PCD's.

## 2017-06-09 NOTE — PROGRESS NOTES
[facility] ICU RESPIRATORY NOTE  Date of Admission:  6/2/17  Date of Intubation (most recent): 6/9/17  Reason for Mechanical Ventilation: Airway protection/hypercapnea   Number of Days on Mechanical Ventilation: 1  Met Criteria for Pressure Support Trial: No  Length of Pressure Support Trial:  Reason for Stopping Pressure Support Trial:  Reason for No Pressure Support Trial: Per MD    Significant Events Today: Intubated this AM     ABG Results:      Recent Labs  Lab 06/09/17  0947 06/09/17  0545 06/06/17  1739 06/05/17  1325 06/05/17  0903   PH 7.29* 7.05* 7.41 7.29* 7.33*   PCO2 43 89* 37 46* 39   PO2 305* 85 97 118* 114*   HCO3 21 24 23 22 21   O2PER 100  --   --  70 10L     Ventilation Mode: CMV/AC  FiO2 (%): 40 %  Rate Set (breaths/minute): 20 breaths/min  Tidal Volume Set (mL): 500 mL  PEEP (cm H2O): 5 cmH2O  Oxygen Concentration (%): 50 %  Resp: 20      ETT appearance on chest x-ray:  In good position     Plan:  Continue full support     Alvin KLEIN

## 2017-06-09 NOTE — PLAN OF CARE
Problem: Goal Outcome Summary  Goal: Goal Outcome Summary  OT/PT: Pt just transferred to ICU due to respiratory issues. Possible intubation. Will hold on OT/PT today.

## 2017-06-09 NOTE — PHARMACY-VANCOMYCIN DOSING SERVICE
Pharmacy Vancomycin Initial Note  Date of Service 2017  Patient's  1945  71 year old, female    Indication: Bacteremia    Current estimated CrCl = Estimated Creatinine Clearance: 36.1 mL/min (based on Cr of 1.55).    Creatinine for last 3 days  2017:  5:04 AM Creatinine 1.57 mg/dL  2017:  5:00 AM Creatinine 1.39 mg/dL  2017:  5:00 AM Creatinine 1.55 mg/dL    Recent Vancomycin Level(s) for last 3 days  No results found for requested labs within last 72 hours.      Vancomycin IV Administrations (past 72 hours)      No vancomycin orders with administrations in past 72 hours.                Nephrotoxins and other renal medications (Future)    Start     Dose/Rate Route Frequency Ordered Stop    17 1000  vancomycin (VANCOCIN) 1500 mg in 0.9% NaCl 250 mL PREMIX      1,500 mg  over 60 Minutes Intravenous ONCE 17 0802      17 0802  vancomycin place alvarez - receiving intermittent dosing      1 each Does not apply SEE ADMIN INSTRUCTIONS 17 0802      17 0800  norepinephrine (LEVOPHED) 16 mg in D5W 250 mL infusion      0.03-0.4 mcg/kg/min × 79.4 kg  2.2-29.8 mL/hr  Intravenous CONTINUOUS 17 0747            Contrast Orders - past 72 hours (72h ago through future)    Start     Dose/Rate Route Frequency Ordered Stop    17 1400  technetium Tc 99m mebrofenin (CHOLETEC) radioisotope injection 6 millicurie      6 millicurie Intravenous ONCE 17 1351 17 1352                Plan:  1.  Start vancomycin  1500 mg IV x 1 then intermittent doses based on levels  2.  Goal Trough Level: 15-20 mg/L   3.  Pharmacy will check trough levels as appropriate in 1-3 Days.    4. Serum creatinine levels will be ordered daily for the first week of therapy and at least twice weekly for subsequent weeks.    5. Waldo method utilized to dose vancomycin therapy: Method 1    Cooper Fernandez

## 2017-06-09 NOTE — PROGRESS NOTES
RRT called for Hypotension. Pt was also seen to have an odd breathing pattern. VBG was drawn by nursing.   Pt is on a 6L NC with SpO2 in the mid 90's. BS clear with some rhonchi. PRN albuterol neb was given prior the the RRT at 0343 . RT was dismissed from the RRT.    Addendum 0605:   VBG results:  Results for BEN PALOMINO (MRN [id number]) as of 6/9/2017 06:06   Ref. Range 6/9/2017 05:00 6/9/2017 05:15   Ph Venous Latest Ref Range: 7.32 - 7.43 pH 7.06 (LL)    PCO2 Venous Latest Ref Range: 40 - 50 mm Hg 87 (HH)    PO2 Venous Latest Ref Range: 25 - 47 mm Hg 45    Bicarbonate Venous Latest Ref Range: 21 - 28 mmol/L 25    Base Deficit Venous Latest Units: mmol/L 5.0    Oxyhemoglobin Venous Latest Units: % 75      Arterial gas was then drawn:    Results for BEN PALOMINO (MRN [id number]) as of 6/9/2017 06:06   Ref. Range 6/9/2017 05:45   pH Arterial Latest Ref Range: 7.35 - 7.45 pH 7.05 (LL)   pCO2 Arterial Latest Ref Range: 35 - 45 mm Hg 89 (HH)   PO2 Arterial Latest Ref Range: 80 - 105 mm Hg 85   Bicarbonate Arterial Latest Ref Range: 21 - 28 mmol/L 24   Base Deficit Art Latest Units: mmol/L 5.4   Oxyhemoglobin Arterial Latest Ref Range: 92 - 100 % 94     Pt was then placed on BiPAP 14/5 RR 20 FiO2 50%. Gel pad in place under BiPAP mask. Skin intact.  Will cont to monitor closely.    6/9/2017  Pallavi KLEIN.

## 2017-06-09 NOTE — PLAN OF CARE
Problem: Individualization  Goal: Patient Preferences  Neuro: sedated with prn versed and fentanyl for pain  CV: SR with 1AVB. Goal MAP > 65 maintained with levo off now  Pulm: vent fio2 40%, Vt 500, PEEP 5. RR 20, sats 99%, LS coarse to exp wheeze to clear with small thick white secretions   GI/:  NPO, NG tube to decompression, flexiseal in place with green mucous loose stool   Skin: bruising and edema. Coccyx blanchable with mepilex border in place  Lines:  Triple lumen PICC rt arm   Restraints: Continued to maintain patency of necessary lines and ET tube.   Plan: Rest overnight on vent.

## 2017-06-09 NOTE — PROCEDURES
Procedure:  Intubation  Patient evaluated in the MICU.  The decision was made to intubate the patient for the indication noted below.   Indication:  Hypercapneic resp failure  Permit:  Verbal consent not obtained and written consent not obtained - emergent procedure and consistent with pre-determined code status.   Maneuver:  The patient's vital signs were reviewed.  Pre-oxygenation was performed.  After administration of 20 mg propofol,150 phenylephrine a glidescope was used.  There was good visualization of the cords and 7.5 size ETT was passed through the cords wihtout difficulty.  There was appropriate color change noted on the end expiratory CO2, condensation was noted in the ETT, symmetrical bilateral chest rise and breath sounds were confirmed.  The tube was secured at 26cm at lip.  There was no significant blood loss.  CXR pending to confirm placement of ETT.   Complications:  none    Tam Ugalde MD  Pulmonary and Critical Care  [facility]  Pager:  [phone]

## 2017-06-09 NOTE — SIGNIFICANT EVENT
House Officer Note:      Patient Name: Ara Stanley MRN# [id number]   Age: 71 year old YOB: 1945     Date of Admission:6/2/2017  Date of Service: 6/9/2017  Attending physician: [id number] [Lety Kahn*         Assessment and Plan:   Ara Stanley is a 71 year old female with recent gastric perforation and possible PNA on admission who had rapid response called for lower blood pressures. Found to be dehydrated and with acute respiratory acidosis requiring BIPAP.    ## dehydration:  Recent lactate normal. Broad infectious workup for leukocytosis yesterday. Repeat CBC, CXR today.  Improved VS with 250 bolus NS.    --repeat 250 bolus then 100 ns/hr from 75 for MIVF    ## COPD:  ADDENDUM: acute hypercarbic respiratory failure with acidosis, secondary to COPD and possible oversedation/sepsis. 7.06/87  --bipap  --another 0.1mg narcan  --consideration for ICU transfer if not improving in next 1-2 hours or if decompensates further      ## worsening WOODY on lab today, increased fluid as above      ## pain control:   consider decreasing scheduled zyprexa if able to reduce sedation (may be less of an issue during daytime hours).       CODE: Full per chart      Time spent with patient: 50 minutes    Herve Pimentel MD  Internal Medicine House Officer             Subjective:   Pulse elevated ~110 and BP dropping over the last 24 hours with diminishing urine output. Remains afebrile. Likely hypovolemic per surgeon yesterday. Appears dry on exam. Bolus 250 NS with improved BP, rebolus 250 and increased MIVF to 100/hr. systolics were in 70's when RRT called, from 90's, improved back to 90's with first bolus. MAP now >60 repeatedly.     Noted to have odd breathing pattern, almost a central apnea pattern. Patient noted to have COPD and was receiving dilaudid and scheduled zyprexa. Patient was able to awaken briefly and say she had mild ongoing abdominal pain. Some slurred speech but able to move all  extremities and with no facial droop.    She was given 0.1mg narcan in case dilaudid was changing her breathing pattern. CXR and VBG ordered.              Past Medical/Surgical History:     Past Medical History:   Diagnosis Date     Asthma      COPD (chronic obstructive pulmonary disease) (H)      Past Surgical History:   Procedure Laterality Date     ARTHROPLASTY HIP  6/4/2012    Procedure:ARTHROPLASTY HIP; Surgeon:DAVIAN FENG; Location: OR     ENT SURGERY      tonsils     GYN SURGERY      hyst     LAPAROSCOPIC CHOLECYSTECTOMY N/A 6/2/2017    Procedure: LAPAROSCOPIC CHOLECYSTECTOMY;;  Surgeon: Lavell Carpenter MD;  Location:  OR     LAPAROSCOPY DIAGNOSTIC (GENERAL) N/A 6/6/2017    Procedure: LAPAROSCOPY DIAGNOSTIC (GENERAL);  DIAGNOSTIC LAPAROSCOPY, REPAIR OF PERFORATED GASTRIC ULCER, ABDOMINAL LAVAGE;  Surgeon: Estelita Peck MD;  Location:  OR     LAPAROTOMY EXPLORATORY N/A 6/2/2017    Procedure: LAPAROTOMY EXPLORATORY;  EXPLORATORY LAPAROSCOPY, CHOLECYCTECTOMY;  Surgeon: Lavell Carpenter MD;  Location:  OR     ORTHOPEDIC SURGERY      hip pinning   femur fracture knee surgery      REMOVE HARDWARE HIP NAILING  6/4/2012    Procedure:REMOVE HARDWARE HIP NAILING; REMOVAL OF GAMMA NAIL AND SCREWS FROM RIGHT HIP, RIGHT TOTAL HIP ARTHROPLASTY(GAMMA NAIL EQUIPMENT, [employer] TOTAL HIP)^; Surgeon:DAVIAN FENG; Location: OR              Medications:     Prior to Admission Medications   Prescriptions Last Dose Informant Patient Reported? Taking?   ACETAMINOPHEN PO 6/1/2017 at am Self Yes Yes   Sig: Take 650 mg by mouth every morning   ASPIRIN EC PO 6/1/2017 at am Self Yes Yes   Sig: Take 81 mg by mouth daily   BACLOFEN PO 6/1/2017 at am Self Yes Yes   Sig: Take 5 mg by mouth every morning RX is 5 mg (1/2 of 10 mg tab) 2 x daily but she takes daily due to drowsiness.   BENAZEPRIL HCL PO 6/1/2017 at am Self Yes Yes   Sig: Take 20 mg by mouth every morning    Ipratropium-Albuterol (COMBIVENT RESPIMAT)  MCG/ACT inhaler prn Self Yes Yes   Sig: Inhale 1 puff into the lungs 4 times daily as needed for shortness of breath / dyspnea or wheezing   LOVASTATIN PO 6/1/2017 at am Self Yes Yes   Sig: Take 20 mg by mouth every morning   MELOXICAM PO 6/1/2017 at am Self Yes Yes   Sig: Take 15 mg by mouth daily   Multiple Vitamins-Minerals (CENTRUM SILVER) per tablet 6/1/2017 at am Self Yes Yes   Sig: Take 1 tablet by mouth every morning   TRAZODONE HCL PO prn Self Yes Yes   Sig: Take 50 mg by mouth nightly as needed for sleep   fluticasone-vilanterol (BREO ELLIPTA) 100-25 MCG/INH oral inhaler 6/1/2017 at am Pharmacy Yes Yes   Sig: Inhale 1 puff into the lungs daily   ipratropium - albuterol 0.5 mg/2.5 mg/3 mL (DUONEB) 0.5-2.5 (3) MG/3ML neb solution 6/1/2017 at am Self Yes Yes   Sig: Take 1 vial by nebulization 2 times daily as needed for shortness of breath / dyspnea or wheezing   sertraline (ZOLOFT) 50 MG tablet 6/1/2017 at am Self No Yes   Sig: Take 1 tablet by mouth daily.      Facility-Administered Medications: None        sodium chloride (PF)  3 mL Intracatheter Q8H     chlorhexidine  15 mL Swish & Spit BID     ertapenem (INVanz) IV  1 g Intravenous Q24H     insulin aspart  1-12 Units Subcutaneous Q4H     lipids  250 mL Intravenous Q24H     OLANZapine zydis  5 mg Oral Q6H     thiamine  250 mg Intravenous Daily    Followed by     [START ON 6/11/2017] vitamin  B-1  200 mg Oral Daily     acetaminophen  1,000 mg Oral Q8H    Or     acetaminophen  650 mg Rectal Q8H     pantoprazole  40 mg Intravenous BID     fluconazole  200 mg Intravenous Q24H     folic acid  1 mg Oral Once     heparin  5,000 Units Subcutaneous Q8H     budesonide  1 mg Nebulization BID     sodium chloride (PF)  10 mL Intracatheter Q8H     arformoterol  15 mcg Nebulization BID     senna-docusate  1-2 tablet Oral BID     sertraline  50 mg Oral Daily     simvastatin  10 mg Oral QAM                  Physical  Exam:   Temp:  [97.6  F (36.4  C)-99.3  F (37.4  C)] 97.6  F (36.4  C)  Heart Rate:  [101-120] 101  Resp:  [13-27] 18  BP: ()/(41-86) 80/43  SpO2:  [86 %-100 %] 86 %  Temp (24hrs), Av.6  F (37  C), Min:97.6  F (36.4  C), Max:99.3  F (37.4  C)    Gen: sleeping, awakens to voice  HEENT: PER, EOMI, dry membranes  Neck: supple, JVD not visibly elevated  Resp: some rhonchi but no crackles/wheezes noted. Large almost gasping breaths with mild paradoxical abdominal movement but in seemingly no distress  CV: regular,  Neuro: face symmetric, moves all extremities with equal strength, mildly dysarthric seemingly from dry mouth and sedation    PICC Triple Lumen 17 Right Basilic (Active)   Site Assessment WDL 2017 10:37 AM   External Cath Length (cm) 4 cm 2017 10:37 AM   Extremity Circumference (cm) 25 cm 2017 10:37 AM   Dressing Intervention Chlorhexidine patch;Transparent 2017  8:00 AM   Extravasation? No 2017  4:00 AM   Dressing Change Due 17  8:00 AM   Number of days:5            Data:   All labs and imaging reviewed by me in Epic.        CMP  Recent Labs  Lab 17  0500 17  0504 17  1739 17  0411 17  0303 17  1946 17  0725    135 132* 128* 132* 132* 130*   POTASSIUM 3.6 3.5 3.4 3.8 4.3 4.6 4.4   CHLORIDE 105 102  --  93* 102 105 103   CO2 25 24  --  27 19* 18* 15*   ANIONGAP 8 9  --  8 11 9 12   * 104*  --  126* 122* 109* 75   BUN 36* 39*  --  45* 49* 48* 47*   CR 1.39* 1.57*  --  2.01* 2.31* 2.31* 2.26*   GFRESTIMATED 37* 32*  --  24* 21* 21* 21*   GFRESTBLACK 45* 39*  --  29* 25* 25* 26*   CARYL 7.6* 7.5*  --  7.5* 7.5* 7.8* 8.1*   MAG 2.0 2.0  --  2.2  --   --  2.8*   PHOS 4.4 5.0*  --  4.2  --  5.1* 5.0*   PROTTOTAL 4.4* 4.3*  --  4.4* 4.4*  --  5.2*   ALBUMIN 1.8* 2.2*  --  1.9* 2.0* 1.9* 2.3*   BILITOTAL 0.3 0.9  --  0.4 0.3  --  0.3   ALKPHOS 65 57  --  55 51  --  65   AST 29 41  --  39 58*  --  56*   ALT 16 17  --   19 26  --  33     CBC  Recent Labs  Lab 06/08/17  0500 06/07/17  0504 06/06/17  2023 06/06/17  1739 06/06/17  0411   WBC 13.9* 6.3 6.0  --  5.3   HGB 7.8* 7.9* 8.1* 8.2* 7.0*   HCT 22.2* 22.6* 22.9*  --  20.2*   MCV 85 84 84  --  84   RDW 16.5* 16.5* 16.2*  --  16.6*    151 172  --  209     INR  Recent Labs  Lab 06/08/17  0500 06/07/17  1735 06/07/17  0504 06/06/17  0411   INR 1.26* 1.24* 1.37* 1.31*     Arterial Blood Gas  Recent Labs  Lab 06/06/17  1739 06/05/17  1325 06/05/17  0903   PH 7.41 7.29* 7.33*   PCO2 37 46* 39   PO2 97 118* 114*   HCO3 23 22 21   O2PER  --  70 10L     UA RESULTS:  Recent Labs   Lab Test  06/08/17   1220   COLOR  Yellow   APPEARANCE  Slightly Cloudy   URINEGLC  Negative   URINEBILI  Negative   URINEKETONE  Negative   SG  1.014   UBLD  Trace*   URINEPH  5.5   PROTEIN  30*   NITRITE  Negative   LEUKEST  Negative   RBCU  4*   WBCU  3*     All cultures:    Recent Labs  Lab 06/08/17  1248 06/08/17  1237 06/08/17  1216 06/08/17  0034 06/07/17  1655 06/07/17  1315 06/06/17  1642 06/06/17  1207 06/06/17  0641 06/04/17  1245 06/04/17  1147 06/03/17  1320 06/02/17  1015   CULT No growth after 2 hours >10 Squamous epithelial cells/low power field indicates oral contamination. Please recollect.Canceled, Test creditedNotification of test cancellation was given to Ayden BREWER [id number], @2995 6/8/17. .* No growth after 2 hours No growth after 15 hours No growth after 23 hours Culture negative monitoring continuesSpecimen was frozen by  and now considered suboptimal, results should be interpreted with caution notified OSMAN Lam at [facility] and a new culture from the PICC line will be drawn Heavy growth Tricia albicans / dubliniensis Candida albicans and Candida dubliniensis are not routinely speciated Susceptibility testing not routinely doneCritical Value/Significant Value called to and read back by Nedra PENALOZA @ 3796 6.7.17 KB*  Culture negative monitoring continues Presumptive  identification: Candida albicans / dubliniensis isolated Candida albicans and Candida dubliniensis are not routinely speciated*  Heavy growth Tricia albicans / dubliniensis Candida albicans and Candida dubliniensis are not routinely speciatedSusceptibility testing not routinely done*  Culture negative monitoring continues Moderate growth Candida albicans / dubliniensis Candida albicans and Candida dubliniensis are not routinely speciatedSusceptibility testing not routinely done* No growth after 4 days No growth after 4 days No growth No growth       CXR pending

## 2017-06-09 NOTE — CODE/RAPID RESPONSE
House MD note. Re: Hypotension.    71 year old female seen by the House MD early this AM ( see note ) for hypercarbic respiratory failure, attempted BIPAP to reverse her markedly elevated CO2 levels. Patient now has decreased BP readings, systolic BP is 70. She is given NS fluid boluses of 500 cc's times two. She has had decreased urine output for the past 24 hours per her nurse. Repeat ABG's now show worsening hypercapnia and acidosis. She will require re-intubation and sepsis workup and possible pressors and is being transferred now to ICU. Discussed briefly with Dr. Ugalde, Intensivist, who is familiar with this patient from her earlier stay in ICU.     Sky Robledo MD  6/9/2017  8:04 AM

## 2017-06-09 NOTE — PLAN OF CARE
Problem: Individualization  Goal: Patient Preferences  Outcome: Declining  Pt lethargic but alert and oriented most of the night. RRT called at 0430 due to low BP's and work of breathing even when sats stable 92-96% at 4L O2 NC. Pt had not voided since tanner out 6/8/17 1300. Bladder scanned x2 with little urine. See Dr. Pimentel's note from RRT.         RRT called at 0730. BP's plummeting to 60's/40's, not responding to bolus. Tanner inserted, 150 cc out, joanna/cloudy. Pt increasingly confused and eventually not responsive. Transferred to ICU.

## 2017-06-10 NOTE — PLAN OF CARE
Problem: Individualization  Goal: Patient Preferences  Neuro: sedated with prn versed and fentanyl, open eyes to voice and follow command  CV: SR with 1AVB to ST, maintained BP MAP > 65 without any pressor support since yesterday  Pulm:, on vent, PS trial X2 done. Pt tolerated well. Minimal white secretion, LS coarse   GI/: NPO, NGT to decompression, Flexiseal with medium output, BS faint.   Skin: bruising, edema.  Lines: triple lumen PICC HERVE  Restraints: Continued to maintain patency of necessary lines and ET tube.   Plan: Rest overnight on vent. PS trial tomorrow with possible extubation.      Multiple families called today, updated all and redirected future calls to the spokesperson- valeria daughter.

## 2017-06-10 NOTE — PROGRESS NOTES
[facility] ICU RESPIRATORY NOTE  Date of Admission: 6/2/2017  Date of Intubation (most recent): 6/9/2017  Reason for Mechanical Ventilation: Hypercapnic respiratory failure  Number of Days on Mechanical Ventilation: 2  Met Criteria for Pressure Support Trial: Yes  Length of Pressure Support Trial: PS 5/5 40% > 90 minutes  Reason for Stopping Pressure Support Trial: Conditioning    Significant Events Today: None  Ventilation Mode: CMV/AC  FiO2 (%): 40 %  Rate Set (breaths/minute): 20 breaths/min  Tidal Volume Set (mL): 500 mL  PEEP (cm H2O): 5 cmH2O  Pressure Support (cm H2O): 5 cmH2O  Oxygen Concentration (%): 40 %  Resp: 22    ABG Results: No ABG result for today    ETT appearance on chest x-ray: ET tube in good position    Plan: continue daily assessment for weaning and possible extubation.

## 2017-06-10 NOTE — PROGRESS NOTES
"  Daily ICU Progress Note       Date of service: 06/10/2017  Admission Date:  6/2/2017    Active Problem List:   -->  COPD  -->  Sepsis  -->  Possible PNA  -->  UTI   -->  Status post cholecystectomy, laparoscopic  -->  Gastric ulcer with perforation status post omental patch  -->  Acute hypercapneic respiratory failure, vented since 6/9    24 Hour Events:  Transferred to the ICU 6/9 due to hypotension and resp failure requiring mech ventilation. Off pressors overnight, following commands    ROS:  Unable to obtain 2/2 critical illness.     Objective:     /57  Pulse 97  Temp 100.6  F (38.1  C)  Resp 22  Ht 1.702 m (5' 7\")  Wt 80.6 kg (177 lb 11.1 oz)  SpO2 96%  BMI 27.83 kg/m2    Intake/Output Summary (Last 24 hours) at 06/10/17 1119  Last data filed at 06/10/17 1000   Gross per 24 hour   Intake          3609.67 ml   Output             1033 ml   Net          2576.67 ml      Net since admission 6/2 +21 L    Ventilation Mode: CMV/AC  FiO2 (%): 40 %  Rate Set (breaths/minute): 20 breaths/min  Tidal Volume Set (mL): 500 mL  PEEP (cm H2O): 5 cmH2O  Pressure Support (cm H2O): 5 cmH2O  Oxygen Concentration (%): 40 %  Resp: 22    General: Adult female, following simple commands  HEENT: NCAT; EOMI; no icterus; extremely dry mucous membranes  Neck: No LAD  Pulm: Coarse BS bilaterally  CV: RRR, no m/g  Abdomen: Soft abdomen, multiple areas of ecchymosis, UFNMI drain in place  : Crawford  Extremities: + edema  Skin: Warm to touch  Neuro: somnolent    Pertinent Labs:       Recent Labs  Lab 06/10/17  0500 06/09/17  1405 06/09/17  0825 06/09/17  0500   WBC 19.4*  --  17.8* 22.6*   RBC 2.70*  --  2.37* 2.73*   HGB 7.9* 7.7* 7.0* 8.1*   HCT 23.0* 22.8* 21.1* 24.3*     --  155 172       Recent Labs  Lab 06/10/17  0500 06/09/17  0825 06/09/17  0500    142 141   POTASSIUM 3.1* 3.8 3.8   CHLORIDE 113* 113* 109   CO2 21 23 26   BUN 44* 41* 41*   CR 1.46* 1.45* 1.55*   * 184*  Canceled, Test credited Duplicate " requestCORRECTED ON 06/09 AT 0904: PREVIOUSLY REPORTED  175*   CARYL 7.5* 6.9* 7.6*       Recent Labs  Lab 06/09/17  0947 06/09/17  0545 06/06/17  1739 06/05/17  1325 06/05/17  0903   PH 7.29* 7.05* 7.41 7.29* 7.33*   PCO2 43 89* 37 46* 39   PO2 305* 85 97 118* 114*   HCO3 21 24 23 22 21   O2PER 100  --   --  70 10L       Recent Labs  Lab 06/10/17  0738 06/10/17  0506 06/10/17  0500 06/10/17  0023 06/09/17  2037 06/09/17  1613 06/09/17  1204  06/09/17  0825  06/09/17  0500  06/08/17  0500  06/07/17  0504  06/06/17  0411   GLC  --   --  129*  --   --   --   --   --  184*  Canceled, Test credited Duplicate requestCORRECTED ON 06/09 AT 0904: PREVIOUSLY REPORTED   --  175*  --  132*  --  104*  --  126*   * 118*  --  105* 92 136* 125*  < >  --   < >  --   < >  --   < >  --   < >  --    < > = values in this interval not displayed.    Procal (6/4) 22    Microbiology / Cultures:     Abdominal fluid culture with heavy growth of Candida albicans/dublinienesis    Imaging:  Imaging was reviewed by me personally.  Pertinent positives noted below.     CXR 6/10 - Bilateral effusions.      Assessment and Plan:   This is a 71-year-old female who was initially admitted for abdominal pain status post laparoscopic expiratory laparotomy and cholecystectomy with subsequent respiratory failure requiring intubation and taken back to the operating room and found to have a gastric perforation.  This was repaired.  Subsequently extubated without incident.  She had anuric renal failure with improving urine output yesterday.  Overnight she had multiple RRT was called for hypotensive episodes and worsening respiratory status.  She was found to have a pCO2 of almost 90 and blood pressures 60/40.  Transferred to ICU where she received fluid bolus and intubation for severe acidosis.  At this point I would recommend broadening her antimicrobial coverage given the heavy growth of yeast in the abdominal fluid were changed to  micafungin and given her recent procedures including abdominal surgery, paracentesis I would add vancomycin for gram-positive coverage.  If this is new or worsening sepsis she needs more aggressive fluid resuscitation 50 g of albumin and 1 L of fluid upon arrival to the ICU.  Recommend reculture with sputum, blood, FUNMI drain, urine.    Neuro:   1.  Depression / anxiety - patient is on Zoloft and trazodone at home.    --> Continue baseline sertraline and trazodone    2.  Analgesia/sedation -  --> p.r.n. Versed and fentanyl    Pulm:   1. COPD - she does have underlying COPD with very mild obstruction and a mild reduction in diffusion capacity.  Plan to continue her current outpatient regimen.  No indication for steroids.    2.  Bilateral effusion - slightly worse  -->Diuresis (albumin + lasix)    3. Acute hypercapnic respiratory failure - full vent support  --> Daily PST    CV:  1.  Hypotension / Sepsis - off pressors. Normal lactic acid. she had some rhonchorous breath sounds upon arrival at time of intubation concerning for possible pneumonia given the stability of her effusions on chest x-ray less likely the underlying source.  Given the known intra-abdominal contamination would be concerned about worsening fungal infection and given previous invasive procedures will add greater gram-positive coverage.  Added vancomycin, discontinued Diflucan, started micafungin, continue ertapenem.  Repeat cultures.    Renal:   1.  Acute renal failure - Patient with acute anuric renal failure with improved urine output now.  She may be third spacing secondary to her recent abdominal surgery as well as worsening sepsis.  Hypervolemia.  --> Albumin + lasix    ID:  1.  Sepsis - see above, extended coverage  --> Trend procal, monitor cultures    GI:   1.  Gastric perforation - patient is currently n.p.o.  Nasogastric tube in place and low intermittent suction.  Surgery noted that this should not be repositioned except for under  fluoroscopic guidance.  It was recommended patient have a Gastrografin swallow study on 6/9/2017 which artery placed on hold.  She will need a repeat endoscopy in 6-8 weeks from 6/7/2017.  She is on twice a day PPI.  No free air under the diaphragm.  --> Per surgery    Heme / Onc:   1.  Anemia - stable.  --> Monitor counts    Endocrine:   1. Glc - glc controlled. On tpn    Musculoskeletal:   1.  Deconditioning - not appropriate for pt/ot at this time    Nutrition:   1. NPO   --> TPN    Prophylaxis:  DVT Prophylaxis:  SQ heparin  GI Prophylaxis: PPI tx dose  PTOT:  ordered  Restraints: UE restraints needed    Disposition: MICU    Critical Care Time:  35 min    Abel Smith MD

## 2017-06-10 NOTE — PROGRESS NOTES
Surgery  Pt stable.  Off pressors  Arouseable, abdomen pain appropriate.  FUNMI output minimal  Continue ICU cares, vent weaning  Continue TPN, no enteric feeds yet  Norberto Buckley MD  General Surgery, Office [phone]

## 2017-06-10 NOTE — PROGRESS NOTES
Tmax 100.6.  Arouses to voice. Follows commands. LS coarse, diminished in bases.  CMV 40% 20/500/5.  Thick white secretions.  SR-ST.  TPN and lipids infusing.  NG to LIS.  Bile-mucous output in flexiseal.  Marginal UOP.  Pain managed with PRN fentanyl.  Daughter Charlene updated overnight.

## 2017-06-10 NOTE — PHARMACY-VANCOMYCIN DOSING SERVICE
Pharmacy Vancomycin Note  Date of Service Debi 10, 2017  Patient's  1945   71 year old, female    Indication: Bacteremia  Goal Trough Level: 15-20 mg/L  Day of Therapy: 2  Current Vancomycin regimen:  Intermittent dose based on level    Current estimated CrCl = Estimated Creatinine Clearance: 38.6 mL/min (based on Cr of 1.46).    Creatinine for last 3 days  2017:  5:00 AM Creatinine 1.39 mg/dL  2017:  5:00 AM Creatinine 1.55 mg/dL;  8:25 AM Creatinine 1.45 mg/dL  6/10/2017:  5:00 AM Creatinine 1.46 mg/dL    Recent Vancomycin Levels (past 3 days)  6/10/2017: 11:15 AM Vancomycin Level 16.8 mg/L    Vancomycin IV Administrations (past 72 hours)                   vancomycin (VANCOCIN) 1500 mg in 0.9% NaCl 250 mL PREMIX (mg) 1,500 mg New Bag 17 0928                Nephrotoxins and other renal medications (Future)    Start     Dose/Rate Route Frequency Ordered Stop    06/10/17 1200  vancomycin (VANCOCIN) 1500 mg in 0.9% NaCl 250 mL PREMIX      1,500 mg Intravenous ONCE 06/10/17 1154      06/10/17 1145  furosemide (LASIX) injection 20 mg      20 mg Intravenous EVERY 8 HOURS 06/10/17 1132 17 1144    17 0802  vancomycin place alvarez - receiving intermittent dosing      1 each Does not apply SEE ADMIN INSTRUCTIONS 17 0802      17 0800  norepinephrine (LEVOPHED) 16 mg in D5W 250 mL infusion      0.03-0.4 mcg/kg/min × 79.4 kg  2.2-29.8 mL/hr  Intravenous CONTINUOUS 17 0747               Contrast Orders - past 72 hours     None          Interpretation of levels and current regimen:  Trough level is  Therapeutic    Has serum creatinine changed > 50% in last 72 hours: No    Urine output:  good urine output    Renal Function: Stable    Plan:  1.  Re-dose with 1500 mg x 1   2.  Pharmacy will check trough levels as appropriate in 1-3 Days.    3. Serum creatinine levels will be ordered daily for the first week of therapy and at least twice weekly for subsequent weeks.      Jadyn  Casper        .

## 2017-06-10 NOTE — PLAN OF CARE
Problem: Goal Outcome Summary  Goal: Goal Outcome Summary  OT/PT: Pt not appropriate this morning for OT/PT as pt is currently intubated.

## 2017-06-10 NOTE — PROVIDER NOTIFICATION
Dr Smith notified of pt on PS trial since 0915. RR 28, Vt 300'sml. Minute vent 9. Per MD to do PS trial twice today and celio.

## 2017-06-10 NOTE — PROGRESS NOTES
Assessment and Plan:   WOODY: associated with shock/hypotension. Labs show stable Cr at 1.46. K is low at 3.1, HCO3 is 21. Will repeat Isiah and FENa. I/O  6303/653.  Yesterday go NS boluses and IV alb. On NS IV maintenance and TPN. Will hold on further lasix.             Interval History:   Septic shock  Resp failure/COPD/mech vent  S/P lap alexandre and repair of perforated gastric ulcer.   Anemia:         TPN           Review of Systems:   Intubated.           Medications:       albumin human  12.5 g Intravenous Q8H     furosemide  20 mg Intravenous Q8H     vancomycin (VANCOCIN) IV  1,500 mg Intravenous Once     sodium chloride 0.9%  250 mL Intravenous Once     sodium chloride 0.9%  250 mL Intravenous Once     vancomycin place alvarez - receiving intermittent dosing  1 each Does not apply See Admin Instructions     sodium chloride 0.9%  2,000 mL Intravenous Once     micafungin  100 mg Intravenous Q24H     sodium chloride 0.9%  500 mL Intravenous Once     chlorhexidine  15 mL Swish & Spit BID     ertapenem (INVanz) IV  1 g Intravenous Q24H     insulin aspart  1-12 Units Subcutaneous Q4H     lipids  250 mL Intravenous Q24H     [START ON 6/11/2017] vitamin  B-1  200 mg Oral Daily     acetaminophen  1,000 mg Oral Q8H    Or     acetaminophen  650 mg Rectal Q8H     pantoprazole  40 mg Intravenous BID     folic acid  1 mg Oral Once     heparin  5,000 Units Subcutaneous Q8H     budesonide  1 mg Nebulization BID     sodium chloride (PF)  10 mL Intracatheter Q8H     arformoterol  15 mcg Nebulization BID     senna-docusate  1-2 tablet Oral BID     sertraline  50 mg Oral Daily     simvastatin  10 mg Oral QAM       parenteral nutrition - ADULT compounded formula       - MEDICATION INSTRUCTIONS -       NaCl 60 mL/hr at 06/10/17 0425     norepinephrine Stopped (06/09/17 5140)     parenteral nutrition - ADULT compounded formula 45 mL/hr at 06/09/17 2000     IV fluid REPLACEMENT ONLY Stopped (06/08/17 8690)     Current active  medications and PTA medications reviewed, see medication list for details.            Physical Exam:   Vitals were reviewed  Patient Vitals for the past 24 hrs:   BP Temp Temp src Heart Rate Resp SpO2 Weight   06/10/17 1130 131/68 100.4  F (38  C) - 105 20 (!) 89 % -   06/10/17 1119 - - - - - 95 % -   06/10/17 1100 (!) 113/96 100.2  F (37.9  C) - 102 20 99 % -   06/10/17 1030 115/60 100.4  F (38  C) - 108 - 99 % -   06/10/17 1000 108/57 100.6  F (38.1  C) - 124 22 96 % -   06/10/17 0930 132/61 100.4  F (38  C) - 120 26 97 % -   06/10/17 0915 - 100.4  F (38  C) - 110 - 98 % -   06/10/17 0906 127/66 - - 106 28 98 % -   06/10/17 0900 127/67 100  F (37.8  C) - 101 26 98 % -   06/10/17 0830 118/60 100  F (37.8  C) - 104 22 97 % -   06/10/17 0800 119/64 99.9  F (37.7  C) Esophageal 99 20 99 % -   06/10/17 0757 - - - - - 97 % -   06/10/17 0700 146/71 99.9  F (37.7  C) - 101 - 95 % -   06/10/17 0630 131/66 99.9  F (37.7  C) - 98 - 98 % -   06/10/17 0614 - 99.9  F (37.7  C) - 100 - 98 % -   06/10/17 0600 140/71 99.9  F (37.7  C) - 97 21 99 % -   06/10/17 0559 - - - - - - 80.6 kg (177 lb 11.1 oz)   06/10/17 0530 126/67 99.7  F (37.6  C) - 98 - 94 % -   06/10/17 0500 136/66 99.9  F (37.7  C) - 101 22 100 % -   06/10/17 0430 111/66 100  F (37.8  C) - 105 - 98 % -   06/10/17 0425 - 100  F (37.8  C) - 96 - 97 % -   06/10/17 0400 114/59 99.9  F (37.7  C) - 96 21 95 % -   06/10/17 0330 119/63 100  F (37.8  C) - 98 - 96 % -   06/10/17 0329 - - - - - 98 % -   06/10/17 0300 124/68 100.2  F (37.9  C) - 98 - 96 % -   06/10/17 0230 108/59 100  F (37.8  C) - 99 - 97 % -   06/10/17 0200 104/55 100.2  F (37.9  C) - 101 22 97 % -   06/10/17 0130 121/59 100.4  F (38  C) - 104 - 100 % -   06/10/17 0100 115/59 100.4  F (38  C) - 101 - 92 % -   06/10/17 0030 106/55 100.6  F (38.1  C) - 103 - 100 % -   06/10/17 0020 - 100.6  F (38.1  C) - 108 30 99 % -   06/10/17 0000 133/68 100.6  F (38.1  C) Esophageal 101 22 98 % -   06/09/17 2330 116/89 100.6   F (38.1  C) - 100 - 96 % -   06/09/17 2326 - - - - - 96 % -   06/09/17 2300 118/65 100.4  F (38  C) - 102 - 97 % -   06/09/17 2244 - 100.6  F (38.1  C) - 105 - 95 % -   06/09/17 2230 133/72 100.6  F (38.1  C) - 105 - 95 % -   06/09/17 2229 133/72 100.6  F (38.1  C) - 105 - 94 % -   06/09/17 2200 - 100.6  F (38.1  C) - 103 22 98 % -   06/09/17 2130 112/72 100.4  F (38  C) - 102 20 96 % -   06/09/17 2100 127/61 100.4  F (38  C) - 102 20 92 % -   06/09/17 2030 113/56 100.2  F (37.9  C) - 100 - 94 % -   06/09/17 2012 - - - - - 98 % -   06/09/17 2008 - - - 104 24 - -   06/09/17 2000 106/82 100  F (37.8  C) Esophageal 102 22 95 % -   06/09/17 1930 116/59 99.9  F (37.7  C) - 98 - 97 % -   06/09/17 1900 111/57 99.9  F (37.7  C) - 98 - 95 % -   06/09/17 1845 126/59 99.9  F (37.7  C) - 99 20 97 % -   06/09/17 1830 96/54 99.7  F (37.6  C) - 96 - 96 % -   06/09/17 1815 100/56 99.9  F (37.7  C) - 99 - 98 % -   06/09/17 1800 113/57 99.7  F (37.6  C) - 96 23 99 % -   06/09/17 1745 122/64 99.5  F (37.5  C) - 96 20 97 % -   06/09/17 1730 114/59 99.5  F (37.5  C) - 96 20 99 % -   06/09/17 1715 116/61 99.3  F (37.4  C) - 95 20 100 % -   06/09/17 1700 108/57 99.3  F (37.4  C) - 96 - 100 % -   06/09/17 1645 114/58 99.3  F (37.4  C) - 96 - 100 % -   06/09/17 1630 112/56 99.3  F (37.4  C) - 96 20 98 % -   06/09/17 1615 117/62 99.1  F (37.3  C) Esophageal 95 20 90 % -   06/09/17 1600 106/56 99.1  F (37.3  C) - 96 - 97 % -   06/09/17 1545 97/61 99.1  F (37.3  C) - - - - -   06/09/17 1530 115/61 99  F (37.2  C) - 105 - (!) 81 % -   06/09/17 1515 104/55 98.8  F (37.1  C) - 93 - 99 % -   06/09/17 1500 114/56 98.8  F (37.1  C) - 92 20 99 % -   06/09/17 1459 - - - - - 99 % -   06/09/17 1445 102/58 98.6  F (37  C) - 90 20 99 % -   06/09/17 1430 98/53 98.4  F (36.9  C) - 90 - 99 % -   06/09/17 1415 106/59 98.4  F (36.9  C) - 90 - 99 % -   06/09/17 1400 143/75 98.2  F (36.8  C) - 90 20 99 % -   06/09/17 1345 104/60 97.9  F (36.6  C) - 86 20 100 %  -   17 1334 - - - - - 100 % -   17 1330 122/70 97.7  F (36.5  C) - 86 20 100 % -   17 1315 120/68 97.7  F (36.5  C) - 87 20 100 % -   17 1300 124/67 97.5  F (36.4  C) - 87 20 100 % -   17 1245 110/60 97.3  F (36.3  C) - 87 20 100 % -   17 1230 94/59 97.3  F (36.3  C) - 89 20 100 % -   17 1215 154/85 97.3  F (36.3  C) - 95 20 100 % -   17 1200 100/63 97.2  F (36.2  C) Esophageal 87 20 100 % -       Temp:  [97.2  F (36.2  C)-100.6  F (38.1  C)] 100.4  F (38  C)  Heart Rate:  [] 105  Resp:  [20-30] 20  BP: ()/(53-96) 131/68  FiO2 (%):  [40 %-50 %] 40 %  SpO2:  [81 %-100 %] 89 %    Temperatures:  Current - Temp: 100.4  F (38  C); Max - Temp  Av.6  F (37.6  C)  Min: 97.2  F (36.2  C)  Max: 100.6  F (38.1  C)  Respiration range: Resp  Av.4  Min: 20  Max: 30  Pulse range: No Data Recorded  Blood pressure range: Systolic (24hrs), Av , Min:94 , Max:154   ; Diastolic (24hrs), Av, Min:53, Max:96    Pulse oximetry range: SpO2  Av.2 %  Min: 81 %  Max: 100 %    I/O last 3 completed shifts:  In: 5718.61 [I.V.:3599.41]  Out: 843 [Urine:725; Emesis/NG output:75; Drains:3; Stool:40]      Intake/Output Summary (Last 24 hours) at 06/10/17 1155  Last data filed at 06/10/17 1000   Gross per 24 hour   Intake          3609.67 ml   Output             1033 ml   Net          2576.67 ml       Alert, intubated  Lungs with clear ant BS  Cor RRR nl S1 S2 no M  LE no edema       Wt Readings from Last 4 Encounters:   06/10/17 80.6 kg (177 lb 11.1 oz)   12 65.8 kg (145 lb)          Data:          Lab Results   Component Value Date     06/10/2017     2017     2017    Lab Results   Component Value Date    CHLORIDE 113 06/10/2017    CHLORIDE 113 2017    CHLORIDE 109 2017    Lab Results   Component Value Date    BUN 44 06/10/2017    BUN 41 2017    BUN 41 2017      Lab Results   Component Value Date    POTASSIUM  3.1 06/10/2017    POTASSIUM 3.8 06/09/2017    POTASSIUM 3.8 06/09/2017    Lab Results   Component Value Date    CO2 21 06/10/2017    CO2 23 06/09/2017    CO2 26 06/09/2017    Lab Results   Component Value Date    CR 1.46 06/10/2017    CR 1.45 06/09/2017    CR 1.55 06/09/2017        Recent Labs   Lab Test  06/10/17   0500  06/09/17   1405  06/09/17   0825  06/09/17   0500   WBC  19.4*   --   17.8*  22.6*   HGB  7.9*  7.7*  7.0*  8.1*   HCT  23.0*  22.8*  21.1*  24.3*   MCV  85   --   89  89   PLT  173   --   155  172     Recent Labs   Lab Test  06/10/17   0500  06/09/17   0825  06/08/17   0500   AST  17  15  29   ALT  11  11  16   ALKPHOS  107  70  65   BILITOTAL  0.3  0.2  0.3       Recent Labs   Lab Test  06/10/17   0500  06/09/17   0500  06/08/17   0500   MAG  1.8  2.1  2.0     Recent Labs   Lab Test  06/10/17   0500  06/09/17   0500  06/08/17   0500   PHOS  1.9*  4.5  4.4     Recent Labs   Lab Test  06/10/17   0500  06/09/17   0825  06/09/17   0500   CARYL  7.5*  6.9*  7.6*       Lab Results   Component Value Date    CARYL 7.5 (L) 06/10/2017     Lab Results   Component Value Date    WBC 19.4 (H) 06/10/2017    HGB 7.9 (L) 06/10/2017    HCT 23.0 (L) 06/10/2017    MCV 85 06/10/2017     06/10/2017     Lab Results   Component Value Date     06/10/2017    POTASSIUM 3.1 (L) 06/10/2017    CHLORIDE 113 (H) 06/10/2017    CO2 21 06/10/2017     (H) 06/10/2017     Lab Results   Component Value Date    BUN 44 (H) 06/10/2017    CR 1.46 (H) 06/10/2017     Lab Results   Component Value Date    MAG 1.8 06/10/2017     Lab Results   Component Value Date    PHOS 1.9 (L) 06/10/2017       Creatinine   Date Value Ref Range Status   06/10/2017 1.46 (H) 0.52 - 1.04 mg/dL Final   06/09/2017 1.45 (H) 0.52 - 1.04 mg/dL Final   06/09/2017 1.55 (H) 0.52 - 1.04 mg/dL Final   06/08/2017 1.39 (H) 0.52 - 1.04 mg/dL Final   06/07/2017 1.57 (H) 0.52 - 1.04 mg/dL Final   06/06/2017 2.01 (H) 0.52 - 1.04 mg/dL Final        Attestation:  I have reviewed today's vital signs, notes, medications, labs and imaging.     Eric Kirkpatrick MD

## 2017-06-11 NOTE — PROGRESS NOTES
Surgery  Patient doing tolerably well.  Still ventilated.  Moderate NG tube outputs, minimal FUNMI output.  Abdomen soft, incisions are fine.  Recommend continuing to work on vent management.  I think tomorrow we could either perform upper GI with contrast (through the NG) or simply start some trickle tube feeds through the NG.  Norberto Buckley M.D.  [facility]  [phone]

## 2017-06-11 NOTE — PROGRESS NOTES
Assessment and Plan:   WOODY: labs show improved Cr over last 3 days, 1.46 > > 1.19. K is 3.5 with HCO3 21. Mg 1.6 and phos 1.7, both low. Yesterday FENa 0.5, Isiah 27. UO yest 1135. Last 3 days weight is up 0.8 kg.   Will replace Mg. Phos protocol in place. Give lasix 40 mg IV bid for gentle diuresis.            Interval History:   Septic shock: BP now stable.   Resp failure/COPD/mech vent  S/P lap alexandre and repair of perforated gastric ulcer.   Anemia:         TPN                 Review of Systems:   Intubated.           Medications:       sodium chloride 0.9%  250 mL Intravenous Once     sodium chloride 0.9%  250 mL Intravenous Once     vancomycin place alvarez - receiving intermittent dosing  1 each Does not apply See Admin Instructions     sodium chloride 0.9%  2,000 mL Intravenous Once     micafungin  100 mg Intravenous Q24H     sodium chloride 0.9%  500 mL Intravenous Once     chlorhexidine  15 mL Swish & Spit BID     ertapenem (INVanz) IV  1 g Intravenous Q24H     insulin aspart  1-12 Units Subcutaneous Q4H     lipids  250 mL Intravenous Q24H     vitamin  B-1  200 mg Oral Daily     acetaminophen  1,000 mg Oral Q8H    Or     acetaminophen  650 mg Rectal Q8H     pantoprazole  40 mg Intravenous BID     folic acid  1 mg Oral Once     heparin  5,000 Units Subcutaneous Q8H     budesonide  1 mg Nebulization BID     sodium chloride (PF)  10 mL Intracatheter Q8H     arformoterol  15 mcg Nebulization BID     senna-docusate  1-2 tablet Oral BID     sertraline  50 mg Oral Daily     simvastatin  10 mg Oral QAM       dexmedetomidine 0.4 mcg/kg/hr (06/11/17 0930)     parenteral nutrition - ADULT compounded formula       parenteral nutrition - ADULT compounded formula 45 mL/hr at 06/10/17 2024     - MEDICATION INSTRUCTIONS -       NaCl 25 mL/hr at 06/11/17 1105     norepinephrine Stopped (06/09/17 6995)     IV fluid REPLACEMENT ONLY Stopped (06/08/17 0450)     Current active medications and PTA medications reviewed, see  medication list for details.            Physical Exam:   Vitals were reviewed  Patient Vitals for the past 24 hrs:   BP Temp Temp src Heart Rate Resp SpO2 Weight   06/11/17 1100 126/88 100  F (37.8  C) - 104 28 98 % -   06/11/17 1030 94/59 100  F (37.8  C) - 86 20 (!) 89 % -   06/11/17 1000 107/59 100.2  F (37.9  C) - 96 22 94 % -   06/11/17 0930 126/77 100.2  F (37.9  C) - 108 24 93 % -   06/11/17 0900 115/61 100.4  F (38  C) - 110 20 98 % -   06/11/17 0840 (!) 161/102 100.9  F (38.3  C) - 130 (!) 34 92 % -   06/11/17 0830 (!) 170/95 100.8  F (38.2  C) - 115 (!) 32 97 % -   06/11/17 0827 134/82 100.8  F (38.2  C) - 111 - 95 % -   06/11/17 0800 160/79 100.8  F (38.2  C) Esophageal 113 24 94 % -   06/11/17 0630 140/68 100.8  F (38.2  C) - 100 - 98 % -   06/11/17 0600 133/67 100.8  F (38.2  C) - 98 - 99 % -   06/11/17 0530 136/73 101.1  F (38.4  C) - 105 - 99 % 81.3 kg (179 lb 3.7 oz)   06/11/17 0500 164/80 101.3  F (38.5  C) - 122 - 97 % -   06/11/17 0430 150/78 101.3  F (38.5  C) - 100 - 98 % -   06/11/17 0400 157/79 101.1  F (38.4  C) - 101 - 98 % -   06/11/17 0359 - - - 102 - 98 % -   06/11/17 0330 135/84 100.9  F (38.3  C) - 102 - 97 % -   06/11/17 0300 153/70 100.9  F (38.3  C) - 105 - 98 % -   06/11/17 0230 144/65 100.9  F (38.3  C) - 101 - 98 % -   06/11/17 0200 144/73 100.8  F (38.2  C) - 104 - 98 % -   06/11/17 0130 134/71 100.8  F (38.2  C) - 104 - 98 % -   06/11/17 0100 138/69 100.8  F (38.2  C) - 105 - 97 % -   06/11/17 0030 133/67 101.1  F (38.4  C) - 101 - 97 % -   06/11/17 0000 131/73 101.1  F (38.4  C) - 104 - 97 % -   06/10/17 2339 - - - - - 98 % -   06/10/17 2330 134/66 100.9  F (38.3  C) - 104 - 97 % -   06/10/17 2300 129/65 100.8  F (38.2  C) - 104 - 92 % -   06/10/17 2230 110/82 100.8  F (38.2  C) - 99 - 94 % -   06/10/17 2200 126/60 100.6  F (38.1  C) - 101 - 98 % -   06/10/17 2130 116/61 100.2  F (37.9  C) - 100 - 100 % -   06/10/17 2100 106/61 100.2  F (37.9  C) - 98 - 100 % -   06/10/17 2030  125/67 100.6  F (38.1  C) - 100 - 99 % -   06/10/17 2021 - - - - - 98 % -   06/10/17 2015 - - - - - 98 % -   06/10/17 2000 115/59 100.2  F (37.9  C) - 98 - 99 % -   06/10/17 1930 117/59 100.4  F (38  C) - 101 - 99 % -   06/10/17 1900 117/61 100.6  F (38.1  C) - 103 - 98 % -   06/10/17 1830 114/63 100.8  F (38.2  C) - 105 24 98 % -   06/10/17 1800 122/62 100.8  F (38.2  C) Esophageal 120 22 97 % -   06/10/17 1730 120/58 100.8  F (38.2  C) - 112 24 97 % -   06/10/17 1724 - - - 106 24 96 % -   06/10/17 1700 131/72 100.9  F (38.3  C) - 104 20 98 % -   06/10/17 1630 116/82 100.8  F (38.2  C) - 101 22 93 % -   06/10/17 1600 143/71 100.4  F (38  C) Esophageal 101 23 95 % -   06/10/17 1542 - - - - - 97 % -   06/10/17 1530 124/66 100.6  F (38.1  C) - 103 22 96 % -   06/10/17 1500 158/78 100.4  F (38  C) - 101 28 98 % -   06/10/17 1430 118/65 100  F (37.8  C) - 96 20 100 % -   06/10/17 1400 133/69 99.9  F (37.7  C) Esophageal 100 20 100 % -   06/10/17 1330 142/70 100.6  F (38.1  C) - 105 24 100 % -   06/10/17 1300 127/67 100.4  F (38  C) - 102 - 99 % -   06/10/17 1230 126/67 100.4  F (38  C) - 104 - 99 % -   06/10/17 1200 (!) 139/119 100.6  F (38.1  C) Esophageal 105 22 98 % -   06/10/17 1130 131/68 100.4  F (38  C) - 105 20 - -       Temp:  [99.9  F (37.7  C)-101.3  F (38.5  C)] 100  F (37.8  C)  Heart Rate:  [] 104  Resp:  [20-34] 28  BP: ()/() 126/88  FiO2 (%):  [40 %] 40 %  SpO2:  [89 %-100 %] 98 %    Temperatures:  Current - Temp: 100  F (37.8  C); Max - Temp  Av.6  F (38.1  C)  Min: 99.9  F (37.7  C)  Max: 101.3  F (38.5  C)  Respiration range: Resp  Av.6  Min: 20  Max: 34  Pulse range: No Data Recorded  Blood pressure range: Systolic (24hrs), Av , Min:94 , Max:170   ; Diastolic (24hrs), Av, Min:58, Max:119    Pulse oximetry range: SpO2  Av.1 %  Min: 89 %  Max: 100 %    I/O last 3 completed shifts:  In: 3593.28 [I.V.:2190]  Out: 1548 [Urine:1195; Emesis/NG output:250; Drains:3;  Stool:100]      Intake/Output Summary (Last 24 hours) at 06/11/17 1129  Last data filed at 06/11/17 1000   Gross per 24 hour   Intake          3842.57 ml   Output             1923 ml   Net          1919.57 ml       Eyes open, intubated, NG to drainage  Lungs with clear ant BS  Cor RRR nl S1 S2 no M  LE boots on, no edema       Wt Readings from Last 4 Encounters:   06/11/17 81.3 kg (179 lb 3.7 oz)   06/04/12 65.8 kg (145 lb)          Data:          Lab Results   Component Value Date     06/11/2017     06/10/2017     06/10/2017    Lab Results   Component Value Date    CHLORIDE 115 06/11/2017    CHLORIDE 113 06/10/2017    CHLORIDE 113 06/09/2017    Lab Results   Component Value Date    BUN 40 06/11/2017    BUN 44 06/10/2017    BUN 41 06/09/2017      Lab Results   Component Value Date    POTASSIUM 3.5 06/11/2017    POTASSIUM 3.1 06/10/2017    POTASSIUM 3.8 06/09/2017    Lab Results   Component Value Date    CO2 21 06/11/2017    CO2 21 06/10/2017    CO2 23 06/09/2017    Lab Results   Component Value Date    CR 1.19 06/11/2017    CR 1.37 06/10/2017    CR 1.46 06/10/2017        Recent Labs   Lab Test  06/11/17   0515  06/10/17   0500  06/09/17   1405  06/09/17   0825   WBC  18.6*  19.4*   --   17.8*   HGB  7.8*  7.9*  7.7*  7.0*   HCT  22.9*  23.0*  22.8*  21.1*   MCV  85  85   --   89   PLT  194  173   --   155     Recent Labs   Lab Test  06/11/17   0515  06/10/17   0500  06/09/17   0825   AST  19  17  15   ALT  11  11  11   ALKPHOS  111  107  70   BILITOTAL  0.3  0.3  0.2       Recent Labs   Lab Test  06/11/17   0515  06/10/17   0500  06/09/17   0500   MAG  1.6  1.8  2.1     Recent Labs   Lab Test  06/11/17   0515  06/10/17   0500  06/09/17   0500   PHOS  1.7*  1.9*  4.5     Recent Labs   Lab Test  06/11/17   0515  06/10/17   0500  06/09/17   0825   CARYL  7.7*  7.5*  6.9*       Lab Results   Component Value Date    CARYL 7.7 (L) 06/11/2017     Lab Results   Component Value Date    WBC 18.6 (H) 06/11/2017     HGB 7.8 (L) 06/11/2017    HCT 22.9 (L) 06/11/2017    MCV 85 06/11/2017     06/11/2017     Lab Results   Component Value Date     (H) 06/11/2017    POTASSIUM 3.5 06/11/2017    CHLORIDE 115 (H) 06/11/2017    CO2 21 06/11/2017     (H) 06/11/2017     Lab Results   Component Value Date    BUN 40 (H) 06/11/2017    CR 1.19 (H) 06/11/2017     Lab Results   Component Value Date    MAG 1.6 06/11/2017     Lab Results   Component Value Date    PHOS 1.7 (L) 06/11/2017       Creatinine   Date Value Ref Range Status   06/11/2017 1.19 (H) 0.52 - 1.04 mg/dL Final   06/10/2017 1.37 (H) 0.52 - 1.04 mg/dL Final   06/10/2017 1.46 (H) 0.52 - 1.04 mg/dL Final   06/09/2017 1.45 (H) 0.52 - 1.04 mg/dL Final   06/09/2017 1.55 (H) 0.52 - 1.04 mg/dL Final   06/08/2017 1.39 (H) 0.52 - 1.04 mg/dL Final       Attestation:  I have reviewed today's vital signs, notes, medications, labs and imaging.     Eric Kirkpatrick MD

## 2017-06-11 NOTE — PHARMACY-CONSULT NOTE
TPN formula evaluated based on today s labs results.    The following changes have been made:    Sodium:  decreased  to 30 mEq  .  Phosphorus: added  15 mMol/day  Chloride:Acetate ratio: changed to 1:2    Pharmacy will continue to follow and adjust as appropriate.

## 2017-06-11 NOTE — PROGRESS NOTES
Date of Admission: 6/2/2017  Date of Intubation (most recent): 6/9/2017  Reason for Mechanical Ventilation: Hypercapnic respiratory failure  Number of Days on Mechanical Ventilation: 3  Met Criteria for Pressure Support Trial: Yes  Length of Pressure Support Trial: PS 5/5 40% > 80 minutes  Reason for Stopping Pressure Support Trial: Conditioning     Significant Events Today: None  Ventilation Mode: CMV/AC  FiO2 (%): 40 %  Rate Set (breaths/minute): 20 breaths/min  Tidal Volume Set (mL): 500 mL  PEEP (cm H2O): 5 cmH2O  Pressure Support (cm H2O): 5 cmH2O  Oxygen Concentration (%): 40 %  Resp: 22     ABG Results: No ABG result for today     ETT appearance on chest x-ray: ET tube in good position     Plan: continue daily assessment for weaning and possible extubation.

## 2017-06-11 NOTE — PROGRESS NOTES
"  Daily ICU Progress Note       Date of service: 06/11/2017  Admission Date:  6/2/2017    Active Problem List:   -->  COPD  -->  Sepsis  -->  Possible PNA  -->  UTI   -->  Status post cholecystectomy, laparoscopic  -->  Gastric ulcer with perforation status post omental patch  -->  Acute hypercapneic respiratory failure, vented since 6/9    24 Hour Events:  Transferred to the ICU 6/9 due to hypotension and resp failure requiring mech ventilation. Off pressors since night before, started on precedex 6/10, volume up, on TPN, on PST twice daily down to 5/5 and doing well    ROS:  Unable to obtain 2/2 critical illness.     Objective:     BP 94/59  Pulse 97  Temp 100  F (37.8  C)  Resp 20  Ht 1.702 m (5' 7\")  Wt 81.3 kg (179 lb 3.7 oz)  SpO2 (!) 87%  BMI 28.07 kg/m2    Intake/Output Summary (Last 24 hours) at 06/11/17 1057  Last data filed at 06/11/17 1000   Gross per 24 hour   Intake          3842.57 ml   Output             1923 ml   Net          1919.57 ml     Net since admission 6/2 +23 L    Ventilation Mode: CMV/AC  FiO2 (%): 40 %  Rate Set (breaths/minute): 20 breaths/min  Tidal Volume Set (mL): 500 mL  PEEP (cm H2O): 5 cmH2O  Pressure Support (cm H2O): 5 cmH2O  Oxygen Concentration (%): 40 %  Resp: 20    General: Adult female, sedated  HEENT: NCAT; EOMI; no icterus; extremely dry mucous membranes  Neck: No LAD  Pulm: Coarse BS bilaterally  CV: RRR, no m/g  Abdomen: Soft abdomen, multiple areas of ecchymosis, FUNMI drain in place  : Crawford  Extremities: + edema  Skin: Warm to touch  Neuro: sedated    Pertinent Labs:       Recent Labs  Lab 06/11/17  0515 06/10/17  0500 06/09/17  1405 06/09/17  0825   WBC 18.6* 19.4*  --  17.8*   RBC 2.70* 2.70*  --  2.37*   HGB 7.8* 7.9* 7.7* 7.0*   HCT 22.9* 23.0* 22.8* 21.1*    173  --  155       Recent Labs  Lab 06/11/17  0515 06/10/17  1245 06/10/17  0500 06/09/17  0825   * 144 143 142   POTASSIUM 3.5  --  3.1* 3.8   CHLORIDE 115*  --  113* 113*   CO2 21  --  21 " 23   BUN 40*  --  44* 41*   CR 1.19* 1.37* 1.46* 1.45*   *  --  129* 184*  Canceled, Test credited Duplicate requestCORRECTED ON 06/09 AT 0904: PREVIOUSLY REPORTED    CARYL 7.7*  --  7.5* 6.9*       Recent Labs  Lab 06/09/17  0947 06/09/17  0545 06/06/17  1739 06/05/17  1325 06/05/17  0903   PH 7.29* 7.05* 7.41 7.29* 7.33*   PCO2 43 89* 37 46* 39   PO2 305* 85 97 118* 114*   HCO3 21 24 23 22 21   O2PER 100  --   --  70 10L       Recent Labs  Lab 06/11/17  0739 06/11/17  0515 06/11/17  0418 06/11/17  0038 06/10/17  2052 06/10/17  1611 06/10/17  1212  06/10/17  0500  06/09/17  0825  06/09/17  0500  06/08/17  0500  06/07/17  0504   GLC  --  125*  --   --   --   --   --   --  129*  --  184*  Canceled, Test credited Duplicate requestCORRECTED ON 06/09 AT 0904: PREVIOUSLY REPORTED   --  175*  --  132*  --  104*   *  --  105* 94 111* 102* 105*  < >  --   < >  --   < >  --   < >  --   < >  --    < > = values in this interval not displayed.    Procal (6/4) 22    Microbiology / Cultures:   Abdominal fluid culture with heavy growth of Candida albicans/dublinienesis    Imaging:  Imaging was reviewed by me personally.  Pertinent positives noted below.   CXR 6/10 - Bilateral effusions and pulm edema    Assessment and Plan:   This is a 71-year-old female who was initially admitted for abdominal pain status post laparoscopic expiratory laparotomy and cholecystectomy with subsequent respiratory failure requiring intubation and taken back to the operating room and found to have a gastric perforation.  This was repaired.  Subsequently extubated without incident.  She had anuric renal failure with improving urine output yesterday.  Overnight she had multiple RRT was called for hypotensive episodes and worsening respiratory status.  She was found to have a pCO2 of almost 90 and blood pressures 60/40.  Transferred to ICU where she received fluid bolus and intubation for severe acidosis.  At this point I would  recommend broadening her antimicrobial coverage given the heavy growth of yeast in the abdominal fluid were changed to micafungin and given her recent procedures including abdominal surgery, paracentesis I would add vancomycin for gram-positive coverage.  If this is new or worsening sepsis she needs more aggressive fluid resuscitation 50 g of albumin and 1 L of fluid upon arrival to the ICU.  Recommend reculture with sputum, blood, FUNMI drain, urine.    Neuro:   1.  Depression / anxiety - patient is on Zoloft and trazodone at home.    --> Continue baseline sertraline and trazodone  2.  Analgesia/sedation -  --> Precedex and p.r.n. Versed and fentanyl    Pulm:   1. COPD - she does have underlying COPD with very mild obstruction and a mild reduction in diffusion capacity.  Plan to continue her current outpatient regimen.  No indication for steroids.  2.  Bilateral effusion - slightly worse  --> Diuresis  3. Acute hypercapnic respiratory failure - full vent support  --> Daily PST    CV:  1.  Hypotension / Sepsis - off pressors. Normal lactic acid. she had some rhonchorous breath sounds upon arrival at time of intubation concerning for possible pneumonia given the stability of her effusions on chest x-ray less likely the underlying source.  Given the known intra-abdominal contamination would be concerned about worsening fungal infection and given previous invasive procedures will add greater gram-positive coverage.    --> Added vancomycin, discontinued Diflucan, started micafungin, continue ertapenem.    --> Repeat cultures.    Renal:   1.  Acute renal failure - Patient with acute anuric renal failure with improved urine output now.  She may be third spacing secondary to her recent abdominal surgery as well as worsening sepsis.  Hypervolemia.  --> Diuresis, renal following    ID:  1.  Sepsis - see above, extended coverage, procal (6/4)  22 --> (6/10) 1.56  --> Trend procal, monitor cultures    GI:   1.  Gastric  perforation - patient is currently n.p.o.  Nasogastric tube in place and low intermittent suction.  Surgery noted that this should not be repositioned except for under fluoroscopic guidance.  It was recommended patient have a Gastrografin swallow study on 6/9/2017 which is on hold.  She will need a repeat endoscopy in 6-8 weeks from 6/7/2017.  She is on twice a day PPI.  No free air under the diaphragm. TPN  --> Per surgery    Heme / Onc:   1.  Anemia - stable.  --> Monitor counts    Endocrine:   1. Glc - glc controlled. On tpn    Musculoskeletal:   1.  Deconditioning - not appropriate for pt/ot at this time    Nutrition:   1. NPO   --> TPN    Prophylaxis:  DVT Prophylaxis:  SQ heparin  GI Prophylaxis: PPI tx dose  PTOT:  ordered  Restraints: UE restraints needed    Disposition: MICU    Critical Care Time:  35 min    Abel Smith MD

## 2017-06-11 NOTE — PHARMACY-VANCOMYCIN DOSING SERVICE
Pharmacy Vancomycin Note  Date of Service 2017  Patient's  1945   71 year old, female    Indication: Bacteremia  Goal Trough Level: 15-20 mg/L  Day of Therapy: 3  Current Vancomycin regimen:  Intermittent dosing    Current estimated CrCl = Estimated Creatinine Clearance: 47.6 mL/min (based on Cr of 1.19).    Creatinine for last 3 days  2017:  5:00 AM Creatinine 1.55 mg/dL;  8:25 AM Creatinine 1.45 mg/dL  6/10/2017:  5:00 AM Creatinine 1.46 mg/dL; 12:45 PM Creatinine 1.37 mg/dL  2017:  5:15 AM Creatinine 1.19 mg/dL    Recent Vancomycin Levels (past 3 days)  6/10/2017: 11:15 AM Vancomycin Level 16.8 mg/L  2017:  1:23 PM Vancomycin Level 19.4 mg/L    Vancomycin IV Administrations (past 72 hours)                   vancomycin (VANCOCIN) 1500 mg in 0.9% NaCl 250 mL PREMIX (mg) 1,500 mg New Bag 06/10/17 1217                Nephrotoxins and other renal medications (Future)    Start     Dose/Rate Route Frequency Ordered Stop    17 1530  vancomycin 1250 mg in 0.9% NaCl 250 mL PREMIX      1,250 mg Intravenous ONCE 17 1516      17 1200  furosemide (LASIX) injection 40 mg      40 mg Intravenous 2 TIMES DAILY. 17 1147      17 0802  vancomycin place alvarez - receiving intermittent dosing      1 each Does not apply SEE ADMIN INSTRUCTIONS 17 0802      17 0800  norepinephrine (LEVOPHED) 16 mg in D5W 250 mL infusion      0.03-0.4 mcg/kg/min × 79.4 kg  2.2-29.8 mL/hr  Intravenous CONTINUOUS 17 0747               Contrast Orders - past 72 hours     None          Interpretation of levels and current regimen:  Trough level is  Therapeutic - appropriate for redosing    Has serum creatinine changed > 50% in last 72 hours: No    Urine output:  good urine output    Renal Function: Stable    Plan:  1.  dose 1250 mg x 1  2.  Pharmacy will check trough levels as appropriate in 1-3 Days.    3. Serum creatinine levels will be ordered daily for the first week of therapy  and at least twice weekly for subsequent weeks.      Jadyn Shirley        .

## 2017-06-11 NOTE — PLAN OF CARE
Problem: Goal Outcome Summary  Goal: Goal Outcome Summary  OT/PT: Attempted OT eval; however, pt intubated and not appropriate to engage in OT/PT. Will discontinue OT order at this time and continue to monitor status from PT standpoint.

## 2017-06-11 NOTE — PLAN OF CARE
Problem: Individualization  Goal: Patient Preferences  Neuro: comfortable now, prn versed and fentanyl, was on precedex this am for anxiety for PS trial but now off, WORTHY to command, PERRl  CV: SR with 1AVB- ST, occassional PAC noted. BP within parameter  Pulm: PS trial done for 1 hour this morning, plan to reassess tomorrow for possible extubation  GI/: NG to decopression, flexiseal in place with smalll to medium output. Crawford with adequate output, 40mg IV lasix BID ordered by renal.   Skin: bruises, edema.   Lines: PICC triple lumen HERVE  Restraints: Continued to maintain patency of necessary lines and ET tube.   Plan: Rest overnight on vent. Labs and Xray ordered for tomorrow. PS trial tomorrow and possible extubation

## 2017-06-11 NOTE — PLAN OF CARE
Problem: Goal Outcome Summary  Goal: Goal Outcome Summary  Outcome: No Change  Follows commands; sedated w/prn fentanyl & versed. Tolerating vent at 40% FiO2. Lungs intermittently coarse. Tele shows ST. BP improved off pressors. NG to LIS w/ green dng. Faint bowel sounds. Dark green stool per rectal tube. Blood cx remain NTD. UO adequate per tanner. T-max 38.6. Phos replaced per protocol.

## 2017-06-12 NOTE — PROGRESS NOTES
Renal Medicine Progress Note                                Ara Stanley MRN# [id number]   Age: 71 year old YOB: 1945   Date of Admission: 6/2/2017 Hospital LOS: 10                  Assessment/Plan:     Admitted with abdominal pain.  Diagnostic lap, lap alexandre and abdominal lavage 06/02/17.  Repair of perforated gastric ulcer 06/06/17    Seen for evaluation ARF    1.  Question baseline CKD   -creatinine unclear; suspected in 1.0 range   -dipstick positive albuminuria  2.  ARF   -nonoliguric   -ATN   -hypotension/IV contrast  3.  Increased volume  4.  Hypercapnic respiratory failure      Continue diuretic as tolerated  Attempt to limit fluid in  Goal IO negative    Follow labs  Avoid nephrotoxins         Interval History:     Vented.  Follows.  UO reviewed.  Creatinine improving.      ROS:     Intubated and sedated: ROS unable    Medications and Allergies:     Reviewed    Physical Exam:     Vitals were reviewed  Patient Vitals for the past 8 hrs:   BP Temp Temp src Heart Rate Resp SpO2   06/12/17 0900 (!) 133/92 100.6  F (38.1  C) Esophageal 97 30 -   06/12/17 0800 140/61 100.9  F (38.3  C) Esophageal 90 24 99 %   06/12/17 0754 - - - - - 96 %   06/12/17 0730 159/71 100.8  F (38.2  C) Esophageal 98 24 99 %   06/12/17 0700 129/63 100.8  F (38.2  C) - 93 - 96 %   06/12/17 0630 139/71 100.8  F (38.2  C) - 91 - 95 %   06/12/17 0600 157/78 100.8  F (38.2  C) - 98 - 99 %   06/12/17 0530 158/76 100.9  F (38.3  C) - 96 - 96 %   06/12/17 0500 145/84 100.8  F (38.2  C) - 95 - 95 %   06/12/17 0430 127/63 100.6  F (38.1  C) - 93 - 95 %   06/12/17 0405 - - - - - 96 %   06/12/17 0400 121/59 100.4  F (38  C) - 96 - 96 %   06/12/17 0330 (!) 157/139 100.9  F (38.3  C) - 101 - 95 %   06/12/17 0300 151/73 100.8  F (38.2  C) - 104 - 96 %   06/12/17 0230 112/62 100.6  F (38.1  C) - 98 - 97 %     I/O last 3 completed shifts:  In: 2969.91 [I.V.:1644.81]  Out: 3927 [Urine:3075; Emesis/NG output:400; Drains:2;  Stool:450]    Vitals:    06/08/17 0612 06/09/17 0400 06/09/17 0835 06/10/17 0559   Weight: 80.8 kg (178 lb 2.1 oz) 79.4 kg (175 lb 0.7 oz) 80.5 kg (177 lb 7.5 oz) 80.6 kg (177 lb 11.1 oz)    06/11/17 0530   Weight: 81.3 kg (179 lb 3.7 oz)       GENERAL:  intubated but follows  HEENT: ETT  RESP:  clear anteriorly  CV: RRR, normal S1 S2  ABDOMEN: soft, nontender, no HSM or masses and bowel sounds normal  :  tanner catheter  MS: no clubbing, cyanosis   SKIN: clear without significant rashes or lesions  EXT: 1 plus    Data:       Recent Labs  Lab 06/12/17  0555 06/11/17  0515 06/10/17  1245 06/10/17  0500 06/09/17  0825    145* 144 143 142   POTASSIUM 3.0* 3.5  --  3.1* 3.8   CHLORIDE 114* 115*  --  113* 113*   CO2 21 21  --  21 23   ANIONGAP 9 9  --  9 6   * 125*  --  129* 184*  Canceled, Test credited Duplicate requestCORRECTED ON 06/09 AT 0904: PREVIOUSLY REPORTED    BUN 42* 40*  --  44* 41*   CR 1.11* 1.19* 1.37* 1.46* 1.45*   GFRESTIMATED 48* 45* 38* 35* 36*   GFRESTBLACK 59* 54* 46* 43* 43*   CARYL 7.6* 7.7*  --  7.5* 6.9*         Recent Labs   Lab Test  06/12/17   0555  06/11/17   0515  06/10/17   1245  06/10/17   0500  06/09/17   0825  06/09/17   0500  06/08/17   0500  06/07/17   0504  06/06/17   0411  06/05/17   0303   CR  1.11*  1.19*  1.37*  1.46*  1.45*  1.55*  1.39*  1.57*  2.01*  2.31*       Recent Labs  Lab 06/12/17  0555 06/11/17  1621 06/11/17  0515 06/10/17  0500 06/09/17  1405   PHOS 2.5 2.5 1.7* 1.9*  --    HGB 7.6*  --  7.8* 7.9* 7.7*         [facility]phrology  [phone]

## 2017-06-12 NOTE — PHARMACY
TPN formula evaluated based on today s labs results.    The following changes have been made:  Potassium: increased to 40 .  Magnesium: increased to 7 .    Pharmacy will continue to follow and adjust as appropriate.

## 2017-06-12 NOTE — PLAN OF CARE
Problem: Goal Outcome Summary  Goal: Goal Outcome Summary  Outcome: Declining  EPIC downtime 8641-3514 today. Pt extubated at 1130 this morning and placed on aerosol mask. Pt become more labored and less responsive this afternoon; reintubated at 1530. Pt remains on full vent support. SBP low post intubation; given NS bolus and albumin with improvement. Fentanyl and versed prn. Family updated on plan of care. Will continue to monitor.

## 2017-06-12 NOTE — PLAN OF CARE
Problem: Goal Outcome Summary  Goal: Goal Outcome Summary  Outcome: No Change  Patient resting intermittently since 1900. Grimacing, guarding with movement. Fentanyl x1, Versed x1 given (see MAR). Tolerating ventilator. Minimal secretions from ET tube, despite coarse lung sounds. Marginal response to IV Lasix. Continued moderate green output from both NG tube and Flexiseal. Plan for vent wean in AM to reassess extubation readiness.

## 2017-06-12 NOTE — PROGRESS NOTES
Surgery Progress Note    Pt improving. Nodding yes/no appropriately. Moderate bilious NGT output. Minimal from FUNMI drain. Oxygen requirements up slightly overnight. Low grade fevers.   Abd: soft, incisions c/d/i, no output in FUNMI    A/P: Plan for upper GI with gastrograffin via NGT this morning. If negative for leak - plan to start NG tube feeds today and ok for clears when able to be extubated from a surgical standpoint.   Appreciate Intensivist, Nephrology cares    Estelita Peck MD  [facility]  [phone]

## 2017-06-12 NOTE — PLAN OF CARE
Problem: Goal Outcome Summary  Goal: Goal Outcome Summary  Outcome: No Change  Neuro: PERRL, WORTHY to command, anxious/restless at times tx w/ prn fentanyl & versed  Pulm: tolerating vent, FiO2 increased to 50% for pO2 76. Lungs coarse, mod thick secretions per ETT  CV: SR/ST w/occ PAC, BP stable  GI/: hypoactive BS, continued green dng per NG & flexiseal. UO improved w/IV lasix  Skin/CMS: increased generalized edema/3rd spacing. Serous dng oozing from abd lab sites  Plan for PS trial and possible extubation today.

## 2017-06-13 NOTE — PLAN OF CARE
Problem: Goal Outcome Summary  Goal: Goal Outcome Summary  PT: Pt to have trach placed this afternoon. Not appropriate at this time for PT intervention.

## 2017-06-13 NOTE — DOWNTIME EVENT NOTE
The EMR was down for 5.25 hours on 6/12/2017.    Ting Craig/RN was responsible for completing the paper charting during this time period. 2    The following information was re-entered into the system by Ana Maddox: Orders    The following information will remain in the paper chart: Flowsheet, Vital signs, I/O.    Ana Maddox  6/12/2017

## 2017-06-13 NOTE — PROGRESS NOTES
"Surgery Progress Note    Pt reintubated yesterday due to hypercarbia and somnolence, not protecting airway. Today alert, asking for water. Afebrile. WBC improving in micafungin and ertapenem. She is having serous drainage from her left abdominal port site. Ostomy appliance placed.     O: BP 98/58  Pulse 93  Temp 98.6  F (37  C)  Resp 20  Ht 5' 7\" (1.702 m)  Wt 179 lb 3.7 oz (81.3 kg)  SpO2 100%  BMI 28.07 kg/m2  Abd: soft, left abdominal incision with ostomy appliance covering with serous drainage in bag. No surrounding erythema or induration. FUNMI in place with no output.     A/P: Unclear etiology of respiratory failure. Agree with plan for tracheostomy. Plan for CT chest today - would also obtain abd/pelvis to evaluate for any undrained fluid collection. If stable/improving - will remove FUNMI drain. Ok to slowly advance tube feeds to goal and transition off TPN. No evidence of contrast extravasation on imaging yesterday.     Estelita Peck MD  [facility]  [phone]      "

## 2017-06-13 NOTE — PROGRESS NOTES
"Call received from Alejandro birch's Son who lives in [facility].  Alejandro states that \"I was just updated by phone from my sister Valeria who lives in [place] that my mother has been in the hospital for the past 11 days.\"  Alejandro further states that his conversation with his sister was the first call in 10 to 12 years.  Alejandro further states that he has not spoken to his mother in over 3 years.  But despite family dynamics, both himself and his sister and other brother Lane are going to be \"working together to work together for their mother\".  This RN had updated Valeria around 8 p.m. And during that conversation, Valeria, who has been acting as POA stated that she had spoken with her family and they \"agree to go ahead with the tracheostomy.\"  I discussed that the timing of that procedure and the obtaining of its consent will happen sometime in the future, valeria stated understanding.  I encouraged Alejandro, who is the eldest child to speak with his siblings to determine who of the children would take the role of POA.  At this time, Alejandro stated that it should stay with Valeria so as to not disrupt what is occurring presently.  This RN has updated contact information for Alejandro.    "

## 2017-06-13 NOTE — PROGRESS NOTES
Called POA (daughter) to discuss possible trach. She is in agreement but wants to discuss with other family members before final decision. Will discuss further tomorrow.     Tam Ugalde MD  Pulmonary and Critical Care  [facility]  Pager:  [phone]

## 2017-06-13 NOTE — PLAN OF CARE
Problem: Individualization  Goal: Patient Preferences  Outcome: No Change  Pt tolerating Vent, somewhat anxious, PRN Fentanyl and Versed.  Restarted Precedex drip low dose as pt became hypotensive early a.m.  At times large to copious oral and ETT sec's.  Abdomen is tender to palpation near Lap sites.  LLQ lab site noted to have copious serous drainage, small ostomy bag applied to catch serous drainage.  Daughter Charlene and son Alejandro updated over phone.  Hgb 6.8 this a.m. To get one unit PRBC this morning with recheck Hgb around 1200.  Children agreed to eventual Trach.  Awaiting consult.

## 2017-06-13 NOTE — PROGRESS NOTES
CLINICAL NUTRITION SERVICES - REASSESSMENT NOTE      Future/Additional Recommendations:   Advance EN when medically able. IsoSource 1.5 goal rate @ 45mL/hr. Providing 1620 kcal (26kcal/kg), 73g protein (1.2g/kg), 190g CHO, 35% fat and 821 mL free water  Advancement schedule of: TPN rate of 25mL/hr and EN rate of 20mL/hr. If pt tolerates EN at rate of 20mL/hr for 12 hrs, advance to goal rate of 45mL and d/c TPN.       EVALUATION OF PROGRESS TOWARD GOALS   Diet: NPO  Nutrition Support:   1. TPN at 45 mL/hr with lower CHO amount (270gm).   2. EN: Isosource 1.5 at 10mL/hr (currently on hold for trach placement)    Nutrition Support Parenteral:  Type of Access:  Central line  Parenteral Frequency:  Continuous  Parenteral Regimen:  D25 AA7 at 45 mL/hr + Lipids 250 mL daily  Total Parenteral Provisions:  1722 kcals (27 kcal/kg), 76 gm pro (1.2 gm/kg), 270 gm CHO, 29% fat    Nutrition Support Enteral:  Type of Feeding Tube: NG tube  Enteral Frequency:  Continuous  Enteral Regimen: IsoSource 1.5 10mL/hr  Total Enteral Provisions: 360 kcal, 16 g protein, 42 g CHO, 35% (14g)  Free Water Flush: 30mL Q4hrs    Intake/ tolerance :   TPN + EN providing 2082 kcal (33 kcal/kg), 92g protein (1.5g/kg), 312g CHO, 30% (69g) fat. Meeting 100% of energy and protein needs  Stool 600mL- 6/12  NG out put: 200mL - 6/12    Dosing Weight: 63.4 kg     ASSESSED NUTRITION NEEDS:  Energy Needs: 8403-3299 kcals (25-30 Kcal/Kg) - maintenance, vented  Protein Needs:  63-76 grams protein (1-1.2 g pro/Kg) - post-op, hypercatabolism with acute illness and CKD    NEW FINDINGS:   TF: initiated on 6/12 as trickle feed at 10mL/hr, per MD today continue TF and TPN at current rates   Wt: trending up however last wt on pt from 6/11 of 81.3kg-   6/11: NaCl d/c  6/12: UGI with gastrograffin, no leak  6/12: Extubated and reinutabted   6/12: I/O 1,401/3,827 - pt receiving IV lasix  Na: 145 (H), K 3.1 (L), Cr 1.4 (H), BUN 47 (H)  BG under 150    Previous Goals:    TPN/Lipids will continue to meet % estimated needs.  Evaluation: Met    Previous Nutrition Diagnosis:   No nutrition diagnosis identified at this time   Evaluation: Declining      CURRENT NUTRITION DIAGNOSIS  Inadequate enteral nutrition infusion related to interruptions in TF, reliant on TPN to meet needs and overall medical status as evidenced by currently enteral nutrition being held and current goal rate of EN at 10mL/hr.     INTERVENTIONS  Recommendations / Nutrition Prescription  Advance EN when medically able. IsoSource 1.5 goal rate @ 45mL/hr. Providing 1620 kcal (26kcal/kg), 73g protein (1.2g/kg), 190g CHO, 35% fat and 821 mL free water  Advancement schedule of: TPN rate of 25mL/hr and EN rate of 20mL/hr. If pt tolerates EN at rate of 20mL/hr for 12 hrs, advance to goal rate of 45mL and d/c TPN.    Implementation  Collaboration and Referral of Nutrition care- discussed pt during interdisciplinary rounds    Goals  EN to advance to goal in 2-3 days      MONITORING AND EVALUATION:  Progress towards goals will be monitored and evaluated per protocol and Practice Guidelines      Keely Stanley Clinical Dietitian

## 2017-06-13 NOTE — PROGRESS NOTES
[facility] ICU RESPIRATORY NOTE  Date of Admission: 6/2/2017  Date of Intubation (most recent): 6/12/2017  Reason for Mechanical Ventilation: Airway protection  Number of Days on Mechanical Ventilation: 2 (Reintubated 6/12/2017)  Met Criteria for Pressure Support Trial: No  Length of Pressure Support Trial: None  Reason for No Pressure Support Trial: Per MD    Significant Events Today: CT, Pt trach today  Ventilation Mode: CMV/AC  FiO2 (%): 50 %  Rate Set (breaths/minute): 20 breaths/min  Tidal Volume Set (mL): 500 mL  PEEP (cm H2O): 5 cmH2O  Pressure Support (cm H2O): 5 cmH2O  Oxygen Concentration (%): 50 %  Resp: 21    ABG Results:     Recent Labs  Lab 06/13/17  0415 06/12/17  1620 06/12/17  1509 06/12/17  0512 06/09/17  0947   PH  --  7.28* 7.02* 7.39 7.29*   PCO2  --  44 89* 35 43   PO2  --  126* 69* 76* 305*   HCO3  --  21 23 21 21   O2PER 50%  --  40% 40 100       ETT appearance on chest x-ray: New trach     Plan:  Continue full mechanical ventilatory support.

## 2017-06-13 NOTE — PROGRESS NOTES
Date of Admission: 6/2/2017  Date of Intubation (most recent): 6/12/2017  Reason for Mechanical Ventilation: Airway Protection   Number of Days on Mechanical Ventilation: 2  Met Criteria for Pressure Support Trial: no  Significant events Tonight: none   Ventilation Mode: CMV/AC  FiO2 (%): 50 %  Rate Set (breaths/minute): 20 breaths/min  Tidal Volume Set (mL): 500 mL  PEEP (cm H2O): 5 cmH2O  Pressure Support (cm H2O): 5 cmH2O  Oxygen Concentration (%): 50 %  Resp: 21    Recent Labs  Lab 06/13/17  0415 06/12/17  1620 06/12/17  1509 06/12/17  0512 06/09/17  0947   PH  --  7.28* 7.02* 7.39 7.29*   PCO2  --  44 89* 35 43   PO2  --  126* 69* 76* 305*   HCO3  --  21 23 21 21   O2PER 50%  --  40% 40 100   plan: continue full vent support , assess for weaning readiness daily

## 2017-06-13 NOTE — PROGRESS NOTES
"  Daily ICU Progress Note       Date of service: 2017  Admission Date:  2017    Active Problem List:   -->  COPD  -->  Sepsis  -->  Possible PNA  -->  UTI   -->  Status post cholecystectomy, laparoscopic  -->  Gastric ulcer with perforation status post omental patch  -->  Acute hypercapneic respiratory failure, recurrent    24 Hour Events: Patient extubated.  Transferred for upper GI study and upon return she was decreased level of consciousness with minimal withdrawal to stimuli.  Narcan administered with no improvement.  Decision to reintubate.    ROS:  Unable to obtain 2/2 critical illness.     Objective:     /55  Pulse 93  Temp 98.6  F (37  C)  Resp 10  Ht 1.702 m (5' 7\")  Wt 81.3 kg (179 lb 3.7 oz)  SpO2 100%  BMI 28.07 kg/m2    Intake/Output Summary (Last 24 hours) at 17 0733  Last data filed at 17 0700   Gross per 24 hour   Intake          2857.76 ml   Output             2950 ml   Net           -92.24 ml     Ventilation Mode: CMV/AC  FiO2 (%): 50 %  Rate Set (breaths/minute): 20 breaths/min  Tidal Volume Set (mL): 500 mL  PEEP (cm H2O): 5 cmH2O  Pressure Support (cm H2O): 5 cmH2O  Oxygen Concentration (%): 50 %  Resp: 10    General: Adult female, sedated, intbuted  HEENT: NCAT; EOMI; no icterus; dry mucus membranes  Neck: No LAD  Pulm: Coarse BS bilaterally  CV: RRR, no m/g  Abdomen: Soft abdomen, multiple areas of ecchymosis, FUNMI drain in place, output clear  : [place]  Extremities: + edema  Skin: Warm to touch  Neuro: sedated    Pertinent Labs:     Creatinine: 1.24  K 3.1  Glc 154  CBC: 12.6 / 6.8 / 226  VB.30 / 46    Microbiology / Cultures:   No new cultures    Imaging:  Imaging was reviewed by me personally.  Pertinent positives noted below.     CXR - Endotracheal tube in good position. Nasogastric tube  passes into the stomach but the tip is not included on the radiograph.  Contrast material is seen in the stomach. Alveolar infiltrates in the  left lung are worse. " Right lung is well-inflated and there is only  minimal infiltrate at the right base. Mild cardiomegaly. Patient  rotated to the left. PICC line from the right with the tip at the  cavoatrial junction, unchanged.    Abd: No free air.     Assessment and Plan:   This is a 71-year-old female admitted for abdominal pain status post laparoscopic cholecystectomy with worsening respiratory status with return to the operating room and found to have a gastric perforation status post repair.  Had acute renal failure (and anuric) with slow improvement in renal function.  She was extubated again yesterday but within eight hours again had poor airway clearance and CO2 retention leading to decreased level of consciousness and required reintubation for airway protection.  I have discussed with the family will follow up with a tracheostomy consult will be placed today.  Trickle feeds are now started and we are watching for any change in FUNMI drain output.    Neuro:   1.  Depression / anxiety - patient is on Zoloft and trazodone at home.    --> Continue baseline sertraline and trazodone    2.  Analgesia/sedation -  --> Precedex and p.r.n. Versed and fentanyl    Pulm:   1. COPD - she does have underlying COPD with very mild obstruction and a mild reduction in diffusion capacity.  Plan to continue her current outpatient regimen.  No indication for steroids.    2.  Bilateral effusion - worse on left.  Given the abdominal contamination, persistent failure to extubate, persistent leukocytosis though somewhat improved we will evaluate today left-sided diagnostic thoracentesis. Bedside u/s with not a large effusion (as noted previously). Plan for non-contrast CT chest.   --> Diuresis    3. Acute hypercapnic respiratory failure - recurrent failure.  Plan for tracheostomy.    CV:  1.  Hypotension / Sepsis - blood pressure improved.  Off of vasopressors.  We are continuing ertapenem and micafungin for now.    Renal:   1.  Acute renal failure -  Patient with acute anuric renal failure with improved urine output.     ID:  1.  Sepsis - see above    GI:   1.  Gastric perforation - patient is currently starting trickle feeds. Monitoring FUNMI output.     She is on twice a day PPI.  Wean TPN as able.   --> Per surgery  --> cont ertapenem and micafungin    Heme / Onc:   1.  Anemia - stable.  --> Monitor counts    Endocrine:   1. Glc - glc controlled. On tpn    Musculoskeletal:   1.  Deconditioning - PTOT    Nutrition:   1. NPO   --> TPN  --> trickle feeds    Prophylaxis:  DVT Prophylaxis:  SQ heparin  GI Prophylaxis: PPI tx dose  PTOT:  ordered  Restraints: UE restraints needed    Disposition: MICU    Critical Care Time:  40 min    Tam Ugalde MD  Pulmonary and Critical Care  [facility]  Pager:  [phone]

## 2017-06-13 NOTE — PROGRESS NOTES
Renal Medicine Progress Note                                Ara Stanley MRN# [id number]   Age: 71 year old YOB: 1945   Date of Admission: 6/2/2017 Hospital LOS: 11                  Assessment/Plan:     Admitted with abdominal pain.  Diagnostic lap, lap alexandre and abdominal lavage 06/02/17.  Repair of perforated gastric ulcer 06/06/17    Seen for evaluation ARF    1.  Question baseline CKD   -creatinine unclear; suspected in 1.0 range   -dipstick positive albuminuria  2.  ARF   -nonoliguric   -ATN   -hypotension/IV contrast  3.  Increased volume  4.  Hypercapnic respiratory failure  5.  Borderline hypernatremia      Continue current diuretic  Creatinine up slightly   Follow labs  Replace K         Interval History:     Extubated/re intubated yesterday.  Awake and alert.  Follows.  IO reviewed and negative.  3125 ml UO.        ROS:     Intubated and sedated: ROS unable    Medications and Allergies:     Reviewed    Physical Exam:     Vitals were reviewed  Patient Vitals for the past 8 hrs:   BP Temp Temp src Heart Rate Resp SpO2   06/13/17 0830 99/51 98.6  F (37  C) - 72 22 100 %   06/13/17 0800 97/50 98.8  F (37.1  C) Esophageal 74 20 100 %   06/13/17 0730 98/58 98.6  F (37  C) - 70 20 100 %   06/13/17 0717 - - - - - 100 %   06/13/17 0700 100/55 98.6  F (37  C) - 71 10 100 %   06/13/17 0630 99/59 98.6  F (37  C) - 77 25 99 %   06/13/17 0600 (!) 87/51 98.6  F (37  C) - 72 22 99 %   06/13/17 0530 96/59 - - - - -   06/13/17 0500 97/66 98.6  F (37  C) - 79 23 100 %   06/13/17 0430 107/58 98.6  F (37  C) - 71 21 99 %   06/13/17 0416 - - - - - 99 %   06/13/17 0400 (!) 88/49 98.4  F (36.9  C) - 73 20 97 %   06/13/17 0330 99/56 - - - - -   06/13/17 0300 110/53 96.8  F (36  C) - 83 27 97 %   06/13/17 0230 122/64 98.8  F (37.1  C) - 90 26 100 %   06/13/17 0200 118/59 98.8  F (37.1  C) - 86 19 99 %   06/13/17 0130 129/58 98.6  F (37  C) - - - -   06/13/17 0100 124/59 98.6  F (37  C) - 83 22 100 %     I/O  last 3 completed shifts:  In: 2750.86 [I.V.:973.16]  Out: 2950 [Urine:2150; Emesis/NG output:200; Stool:600]    Vitals:    06/08/17 0612 06/09/17 0400 06/09/17 0835 06/10/17 0559   Weight: 80.8 kg (178 lb 2.1 oz) 79.4 kg (175 lb 0.7 oz) 80.5 kg (177 lb 7.5 oz) 80.6 kg (177 lb 11.1 oz)    06/11/17 0530   Weight: 81.3 kg (179 lb 3.7 oz)       GENERAL:  intubated but follows  HEENT: ETT  RESP:  clear anteriorly  CV: RRR, normal S1 S2  ABDOMEN: soft, nontender, no HSM or masses and bowel sounds normal  :  tanner catheter  MS: no clubbing, cyanosis   SKIN: clear without significant rashes or lesions  EXT: 1 plus    Data:       Recent Labs  Lab 06/13/17  0415 06/12/17  1535 06/12/17  0555 06/11/17  0515 06/10/17  1245 06/10/17  0500 06/09/17  0825   *  --  144 145* 144 143 142   POTASSIUM 3.1* 3.9 3.0* 3.5  --  3.1* 3.8   CHLORIDE 112*  --  114* 115*  --  113* 113*   CO2 21  --  21 21  --  21 23   ANIONGAP  --   --  9 9  --  9 6   *  --  128* 125*  --  129* 184*  Canceled, Test credited Duplicate requestCORRECTED ON 06/09 AT 0904: PREVIOUSLY REPORTED    BUN 47*  --  42* 40*  --  44* 41*   CR 1.24*  --  1.11* 1.19* 1.37* 1.46* 1.45*   GFRESTIMATED 43*  --  48* 45* 38* 35* 36*   GFRESTBLACK 51*  --  59* 54* 46* 43* 43*   CARYL 7.8*  --  7.6* 7.7*  --  7.5* 6.9*         Recent Labs   Lab Test  06/13/17   0415  06/12/17   0555  06/11/17   0515  06/10/17   1245  06/10/17   0500  06/09/17   0825  06/09/17   0500  06/08/17   0500  06/07/17   0504  06/06/17   0411   CR  1.24*  1.11*  1.19*  1.37*  1.46*  1.45*  1.55*  1.39*  1.57*  2.01*       Recent Labs  Lab 06/13/17  0415 06/12/17  0555 06/11/17  1621 06/11/17  0515 06/10/17  0500   PHOS  --  2.5 2.5 1.7* 1.9*   HGB 6.8* 7.6*  --  7.8* 7.9*         G Alvin Bhakta    [facility]rology  [phone]

## 2017-06-13 NOTE — PLAN OF CARE
Problem: Goal Outcome Summary  Goal: Goal Outcome Summary  PT: See paper chart for documentation during unplanned EPIC downtime 6/12.

## 2017-06-13 NOTE — ANESTHESIA PREPROCEDURE EVALUATION
Anesthesia Evaluation     .             ROS/MED HX    ENT/Pulmonary:     (+)asthma COPD, , recent URI . .    Neurologic:       Cardiovascular:     (+) Dyslipidemia, hypertension----. : . CHF etiology: stress induced cardiomyopathy . . :. dysrhythmias a-fib, .       METS/Exercise Tolerance:     Hematologic:         Musculoskeletal:   (+) arthritis, , , -       GI/Hepatic: Comment: Gastric perforation, s/p repair    (+) cholecystitis/cholelithiasis,       Renal/Genitourinary:     (+) chronic renal disease, type: ARF,       Endo:         Psychiatric:     (+) psychiatric history anxiety and depression      Infectious Disease:         Malignancy:         Other:                     Physical Exam      Airway     Dental     Cardiovascular       Pulmonary     Other findings: Currently intubated and sedated                Anesthesia Plan      History & Physical Review  History and physical reviewed and following examination; no interval change.    ASA Status:  3 .    NPO Status:  > 8 hours    Plan for General and Trach Maintenance will be Inhalation.           Postoperative Care  Postoperative pain management:  IV analgesics.      Consents  Plan/risks discussed with: patient intubated and sedated, no family members present.                        .

## 2017-06-13 NOTE — ANESTHESIA CARE TRANSFER NOTE
Patient: Ara Stanley    Procedure(s):  TRACHEOSTOMY. - Wound Class: II-Clean Contaminated    Diagnosis: RESP. FAILURE   Diagnosis Additional Information: No value filed.    Anesthesia Type:   General, Trach     Note:  Airway :Tracheostomy  Patient transferred to:ICU  Comments: Transferred to ICU, ETT in place, ambu bag with 15L oxygen for transport, all monitors and alarms on for transport, IV/lines patent and drips continued per anesthesia record.  Placed on ventilator per RT in ICU. Placed on ICU monitors per ICU RN, alarms on, VSS.  Report to ICU RN. All questions answered. Pt stable      Vitals: (Last set prior to Anesthesia Care Transfer)    CRNA VITALS  6/13/2017 1620 - 6/13/2017 1704      6/13/2017             NIBP: 101/56    Ht Rate: 71    SpO2: 100 %                Electronically Signed By: OTTO Higgins CRNA  June 13, 2017  5:04 PM

## 2017-06-13 NOTE — PLAN OF CARE
Problem: Goal Outcome Summary  Goal: Goal Outcome Summary  Outcome: No Change  Pt remains on full ventilator support. Had trach placed today. Precedex gtt infusing and fentanyl prn for pain. Lasix with minimal response. Pt tolerating trickle feeds, remains on TPN and lipids. Family updated on plan of care. Will continue to monitor.

## 2017-06-13 NOTE — BRIEF OP NOTE
[facility] Brief Operative Note    Pre-operative diagnosis: RESP. FAILURE    Post-operative diagnosis SAME   Procedure: Procedure(s):  TRACHEOSTOMY. - Wound Class: II-Clean Contaminated  SHILEY NO 6 CUFFED NON FENESTRATED   Surgeon(s): Surgeon(s) and Role:     * Selwyn Varela MD - Primary   Estimated blood loss: 1ML   Specimens: NONE   Findings: N/A

## 2017-06-13 NOTE — PROGRESS NOTES
Daughter Charlene udpated over phone.  Discussed her mothers night and earlier discussion this RN had with her brother Alejandro.  Afterwards discussed with pt that This RN spoke with her daughter and son Alejandro.  Pt's face smiled in appreciation and understanding.  I assured pt that her children were coming together to help make decisions for her.  At this time Charlene will continue to be POA for decision making.

## 2017-06-14 NOTE — PROGRESS NOTES
"  Daily ICU Progress Note       Date of service: 06/14/2017  Admission Date:  6/2/2017    Active Problem List:   -->  COPD  -->  Sepsis  -->  Possible PNA  -->  UTI   -->  Status post cholecystectomy, laparoscopic  -->  Gastric ulcer with perforation status post omental patch  -->  Acute hypercapneic respiratory failure, recurrent   -->  Status post tracheostomy.    24 Hour Events: Trach    ROS:  Unable to obtain 2/2 critical illness.     Objective:     /77  Pulse 93  Temp 98.4  F (36.9  C) (Axillary)  Resp 23  Ht 1.702 m (5' 7\")  Wt 82 kg (180 lb 12.4 oz)  SpO2 99%  BMI 28.31 kg/m2    Intake/Output Summary (Last 24 hours) at 06/14/17 1721  Last data filed at 06/14/17 1700   Gross per 24 hour   Intake          2508.66 ml   Output             2600 ml   Net           -91.34 ml     Ventilation Mode: CMV/AC  FiO2 (%): 40 %  Rate Set (breaths/minute): 20 breaths/min  Tidal Volume Set (mL): 500 mL  PEEP (cm H2O): 5 cmH2O  Pressure Support (cm H2O): 5 cmH2O  Oxygen Concentration (%): 40 %  Resp: 23    General: Adult female, sedated, tracheostomy in place, no bleeding  HEENT: NCAT; EOMI; no icterus; moist mucus membranes  Neck: No LAD  Pulm: Coarse BS bilaterally  CV: RRR, no m/g  Abdomen: Soft abdomen, multiple areas of ecchymosis, FUNMI drain in place, output clear   : Crawford  Extremities: + edema  Skin: Warm to touch  Neuro: sedated    Pertinent Labs:     Basic metabolic panel: 142/3.6/110/20/53/1.4  Glucose 113  CBC: 15.3/8.0/267    Microbiology / Cultures:   No new microbiology.    Imaging:  Imaging was reviewed by me personally.  Pertinent positives noted below.     CTCAP:    1. The tip of the endotracheal tube is at the jeanmarie. Consider pulling  back 4 cm. This was communicated to the nurse taking care of the  patient at 9:45 AM on 6/13/2017.  2. Worsening airspace opacities in bilateral upper lobes with volume  loss may represent developing atelectasis.  3. Moderate-sized bilateral pleural effusions " appear simple.  4. Left lower lobe atelectasis versus pneumonia is similar to prior.  5. Small to moderate volume of ascites.    Assessment and Plan:   This is a 71-year-old female who was initially admitted for abdominal pain ultimately had a cholecystectomy and then had a follow-up which demonstrated a gastric perforation status post repair.  She failed exhibition of multiple occasions and now has a tracheostomy in place.  Repeat imaging did not demonstrate any significant increase in fluid in the abdomen or new free air.    Neuro:   1.  Depression / anxiety - patient is on Zoloft and trazodone at home.    --> Continue baseline sertraline and trazodone    2.  Analgesia/sedation -  --> Precedex and p.r.n. Versed and fentanyl   --> Start low dose a.m. Seroquel and 25 mg q.h.s.    Pulm:   1. COPD - she does have underlying COPD with very mild obstruction and a mild reduction in diffusion capacity.  Plan to continue her current outpatient regimen.  No indication for steroids.    2.  Bilateral effusion - worse on left.  Given the abdominal contamination, persistent failure to extubate, persistent leukocytosis though somewhat improved we will evaluate today left-sided diagnostic thoracentesis. Bedside u/s with not a large effusion (as noted previously). Plan for non-contrast CT chest.   --> Diuresis    3. Acute hypercapnic respiratory failure - recurrent failure.    --> s/p tracheostomy    CV:  1.  Hypotension / Sepsis - blood pressure improved.  Off of vasopressors.  We are continuing ertapenem and micafungin for now.  WBC continues to increase.    Renal:   1.  Acute renal failure - Patient with acute anuric renal failure with improved urine output.  Creatinine slowly increasing low at albumin prior to the diuretics.  May need to hold on further diuresis.  Plan to max concentrate all intravenous medications.    ID:  1.  Sepsis - see above    GI:   1.  Gastric perforation - patient is currently starting trickle feeds.  Monitoring FUNMI output.     She is on twice a day PPI.  Wean TPN as able.   --> Per surgery  --> cont ertapenem and micafungin  --> Transpyloric feeding tube to be placed tomorrow with removal of NG tube.    Heme / Onc:   1.  Anemia - stable.    Endocrine:   1. Glc - glc controlled. On tpn    Musculoskeletal:   1.  Deconditioning - PTOT    Nutrition:   1. NPO   --> TPN  --> trickle feeds, advance as tolerated  Prophylaxis:  DVT Prophylaxis:  SQ heparin  GI Prophylaxis: PPI tx dose  PTOT:  ordered  Restraints: UE restraints needed    Disposition: MICU     Critical Care Time:  40 min.  This billing is exclusive of any procedures that were performed in the ICU setting.    Tam Ugalde MD  Pulmonary and Critical Care  [facility]  Pager:  [phone]

## 2017-06-14 NOTE — PROGRESS NOTES
CLINICAL NUTRITION SERVICES - REASSESSMENT NOTE      Recommendations Ordered by Registered Dietitian (RD): 1.  Increase TF rate to 25 mL/hr and decrease TPN rate to 20 mL/hr  2.  Tonight at 8:00 pm d/c TPN and Lipid  3.  Tonight at 11:00 pm increase TF to goal as follows --> Isosource 1.5 at 45 mL/hr to provide:  1620 kcal (26 kcal/kg), 73 g protein (1.2 g/kg), 190 g CHO, 821 mL H2O       EVALUATION OF PROGRESS TOWARD GOALS   Diet:  NPO with Trach   Nutrition Support:  Patient continues on low drip TF (resumed last night at 1700 post Trach) and TPN as follows ~    Nutrition Support Enteral:  Type of Feeding Tube: NG tube  Enteral Frequency:  Continuous  Enteral Regimen: Isosource 1.5 at 10 mL/hr   Total Enteral Provisions: 360 kcal and 16 g protein, 42 g CHO   Free Water Flush: 30 mL every 4 hours     Nutrition Support Parenteral:  Type of Access: Central line   Parenteral Frequency:  Continuous  Parenteral Regimen: D27 AA7 at 45 mL/hr + Lipid 250 mL daily   Total Parenteral Provisions: 1722 kcal (27 kcal per kg), 76 g protein (1.2 g per kg), 270 g CHO, 29% fat     Total (TF + TPN/Lipid)= 2082 kcal (33 kcal per kg), 92 g protein (1.5 g per kg), 312 g CHO      Intake/Tolerance:    , 132, 127, 137, 270 (??), 137 - Suspect 270 values may not be accurate (serum level vs accucheck) - High SSI   BUN 53 (H), Cr 1.41 (H) - CKD   BM x 2 today -- Senokot on hold   I/O 5024/1776, weight today 82 kg (up 18.6 kg from admit) - Diuresing on IV Albumin and IV Lasix         Dosing Weight: 63.4 kg      ASSESSED NUTRITION NEEDS:  Energy Needs: 8971-9696 kcals (25-30 Kcal/Kg) - maintenance, vented  Protein Needs:  63-76 grams protein (1-1.2 g pro/Kg) - post-op, hypercatabolism with acute illness and CKD      NEW FINDINGS:   Folic acid and Thiamine per ETOH Protocol   Plan to have FT placed under fluoroscopy tomorrow and pull NG tube     WOCN eval on 6/9 --> Coccyx - no PI noted, zulma risk      Previous Goals (6/13):   EN to  advance to goal in 2-3 days  Evaluation: Not met yet     Previous Nutrition Diagnosis (6/13):   Inadequate enteral nutrition infusion related to interruptions in TF, reliant on TPN to meet needs and overall medical status as evidenced by currently enteral nutrition being held and current goal rate of EN at 10mL/hr  Evaluation: No change      CURRENT NUTRITION DIAGNOSIS  Inadequate enteral nutrition infusion related to low drip TF continues, plan to advance towards goal today as evidenced by meeting 20% needs via current low drip TF, continued TPN reliance     INTERVENTIONS  Recommendations / Nutrition Prescription  1.  Increase TF rate to 25 mL/hr and decrease TPN rate to 20 mL/hr  2.  Tonight at 8:00 pm d/c TPN and Lipid  3.  Tonight at 11:00 pm increase TF to goal as follows --> Isosource 1.5 at 45 mL/hr to provide:  1620 kcal (26 kcal/kg), 73 g protein (1.2 g/kg), 190 g CHO, 821 mL H2O    Implementation  EN Composition, EN Schedule:  Above TF advancement orders written  PN Composition, PN Schedule:  Above TPN taper orders written  Collaboration and Referral of Nutrition care:  Patient discussed today during interdisciplinary bedside rounds.  Also discussed plan for transition with Pharmacist.     Goals  Patient will transition from TPN --> TF within the next 24-48 hours     MONITORING AND EVALUATION:  Progress towards goals will be monitored and evaluated per protocol and Practice Guidelines    Bisi Brumfield RD, LD, CNSC   Clinical Dietitian - [facility]

## 2017-06-14 NOTE — OP NOTE
PREOPERATIVE DIAGNOSIS:  Respiratory failure.      POSTOPERATIVE DIAGNOSIS:  Respiratory failure.      PROCEDURE:  Tracheostomy with Shiley #6 cuffed nonfenestrated tube.      SURGEON:  Selwyn Varela MD      ANESTHESIA:  General.      INDICATIONS:  The patient Ara Palomino is a 71-year-old woman with respiratory failure.  She will require prolonged mechanical ventilation.      DESCRIPTION OF PROCEDURE:  On 2017 the patient was brought to the operating room in the ICU bed.  Under general anesthesia the patient's neck was extended.  Neck and upper chest were prepared and draped in the usual fashion using ChloraPrep.        A transverse incision was made.  Dissection was carried down to the midline of the strap muscle, and the isthmus of the thyroid was divided.  The second ring of the trachea was clearly identified, and sutures of 2-0 Prolene were placed around the second ring laterally on each side.  A longitudinal tracheotomy was made and dilated.  The endotracheal tube was pulled above the tracheotomy.        A Shiley #6 cuffed nonfenestrated tube was advanced without difficulty.  The cuff was inflated.  Ventilation was re-established and was excellent.  Hemostasis was verified and was excellent.  The incision was closed with interrupted 3-0 nylon suture in the skin along the tracheostomy.  A dressing was applied.  The tracheostomy was secured around the patient's neck.  The patient was returned to the Intensive Care Unit in satisfactory condition.         SELWYN VARELA MD             D: 2017 19:37   T: 2017 08:29   MT: [id number]      Name:     ARA PALOMINO   MRN:      [id number]        Account:        [id number]   :      1945           Procedure Date: 2017      Document: [id number]       cc: Shar James DO

## 2017-06-14 NOTE — PLAN OF CARE
Problem: Goal Outcome Summary  Goal: Goal Outcome Summary  Pt very agitated and restless throughout night. Pt seems to be confused and delirious. Pt pulling at lines and tubes and attempting to get out of bed. Dex at 0.7. PRN Fentanyl given. Pt increasingly hypertensive throughout the night. Pt voiding. Pt SR on tele. Lungs are very coarse, fine secretions out of ET tube. Plan continue of care.

## 2017-06-14 NOTE — PLAN OF CARE
Problem: Goal Outcome Summary  Goal: Goal Outcome Summary  Discharge Planner PT   Patient plan for discharge: None stated.  Current status: Pt with limited participation in therapy today secondary to fatigue, difficulty staying awake, difficulty following commands. Pt participated in LE strengthening exercises with constant verbal and tactile cues.  Barriers to return to prior living situation: Requires significant amount of assistance for mobility, decreased strength, decreased command following.  Recommendations for discharge: LTACH  Rationale for recommendations: Pt with significant medical needs and limited ability to participate in therapy sessions.       Entered by: Charlene Hendrickson 06/14/2017 10:41 AM

## 2017-06-14 NOTE — PHARMACY
TPN formula evaluated based on today s labs results.    The following changes have been made:  Chloride:Acetate ratio: change to max acetate    Pharmacy will continue to follow and adjust as appropriate.

## 2017-06-14 NOTE — PLAN OF CARE
Problem: Goal Outcome Summary  Goal: Goal Outcome Summary  Outcome: No Change  Pt trialed on pressure support; did not tolerate due to tachypnea. Remains on full pressure support. BPs elevated this shift; MD aware, will monitor. All other VSS. Up to chair this afternoon. Agitated on and off throughout shift; precedex infusing. Fentanyl prn for pain. TF increased and TPN decreased today. Pt tolerating well. Family updated on plan of care. Will continue to monitor.

## 2017-06-14 NOTE — PROGRESS NOTES
Critical access hospital ICU RESPIRATORY NOTE  Date of Admission: 6/2/2017  Date of Intubation (most recent): 6/12/2017  Reason for Mechanical Ventilation: Airway protection  Number of Days on Mechanical Ventilation: 3 (Reintubated 6/12/2017, Trach 6/14/2017)  Met Criteria for Pressure Support Trial: No  Reason for No Pressure Support Trial: Per MD    Ventilation Mode: CMV/AC  FiO2 (%): 40 %  Rate Set (breaths/minute): 20 breaths/min  Tidal Volume Set (mL): 500 mL  PEEP (cm H2O): 5 cmH2O  Oxygen Concentration (%): 40 %  Resp: 27    Significant Events Today:None overnight    ABG Results:No new results    ETT appearance on chest x-ray:No new results since trach was placed    Plan:  Will cont full vent support and assess for weaning readiness daily.  6/14/2017  Pallavi Hammond RRT

## 2017-06-14 NOTE — PROGRESS NOTES
Renal Medicine Progress Note                                Ara Stanley MRN# [id number]   Age: 71 year old YOB: 1945   Date of Admission: 6/2/2017 Hospital LOS: 12                  Assessment/Plan:     Admitted with abdominal pain.  Diagnostic lap, lap alexandre and abdominal lavage 06/02/17.  Repair of perforated gastric ulcer 06/06/17    Seen for evaluation ARF    1.  Question baseline CKD   -creatinine unclear; suspected in 1.0 range   -dipstick positive albuminuria  2.  ARF   -nonoliguric   -ATN   -hypotension/IV contrast  3.  Increased volume  4.  Hypercapnic respiratory failure   -trach 06/13/17  5.  Borderline hypernatremia      Creatinine up slightly again  Add 25% albumin pre diuretic today  Reduce INs for improved fluid balance  Transition from TPN to tube feeds       Interval History:     Trach placed without issue.  BP labile.  IO positive.  Awake.  Follows.        ROS:     Intubated: ROS unable    Medications and Allergies:     Reviewed    Physical Exam:     Vitals were reviewed  Patient Vitals for the past 8 hrs:   BP Temp Temp src Heart Rate Resp SpO2 Weight   06/14/17 0800 167/78 98  F (36.7  C) Axillary 71 23 99 % -   06/14/17 0716 - - - - - 99 % -   06/14/17 0500 154/74 - - 73 13 100 % -   06/14/17 0400 150/74 97.8  F (36.6  C) Oral 70 30 99 % 82 kg (180 lb 12.4 oz)   06/14/17 0317 - - - - - 100 % -   06/14/17 0300 137/66 - - 76 20 100 % -   06/14/17 0200 146/70 - - 72 27 100 % -   06/14/17 0100 131/67 - - 70 25 99 % -     I/O last 3 completed shifts:  In: 2734.14 [I.V.:972.42; NG/GT:105]  Out: 1776 [Urine:1775; Blood:1]    Vitals:    06/09/17 0400 06/09/17 0835 06/10/17 0559 06/11/17 0530   Weight: 79.4 kg (175 lb 0.7 oz) 80.5 kg (177 lb 7.5 oz) 80.6 kg (177 lb 11.1 oz) 81.3 kg (179 lb 3.7 oz)    06/14/17 0400   Weight: 82 kg (180 lb 12.4 oz)       GENERAL:  intubated but follows  HEENT: ETT  RESP:  clear anteriorly  CV: RRR, normal S1 S2  ABDOMEN: soft, nontender, no HSM or  masses and bowel sounds normal  :  tanner catheter  MS: no clubbing, cyanosis   SKIN: clear without significant rashes or lesions  EXT: 1 plus    Data:       Recent Labs  Lab 06/14/17  0430 06/13/17  1320 06/13/17  0415 06/12/17  1535 06/12/17  0555 06/11/17  0515  06/10/17  0500     --  145*  --  144 145*  < > 143   POTASSIUM 3.6 4.8 3.1* 3.9 3.0* 3.5  --  3.1*   CHLORIDE 110*  --  112*  --  114* 115*  --  113*   CO2 20  --  21  --  21 21  --  21   ANIONGAP 12  --   --   --  9 9  --  9   *  --  150*  --  128* 125*  --  129*   BUN 53*  --  47*  --  42* 40*  --  44*   CR 1.41*  --  1.24*  --  1.11* 1.19*  < > 1.46*   GFRESTIMATED 37*  --  43*  --  48* 45*  < > 35*   GFRESTBLACK 44*  --  51*  --  59* 54*  < > 43*   CARYL 8.2*  --  7.8*  --  7.6* 7.7*  --  7.5*   < > = values in this interval not displayed.      Recent Labs   Lab Test  06/14/17   0430  06/13/17   0415  06/12/17   0555  06/11/17   0515  06/10/17   1245  06/10/17   0500  06/09/17   0825  06/09/17   0500  06/08/17   0500  06/07/17   0504   CR  1.41*  1.24*  1.11*  1.19*  1.37*  1.46*  1.45*  1.55*  1.39*  1.57*       Recent Labs  Lab 06/14/17  0430 06/13/17  1145 06/13/17  0415 06/12/17  0555 06/11/17  1621 06/11/17  0515   PHOS 3.1  --   --  2.5 2.5 1.7*   HGB 8.0* 8.1* 6.8* 7.6*  --  7.8*         G Alvin Bhakta    [facility]phrology  [phone]

## 2017-06-14 NOTE — PROGRESS NOTES
THORACIC SURGERY    Tracheostomy ok  No bleeding    PAN ANDRADE MD [facility] ONCOLOGY THORACIC SURGERY  CELL:  [phone]  OFFICE: [phone]

## 2017-06-14 NOTE — ANESTHESIA POSTPROCEDURE EVALUATION
Patient: Ara Stanley    Procedure(s):  TRACHEOSTOMY. - Wound Class: II-Clean Contaminated    Diagnosis:RESP. FAILURE   Diagnosis Additional Information: No value filed.    Anesthesia Type:  General, Trach    Note:  Anesthesia Post Evaluation    Patient location during evaluation: ICU  Patient participation: Unable to evaluate secondary to administered sedation  Post-procedure mental status: sedated.  Pain management: adequate  Airway patency: patent  Cardiovascular status: hemodynamically stable  Respiratory status: ventilator and ETT  Hydration status: acceptable  PONV: none     Anesthetic complications: None          Last vitals:  Vitals:    06/13/17 1745 06/13/17 1800 06/13/17 1815   BP: (!) 89/56 93/59    Pulse:      Resp: 19 21    Temp:      SpO2: 99% 99% 99%         Electronically Signed By: Mk Irizarry MD  June 13, 2017  7:22 PM

## 2017-06-14 NOTE — PROGRESS NOTES
"General Surgery Progress Note    Subjective: tracheostomy completed yesterday. Pt with labile BP overnight. WBC up today. Afebrile. FUNMI w 0ml out. Tolerating tube feeds at 20ml/hr.     Objective: /78 (BP Location: Left arm)  Pulse 93  Temp 98  F (36.7  C) (Axillary)  Resp 21  Ht 5' 7\" (1.702 m)  Wt 180 lb 12.4 oz (82 kg)  SpO2 99%  BMI 28.31 kg/m2  Gen: opens eyes to voice, nods yes/no  CV: RRR  Pulm: nonlabored breathing  Abd: soft, FUNMI removed - copious amounts of ascitic fluid drained with drain removal. Fluid sent for culture. Remainder of port sites are c/d/i, no erythema or drainage.   Ext: WWP    Assessment/Plan:   Ara Stanley  is a 71 year old female s/p lap alexandre and lap repair of perforated gastric ulcer. Hypercarbic respiratory failure, extubation x3 w reintubation now s/p trach. FUNMI removed with significant fluid drainage after removal.   - place ostomy appliance over FUNMI site  - await cultures from ascitic fluid  - advance tube feeds as tolerated  - plan for feeding tube placement under fluoroscopy tomorrow and can remove NGT (will order)    Estelita Peck MD  [facility]  [phone]              "

## 2017-06-15 NOTE — PROGRESS NOTES
"  Daily ICU Progress Note       Date of service: 06/15/2017  Admission Date:  6/2/2017    Active Problem List:   -->  COPD  -->  Sepsis  -->  Possible PNA  -->  UTI   -->  Status post cholecystectomy, laparoscopic  -->  Gastric ulcer with perforation status post omental patch  -->  Acute hypercapneic respiratory failure, recurrent   -->  Status post tracheostomy.    24 Hour Events: Positive fever.  Positive yeast culture from abdominal fluid.  Required p.r.n. medication for hypertension.  Albumin started for diuresis    Subjective:  Chart reviewed.  Nephrology note reviewed.      ROS:  Unable to obtain 2/2 critical illness.     Objective:     BP (!) 148/95  Pulse 93  Temp 101.5  F (38.6  C) (Oral)  Resp (!) 56  Ht 1.702 m (5' 7\")  Wt 82.9 kg (182 lb 12.2 oz)  SpO2 100%  BMI 28.62 kg/m2     Intake/Output Summary (Last 24 hours) at 06/15/17 1119  Last data filed at 06/15/17 1000   Gross per 24 hour   Intake          2257.82 ml   Output             3425 ml   Net         -1167.18 ml     Ventilation Mode: CPAP/PS  FiO2 (%): 40 %  Rate Set (breaths/minute): 20 breaths/min  Tidal Volume Set (mL): 500 mL  PEEP (cm H2O): 5 cmH2O  Pressure Support (cm H2O): 10 cmH2O  Oxygen Concentration (%): 40 %  Resp: 56    General: Adult female, sedated, tracheostomy in place, follows commands  HEENT: NCAT; EOMI; no icterus; moist mucus membranes  Neck: No LAD  Pulm: Coarse BS bilaterally  CV: RRR, no m/g  Abdomen: Soft abdomen, multiple areas of ecchymosis  : Crawford  Extremities: + edema  Skin: Warm to touch  Neuro: sedated    Pertinent Labs:     Metabolic panel: 145/3.5/113/22/52/1.38  CBC: 9.94/7.54/332    Microbiology / Cultures:     Moderate yeast in the fluid culture    Imaging:  Imaging was reviewed by me personally.  Pertinent positives noted below.     CXR  -1. No change in bilateral upper lobe airspace opacities, left greater  than right.  2. Interval placement of a tracheostomy tube.  3. Left pleural effusion with " associated atelectasis is unchanged.      Assessment and Plan:   71-year-old female initially admitted with abdominal pain status post cholecystectomy with subsequent respiratory failure and return to the operating room where a gastric perforation was found status post repair.  She failed extubation multiple times and ultimately had a tracheostomy placed.  Given the persistent yeast in her abdominal fluid decision not to move forward with a PEG tube at this time.  She continues to have fevers and persistent positive cultures for yeast on micafungin.    Neuro:   1.  Depression / anxiety - patient is on Zoloft and trazodone at home.    --> Continue baseline sertraline and trazodone    2.  Analgesia/sedation -  --> bid seroquel    Pulm:   1. COPD - she does have underlying COPD with very mild obstruction and a mild reduction in diffusion capacity.  Plan to continue her current outpatient regimen.  No indication for steroids.    2.  Bilateral effusion - worse on left.  Given the abdominal contamination, persistent failure to extubate, persistent leukocytosis will broad antimicrobial coverage.  Discussed with infectious disease.  Plan to broaden ertapenem to meropenem for better coverage against Pseudomonas.  Add vancomycin for coverage of E. facaelis  --> cont Diuresis with albumin    3. Acute hypercapnic respiratory failure - recurrent failure.    --> s/p tracheostomy    CV:  1.  Hypotension / Sepsis - blood pressure improved.  Off of vasopressors and now hypertensive.   --> prn hydralazine    Renal:   1.  Acute renal failure - Patient with acute anuric renal failure with improved urine output.  Stable. Cont albumin infusion with diuretics as creatinine has remained stable on this    ID:  1.  Sepsis - see above    GI:   1.  Gastric perforation - patient is currently starting trickle feeds. Monitoring FUNMI output. Today may be more white / pururlent in color. Will add dye to tube feed and monitor. Abx per above. She is on  twice a day PPI.   --> Per surgery  --> discont ertapenem  --> cont tiff  --> add vanco  --> Transpyloric feeding tube to be placed today    Heme / Onc:   1.  Anemia - stable.    Endocrine:   1. Glc - glc controlled.    Musculoskeletal:   1.  Deconditioning - PTOT    Nutrition:   1. TF's  --> d/c TPN  --> cont TF's    Prophylaxis:  DVT Prophylaxis:  SQ heparin  GI Prophylaxis: PPI tx dose  PTOT:  ordered  Restraints: UE restraints needed    Disposition: MICU     Critical Care Time:  40 min.  This billing is exclusive of any procedures that were performed in the ICU setting.    Tam Ugalde MD  Pulmonary and Critical Care  [facility]  Pager:  [phone]

## 2017-06-15 NOTE — PROGRESS NOTES
Restless, pulling @ gown, trying to pull @ trach, confused to time & place. Precedex @ 0.7. Repositioned, oral cares & Versed 2mg IV given

## 2017-06-15 NOTE — PLAN OF CARE
Problem: Ventilation, Mechanical Invasive (Adult)  Goal: Signs and Symptoms of Listed Potential Problems Will be Absent or Manageable (Ventilation, Mechanical Invasive)  Signs and symptoms of listed potential problems will be absent or manageable by discharge/transition of care (reference Ventilation, Mechanical Invasive (Adult) CPG).   Outcome: No Change  Essentially unchanged. Remains on Precedex, required Versed x1 through the night. Secretions per trach sm- mod creamy, lungs coarse throughout.

## 2017-06-15 NOTE — PROGRESS NOTES
Pt with ongoing purulent fluid from prior FUNMI site. Low concern for leak or new perforation given negative upper GI and fluoro placement of post pyloric feeding tube. Plan to place NGT for ongoing decompression.      Estelita Peck MD  [facility]  [phone]

## 2017-06-15 NOTE — PLAN OF CARE
Problem: Goal Outcome Summary  Goal: Goal Outcome Summary  Discharge Planner PT   Patient plan for discharge: Patient unable to state  Current status: Attempted to transfer to sitting but patient c/o too much pain-difficult to tell where but appeared to be in her abdomen. Got to partial sitting and needed to return to supine. Max assist of 2. Patient frequently asking for water so given swabs. Attempting to use swab herself with her right arm but having difficulty with accurately hitting her mouth. Minimal participation in LE ex. Again indicating pain in bilateral LE's, especially right. Dorsiflexion limited bilaterally, especially on the right. ? Increased extensor tone versus resistance in right LE.   Barriers to return to prior living situation: impaired strength, impaired ROM, lives alone, impaired endurance  Recommendations for discharge: LTACH  Rationale for recommendations: Patient currently with a trach and activity tolerance and independence are both very limited.        Entered by: Cinthia Myers 06/15/2017 1:18 PM

## 2017-06-15 NOTE — PROGRESS NOTES
Surgery would like an NG replaced in right nostril as Keofeed is in left nare and verified as post pyloric.  There is concern that the previous FUNMI site on RUQ of abdomen has purulent looking drainage oozing in to ostomy pouch.  This could be mistaken for tube feeding formula if there is a gastric perforation.  Once NG is placed and verified by X-ray, purple/blue Gatorade will be instilled per intensivist/surgery.  If ostomy output turns the color of the Gatorade then MD should be notified stat.

## 2017-06-15 NOTE — PROGRESS NOTES
"Surgery Progress Note    Pt febrile overnight, otherwise stable. Tolerating tube feeds. Having bowel movements. Small amount of drainage from prior drain site.     O: /76  Pulse 93  Temp 100.9  F (38.3  C) (Oral)  Resp 22  Ht 5' 7\" (1.702 m)  Wt 182 lb 12.2 oz (82.9 kg)  SpO2 100%  BMI 28.62 kg/m2  Abd: soft, mildly tender near incisions    A/P: pt overall stable. WBC normal today, though febrile overnight. Agree with ID consult - fluid from abdominal ascites continues to grow yeast.   - switch NGT to feeding tube and continue tube feeds.   - mobilize patient   - appreciate Intensivist, nephrology and ID recommendations    Estelita Peck MD  [facility]  [phone]      "

## 2017-06-15 NOTE — CONSULTS
[facility]    Infectious Disease Consultation     Date of Admission:  6/2/2017  Date of Consult (When I saw the patient): 06/15/17    Assessment & Plan   Ara Stanley is a 71 year old female who was admitted on 6/2/2017.     Impression:  71 y.o female admitted in the hospital for about 2 weeks now.   Initial presentation was of acute abdomen taken to OR for cholecystectomy.   Taken back to the OR 4 days later with concern for peritonitis, found to have gastric perforation of an ulcer which was repaired.   Post-operative course has been complicated with multiple intubations, trials of weaning off the vent and now with trach.   Most recent concern also has been HCAP.   Only cultures positive are sputum for candida and abdominal fluid for yeast still being identified in the microbiology lab.   She is on Micafungin and Ertapenem and febrile.    Recommendations:   Potential holes in coverage are no coverage for  E faecalis, MRSA and Pseudomonas with current coverage, given how long patient has been in the hospital and now being evaluated for HCAp will agree with broadening coverage to Vancomycin and Meropenem, continue on Micafungin will follow up on th pending labs. Will narrow as more data emerges.   Repeat sputum cultures.       Celso Birch MD    Reason for Consult   Reason for consult: I was asked by Dr. Ugalde  to evaluate this patient for intraabdominal infection, fever .    Primary Care Physician   Shar James    Chief Complaint   Abdominal pain     History is obtained from ICU physician and medical records    History of Present Illness   Ara Stanley is a 71 year old female who has been admitted to [facility] since 6/2 so about 2 weeks. She was initially admitted for abdominal pain had cholecystectomy given continued pain and concern for peritonitis was taken to the OR again on 6/6 and found to have gastric perforation which was repaired. Post surgical course has been  quite complicated with intubations, trial of extubations, reintubation and now trach. She has also been febrile off and on. The only microbiological data available is candida in sputum and more recently yeast in the abdominal fluid.   She is on ertapenem and micafungin but has continued to be febrile, current concern also include pneumonia.       Past Medical History   I have reviewed this patient's medical history and updated it with pertinent information if needed.   Past Medical History:   Diagnosis Date     Asthma      COPD (chronic obstructive pulmonary disease) (H)        Past Surgical History   I have reviewed this patient's surgical history and updated it with pertinent information if needed.  Past Surgical History:   Procedure Laterality Date     ARTHROPLASTY HIP  6/4/2012    Procedure:ARTHROPLASTY HIP; Surgeon:DAVIAN FENG; Location: OR     ENT SURGERY      tonsils     GYN SURGERY      hyst     LAPAROSCOPIC CHOLECYSTECTOMY N/A 6/2/2017    Procedure: LAPAROSCOPIC CHOLECYSTECTOMY;;  Surgeon: Lavell Carpenter MD;  Location:  OR     LAPAROSCOPY DIAGNOSTIC (GENERAL) N/A 6/6/2017    Procedure: LAPAROSCOPY DIAGNOSTIC (GENERAL);  DIAGNOSTIC LAPAROSCOPY, REPAIR OF PERFORATED GASTRIC ULCER, ABDOMINAL LAVAGE;  Surgeon: Estelita Peck MD;  Location:  OR     LAPAROTOMY EXPLORATORY N/A 6/2/2017    Procedure: LAPAROTOMY EXPLORATORY;  EXPLORATORY LAPAROSCOPY, CHOLECYCTECTOMY;  Surgeon: Lavell Carpenter MD;  Location:  OR     ORTHOPEDIC SURGERY      hip pinning   femur fracture knee surgery      REMOVE HARDWARE HIP NAILING  6/4/2012    Procedure:REMOVE HARDWARE HIP NAILING; REMOVAL OF GAMMA NAIL AND SCREWS FROM RIGHT HIP, RIGHT TOTAL HIP ARTHROPLASTY(GAMMA NAIL EQUIPMENT, [employer] TOTAL HIP)^; Surgeon:DAVIAN FENG; Location: OR     TRACHEOSTOMY N/A 6/13/2017    Procedure: TRACHEOSTOMY;  TRACHEOSTOMY.;  Surgeon: Selwyn Varela MD;  Location:  OR       Prior to  Admission Medications   Prior to Admission Medications   Prescriptions Last Dose Informant Patient Reported? Taking?   ACETAMINOPHEN PO 6/1/2017 at am Self Yes Yes   Sig: Take 650 mg by mouth every morning   ASPIRIN EC PO 6/1/2017 at am Self Yes Yes   Sig: Take 81 mg by mouth daily   BACLOFEN PO 6/1/2017 at am Self Yes Yes   Sig: Take 5 mg by mouth every morning RX is 5 mg (1/2 of 10 mg tab) 2 x daily but she takes daily due to drowsiness.   BENAZEPRIL HCL PO 6/1/2017 at am Self Yes Yes   Sig: Take 20 mg by mouth every morning   Ipratropium-Albuterol (COMBIVENT RESPIMAT)  MCG/ACT inhaler prn Self Yes Yes   Sig: Inhale 1 puff into the lungs 4 times daily as needed for shortness of breath / dyspnea or wheezing   LOVASTATIN PO 6/1/2017 at am Self Yes Yes   Sig: Take 20 mg by mouth every morning   MELOXICAM PO 6/1/2017 at am Self Yes Yes   Sig: Take 15 mg by mouth daily   Multiple Vitamins-Minerals (CENTRUM SILVER) per tablet 6/1/2017 at am Self Yes Yes   Sig: Take 1 tablet by mouth every morning   TRAZODONE HCL PO prn Self Yes Yes   Sig: Take 50 mg by mouth nightly as needed for sleep   fluticasone-vilanterol (BREO ELLIPTA) 100-25 MCG/INH oral inhaler 6/1/2017 at am Pharmacy Yes Yes   Sig: Inhale 1 puff into the lungs daily   ipratropium - albuterol 0.5 mg/2.5 mg/3 mL (DUONEB) 0.5-2.5 (3) MG/3ML neb solution 6/1/2017 at am Self Yes Yes   Sig: Take 1 vial by nebulization 2 times daily as needed for shortness of breath / dyspnea or wheezing   sertraline (ZOLOFT) 50 MG tablet 6/1/2017 at am Self No Yes   Sig: Take 1 tablet by mouth daily.      Facility-Administered Medications: None     Allergies   Allergies   Allergen Reactions     Iodine I 131 Tositumomab Anaphylaxis     Can tolerate topical iodine if it is wahed off after surgery      Penicillins Rash and Anaphylaxis     Swollen  lymph nodes, flushed overheating      Sulfa Drugs Nausea and Vomiting, Diarrhea and Rash       Immunization History     There is no  immunization history on file for this patient.    Social History   I have reviewed this patient's social history and updated it with pertinent information if needed. Ara Stanley  reports that she quit smoking about 25 years ago. Her smoking use included Cigarettes. She has a 10.00 pack-year smoking history. She does not have any smokeless tobacco history on file. She reports that she does not drink alcohol or use illicit drugs.    Family History   I have reviewed this patient's family history and updated it with pertinent information if needed.   No family history on file.    Review of Systems   Could not be obtained because of severe sickness      Physical Exam   Temp: 100.9  F (38.3  C) Temp src: Oral BP: 146/76   Heart Rate: 80 Resp: 22 SpO2: 100 % O2 Device: Mechanical Ventilator    Vital Signs with Ranges  Temp:  [97.4  F (36.3  C)-101.7  F (38.7  C)] 100.9  F (38.3  C)  Heart Rate:  [66-83] 80  Resp:  [19-56] 22  BP: (113-168)/() 146/76  FiO2 (%):  [40 %] 40 %  SpO2:  [93 %-100 %] 100 %  182 lbs 12.18 oz    GENERAL APPEARANCE:  Vented but awake   EYES: Eyes grossly normal to inspection, PERRL and conjunctivae and sclerae normal  HENT: ear canals and TM's normal and nose and mouth without ulcers or lesions  NECK: trach  RESP: lungs clear to auscultation - no rales, rhonchi or wheezes  CV: regular rates and rhythm, normal S1 S2, no S3 or S4 and no murmur, click or rub  LYMPHATICS: normal ant/post cervical and supraclavicular nodes  ABDOMEN:soft, some tenderness   MS: extremities normal- no gross deformities noted  SKIN: no suspicious lesions or rashes      Data   Lab Results   Component Value Date    WBC 9.9 06/15/2017    HGB 7.5 (L) 06/15/2017    HCT 21.3 (L) 06/15/2017     06/15/2017     (H) 06/15/2017    POTASSIUM 3.5 06/15/2017    CHLORIDE 113 (H) 06/15/2017    CO2 22 06/15/2017    BUN 52 (H) 06/15/2017    CR 1.38 (H) 06/15/2017     (H) 06/15/2017    SED 34 (H) 06/04/2012     NTBNPI 425 07/01/2008    TROPI  06/04/2017     <0.015  The 99th percentile for upper reference range is 0.045 ug/L.  Troponin values in   the range of 0.045 - 0.120 ug/L may be associated with risks of adverse   clinical events.      AST 13 06/15/2017    ALT 10 06/15/2017    ALKPHOS 108 06/15/2017    BILITOTAL 0.5 06/15/2017    INR 1.30 (H) 06/14/2017       Recent Labs  Lab 06/15/17  0855 06/14/17  0911 06/09/17  1023 06/09/17  0920 06/09/17  0839 06/09/17  0825   CULT No growth after 4 hours Moderate growth YeastCritical Value/Significant Value, preliminary result only, called to and read back by Saira Nicole RN on 6.15.2017 at 0815. KVO*  Canceled, Test creditedIncorrectly ordered by PCU/ClinicTest reordered as correct code No growth Candida albicans / dubliniensis Candida albicans and Candida dubliniensis are not routinely speciated isolated*  Light growth Candida albicans / dubliniensis Candida albicans and Candida dubliniensis are not routinely speciated Susceptibility testing not routinely done*  Canceled, Test creditedIncorrectly ordered by PCU/ClinicTest reordered as correct code No growth  Culture negative monitoring continues No growth     Recent Labs   Lab Test  06/15/17   0855  06/14/17   0911  06/09/17   1023  06/09/17   0920  06/09/17   0839  06/09/17   0825  06/08/17   1248  06/08/17   1237  06/08/17   1216   CULT  No growth after 4 hours  Moderate growth Yeast  Critical Value/Significant Value, preliminary result only, called to and read   back by Saira Nicole RN on 6.15.2017 at 0815. KVO  *  Canceled, Test credited  Incorrectly ordered by PCU/Clinic  Test reordered as correct code    No growth  Candida albicans / dubliniensis Candida albicans and Candida dubliniensis are not   routinely speciated isolated  *  Light growth Candida albicans / dubliniensis Candida albicans and Candida   dubliniensis are not routinely speciated Susceptibility testing not routinely   done  *  Canceled,  Test credited  Incorrectly ordered by PCU/Clinic  Test reordered as correct code    No growth  Culture negative monitoring continues  No growth  No growth  >10 Squamous epithelial cells/low power field indicates oral contamination.   Please recollect.  Canceled, Test credited  Notification of test cancellation was given to yAden BREWER SH66, @2214 6/8/17. .  *  No growth

## 2017-06-15 NOTE — PROGRESS NOTES
[facility] ICU RESPIRATORY NOTE  Date of Admission: 6/2/2017  Date of Intubation (most recent): 6/12/2017  Reason for Mechanical Ventilation: Airway protection  Number of Days on Mechanical Ventilation: 4 (Reintubated 6/12/2017, Trach 6/14/2017)  Met Criteria for Pressure Support Trial: Not this shift  Reason for No Pressure Support Trial: Per MD    Pt remains on full ventilatory support on the following settings:    Ventilation Mode: CMV/AC  FiO2 (%): 40 %  Rate Set (breaths/minute): 20 breaths/min  Tidal Volume Set (mL): 500 mL  PEEP (cm H2O): 5 cmH2O  Pressure Support (cm H2O): 5 cmH2O  Oxygen Concentration (%): 40 %  Resp: 24       Recent Labs  Lab 06/13/17  0415 06/12/17  1620 06/12/17  1509 06/12/17  0512 06/09/17  0947   PH  --  7.28* 7.02* 7.39 7.29*   PCO2  --  44 89* 35 43   PO2  --  126* 69* 76* 305*   HCO3  --  21 23 21 21   O2PER 50%  --  40% 40 100     Plan: SBT as tolerated. Will continue to follow.    6/15/2017  Aleksandra York RT

## 2017-06-15 NOTE — PROVIDER NOTIFICATION
MD Notification    Notified Person:  MD    Notified Persons Name: Intensivist and surgery    Notification Date/Time: 6/15/17, 0815    Notification Interaction:  Talked with Intensivist and Paged Surgery    Purpose of Notification: Positive fluid cultures    Orders Received: None    Comments:  [facility] micro lab called with positive ascites fluid cultures.  Moderate growth of yeast.  Pt is currently being treated with micafungin IV.

## 2017-06-15 NOTE — PROGRESS NOTES
Lab called to inform that the sputum collected earlier today unfortunately was frozen.  Needs to be reordered and collected.  RT called.  Will recollect.

## 2017-06-15 NOTE — PROGRESS NOTES
Renal Medicine Progress Note                                Ara Stanley MRN# [id number]   Age: 71 year old YOB: 1945   Date of Admission: 6/2/2017 Hospital LOS: 13                  Assessment/Plan:     Admitted with abdominal pain.  Diagnostic lap, lap alexandre and abdominal lavage 06/02/17.  Repair of perforated gastric ulcer 06/06/17    Seen for evaluation ARF    1.  Question baseline CKD   -creatinine unclear; suspected in 1.0 range   -dipstick positive albuminuria  2.  ARF   -nonoliguric   -ATN   -hypotension/IV contrast  3.  Increased volume  4.  Hypercapnic respiratory failure   -trach 06/13/17  5.  Borderline hypernatremia      Creatinine stale to improved  Repeat 25% albumin infusion pre diuretic  Replace K      Interval History:     Creatinine stable.  IO finally negative.  UO 3 liter range.  Continue current diuresis with   goal of continued negative IO       ROS:     Intubated: ROS unable    Medications and Allergies:     Reviewed    Physical Exam:     Vitals were reviewed  Patient Vitals for the past 8 hrs:   BP Temp Temp src Heart Rate Resp SpO2 Weight   06/15/17 0728 - - - - - 100 % -   06/15/17 0700 131/71 - - 73 24 100 % -   06/15/17 0626 140/66 - - 71 24 100 % -   06/15/17 0600 (!) 154/100 100.2  F (37.9  C) Oral 71 26 100 % -   06/15/17 0500 117/57 - - 69 24 99 % 82.9 kg (182 lb 12.2 oz)   06/15/17 0400 140/76 100.4  F (38  C) Oral 68 24 99 % -   06/15/17 0328 - - - - - 99 % -   06/15/17 0300 138/70 - - 70 22 99 % -   06/15/17 0200 142/68 100.1  F (37.8  C) Oral 72 22 99 % -   06/15/17 0130 - - - - 26 - -   06/15/17 0100 130/77 - - 72 20 99 % -     I/O last 3 completed shifts:  In: 2632.82 [I.V.:863.4; NG/GT:330]  Out: 3350 [Urine:3300; Stool:50]    Vitals:    06/09/17 0835 06/10/17 0559 06/11/17 0530 06/14/17 0400   Weight: 80.5 kg (177 lb 7.5 oz) 80.6 kg (177 lb 11.1 oz) 81.3 kg (179 lb 3.7 oz) 82 kg (180 lb 12.4 oz)    06/15/17 0500   Weight: 82.9 kg (182 lb 12.2 oz)        GENERAL:  intubated but follows  HEENT: ETT  RESP:  clear anteriorly  CV: RRR, normal S1 S2  ABDOMEN: soft, nontender, no HSM or masses and bowel sounds normal  :  tanner catheter  MS: no clubbing, cyanosis   SKIN: clear without significant rashes or lesions  EXT: 1 plus    Data:       Recent Labs  Lab 06/15/17  0400 06/14/17  0430 06/13/17  1320 06/13/17  0415  06/12/17  0555 06/11/17  0515  06/10/17  0500   NA  --  142  --  145*  --  144 145*  < > 143   POTASSIUM 3.1* 3.6 4.8 3.1*  < > 3.0* 3.5  --  3.1*   CHLORIDE  --  110*  --  112*  --  114* 115*  --  113*   CO2  --  20  --  21  --  21 21  --  21   ANIONGAP  --  12  --   --   --  9 9  --  9   GLC  --  270*  --  150*  --  128* 125*  --  129*   BUN  --  53*  --  47*  --  42* 40*  --  44*   CR 1.37* 1.41*  --  1.24*  --  1.11* 1.19*  < > 1.46*   GFRESTIMATED 38* 37*  --  43*  --  48* 45*  < > 35*   GFRESTBLACK 46* 44*  --  51*  --  59* 54*  < > 43*   CARYL  --  8.2*  --  7.8*  --  7.6* 7.7*  --  7.5*   < > = values in this interval not displayed.      Recent Labs   Lab Test  06/15/17   0400  06/14/17   0430  06/13/17   0415  06/12/17   0555  06/11/17   0515  06/10/17   1245  06/10/17   0500  06/09/17   0825  06/09/17   0500  06/08/17   0500   CR  1.37*  1.41*  1.24*  1.11*  1.19*  1.37*  1.46*  1.45*  1.55*  1.39*       Recent Labs  Lab 06/15/17  0400 06/14/17  0430 06/13/17  1145 06/13/17  0415 06/12/17  0555 06/11/17  1621   PHOS 3.2 3.1  --   --  2.5 2.5   HGB  --  8.0* 8.1* 6.8* 7.6*  --          [facility]  [phone]

## 2017-06-15 NOTE — PHARMACY-VANCOMYCIN DOSING SERVICE
Pharmacy Vancomycin Initial Note  Date of Service Debi 15, 2017  Patient's  1945  71 year old, female    Indication: Bacteremia    Current estimated CrCl = Estimated Creatinine Clearance: 41.4 mL/min (based on Cr of 1.38).    Creatinine for last 3 days  2017:  4:15 AM Creatinine 1.24 mg/dL  2017:  4:30 AM Creatinine 1.41 mg/dL  6/15/2017:  4:00 AM Creatinine 1.37 mg/dL; 11:54 AM Creatinine 1.38 mg/dL    Recent Vancomycin Level(s) for last 3 days  No results found for requested labs within last 72 hours.      Vancomycin IV Administrations (past 72 hours)      No vancomycin orders with administrations in past 72 hours.                Nephrotoxins and other renal medications (Future)    Start     Dose/Rate Route Frequency Ordered Stop    06/15/17 1415  vancomycin (VANCOCIN) 1,750 mg in NaCl 0.9 % 250 mL intermittent infusion      1,750 mg Intravenous EVERY 24 HOURS 06/15/17 1410      17 1200  furosemide (LASIX) injection 40 mg      40 mg Intravenous 2 TIMES DAILY. 17 1147            Contrast Orders - past 72 hours (72h ago through future)    Start     Dose/Rate Route Frequency Ordered Stop    06/15/17 1415  iohexol (OMNIPAQUE) 240 mg/mL solution 15 mL      15 mL Per NG tube ONCE 06/15/17 1408      17 1430  diatrizoate meglumine-sodium (GASTROGRAFIN; GASTROVIEW) 66-10 % solution 150 mL      150 mL Oral ONCE 17 2132 17 1435                Plan:  1.  Start vancomycin  1750 mg IV q24h. Concentrated per MD request (in 250mL instead of 500mL) - to be given in central line only.  2.  Goal Trough Level: 15-20 mg/L   3.  Pharmacy will check trough levels as appropriate in 3-5 Days.    4. Serum creatinine levels will be ordered daily for the first week of therapy and at least twice weekly for subsequent weeks.    5. Cromwell method utilized to dose vancomycin therapy: Method 1    Jessie Hamilton, PharmD

## 2017-06-15 NOTE — PROGRESS NOTES
Date of Admission: 6/2/2017  Date of Intubation (most recent): 6/12/2017  Reason for Mechanical Ventilation: Airway protection  Number of Days on Mechanical Ventilation: 4 (Reintubated 6/12/2017, Trach 6/14/2017)  Met Criteria for Pressure Support Trial: Yes  Reason for No Pressure Support Trial: failed within 10 Minutes.      Pt remains on full ventilatory support on the following settings:     Ventilation Mode: CMV/AC  FiO2 (%): 40 %  Rate Set (breaths/minute): 20 breaths/min  Tidal Volume Set (mL): 500 mL  PEEP (cm H2O): 5 cmH2O  Pressure Support (cm H2O): 5 cmH2O  Oxygen Concentration (%): 40 %    Plan, To remain full support, and assess tomorrow for pressure support.6/15/2017  Yadi Camilo

## 2017-06-16 NOTE — PROGRESS NOTES
Care Coordinator spoke micaela Chang (pt's daughter via phone) via phone after she spoke wt Dr Ugalde and she consented for pt to be referred to both LTACHs.  Referral sent to Beverly ([facility] Liaison) via voicemail and to R[facility] ([facility]) in person.

## 2017-06-16 NOTE — PROGRESS NOTES
Date of Admission: 6/2/2017  Date of Intubation (most recent): 6/12/2017  Reason for Mechanical Ventilation: Airway protection  Number of Days on Mechanical Ventilation: 5 (Reintubated 6/12/2017, Trach 6/14/2017)  Met Criteria for Pressure Support Trial: Not this shift  Reason for No Pressure Support Trial: Per MD  Ventilation Mode: CMV/AC  FiO2 (%): 40 %  Rate Set (breaths/minute): 20 breaths/min  Tidal Volume Set (mL): 500 mL  PEEP (cm H2O): 5 cmH2O  Pressure Support (cm H2O): 10 cmH2O  Oxygen Concentration (%): 40 %  Resp: 24    Recent Labs  Lab 06/13/17  0415 06/12/17  1620 06/12/17  1509 06/12/17  0512 06/09/17  0947   PH  --  7.28* 7.02* 7.39 7.29*   PCO2  --  44 89* 35 43   PO2  --  126* 69* 76* 305*   HCO3  --  21 23 21 21   O2PER 50%  --  40% 40 100   plan: continue full vent support , assess for weaning readiness daily.  Barb Tobias

## 2017-06-16 NOTE — PROGRESS NOTES
Renal Medicine Progress Note                                Ara Stanley MRN# [id number]   Age: 71 year old YOB: 1945   Date of Admission: 6/2/2017 Hospital LOS: 14                  Assessment/Plan:     Admitted with abdominal pain.  Diagnostic lap, lap alexandre and abdominal lavage 06/02/17.  Repair of perforated gastric ulcer 06/06/17    Seen for evaluation ARF    1.  Question baseline CKD   -creatinine unclear; suspected in 1.0 range   -dipstick positive albuminuria  2.  ARF   -nonoliguric   -ATN   -hypotension/IV contrast  3.  Increased volume  4.  Hypercapnic respiratory failure   -trach 06/13/17  5.  Borderline hypernatremia      Creatinine up slightly  IO negative  Continue diuretic plus 25% albumin today    Further increase in creatinine may necessitate holding diuretic  Mix all infusion in D5  Increase free water to 60 q4    Interval History:     Good UO.  IO negative.  Awake.  Appears to follow.         ROS:     Intubated: ROS unable    Medications and Allergies:     Reviewed    Physical Exam:     Vitals were reviewed  Patient Vitals for the past 8 hrs:   BP Temp Temp src Heart Rate Resp SpO2 Weight   06/16/17 0700 - - - 80 21 100 % -   06/16/17 0600 141/75 100.8  F (38.2  C) Oral 79 16 100 % 80 kg (176 lb 5.9 oz)   06/16/17 0500 - - - 76 11 98 % -   06/16/17 0400 141/69 - - 80 20 100 % -   06/16/17 0351 - - - - - 100 % -   06/16/17 0300 - - - 85 24 100 % -   06/16/17 0200 138/65 - - 76 21 100 % -   06/16/17 0100 - - - 75 20 100 % -     I/O last 3 completed shifts:  In: 2360.8 [I.V.:805.8; NG/GT:420]  Out: 3615 [Urine:3450; Drains:165]    Vitals:    06/10/17 0559 06/11/17 0530 06/14/17 0400 06/15/17 0500   Weight: 80.6 kg (177 lb 11.1 oz) 81.3 kg (179 lb 3.7 oz) 82 kg (180 lb 12.4 oz) 82.9 kg (182 lb 12.2 oz)    06/16/17 0600   Weight: 80 kg (176 lb 5.9 oz)       GENERAL:  Vent dependent  HEENT: trach  RESP:  clear anteriorly  CV: RRR, normal S1 S2  ABDOMEN: soft, nontender, no HSM or  masses and bowel sounds normal  :  tanner catheter  MS: no clubbing, cyanosis   SKIN: clear without significant rashes or lesions  EXT: 1 plus    Data:       Recent Labs  Lab 06/16/17  0425 06/15/17  1154 06/15/17  0800 06/15/17  0400 06/14/17  0430  06/13/17  0415  06/12/17  0555   * 145*  --   --  142  --  145*  --  144   POTASSIUM 3.1* 3.5 3.9 3.1* 3.6  < > 3.1*  < > 3.0*   CHLORIDE 112* 113*  --   --  110*  --  112*  --  114*   CO2 24 22  --   --  20  --  21  --  21   ANIONGAP 10 10  --   --  12  --   --   --  9   GLC 94 108*  --   --  270*  --  150*  --  128*   BUN 55* 52*  --   --  53*  --  47*  --  42*   CR 1.49* 1.38*  --  1.37* 1.41*  --  1.24*  --  1.11*   GFRESTIMATED 34* 38*  --  38* 37*  --  43*  --  48*   GFRESTBLACK 42* 46*  --  46* 44*  --  51*  --  59*   CARYL 8.4* 8.2*  --   --  8.2*  --  7.8*  --  7.6*   < > = values in this interval not displayed.      Recent Labs   Lab Test  06/16/17   0425  06/15/17   1154  06/15/17   0400  06/14/17   0430  06/13/17   0415  06/12/17   0555  06/11/17   0515  06/10/17   1245  06/10/17   0500  06/09/17   0825   CR  1.49*  1.38*  1.37*  1.41*  1.24*  1.11*  1.19*  1.37*  1.46*  1.45*       Recent Labs  Lab 06/16/17  0425 06/15/17  1154 06/15/17  0400 06/14/17  0430 06/13/17  1145  06/12/17  0555   PHOS 3.0  --  3.2 3.1  --   --  2.5   HGB 7.3* 7.5*  --  8.0* 8.1*  < > 7.6*   < > = values in this interval not displayed.      CYRIL Bhakta    [facility]phrology  [phone]

## 2017-06-16 NOTE — PROGRESS NOTES
[facility] Nurse Inpatient Adult Pressure Injury (PI) Assessment     Follow-up Assessment of PI(s) on pt's:   Suspected coccyx PI    Data:   Patient History:      Admission Date:  6/2/2017     Active Problem List:   -->  COPD  -->  Sepsis  -->  Possible PNA  -->  UTI   -->  Status post cholecystectomy, laparoscopic  -->  Gastric ulcer with perforation status post omental patch  -->  Acute hypercapneic respiratory failure     24 Hour Events:  Had multiple RRT was called overnight.  Hypotensive.  Altered mental status.  250 cc bolus given.  BiPAP attempted.  Worsening mental status with a pCO2 found to be almost 90.  No changes in antibiotics.  Worsening blood pressure to systolic of 60 and diastolic of 40.  Patient transferred to ICU.  Intubated on arrival to ICU.  Diflucan discontinued.  Micafungin started.  Vancomycin added.  Fluid bolus.    Current mattress:  Atmos air  Current pressure relieving devices: Pillows and prevalon heel lift boots       Moisture Management: Crawford for UIC / rectal tube for FIC    Catheter secured? Yes      Current Diet / Nutrition:  TPN      Mark Assessment and sub scores:   Mark Score  Avg: 15.1  Min: 11  Max: 22           Vented, restrained, dietitian following; multiple surgeries, Current LOS: 14 days; Comorbidities:          CPOD; sepsis, UTI    Labs:   Recent Labs   Lab Test  06/16/17   0425   06/08/17   0500   06/04/12   0527   ALBUMIN  2.6*   < >  1.8*   < >   --    HGB  7.3*   < >  7.8*   < >  12.9   INR  1.42*   < >  1.26*   < >  0.97   WBC  7.7   < >  13.9*   < >  7.6   A1C   --    --   Canceled, Test credited   UNABLE TO CALCULATE % HBA1C DUE TO LOW HGB AND/OR LOW HGBA1C  NOTIFIED RASHAD IN ICU AT 0634     --    --    CRP   --    --    --    --   11.4*    < > = values in this interval not displayed.   Albumin improving                                                                                                                     Pressure Injury  "Assessment  (location):   Suspected coccyx PI      Skin remains intact and blanchable throughout.  Turning was painful for pt, but no tenderness noted with palpation of coccyx area.             Intervention:     Patient's chart evaluated.      Mark Interventions:  Current Mark Interventions and Care Plan reviewed and updated, appropriate at this time.         Coccyx:  Area inspected, left open to air, as it was on assessment    Orders reviewed    Supplies  In floor supply room    Discussed plan of care with Nursing. No family present             Assessment:     Coccyx: no PI noted on assessment today.  Did not note any further issues to groin area skin.  Pt remains high risk for PI due to very limited mobility, vented, restraints, multiple co-morbities.  Nursing doing great job so far of preventing any injury.      Mark Risk             Plan:     Nursing to notify the Provider(s) and re-consult the WOC Nurse if wound(s) deteriorate(s)or if the wound care plan needs reevaluation.    If pt is refusing to turn or reposition they must be educated on the  potential injury from not off loading pressure.  Then this \"educated refusal\" needs to be documented as an \"educated refusal to turn/ reposition\" and document if alert, etc.         PIP POC:        1. Diligent turning every 2 hours.          2.  Consider pulsate mattress for worsening condition, moisture issues, Pt needing HOB above 30+ degrees for breathing        3. Continue bilateral prevalon boots        4.  Dietitian following        5.  HOB @ 30 degrees for VAP's         6.  Check under all devices during skin inspections and reposition tubes if possible        7. Incontinence protocol: antifungal powder to mono-area as ordered        8.  Mark Risk: document interventions        9.  Full skin inspections BID and document    WOC will follow-up again in about a week and prn to ensure no new issues, since pt remains at high risk for PI    WOC Nurse will " return: weekly and prn  Face to face time: 15 minutes

## 2017-06-16 NOTE — PLAN OF CARE
Problem: Goal Outcome Summary  Goal: Goal Outcome Summary  Discharge Planner PT   Patient plan for discharge: Unable to state due to trach  Current status: Patient needs max assist for all functional mobility. C/o pain in right LE at times but did tolerate some ex this p.m. Patient resistant to ex and rolling her eyes at therapist at times. Educated her on the importance of activity and ex.   Barriers to return to prior living situation: Lives alone, amount of assist currently needed for all mobility.  Recommendations for discharge: LTACH  Rationale for recommendations: Patient currently on ventilator via trach.        Entered by: Cinthia Myers 06/16/2017 2:02 PM

## 2017-06-16 NOTE — PROGRESS NOTES
Pt remains trached, vented and sedated with precedex. Awakes spontaneously, follows commands, tries to mouth words and can be restless at times. Grabbed hold of vent tubing at one point and was trying to pull it. Restraints continued and remain necessary. Flexi-seal removed earlier this evening, pt had extremely large BM; green and mucous-y liquid. Replaced flexi-seal. Good UOP per tanner, 200+/2hr. Tele SR with 1 degree HB. RLQ site draining purulent thin fluid, no purple powerade seen.  Plan for continued support and monitoring.

## 2017-06-16 NOTE — PROGRESS NOTES
[facility]    Infectious Disease Progress Note    Date of Service (when I saw the patient): 06/16/2017     Assessment & Plan   Ara Stanley is a 71 year old female who was admitted on 6/2/2017.    Impression:  71 y.o female admitted in the hospital for about 2 weeks now.   Initial presentation was of acute abdomen taken to OR for cholecystectomy.   Taken back to the OR 4 days later with concern for peritonitis, found to have gastric perforation of an ulcer which was repaired.   Post-operative course has been complicated with multiple intubations, trials of weaning off the vent and now with trach.   Most recent concern also has been HCAP.   Only cultures positive are sputum for candida and abdominal fluid for yeast still being identified in the microbiology lab.   She was  on Micafungin and Ertapenem and febrile. Broadened to Vancomycin, meropenem and continued on Micafungin on 6/15.      Recommendations:   Continue on current antibiotics.   Noted sputum cultures, will follow.       Celso Birch MD    Interval History   Continue to be febrile   Antibiotics broadened less than 24 hours ago  Awake, following     Physical Exam   Temp: 101.8  F (38.8  C) Temp src: Oral BP: 146/69   Heart Rate: 87 Resp: 24 SpO2: 100 % O2 Device: Mechanical Ventilator    Vitals:    06/14/17 0400 06/15/17 0500 06/16/17 0600   Weight: 82 kg (180 lb 12.4 oz) 82.9 kg (182 lb 12.2 oz) 80 kg (176 lb 5.9 oz)     Vital Signs with Ranges  Temp:  [100.8  F (38.2  C)-101.8  F (38.8  C)] 101.8  F (38.8  C)  Heart Rate:  [75-87] 87  Resp:  [11-56] 24  BP: (111-154)/(54-95) 146/69  FiO2 (%):  [40 %-50 %] 40 %  SpO2:  [98 %-100 %] 100 %    Constitutional: Awake, alert, cooperative, no apparent distress  Lungs: Clear to auscultation bilaterally, no crackles or wheezing  Cardiovascular: Regular rate and rhythm, normal S1 and S2, and no murmur noted  Abdomen: Normal bowel sounds, soft, non-distended, non-tender  Skin: No rashes, no  cyanosis, no edema  Other:    Medications     dexmedetomidine 0.7 mcg/kg/hr (06/16/17 0407)     - MEDICATION INSTRUCTIONS -       NaCl 25 mL/hr at 06/16/17 0633     IV fluid REPLACEMENT ONLY Stopped (06/08/17 0450)       albumin human  25 g Intravenous BID     meropenem  1 g Intravenous Q12H     vancomycin (VANCOCIN) IV  1,750 mg Intravenous Q24H     QUEtiapine  12.5 mg Oral QAM     QUEtiapine  25 mg Oral At Bedtime     furosemide  40 mg Intravenous BID     micafungin  100 mg Intravenous Q24H     chlorhexidine  15 mL Swish & Spit BID     insulin aspart  1-12 Units Subcutaneous Q4H     vitamin  B-1  200 mg Oral Daily     acetaminophen  1,000 mg Oral Q8H    Or     acetaminophen  650 mg Rectal Q8H     pantoprazole  40 mg Intravenous BID     folic acid  1 mg Oral Once     heparin  5,000 Units Subcutaneous Q8H     budesonide  1 mg Nebulization BID     sodium chloride (PF)  10 mL Intracatheter Q8H     arformoterol  15 mcg Nebulization BID     senna-docusate  1-2 tablet Oral BID     sertraline  50 mg Oral Daily     simvastatin  10 mg Oral QAM       Data   All microbiology laboratory data reviewed.  Recent Labs   Lab Test  06/16/17   0425  06/15/17   1154  06/14/17   0430   WBC  7.7  9.9  15.3*   HGB  7.3*  7.5*  8.0*   HCT  20.9*  21.3*  23.1*   MCV  83  82  84   PLT  368  332  267     Recent Labs   Lab Test  06/16/17   0425  06/15/17   1154  06/15/17   0400   CR  1.49*  1.38*  1.37*     Recent Labs   Lab Test  06/04/12   0527   SED  34*     Recent Labs   Lab Test  06/15/17   1947  06/15/17   1143  06/15/17   0855  06/14/17   0911  06/09/17   1023  06/09/17   0920  06/09/17   0839  06/09/17   0825  06/08/17   1248   CULT  Pending  Canceled, Test credited Unsatisfactory Specimen - Specimen frozen prior to   receipt in lab.  FROZE SAMPLE IN TRANSIT Notification of test   cancellation was given to OSMAN TIJERINA AT [facility].  RN WILL RECOLLECT AND REORDER.  [facility] 6.15.17 1600    No growth after 17 hours  Moderate growth  Yeast  Critical Value/Significant Value, preliminary result only, called to and read   back by Saira Nicole RN on 6.15.2017 at 0815. KVO  *  Canceled, Test credited  Incorrectly ordered by PCU/Clinic  Test reordered as correct code    No growth  Candida albicans / dubliniensis Candida albicans and Candida dubliniensis are not   routinely speciated isolated  *  Light growth Candida albicans / dubliniensis Candida albicans and Candida   dubliniensis are not routinely speciated Susceptibility testing not routinely   done  *  Canceled, Test credited  Incorrectly ordered by PCU/Clinic  Test reordered as correct code    No anaerobes isolated  No growth  No growth  No growth

## 2017-06-16 NOTE — PROGRESS NOTES
SWS  D:  SW received a call from pt's daughter Charlene informing SW that family's 1st preference for LTACH is [facility].  However, family understands pt will be placed at whatever has availability and can accept her and they agree to this.  SW will note this in case management notes.  P:  SW not following pt, however, remains available should need arise.

## 2017-06-16 NOTE — PLAN OF CARE
Problem: Ventilation, Mechanical Invasive (Adult)  Goal: Signs and Symptoms of Listed Potential Problems Will be Absent or Manageable (Ventilation, Mechanical Invasive)  Signs and symptoms of listed potential problems will be absent or manageable by discharge/transition of care (reference Ventilation, Mechanical Invasive (Adult) CPG).   Outcome: No Change  Patient is alert and following simple commands. Mouthing words. Restless intermittently. Precedex gtt at 0.7 mcg/hr. Weaned for 1 hr on PS 10 Peep 5 FIO2 40%. Low grade fever. Up in chair.Urine output adequate. Sputum CS sent. Stable.Kandy Du, RN, BSN, BC, CCRN

## 2017-06-16 NOTE — PROGRESS NOTES
CLINICAL NUTRITION SERVICES - REASSESSMENT NOTE      EVALUATION OF PROGRESS TOWARD GOALS   Diet:  NPO  Nutrition Support: Isosource 1.5 @ 45mL/hr (increased to goal rate on 6/15), TPN off on 6/15    Nutrition Support Enteral:  Type of Feeding Tube: ND tube    Enteral Frequency:  Continuous  Enteral Regimen: Isosource 1.5 at 45mL/hr  Total Enteral Provisions: 1620 kcal (26 kcal/kg), 73 g protein (1.2 g/kg), 190 g CHO, 821 mL H2O, 16 gm fiber  Free Water Flush: 60mL Q4hrs - Increased 6/16    Intake:   I/O: 2281/3480, wt trending down. - Diuresing, on IV Albumin and IV Lasix    Stools: BM x 1 (6/15), BM x 2 (6/14), BM x2 (6/13)  Na: 146 (H)  K: 3.1 (L)  BUN: 55 (H)  Cr: 1.49 (H)  BG < 150    DW:  63.4 kg    ASSESSED NEEDS: Increased protein needs ( 6/15)  Energy Needs: 9714-2540 kcals (25-30 Kcal/Kg) - maintenance, vented  Protein Needs:  70-90 grams protein (1.1-1.4 g pro/Kg) - post-op, hypercatabolism with acute illness and CKD    NEW FINDINGS:   6/15:  NGT replaced and Keofeed placed (Post pyloric)- distal bulb in the third portion of duodenum. NGT for decompression  6/15: NGT placed and purple powerade instilled, no drainage noted from prior drain site.  6/16: Water flushes increased to 60mL Q4hr by MD  -Plan for LTCH next week        Previous Goals (6/14):   Patient will transition from TPN --> TF within the next 24-48 hours   Evaluation: Met    Previous Nutrition Diagnosis (6/14):   Inadequate enteral nutrition infusion related to low drip TF continues, plan to advance towards goal today as evidenced by meeting 20% needs via current low drip TF, continued TPN reliance   Evaluation: resolved       CURRENT NUTRITION DIAGNOSIS  No nutrition diagnosis identified at this time     INTERVENTIONS  Recommendations / Nutrition Prescription  Continue enteral nutrition.  Isosource 1.5 at 45mL/hr. Providing, 1620 kcal (26 kcal/kg), 73 g protein (1.2 g/kg), 190 g CHO, 821 mL H2O, 16 gm fiber    Implementation  Collaboration  and Referral of Nutrition care- discussed pt during interdisciplinary rounds     Goals  EN to meet 90%- 110% of assessed needs      MONITORING AND EVALUATION:  Progress towards goals will be monitored and evaluated per protocol and Practice Guidelines    Keely Stanley Clinical Dietitian

## 2017-06-16 NOTE — PLAN OF CARE
Problem: Goal Outcome Summary  Goal: Goal Outcome Summary  Outcome: No Change  Neuro: Alert but confused.  Moves all extremities equally.  PERRLA.  Emotionally labile.  Calm and cooperative and then frustrated, uncooperative and tearful others.    Cardiac:  SR with 1AVB.  Hypertensive.  Hydralazine given.  Resp:  Trach site CDI.  Unable to tolerate SBT 10/5 more than 10 minutes.  C/O SOB, and had increased RR 30-35.  LS are coarse throughout.    GI:  Keofeed placed by X-ray. Intermittent abdominal pain relieved by IV dilaudid and oxy.  Lap sites CDI.  Old FUNMI site continues to drain purulent looking drainage.    :  Crawford draining adequate amount of urine.  Diuresing per Nephrology.    Labs: K+ replaced.  All other labs reviewed by MD's.

## 2017-06-16 NOTE — PROGRESS NOTES
"General Surgery Progress Note    Subjective: pt sedated this morning. Continues to have intermittent fevers, WBC 7 this morning. PST for 3 hrs yesterday. NGT placed and gatorade instilled, no drainage noted from prior drain site.     Objective: /75  Pulse 93  Temp 100.8  F (38.2  C) (Oral)  Resp 21  Ht 5' 7\" (1.702 m)  Wt 176 lb 5.9 oz (80 kg)  SpO2 100%  BMI 27.62 kg/m2  Gen: opens eyes to voice  CV: RRR  Pulm: nonlabored breathing  Abd: soft, tender near incisions. Ostomy appliance without new output since yesterday - removed and dressing placed  Ext: WWP    Assessment/Plan:   Ara Stanley  is a 71 year old female s/p lap alexandre and lap repair of perforated gastric ulcer. No evidence of leak at this time.   - continue tube feeds  - ok to replace ostomy appliance over prior drain site if there is ongoing drainage, otherwise cover with clean dry dressing.   - if minimal output from NGT - ok to remove.     Estelita Peck MD  [facility]  [phone]              "

## 2017-06-17 NOTE — PLAN OF CARE
Problem: Goal Outcome Summary  Goal: Goal Outcome Summary  PT-Attempted to see. Nurse and therapist spent a long time educating patient on the importance of activity and encouraging patient to participate. Patient was adamantly refusing, shaking her head no, mouthing tomorrow and when therapist tried to stretch her ankle she just wiggled her foot so therapist couldn't hold onto it. Therapist and nurse educated her that therapist will see her tomorrow at 9:30 and the plan is to sit on the edge of the bed.

## 2017-06-17 NOTE — PROGRESS NOTES
Renal Medicine Progress Note                                Ara Stanley MRN# [id number]   Age: 71 year old YOB: 1945   Date of Admission: 6/2/2017 Hospital LOS: 15                  Assessment/Plan:     Admitted with abdominal pain.  Diagnostic lap, lap alexandre and abdominal lavage 06/02/17.  Repair of perforated gastric ulcer 06/06/17    Seen for evaluation ARF    1.  Question baseline CKD   -creatinine unclear; suspected in 1.0 range   -dipstick positive albuminuria  2.  ARF   -nonoliguric   -ATN   -hypotension/IV contrast  3.  Increased volume  4.  Hypercapnic respiratory failure   -trach 06/13/17  5.  Borderline hypernatremia  7.  Anemia   -transfuse as indicated      Creatinine stabilized  Na improved    Continue current free water  Continue diuretic and albumin  Goal continued negative IO      Interval History:     Good UO.  IO negative.  Awake.  Appears to follow.  Labs stable to improved       ROS:     Intubated: ROS unable    Medications and Allergies:     Reviewed    Physical Exam:     Vitals were reviewed  Patient Vitals for the past 8 hrs:   BP Temp Temp src Heart Rate Resp SpO2 Weight   06/17/17 0600 112/63 99.9  F (37.7  C) Oral 80 26 100 % 74.5 kg (164 lb 3.9 oz)   06/17/17 0500 130/70 - - 80 23 100 % -   06/17/17 0400 131/66 99.9  F (37.7  C) Oral 84 26 100 % -   06/17/17 0349 - - - - - 100 % -   06/17/17 0300 127/65 - - 87 26 100 % -   06/17/17 0200 129/62 99.8  F (37.7  C) Oral 87 21 100 % -   06/17/17 0105 121/58 - - 82 22 - -   06/17/17 0100 (!) 53/39 - - 87 24 100 % -     I/O last 3 completed shifts:  In: 2024.6 [I.V.:509.6; NG/GT:180]  Out: 4600 [Urine:3700; Stool:900]    Vitals:    06/11/17 0530 06/14/17 0400 06/15/17 0500 06/16/17 0600   Weight: 81.3 kg (179 lb 3.7 oz) 82 kg (180 lb 12.4 oz) 82.9 kg (182 lb 12.2 oz) 80 kg (176 lb 5.9 oz)    06/17/17 0600   Weight: 74.5 kg (164 lb 3.9 oz)       GENERAL:  Vent dependent  HEENT: trach  RESP:  clear anteriorly  CV: RRR,  normal S1 S2  ABDOMEN: soft, nontender, no HSM or masses and bowel sounds normal  :  tanner catheter  MS: no clubbing, cyanosis   SKIN: clear without significant rashes or lesions  EXT: 1 plus    Data:       Recent Labs  Lab 06/17/17  0430 06/16/17  2005 06/16/17  1240 06/16/17  0425 06/15/17  1154  06/15/17  0400 06/14/17  0430     --   --  146* 145*  --   --  142   POTASSIUM 3.5 3.6 3.2* 3.1* 3.5  < > 3.1* 3.6   CHLORIDE 112*  --   --  112* 113*  --   --  110*   CO2 23  --   --  24 22  --   --  20   ANIONGAP 9  --   --  10 10  --   --  12   *  --   --  94 108*  --   --  270*   BUN 53*  --   --  55* 52*  --   --  53*   CR 1.41*  --   --  1.49* 1.38*  --  1.37* 1.41*   GFRESTIMATED 37*  --   --  34* 38*  --  38* 37*   GFRESTBLACK 44*  --   --  42* 46*  --  46* 44*   CARYL 8.1*  --   --  8.4* 8.2*  --   --  8.2*   < > = values in this interval not displayed.      Recent Labs   Lab Test  06/17/17   0430  06/16/17   0425  06/15/17   1154  06/15/17   0400  06/14/17   0430  06/13/17   0415  06/12/17   0555  06/11/17   0515  06/10/17   1245  06/10/17   0500   CR  1.41*  1.49*  1.38*  1.37*  1.41*  1.24*  1.11*  1.19*  1.37*  1.46*       Recent Labs  Lab 06/16/17  0425 06/15/17  1154 06/15/17  0400 06/14/17 0430 06/13/17  1145  06/12/17  0555   PHOS 3.0  --  3.2 3.1  --   --  2.5   HGB 7.3* 7.5*  --  8.0* 8.1*  < > 7.6*   < > = values in this interval not displayed.      G Alvin Bhakta    [facility]  [phone]

## 2017-06-17 NOTE — PROGRESS NOTES
[facility] [place]    Infectious Disease Progress Note    Date of Service (when I saw the patient): 06/17/2017     Assessment & Plan   Ara Stanley is a 71 year old female who was admitted on 6/2/2017.    Impression:  1 71 y.o female admitted in the hospital for about 2 weeks now.   2 Initial presentation was of acute abdomen taken to OR for cholecystectomy.   3 Taken back to the OR 4 days later with concern for peritonitis, found to have gastric perforation of an ulcer which was repaired. C albicans in cx  4 Post-operative course has been complicated with multiple intubations, trials of weaning off the vent and now with trach.   5 Now ? HCAP. Sputum GNR  6 Mild renal insuff        Recommendations:   Continue jimi.   Await sputum and adjust  Continue tiff but flucon soon is sens org in peritoneal cx  vanco for now but if no cx requiring it DC soon.       Rick Admas MD    Interval History   Continue to be febrile but less last 24   Antibiotics broadened less than 24 hours ago  Awake, following   Some drainage from drain site  GNR sputum and C albicans is peritoneum cx  Physical Exam   Temp: 99.9  F (37.7  C) Temp src: Oral BP: 112/63 Pulse: 93 Heart Rate: 80 Resp: 26 SpO2: 100 % O2 Device: Mechanical Ventilator    Vitals:    06/15/17 0500 06/16/17 0600 06/17/17 0600   Weight: 82.9 kg (182 lb 12.2 oz) 80 kg (176 lb 5.9 oz) 74.5 kg (164 lb 3.9 oz)     Vital Signs with Ranges  Temp:  [97.1  F (36.2  C)-100.3  F (37.9  C)] 99.9  F (37.7  C)  Pulse:  [93] 93  Heart Rate:  [74-87] 80  Resp:  [9-26] 26  BP: ()/() 112/63  FiO2 (%):  [40 %] 40 %  SpO2:  [99 %-100 %] 100 %    Constitutional: Awake, alert, cooperative, no apparent distress vent, trach  Lungs: Clear to auscultation bilaterally, no crackles or wheezing vent sounds  Cardiovascular: Regular rate and rhythm, normal S1 and S2, and no murmur noted  Abdomen: Normal bowel sounds, soft, non-distended, non-tender drain site  drainage  Skin: No rashes, no cyanosis, no edema  Other:    Medications     dexmedetomidine Stopped (06/17/17 0816)     - MEDICATION INSTRUCTIONS -       NaCl 25 mL/hr at 06/17/17 0600     IV fluid REPLACEMENT ONLY Stopped (06/08/17 0450)       albumin human  25 g Intravenous BID     meropenem  1 g Intravenous Q12H     vancomycin (VANCOCIN) IV  1,750 mg (central catheter) Intravenous Q24H     micafungin  100 mg Intravenous Q24H     QUEtiapine  12.5 mg Oral QAM     QUEtiapine  25 mg Oral At Bedtime     furosemide  40 mg Intravenous BID     chlorhexidine  15 mL Swish & Spit BID     insulin aspart  1-12 Units Subcutaneous Q4H     vitamin  B-1  200 mg Oral Daily     acetaminophen  1,000 mg Oral Q8H    Or     acetaminophen  650 mg Rectal Q8H     pantoprazole  40 mg Intravenous BID     folic acid  1 mg Oral Once     heparin  5,000 Units Subcutaneous Q8H     budesonide  1 mg Nebulization BID     sodium chloride (PF)  10 mL Intracatheter Q8H     arformoterol  15 mcg Nebulization BID     senna-docusate  1-2 tablet Oral BID     sertraline  50 mg Oral Daily     simvastatin  10 mg Oral QAM       Data   All microbiology laboratory data reviewed.  Recent Labs   Lab Test  06/16/17   0425  06/15/17   1154  06/14/17   0430   WBC  7.7  9.9  15.3*   HGB  7.3*  7.5*  8.0*   HCT  20.9*  21.3*  23.1*   MCV  83  82  84   PLT  368  332  267     Recent Labs   Lab Test  06/17/17   0430  06/16/17   0425  06/15/17   1154   CR  1.41*  1.49*  1.38*     Recent Labs   Lab Test  06/04/12   0527   SED  34*     Recent Labs   Lab Test  06/16/17   1145  06/15/17   1947  06/15/17   1143  06/15/17   0855  06/14/17   0911  06/09/17   1023  06/09/17   0920  06/09/17   0839  06/09/17   0825   CULT  Pending  Heavy growth Non lactose fermenting gram negative rods  Culture in progress  *  Canceled, Test credited Unsatisfactory Specimen - Specimen frozen prior to   receipt in lab.  FROZE SAMPLE IN TRANSIT Notification of test   cancellation was given to  OSMAN TIJERINA AT [facility].  RN WILL RECOLLECT AND REORDER.  [facility] 6.15.17 1600    No growth after 2 days  Moderate growth Candida albicans / dubliniensis Candida albicans and Candida   dubliniensis are not routinely speciated Susceptibility testing not routinely   done  Critical Value/Significant Value, preliminary result only, called to and read   back by Saira Nicole RN on 6.15.2017 at 0815. KVO  Susceptibility testing requested by . [id number]. susceptibility on   Candida albicans and dubliniensis. [id number]  *  Canceled, Test credited  Incorrectly ordered by PCU/Clinic  Test reordered as correct code    No growth  Candida albicans / dubliniensis Candida albicans and Candida dubliniensis are not   routinely speciated isolated  *  Light growth Candida albicans / dubliniensis Candida albicans and Candida   dubliniensis are not routinely speciated Susceptibility testing not routinely   done  *  Canceled, Test credited  Incorrectly ordered by PCU/Clinic  Test reordered as correct code    No anaerobes isolated  No growth  No growth

## 2017-06-17 NOTE — PROGRESS NOTES
"General Surgery  Admission Date: 6/2/2017  Today's Date: 6/17/2017      SUBJECTIVE  Pt more awake this morning, attempting to communicate. Tmax overnight 100.3. Minimal slightly purulent thin drainage from previous drain site. Tolerating tube feeds.        OBJECTIVE  /63  Pulse 93  Temp 99.9  F (37.7  C) (Oral)  Resp 26  Ht 1.702 m (5' 7\")  Wt 74.5 kg (164 lb 3.9 oz)  SpO2 100%  BMI 25.72 kg/m2  I/O last 3 completed shifts:  In: 2024.6 [I.V.:509.6; NG/GT:180]  Out: 4600 [Urine:3700; Stool:900]  General: awake, alert, sitting up in chair, trach in place  Abdomen: soft, some tenderness to palpation, nondistended; incisions CDI, dressing in place over previous drain site - clean and dry  Extremities: no calf tenderness          ASSESSMENT/PLAN  Ara Stanley is a 71 year old female s/p lap alexandre and lap repair of perforated gastric ulcer   - continue current cares, continue tube feeds   - monitor drain site on right side of abdomen for further drainage   - OK for discharge to LTACH once cleared by medical team            Liane Arellano PA-C  Office: [phone]  Pager: [phone]   "

## 2017-06-17 NOTE — PLAN OF CARE
Problem: Ventilation, Mechanical Invasive (Adult)  Goal: Signs and Symptoms of Listed Potential Problems Will be Absent or Manageable (Ventilation, Mechanical Invasive)  Signs and symptoms of listed potential problems will be absent or manageable by discharge/transition of care (reference Ventilation, Mechanical Invasive (Adult) CPG).   Outcome: No Change  Weaned for 40 minutes. Patient stated that she is short of breath.Observed chest rise, labored breathing.Resp rate 30's-40.O2 sat high 90's. 1 unit of blood transfused for hgb 6.5. Stable. Up in chair. Cont to have low grade fever.Updated Dr Aftab medel patient's condition.Good urine output.  Cont to monitor.Kandy Du, RN, BSN, BC, CCRN

## 2017-06-17 NOTE — PROGRESS NOTES
Date of Admission: 6/2/2017  Date of Intubation (most recent): 6/12/2017  Reason for Mechanical Ventilation: Airway protection  Number of Days on Mechanical Ventilation: 6 (Reintubated 6/12/2017, Trach 6/14/2017)  Met Criteria for Pressure Support Trial: Not this shift  Reason for No Pressure Support Trial: Per MD  Ventilation Mode: CMV/AC  FiO2 (%): 40 %  Rate Set (breaths/minute): 20 breaths/min  Tidal Volume Set (mL): 500 mL  PEEP (cm H2O): 5 cmH2O  Pressure Support (cm H2O): 10 cmH2O  Oxygen Concentration (%): 40 %  Resp: 26    Recent Labs  Lab 06/13/17  0415 06/12/17  1620 06/12/17  1509 06/12/17  0512   PH  --  7.28* 7.02* 7.39   PCO2  --  44 89* 35   PO2  --  126* 69* 76*   HCO3  --  21 23 21   O2PER 50%  --  40% 40   plan: continue full vent support, assess for weaning readiness daily  Barb Tobias RRT

## 2017-06-17 NOTE — PROGRESS NOTES
"  Daily ICU Progress Note       Date of service: 06/17/2017  Admission Date:  6/2/2017    Active Problem List:   -->  COPD  -->  Sepsis  -->  PNA  -->  Persistent yeast in abdominal fluid  -->  Status post cholecystectomy, laparoscopic  -->  Gastric ulcer with perforation status post omental patch  -->  Acute hypercapneic respiratory failure, recurrent   -->  Status post tracheostomy    24 Hour Events:  No acute events overnight.  Patient had more aggressive diuresis over the last few days now that negative.  Net positive 18.3 L.  Improved sodium.    Subjective: Chart reviewed.  Patient does interact.  No particular complaints today.    ROS:  Unable to obtain 2/2 critical illness.     Objective:     /63  Pulse 93  Temp 99.9  F (37.7  C) (Oral)  Resp 26  Ht 1.702 m (5' 7\")  Wt 74.5 kg (164 lb 3.9 oz)  SpO2 100%  BMI 25.72 kg/m2       Intake/Output Summary (Last 24 hours) at 06/17/17 1051  Last data filed at 06/17/17 0600   Gross per 24 hour   Intake           1759.6 ml   Output             4000 ml   Net          -2240.4 ml     Ventilation Mode: CMV/AC  FiO2 (%): 40 %  Rate Set (breaths/minute): 20 breaths/min  Tidal Volume Set (mL): 500 mL  PEEP (cm H2O): 5 cmH2O  Pressure Support (cm H2O): 10 cmH2O  Oxygen Concentration (%): 40 %  Resp: 26    General: Adult female, sedated, tracheostomy in place, follows commands  HEENT: NCAT; EOMI; no icterus; moist mucus membranes NG tube in place  Pulm: Coarse BS bilaterally  CV: RRR, no m/g  Abdomen: Soft abdomen, NT, ND  : Crawford  Extremities: + edema  Skin: Warm to touch  Neuro: follows commands, moving all 4 extremities    Pertinent Labs:     Basic metabolic panel: 144/3.5/112/23/53/1.4  Magnesium 1.4  CBC: Pending    Microbiology / Cultures:     No new cultures.     Imaging:  abd films reviewed    06/16 CXR:   1. No change in bilateral upper lobe airspace opacities, left greater  than right.  2. Interval placement of a tracheostomy tube.  3. Left pleural " effusion with associated atelectasis is unchanged.    Assessment and Plan:   This is a 71-year-old female who had a laparoscopic cholecystectomy followed by worsening respiratory failure with return to the operating room where she was found to have a gastric perforation now status post repair who then subsequently failed extubation and ultimately she had a tracheostomy performed.  She has persistently grown yeast from her sputum as well as her abdominal fluid therefore decision not to move forward with a PEG tube.  She remains on broad-spectrum antimicrobials given her persistent failure.  Likely with a hospital-acquired pneumonia.  Leukocytosis now improving over the last several days.  Plan to move towards LTAC placement next week.    Neuro:   1.  Depression / anxiety - patient is on Zoloft and trazodone at home.    --> Continue baseline sertraline and trazodone    2.  Analgesia/sedation -  --> bid seroquel  --> d/c precedex    Pulm:   1. COPD - she does have underlying COPD with very mild obstruction and a mild reduction in diffusion capacity.  Plan to continue her current outpatient regimen.  Cont LABA and ICS    2. PNA / HCAP - CXR with effusions, upper lobe infiltrates, continue broad spectrum abx. Repeat sputum cultures.     3.  Bilateral effusion - stable.     3. Acute hypercapnic respiratory failure - recurrent failure.    --> s/p tracheostomy  --> PST    CV:  1.  Hypotension / Sepsis - blood pressure improved.  On anti-htn medications.   --> prn hydralazine    Renal:   1.  Acute renal failure - Patient with acute anuric renal failure with improved urine output. Creatine slowly rising . Cont albumin infusion with diuretics as creatinine has remained stable on this Appreciate nephrology' assistance    ID:  1.  Sepsis - known gastric perforation with bilious drainage into the abdominal cavity.  Now increasing sputum and worsening chest x-ray concerning for healthcare associated pneumonia.  Infectious disease  consult is secondary to persistent leukocytosis and antibiotic changes made per their recommendations.  She is now slowly improving on coverage though outside of the yeast cultures we have no definitive infectious organism.  Plan to continue current antibiotic regimen.    GI:   1.  Gastric perforation - to be the goal.  We did monitor FUNMI tube output after putting colored to feed in.  Feedings at goal.  Holding on G-tube placement.    Heme / Onc:   1.  Anemia - stable.    Endocrine:   1. Glc - glc controlled.    Musculoskeletal:   1.  Deconditioning - PTOT    Nutrition:   1. TF's  --> cont TF's    Prophylaxis:  DVT Prophylaxis:  SQ heparin  GI Prophylaxis: PPI tx dose  PTOT:  ordered  Restraints: UE restraints needed    Disposition: MICU     Critical Care Time:  35 min. This excludes any time for procedures performed. Critical Care billing time therefore does not include the time spent during procedure and is independent of these charges.     Tam Ugalde MD  Pulmonary and Critical Care  [facility]  Pager:  [phone]

## 2017-06-17 NOTE — PLAN OF CARE
"Problem: Individualization  Goal: Patient Preferences     Patient has been alert but confused, makes needs known by mouthing words, states her pain is in her back and is relieved by frequent repositioning, tmax overnight 100.3, tyelnol given, urine output WNL, rectal incontinence collection bag, TF 45/hour, water q4 flush, K+ 3.6, /63  Pulse 93  Temp 99.9  F (37.7  C) (Oral)  Resp 26  Ht 1.702 m (5' 7\")  Wt 74.5 kg (164 lb 3.9 oz)  SpO2 100%  BMI 25.72 kg/m2  Trach vent 40% PEEP 5, CHG bath done, oozing mild purulent drainage from old FUNMI site RLQ abdomen covered with mepilex, trach site intact, mild amount of secretions, no acute issues overnight.      "

## 2017-06-18 NOTE — PROGRESS NOTES
"  Daily ICU Progress Note       Date of service: 06/18/2017  Admission Date:  6/2/2017    Active Problem List:   -->  COPD  -->  Sepsis  -->  PNA  -->  Persistent yeast in abdominal fluid  -->  Status post cholecystectomy, laparoscopic  -->  Gastric ulcer with perforation status post omental patch  -->  Acute hypercapneic respiratory failure, recurrent   -->  Status post tracheostomy    24 Hour Events:  No acute events overnight.     Subjective: Chart reviewed.  Patient does complain of right hip pain with movement.  She has history of right hip pain.    ROS:  Unable to obtain 2/2 critical illness.     Objective:     /74  Pulse 93  Temp 99.4  F (37.4  C) (Axillary)  Resp 26  Ht 1.702 m (5' 7\")  Wt 77.6 kg (171 lb 1.2 oz)  SpO2 97%  BMI 26.79 kg/m2     Intake/Output Summary (Last 24 hours) at 06/18/17 1504  Last data filed at 06/18/17 1300   Gross per 24 hour   Intake          1772.17 ml   Output             4625 ml   Net         -2852.83 ml     Ventilation Mode: CMV/AC  FiO2 (%): 40 %  Rate Set (breaths/minute): 14 breaths/min  Tidal Volume Set (mL): 500 mL  PEEP (cm H2O): 5 cmH2O  Oxygen Concentration (%): 40 %  Resp: 26    General: Adult female, sedated, tracheostomy in place, follows commands, answers questions  HEENT: NCAT; EOMI; no icterus; moist mucus membranes NG tube in place  Pulm: Coarse BS bilaterally  CV: RRR, no m/g  Abdomen: Soft abdomen, NT, ND  : Crawford  Extremities: + edema  Skin: Warm to touch  Neuro: follows commands, moving all 4 extremities    Pertinent Labs:     Basic metabolic panel: 147/3.7/114/24/53/1.36  LFTs within normal limits  Glucose 105  CBC: 7.6/8.4/453    Microbiology / Cultures:     Stenotrophomonas maltophilia sensitive to levofloxacin    Imaging:  abd films reviewed    06/18:  Bilateral opacities, no significant interval change    Assessment and Plan:   This is a 71-year-old female who had a laparoscopic cholecystectomy followed by worsening respiratory failure with " return to the operating room where she was found to have a gastric perforation now status post repair who then subsequently failed extubation and ultimately she had a tracheostomy performed.  She has persistently grown yeast from her sputum as well as her abdominal fluid therefore decision not to move forward with a PEG tube.  Planning to change her to Diflucan given sensitivities were run on the Candida species.  Also now sputum going back Stenotrophomonas maltophilia sensitive to levofloxacin.  Discontinue vancomycin and start levofloxacin.    We have been discussing placement for LTAC.  Discussed with  / care coordinator on Friday.    Neuro:   1.  Depression / anxiety - patient is on Zoloft and trazodone at home.    --> Continue baseline sertraline and trazodone    2.  Analgesia/sedation -  --> bid seroquel    Pulm:   1. COPD - she does have underlying COPD with very mild obstruction and a mild reduction in diffusion capacity.  Plan to continue her current outpatient regimen.  Cont LABA and ICS    2. Stenotrophomonas PNA - start levo.     3.  Bilateral effusion - stable.     3. Acute hypercapnic respiratory failure - recurrent failure.    --> s/p tracheostomy  --> trach dome trial today and continue PST, would like to trial her on PMV so she can participate more in placement options.    CV:  1.  Hypotension / Sepsis - blood pressure improved.  On anti-htn medications.   --> prn hydralazine    Renal:   1.  Acute renal failure - Patient with acute anuric renal failure with improved urine output.  Nephrology following.  Creatinine stable.  Continue diuresis.    ID:  1.  Sepsis - known gastric perforation with bilious drainage into the abdominal cavity.  Now increasing sputum and worsening chest x-ray concerning for healthcare associated pneumonia.  Stenotrophomonas PNA. Cont abx. Cont yeast coverage.   --> diflucan  --> levo  --> d/c vanco    GI:   1.  Gastric perforation - to be the goal.  We did  monitor FUNMI tube output after putting colored to feed in.  Feedings at goal.  Holding on G-tube placement.    Heme / Onc:   1.  Anemia - stable.    Endocrine:   1. Glc - glc controlled.    Musculoskeletal:   1.  Deconditioning - PTOT    Nutrition:   1. TF's  --> cont TF's    Prophylaxis:  DVT Prophylaxis:  SQ heparin  GI Prophylaxis: PPI tx dose  PTOT:  ordered  Restraints: UE restraints needed    Disposition: MICU     Critical Care Time:  35 min. This excludes any time for procedures performed. Critical Care billing time therefore does not include the time spent during procedure and is independent of these charges.     Tam Ugalde MD  Pulmonary and Critical Care  [facility]  Pager:  [phone]

## 2017-06-18 NOTE — PROGRESS NOTES
[facility] ICU RESPIRATORY NOTE  Date of Admission:  6/2/17  Date of Intubation (most recent): 6/12/17  Reason for Mechanical Ventilation: Hypercapnia respiratory failure    Number of Days on Mechanical Ventilation: 7  Met Criteria for Pressure Support Trial: Yes  Length of Pressure Support Trial: 2.5 hrs on PS 8/5  Reason for Stopping Pressure Support Trial: PS trial     Significant Events Today: None     Ventilation Mode: CMV/AC  FiO2 (%): 40 %  Rate Set (breaths/minute): 20 breaths/min  Tidal Volume Set (mL): 500 mL  PEEP (cm H2O): 5 cmH2O  Oxygen Concentration (%): 40 %  Resp: 25    ABG Results:    Recent Labs  Lab 06/13/17  0415 06/12/17  1620 06/12/17  1509 06/12/17  0512   PH  --  7.28* 7.02* 7.39   PCO2  --  44 89* 35   PO2  --  126* 69* 76*   HCO3  --  21 23 21   O2PER 50%  --  40% 40     ETT appearance on chest x-ray: in good position     Plan:  Patient remains on full vent support and reassess daily weaning.    Cynthia Correia RT  6/18/2017

## 2017-06-18 NOTE — PROGRESS NOTES
"General Surgery  Admission Date: 6/2/2017  Today's Date: 6/18/2017      SUBJECTIVE  Pt awake this morning, more cheerful, trying to communicate. Tmax overnight 100.2. Continues to have some purulent drainage from previous abdominal drain site. Tolerating tube feeds at goal. Denies abdominal pain.        OBJECTIVE  /68  Pulse 93  Temp 98.4  F (36.9  C) (Axillary)  Resp 19  Ht 1.702 m (5' 7\")  Wt 77.6 kg (171 lb 1.2 oz)  SpO2 98%  BMI 26.79 kg/m2  I/O last 3 completed shifts:  In: 1922.17 [I.V.:107.17; NG/GT:280]  Out: 4825 [Urine:3025; Stool:1800]  General: awake, alert, sitting up in bed, trach in place  Abdomen: soft, denies tenderness to palpation, no rebound or guarding, no significant distention; incisions CDI with steri strips in place; dressing over previous drain site removed and some purulent drainage noted            ASSESSMENT/PLAN  Ara Stanley is a 71 year old female s/p lap alexandre and lap repair of perforated gastric ulcer   - Continues to have some intermittent low-grade fevers and slight purulent drainage from right side of abdomen at previous drain site. However, she is tolerating tube feeds at goal and doesn't have any significant abdominal pain   - Don't feel that there is any indication for re-imaging of abdomen as she is overall doing well    - continue to monitor drain site for drainage and treat with antifungal   - likely OK for discharge to LTACH soon          Liane Arellano PA-C  Office: [phone]  Pager: [phone]   "

## 2017-06-18 NOTE — PROGRESS NOTES
Renal Medicine Progress Note                                Ara Stanley MRN# [id number]   Age: 71 year old YOB: 1945   Date of Admission: 6/2/2017 Hospital LOS: 16                  Assessment/Plan:     Admitted with abdominal pain.  Diagnostic lap, lap alexandre and abdominal lavage 06/02/17.  Repair of perforated gastric ulcer 06/06/17    Seen for evaluation ARF    1.  Question baseline CKD   -creatinine unclear; suspected in 1.0 range   -dipstick positive albuminuria  2.  ARF   -nonoliguric   -ATN   -hypotension/IV contrast  3.  Increased volume  4.  Hypercapnic respiratory failure   -trach 06/13/17  5.  Borderline hypernatremia  7.  Anemia   -transfuse as indicated      Continue diuretic/albumin  Increase free water  Following       Interval History:     Good UO.  IO negative.  Awake.  Appears to follow.  Labs stable to improved.  Not  weaning well.         ROS:     Intubated: ROS unable    Medications and Allergies:     Reviewed    Physical Exam:     Vitals were reviewed  Patient Vitals for the past 8 hrs:   BP Temp Temp src Heart Rate Resp SpO2 Weight   06/18/17 0600 140/68 - - 85 19 98 % -   06/18/17 0500 130/84 - - 85 20 99 % -   06/18/17 0443 - - - - - - 77.6 kg (171 lb 1.2 oz)   06/18/17 0400 121/55 98.4  F (36.9  C) Axillary 85 25 100 % -   06/18/17 0300 123/55 - - 86 19 100 % -   06/18/17 0250 - - - - - 100 % -   06/18/17 0200 138/60 - - 88 23 100 % -   06/18/17 0100 141/62 - - 94 22 99 % -     I/O last 3 completed shifts:  In: 1922.17 [I.V.:107.17; NG/GT:280]  Out: 4825 [Urine:3025; Stool:1800]    Vitals:    06/14/17 0400 06/15/17 0500 06/16/17 0600 06/17/17 0600   Weight: 82 kg (180 lb 12.4 oz) 82.9 kg (182 lb 12.2 oz) 80 kg (176 lb 5.9 oz) 74.5 kg (164 lb 3.9 oz)    06/18/17 0443   Weight: 77.6 kg (171 lb 1.2 oz)       GENERAL:  Vent dependent  HEENT: trach  RESP:  clear anteriorly  CV: RRR, normal S1 S2  ABDOMEN: soft, nontender, no HSM or masses and bowel sounds normal  :   atnner catheter  MS: no clubbing, cyanosis   SKIN: clear without significant rashes or lesions  EXT: 1 plus    Data:       Recent Labs  Lab 06/18/17  0421 06/18/17  0211 06/18/17  0050 06/17/17  2044 06/17/17  0430  06/16/17  0425 06/15/17  1154   *  --   --   --  144  --  146* 145*   POTASSIUM 3.7 3.7 Canceled, Test credited Questionable specimen 3.1* 3.5  < > 3.1* 3.5   CHLORIDE 114*  --   --   --  112*  --  112* 113*   CO2 24  --   --   --  23  --  24 22   ANIONGAP 9  --   --   --  9  --  10 10   *  --   --   --  160*  --  94 108*   BUN 53*  --   --   --  53*  --  55* 52*   CR 1.36*  --   --   --  1.41*  --  1.49* 1.38*   GFRESTIMATED 38*  --   --   --  37*  --  34* 38*   GFRESTBLACK 46*  --   --   --  44*  --  42* 46*   CARYL 8.6  --   --   --  8.1*  --  8.4* 8.2*   < > = values in this interval not displayed.      Recent Labs   Lab Test  06/18/17   0421 06/17/17   0430  06/16/17   0425  06/15/17   1154  06/15/17   0400  06/14/17   0430  06/13/17   0415  06/12/17   0555  06/11/17   0515  06/10/17   1245   CR  1.36*  1.41*  1.49*  1.38*  1.37*  1.41*  1.24*  1.11*  1.19*  1.37*       Recent Labs  Lab 06/18/17  0421 06/17/17  2044 06/17/17  1200 06/16/17  0425  06/15/17  0400 06/14/17  0430   PHOS 2.7  --   --  3.0  --  3.2 3.1   HGB 8.4* 8.2* 6.5* 7.3*  < >  --  8.0*   < > = values in this interval not displayed.      G Alvin Bhakta    [facility] - Nephrology  [phone]

## 2017-06-18 NOTE — PROGRESS NOTES
[facility]    Infectious Disease Progress Note    Date of Service (when I saw the patient): 06/18/2017     Assessment & Plan   Ara Stanley is a 71 year old female who was admitted on 6/2/2017.    Impression:  1 71 y.o female admitted in the hospital for about 2 weeks now.   2 Initial presentation was of acute abdomen taken to OR for cholecystectomy.   3 Taken back to the OR 4 days later with concern for peritonitis, found to have gastric perforation of an ulcer which was repaired. C albicans in cx  4 Post-operative course has been complicated with multiple intubations, trials of weaning off the vent and now with trach.   5 Now ? HCAP. Sputum stento  6 Mild renal insuff        Recommendations:   To levaquin to cover sputum stento  Continue tiff but flucon soon, should be sens org in peritoneal cx  vanco Dc no growth that requires       Rick Adams MD    Interval History   Continue to be febrile but less last 24   Antibiotics broadened less than 24 hours ago  Awake, following   Some drainage from drain site  GNR sputum is stento and C albicans is peritoneum cx, C albicans should be sens but sens done and pending  Physical Exam   Temp: 98.4  F (36.9  C) Temp src: Axillary BP: 140/68   Heart Rate: 85 Resp: 19 SpO2: 98 % O2 Device: Mechanical Ventilator    Vitals:    06/16/17 0600 06/17/17 0600 06/18/17 0443   Weight: 80 kg (176 lb 5.9 oz) 74.5 kg (164 lb 3.9 oz) 77.6 kg (171 lb 1.2 oz)     Vital Signs with Ranges  Temp:  [98.4  F (36.9  C)-100.2  F (37.9  C)] 98.4  F (36.9  C)  Heart Rate:  [74-94] 85  Resp:  [17-38] 19  BP: (113-141)/(55-84) 140/68  FiO2 (%):  [40 %] 40 %  SpO2:  [95 %-100 %] 98 %    Constitutional: Awake, alert, cooperative, no apparent distress vent, trach  Lungs: Clear to auscultation bilaterally, no crackles or wheezing vent sounds  Cardiovascular: Regular rate and rhythm, normal S1 and S2, and no murmur noted  Abdomen: Normal bowel sounds, soft, non-distended,  non-tender drain site drainage  Skin: No rashes, no cyanosis, no edema  Other:    Medications     NaCl 10 mL/hr at 06/17/17 2117     - MEDICATION INSTRUCTIONS -       IV fluid REPLACEMENT ONLY Stopped (06/08/17 0450)       albumin human  25 g Intravenous BID     meropenem  1 g Intravenous Q12H     vancomycin (VANCOCIN) IV  1,750 mg (central catheter) Intravenous Q24H     micafungin  100 mg Intravenous Q24H     QUEtiapine  12.5 mg Oral QAM     QUEtiapine  25 mg Oral At Bedtime     furosemide  40 mg Intravenous BID     chlorhexidine  15 mL Swish & Spit BID     insulin aspart  1-12 Units Subcutaneous Q4H     vitamin  B-1  200 mg Oral Daily     acetaminophen  1,000 mg Oral Q8H    Or     acetaminophen  650 mg Rectal Q8H     pantoprazole  40 mg Intravenous BID     heparin  5,000 Units Subcutaneous Q8H     budesonide  1 mg Nebulization BID     sodium chloride (PF)  10 mL Intracatheter Q8H     arformoterol  15 mcg Nebulization BID     senna-docusate  1-2 tablet Oral BID     sertraline  50 mg Oral Daily     simvastatin  10 mg Oral QAM       Data   All microbiology laboratory data reviewed.  Recent Labs   Lab Test  06/18/17   0421  06/17/17   2044  06/17/17   1200   WBC  7.6  7.5  6.6   HGB  8.4*  8.2*  6.5*   HCT  24.5*  23.8*  19.0*   MCV  86  86  84   PLT  453*  430  411     Recent Labs   Lab Test  06/18/17   0421  06/17/17   0430  06/16/17   0425   CR  1.36*  1.41*  1.49*     Recent Labs   Lab Test  06/04/12   0527   SED  34*     Recent Labs   Lab Test  06/16/17   1145  06/15/17   1947  06/15/17   1143  06/15/17   0855  06/14/17   0911  06/09/17   1023  06/09/17   0920  06/09/17   0839  06/09/17   0825   CULT  Moderate growth Non lactose fermenting gram negative rods  Culture in progress  *  Heavy growth Stenotrophomonas maltophilia  Light growth Candida albicans / dubliniensis Candida albicans and Candida   dubliniensis are not routinely speciated Susceptibility testing not routinely   done  *  Canceled, Test credited  Unsatisfactory Specimen - Specimen frozen prior to   receipt in lab.  FROZE SAMPLE IN TRANSIT Notification of test   cancellation was given to OSMAN TIJERINA AT [facility].  RN WILL RECOLLECT AND REORDER.  [facility] 6.15.17 1600    No growth after 3 days  Moderate growth Candida albicans / dubliniensis Candida albicans and Candida   dubliniensis are not routinely speciated Susceptibility testing not routinely   done  Critical Value/Significant Value, preliminary result only, called to and read   back by Saira Nicole RN on 6.15.2017 at 0815. KVO  Susceptibility testing requested by . [id number]. susceptibility on   Candida albicans and dubliniensis. [id number]  *  Canceled, Test credited  Incorrectly ordered by PCU/Clinic  Test reordered as correct code    No growth  Candida albicans / dubliniensis Candida albicans and Candida dubliniensis are not   routinely speciated isolated  *  Light growth Candida albicans / dubliniensis Candida albicans and Candida   dubliniensis are not routinely speciated Susceptibility testing not routinely   done  *  Canceled, Test credited  Incorrectly ordered by PCU/Clinic  Test reordered as correct code    No anaerobes isolated  No growth  No growth

## 2017-06-18 NOTE — PLAN OF CARE
Problem: Individualization  Goal: Patient Preferences  Outcome: No Change  Neuros intact- CAM negative, seems to be A & O x4- mouths where she is, year, etc. Able to answer everything appropriately.  Pt remained on full vent support overnight, trached- lungs coarse throughout, diminished in bases.  NSR. Generalized edema. Pulses present.   Crawford draining adequate amounts. Flexiseal has continuous green/brown stool. TF at goal. BS WNL.    Incision sites CDI- adhesive strips. LUQ has old FUNMI drain site- draining serous pus like fluid, mepilex in place.   Pt very anxious/restless throughout shift- gave scheduled Seroquel. Gave PRN trazadone. Little to no effects for both.   Gave oxycodone x2 for pain- pt states it helps. Remained unrestrained overnight- not pulling at lines or tubing.     Pt refuses frequent oral cares and to wear prevalon boots- explained importance of both. Will continue to monitor.

## 2017-06-18 NOTE — PROGRESS NOTES
[facility] ICU RESPIRATORY NOTE  Date of Admission:   6/2/17  Date of Intubation (most recent): 6/12/17  Reason for Mechanical Ventilation:   Resp failure  Number of Days on Mechanical Ventilation: 7  Met Criteria for Pressure Support Trial:  Yes   Length of Pressure Support Trial:  Trach dome for 1 hr 40L 40%  Reason for Stopping Pressure Support Trial:  Increased WOB  Reason for No Pressure Support Trial:    Significant Events Today:  None    ABG Results:    ETT appearance on chest x-ray:    Plan:  Pt to remain on full vent support overnight

## 2017-06-18 NOTE — PLAN OF CARE
Problem: Ventilation, Mechanical Invasive (Adult)  Goal: Signs and Symptoms of Listed Potential Problems Will be Absent or Manageable (Ventilation, Mechanical Invasive)  Signs and symptoms of listed potential problems will be absent or manageable by discharge/transition of care (reference Ventilation, Mechanical Invasive (Adult) CPG).   Outcome: No Change  Patient is awake.following commands.Trachea dome iniated on FIO2 40% and 40L O2 sat 99%, Resp rate 20-28. Patient denies SOB. Patient tolerated trachea dome for 1 hr.Low grade fever. Up in the chair. Stable.Cont to monitor.Kandy Du, RN, BSN, BC, CCRN

## 2017-06-19 NOTE — PROGRESS NOTES
[facility] ICU RESPIRATORY NOTE  Date of Admission: 6/2/17  Date of Intubation (most recent): 6/12/17  Reason for Mechanical Ventilation: Respiratory failure   Number of Days on Mechanical Ventilation:  8  Met Criteria for Pressure Support Trial: Yes   Length of Pressure Support Trial: Trach dome for 1 hr 40L 40%  Reason for Stopping Pressure Support Trial: Increase work of breathing   Reason for No Pressure Support Trial:    Significant Events Today: None     Ventilation Mode: CMV/AC  FiO2 (%): 40 %  Rate Set (breaths/minute): 14 breaths/min  Tidal Volume Set (mL): 500 mL  PEEP (cm H2O): 5 cmH2O  Oxygen Concentration (%): 40 %  Resp: 20    ABG Results:    Recent Labs  Lab 06/13/17  0415 06/12/17  1620 06/12/17  1509 06/12/17  0512   PH  --  7.28* 7.02* 7.39   PCO2  --  44 89* 35   PO2  --  126* 69* 76*   HCO3  --  21 23 21   O2PER 50%  --  40% 40     ETT appearance on chest x-ray: In good position    Plan:  Patient remain on full vent support and assess daily weaning.    Cynthia Correia RT  6/19/2017

## 2017-06-19 NOTE — PLAN OF CARE
Problem: Goal Outcome Summary  Goal: Goal Outcome Summary        SLP - SAWYER Robertsy Newman Valve (PMV) speaking evaluation was completed this am with RT brooks.  Patient tolerated PMV placement for 8 minutes while on pressure support.  Patient was able to cough up mucus at the start of the trial.  Saturation levels and vital signs remained stable during the trial and voicing was achieved at the phrase level.  Patient complained of being hot during the trial, but did not complain of SOB.  Patient was disoriented to place and time.  Plan to provide PMV communication Tx with SLP and RT superivision only 5x/week with increased duration of PMV use as able.     Discharge Planner SLP   Patient plan for discharge: Did not state  Current status: See above  Barriers to return to prior living situation: Trach, weakness  Recommendations for discharge: LTACH with continued SLP PMV communication Tx  Rationale for recommendations: Continue PMV communication Tx to increase verbal communication for daily needs       Entered by: Aleksandra Loza 06/19/2017 9:42 AM

## 2017-06-19 NOTE — PLAN OF CARE
Problem: Goal Outcome Summary  Goal: Goal Outcome Summary  PT: Attempted to see pt for mobility or LE AROM/exercise, pt declines. Per RN pt has had a rough day, just want's to sleep. Pt aware of the benefits of exercise and activity, but declines. Denies any pain in the R LE, which pt has reported multiple times in the past when working with PT. RN and MD aware pt declines to work with PT.

## 2017-06-19 NOTE — PROGRESS NOTES
Renal Medicine Progress Note            Assessment/Plan:     Admitted with abdominal pain.  Diagnostic lap, lap alexandre and abdominal lavage 06/02/17.  Repair of perforated gastric ulcer 06/06/17     Seen for evaluation ARF     1.  Question baseline CKD                        -creatinine unclear; suspected in 1.0 range                        -dipstick positive albuminuria  2.  ARF                        -nonoliguric                        -ATN                        -hypotension/IV contrast  3.  Increased volume - still up by ? 2-3 KG.  4.  Hypercapnic respiratory failure                        -trach 06/13/17  5.  Borderline hypernatremia  7.  Anemia                        -transfuse as indicated        Continue diuretic  Increase free water to 150 mL every 4 Hours.    Following              Interval History:   Awake and feels OK.  Diuresing net > 2 liters last 4 days.  Still up 13 kg from admission.  (Was initial weight correct ?)          Medications and Allergies:       levofloxacin  250 mg Intravenous Q24H     micafungin  100 mg Intravenous Q24H     QUEtiapine  12.5 mg Oral QAM     QUEtiapine  25 mg Oral At Bedtime     furosemide  40 mg Intravenous BID     chlorhexidine  15 mL Swish & Spit BID     insulin aspart  1-12 Units Subcutaneous Q4H     vitamin  B-1  200 mg Oral Daily     acetaminophen  1,000 mg Oral Q8H    Or     acetaminophen  650 mg Rectal Q8H     pantoprazole  40 mg Intravenous BID     heparin  5,000 Units Subcutaneous Q8H     budesonide  1 mg Nebulization BID     sodium chloride (PF)  10 mL Intracatheter Q8H     arformoterol  15 mcg Nebulization BID     senna-docusate  1-2 tablet Oral BID     sertraline  50 mg Oral Daily     simvastatin  10 mg Oral QAM        Allergies   Allergen Reactions     Iodine I 131 Tositumomab Anaphylaxis     Can tolerate topical iodine if it is wahed off after surgery      Penicillins Rash and Anaphylaxis     Swollen  lymph nodes, flushed overheating      Sulfa Drugs Nausea  "and Vomiting, Diarrhea and Rash            Physical Exam:   Vitals were reviewed  /70  Pulse 93  Temp 99.9  F (37.7  C) (Axillary)  Resp 20  Ht 1.702 m (5' 7\")  Wt 76 kg (167 lb 8.8 oz)  SpO2 100%  BMI 26.24 kg/m2    Wt Readings from Last 3 Encounters:   06/19/17 76 kg (167 lb 8.8 oz)   06/04/12 65.8 kg (145 lb)       Intake/Output Summary (Last 24 hours) at 06/19/17 1155  Last data filed at 06/19/17 1000   Gross per 24 hour   Intake             1690 ml   Output             5425 ml   Net            -3735 ml       GENERAL APPEARANCE: On vent via trach  HEENT:  Eyes/ears/nose/neck grossly normal   RESP: + vent sounds, lungs cta b c good efforts, no crackles, rhonchi or wheezes  CV: RRR, nl S1/S2, no m/r/g   ABDOMEN: o/s/nt/nd, bs present  EXTREMITIES/SKIN: no c/c/rashes/lesions; 2-3+ BLE edema  NEURO:  Awake and responds  LINES:  + tanner, R PICC           Data:     BMP  Recent Labs  Lab 06/19/17  0520 06/18/17  1635 06/18/17  0421 06/18/17  0211  06/17/17  0430  06/16/17  0425   *  --  147*  --   --  144  --  146*   POTASSIUM 3.8 3.3* 3.7 3.7  < > 3.5  < > 3.1*   CHLORIDE 113*  --  114*  --   --  112*  --  112*   CARYL 8.8  --  8.6  --   --  8.1*  --  8.4*   CO2 27  --  24  --   --  23  --  24   BUN 51*  --  53*  --   --  53*  --  55*   CR 1.30*  --  1.36*  --   --  1.41*  --  1.49*   GLC 87  --  105*  --   --  160*  --  94   < > = values in this interval not displayed.  CBC  Recent Labs  Lab 06/18/17  0421 06/17/17  2044 06/17/17  1200 06/16/17  0425   WBC 7.6 7.5 6.6 7.7   HGB 8.4* 8.2* 6.5* 7.3*   HCT 24.5* 23.8* 19.0* 20.9*   MCV 86 86 84 83   * 430 411 368     Lab Results   Component Value Date    AST 19 06/19/2017    ALT 11 06/19/2017    ALKPHOS 85 06/19/2017    BILITOTAL 0.5 06/19/2017    BILICONJ 0.0 06/06/2008     Lab Results   Component Value Date    INR 1.31 (H) 06/19/2017       Attestation:  I have reviewed today's vital signs, notes, medications, labs and imaging.    Gil RO" MD John  [facility] Nephrology  [phone]

## 2017-06-19 NOTE — PROGRESS NOTES
[facility]    Infectious Disease Progress Note    Date of Service (when I saw the patient): 06/19/2017     Assessment & Plan   Ara Stanley is a 71 year old female who was admitted on 6/2/2017.    Impression:  1 71 y.o female admitted in the hospital for about 2 weeks now.   2 Initial presentation was of acute abdomen taken to OR for cholecystectomy.   3 Taken back to the OR 4 days later with concern for peritonitis, found to have gastric perforation of an ulcer which was repaired. C albicans in cx  4 Post-operative course has been complicated with multiple intubations, trials of weaning off the vent and now with trach.   5 Now ? HCAP. Sputum stento  6 Mild renal insuff        Recommendations:   To levaquin to cover sputum stento  Can switch from micafungin to fluconazole at any time, should be sens C albicans in peritoneal cx        Be Webster MD    Interval History   Afebrile past 24 hrs.  Arousable.  Stable on vent.  FIO2 40.  PEEP 5.  WBC normal.  No new pos cxs.  Physical Exam   Temp: 99.9  F (37.7  C) Temp src: Axillary BP: 153/72   Heart Rate: 100 Resp: 23 SpO2: 99 % O2 Device: Mechanical Ventilator    Vitals:    06/17/17 0600 06/18/17 0443 06/19/17 0500   Weight: 74.5 kg (164 lb 3.9 oz) 77.6 kg (171 lb 1.2 oz) 76 kg (167 lb 8.8 oz)     Vital Signs with Ranges  Temp:  [99.3  F (37.4  C)-99.9  F (37.7  C)] 99.9  F (37.7  C)  Heart Rate:  [] 100  Resp:  [16-33] 23  BP: (130-158)/(62-87) 153/72  FiO2 (%):  [40 %] 40 %  SpO2:  [93 %-100 %] 99 %    Constitutional: Awake, alert, cooperative, no apparent distress vent, trach  Lungs: Clear to auscultation bilaterally, no crackles or wheezing vent sounds  Cardiovascular: Regular rate and rhythm, normal S1 and S2, and no murmur noted  Abdomen: Normal bowel sounds, soft, non-distended, non-tender drain site drainage  Skin: No rashes, no cyanosis, no edema  Other:    Medications     NaCl 10 mL/hr at 06/19/17 0813     -  MEDICATION INSTRUCTIONS -       IV fluid REPLACEMENT ONLY Stopped (06/08/17 0450)       levofloxacin  250 mg Intravenous Q24H     micafungin  100 mg Intravenous Q24H     QUEtiapine  12.5 mg Oral QAM     QUEtiapine  25 mg Oral At Bedtime     furosemide  40 mg Intravenous BID     chlorhexidine  15 mL Swish & Spit BID     insulin aspart  1-12 Units Subcutaneous Q4H     vitamin  B-1  200 mg Oral Daily     acetaminophen  1,000 mg Oral Q8H    Or     acetaminophen  650 mg Rectal Q8H     pantoprazole  40 mg Intravenous BID     heparin  5,000 Units Subcutaneous Q8H     budesonide  1 mg Nebulization BID     sodium chloride (PF)  10 mL Intracatheter Q8H     arformoterol  15 mcg Nebulization BID     senna-docusate  1-2 tablet Oral BID     sertraline  50 mg Oral Daily     simvastatin  10 mg Oral QAM       Data   All microbiology laboratory data reviewed.  Recent Labs   Lab Test  06/18/17   0421  06/17/17   2044  06/17/17   1200   WBC  7.6  7.5  6.6   HGB  8.4*  8.2*  6.5*   HCT  24.5*  23.8*  19.0*   MCV  86  86  84   PLT  453*  430  411     Recent Labs   Lab Test  06/19/17   0520  06/18/17   0421  06/17/17   0430   CR  1.30*  1.36*  1.41*     Recent Labs   Lab Test  06/04/12   0527   SED  34*     Recent Labs   Lab Test  06/19/17   0520  06/16/17   1145  06/15/17   1947  06/15/17   1143  06/15/17   0855  06/14/17   0911  06/09/17   1023  06/09/17   0920  06/09/17   0839   CULT  No growth after 2 hours  Moderate growth Stenotrophomonas maltophilia Susceptibility testing in progress*  Heavy growth Stenotrophomonas maltophilia  Light growth Candida albicans / dubliniensis Candida albicans and Candida   dubliniensis are not routinely speciated Susceptibility testing not routinely   done  *  Canceled, Test credited Unsatisfactory Specimen - Specimen frozen prior to   receipt in lab.  FROZE SAMPLE IN TRANSIT Notification of test   cancellation was given to OSMAN TIJERINA AT [facility].  RN WILL RECOLLECT AND REORDER.  [facility] 6.15.17 1600     No growth after 4 days  Moderate growth Candida albicans / dubliniensis Candida albicans and Candida   dubliniensis are not routinely speciated Susceptibility testing not routinely   done  Critical Value/Significant Value, preliminary result only, called to and read   back by Saira Nicole RN on 6.15.2017 at 0815. KVO  Susceptibility testing requested by . [id number]. susceptibility on   Candida albicans and dubliniensis. [id number].  *  Canceled, Test credited  Incorrectly ordered by PCU/Clinic  Test reordered as correct code    No growth  Candida albicans / dubliniensis Candida albicans and Candida dubliniensis are not   routinely speciated isolated  *  Light growth Candida albicans / dubliniensis Candida albicans and Candida   dubliniensis are not routinely speciated Susceptibility testing not routinely   done  *  Canceled, Test credited  Incorrectly ordered by PCU/Clinic  Test reordered as correct code    No anaerobes isolated  No growth

## 2017-06-19 NOTE — PROGRESS NOTES
CLINICAL NUTRITION SERVICES - REASSESSMENT NOTE      EVALUATION OF PROGRESS TOWARD GOALS   Diet:  NPO    Nutrition Support:  TF continues at recommended goal rate 45 mL/hr as below:    Nutrition Support Enteral:  Type of Feeding Tube:  ND tube                                           Enteral Frequency:  Continuous  Enteral Regimen:  Isosource 1.5 at 45mL/hr  Total Enteral Provisions: 1620 kcal (26 kcal/kg), 73 g protein (1.2 g/kg), 190 g CHO, 821 mL H2O, 16 gm fiber  Free Water Flush:  150 mL every 4 hrs (increased 6/19)    Intake/Tolerance:    Stool:  6/17 - 1800 mL  6/18 - 900 mL  6/19 - 800 mL  Na 146 (H)  BUN 51 (H)  Cr 1.3 (H) - WOODY on possible CKD  Phos 2.2 (L) - replacement given  Accuchecks:  94, 107, 143, 98, 96, 85, 121 - acceptable  Wt:  76 kg (up 12.6 kg since admit).  Note weight in 6/2012 was 65.8 kg (similar to admit wt 63.4 kg).    Pt with 2-3+ BLE edema.    DW:  63.4 kg (admit wt)     ASSESSED NEEDS:   Energy Needs: 9799-4822 kcals (25-30 Kcal/Kg) - maintenance, trach  Protein Needs:  70-90 grams protein (1.1-1.4 g pro/Kg) - post-op, hypercatabolism with acute illness and possible CKD    NEW FINDINGS:   6/16:  WOCN = Coccyx: no PI noted on assessment today.  Did not note any further issues to groin area skin.  Pt remains high risk for PI due to very limited mobility, vented, restraints, multiple co-morbities.  Mark Risk.  Physical exam today - no worsening of muscle wasting in temporal and clavicle regions.  Looking into LTACH at discharge.    Previous Goals (6/16):   EN to meet 90%- 110% of assessed needs  Evaluation: Met    Previous Nutrition Diagnosis (6/16):   No nutrition diagnosis identified at this time   Evaluation: No change      CURRENT NUTRITION DIAGNOSIS  No nutrition diagnosis identified at this time    INTERVENTIONS  Recommendations / Nutrition Prescription  Continue TF as above    Implementation  Collaboration and Referral of Nutrition care - Discussed pt during ICU  interdisciplinary rounds this morning.    Goals  TF will continue to meet % estimated needs.    MONITORING AND EVALUATION:  Progress towards goals will be monitored and evaluated per protocol and Practice Guidelines    Kayleigh Roman, JOSE, LD, CNSC

## 2017-06-19 NOTE — PROGRESS NOTES
Critical Care Progress Note      06/19/2017    Name: Ara Stanley MRN#: [id number]   Age: 71 year old YOB: 1945     Hsptl Day# 17  ICU DAY #    MV DAY #             Problem List:   Active Problems:    Abdominal pain    Active Problem List:   -->  COPD  -->  Sepsis  -->  PNA  -->  Persistent yeast in abdominal fluid  -->  Status post cholecystectomy, laparoscopic  -->  Gastric ulcer with perforation status post omental patch  -->  Acute hypercapneic respiratory failure, recurrent   -->  Status post tracheostomy       Assessment and plan :     Ara Stanley IS a 71 year old female admitted on 6/2/2017 s/p laparoscopic cholecystectomy and s/p laparoscopic repair of a perforated ulcer.   I have personally reviewed the daily labs, imaging studies, cultures and discussed the case with referring physician and consulting physicians.     My assessment and plan by system for this patient is as follows:    Neurology/Psychiatry:   1. Depression/anxiety patient is on Zoloft and trazodone at home  Plan  Continue sertraline and trazodone  Patient is on Seroquel. Monitor Qtc as well    Cardiovascular:   1.Hemodynamics -not on pressors    Pulmonary/Ventilator Management:     1. Ventilation Mode: CMV/AC  FiO2 (%): 40 %  Rate Set (breaths/minute): 14 breaths/min  Tidal Volume Set (mL): 500 mL  PEEP (cm H2O): 5 cmH2O  Oxygen Concentration (%): 40 %  Resp: 20    Plan: Continue current vent settings. May try pressure support settings and a PMV later.     GI and Nutrition :   1. Gastric perforation. Feedings at goal       Renal/Fluids/Electrolytes:   1. Acute renal failure: nephrology following  Plan  - Continue diuresis  - monitor function and electrolytes as needed with replacement per ICU protocols. - generally avoid nephrotoxic agents such as NSAID, IV contrast unless specifically required  - adjust medications as needed for renal clearance  - follow I/O's as appropriate.    Infectious Disease:   1.  Gastric perforation with bilious drainage into the abdominal cavity. Sepsis resolving, Vanc dced and continuing diflucan  2. Stenotrophomonas PNA on LEVO      Endocrine:     - ICU insulin protocol, goal sugar <180    Hematology/Oncology:   1. Anemia, no signs, symptoms of active blood loss    ICU Prophylaxis:   1. DVT: Hep Subq/ LMWH/mechanical  2. VAP: HOB 30 degrees, chlorhexidine rinse  3. Stress Ulcer: PPI/H2 blocker  4. Restraints: Nonviolent soft two point restraints required and necessary for patient safety and continued cares and good effect as patient continues to pull at necessary lines, tubes despite education and distraction. Will readdress daily.   5. IV Access - central access required and necessary for continued patient cares  6. Feeding - Tube feeds  7. Family Update:not done today  8. Disposition - ICU to transfer to LTAC        Key goals for next 24 hours:   1.LTAC tomorrow  2. Continue pressure support trials  3. Continue antibiotics         Key Medications:       levofloxacin  250 mg Intravenous Q24H     micafungin  100 mg Intravenous Q24H     QUEtiapine  12.5 mg Oral QAM     QUEtiapine  25 mg Oral At Bedtime     furosemide  40 mg Intravenous BID     chlorhexidine  15 mL Swish & Spit BID     insulin aspart  1-12 Units Subcutaneous Q4H     vitamin  B-1  200 mg Oral Daily     acetaminophen  1,000 mg Oral Q8H    Or     acetaminophen  650 mg Rectal Q8H     pantoprazole  40 mg Intravenous BID     heparin  5,000 Units Subcutaneous Q8H     budesonide  1 mg Nebulization BID     sodium chloride (PF)  10 mL Intracatheter Q8H     arformoterol  15 mcg Nebulization BID     senna-docusate  1-2 tablet Oral BID     sertraline  50 mg Oral Daily     simvastatin  10 mg Oral QAM       NaCl 10 mL/hr at 06/19/17 0813     - MEDICATION INSTRUCTIONS -       IV fluid REPLACEMENT ONLY Stopped (06/08/17 0450)               Physical Examination:   Temp:  [99.3  F (37.4  C)-99.9  F (37.7  C)] 99.9  F (37.7  C)  Heart Rate:   [] 96  Resp:  [16-33] 27  BP: (130-158)/(62-87) 148/72  FiO2 (%):  [40 %] 40 %  SpO2:  [93 %-100 %] 100 %    Intake/Output Summary (Last 24 hours) at 06/19/17 1057  Last data filed at 06/19/17 1000   Gross per 24 hour   Intake             1755 ml   Output             4975 ml   Net            -3220 ml     Wt Readings from Last 4 Encounters:   06/19/17 76 kg (167 lb 8.8 oz)   06/04/12 65.8 kg (145 lb)     BP - Mean:  [] 101  Ventilation Mode: CPAP/PS  FiO2 (%): 40 %  Rate Set (breaths/minute): 14 breaths/min  Tidal Volume Set (mL): 500 mL  PEEP (cm H2O): 5 cmH2O  Pressure Support (cm H2O): 10 cmH2O  Oxygen Concentration (%): 40 %  Resp: 27    Recent Labs  Lab 06/13/17  0415 06/12/17  1620 06/12/17  1509   PH  --  7.28* 7.02*   PCO2  --  44 89*   PO2  --  126* 69*   HCO3  --  21 23   O2PER 50%  --  40%       GEN: no acute distress   HEENT: head ncat, sclera anicteric, OP patent, trachea midline   PULM: unlabored synchronous with vent, clear anteriorly    CV/COR: RRR S1S2 no gallop,  No rub, no murmur  ABD: soft nontender, hypoactive bowel sounds, no mass  EXT:  Edema   warm  NEURO: grossly intact  SKIN: no obvious rash  LINES: clean, dry intact         Data:   All data and imaging reviewed     ROUTINE ICU LABS (Last four results)  CMP  Recent Labs  Lab 06/19/17  0520 06/18/17  1635 06/18/17  0421 06/18/17  0211  06/17/17  2044 06/17/17  0430  06/16/17  0425 06/15/17  1154  06/15/17  0400   *  --  147*  --   --   --  144  --  146* 145*  --   --    POTASSIUM 3.8 3.3* 3.7 3.7  < > 3.1* 3.5  < > 3.1* 3.5  < > 3.1*   CHLORIDE 113*  --  114*  --   --   --  112*  --  112* 113*  --   --    CO2 27  --  24  --   --   --  23  --  24 22  --   --    ANIONGAP 6  --  9  --   --   --  9  --  10 10  --   --    GLC 87  --  105*  --   --   --  160*  --  94 108*  --   --    BUN 51*  --  53*  --   --   --  53*  --  55* 52*  --   --    CR 1.30*  --  1.36*  --   --   --  1.41*  --  1.49* 1.38*  --  1.37*   GFRESTIMATED 40*   --  38*  --   --   --  37*  --  34* 38*  --  38*   GFRESTBLACK 49*  --  46*  --   --   --  44*  --  42* 46*  --  46*   CARYL 8.8  --  8.6  --   --   --  8.1*  --  8.4* 8.2*  --   --    MAG 1.8  --  2.1  --   --  2.1 1.4*  --  1.6  --   --  1.8   PHOS 2.2*  --  2.7  --   --   --   --   --  3.0  --   --  3.2   PROTTOTAL 6.6*  --  6.6*  --   --   --   --   --  6.3* 6.4*  --   --    ALBUMIN 2.4*  --  2.7*  --   --   --   --   --  2.6* 2.8*  --   --    BILITOTAL 0.5  --  0.5  --   --   --   --   --  0.5 0.5  --   --    ALKPHOS 85  --  86  --   --   --   --   --  92 108  --   --    AST 19  --  14  --   --   --   --   --  13 13  --   --    ALT 11  --  12  --   --   --   --   --  8 10  --   --    < > = values in this interval not displayed.  CBC  Recent Labs  Lab 06/18/17 0421 06/17/17  2044 06/17/17  1200 06/16/17  0425   WBC 7.6 7.5 6.6 7.7   RBC 2.86* 2.78* 2.26* 2.53*   HGB 8.4* 8.2* 6.5* 7.3*   HCT 24.5* 23.8* 19.0* 20.9*   MCV 86 86 84 83   MCH 29.4 29.5 28.8 28.9   MCHC 34.3 34.5 34.2 34.9   RDW 16.4* 16.3* 17.5* 17.0*   * 430 411 368     INR  Recent Labs  Lab 06/19/17  0520 06/18/17  0421 06/16/17  0425 06/14/17  0430   INR 1.31* 1.26* 1.42* 1.30*     Arterial Blood Gas  Recent Labs  Lab 06/13/17  0415 06/12/17  1620 06/12/17  1509   PH  --  7.28* 7.02*   PCO2  --  44 89*   PO2  --  126* 69*   HCO3  --  21 23   O2PER 50%  --  40%       All cultures:    Recent Labs  Lab 06/19/17  0520 06/16/17  1145 06/15/17  1947 06/15/17  1143 06/15/17  0855 06/14/17  0911   CULT No growth after 2 hours Moderate growth Stenotrophomonas maltophilia Susceptibility testing in progress* Heavy growth Stenotrophomonas maltophiliaLight growth Tricia albicans / dubliniensis Candida albicans and Candida dubliniensis are not routinely speciated Susceptibility testing not routinely done* Canceled, Test credited Unsatisfactory Specimen - Specimen frozen prior to receipt in lab.  FROZE SAMPLE IN TRANSIT Notification of test  cancellation was given to OSMAN TIJERINA AT [facility].  RN WILL RECOLLECT AND REORDER.[facility] 6.15.17 1600 No growth after 4 days Moderate growth Candida albicans / dubliniensis Candida albicans and Candida dubliniensis are not routinely speciated Susceptibility testing not routinely doneCritical Value/Significant Value, preliminary result only, called to and read back by Saira Nicole RN on 6.15.2017 at 0815. KVOSusceptibility testing requested by . [id number]. susceptibility on Candida albicans and dubliniensis. [id number].*  Canceled, Test creditedIncorrectly ordered by PCU/ClinicTest reordered as correct code     No results found for this or any previous visit (from the past 24 hour(s)).      Billing: This patient is critically ill: Yes. Total critical care time today 30 min.

## 2017-06-19 NOTE — DISCHARGE SUMMARY
[facility]    Discharge Summary  [facility]ensive [facility]  IDate of Admission:  6/2/2017  Date of Discharge:  6/19/2017  Discharging Provider: Stu Esquivel  Date of Service (when I saw the patient): 06/19/17    Discharge Diagnoses     COPD  -->  Sepsis  -->  PNA  -->  Persistent yeast in abdominal fluid  -->  Status post cholecystectomy, laparoscopic  -->  Gastric ulcer with perforation status post omental patch  -->  Acute hyper capneic respiratory failure, recurrent   -->  Status post tracheostomy    History of Present Illness   Ara Stanley is an 71 year old female who presented with 71-year-old female who had a laparoscopic cholecystectomy followed by worsening respiratory failure with return to the operating room where she was found to have a gastric perforation now status post repair who then subsequently failed extubation and ultimately she had a tracheostomy performed.  She has persistently grown yeast from her sputum as well as her abdominal fluid therefore decision not to move forward with a PEG tube.  She remains on broad-spectrum antimicrobials given her persistent failure.  Likely with a hospital-acquired pneumonia.  Leukocytosis now improving over the last several days  Hospital Course   Ara Stanley was admitted on 6/2/2017.  The following problems were addressed during her hospitalization:  COPD  -->  Sepsis: resolved  -->  PNA: treated  -->  Persistent yeast in abdominal fluid  -->  Status post cholecystectomy, laparoscopic  -->  Gastric ulcer with perforation status post omental patch  -->  Acute hyper capneic respiratory failure, recurrent : on Ventilator  -->  Status post tracheostomy  We appreciate the opportunity of caring for your patient.  If you have any questions feel free to call [phone] and ask that we be paged directly.     Stu Esquivel    Significant Results and Procedures   Ventilation Mode: CMV/AC  FiO2 (%): 40 %  Rate Set  (breaths/minute): 14 breaths/min  Tidal Volume Set (mL): 500 mL  PEEP (cm H2O): 5 cmH2O  Pressure Support (cm H2O): 10 cmH2O  Oxygen Concentration (%): 40 %  Resp: 18    Pending Results   These results will be followed up by LTAC  Unresulted Labs Ordered in the Past 30 Days of this Admission     Date and Time Order Name Status Description    6/15/2017 0904 Blood culture Preliminary     6/15/2017 0655 Blood culture Preliminary     6/9/2017 0920 Fungus Culture, non-blood Preliminary     6/6/2017 1306 Fungus culture Preliminary           Code Status   Full Code    Primary Care Physician   Shar James    Phyisical exam:   Patient is trached and on pressure support trials  Alert, following commands  Chest: Clear to auscultation   CVS S1 S2 heard   Abdomen: Soft  Extemities: moves all fingers and toes.     Time Spent on this Encounter   I, Stu Esquivel, personally saw the patient today and spent greater than 30 minutes discharging this patient.    Discharge Disposition   Discharged to long-term care facility  Condition at discharge: Fair    Consultations This Hospital Stay   HOSPITALIST IP CONSULT  SPIRITUAL HEALTH SERVICES IP CONSULT  NEPHROLOGY IP CONSULT  HEMATOLOGY & ONCOLOGY IP CONSULT  VASCULAR ACCESS ADULT IP CONSULT  INTENSIVIST IP CONSULT  PHARMACY TO DOSE VANCO  SWALLOW EVAL SPEECH PATH AT BEDSIDE IP CONSULT  PHYSICAL THERAPY ADULT IP CONSULT  PHARMACY IP CONSULT  NUTRITION SERVICES ADULT IP CONSULT  PHARMACY IP CONSULT  PHARMACY IP CONSULT  PHARMACY IP CONSULT  PHARMACY/NUTRITION TO START AND MANAGE TPN  PHARMACY IP CONSULT  OCCUPATIONAL THERAPY ADULT IP CONSULT  PHYSICAL THERAPY ADULT IP CONSULT  WOUND OSTOMY CONTINENCE NURSE  IP CONSULT  PHARMACY IP CONSULT  INTENSIVIST IP CONSULT  PHARMACY TO DOSE VANCO  NUTRITION SERVICES ADULT IP CONSULT  THORACIC SURGERY IP CONSULT  PHARMACY IP CONSULT  PHARMACY IP CONSULT  INFECTIOUS DISEASES IP CONSULT  PHARMACY TO DOSE VANCO  SWALLOW EVAL SPEECH PATH AT  BEDSIDE IP CONSULT  PASSY TIM VALVE WITH CUFF DEFLATION IP CONSULT  PHYSICAL THERAPY ADULT IP CONSULT  OCCUPATIONAL THERAPY ADULT IP CONSULT    Discharge Orders     AntiEmbolism Stockings   Bilateral below knee length.On in the morning, off at night     General info for SNF   Length of Stay Estimate: Short Term Care: Estimated # of Days <30  Condition at Discharge: Improving  Level of care:skilled   Rehabilitation Potential: Fair  Admission H&P remains valid and up-to-date: Yes  Recent Chemotherapy: N/A  Use Nursing Home Standing Orders: Yes     Mantoux instructions   Give two-step Mantoux (PPD) Per Facility Policy Yes     Reason for your hospital stay   71-year-old female who had a laparoscopic cholecystectomy followed by worsening respiratory failure with return to the operating room where she was found to have a gastric perforation now status post repair who then subsequently failed extubation and ultimately she had a tracheostomy performed.  She has persistently grown yeast from her sputum as well as her abdominal fluid therefore decision not to move forward with a PEG tube.  Planning to change her to Diflucan given sensitivities were run on the Candida species.  Also now sputum going back Stenotrophomonas maltophilia sensitive to levofloxacin.  Discontinue vancomycin and start levofloxacin.        Glucose monitor nursing POCT   Before meals and at bedtime     Intake and output   Every shift     Crawford catheter   To straight gravity drainage. Change catheter every 2 weeks and PRN for leaking or decreased uring output with signs of bladder distention. DO NOT change catheter without a specific MD order IF diagnosis of benign prostatic hypertrophy (BPH), neurogenic bladder, or other urological conditions     IV access   Peripheral IV.     Follow Up and recommended labs and tests   Continue to see nephrology and ID physicians     Activity - Up with nursing assistance     Full Code     Physical Therapy Adult Consult    Evaluate and treat as clinically indicated.    Reason:  Critical illness     Occupational Therapy Adult Consult   Evaluate and treat as clinically indicated.    Reason:  Critical therapy     Ventilator   Ventilation Mode: CMV/AC  FiO2 (%): 40 %  Rate Set (breaths/minute): 20 breaths/min  Tidal Volume Set (mL): 500 mL  PEEP (cm H2O): 5 cmH2O  Oxygen Concentration (%): 40 %  Resp: 25     Fall precautions     Pneumatic Compression Device    Bilateral calf. Remove 30 mins BID.     Advance Diet as Tolerated   Follow this diet upon discharge: Orders Placed This Encounter     Adult Formula Drip Feeding: Continuous Isosource 1.5; Nasogastric tube; Goal Rate: 45; mL/hr; Medication - Tube Feeding Flush Frequency: At least 15-30 mL water before and after medication administration and with tube clogging; Amout to Send (Nutr...       Discharge Medications   Current Discharge Medication List      START taking these medications    Details   Heparin Sodium, Porcine, (HEPARIN SODIUM PF) 5000 UNIT/0.5ML injection Inject 0.5 mLs (5,000 Units) Subcutaneous every 8 hours    Comments: Discontinue upon discharge from inpatient setting or when patient is not sedentary  Associated Diagnoses: Mesenteric ischemia (H)      insulin aspart (NOVOLOG PEN) 100 UNIT/ML injection Inject 1-12 Units Subcutaneous every 4 hours    Associated Diagnoses: Mesenteric ischemia (H)      furosemide (LASIX) 10 MG/ML injection Inject 4 mLs (40 mg) into the vein 2 times daily  Qty: 60 mL    Comments: Continue at the discretion of nephrology  Associated Diagnoses: Mesenteric ischemia (H)      levofloxacin (LEVAQUIN) 250 MG/50ML infusion Inject 50 mLs (250 mg) into the vein every 24 hours  Qty: 1000 mL    Comments: Continue at the discretion of infectious diseases  Associated Diagnoses: Mesenteric ischemia (H)      pantoprazole (PROTONIX) 40 mg IV push injection Inject 40 mg into the vein 2 times daily  Qty: 30 each    Associated Diagnoses: Mesenteric ischemia (H)          CONTINUE these medications which have NOT CHANGED    Details   ASPIRIN EC PO Take 81 mg by mouth daily      MELOXICAM PO Take 15 mg by mouth daily      LOVASTATIN PO Take 20 mg by mouth every morning      ipratropium - albuterol 0.5 mg/2.5 mg/3 mL (DUONEB) 0.5-2.5 (3) MG/3ML neb solution Take 1 vial by nebulization 2 times daily as needed for shortness of breath / dyspnea or wheezing      ACETAMINOPHEN PO Take 650 mg by mouth every morning      BACLOFEN PO Take 5 mg by mouth every morning RX is 5 mg (1/2 of 10 mg tab) 2 x daily but she takes daily due to drowsiness.      Multiple Vitamins-Minerals (CENTRUM SILVER) per tablet Take 1 tablet by mouth every morning      TRAZODONE HCL PO Take 50 mg by mouth nightly as needed for sleep      BENAZEPRIL HCL PO Take 20 mg by mouth every morning      fluticasone-vilanterol (BREO ELLIPTA) 100-25 MCG/INH oral inhaler Inhale 1 puff into the lungs daily      Ipratropium-Albuterol (COMBIVENT RESPIMAT)  MCG/ACT inhaler Inhale 1 puff into the lungs 4 times daily as needed for shortness of breath / dyspnea or wheezing      sertraline (ZOLOFT) 50 MG tablet Take 1 tablet by mouth daily.  Qty: 90 tablet         STOP taking these medications       levalbuterol (XOPENEX) 0.31 MG/3ML nebulizer solution Comments:   Reason for Stopping:             Allergies   Allergies   Allergen Reactions     Iodine I 131 Tositumomab Anaphylaxis     Can tolerate topical iodine if it is wahed off after surgery      Penicillins Rash and Anaphylaxis     Swollen  lymph nodes, flushed overheating      Sulfa Drugs Nausea and Vomiting, Diarrhea and Rash     Data   Most Recent 3 CBC's:  Recent Labs   Lab Test  06/18/17   0421  06/17/17   2044  06/17/17   1200   WBC  7.6  7.5  6.6   HGB  8.4*  8.2*  6.5*   MCV  86  86  84   PLT  453*  430  411      Most Recent 3 BMP's:  Recent Labs   Lab Test  06/19/17   0520  06/18/17   1635  06/18/17   0421   06/17/17   0430   NA  146*   --   147*   --   144    POTASSIUM  3.8  3.3*  3.7   < >  3.5   CHLORIDE  113*   --   114*   --   112*   CO2  27   --   24   --   23   BUN  51*   --   53*   --   53*   CR  1.30*   --   1.36*   --   1.41*   ANIONGAP  6   --   9   --   9   CARYL  8.8   --   8.6   --   8.1*   GLC  87   --   105*   --   160*    < > = values in this interval not displayed.     Most Recent 2 LFT's:  Recent Labs   Lab Test  06/19/17   0520  06/18/17   0421   AST  19  14   ALT  11  12   ALKPHOS  85  86   BILITOTAL  0.5  0.5     Most Recent INR's and Anticoagulation Dosing History:  Anticoagulation Dose History     Recent Dosing and Labs Latest Ref Rng & Units 6/9/2017 6/10/2017 6/12/2017 6/14/2017 6/16/2017 6/18/2017 6/19/2017    INR 0.86 - 1.14 1.12 1.15(H) 1.22(H) 1.30(H) 1.42(H) 1.26(H) 1.31(H)        Most Recent 3 Troponin's:  Recent Labs   Lab Test  06/04/17   1428  06/02/17   1015   TROPI  <0.015  The 99th percentile for upper reference range is 0.045 ug/L.  Troponin values in   the range of 0.045 - 0.120 ug/L may be associated with risks of adverse   clinical events.    <0.015  The 99th percentile for upper reference range is 0.045 ug/L.  Troponin values in   the range of 0.045 - 0.120 ug/L may be associated with risks of adverse   clinical events.       Most Recent Cholesterol Panel:  Recent Labs   Lab Test  06/19/17   0520   TRIG  81     Most Recent 6 Bacteria Isolates From Any Culture (See EPIC Reports for Culture Details):  Recent Labs   Lab Test  06/19/17   0520  06/16/17   1145  06/15/17   1947  06/15/17   1143  06/15/17   0855  06/14/17   0911   CULT  No growth after 2 hours  Moderate growth Stenotrophomonas maltophilia*  Heavy growth Stenotrophomonas maltophilia  Light growth Candida albicans / dubliniensis Candida albicans and Candida   dubliniensis are not routinely speciated Susceptibility testing not routinely   done  *  Canceled, Test credited Unsatisfactory Specimen - Specimen frozen prior to   receipt in lab.  FROZE SAMPLE IN TRANSIT  Notification of test   cancellation was given to OSMAN TIJERINA AT [facility].  RN WILL RECOLLECT AND REORDER.  [facility] 6.15.17 1600    No growth after 4 days  Moderate growth Candida albicans / dubliniensis Candida albicans and Candida   dubliniensis are not routinely speciated Susceptibility testing not routinely   done  Critical Value/Significant Value, preliminary result only, called to and read   back by Saira Nicole RN on 6.15.2017 at 0815. KVO  Susceptibility testing requested by . [id number]. susceptibility on   Candida albicans and dubliniensis. [id number].  *  Canceled, Test credited  Incorrectly ordered by PCU/Clinic  Test reordered as correct code       Most Recent TSH, T4 and A1c Labs:  Recent Labs   Lab Test  06/08/17   0500   A1C  Canceled, Test credited   UNABLE TO CALCULATE % HBA1C DUE TO LOW HGB AND/OR LOW HGBA1C  NOTIFIED RASHAD IN ICU AT 0634       Results for orders placed or performed during the hospital encounter of 06/02/17   CT Abdomen/Pelvis Angio wo & w Contrast    Narrative    CTA ANGIOGRAM ABDOMEN AND PELVIS  6/2/2017 3:02 PM     HISTORY: Concerns for embolic occlusion of mesenteric arteries due to  atrial fibrillation.    COMPARISON: 6/2/2017 CT without contrast.    TECHNIQUE: CT angiogram of the abdomen and pelvis was performed  following the administration of 60 mL Isovue-370 contrast. Images are  viewed in multiple planes and 3-D reconstructions were also performed.  Radiation dose for this scan was reduced using automated exposure  control, adjustment of the mA and/or kV according to patient size, or  iterative reconstruction technique.    FINDINGS: There is scattered calcified and soft plaque in the  descending thoracic and abdominal aorta. This does not contribute to a  significant stenosis. The celiac trunk and its branches, the superior  mesenteric artery and visualized branches and inferior mesenteric  artery are patent without definite evidence for embolic  occlusion.  There is a mild amount of soft plaque within the proximal superior  mesenteric artery. This does not contribute to a significant stenosis.    There is calcified plaque in the common iliac arteries. This  contributes to a mild stenosis in the right common iliac artery. The  external iliac arteries and proximal femoral arteries are patent  without significant stenoses.    There are scattered colonic diverticuli. No evidence for acute  diverticulitis. The bowel otherwise appears grossly unremarkable.    There is diffuse fatty infiltration of the liver. There are  gallstones. There is a cyst at the lower pole of the right kidney.  Remaining solid organs appear grossly unremarkable. The extrahepatic  common bile duct is mildly dilated and has a maximum diameter of  approximately 8 to 9 mm.      Impression    IMPRESSION: No evidence for embolic occlusion of mesenteric arteries  and visualized branches.    ALESSANDRA WATTS MD   XR Abdomen Port 1 View    Narrative    ABDOMEN PORTABLE ONE VIEW  6/4/2017 11:48 AM     HISTORY: Mild abdominal distention. Evaluate for postop ileus (status  post laparoscopic cholecystectomy two days ago).       Impression    IMPRESSION: Nonspecific bowel gas pattern with a paucity of gas in the  abdomen. There is an amorphous loop of bowel in the right mid abdomen.  Right hip arthroplasty is incidentally noted.    NIKKI CASTLE MD   XR Chest Port 1 View    Narrative    CHEST PORTABLE ONE VIEW  6/4/2017 11:46 AM     HISTORY: Evaluate for edema and infiltrate.      Impression    IMPRESSION: The patient is rotated towards the left on the radiograph.  There is opacification at the lung bases, left greater than right,  which could be related to a combination of pleural fluid and/or  parenchymal opacification.    NIKKI CASTLE MD   XR Chest Port 1 View    Narrative    XR CHEST PORT 1 VW  6/5/2017 2:25 AM     HISTORY:  SOB, patient status post cholecystectomy.    COMPARISON:  None.    FINDINGS:  Again noted is lucency beneath the right diaphragm  compatible with free air. This is consistent with the patient's recent  surgery. A left pleural effusion is present. This is increased since  6/4/2017. There is associated left basilar infiltrate/atelectasis.      Impression    IMPRESSION:  1. Lucency beneath the right diaphragm consistent with free peritoneal  air. This is compatible with the patient's recent surgery.  2. Increased left pleural effusion and increased associated left  basilar infiltrate/atelectasis.    CAROL CONTRERAS MD   XR Chest Port 1 View    Narrative    CHEST ONE VIEW UPRIGHT  6/5/2017 1:36 PM     HISTORY:  Lines and tubes, intubated patient.    COMPARISON: 6/5/2017.      Impression    IMPRESSION: Worsening infiltrates on the left since the comparison  study. Trace atelectasis and/or infiltrate at the right base.  Endotracheal tube tip mid trachea. Right PICC tip in the low SVC.    YEVGENIY BAI MD   CT Chest Abdomen Pelvis w/o Contrast    Narrative    CT CHEST, ABDOMEN, AND PELVIS WITHOUT CONTRAST 6/5/2017 2:39 PM     HISTORY: Evaluate for left lower lobe effusion versus consolidation.  Also, has worsening abdominal pain after cholecystectomy and exp  laparoscopy without clear etiology. Evaluate for pneumatosis.    COMPARISON: Abdomen and pelvis CT from 6/2/2017    TECHNIQUE: Volumetric helical acquisition of CT images from the lung  apices through the symphysis pubis without contrast. Radiation dose  for this scan was reduced using automated exposure control, adjustment  of the mA and/or kV according to patient size, or iterative  reconstruction technique.    FINDINGS:     Chest: Centrally lobar atelectasis and/or consolidation is seen on the  left. Small to moderate bilateral effusions left worse than right.  Left base atelectasis and/or infiltrate. There are moderate  atherosclerotic changes of the visualized aorta and its branches.  There is no evidence of aortic aneurysm.  There are mild coronary  vascular calcifications consistent with coronary artery disease.  Normal heart size.    Abdomen and pelvis: Moderate air and fluid throughout the abdomen,  more than typically seen post surgery. No bowel obstruction. Increased  density of the kidney is noted which may be retained contrast. Liver,  spleen, adrenal glands, and pancreas grossly negative for worrisome  focal lesion. There are moderate atherosclerotic changes of the  visualized aorta and its branches. There is no evidence of aortic  aneurysm. No hydronephrosis. No definite extravasated oral contrast.  Oral contrast is seen in the distal small bowel. No pneumatosis  demonstrated.      Impression    IMPRESSION:  1. Essentially lobar atelectasis and/or consolidation in the left  lower lobe.  2. Small to moderate bilateral effusions left worse than right. Mild  right base atelectasis and/or infiltrate.  3. Moderate amount of air and fluid throughout the abdomen, more than  is typically seen after surgery, of uncertain etiology. Oral contrast  is present in the distal small bowel without evidence of definite  extraluminal oral contrast. Consider diagnostic paracentesis to  evaluate for infection.    YEVGENIY BAI MD   XR Chest Port 1 View    Narrative    CHEST PORTABLE ONE VIEW June 6, 2017 5:34 AM     HISTORY: Lines and tubes, intubated patient.    COMPARISON: 6/5/2017.    FINDINGS: ET tube tip well above the jeanmarie. PICC line with tip in the  SVC. Haziness over the left lung likely represents some layered out  pleural fluid. No definite interval change.      Impression    IMPRESSION: Support hardware remains in good position. Left pleural  effusion. No interval change.    SHAYE JOSEPH MD   NM HepatOBiliary Scan    Narrative    NUCLEAR MEDICINE HEPATOBILIARY SCAN June 6, 2017 3:00 PM     HISTORY: Evaluate for bile leak. Status post recent cholecystectomy.    COMPARISON: None.    TECHNIQUE: The patient was injected with 6.6 mCi  technetium 99m  Choletec followed by dynamic imaging over the biliary system for 60  minutes.    FINDINGS: There is rapid uptake of radiotracer throughout the liver  without focal abnormalities. Radiotracer is seen within the common  bile duct by 18 minutes. Bowel activity is seen by 22 minutes. No  convincing evidence for a bile leak. The bile ducts are unremarkable,  without evidence for biliary dilatation. No evidence for cystic duct  or common bile duct obstruction.      Impression    IMPRESSION:   1. Unremarkable hepatobiliary scan without evidence for acute  cholecystitis or biliary obstruction.  2. No convincing evidence for a bile leak.     MONICA HALE MD   XR Abdomen Port 1 View    Narrative    XR ABDOMEN PORT F1 VW 6/7/2017 5:19 AM    COMPARISON: 6/4/2017    HISTORY: Nasogastric tube placement.      Impression    IMPRESSION: Enteric tube tip and sidehole project over the stomach.  Surgical drain projects over the mid abdomen. No gas-filled loops of  distended large or small bowel. No evidence of free air.    CHAUNCEYOLIVER MEZA   XR Chest Port 1 View    Narrative    XR CHEST PORT 1 VW 6/7/2017 5:19 AM    COMPARISON: 6/6/2017    HISTORY: Intubation.      Impression    IMPRESSION: Endotracheal tube tip in the mid trachea. Right upper  extremity PICC tip in the low SVC. Enteric tube courses below the  diaphragm, tip not included in this image. Small bilateral pleural  effusions with associated bibasilar atelectasis are not significantly  changed. No pneumothorax identified.    CHAUNCEY MEZA   XR Chest Port 1 View    Narrative    XR CHEST PORT 1 VW  6/9/2017 5:15 AM     HISTORY:  increased respirations    COMPARISON: Film performed on 6/7/2017    FINDINGS:  ET tube has been removed. NG tube is still in place. Right  PICC line is in place. Bilateral pleural effusions are present. There  is probably not been a change in the pleural effusions. This is an  upright film. The previous film was supine.      Impression     IMPRESSION: Persistent pleural effusions, the left appears slightly  larger than the right.    CAROL CONTRERAS MD   XR Chest Port 1 View    Narrative    CHEST PORTABLE ONE VIEW  6/9/2017 8:10 AM     COMPARISON: Frontal chest x-ray 6/9/2017 at 5:07 AM.    HISTORY: Post intubation.      Impression    IMPRESSION: Right PICC line with its tip likely in the right atrium  and a nasogastric tube are again noted. There has been interval  placement of an endotracheal tube with its tip at the jeanmarie.  Withdrawal of endotracheal tube by 1-2 cm is recommended. Layering  bilateral pleural effusions again noted. There are no airspace  opacities to suggest pneumonia. There is no pneumothorax.    BRAULIO ERWIN MD   XR Chest Port 1 View    Narrative    CHEST ONE VIEW PORTABLE   6/10/2017 6:10 AM     HISTORY: Lines and tubes, intubated patient.    COMPARISON: 6/9/2017      Impression    IMPRESSION: Endotracheal tubes in good position. PICC line in the  upper right atrium is unchanged. Alveolar infiltrates in the lung  bases have worsened. Bilateral pleural effusions are unchanged. Normal  heart size.    JONATHAN ANGLIN MD   XR Chest Port 1 View    Narrative    PORTABLE CHEST ONE VIEW   6/11/2017 11:35 AM     HISTORY: Ventilator.    COMPARISON: 6/10/2017.      Impression    IMPRESSION: Lines and tubes are in satisfactory position. Increased  density at the lung bases likely representing pleural effusions with  adjacent atelectasis is unchanged. There remains central pulmonary  vascular congestion. This may be slightly worse when compared to the  previous exam.    ALESSANDRA WATTS MD   XR Chest Port 1 View    Narrative    CHEST PORTABLE ONE VIEW June 12, 2017 5:25 AM     HISTORY: Vented.    COMPARISON: 6/11/2017.      Impression    IMPRESSION: No change. ET tube, nasogastric tube and central venous  line remain in position along with any esophageal temperature probe.  The heart appears mildly prominent but this is portable  technique.  Vascular congestion and some perihilar streaky infiltrates are noted  also similar to that seen on the prior exam.    BELLA CABRERA MD   XR Upper GI Water Soluble    Narrative    WATER SOLUBLE UPPER GASTROINTESTINAL   6/12/2017 3:00 PM    HISTORY: Evaluate for possible leak following repair of a perforated  gastric ulcer.    COMPARISON: None.    FLUOROSCOPY TIME: 0.7 minutes.    IMAGES OBTAINED: 11     FINDINGS: 150 mL of water-soluble contrast was injected through the  patient's nasogastric tube. There is a normal mucosal pattern of the  stomach. Contrast readily enters the duodenum. No extravasation of  contrast is seen with no evidence of a leak.      Impression    IMPRESSION: Unremarkable examination with no evidence of a gastric  leak.     MICHAEL VILLASENOR MD   XR Chest Port 1 View    Narrative    CHEST ONE VIEW PORTABLE   6/12/2017 4:05 PM     HISTORY: Pain.    COMPARISON: 6/12/2017 at 5:26 AM.      Impression    IMPRESSION: Endotracheal tube in good position. Nasogastric tube  passes into the stomach but the tip is not included on the radiograph.  Contrast material is seen in the stomach. Alveolar infiltrates in the  left lung are worse. Right lung is well-inflated and there is only  minimal infiltrate at the right base. Mild cardiomegaly. Patient  rotated to the left. PICC line from the right with the tip at the  cavoatrial junction, unchanged.    JONATHAN ANGLIN MD   XR Abdomen Port 2 Views    Narrative    XR ABDOMEN PORT 2 VW 6/12/2017 5:51 PM    HISTORY: Evaluate for perforation.    COMPARISON: 6/7/2017    FINDINGS: There is no specific evidence of free intraperitoneal air.  The bowel gas pattern is nonobstructive.      Impression    IMPRESSION: No specific evidence of free air.    ANDRES ANDERSON MD   CT Chest Abdomen Pelvis w/o Contrast    Narrative    CT CHEST, ABDOMEN AND PELVIS WITHOUT CONTRAST   6/13/2017 9:21 AM     HISTORY: Evaluate for interval abdominal fluid collection.  Evaluate  left pneumonia, effusion, possible empyema.    TECHNIQUE:  Noncontrast images of the chest, abdomen, and pelvis.  Radiation dose for this scan was reduced using automated exposure  control, adjustment of the mA and/or kV according to patient size, or  iterative reconstruction technique.    COMPARISON: 6/5/2017.    FINDINGS:   Chest: The tip of the endotracheal tube is at the jeanmarie. Nasogastric  tube extends into the stomach. There is a right-sided PICC line which  terminates at the atriocaval junction. There are moderate-sized  bilateral pleural effusions which appear simple. There is dense  airspace consolidation in the left lower lobe, similar to prior. Mild  compressive atelectasis noted in the right lower lobe. There are  worsening airspace opacities in bilateral upper lobes with associated  volume loss.    Abdomen: There is a small to moderate volume of ascites, similar to  prior. No free intra-abdominal air. No evidence of loculated  intra-abdominal fluid collection to suggest abscess. There is a  surgical drain beneath the liver. The gallbladder has been removed.  The unenhanced liver, spleen, pancreas, and adrenal glands are  unremarkable. There is no hydronephrosis of either kidney.    Pelvis: No pelvic mass. Crawford catheter and rectal tube are noted.  Right hip replacement and anasarca noted.      Impression    IMPRESSION:  1. The tip of the endotracheal tube is at the jeanmarie. Consider pulling  back 4 cm. This was communicated to the nurse taking care of the  patient at 9:45 AM on 6/13/2017.  2. Worsening airspace opacities in bilateral upper lobes with volume  loss may represent developing atelectasis.  3. Moderate-sized bilateral pleural effusions appear simple.  4. Left lower lobe atelectasis versus pneumonia is similar to prior.  5. Small to moderate volume of ascites.    ALBERTA AJ MD   XR Feeding Tube Placement    Narrative    FEEDING TUBE PLACEMENT   6/15/2017 2:32 PM    HISTORY:  Nutritional needs.    COMPARISON: None    FINDINGS: 0.8 minutes of fluoroscopy were utilized for placement of a  feeding tube.  At the final position, the feeding tube tip was  ligament of Treitz.  40 mL of contrast was injected to confirm  placement.  The tube was marked at the level of the nose at the 90 cm  length.  Estimated blood loss during the procedure was less than 5 mL.  No specimens collected. There were no complications of the procedure.    Medications used: 4 ml 2% Lidocaine gel, 40 mL Omnipaque 240    Images Obtained: 1      Impression    IMPRESSION: Successful feeding tube placement.    JIMMY ZAMORA PA-C   XR Chest Port 1 View    Narrative    PORTABLE CHEST ONE VIEW   6/15/2017 10:30 AM     HISTORY: Interval chest x-ray in intubated patient with new fever.    COMPARISON: 6/12/2017.    FINDINGS: Interval placement of tracheostomy tube with the tip in the  high trachea. Cardiac silhouette is enlarged, unchanged. Enteric tube  with the tip projecting over the stomach is unchanged. Airspace  opacities in the right and left upper lobes are not significantly  changed when compared to the prior exam. Minimal right basilar  atelectasis is again noted. Right upper extremity PICC line with the  tip projecting over the atriocaval junction is again noted. Left  pleural effusion with associated atelectasis is unchanged.      Impression    IMPRESSION:   1. No change in bilateral upper lobe airspace opacities, left greater  than right.  2. Interval placement of a tracheostomy tube.  3. Left pleural effusion with associated atelectasis is unchanged.    ROSA BENTLEY DO   XR Abdomen Port 1 View    Narrative    ABDOMEN ONE VIEW PORTABLE  6/15/2017 4:59 PM     COMPARISON: Fluoroscopic view of the abdomen 6/15/2017    HISTORY: Verify NG placement in stomach    FINDINGS: Tip of the feeding tube appears to be in the distal  duodenum.    BRAULIO ERWIN MD   XR Abdomen Port 1 View    Narrative    ABDOMEN ONE VIEW  PORTABLE  6/15/2017 6:41 PM     COMPARISON: [facility] 6/15/2017    HISTORY: NG placement      Impression    IMPRESSION: Feeding tube again noted with its distal bulb in the third  portion of the duodenum. Nasogastric tube has been placed with its tip  and sidehole below the left hemidiaphragm. No evidence for bowel  obstruction or free air.    BRAULIO ERWIN MD   XR Chest Port 1 View    Narrative    XR CHEST PORT 1 VW 6/18/2017 6:08 AM    HISTORY: Lines and tubes, intubated patient.    COMPARISON: 6/15/2017    FINDINGS: Tracheostomy tube appears appropriately positioned. Right  PICC line tip is in the projection of the SVC/RA junction. Enteric  tube extends beyond the lower margin of the film. Hazy opacity at the  lung bases, left greater than right. No pneumothorax.      Impression    IMPRESSION: Basilar interstitial edema.    ANDRES ANDERSON MD

## 2017-06-19 NOTE — PROGRESS NOTES
D: D/c Planning --per AM ICU rounding and verification w/ unit [facility]-- provider ICU-- in agreement pt is medically ready for d/c to LTACH.  Previous LTACH [facility] referral sent.  I: Care Coordinator spoke w/ [facility] Liaison Mary who will forward pt's financial/ Demographic sheet to [facility] intake financial team in order to start pre auth of a LTACH stay.  In the mean time Mary will resume Chart Assessment of pt's referral and will contact [facility] care coord once more information is available.     A: Active LTACH referral [facility].     Rosie Ousmane Care Transitions Nurse,  RN, BSN, CCM  [facility]  Cell Phone # [phone]    12:13 pt meets  LTACH Criteria and [facility] will proceed w/ prior Auth from insurance followed by request a bed for placement.      12;25 Bed Availability at [facility] today. [facility] provider will need to complete D/C Orders/ Summary and D/C packet will include HC Directive (If available) D/C Summary, X ray CD, D/C Orders and Copy of MarShailesh  [facility] preparing d/c packet.     13:33 per ICU provider postponing hosp d/c until tomorrow allowing provider more time to familiarize himself w/ pt's d/c needs.  Per [facility] Liaison in the AM she will check back w/ ICU to clarify if pt is ready for d/c and time needed for ICU Medical provider to also be prepared for d/c.  Discharge orders should include Vent Settings, Tube Feeding Formula/Rate of Delivery/ Free Water flushes and pt's Code Status.    Care Coordinator called back Dtr and LVM indicating that pt will likely d/c tomorrow and dtr should reach out to [facility] Liaison as previously requested.

## 2017-06-19 NOTE — PLAN OF CARE
Problem: Ventilation, Mechanical Invasive (Adult)  Goal: Signs and Symptoms of Listed Potential Problems Will be Absent or Manageable (Ventilation, Mechanical Invasive)  Signs and symptoms of listed potential problems will be absent or manageable by discharge/transition of care (reference Ventilation, Mechanical Invasive (Adult) CPG).   Outcome: No Change  Pt rested well overnight, denies pain, dyspnea, or other complaints, tolerating vent well, TF continues, well tolerated, pt continues to have loose/liquid stool, flexiseal device in, tolerating well, plan to continue to wean as tolerates and prepare for transfer to TCU.

## 2017-06-19 NOTE — PROGRESS NOTES
[facility] ICU RESPIRATORY NOTE  Date of Admission: 6/2/2017  Date of Intubation (most recent): 6/12/2017  Reason for Mechanical Ventilation: Respiratory failure  Number of Days on Mechanical Ventilation: 8  Met Criteria for Pressure Support Trial: Yes  Length of Pressure Support Trial: PS 10/5 40% about 45 minutes  Reason for Stopping Pressure Support Trial: Tachypneic    Significant Events Today: PMV done 8 minutes  Ventilation Mode: CMV/AC  FiO2 (%): 40 %  Rate Set (breaths/minute): 14 breaths/min  Tidal Volume Set (mL): 500 mL  PEEP (cm H2O): 5 cmH2O  Pressure Support (cm H2O): 10 cmH2O  Oxygen Concentration (%): 40 %  Resp: 18    ABG Results: No ABG result for today    ETT appearance on chest x-ray: Trach secure, patent and clean    Plan:  Consider long term care transfer this afternoon.  6/19/2017  Dorie Peace

## 2017-06-19 NOTE — PROGRESS NOTES
"General Surgery Progress Note    Subjective: pt able to answer questions appropriately. Improved fever curve. Tolerating tube feeds.     Objective: /71  Pulse 93  Temp 99.9  F (37.7  C) (Axillary)  Resp 16  Ht 5' 7\" (1.702 m)  Wt 167 lb 8.8 oz (76 kg)  SpO2 100%  BMI 26.24 kg/m2  Gen: alert  CV: RRR  Pulm: nonlabored breathing  Abd: soft, small amount of drainage on prior drain site dressing  Ext: WWP    Assessment/Plan:   Ara Stanley  is a 71 year old female s/p lap alexandre and s/p lap repair of perforated ulcer. Improving.   - continue tube feeds  - likely able to d/c to LTACH soon  - follow up with me 2 weeks after discharge  - appreciate intensivist, nephrology, ID jessica Peck MD  [facility]  [phone]              "

## 2017-06-19 NOTE — PROGRESS NOTES
06/19/17 0923   General Information   Patient Profile Review See Profile for full history and prior level of function   Onset of Illness/Injury, or Date of Surgery - Date 06/02/17   Start of Care Date 06/19/17   Date of Tracheostomy Placement 06/13/17   Referring Physician Dr. Ugalde   Patient/Family Goals Statement Did not state; Agreed to POC goals.   Treatment Diagnosis Aphonia due to cuffed trach tube   Current Communication Mouthing words   Observations Alert  (Disoriented to place and time)   Comments Per MD note:This is a 71-year-old female who had a laparoscopic cholecystectomy followed by worsening respiratory failure with return to the operating room where she was found to have a gastric perforation now status post repair who then subsequently failed extubation and ultimately she had a tracheostomy performed.  She has persistently grown yeast from her sputum as well as her abdominal fluid therefore decision not to move forward with a PEG tube.   Evaluation   Type of Trach Shiley   Size of Trach 6 mm   Oxygen Supply CPAP   Cuff Inflated at Onset of Evaluation Yes   Orders received to deflate cuff for PMSV trial Yes   Suctioning Required Before Cuff Deflation Yes   Oxygen saturation before cuff deflation 100 %   Respiratory rate before cuff deflation 25 Per Minute   Suctioning required after cuff deflation No   Oxygen saturation after cuff deflation 98 %   Respiratory rate after cuff deflation 25 Per Minute   Voicing noted after PMSV placement: Yes  (At phrase level)   Total amount of time with PMSV placement: 8   Cuff re-inflated after PMSV trials: Yes   Prognosis / Impression   Criteria for Skilled Therapeutic Interventions Met Yes   SLP Diagnosis Aphonia due to cuffed trach tube   Rehab Potential Good, to achieve stated therapy goals   Therapy Frequency 5 times/wk   Predicted Duration of Therapy Intervention (days/wks) 1 week   Anticipated Discharge Disposition LTACH (long-term acute care hospital)    Risks and Benefits of Treatment have been explained. yes   Patient, Family & other staff in agreement with plan of care yes   Clinical Impression Comments Patient presents with aphonia due to cuffed trach tube.  Passy Greg Valve (PMV) speaking trial was completed this am with RT asisistance.  Patient tolerated PMV placement for 8 minutes while on pressure support.  Patient was able to cough up mucus at the start of the trial.  Saturation levels and vital signs remained stable during the trial and voicing was achieved at the phrase level.  Patient complained of being hot during the trial, but did not complain of SOB.  Patient was disoriented to place and time.  Plan to provide PMV communication Tx with SLP and RT superivision only 5x/week with increased duration of PMV use as able.     Total Evaluation Time   Total Evaluation Time (Minutes) 15

## 2017-06-19 NOTE — PLAN OF CARE
Problem: Restraint for Non-Violent/Non-Self-Destructive Behavior  Goal: Prevent/Manage Potential Problems  Maintain safety of patient and others during period of restraint.  Promote psychological and physical wellbeing.  Prevent injury to skin and involved body parts.  Promote nutrition, hydration, and elimination.   Outcome: Improving  Patient is awake and oriented.Patient off restraints. Kandy Du RN, BSN, BC, CCRN

## 2017-06-19 NOTE — PLAN OF CARE
Problem: Goal Outcome Summary  Goal: Goal Outcome Summary  Outcome: Improving  Pt VSS, afebrile and was turned and had oral cares every couple hours.  Pt gets very agitated and even hits and scratches when mad.  Pt has a flexi seal and tanner with good UO and if still stable will transfer to LTAC tomorrow.  Pt family was updated a couple times today by phone.  Pt was cleaned up x 2 for leaking around flexi seal.

## 2017-06-20 NOTE — PROGRESS NOTES
Date of Admission: 6/2/2017  Date of Intubation (most recent): 6/12/2017  Reason for Mechanical Ventilation: Respiratory failure  Number of Days on Mechanical Ventilation: 9  Met Criteria for Pressure Support Trial: Yes  Length of Pressure Support Trial: none  Reason for Stopping Pressure Support Trial: per MD     Ventilation Mode: CMV/AC  FiO2 (%): 40 %  Rate Set (breaths/minute): 14 breaths/min  Tidal Volume Set (mL): 500 mL  PEEP (cm H2O): 5 cmH2O  Pressure Support (cm H2O): 10 cmH2O  Oxygen Concentration (%): 40 %  Resp: 34    ABG Results: No ABG result for today     ETT appearance on chest x-ray: Trach secure, patent and clean     Plan:  Consider long term care transfer today.  6/20/2017  Saba Kelly

## 2017-06-20 NOTE — PROGRESS NOTES
Patient remains trached and on vent.      Ventilation Mode: CMV/AC  FiO2 (%): 40 %  Rate Set (breaths/minute): 14 breaths/min  Tidal Volume Set (mL): 500 mL  PEEP (cm H2O): 5 cmH2O  Pressure Support (cm H2O): 10 cmH2O  Oxygen Concentration (%): 40 %  Resp: 25     PMV done with speech x 5 minutes.    Trach clean and intact  PS not done today due to Possible transfer to LTACH today.      Will continue to follow.

## 2017-06-20 NOTE — PROGRESS NOTES
"RN spoke with social work and Dr. Fabian about why we need family to give consent to transfer patient if she was mostly oriented. When passy jeramy valve placed on pt earlier this shift, pt knew \"hopital\" and \"2017\" but didn't know month or why she was here other than she's \"sick for awhile\". Patient asks appropriate questions, answers questions, converses (via mouthing words) appropriately.  called ethics coordinator who stated patient can make that decision for herself to go to [facility]. Social work went bedside and spoke with/assessed the patient. After Social Work left room, she told RN she would speak to Liane, care coordinator, regarding getting her transferred out. Will monitor closely.     "

## 2017-06-20 NOTE — PROGRESS NOTES
"General Surgery Progress Note    Subjective: pt afebrile past 24hrs. Has had ongoing drainage from prior FUNMI site and ostomy appliance placed.     Objective: /79  Pulse 93  Temp 99.4  F (37.4  C) (Axillary)  Resp 22  Ht 5' 7\" (1.702 m)  Wt 167 lb 12.3 oz (76.1 kg)  SpO2 97%  BMI 26.28 kg/m2  Gen: drowsy, opens eyes to voice  CV: RRR  Pulm: nonlabored breathing  Abd: soft, ostomy appliance at prior drain site with small amount of drainage in bag, remainder of lap sites well healed  Ext: WWP    Assessment/Plan:   Ara Stanley  is a 71 year old female s/p lap alexandre and s/p lap repair of perforated gastric ulcer. Pt is stable/improving. Continues to have loose stool requiring rectal tube.   - ok for discharge to LTACH  - replace ostomy appliance on FUNMI site with dry dressing as minimal drainage noted  - pt should follow up with me in 2-4 weeks   - consider changing tube feeds vs adding fiber/guar gum to decrease amount of loose stool and hopefully able to d/c rectal tube.     Estelita Peck MD  [facility]  [phone]              "

## 2017-06-20 NOTE — PROGRESS NOTES
Pt discharged to [facility] via EMS this afternoon. Report given to Shankar, receiving RN. Personal belongings, including wedding rings and home medications sent with EMS. Daughter Charlene updated.

## 2017-06-20 NOTE — PROGRESS NOTES
Left message with Daughter, Charlene. Gave her contact information for both Liane ([facility] care coordinator) and Mary ([facility]) and asked her to call back asap so we can get the ball rolling on transferring out to LTACH today. Monitor closely.

## 2017-06-20 NOTE — PROGRESS NOTES
ESME  I: SW was updated that patient would be ready for d/c today to [facility]. SW attempted to contact patient's daughter to update her that patient would be ready for d/c. ESME left message on both home and mobile number. ESME called patient's son Lane to update him as SW was unable to contact Charlene. Lane was okay with patient d/cing today and would update Charlene. ESME spoke with patient to discuss d/cing to [facility]. Patient was agreeable. RN was present for this conversation. [facility], [facility].    ESME will arrange transportation for patient when d/c confirmed.    P: SW will continue to follow and assist as needed.    Addendum  I: ESME called [facility] and arranged stretcher for patient to transport to [facility]. ESME arranged for 1830. ESME faxed PCS and gave to [facility] in ICU.    ESME changed stretcher ride to 1700. Patient will be going to [facility] [address] [facility] [place].    Bernie Joyec, ANTOINETTE   *[phone]

## 2017-06-20 NOTE — PROGRESS NOTES
[facility]    Infectious Disease Progress Note    Date of Service (when I saw the patient): 06/20/2017     Assessment & Plan   Ara Stanley is a 71 year old female who was admitted on 6/2/2017.    Impression:  1 71 y.o female admitted in the hospital for about 2 weeks now.   2 Initial presentation was of acute abdomen taken to OR for cholecystectomy.   3 Taken back to the OR 4 days later with concern for peritonitis, found to have gastric perforation of an ulcer which was repaired. C albicans in cx  4 Post-operative course has been complicated with multiple intubations, trials of weaning off the vent and now with trach.   5 Now ? HCAP. Sputum stento  6 Mild renal insuff        Recommendations:   1. Note plan for LTACH.  Would give one week of levaquin and fluconazole post-discharge.        Be Webster MD    Interval History   Afebrile past 48 hrs.  Arousable.  Stable on vent.    WBC normal.  No new pos cxs.  Physical Exam   Temp: 99.4  F (37.4  C) Temp src: Axillary BP: 155/79   Heart Rate: 90 Resp: 22 SpO2: 97 % O2 Device: Mechanical Ventilator    Vitals:    06/18/17 0443 06/19/17 0500 06/20/17 0400   Weight: 77.6 kg (171 lb 1.2 oz) 76 kg (167 lb 8.8 oz) 76.1 kg (167 lb 12.3 oz)     Vital Signs with Ranges  Temp:  [99.4  F (37.4  C)-99.8  F (37.7  C)] 99.4  F (37.4  C)  Heart Rate:  [] 90  Resp:  [10-34] 22  BP: (135-177)/() 155/79  FiO2 (%):  [40 %] 40 %  SpO2:  [96 %-100 %] 97 %    Constitutional: Awake, alert, cooperative, no apparent distress vent, trach  Lungs: Clear to auscultation bilaterally, no crackles or wheezing vent sounds  Cardiovascular: Regular rate and rhythm, normal S1 and S2, and no murmur noted  Abdomen: Normal bowel sounds, soft, non-distended, non-tender drain site drainage  Skin: No rashes, no cyanosis, no edema  Other:    Medications     NaCl 10 mL/hr at 06/20/17 0134     - MEDICATION INSTRUCTIONS -       IV fluid REPLACEMENT ONLY Stopped  (06/08/17 0450)       levofloxacin  250 mg Intravenous Q24H     micafungin  100 mg Intravenous Q24H     QUEtiapine  12.5 mg Oral QAM     QUEtiapine  25 mg Oral At Bedtime     furosemide  40 mg Intravenous BID     chlorhexidine  15 mL Swish & Spit BID     insulin aspart  1-12 Units Subcutaneous Q4H     vitamin  B-1  200 mg Oral Daily     acetaminophen  1,000 mg Oral Q8H    Or     acetaminophen  650 mg Rectal Q8H     pantoprazole  40 mg Intravenous BID     heparin  5,000 Units Subcutaneous Q8H     budesonide  1 mg Nebulization BID     sodium chloride (PF)  10 mL Intracatheter Q8H     arformoterol  15 mcg Nebulization BID     senna-docusate  1-2 tablet Oral BID     sertraline  50 mg Oral Daily     simvastatin  10 mg Oral QAM       Data   All microbiology laboratory data reviewed.  Recent Labs   Lab Test  06/18/17   0421  06/17/17   2044  06/17/17   1200   WBC  7.6  7.5  6.6   HGB  8.4*  8.2*  6.5*   HCT  24.5*  23.8*  19.0*   MCV  86  86  84   PLT  453*  430  411     Recent Labs   Lab Test  06/19/17   0520  06/18/17   0421  06/17/17   0430   CR  1.30*  1.36*  1.41*     Recent Labs   Lab Test  06/04/12   0527   SED  34*     Recent Labs   Lab Test  06/19/17   0520  06/16/17   1145  06/15/17   1947  06/15/17   1143  06/15/17   0855  06/14/17   0911  06/09/17   1023  06/09/17   0920  06/09/17   0839   CULT  No growth after 20 hours  Moderate growth Stenotrophomonas maltophilia*  Heavy growth Stenotrophomonas maltophilia  Light growth Candida albicans / dubliniensis Candida albicans and Candida   dubliniensis are not routinely speciated Susceptibility testing not routinely   done  *  Canceled, Test credited Unsatisfactory Specimen - Specimen frozen prior to   receipt in lab.  FROZE SAMPLE IN TRANSIT Notification of test   cancellation was given to OSMAN TIJERINA AT [facility].  RN WILL RECOLLECT AND REORDER.  [facility] 6.15.17 1600    No growth after 5 days  Moderate growth Candida albicans / dubliniensis Tricia albicans and  Tricia   dubliniensis are not routinely speciated Susceptibility testing not routinely   done  Critical Value/Significant Value, preliminary result only, called to and read   back by Saira Nicole RN on 6.15.2017 at 0815. KVO  Susceptibility testing requested by . [id number]. susceptibility on   Candida albicans and dubliniensis. [id number]  *  Canceled, Test credited  Incorrectly ordered by PCU/Clinic  Test reordered as correct code    No growth  Candida albicans / dubliniensis Candida albicans and Candida dubliniensis are not   routinely speciated isolated  *  Light growth Candida albicans / dubliniensis Candida albicans and Candida   dubliniensis are not routinely speciated Susceptibility testing not routinely   done  *  Canceled, Test credited  Incorrectly ordered by PCU/Clinic  Test reordered as correct code    No anaerobes isolated  No growth

## 2017-06-20 NOTE — PLAN OF CARE
Problem: Goal Outcome Summary  Goal: Goal Outcome Summary     Discharge Planner SLP   Patient plan for discharge: Did not state  Current status: Passy Greg Valve (PMV) speaking valve trial completed this am with RT assistance while patient in line with vent on CMV.  Patient achieved voicing at the word to phrase level, but increased fatigue, restricted airflow - ? Secretions, and SOB observed.  Trial was discontinued after 5 minutes due to fatigue/SOB.  Plan to continue Tx until discharge.  Possible discharge to LTACH today  Barriers to return to prior living situation: Weakness, respiratory needs  Recommendations for discharge: Continue SLP PMV communication Tx at LTACH  Rationale for recommendations: Increased verbal communication of daily needs       Entered by: Aleksandra Loza 06/20/2017 9:25 AM

## 2017-06-20 NOTE — PLAN OF CARE
"Problem: Bowel Obstruction (Adult)  Goal: Signs and Symptoms of Listed Potential Problems Will be Absent or Manageable (Bowel Obstruction)  Signs and symptoms of listed potential problems will be absent or manageable by discharge/transition of care (reference Bowel Obstruction (Adult) CPG).   Outcome: Improving  Patient on full vent support this shift. With Passy [place] valve on, patient appropriate and mostly oriented. (knew \"hospital\" \"2017\" but not month, and knew she was here because she was \"sick\") SR. Up to chair x4 hours. TF at goal. UOP adequate. Plan for [place] later today pending transportation can be set up. Continue to monitor closely.       "

## 2017-06-20 NOTE — PLAN OF CARE
Problem: Goal Outcome Summary  Goal: Goal Outcome Summary  PT: Decreased frequency to 5x per week, pt not motivated to participate. Pt up to the chair with nursing today, this is good progress.

## 2017-06-20 NOTE — PROGRESS NOTES
RN placed another call and subsequent VM on daughter, Charlene's, home phone/cell phone. She was again given contact info for both [facility] and [place] care coordinators. A call was also placed with the second , son-Lane. He was given [facility] number and asked to call back ASAP as well. Will continue to monitor closely.

## 2017-06-20 NOTE — PLAN OF CARE
Problem: Goal Outcome Summary  Goal: Goal Outcome Summary  Outcome: No Change  Pt's demeanor withdrawn, refusing ADL's. Appears [facility], able to make needs known. VSS overnight. Thick creamy secretions through ETT. Flexiseal with liquid stool. Good UO. Transfer to [facility]

## 2017-06-20 NOTE — PROVIDER NOTIFICATION
Discussed POC with Dr. Esquivel. Notified MD of morning lab levels, discussed discharge orders and plan for patient. Awaiting call back from DaughterNata. Plan for [place] today if daughter returns call. Monitor closely.

## 2017-06-20 NOTE — PROGRESS NOTES
"Care Coordination:    Called [facility] rep (Estelita Ama, via Aleksandra) to touch base re: their readiness for accepting pt to LTACH.    + LTACH contact today is Mary, [phone] who states they were unable to reach family yesterday (did leave VM) and bed can only be officially requested once family consents.  Please have pt's 1st contact call Mary!   + RN unable to reach family either; SW made multiple calls and updated/obtained consent for pt to transfer  + I called and left VM for [facility]  Mary at 1330 to confirm bed, and to obtain phone numbers for report  + Left another VM for Mary at 1405.  Called rep Estelita ([phone]) who states pt's family did call Mary, and Mary is working on it.  I asked Mary call me or the unit when able.      Per yesterday's note, if pt d/c's,  \"Discharge orders should include Vent Settings, Tube Feeding Formula/Rate of Delivery/ Free Water flushes and pt's Code Status.\"  + done    LTACH to-do List:     (x when completed)    _x_ SW set ride time: 1700 to unit 3W  _x_ Nurse-to-nurse report (phone number: [phone])  _x_ MD-to-MD handoff (pager number: Dr. Hinojosa [phone])  _x_ EMTALA form completed (to be signed by MD, nurse, and patient/representatitve)  _x_ [facility] transfer form completed -     Liane Lara RN, BSN  [facility] Care Coordinator   Mobile Phone: [phone]    "

## 2017-06-21 NOTE — PLAN OF CARE
Problem: Goal Outcome Summary  Goal: Goal Outcome Summary  Speech Language Therapy Discharge Summary     Reason for therapy discharge:    Discharged to [facility]     Progress towards therapy goal(s). See goals on Care Plan in [facility] electronic health record for goal details.  Goals not met.  Barriers to achieving goals:   discharge from facility.     Therapy recommendation(s):    Continued therapy is recommended.  Rationale/Recommendations:  Continue speech therapy for tolerance of the Passy [place] Valve..

## 2017-06-21 NOTE — PLAN OF CARE
Problem: Goal Outcome Summary  Goal: Goal Outcome Summary  Physical Therapy Discharge Summary     Reason for therapy discharge:    Discharged to long term care facility.     Progress towards therapy goal(s). See goals on Care Plan in [facility] electronic health record for goal details.  Goals not met.  Barriers to achieving goals:   limited tolerance for therapy and discharge from facility.     Therapy recommendation(s):    Continued therapy is recommended.  Rationale/Recommendations:  Pt will benefit from conitnued skilled therapy services to increase activity tolerance, strength, safety and independence with functional mobility.

## 2017-07-26 PROBLEM — J96.02 ACUTE HYPERCAPNIC RESPIRATORY FAILURE (H): Status: ACTIVE | Noted: 2017-01-01

## 2017-07-26 NOTE — IP AVS SNAPSHOT
"` `     [facility] INTENSIVE CARE UNIT: [phone]                                              INTERAGENCY TRANSFER FORM - NURSING   2017                    Hospital Admission Date: 2017  BEN PALOMINO   : 1945  Sex: Female        Attending Provider: Scarlet Sorenson MD     Allergies:  Iodine I 131 Tosit[place]omab, Penicillins, Sulfa Drugs    Infection:  VRE-Contact Isolation   Service:  PULMONOLOGY    Ht:  1.676 m (5' 6\")   Wt:  66.9 kg (147 lb 7.8 oz)   Admission Wt:  67 kg (147 lb 11.3 oz)    BMI:  23.81 kg/m 2   BSA:  1.77 m 2            Patient PCP Information     Provider PCP Type    DO Blake Griffin      Current Code Status     Date Active Code Status Order ID Comments User Context       Prior      Code Status History     Date Active Date Inactive Code Status Order ID Comments User Context    2017  4:37 PM  Full Code [id number]  Isabella Gutierrez APRN CNP Outpatient    2017  6:10 PM 2017  4:37 PM Full Code [id number]  Scarlet Sorenson MD Inpatient    2017  2:48 PM 2017  6:10 PM Full Code [id number]  Stu Esquivel MD Outpatient    2017  7:23 PM 2017  2:48 PM Full Code [id number]  Estelita Peck MD Inpatient    2012 12:45 PM 2012  3:06 PM Full Code [id number]  Feli Gilmore, RN Inpatient      Advance Directives        Does patient have a scanned Advance Directive/ACP document in EPIC?           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    COPD (chronic obstructive pulmonary disease) (H) Medium  2012    Hyperlipidemia Medium  2012    " Hypertension Medium  6/4/2012    Anemia due to blood loss, acute Medium  6/6/2012    Abdominal pain Medium  6/2/2017      Immunizations     Name Date      Pneumococcal (PCV 13) defer-08/01/17     Deferral:            END      ASSESSMENT     Discharge Profile Flowsheet     EXPECTED DISCHARGE     COMMUNICATION ASSESSMENT      Expected Discharge Date  08/03/17 (LTACH/[facility] at 1200 ) 08/02/17 1456   Patient's communication style  spoken language (English or Bilingual) 07/27/17 0157    DISCHARGE NEEDS ASSESSMENT     SKIN      Concerns To Be Addressed  discharge planning concerns 06/05/12 1104   Inspection of bony prominences  Full 08/03/17 0001    Anticipated Changes Related to Illness  inability to care for self 07/27/17 0157   Skin WDL  ex 08/03/17 0640    Equipment Currently Used at Home  walker, rolling 07/30/17 0948   Skin Color/Characteristics  bruised (ecchymotic) 08/03/17 0640    Transportation Available  car 07/29/17 1221   Skin Moisture  dry 08/02/17 1756    # of Referrals Placed by CTS  Post Acute Facilities ([facility]) 06/19/17 1355   Skin Elasticity  quick return to original state 08/02/17 1728    Equipment Used at Home  standard walker;bath bench;grab bar;raised toilet;dressing device 06/06/12 1425   Skin Integrity  drain/device(s);excoriation;incision(s);rash(es) 08/03/17 0640    GASTROINTESTINAL (ADULT,PEDIATRIC,OB)     Procedural focused assessment (identify areas inspected)   -- (anterior) 08/01/17 1532    GI WDL  ex 08/03/17 0640   Full except areas not inspected   Ear, right;Ear, left 08/02/17 1539    Abdominal Appearance  distended 08/02/17 1728   Skin Temperature  warm 08/02/17 1756    All Quadrants Bowel Sounds  hypoactive 08/03/17 0640   SAFETY      Last Bowel Movement  08/01/17 08/01/17 1637   Safety WDL  WDL 08/03/17 0640    GI Signs/Symptoms  fecal incontinence 08/03/17 0640   Safety Equipment  suction equipment 08/03/17 0640                 Assessment WDL (Within Defined Limits)  "Definitions           Safety WDL     Effective: 09/28/15    Row Information: <b>WDL Definition:</b> Bed in low position, wheels locked; call light in reach; upper side rails up x 2; ID band on<br> <font color=\"gray\"><i>Item=AS safety wdl>>List=AS safety wdl>>Version=F14</i></font>      Skin WDL     Effective: 09/28/15    Row Information: <b>WDL Definition:</b> Warm; dry; intact; elastic; without discoloration; pressure points without redness<br> <font color=\"gray\"><i>Item=AS skin wdl>>List=AS skin wdl>>Version=F14</i></font>      Vitals     Vital Signs Flowsheet     COMMENTS     Compliance w/ventilator (intubated patients)  0 08/03/17 0831    Comments  return from CT 08/03/17 1358   Vocalization (extubated patients)  Intubated 08/03/17 0831    VITAL SIGNS     Muscle Tension  0 08/03/17 0831    Temp  98.8  F (37.1  C) 08/03/17 1357   Total  0 08/03/17 0831    Temp src  Oral 08/03/17 1357   ANALGESIA SIDE EFFECTS MONITORING      Resp  14 08/03/17 0621   Side Effects Monitoring: Respiratory Quality  V 08/03/17 1400    Heart Rate  69 08/03/17 0621   Side Effects Monitoring: Respiratory Depth  N 08/03/17 1400    Pulse/Heart Rate Source  Monitor 07/27/17 2027   Side Effects Monitoring: Sedation Level  1 08/03/17 1400    BP  97/50 08/03/17 0621   HEIGHT AND WEIGHT      BP Location  Left arm 08/03/17 0624   Height  1.676 m (5' 6\") 07/26/17 1942    OXYGEN THERAPY     Weight  66.9 kg (147 lb 7.8 oz) 08/02/17 0655    SpO2  100 % 08/03/17 1216   BSA (Calculated - sq m)  1.88 07/26/17 1942    O2 Device  Mechanical Ventilator 08/03/17 1216   BMI (Calculated)  27.1 07/26/17 1942    FiO2 (%)  40 % 08/03/17 1216   POSITIONING      PAIN/COMFORT     Body Position  supine 08/03/17 1400    Patient Currently in Pain  yes 08/03/17 1400   Head of Bed (HOB)  HOB at 20-30 degrees 08/03/17 1400    Preferred Pain Scale  CPOT (Critical-Care Pain Observation Tool) 08/03/17 0624   Positioning/Transfer Devices  pillows 08/03/17 0624    Patient's " Stated Pain Goal  2 08/02/17 1622   Chair  Upright in chair 08/01/17 1153    0-10 Pain Scale  5 08/03/17 1400   DAILY CARE      Word Pain Scale  2 08/02/17 1536   Activity Type  bedrest 08/03/17 1400    Pain Location  Back 08/03/17 1400   Activity Level of Assistance  assistance, 2 people 08/03/17 1400    Pain Orientation  Lower 08/03/17 1400   ECG      Pain Descriptors  Aching 08/03/17 1400   ECG Rhythm  Normal sinus rhythm 08/03/17 1400    Pain Management Interventions  analgesia administered 08/02/17 1622   Ectopy  None 07/31/17 1708    Pain Intervention(s)  Medication (See eMAR) 08/03/17 1400   Lead Monitored  Lead II;V 1 07/30/17 0417    Response to Interventions  Decrease in pain 08/03/17 1400   POINT OF CARE TESTING      CRITICAL-CARE PAIN OBSERVATION TOOL (CPOT)     Puncture Site  fingertip 07/28/17 1604    Facial Expression  0 08/03/17 0831   Bedside Glucose (mg/dl )   70 mg/dl 07/28/17 1604    Body Movements  0 08/03/17 0831                 Patient Lines/Drains/Airways Status    Active LINES/DRAINS/AIRWAYS     Name: Placement date: Placement time: Site: Days: Last dressing change:    Airway - Adult/Peds 8 [place]       8   [id number]     Surgical Airway Sh[place] 6 Cuffed 07/28/17   1508   6   5     Closed/Suction Drain 2 Right Abdomen Bulb 07/21/17      Abdomen   13     Closed/Suction Drain Left;Anterior Abdomen Bulb 10 [place] 08/01/17   1424   Abdomen   1     NG/OG Tube Nasoenteric feeding tube 10 fr Left nostril 06/15/17   1430   Left nostril   48     Urethral Catheter Coude 06/09/17   0630   Coude   55     Rash 06/18/17 2000 Bilateral thigh macular 06/18/17   2000    45     Rash 07/26/17 1700 other (see comments) other (see comments) macular 07/26/17   1700    7     Incision/Surgical Site 06/04/12 Lateral;Right Leg 06/04/12   0927    1886     Incision/Surgical Site 06/02/17 Abdomen 06/02/17   1716    61     Incision/Surgical Site 06/06/17 Abdomen 06/06/17   1809    57     Incision/Surgical Site 06/13/17  Neck 06/13/17   1700    50     Incision/Surgical Site 07/28/17 Left Chest 07/28/17   1500    5             Patient Lines/Drains/Airways Status    Active PICC/CVC     Name: Placement date: Placement time: Site: Days: Additional Info Last dressing change:    PICC Triple Lumen 06/04/17 Right Basilic 06/04/17   1214   Basilic   60 External Cath Length (cm): 4 cm            Size (Fr): 5 Fr            Orientation: Right            Extremity Circumference (cm): 24 cm            Catheter Brand: Bard            Dressing Intervention: Transparent;Securing device;Gauze            Description: Valved;Power PICC            Total Catheter Length (cm) Trimmed: 45 cm            Site Prep: Chlorhexidine            Local Anesthetic: Injectable            Inserted by: COLE Hernandez RN            Insertion attempts with ultrasound: 1            Patient Tolerance: Tolerated well            Placement Verification: Blood Return            Difficulty with threading line: No            Tip location: SVC            Full barrier precautions done: Yes            Consent Signed: Yes            Time Out performed: Yes            Lot #: [id number]               Intake/Output Detail Report     Date Intake               Output           Net    Shift P.O. I.V. Other NG/GT IV Piggyback Colloid Enteral Blood Components Total Urine Emesis/NG output Drains Stool Blood Chest Tube Total       Noc 08/01/17 2300 - 08/02/17 0659 -- 80 -- 120 -- -- 400 -- 600 385 -- 115 200 -- -- 700 -100    Day 08/02/17 0700 - 08/02/17 1459 -- 100 -- 180 -- -- 400 -- 680 1100 -- 50 250 -- -- 1400 -720    Nneka 08/02/17 1500 - 08/02/17 2259 -- 160 -- 240 -- -- 370 -- 770 410 -- 4 -- -- -- 414 356    Noc 08/02/17 2300 - 08/03/17 0659 -- 80 -- 120 -- -- 320 -- 520 295 -- 15 -- -- -- 310 210    Day 08/03/17 0700 - 08/03/17 1459 -- 200 -- -- -- -- 240 -- 440 200 -- -- -- -- -- 200 240      Last Void/BM       Most Recent Value    Urine Occurrence     Stool Occurrence 1 at 08/02/2017 1850       Case Management/Discharge Planning     Case Management/Discharge Planning Flowsheet     REFERRAL INFORMATION     Expected Discharge Date  08/03/17 (LTACH/[facility] at 1200 ) 08/02/17 1456    Did the Initial Social Work Assessment result in a Social Work Case?  No 07/27/17 1308   ASSESSMENT/CONCERNS TO BE ADDRESSED      Admission Type  inpatient 07/27/17 1308   Concerns To Be Addressed  discharge planning concerns 06/05/12 1104    Arrived From  another healthcare institution, not defined ([place]) 07/27/17 1308   DISCHARGE PLANNING      Referral Source  physician;nursing 07/27/17 1308   Anticipated Changes Related to Illness  inability to care for self 07/27/17 0157    New Steerage to  Clinics?  No 07/27/17 1308   Transportation Available  car 07/29/17 1221    # of Referrals Placed by CTS  [facility] 06/19/17 1355   Skilled Nursing Facility  [facility] 06/07/12 0924    Reason For Consult  decision making concerns 07/27/17 1308   Skilled Nursing Facility Phone Number  [phone] 06/07/12 0924    Record Reviewed  clinical discipline documentation;history and physical;medical record;patient profile;plan of care 07/27/17 1308   Equipment Used at Home  standard walker;bath bench;grab bar;raised toilet;dressing device 06/06/12 1425     Assigned to Case  Liane BREWER CTS 07/27/17 1308   FINAL RESOURCES      Primary Care MD Name  Shar Jay 07/27/17 1308   Equipment Currently Used at Home  walker, rolling 07/30/17 0948    LIVING ENVIRONMENT     ABUSE RISK SCREEN      Lives With  alone 07/30/17 0948   QUESTION TO PATIENT:  Has a member of your family or a partner(now or in the past) intimidated, hurt, manipulated, or controlled you in any way?  no 07/27/17 0157    Living Arrangements  house ([place] for 6 weeks. ) 07/30/17 0948   QUESTION TO PATIENT: Do you feel safe going back to the place where you are living?  yes 07/27/17 0157    Provides Primary  Care For  no one, unable/limited ability to care for self 07/27/17 0157   OBSERVATION: Is there reason to believe there has been maltreatment of a vulnerable adult (ie. Physical/Sexual/Emotional abuse, self neglect, lack of adequate food, shelter, medical care, or financial exploitation)?  no 07/27/17 0157    COPING/STRESS     (R) MENTAL HEALTH SUICIDE RISK      Major Change/Loss/Stressor  death of a loved one;child(iglesia);family problems;hospitalization;illness;significant life changes 07/27/17 0200   Are you depressed or being treated for depression?  Yes 07/27/17 0200    Patient Personal Strengths  expressive of needs;motivated;positive attitude;strong support system 06/05/12 1104   HOMICIDE RISK      EXPECTED DISCHARGE     Homicidal Ideation  no 07/27/17 0157

## 2017-07-26 NOTE — IP AVS SNAPSHOT
MRN:[id number]                      After Visit Summary   7/26/2017    Ara Stanley    MRN: [id number]           Thank you!     Thank you for choosing [facility] for your care. Our goal is always to provide you with excellent care. Hearing back from our patients is one way we can continue to improve our services. Please take a few minutes to complete the written survey that you may receive in the mail after you visit with us. Thank you!        Patient Information     Date Of Birth          1945        Designated Caregiver       Most Recent Value    Caregiver    Will someone help with your care after discharge? yes    Name of designated caregiver [facility]    Phone number of caregiver [phone]    Caregiver address [address]      About your hospital stay     You were admitted on:  July 26, 2017 You last received care in the:  [facility] Intensive [facility] Unit    You were discharged on:  August 3, 2017        Reason for your hospital stay       Ara Stanley is a 71 year old female who was recently hospitalized at [facility] for lap alexandre, subsequent return to operating room for worsening status and was found to have gastric perforation which was repaired with omental mesh, and complicated by persistent abdominal fluid collection and abdominal candidal infection.  She ultimately required tracheostomy for ongoing respiratory failure and was initially discharged to [place] [facility] where she treated for ongoing abdominal abscess and rehabbing for respiratory failure.  She developed worsening respiratory status and was re-admitted on 7/26/2017 from [place] with loculated pleural effusion for evaluation by thoracic surgery for potential VATS.  VATS performed 7/28 by Dr. Varela.   This hospitalization complicated by presumed infection. Broad spectrum antibiotics were initiated and CT scan performed. Patient found again to have persistent abdominal fluid  collection. Patient underwent CT guided abscess drainage 8/1 with FUNMI drain placement. Cultures sent and pending                  Who to Call     For medical emergencies, please call 911.  For non-urgent questions about your medical care, please call your primary care provider or clinic, [phone]  For questions related to your surgery, please call your surgery clinic        Attending Provider     Provider Specialty    Scarlet Sorenson MD Pulmonary       Primary Care Provider Office Phone # Fax #    Shar James [phone]      After Care Instructions     Activity - Up with nursing assistance           Additional Discharge Instructions       Abdominal Drains:  - continue abdominal drains until <20ml/24hr period or per IR recommendations  - may benefit from follow up sinogram prior to drain removal to assess resolution of fluid collections  - pt can follow up with Estelita Peck MD, in one week post discharge            Advance Diet as Tolerated       Follow this diet upon discharge: Orders Placed This Encounter      Adult Formula Drip Feeding: Continuous Nepro; Nasoduodenal tube; Goal Rate: 40; mL/hr; Medication - Tube Feeding Flush Frequency: At least 15-30 mL water before and after medication administration and with tube clogging; Amout to Send (Nutrition u...      Advance Diet as Tolerated: Clear Liquid Diet            Daily weights       Call Provider for weight gain of more than 2 pounds per day or 5 pounds per week.            Fall precautions           Crawford catheter       To straight gravity drainage. Change catheter every 2 weeks and PRN for leaking or decreased uring output with signs of bladder distention. DO NOT change catheter without a specific MD order IF diagnosis of benign prostatic hypertrophy (BPH), neurogenic bladder, or other urological conditions            General info for SNF       Length of Stay Estimate: Short Term Care: Estimated # of Days  31-90  Condition at Discharge: Stable  Level of care:skilled   Rehabilitation Potential: Good  Admission H&P remains valid and up-to-date: Yes  Recent Chemotherapy: N/A  Use Nursing Home Standing Orders: Yes            Glucose monitor nursing POCT       Before meals and at bedtime            IV access       PICC.            Intake and output       Every shift            Mantoux instructions       Give two-step Mantoux (PPD) Per Facility Policy Yes            Tracheostomy care by nursing       Standard Cares            Ventilator       Current Vent settings: 40%, R14, volume 450, PEEP 5   Date of Intubation (most recent): Tracheostomy 6/13/17  Reason for Mechanical Ventilation: Vent dependent   Met Criteria for Pressure Support Trial: Yes   Length of Pressure Support Trial: PS 10/5 since 11:21 am today to present.   Significant Events Today: Passy [place] Valve was performed today. Pt was not able to tolerate the valve, but did tolerate well deflated cuff for 15 minute            Wound care       Site:   Chest tube entry incisions   Instructions:  Apply bacitracin to sites BID for 7 days.  Cover with bandaide or gauze.  Notify provider of any purulent or bloody drainage.            Wound care (specify)       Site:   Abdominal drains; left and right   Instructions:  Keep drains covered with dry gauze. Keep gauze clean and dry.  Change gauze daily and PRN                  Follow-up Appointments     Follow Up and recommended labs and tests       Follow up with Estelita Peck MD, General Surgery, one week after discharge  Infectious Disease consult will need to be placed and cultures followed.                  Additional Services     Nutrition Services Adult IP Consult       Reason:  Enteric tube feeding  Nutrition Services Recommendations 8/2: Ordered by Registered Dietitian (RD): Change TF regimen to a renal formula in light of high Phos levels ---> Nepro at 40 mL/hr to provide:  1728 kcal (26 kcal per kg), 78 g protein  (1.2 g per kg), 155 g CHO, 12 g fiber, 700 mL H2O   Discontinue the ProSource (no longer needed)            Occupational Therapy Adult Consult       Evaluate and treat as clinically indicated.    Reason:  Deconditioning of critical illness            Physical Therapy Adult Consult       Evaluate and treat as clinically indicated.    Reason:  Deconditioning of critical illness            Speech Language Path Adult Consult       Evaluate and treat as clinically indicated.  Reason:  Swallowing, cuff deflation and Passy Greg valve    Most recent progress note:   Current status:  Patient was seen for Passy [place] Valve (PMV) with RT present for cuff deflation, ventilator monitoring on PS. Patient tolerated cuff deflation and was able to achieve good voicing. PMV placed and she demonstrated difficulty tolerating it, so it was removed. Continue the session with the cuff deflated. She stated she was more fatigued and anxious today. Likely the cause of her not tolerating the valve today, compared to yesterday and previous sessions. Recommend: 1. Continue daily trials of the PMV with ST and RT only at this time. 2. No ice chips due to silent aspiration noted on a video swallow study completed at [place] prior to her current hospitalization.                  Future tests that were ordered for you     Contact Isolation           Pneumatic Compression Device        Bilateral calf. Remove 30 mins BID.                  Pending Results     Date and Time Order Name Status Description    8/1/2017 1431 Anaerobic bacterial culture Preliminary     8/1/2017 1431 Abscess Culture Aerobic Bacterial Preliminary     7/31/2017 1239 Abscess Culture Aerobic Bacterial Preliminary     7/30/2017 1712 Blood culture Preliminary     7/30/2017 1524 Blood culture Preliminary     7/28/2017 1530 XR Chest Port 1 View Preliminary     7/28/2017 1358 Fungus Culture, non-blood Preliminary     7/28/2017 1358 Anaerobic bacterial culture Preliminary     7/28/2017  "1358 AFB Culture Non Blood Preliminary             Statement of Approval     Ordered          17 0750  I have reviewed and agree with all the recommendations and orders detailed in this document.  EFFECTIVE NOW     Approved and electronically signed by:  Isabella Gutierrez APRN CNP             Admission Information     Date & Time Provider Department Dept. Phone    2017 Scarlet Sorenson MD [facility] Intensive Care Unit [phone]      Your Vitals Were     Blood Pressure Temperature Respirations Height Weight Pulse Oximetry    97/50 (BP Location: Left arm) 98  F (36.7  C) (Oral) 14 1.676 m (5' 6\") 66.9 kg (147 lb 7.8 oz) 99%    BMI (Body Mass Index)                   23.81 kg/m2           Page Mage Information     Page Mage lets you send messages to your doctor, view your test results, renew your prescriptions, schedule appointments and more. To sign up, go to [email or web address] . Click on \"Log in\" on the left side of the screen, which will take you to the Welcome page. Then click on \"Sign up Now\" on the right side of the page.     You will be asked to enter the access code listed below, as well as some personal information. Please follow the directions to create your username and password.     Your access code is: [email or web address]  Expires: 2017  2:17 PM     Your access code will  in 90 days. If you need help or a new code, please call your [facility] clinic or [phone].        Care EveryWhere ID     This is your Care EveryWhere ID. This could be used by other organizations to access your [facility] medical records  [id number]        Equal Access to Services     ARTEMIO WILSON : Hadii ashlie Lewis, wabridgetteda reba, qaybta deidre young, veronica marcelo. So [facility] [phone].    ATENCIÓN: Si habla español, tiene a young disposición servicios gratuitos de asistencia lingüística. Llame al [phone].    We comply with applicable " federal civil rights laws and [place] laws. We do not discriminate on the basis of race, color, national origin, age, disability sex, sexual orientation or gender identity.               Review of your medicines      START taking        Dose / Directions    bacitracin 500 UNIT/GM Oint   Used for:  Open wound        Apply topically 2 times daily Apply to chest tube site BID for 7 days   Refills:  0       chlorhexidine 0.12 % solution   Commonly known as:  PERIDEX   Used for:  On mechanically assisted ventilation (H)        Dose:  15 mL   Take 15 mLs by mouth every 12 hours   Refills:  0       DAPTOmycin 400 mg   Indication:  Abscess   Used for:  Infection in abdomen (H)        Dose:  6 mg/kg   Inject 400 mg into the vein every 24 hours   Refills:  0       fluconazole 400-0.9 MG/200ML-% infusion   Commonly known as:  DIFLUCAN   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   Used for:  Candida infection        Dose:  400 mg   Inject 200 mLs (400 mg) into the vein every 24 hours   Quantity:  3000 mL   Refills:  0       heparin sodium PF 5000 UNIT/0.5ML injection   Used for:  Deep vein thrombosis (DVT) prophylaxis prescribed at discharge        Dose:  5000 Units   Inject 0.5 mLs (5,000 Units) Subcutaneous every 8 hours   Refills:  0       meropenem 1 G vial   Commonly known as:  MERREM   Indication:  Abscess   Used for:  Infection in abdomen (H)        Dose:  1 g   Inject 1,000 mg (1 g) into the vein every 8 hours   Quantity:  30 each   Refills:  0       nystatin 756344 UNIT/ML suspension   Commonly known as:  MYCOSTATIN   Used for:  Candida infection        Dose:  411325 Units   Take 5 mLs (500,000 Units) by mouth 4 times daily   Quantity:  280 mL   Refills:  0       ondansetron 4 MG ODT tab   Commonly known as:  ZOFRAN-ODT   Used for:  Nausea        Dose:  4 mg   Take 1 tablet (4 mg) by mouth every 6 hours as needed for nausea or vomiting   Quantity:  30 tablet   Refills:  0       senna-docusate 8.6-50 MG per  tablet   Commonly known as:  SENOKOT-S;PERICOLACE   Used for:  Constipation, unspecified constipation type        Dose:  1-2 tablet   Take 1-2 tablets by mouth 2 times daily as needed (constipation )   Quantity:  100 tablet   Refills:  0         CONTINUE these medicines which may have CHANGED, or have new prescriptions. If we are uncertain of the size of tablets/capsules you have at home, strength may be listed as something that might have changed.        Dose / Directions    * ipratropium - albuterol 0.5 mg/2.5 mg/3 mL 0.5-2.5 (3) MG/3ML neb solution   Commonly known as:  DUONEB   This may have changed:  Another medication with the same name was added. Make sure you understand how and when to take each.        Dose:  1 vial   Take 1 vial by nebulization 4 times daily   Refills:  0       * ipratropium - albuterol 0.5 mg/2.5 mg/3 mL 0.5-2.5 (3) MG/3ML neb solution   Commonly known as:  DUONEB   This may have changed:  You were already taking a medication with the same name, and this prescription was added. Make sure you understand how and when to take each.   Used for:  On mechanically assisted ventilation (H)        Dose:  3 mL   Take 1 vial (3 mLs) by nebulization every 4 hours as needed for shortness of breath / dyspnea   Quantity:  360 mL   Refills:  0       oxyCODONE 5 MG IR tablet   Commonly known as:  ROXICODONE   This may have changed:    - medication strength  - how to take this  - when to take this  - reasons to take this   Used for:  Generalized abdominal pain        Dose:  2.5 mg   Take 0.5 tablets (2.5 mg) by mouth every 3 hours as needed (pain)   Quantity:  30 tablet   Refills:  0       * Notice:  This list has 2 medication(s) that are the same as other medications prescribed for you. Read the directions carefully, and ask your doctor or other care provider to review them with you.      CONTINUE these medicines which have NOT CHANGED        Dose / Directions    ACETAMINOPHEN PO        Dose:  1000 mg    1,000 mg by Per J Tube route every 8 hours   Refills:  0       albuterol (2.5 MG/3ML) 0.083% neb solution        Dose:  1 vial   Take 1 vial by nebulization every 2 hours as needed for wheezing   Refills:  0       ALPRAZOLAM PO        Dose:  0.25 mg   0.25 mg by Per J Tube route 3 times daily   Refills:  0       BACLOFEN PO        Dose:  5 mg   Take 5 mg by mouth every morning RX is 5 mg (1/2 of 10 mg tab) 2 x daily but she takes daily due to drowsiness.   Refills:  0       budesonide 0.5 MG/2ML neb solution   Commonly known as:  PULMICORT        Dose:  0.5 mg   Take 0.5 mg by nebulization 2 times daily   Refills:  0       FLEXERIL PO        Dose:  5 mg   5 mg by Per J Tube route 3 times daily as needed for muscle spasms   Refills:  0       formoterol 20 MCG/2ML neb solution   Commonly known as:  PERFOROMIST        Dose:  20 mcg   Take 20 mcg by nebulization every 12 hours   Refills:  0       HYDRALAZINE HCL PO        Dose:  25 mg   25 mg by Per G Tube route every 6 hours   Refills:  0       LACTOBACILLUS RHAMNOSUS (GG) PO        Dose:  1 capsule   Take 1 capsule by mouth 3 times daily (with meals)   Refills:  0       Lidocaine 4 % Ptch        Dose:  1 patch   Externally apply 1 patch topically daily Remove for 12 hours   Refills:  0       LISINOPRIL PO        Dose:  20 mg   20 mg by Per J Tube route daily   Refills:  0       LOVASTATIN PO        Dose:  20 mg   Take 20 mg by mouth every morning   Refills:  0       MAGNESIUM OXIDE PO        Dose:  400 mg   Take 400 mg by mouth daily   Refills:  0       METOPROLOL TARTRATE PO        Dose:  50 mg   Take 50 mg by mouth 2 times daily   Refills:  0       omeprazole 2 mg/mL Susp   Commonly known as:  priLOSEC        Dose:  40 mg   Take 40 mg by mouth 2 times daily   Refills:  0       QUETIAPINE FUMARATE PO        Dose:  12.5 mg   12.5 mg by Per J Tube route 4 times daily as needed (hallucination or agitation)   Refills:  0       REMERON PO        Dose:  22.5 mg   22.5 mg  by Per G Tube route At Bedtime   Refills:  0       TRAZODONE HCL PO        Dose:  50 mg   50 mg by Per J Tube route nightly as needed for sleep   Refills:  0       ZOLOFT PO        Dose:  150 mg   150 mg by Per J Tube route daily   Refills:  0         STOP taking     micafungin 100 MG injection   Commonly known as:  MYCAMINE           vancomycin 50 mg/mL Liqd solution   Commonly known as:  VANOCIN                Where to get your medicines      Some of these will need a paper prescription and others can be bought over the counter. Ask your nurse if you have questions.     You don't need a prescription for these medications     bacitracin 500 UNIT/GM Oint    chlorhexidine 0.12 % solution    DAPTOmycin 400 mg    fluconazole 400-0.9 MG/200ML-% infusion    heparin sodium PF 5000 UNIT/0.5ML injection    ipratropium - albuterol 0.5 mg/2.5 mg/3 mL 0.5-2.5 (3) MG/3ML neb solution    meropenem 1 G vial    nystatin [id number] UNIT/ML suspension    ondansetron 4 MG ODT tab    senna-docusate 8.6-50 MG per tablet         Information about where to get these medications is not yet available     ! Ask your nurse or doctor about these medications     oxyCODONE 5 MG IR tablet                Protect others around you: Learn how to safely use, store and throw away your medicines at www.disposemymeds.org.             Medication List: This is a list of all your medications and when to take them. Check marks below indicate your daily home schedule. Keep this list as a reference.      Medications           Morning Afternoon Evening Bedtime As Needed    ACETAMINOPHEN PO   1,000 mg by Per J Tube route every 8 hours   Last time this was given:  1,000 mg on 8/3/2017  4:24 AM                                albuterol (2.5 MG/3ML) 0.083% neb solution   Take 1 vial by nebulization every 2 hours as needed for wheezing                                ALPRAZOLAM PO   0.25 mg by Per J Tube route 3 times daily   Last time this was given:  0.25 mg on  8/2/2017  9:41 PM                                bacitracin 500 UNIT/GM Oint   Apply topically 2 times daily Apply to chest tube site BID for 7 days   Last time this was given:  8/2/2017  9:42 PM                                BACLOFEN PO   Take 5 mg by mouth every morning RX is 5 mg (1/2 of 10 mg tab) 2 x daily but she takes daily due to drowsiness.   Last time this was given:  5 mg on 8/2/2017  8:55 AM                                budesonide 0.5 MG/2ML neb solution   Commonly known as:  PULMICORT   Take 0.5 mg by nebulization 2 times daily   Last time this was given:  0.5 mg on 8/3/2017  7:46 AM                                chlorhexidine 0.12 % solution   Commonly known as:  PERIDEX   Take 15 mLs by mouth every 12 hours   Last time this was given:  15 mLs on 8/2/2017  8:11 PM                                DAPTOmycin 400 mg   Inject 400 mg into the vein every 24 hours   Last time this was given:  400 mg on 8/3/2017  4:26 AM                                FLEXERIL PO   5 mg by Per J Tube route 3 times daily as needed for muscle spasms   Last time this was given:  5 mg on 8/2/2017  9:41 PM                                fluconazole 400-0.9 MG/200ML-% infusion   Commonly known as:  DIFLUCAN   Inject 200 mLs (400 mg) into the vein every 24 hours   Last time this was given:  400 mg on 8/2/2017 12:40 PM                                formoterol 20 MCG/2ML neb solution   Commonly known as:  PERFOROMIST   Take 20 mcg by nebulization every 12 hours                                heparin sodium PF 5000 UNIT/0.5ML injection   Inject 0.5 mLs (5,000 Units) Subcutaneous every 8 hours   Last time this was given:  5,000 Units on 8/3/2017  4:24 AM                                HYDRALAZINE HCL PO   25 mg by Per G Tube route every 6 hours   Last time this was given:  25 mg on 8/3/2017  4:24 AM                                * ipratropium - albuterol 0.5 mg/2.5 mg/3 mL 0.5-2.5 (3) MG/3ML neb solution   Commonly known as:  DUONEB    Take 1 vial by nebulization 4 times daily                                * ipratropium - albuterol 0.5 mg/2.5 mg/3 mL 0.5-2.5 (3) MG/3ML neb solution   Commonly known as:  DUONEB   Take 1 vial (3 mLs) by nebulization every 4 hours as needed for shortness of breath / dyspnea                                LACTOBACILLUS RHAMNOSUS (GG) PO   Take 1 capsule by mouth 3 times daily (with meals)                                Lidocaine 4 % Ptch   Externally apply 1 patch topically daily Remove for 12 hours                                LISINOPRIL PO   20 mg by Per J Tube route daily   Last time this was given:  20 mg on 8/2/2017  8:54 AM                                LOVASTATIN PO   Take 20 mg by mouth every morning                                MAGNESIUM OXIDE PO   Take 400 mg by mouth daily                                meropenem 1 G vial   Commonly known as:  MERREM   Inject 1,000 mg (1 g) into the vein every 8 hours   Last time this was given:  1 g on 8/3/2017  6:31 AM                                METOPROLOL TARTRATE PO   Take 50 mg by mouth 2 times daily   Last time this was given:  50 mg on 8/2/2017  9:40 PM                                nystatin 501804 UNIT/ML suspension   Commonly known as:  MYCOSTATIN   Take 5 mLs (500,000 Units) by mouth 4 times daily   Last time this was given:  500,000 Units on 8/2/2017  9:41 PM                                omeprazole 2 mg/mL Susp   Commonly known as:  priLOSEC   Take 40 mg by mouth 2 times daily                                ondansetron 4 MG ODT tab   Commonly known as:  ZOFRAN-ODT   Take 1 tablet (4 mg) by mouth every 6 hours as needed for nausea or vomiting                                oxyCODONE 5 MG IR tablet   Commonly known as:  ROXICODONE   Take 0.5 tablets (2.5 mg) by mouth every 3 hours as needed (pain)   Last time this was given:  2.5 mg on 8/3/2017  4:24 AM                                QUETIAPINE FUMARATE PO   12.5 mg by Per J Tube route 4 times daily  as needed (hallucination or agitation)                                REMERON PO   22.5 mg by Per G Tube route At Bedtime   Last time this was given:  22.5 mg on 8/2/2017  9:41 PM                                senna-docusate 8.6-50 MG per tablet   Commonly known as:  SENOKOT-S;PERICOLACE   Take 1-2 tablets by mouth 2 times daily as needed (constipation )                                TRAZODONE HCL PO   50 mg by Per J Tube route nightly as needed for sleep   Last time this was given:  50 mg on 8/2/2017  9:44 PM                                ZOLOFT PO   150 mg by Per J Tube route daily   Last time this was given:  150 mg on 8/2/2017  8:56 AM                                * Notice:  This list has 2 medication(s) that are the same as other medications prescribed for you. Read the directions carefully, and ask your doctor or other care provider to review them with you.

## 2017-07-26 NOTE — IP AVS SNAPSHOT
"[facility] INTENSIVE CARE UNIT: [phone]                                              INTERAGENCY TRANSFER FORM - PHYSICIAN ORDERS   2017                    Hospital Admission Date: 2017  BEN PALOMINO   : 1945  Sex: Female        Attending Provider: Scarlet Sorenson MD     Allergies:  Iodine I 131 Tositumomab, Penicillins, Sulfa Drugs    Infection:  VRE-Contact Isolation   Service:  PULMONOLOGY    Ht:  1.676 m (5' 6\")   Wt:  66.9 kg (147 lb 7.8 oz)   Admission Wt:  67 kg (147 lb 11.3 oz)    BMI:  23.81 kg/m 2   BSA:  1.77 m 2            Patient PCP Information     Provider PCP Type    Shar James DO [facility]      ED Clinical Impression     Diagnosis Description Comment Added By Time Added    Generalized abdominal pain [R10.84] Generalized abdominal pain [R10.84]  Isabella Gutierrez APRN CNP 2017  4:09 PM    Deep vein thrombosis (DVT) prophylaxis prescribed at discharge [Z78.9] Deep vein thrombosis (DVT) prophylaxis prescribed at discharge [Z78.9]  Isabella Gutierrez APRN CNP 2017  4:10 PM    Nausea [R11.0] Nausea [R11.0]  Isabella Gutierrez APRN CNP 2017  4:11 PM    Candida infection [B37.9] Candida infection [B37.9]  Isabella Gutierrez APRN CNP 2017  4:11 PM    Open wound [T14.8] Open wound [T14.8]  Isabella Gutierrez APRN CNP 2017  4:12 PM    Constipation, unspecified constipation type [K59.00] Constipation, unspecified constipation type [K59.00]  Isabella Gutierrez APRN CNP 2017  4:13 PM    Infection in abdomen (H) [K65.9] Infection in abdomen (H) [K65.9]  Isabella Gutierrez APRN CNP 2017  4:16 PM    On mechanically assisted ventilation (H) [Z99.11] On mechanically assisted ventilation (H) [Z99.11]  Isabella Gutierrez APRN CNP 2017  4:18 PM      Hospital Problems as of 8/3/2017              Priority Class Noted POA    Acute hypercapnic respiratory failure (H) Medium  2017 Yes      Non-Hospital Problems as " of 8/3/2017              Priority Class Noted    Osteoarthritis of hip Medium  6/4/2012    Non union subtrochanteric fracture right hip Medium  6/4/2012    COPD (chronic obstructive pulmonary disease) (H) Medium  6/4/2012    Hyperlipidemia Medium  6/4/2012    Hypertension Medium  6/4/2012    Anemia due to blood loss, acute Medium  6/6/2012    Abdominal pain Medium  6/2/2017      Code Status History     Date Active Date Inactive Code Status Order ID Comments User Context    8/2/2017  4:37 PM  Full Code [id number]  Isabella Gutierrez APRN CNP Outpatient    7/26/2017  6:10 PM 8/2/2017  4:37 PM Full Code [id number]  Scarlet Sorenson MD Inpatient    6/19/2017  2:48 PM 7/26/2017  6:10 PM Full Code [id number]  Stu Esquivel MD Outpatient    6/2/2017  7:23 PM 6/19/2017  2:48 PM Full Code [id number]  Estelita Peck MD Inpatient    6/4/2012 12:45 PM 6/7/2012  3:06 PM Full Code [id number]  Feli Gilmore RN Inpatient         Medication Review      START taking        Dose / Directions Comments    bacitracin 500 UNIT/GM Oint   Used for:  Open wound        Apply topically 2 times daily Apply to chest tube site BID for 7 days   Refills:  0        chlorhexidine 0.12 % solution   Commonly known as:  PERIDEX   Used for:  On mechanically assisted ventilation (H)        Dose:  15 mL   Take 15 mLs by mouth every 12 hours   Refills:  0        DAPTOmycin 400 mg   Indication:  Abscess   Used for:  Infection in abdomen (H)        Dose:  6 mg/kg   Inject 400 mg into the vein every 24 hours   Refills:  0        fluconazole 400-0.9 MG/200ML-% infusion   Commonly known as:  DIFLUCAN   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   Used for:  Candida infection        Dose:  400 mg   Inject 200 mLs (400 mg) into the vein every 24 hours   Quantity:  3000 mL   Refills:  0        heparin sodium PF 5000 UNIT/0.5ML injection   Used for:  Deep vein thrombosis (DVT) prophylaxis prescribed at discharge        Dose:   5000 Units   Inject 0.5 mLs (5,000 Units) Subcutaneous every 8 hours   Refills:  0    Continue until mobility at baseline       meropenem 1 G vial   Commonly known as:  MERREM   Indication:  Abscess   Used for:  Infection in abdomen (H)        Dose:  1 g   Inject 1,000 mg (1 g) into the vein every 8 hours   Quantity:  30 each   Refills:  0        nystatin 924210 UNIT/ML suspension   Commonly known as:  MYCOSTATIN   Used for:  Candida infection        Dose:  968205 Units   Take 5 mLs (500,000 Units) by mouth 4 times daily   Quantity:  280 mL   Refills:  0        ondansetron 4 MG ODT tab   Commonly known as:  ZOFRAN-ODT   Used for:  Nausea        Dose:  4 mg   Take 1 tablet (4 mg) by mouth every 6 hours as needed for nausea or vomiting   Quantity:  30 tablet   Refills:  0        senna-docusate 8.6-50 MG per tablet   Commonly known as:  SENOKOT-S;PERICOLACE   Used for:  Constipation, unspecified constipation type        Dose:  1-2 tablet   Take 1-2 tablets by mouth 2 times daily as needed (constipation )   Quantity:  100 tablet   Refills:  0          CONTINUE these medications which may have CHANGED, or have new prescriptions. If we are uncertain of the size of tablets/capsules you have at home, strength may be listed as something that might have changed.        Dose / Directions Comments    * ipratropium - albuterol 0.5 mg/2.5 mg/3 mL 0.5-2.5 (3) MG/3ML neb solution   Commonly known as:  DUONEB   This may have changed:  Another medication with the same name was added. Make sure you understand how and when to take each.        Dose:  1 vial   Take 1 vial by nebulization 4 times daily   Refills:  0        * ipratropium - albuterol 0.5 mg/2.5 mg/3 mL 0.5-2.5 (3) MG/3ML neb solution   Commonly known as:  DUONEB   This may have changed:  You were already taking a medication with the same name, and this prescription was added. Make sure you understand how and when to take each.   Used for:  On mechanically assisted  ventilation (H)        Dose:  3 mL   Take 1 vial (3 mLs) by nebulization every 4 hours as needed for shortness of breath / dyspnea   Quantity:  360 mL   Refills:  0        oxyCODONE 5 MG IR tablet   Commonly known as:  ROXICODONE   This may have changed:    - medication strength  - how to take this  - when to take this  - reasons to take this   Used for:  Generalized abdominal pain        Dose:  2.5 mg   Take 0.5 tablets (2.5 mg) by mouth every 3 hours as needed (pain)   Quantity:  30 tablet   Refills:  0        * Notice:  This list has 2 medication(s) that are the same as other medications prescribed for you. Read the directions carefully, and ask your doctor or other care provider to review them with you.      CONTINUE these medications which have NOT CHANGED        Dose / Directions Comments    ACETAMINOPHEN PO        Dose:  1000 mg   1,000 mg by Per J Tube route every 8 hours   Refills:  0        albuterol (2.5 MG/3ML) 0.083% neb solution        Dose:  1 vial   Take 1 vial by nebulization every 2 hours as needed for wheezing   Refills:  0        ALPRAZOLAM PO        Dose:  0.25 mg   0.25 mg by Per J Tube route 3 times daily   Refills:  0        BACLOFEN PO        Dose:  5 mg   Take 5 mg by mouth every morning RX is 5 mg (1/2 of 10 mg tab) 2 x daily but she takes daily due to drowsiness.   Refills:  0        budesonide 0.5 MG/2ML neb solution   Commonly known as:  PULMICORT        Dose:  0.5 mg   Take 0.5 mg by nebulization 2 times daily   Refills:  0        FLEXERIL PO        Dose:  5 mg   5 mg by Per J Tube route 3 times daily as needed for muscle spasms   Refills:  0        formoterol 20 MCG/2ML neb solution   Commonly known as:  PERFOROMIST        Dose:  20 mcg   Take 20 mcg by nebulization every 12 hours   Refills:  0        HYDRALAZINE HCL PO        Dose:  25 mg   25 mg by Per G Tube route every 6 hours   Refills:  0        LACTOBACILLUS RHAMNOSUS (GG) PO        Dose:  1 capsule   Take 1 capsule by mouth 3  times daily (with meals)   Refills:  0        Lidocaine 4 % Ptch        Dose:  1 patch   Externally apply 1 patch topically daily Remove for 12 hours   Refills:  0        LISINOPRIL PO        Dose:  20 mg   20 mg by Per J Tube route daily   Refills:  0        LOVASTATIN PO        Dose:  20 mg   Take 20 mg by mouth every morning   Refills:  0        MAGNESIUM OXIDE PO        Dose:  400 mg   Take 400 mg by mouth daily   Refills:  0        METOPROLOL TARTRATE PO        Dose:  50 mg   Take 50 mg by mouth 2 times daily   Refills:  0        omeprazole 2 mg/mL Susp   Commonly known as:  priLOSEC        Dose:  40 mg   Take 40 mg by mouth 2 times daily   Refills:  0        QUETIAPINE FUMARATE PO        Dose:  12.5 mg   12.5 mg by Per J Tube route 4 times daily as needed (hallucination or agitation)   Refills:  0        REMERON PO        Dose:  22.5 mg   22.5 mg by Per G Tube route At Bedtime   Refills:  0        TRAZODONE HCL PO        Dose:  50 mg   50 mg by Per J Tube route nightly as needed for sleep   Refills:  0        ZOLOFT PO        Dose:  150 mg   150 mg by Per J Tube route daily   Refills:  0          STOP taking     micafungin 100 MG injection   Commonly known as:  MYCAMINE           vancomycin 50 mg/mL Liqd solution   Commonly known as:  VANOCIN                   Summary of Visit     Reason for your hospital stay       Ara Stanley is a 71 year old female who was recently hospitalized at [facility] for lap alexandre, subsequent return to operating room for worsening status and was found to have gastric perforation which was repaired with omental mesh, and complicated by persistent abdominal fluid collection and abdominal candidal infection.  She ultimately required tracheostomy for ongoing respiratory failure and was initially discharged to [facility] where she treated for ongoing abdominal abscess and rehabbing for respiratory failure.  She developed worsening respiratory status and was re-admitted on  7/26/2017 from [place] with loculated pleural effusion for evaluation by thoracic surgery for potential VATS.  VATS performed 7/28 by Dr. Varela.   This hospitalization complicated by presumed infection. Broad spectrum antibiotics were initiated and CT scan performed. Patient found again to have persistent abdominal fluid collection. Patient underwent CT guided abscess drainage 8/1 with FUNMI drain placement. Cultures sent and pending             After Care     Activity - Up with nursing assistance           Additional Discharge Instructions       Abdominal Drains:  - continue abdominal drains until <20ml/24hr period or per IR recommendations  - may benefit from follow up sinogram prior to drain removal to assess resolution of fluid collections  - pt can follow up with Estelita Peck MD, in one week post discharge       Advance Diet as Tolerated       Follow this diet upon discharge: Orders Placed This Encounter      Adult Formula Drip Feeding: Continuous Nepro; Nasoduodenal tube; Goal Rate: 40; mL/hr; Medication - Tube Feeding Flush Frequency: At least 15-30 mL water before and after medication administration and with tube clogging; Amout to Send (Nutrition u...      Advance Diet as Tolerated: Clear Liquid Diet       Daily weights       Call Provider for weight gain of more than 2 pounds per day or 5 pounds per week.       Fall precautions           Crawford catheter       To straight gravity drainage. Change catheter every 2 weeks and PRN for leaking or decreased uring output with signs of bladder distention. DO NOT change catheter without a specific MD order IF diagnosis of benign prostatic hypertrophy (BPH), neurogenic bladder, or other urological conditions       General info for SNF       Length of Stay Estimate: Short Term Care: Estimated # of Days 31-90  Condition at Discharge: Stable  Level of care:skilled   Rehabilitation Potential: Good  Admission H&P remains valid and up-to-date: Yes  Recent Chemotherapy:  N/A  Use Nursing Home Standing Orders: Yes       Glucose monitor nursing POCT       Before meals and at bedtime       IV access       PICC.       Intake and output       Every shift       Mantoux instructions       Give two-step Mantoux (PPD) Per Facility Policy Yes       Tracheostomy care by nursing       Standard Cares       Wound care       Site:   Chest tube entry incisions   Instructions:  Apply bacitracin to sites BID for 7 days.  Cover with bandaide or gauze.  Notify provider of any purulent or bloody drainage.       Wound care (specify)       Site:   Abdominal drains; left and right   Instructions:  Keep drains covered with dry gauze. Keep gauze clean and dry.  Change gauze daily and PRN             Procedures     Ventilator       Current Vent settings: 40%, R14, volume 450, PEEP 5   Date of Intubation (most recent): Tracheostomy 6/13/17  Reason for Mechanical Ventilation: Vent dependent   Met Criteria for Pressure Support Trial: Yes   Length of Pressure Support Trial: PS 10/5 since 11:21 am today to present.   Significant Events Today: Passy [place] Valve was performed today. Pt was not able to tolerate the valve, but did tolerate well deflated cuff for 15 minute             Referrals     Nutrition Services Adult IP Consult       Reason:  Enteric tube feeding  Nutrition Services Recommendations 8/2: Ordered by Registered Dietitian (RD): Change TF regimen to a renal formula in light of high Phos levels ---> Nepro at 40 mL/hr to provide:  1728 kcal (26 kcal per kg), 78 g protein (1.2 g per kg), 155 g CHO, 12 g fiber, 700 mL H2O   Discontinue the ProSource (no longer needed)       Occupational Therapy Adult Consult       Evaluate and treat as clinically indicated.    Reason:  Deconditioning of critical illness       Physical Therapy Adult Consult       Evaluate and treat as clinically indicated.    Reason:  Deconditioning of critical illness       Speech Language Path Adult Consult       Evaluate and treat as  clinically indicated.  Reason:  Swallowing, cuff deflation and Passy Greg valve    Most recent progress note:   Current status:  Patient was seen for Passy [place] Valve (PMV) with RT present for cuff deflation, ventilator monitoring on PS. Patient tolerated cuff deflation and was able to achieve good voicing. PMV placed and she demonstrated difficulty tolerating it, so it was removed. Continue the session with the cuff deflated. She stated she was more fatigued and anxious today. Likely the cause of her not tolerating the valve today, compared to yesterday and previous sessions. Recommend: 1. Continue daily trials of the PMV with ST and RT only at this time. 2. No ice chips due to silent aspiration noted on a video swallow study completed at [place] prior to her current hospitalization.             Other Orders     Contact Isolation                 Supplies     Pneumatic Compression Device        Bilateral calf. Remove 30 mins BID.             Follow-Up Appointment Instructions     Future Labs/Procedures    Follow Up and recommended labs and tests     Comments:    Follow up with Estelita Peck MD, General Surgery, one week after discharge  Infectious Disease consult will need to be placed and cultures followed.      Follow-Up Appointment Instructions     Follow Up and recommended labs and tests       Follow up with Estelita Peck MD, General Surgery, one week after discharge  Infectious Disease consult will need to be placed and cultures followed.             Statement of Approval     Ordered          08/03/17 0750  I have reviewed and agree with all the recommendations and orders detailed in this document.  EFFECTIVE NOW     Approved and electronically signed by:  Isabella Gutierrez APRN CNP

## 2017-07-26 NOTE — IP AVS SNAPSHOT
[facility] Intensive Care Unit    [address] [address]    Phone:  [phone]                                       After Visit Summary   7/26/2017    Ara Stanley    MRN: [id number]           After Visit Summary Signature Page     I have received my discharge instructions, and my questions have been answered. I have discussed any challenges I see with this plan with the nurse or doctor.    ..........................................................................................................................................  Patient/Patient Representative Signature      ..........................................................................................................................................  Patient Representative Print Name and Relationship to Patient    ..................................................               ................................................  Date                                            Time    ..........................................................................................................................................  Reviewed by Signature/Title    ...................................................              ..............................................  Date                                                            Time

## 2017-07-26 NOTE — H&P
[facility] Intensive Care Unit  History and Physical  July 26, 2017  Ara Stanley MRN# [id number]   Age: 71 year old YOB: 1945     Date of Admission: 7/26/2017    Primary care provider: Shar James          Assessment and plan :   Ara Stanley is a 71 year old female admitted on 7/26/2017 for pleural effusion. My assessment and plan for this patient is as follows:    1.Neurology:     Previously on sertraline; seen by Psychiatry at [place] due to concern for anxiety limiting ability to wean from ventilator    Pt has concerns about her health care power of . Will discuss with social work.        2. Cardiovascular:      hypertension, resume prior to admission medications.    3. Pulmonary/Ventilator Management:     Chronic hypercapnic respiratory failure    Loculated pleural effusions; initially borderline exudate but most recent thoracentesis with much higher WBC and 94% neutrophils. Concerning for abscess or empyema. Likely will need chest tube placement with TPA vs thoracic surgery for vats.     4. GI and Nutrition:    Globulin gap, low albumin, normal total protein. Unclear cause. Send SPEP/UPEP, consider viremia could cause this picture as well but no clear suspicion for this currently.    Repeat CMP, will need dietary consult.    Resume TF    5. Renal/fluids/electrolytes:     PTA cr ~1.5    Was on furosemide for a period of time. Monitor UOP. CMP pending.    6. Infectious Disease:     Has had multiple fluid collections growing candida, on micafungin (previously on fluconazole).     Continue micafungin    Check procalcitonin- if elevated would resume systemic antibiotics (is otherwise clinically stable and does not appear septic at this point)    Cdiff on vancomycin    7. Endocrine:     Follow blood glucose    8. Hematology/Oncology:     Anemia- has had intermittent anemia requiring transfusion. Ongoing consideration of acute blood loss (eg from  thoracentesis) without clear evidence.    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    10. IV/Access:     Triple lumen picc    11. Prophylaxis:   DVT Prophylaxis: Pneumatic Compression Devices  GI Prophylaxis: PPI    12. Other:   Restraints: Restraints for medical healing needed: NO  Family update by me today: No    13. Goals for the next 24 hours :     Obtain full records including imaging    Follow up admission labs    Continue vent    14. Time Spent on this Encounter   Billing:  I spent 65 minutes bedside and on the inpatient unit today managing the critical care of Ara Stanley in relation to the issues listed in this note.    Lines: No erythema or discharge at entry site for PICC Line  Current lines are required for patient management    Medications         Data          Chief Complaint/ Reason for ICU Admission and HPI     Admitted for : pleural effusion and chronic respiratory failure  History obtained from patient, chart review.  History of Present Illness: Pt is a 72 yo woman transferred for evaluation and management of loculated pleural effusion concerning for empyema.         Hospitalized at [facility] from 6/2-6/19. Initially presented with abdominal pain concerning for mesenteric ischemia; CTA negative. Ex lap resulted in cholecystectomy. Hypercapnic respiratory failure. Returned to the OR 4 days after initial surgery due to concern for peritonitis, was found to have perforated gastric ulcer, s/p omentum patch. Developed multiple abscesses with drains placed. Transferred to [place] for long term vent weaning; was on antibiotics (levofloxacin, fluconazole)    Abridged version of stay from [place]: Treated for HCAP on transfer, cultures grew stenotrophomonas. Was treated with IV levofloxacin until 6/29.  Had thoracentesis on 7/13, borderline exudate, cultures negative. Diagnosed with Cdiff 7/19 (started on PO  vanc), also evaluated for bilateral pleural effusions. CT 7/20 showed perisplenic fluid collection and mesenteric fluid collection. Drains placed in 2 pockets on 7/21, growing candida albicans and was restarted on micafungin. On 7/24 repeat thoracentesis was attempted, but fluid was loculated and only 30mL were removed.    Med rec done by Pharmacist. Nutrition via naso-enteric tube. Fibersource HN at 50mL/hr, flush with water 180mL Q4H.     Most recent labs: Na 137; K 4.3; Cl 95; Bicarb 31; BUN 58, Cr 1.29, Ca 9.0, AST 20, ALT 19, Albumin 1.9, Prot 7.4; Alk P 154; INR 1.24; WBC 17.9 (from 25.6 the day prior); HGB 8.0 (from 7.4/transfusion); Plt 557. Most recent ABG 7/26: 7.30/77/92    Pleural effusion:  7/13: 900 mL yellow fluid. Glucose 112, , 168 WBC (40% L, 50% PMN, 2% macrophage, 1% mesothelial, 8% monocyte). RBC <50k.   7/24: WBC 1409 (96% neutrophils, 2% lymphocytes, 3% macrophage, 1% monocytes)         Past Medical History:     Past Medical History:   Diagnosis Date     Asthma      COPD (chronic obstructive pulmonary disease) (H)             Past Surgical History:     Past Surgical History:   Procedure Laterality Date     ARTHROPLASTY HIP  6/4/2012    Procedure:ARTHROPLASTY HIP; Surgeon:DAVIAN FENG; Location: OR     ENT SURGERY      tonsils     GYN SURGERY      hyst     LAPAROSCOPIC CHOLECYSTECTOMY N/A 6/2/2017    Procedure: LAPAROSCOPIC CHOLECYSTECTOMY;;  Surgeon: Lavell Carpenter MD;  Location:  OR     LAPAROSCOPY DIAGNOSTIC (GENERAL) N/A 6/6/2017    Procedure: LAPAROSCOPY DIAGNOSTIC (GENERAL);  DIAGNOSTIC LAPAROSCOPY, REPAIR OF PERFORATED GASTRIC ULCER, ABDOMINAL LAVAGE;  Surgeon: Estelita Peck MD;  Location:  OR     LAPAROTOMY EXPLORATORY N/A 6/2/2017    Procedure: LAPAROTOMY EXPLORATORY;  EXPLORATORY LAPAROSCOPY, CHOLECYCTECTOMY;  Surgeon: Lavell Carpenter MD;  Location:  OR     ORTHOPEDIC SURGERY      hip pinning   femur fracture knee surgery      REMOVE HARDWARE  HIP NAILING  6/4/2012    Procedure:REMOVE HARDWARE HIP NAILING; REMOVAL OF GAMMA NAIL AND SCREWS FROM RIGHT HIP, RIGHT TOTAL HIP ARTHROPLASTY(GAMMA NAIL EQUIPMENT, [employer] TOTAL HIP)^; Surgeon:DAVIAN FENG; Location: OR     TRACHEOSTOMY N/A 6/13/2017    Procedure: TRACHEOSTOMY;  TRACHEOSTOMY.;  Surgeon: Selwyn Varela MD;  Location:  OR            Social History:     Social History     Social History     Marital status:      Spouse name: N/A     Number of children: N/A     Years of education: N/A     Occupational History     Not on file.     Social History Main Topics     Smoking status: Former Smoker     Packs/day: 0.50     Years: 20.00     Types: Cigarettes     Quit date: 5/31/1992     Smokeless tobacco: Not on file     Alcohol use No     Drug use: No     Sexual activity: Not on file     Other Topics Concern     Not on file     Social History Narrative     Smoking: No.   History   Smoking Status     Former Smoker     Packs/day: 0.50     Years: 20.00     Types: Cigarettes     Quit date: 5/31/1992   Smokeless Tobacco     Not on file     Alcohol: No    Alcohol use No     Drug:  No   History   Drug Use No            Family History:   Requested information from pt re family history, pt does not know family history.         Allergies:   Please see allergy list which was reviewed this admission.  All allergies reviewed and addressed         Medications:     Current Facility-Administered Medications   Medication     naloxone (NARCAN) injection 0.1-0.4 mg     ondansetron (ZOFRAN-ODT) ODT tab 4 mg    Or     ondansetron (ZOFRAN) injection 4 mg     prochlorperazine (COMPAZINE) injection 5 mg    Or     prochlorperazine (COMPAZINE) tablet 5 mg    Or     prochlorperazine (COMPAZINE) Suppository 12.5 mg     metoclopramide (REGLAN) tablet 5 mg    Or     metoclopramide (REGLAN) injection 5 mg     senna-docusate (SENOKOT-S;PERICOLACE) 8.6-50 MG per tablet 1-2 tablet     bisacodyl (DULCOLAX)  Suppository 10 mg     polyethylene glycol (MIRALAX/GLYCOLAX) Packet 17 g     naloxone (NARCAN) injection 0.1-0.4 mg     chlorhexidine (PERIDEX) 0.12 % solution 15 mL     ipratropium - albuterol 0.5 mg/2.5 mg/3 mL (DUONEB) neb solution 3 mL     albuterol neb solution 2.5 mg     hydrogen peroxide 3 % solution     alteplase (CATHFLO ACTIVASE) injection 1-2 mg     acetaminophen (TYLENOL) tablet 1,000 mg     ALPRAZolam (XANAX) tablet 0.25 mg     [START ON 7/27/2017] baclofen (LIORESAL) half-tab 5 mg     budesonide (PULMICORT) neb solution 0.5 mg     cyclobenzaprine (FLEXERIL) tablet 5 mg     formoterol (PERFOROMIST) neb solution 20 mcg     hydrALAZINE (APRESOLINE) tablet 25 mg     Lidocaine 4 % PTCH 1 patch     lisinopril (PRINIVIL/ZESTRIL) tablet 20 mg     [START ON 7/27/2017] lovastatin (MEVACOR) tablet 20 mg     metoprolol (LOPRESSOR) tablet 50 mg     micafungin (MYCAMINE) injection 100 mg     mirtazapine (REMERON) tablet TABS 22.5 mg     omeprazole (priLOSEC) suspension 40 mg     oxyCODONE (ROXICODONE) IR half-tab 2.5 mg     QUEtiapine (SEROquel) half-tab 12.5 mg     sertraline (ZOLOFT) tablet 150 mg     traZODone (DESYREL) tablet 50 mg     vancomycin (VANOCIN) solution 125 mg            Review of Systems:   The Review of Systems is negative other than noted in the HPI         Physical Exam:   Vitals were reviewed  Physical Exam       No Data Recorded                  There were no vitals filed for this visit.  No intake or output data in the 24 hours ending 07/26/17 1752    General:   Comfortable, no pain or respiratory distress   Neurologic:   Alert and oriented x 3   HEENT:   Head is atraumatic  Tracheostomy is present and the stoma appears benign  Nasogastic tube is present      Lungs:   Symmetrical chest shape and movements with each tidal breath  Good patient-ventilator synchrony  Diffuse expiratory wheezing   Cardiovascular:   Regular rate and rhythm  Normal S1,S2, and no gallop, rub or murmur   Abdomen:    Distended:  No.   Bowel sound present and normal: Yes .   Soft: Yes . Tender: No.   Rebound:tendeness or guarding present No   Multiple drains. With palpation of abdomen, purulent fluid expresses from umbilicus.   Extremities:   Clubbing present: No,   Edema present: Yes ,   Acrocyanosis present: No,   Mottling present: No,   Normal capillary refill: Yes    Skin:   Warm, dry.  Mild erythema along medial thighs bilaterally    Lines/Drain:    Central line present: Yes   Arterial line present: No  External ventricular Drain present: No  Chest tube present: No  FUNMI present: Yes   PEG present: No           Ancillary Data:   All laboratory and imaging data reviewed.   ABG/vent data:   No Data Recorded  No lab results found in last 7 days.     ROUTINE IP LABS  No lab results found in last 7 days.  No results found for this or any previous visit (from the past 24 hour(s)).        Scarlet Sorenson MD July 26, 2017

## 2017-07-26 NOTE — IP AVS SNAPSHOT
` [facility] INTENSIVE CARE UNIT: [phone]            Medication Administration Report for Ara Stanley as of 08/03/17 0758   Legend:    Given Hold Not Given Due Canceled Entry Other Actions    Time Time (Time) Time  Time-Action       Inactive    Active    Linked        Medications 07/28/17 07/29/17 07/30/17 07/31/17 08/01/17 08/02/17 08/03/17    0.9% sodium chloride infusion  Rate: 10 mL/hr Freq: CONTINUOUS Route: IV  Start: 07/29/17 1100     1057 ( )-New Bag       1541 ( )-New Bag        0820 ( )-New Bag        2000 ( )-Rate/Dose Verify        0759 ( )-Rate/Dose Verify         0754 ( )-New Bag           acetaminophen (TYLENOL) tablet 1,000 mg  Dose: 1,000 mg Freq: EVERY 8 HOURS Route: PER J TUBE  Start: 07/26/17 1958   Admin Instructions: Maximum acetaminophen dose from all sources = 75 mg/kg/day not to exceed 4 gram     0430 (1,000 mg)-Given       1204 (1,000 mg)-Given       1308-Auto Hold       1522-Unhold       2020 (1,000 mg)-Given        0405 (1,000 mg)-Given       1401 (1,000 mg)-Given       2009 (1,000 mg)-Given        0408 (1,000 mg)-Given       1231 (1,000 mg)-Given       2059 (1,000 mg)-Given        0408 (1,000 mg)-Given       1304 (1,000 mg)-Given       2021 (1,000 mg)-Given        0441 (1,000 mg)-Given       (1136)-Not Given       1902 (1,000 mg)-Given        0503 (1,000 mg)-Given       1241 (1,000 mg)-Given       2011 (1,000 mg)-Given        0424 (1,000 mg)-Given       [ ] 1200       [ ] 2000           albuterol neb solution 2.5 mg  Dose: 2.5 mg Freq: EVERY 2 HOURS PRN Route: NEBULIZATION  PRN Reason: shortness of breath / dyspnea  Start: 07/26/17 1810    1308-Auto Hold       1522-Unhold                 ALPRAZolam (XANAX) tablet 0.25 mg  Dose: 0.25 mg Freq: 3 TIMES DAILY Route: PER J TUBE  Start: 07/26/17 2200   Admin Instructions: Avoid taking with grapefruit juice     0844 (0.25 mg)-Given       1308-Auto Hold       1522-Unhold       1724 (0.25 mg)-Given       2103 (0.25  mg)-Given        0828 (0.25 mg)-Given       1541 (0.25 mg)-Given       2140 (0.25 mg)-Given        0818 (0.25 mg)-Given       1648 (0.25 mg)-Given       2109 (0.25 mg)-Given        0955 (0.25 mg)-Given       1541 (0.25 mg)-Given       2213 (0.25 mg)-Given        0936 (0.25 mg)-Given       1543 (0.25 mg)-Given       2232 (0.25 mg)-Given        0854 (0.25 mg)-Given       1609 (0.25 mg)-Given       2141 (0.25 mg)-Given        [ ] 0900       [ ] 1600       [ ] 2200           alteplase (CATHFLO ACTIVASE) injection 1-2 mg  Dose: 1-2 mg Freq: ONCE PRN Route: IV  PRN Comment: clotted picc port  Start: 07/26/17 1825    1308-Auto Hold       1522-Unhold                 arformoterol (BROVANA) neb solution 15 mcg  Dose: 15 mcg Freq: EVERY 12 HOURS Route: NEBULIZATION  Start: 07/26/17 2000   Admin Instructions: Autosub for Formoterol 20mcg     0803 (15 mcg)-Given       1308-Auto Hold       1522-Unhold       1941 (15 mcg)-Given        0753 (15 mcg)-Given       1947 (15 mcg)-Given        0741 (15 mcg)-Given       1951 (15 mcg)-Given        0735 (15 mcg)-Given       1954 (15 mcg)-Given        0733 (15 mcg)-Given       2002 (15 mcg)-Given        0738 (15 mcg)-Given       1927 (15 mcg)-Given        0746 (15 mcg)-Given       [ ] 1900           bacitracin ointment  Freq: 3 TIMES DAILY Route: Top  Start: 07/28/17 1615   Admin Instructions: At bedside for TID PRN dressing change by provider.     (1632)-Not Given       2104 ( )-Given        0828 ( )-Given       1541 ( )-Given       2128 ( )-Given        0819 ( )-Given       1535 ( )-Given       2155 ( )-Given        1100 ( )-Given [C]       1835 ( )-Given       2213 ( )-Given        0939 ( )-Given       1545 ( )-Given       2233 ( )-Given        1221 ( )-Given       1612 ( )-Given       2142 ( )-Given        [ ] 0900       [ ] 1600       [ ] 2200           baclofen (LIORESAL) half-tab 5 mg  Dose: 5 mg Freq: EVERY MORNING Route: PER J TUBE  Start: 07/27/17 0900    0845 (5 mg)-Given        1308-Auto Hold       1522-Unhold        0828 (5 mg)-Given        0818 (5 mg)-Given        0956 (5 mg)-Given        0936 (5 mg)-Given        0855 (5 mg)-Given        [ ] 0900           bisacodyl (DULCOLAX) Suppository 10 mg  Dose: 10 mg Freq: DAILY PRN Route: RE  PRN Reason: constipation  Start: 07/26/17 1808   Admin Instructions: Hold for loose stools.  This is the third step of a three step constipation treatment protocol.     1308-Auto Hold       1522-Unhold                 budesonide (PULMICORT) neb solution 0.5 mg  Dose: 0.5 mg Freq: 2 TIMES DAILY Route: NEBULIZATION  Start: 07/26/17 1900    0803 (0.5 mg)-Given       1308-Auto Hold       1522-Unhold       1942 (0.5 mg)-Given        0753 (0.5 mg)-Given       1947 (0.5 mg)-Given        0741 (0.5 mg)-Given       1951 (0.5 mg)-Given        0735 (0.5 mg)-Given       1954 (0.5 mg)-Given        0733 (0.5 mg)-Given       2002 (0.5 mg)-Given        0738 (0.5 mg)-Given       1927 (0.5 mg)-Given        0746 (0.5 mg)-Given       [ ] 1900           chlorhexidine (PERIDEX) 0.12 % solution 15 mL  Dose: 15 mL Freq: EVERY 12 HOURS Route: MT  Start: 07/26/17 2000   Admin Instructions: Do not swallow.  Discontinue when extubated.     0804 (15 mL)-Given       1308-Auto Hold       1522-Unhold       2020 (15 mL)-Given        0827 (15 mL)-Given       2009 (15 mL)-Given        0818 (15 mL)-Given       2059 (15 mL)-Given        0942 (15 mL)-Given       2027 (15 mL)-Given        0936 (15 mL)-Given       1901 (15 mL)-Given        0852 (15 mL)-Given       2011 (15 mL)-Given        [ ] 0800       [ ] 2000           cyclobenzaprine (FLEXERIL) tablet 5 mg  Dose: 5 mg Freq: 3 TIMES DAILY PRN Route: PER J TUBE  PRN Reason: muscle spasms  Start: 07/26/17 1833    1308-Auto Hold       1522-Unhold         0540 (5 mg)-Given          2141 (5 mg)-Given            DAPTOmycin (CUBICIN) 400 mg in NaCl 0.9 % 100 mL intermittent infusion  Dose: 6 mg/kg Freq: EVERY 24 HOURS Route: IV  Indications of Use:  ABSCESS  Start: 07/31/17 0500   Admin Instructions: Refrigeration needed.        0408 (400 mg)-New Bag        0441 (400 mg)-New Bag        0529 (400 mg)-New Bag        0426 (400 mg)-New Bag           docusate sodium (COLACE) capsule 100 mg  Dose: 100 mg Freq: 2 TIMES DAILY Route: PO  Start: 07/29/17 1000   Admin Instructions: To prevent constipation.  Hold for loose stools.      (1123)-Not Given       2010 (100 mg)-Given        (0819)-Not Given       (2059)-Not Given        (1309)-Not Given [C]       (2058)-Not Given        (0920)-Not Given       (2233)-Not Given        (1212)-Not Given [C]       (2142)-Not Given        [ ] 0900       [ ] 2100           fentaNYL (PF) (SUBLIMAZE) injection 25-50 mcg  Dose: 25-50 mcg Freq: EVERY 5 MIN PRN Route: IV  PRN Reason: severe pain  PRN Comment: If inadequate response may repeat 25 mcg IV slowly every 5 min PRN severe pain  Start: 08/01/17 1345   Admin Instructions: Doses can be exceeded under direct oversight of patient by physician.         1410 (100 mcg)-Given             fluconazole (DIFLUCAN) intermittent infusion 400 mg in NaCl  Dose: 400 mg Freq: EVERY 24 HOURS Route: IV  Indications of Use: CANDIDIASIS  Start: 08/01/17 1000        1142 (400 mg)-New Bag        1240 (400 mg)-New Bag        [ ] 1000           flumazenil (ROMAZICON) injection 0.2 mg  Dose: 0.2 mg Freq: EVERY 1 MIN PRN Route: IV  PRN Reason: benzodiazepine reversal  PRN Comment: If inadequate response after 45 seconds, may repeat 0.2 mg IV every 1 minute PRN oversedation.  Start: 08/01/17 1345   Admin Instructions: Give over 15 seconds.  Maximum total dose of 1 mg.  Continue monitoring until discharge criteria met for a minimum of 2 hours.  Irritant.               heparin sodium PF injection 5,000 Units  Dose: 5,000 Units Freq: EVERY 8 HOURS Route: SC  Start: 07/27/17 1300   Admin Instructions: High concentration HEParin. Not for line flush or cath care.     0625 (5,000 Units)-Given       1309-Auto Hold        1522-Unhold       (1631)-Not Given       2020 (5,000 Units)-Given        0447 (5,000 Units)-Given       1306 (5,000 Units)-Given       2116 (5,000 Units)-Given        0409 (5,000 Units)-Given       1231 (5,000 Units)-Given       2100 (5,000 Units)-Given        0408 (5,000 Units)-Given       1302 (5,000 Units)-Given       2027 (5,000 Units)-Given        0441 (5,000 Units)-Given       (1220)-Not Given [C]       2232 (5,000 Units)-Given        0503 (5,000 Units)-Given       1431 (5,000 Units)-Given       2141 (5,000 Units)-Given        0424 (5,000 Units)-Given       [ ] 1300       [ ] 2100           hydrALAZINE (APRESOLINE) tablet 25 mg  Dose: 25 mg Freq: EVERY 6 HOURS Route: PER G TUBE  Start: 07/26/17 1845    0210 (25 mg)-Given       0804 (25 mg)-Given       1308-Auto Hold       1400-Automatically Held       1522-Unhold       2023-Hold        0128-Hold       0827 (25 mg)-Given       1306-Hold       2011-Hold        0200-Hold       0819-Hold       1303-Hold       (2044)-Not Given        (0403)-Not Given       0956 (25 mg)-Given       1459 (25 mg)-Given       2027 (25 mg)-Given        (0209)-Not Given [C]       0936 (25 mg)-Given       (1400)-Not Given [C]       1545 (25 mg)-Given       (2233)-Not Given        0503 (25 mg)-Given       1240 (25 mg)-Given       1609 (25 mg)-Given       2141 (25 mg)-Given        0424 (25 mg)-Given       [ ] 1000       [ ] 1600       [ ] 2200           ipratropium - albuterol 0.5 mg/2.5 mg/3 mL (DUONEB) neb solution 3 mL  Dose: 3 mL Freq: EVERY 4 HOURS PRN Route: NEBULIZATION  PRN Reason: shortness of breath / dyspnea  Start: 07/26/17 1810    1308-Auto Hold       1522-Unhold                 lidocaine (LIDODERM) 5 % Patch 1 patch  Dose: 1 patch Freq: DAILY Route: TD  Start: 07/27/17 0800   Admin Instructions: Keep on for 12 hours and then remove for 12 hours. Autosub for 4%  NEVER APPLY HEAT OVER PATCH which will increase absorption and may lead to risk of local anesthetic toxicity.  Do  "not apply over area where liposomal bupivacaine was injected for 96 hours post injection.     0806 (1 patch)-Given [C]       1308-Auto Hold       1522-Unhold        0828 (1 patch)-Given [C]        0818 (1 patch)-Given        0941 (1 patch)-Given        0937 (1 patch)-Given [C]        0853 (1 patch)-Given [C]        [ ] 0800           lidocaine (LIDODERM) patch in PLACE  Freq: EVERY 8 HOURS Route: TD  Start: 07/26/17 2200   Admin Instructions: Chart every shift, confirming that patch is still in place on patient (no barcode scan needed). See patch order for dose information.  NEVER APPLY HEAT OVER PATCH which will increase absorption and may lead to risk of local anesthetic toxicity. Do not apply over area where liposomal bupivacaine injected for 96 hours.            1308-Auto Hold       1400-Automatically Held       1522-Unhold       2233-Hold        0651 ( )-Patch Removed       1306 ( )-Given       2132-Hold        0600-Hold       1302 ( )-Given       (2201)-Not Given               1454 (1 each)-Patch in Place                      (1400)-Not Given [C]       2234 ( )-Negative        0516 ( )-Negative       1432 ( )-Given       2202 ( )-Negative        0512 ( )-Negative       [ ] 1400       [ ] 2200           lidocaine (LIDODERM) patch REMOVAL  Freq: EVERY 24 HOURS 2000 Route: TD  Start: 07/26/17 2000   Admin Instructions: Remove lidocaine Patch.     1308-Auto Hold       1522-Unhold       2045 ( )-Patch Removed        2011 ( )-Patch Removed        2100 ( )-Patch Removed        2121 ( )-Patch Removed        2234 ( )-Patch Removed        2202 ( )-Patch Removed        [ ] 2000           lidocaine (LMX4) cream  Freq: EVERY 1 HOUR PRN Route: Top  PRN Reason: pain  PRN Comment: with VAD insertion or accessing implanted port.  Start: 08/01/17 1213   Admin Instructions: Do NOT give if patient has a history of allergy to any local anesthetic or any \"joseph\" product.   Apply 30 minutes prior to VAD insertion or port access. MAX " "Dose: 2.5 g (  of 5 g tube)               lidocaine (LMX4) cream  Freq: EVERY 1 HOUR PRN Route: Top  PRN Reason: pain  PRN Comment: with VAD insertion or accessing implanted port.  Start: 07/28/17 1600   Admin Instructions: Do NOT give if patient has a history of allergy to any local anesthetic or any \"joseph\" product.   Apply 30 minutes prior to VAD insertion or port access.  MAX Dose:  2.5 g (  of 5 g tube)               lidocaine (XYLOCAINE) 2 % topical gel  Freq: EVERY 4 HOURS PRN Route: Top  PRN Reason: moderate pain  PRN Comment: rectal pain from tube  Start: 07/27/17 0926   Admin Instructions: Apply to anus/perirectal area     1309-Auto Hold       1522-Unhold                 lidocaine 1 % 1 mL  Dose: 1 mL Freq: EVERY 1 HOUR PRN Route: OTHER  PRN Comment: mild pain with VAD insertion or accessing implanted port  Start: 08/01/17 1213   Admin Instructions: Do NOT give if patient has a history of allergy to any local anesthetic or any \"joseph\" product. MAX dose 1 mL subcutaneous OR intradermal in divided doses.               lidocaine 1 % 1 mL  Dose: 1 mL Freq: EVERY 1 HOUR PRN Route: OTHER  PRN Comment: mild pain with VAD insertion or accessing implanted port  Start: 07/28/17 1600   Admin Instructions: Do NOT give if patient has a history of allergy to any local anesthetic or any \"joseph\" product. MAX dose 1 mL subcutaneous OR intradermal in divided doses.               lisinopril (PRINIVIL/ZESTRIL) tablet 20 mg  Dose: 20 mg Freq: DAILY Route: PER J TUBE  Start: 07/27/17 0900    0845 (20 mg)-Given       1308-Auto Hold       1522-Unhold        0827 (20 mg)-Given        0819 (20 mg)-Given [C]        0957 (20 mg)-Given [C]        0936 (20 mg)-Given        0854 (20 mg)-Given        [ ] 0900           magnesium sulfate 2 g in NS intermittent infusion ([employer]ar[employer] or [facility] Cmpd)  Dose: 2 g Freq: DAILY PRN Route: IV  PRN Reason: magnesium supplementation  Start: 08/01/17 0941   Admin Instructions: For Serum Mg++ 1.6 - 2 " mg/dL  Give 2 g and recheck magnesium level next AM.               magnesium sulfate 4 g in 100 mL sterile water (premade)  Dose: 4 g Freq: EVERY 4 HOURS PRN Route: IV  PRN Reason: magnesium supplementation  Start: 08/01/17 0941   Admin Instructions: For serum Mg++ less than 1.6 mg/dL  Give 4 g and recheck magnesium level 2 hours after dose, and next AM.               Medication Instruction: Keep IV Fluids at TKO if using PCA  Freq: CONTINUOUS PRN Route: XX  Start: 07/28/17 1600   Admin Instructions: Keep IV Fluids at TKO if using PCA.               meropenem (MERREM) 1 g vial to attach to  mL bag  Dose: 1 g Freq: EVERY 8 HOURS Route: IV  Indications of Use: ABSCESS  Start: 07/30/17 1415      1417 (1 g)-New Bag       2101 (1 g)-New Bag        0504 (1 g)-New Bag       1455 (1 g)-New Bag       2213 (1 g)-New Bag        0556 (1 g)-New Bag       (1400)-Not Given [C]       1543 (1 g)-New Bag       2232 (1 g)-New Bag        0503 (1 g)-New Bag       1431 (1 g)-New Bag       2141 (1 g)-New Bag        0631 (1 g)-New Bag       [ ] 1400       [ ] 2200           metoclopramide (REGLAN) tablet 5 mg  Dose: 5 mg Freq: EVERY 6 HOURS PRN Route: PO  PRN Comment: nausea and vomiting  Start: 07/26/17 1808   Admin Instructions: This is Step 3 of nausea and vomiting management.  Give if nausea not resolved 15 minutes after giving prochlorperazine (COMPAZINE).  If nausea not resolved in 15-30 minutes, Notify provider.     1308-Auto Hold       1522-Unhold                Or  metoclopramide (REGLAN) injection 5 mg  Dose: 5 mg Freq: EVERY 6 HOURS PRN Route: IV  PRN Comment: nausea and vomiting  Start: 07/26/17 1808   Admin Instructions: This is Step 3 of nausea and vomiting management.  Give if nausea not resolved 15 minutes after giving prochlorperazine (COMPAZINE).  If nausea not resolved in 15-30 minutes, Notify provider.  Irritant.     1308-Auto Hold       1522-Unhold                 metoprolol (LOPRESSOR) tablet 50 mg  Dose: 50 mg  Freq: 2 TIMES DAILY Route: PO  Start: 07/26/17 2100    0845 (50 mg)-Given       1308-Auto Hold       1522-Unhold       2021 (50 mg)-Given        0827 (50 mg)-Given       2116-Hold        0819-Hold       (2045)-Not Given        0955 (50 mg)-Given       2021 (50 mg)-Given        1007 (50 mg)-Given       (2232)-Not Given        0854 (50 mg)-Given       2140 (50 mg)-Given        [ ] 0900       [ ] 2100           midazolam (VERSED) injection 0.5-1 mg  Dose: 0.5-1 mg Freq: EVERY 4 MIN PRN Route: IV  PRN Reason: sedation  PRN Comment: If inadequate response may repeat 0.5 mg IV slowly every 4 minutes PRN sedation until desired response.  Start: 08/01/17 1345   Admin Instructions: Doses can be exceeded under direct oversight of patient by physician.         1345 (1 mg)-Given             mirtazapine (REMERON) tablet TABS 22.5 mg  Dose: 22.5 mg Freq: AT BEDTIME Route: PER G TUBE  Start: 07/26/17 2200    1308-Auto Hold       1522-Unhold       2103 (22.5 mg)-Given        2116 (22.5 mg)-Given        2101 (22.5 mg)-Given        2212 (22.5 mg)-Given        2232 (22.5 mg)-Given        2141 (22.5 mg)-Given        [ ] 2200           naloxone (NARCAN) injection 0.1-0.4 mg  Dose: 0.1-0.4 mg Freq: EVERY 2 MIN PRN Route: IV  PRN Reason: opioid reversal  Start: 07/28/17 1600   Admin Instructions: For respiratory rate LESS than or EQUAL to 8.  Partial reversal dose:  0.1 mg titrated q 2 minutes for Analgesia Side Effects Monitoring Sedation Level of 3 (frequently drowsy, arousable, drifts to sleep during conversation).Full reversal dose:  0.4 mg bolus for Analgesia Side Effects Monitoring Sedation Level of 4 (somnolent, minimal or no response to stimulation).               nystatin (MYCOSTATIN) suspension 500,000 Units  Dose: 500,000 Units Freq: 4 TIMES DAILY Route: PO  Start: 07/26/17 1915   Admin Instructions: Shake Well.     0845 (500,000 Units)-Given       1308-Auto Hold       1522-Unhold       (1632)-Not Given       1724 (500,000  Units)-Given       2238 (500,000 Units)-Given        0827 (500,000 Units)-Given       1306 (500,000 Units)-Given       1808 (500,000 Units)-Given       2125 (500,000 Units)-Given        0818 (500,000 Units)-Given       1231 (500,000 Units)-Given       1717 (500,000 Units)-Given       2101 (500,000 Units)-Given        0956 (500,000 Units)-Given       1313 (500,000 Units)-Given       1838 (500,000 Units)-Given       2213 (500,000 Units)-Given        0936 (500,000 Units)-Given       1225 (500,000 Units)-Given       1901 (500,000 Units)-Given       2232 (500,000 Units)-Given        0856 (500,000 Units)-Given       1431 (500,000 Units)-Given       1810 (500,000 Units)-Given       2141 (500,000 Units)-Given        [ ] 0900       [ ] 1300       [ ] 1800       [ ] 2200           ondansetron (ZOFRAN-ODT) ODT tab 4 mg  Dose: 4 mg Freq: EVERY 6 HOURS PRN Route: PO  PRN Reasons: nausea,vomiting  Start: 07/26/17 1808   Admin Instructions: This is Step 1 of nausea and vomiting management.  If nausea not resolved in 15 minutes, go to Step 2 prochlorperazine (COMPAZINE). Do not push through foil backing. Peel back foil and gently remove. Place on tongue immediately. Administration with liquid unnecessary     1308-Auto Hold       1522-Unhold                              Or  ondansetron (ZOFRAN) injection 4 mg  Dose: 4 mg Freq: EVERY 6 HOURS PRN Route: IV  PRN Reasons: nausea,vomiting  Start: 07/26/17 1808   Admin Instructions: This is Step 1 of nausea and vomiting management.  If nausea not resolved in 15 minutes, go to Step 2 prochlorperazine (COMPAZINE).  Irritant.     1308-Auto Hold       1522-Unhold           1225 (4 mg)-Given        2011 (4 mg)-Given            oxyCODONE (ROXICODONE) IR half-tab 2.5 mg  Dose: 2.5 mg Freq: EVERY 3 HOURS PRN Route: PO  PRN Comment: pain  Start: 08/01/17 1430        1543 (2.5 mg)-Given       2232 (2.5 mg)-Given        0131 (2.5 mg)-Given       0503 (2.5 mg)-Given       1222 (2.5 mg)-Given        1609 (2.5 mg)-Given       2011 (2.5 mg)-Given        0424 (2.5 mg)-Given           pantoprazole (PROTONIX) suspension 40 mg  Dose: 40 mg Freq: 2 TIMES DAILY Route: PER J TUBE  Start: 07/26/17 2100   Admin Instructions: Shake well.Autosub for Prilosec     0845 (40 mg)-Given       1308-Auto Hold       1522-Unhold       2021 (40 mg)-Given        0827 (40 mg)-Given       2118 (40 mg)-Given        0818 (40 mg)-Given       2100 (40 mg)-Given        0956 (40 mg)-Given       2021 (40 mg)-Given        0935 (40 mg)-Given       2232 (40 mg)-Given        0855 (40 mg)-Given       2141 (40 mg)-Given        [ ] 0900       [ ] 2100           pneumococcal (PREVNAR 13) injection 0.5 mL  Dose: 0.5 mL Freq: PRIOR TO DISCHARGE Route: IM  Start: 07/28/17 1000   Admin Instructions: Administer when afebrile (less than 100.4 ) x 24 hr OR prior to discharge. *Give Fact Sheet to Patient*. If patient is febrile, reassess in 8 hrs or every shift. Minor illnesses with or without fever does NOT contraindicate the vaccination. If not administering when scheduled , change the due time by following the instructions in the reference link below. If patient refuses vaccine, chart as Vaccine Refused.         1135-Hold [C]             polyethylene glycol (MIRALAX/GLYCOLAX) Packet 17 g  Dose: 17 g Freq: DAILY PRN Route: PO  PRN Reason: constipation  Start: 07/26/17 1808   Admin Instructions: Give in 8oz of  water, juice, or soda. Hold for loose stools.  This is the second step of a three step constipation treatment protocol.  1 Packet = 17 grams. Mixed prescribed dose in 8 ounces of water. Follow with 8 oz. of water.     1308-Auto Hold       1522-Unhold                 potassium chloride (KLOR-CON) Packet 20-40 mEq  Dose: 20-40 mEq Freq: EVERY 2 HOURS PRN Route: ORAL OR FEED  PRN Reason: potassium supplementation  Start: 08/01/17 0941   Admin Instructions: Use if unable to tolerate tablets.    If Serum K+ 3.4-4.0, dose = 20 mEq x1. Recheck K+ level the  next AM.  If Serum K+ 3.0-3.3, dose = 60 mEq po total dose (40 mEq x 1 followed in 2 hours by 20 mEq X1). Recheck K+ level 4 hours after dose and the next AM.  If Serum K+ 2.5-2.9, dose = 80 mEq po total dose (40 mEq Q2H x2). Recheck K+ level 4 hours after dose and the next AM.  If Serum K+ less than 2.5, See IV order.  Dissolve packet contents in 4-8 ounces of cold water or juice.               potassium chloride 10 mEq in 100 mL intermittent infusion  Dose: 10 mEq Freq: EVERY 1 HOUR PRN Route: IV  PRN Reason: potassium supplementation  Start: 08/01/17 0941   Admin Instructions: Infuse via PERIPHERAL LINE or CENTRAL LINE. Use for central line replacement if patient weight less than 65 kg, if patient is on TPN with high potassium content or if unit does not stock 20 mEq bags.  If Serum K+ 3.4-4.0, dose = 10 mEq/hr x2 doses. Recheck K+ level the next AM.  If Serum K+ 3.0-3.3, dose = 10 mEq/hr x4 doses (40 mEq IV total dose). Recheck K+ level 2 hours after dose and the next AM.  If Serum K+ less than 3.0, dose = 10 mEq/hr x6 doses (60 mEq IV total dose). Recheck K+ level 2 hours after dose and the next AM.               potassium chloride 10 mEq in 100 mL intermittent infusion with 10 mg lidocaine  Dose: 10 mEq Freq: EVERY 1 HOUR PRN Route: IV  PRN Reason: potassium supplementation  Start: 08/01/17 0941   Admin Instructions: Infuse via PERIPHERAL LINE. Use potassium with lidocaine for pain with peripheral administration.  If Serum K+ 3.4-4.0, dose = 10 mEq/hr x2 doses. Recheck K+ level the next AM.  If Serum K+ 3.0-3.3, dose = 10 mEq/hr x4 doses (40 mEq IV total dose). Recheck K+ level 2 hours after dose and the next AM.  If Serum K+ less than 3.0, dose = 10 mEq/hr x6 doses (60 mEq IV total dose). Recheck K+ level 2 hours after dose and the next AM.               potassium chloride 20 mEq in 50 mL intermittent infusion  Dose: 20 mEq Freq: EVERY 1 HOUR PRN Route: IV  PRN Reason: potassium supplementation  Start:  08/01/17 0941   Admin Instructions: Infuse via CENTRAL LINE Only.  May need EKG if less than 65 kg or on TPN - Max rate is 0.3 mEq/kg/hr for patients not on EKG monitoring.    If Serum K+ 3.4-4.0, dose = 20 mEq/hr x1 doses. Recheck K+ level the next AM.  If Serum K+ 3.0-3.3, dose = 20 mEq/hr x2 doses (40 mEq IV total dose).  Recheck K+ level 2 hours after dose and the next AM.  If Serum K+ less than 3.0, dose = 20 mEq/hr x3 doses (60 mEq IV total dose). Recheck K+ level 2 hours after dose and the next AM.               potassium chloride SA (K-DUR/KLOR-CON M) CR tablet 20-40 mEq  Dose: 20-40 mEq Freq: EVERY 2 HOURS PRN Route: PO  PRN Reason: potassium supplementation  Start: 08/01/17 0941   Admin Instructions: Use if able to take PO.   If Serum K+ 3.4-4.0, dose = 20 mEq x1. Recheck K+ level the next AM.  If Serum K+ 3.0-3.3, dose = 60 mEq po total dose (40 mEq x1 followed in 2 hours by 20 mEq x1). Recheck K+ level 4 hours after dose and the next AM.  If Serum K+ 2.5-2.9, dose = 80 mEq po total dose (40 mEq Q2H x2). Recheck K+ level 4 hours after dose and the next AM.  If Serum K+ less than 2.5, See IV order.  DO NOT CRUSH               potassium phosphate 10 mmol in D5W 250 mL intermittent infusion  Dose: 10 mmol Freq: DAILY PRN Route: IV  PRN Reason: phosphorous supplementation  Start: 08/01/17 0941   Admin Instructions: For serum phosphorus level 2.5-2.7  Do not infuse Phosphorus in the same line as TPN.   Give 10 mmol and recheck phosphorus level the next AM.               potassium phosphate 15 mmol in D5W 250 mL intermittent infusion  Dose: 15 mmol Freq: DAILY PRN Route: IV  PRN Reason: phosphorous supplementation  Start: 08/01/17 0941   Admin Instructions: For serum phosphorus level 2.0-2.4  Do not infuse Phosphorus in the same line as TPN.   Give 15 mmol and recheck phosphorus level next AM.               potassium phosphate 20 mmol in D5W 250 mL intermittent infusion  Dose: 20 mmol Freq: EVERY 6 HOURS PRN  Route: IV  PRN Reason: phosphorous supplementation  Start: 08/01/17 0941   Admin Instructions: For serum phosphorus level 1.1-1.9  For CENTRAL Line ONLY  Do not infuse Phosphorus in the same line as TPN.   Give 20 mmol and recheck phosphorus level 2 hours after dose and next AM.               potassium phosphate 20 mmol in D5W 500 mL intermittent infusion  Dose: 20 mmol Freq: EVERY 6 HOURS PRN Route: IV  PRN Reason: phosphorous supplementation  Start: 08/01/17 0941   Admin Instructions: For serum phosphorus level 1.1-1.9  For peripheral line  Do not infuse Phosphorus in the same line as TPN.   Give 20 mmol and recheck phosphorus level 2 hours after dose and next AM. Repeat if necessary.               potassium phosphate 25 mmol in D5W 500 mL intermittent infusion  Dose: 25 mmol Freq: EVERY 8 HOURS PRN Route: IV  PRN Reason: phosphorous supplementation  Start: 08/01/17 0941   Admin Instructions: For serum phosphorus level less than 1.1  Do not infuse Phosphorus in the same line as TPN.   Give 25 mmol and recheck phosphorus level 2 hours after dose and next AM.               prochlorperazine (COMPAZINE) injection 5 mg  Dose: 5 mg Freq: EVERY 6 HOURS PRN Route: IV  PRN Reasons: nausea,vomiting  Start: 07/26/17 1808   Admin Instructions: This is Step 2 of nausea and vomiting management.   If nausea not resolved in 15 minutes, give metoclopramide (REGLAN) if ordered (step 3 of nausea and vomiting management)     1308-Auto Hold       1522-Unhold            2141 (5 mg)-Given        0424 (5 mg)-Given          Or  prochlorperazine (COMPAZINE) tablet 5 mg  Dose: 5 mg Freq: EVERY 6 HOURS PRN Route: PO  PRN Reason: vomiting  Start: 07/26/17 1808   Admin Instructions: This is Step 2 of nausea and vomiting management.   If nausea not resolved in 15 minutes, give metoclopramide (REGLAN) if ordered (step 3 of nausea and vomiting management)     1308-Auto Hold       1522-Unhold                              Or  prochlorperazine  (COMPAZINE) Suppository 12.5 mg  Dose: 12.5 mg Freq: EVERY 12 HOURS PRN Route: RE  PRN Reasons: nausea,vomiting  Start: 07/26/17 1808   Admin Instructions: This is Step 2 of nausea and vomiting management.   If nausea not resolved in 15 minutes, give metoclopramide (REGLAN) if ordered (step 3 of nausea and vomiting management)     1308-Auto Hold       1522-Unhold                               QUEtiapine (SEROquel) half-tab 12.5 mg  Dose: 12.5 mg Freq: 4 TIMES DAILY PRN Route: PER J TUBE  PRN Comment: hallucination or agitation  Start: 07/26/17 1835    1308-Auto Hold       1522-Unhold                 senna-docusate (SENOKOT-S;PERICOLACE) 8.6-50 MG per tablet 1-2 tablet  Dose: 1-2 tablet Freq: 2 TIMES DAILY PRN Route: PO  PRN Comment: constipation   Start: 07/26/17 1808   Admin Instructions: If no bowel movement in 24 hours, increase to 2 tablets PO BID.  Hold for loose stools.   This is the first step of a three step constipation treatment protocol.     1308-Auto Hold       1522-Unhold                 sertraline (ZOLOFT) tablet 150 mg  Dose: 150 mg Freq: DAILY Route: PER J TUBE  Start: 07/27/17 0900    0844 (150 mg)-Given       1308-Auto Hold       1522-Unhold        0827 (150 mg)-Given        0818 (150 mg)-Given        0955 (150 mg)-Given        0936 (150 mg)-Given        0856 (150 mg)-Given        [ ] 0900           sodium chloride (PF) 0.9% PF flush 10 mL  Dose: 10 mL Freq: EVERY 8 HOURS Route: IR  Start: 08/01/17 1500   Admin Instructions: Irrigation, of drainage tube.         1544 (10 mL)-Given       2235 (10 mL)-Given        0856 (10 mL)-Given       1614 (10 mL)-Given       2321 (10 mL)-Given        0754 (10 mL)-Given       [ ] 1600           sodium chloride (PF) 0.9% PF flush 3 mL  Dose: 3 mL Freq: EVERY 1 HOUR PRN Route: IK  PRN Reason: line flush  Start: 08/01/17 1213   Admin Instructions: for peripheral IV flush post IV meds               sodium chloride (PF) 0.9% PF flush 3 mL  Dose: 3 mL Freq: EVERY 8  HOURS Route: IK  Start: 07/28/17 1615   Admin Instructions: And Q1H PRN, to lock peripheral IV dormant line.     1630 (3 mL)-Given        0043 (3 mL)-Given       0829 (3 mL)-Given       1713 (3 mL)-Given       2355-Hold [C]        0820 (3 mL)-Given       1535 (3 mL)-Given               1008 (3 mL)-Given       1655 (3 mL)-Given       (2354)-Not Given        1007 (3 mL)-Given       1544 (3 mL)-Given        0131 (3 mL)-Given       0857 (3 mL)-Given       1617 (3 mL)-Given       2321 (3 mL)-Given        0754 (3 mL)-Given       [ ] 1615           sodium chloride (PF) 0.9% PF flush 3 mL  Dose: 3 mL Freq: EVERY 1 HOUR PRN Route: IK  PRN Reason: line flush  PRN Comment: for peripheral IV flush post IV meds  Start: 07/28/17 1600              traZODone (DESYREL) tablet 50 mg  Dose: 50 mg Freq: AT BEDTIME PRN Route: PER J TUBE  PRN Reason: sleep  Start: 07/26/17 1835    0028 (50 mg)-Given       1308-Auto Hold       1522-Unhold          2213 (50 mg)-Given         0131 (50 mg)-Given       2144 (50 mg)-Given              Dose: 125 mg Freq: 4 TIMES DAILY Route: PO  Indications of Use: CLOSTRIDIUM DIFFICILE  Start: 07/26/17 1900   End: 08/02/17 2359    0625 (125 mg)-Given       1204 (125 mg)-Given       1308-Auto Hold       1522-Unhold       1724 (125 mg)-Given       2104 (125 mg)-Given        0652 (125 mg)-Given       1401 (125 mg)-Given       1808 (125 mg)-Given       2140 (125 mg)-Given        0617 (125 mg)-Given       1153 (125 mg)-Given       1717 (125 mg)-Given       2100 (125 mg)-Given        0503 (125 mg)-Given       1304 (125 mg)-Given       1847 (125 mg)-Given       2253 (125 mg)-Given        0604 (125 mg)-Given       (1137)-Not Given       1901 (125 mg)-Given       2236 (125 mg)-Given        0529 (125 mg)-Given       1257 (125 mg)-Given       1810 (125 mg)-Given       2149 (125 mg)-Given       5109-Med Discontinued       Completed Medications  Medications 07/28/17 07/29/17 07/30/17 07/31/17 08/01/17 08/02/17 08/03/17          Dose: 40 mg Freq: 2 TIMES DAILY. Route: IV  Start: 08/01/17 1600   End: 08/02/17 0853        1543 (40 mg)-Given        0853 (40 mg)-Given              Dose: 1-30 mL Freq: ONCE PRN Route: SC  PRN Comment: for local anesthetic.  When verbally ordered by prescriber during the procedure.  Start: 08/01/17 1345   End: 08/01/17 1423   Admin Instructions: Dose to be divided into smaller volumes appropriate for the procedure.         1423 (6 mL)-Given            Discontinued Medications  Medications 07/28/17 07/29/17 07/30/17 07/31/17 08/01/17 08/02/17 08/03/17         Freq: PCA Route: IV  Last Dose: Stopped (08/01/17 1146)  Start: 07/29/17 1000   End: 08/01/17 1420   Admin Instructions: Do NOT give additional opioids orders unless requested by provider.      1056 ( )-New Syringe/Cartridge       1355 (0.2 mg)-Rate/Dose Verify              1819 ( )-Rate/Dose Verify              2117 ( )-Rate/Dose Verify       2206 (0.2 mg)-Rate/Dose Verify       2207-Hold        0229 ( )-Rate/Dose Verify       0237-Hold       0620 (4.2 mg)-Shift Total              1431 ( )-Rate/Dose Verify              1717 ( )-Shift Total       2156 ( )-Shift Total               0511 ( )-Shift Total       1033 ( )-New Syringe/Cartridge              1405 ( )-Rate/Dose Verify       1852 ( )-Shift Total       2215 ( )-Shift Total               0557 ( )-New Syringe/Cartridge       1145 ( )-Rate/Dose Verify       1146-Hold       1400-Hold [C]       1420-Med Discontinued           Dose: 100 mg Freq: EVERY 24 HOURS Route: IV  Indications of Use: CANDIDIASIS  Start: 07/27/17 1200   End: 08/01/17 0959   Admin Instructions: Infuse over 1 hour; do not administer as an IV bolus injection; an existing IV line should be flushed with NS prior to infusion.  Protect from light.     1204 (100 mg)-New Bag       1308-Auto Hold       1522-Unhold        1306 (100 mg)-New Bag        1153 (100 mg)-New Bag        1258 (100 mg)-New Bag        0959-Med Discontinued            Dose: 0.1-0.4 mg Freq: EVERY 2 MIN PRN Route: IV  PRN Reason: opioid reversal  Start: 08/01/17 1345   End: 08/01/17 1347   Admin Instructions: For respiratory rate LESS than or EQUAL to 8.  Partial reversal dose:  0.1 mg titrated q 2 minutes for Analgesia Side Effects Monitoring Sedation Level of 3 (frequently drowsy, arousable, drifts to sleep during conversation).Full reversal dose:  0.4 mg bolus for Analgesia Side Effects Monitoring Sedation Level of 4 (somnolent, minimal or no response to stimulation).         1347-Med Discontinued           Dose: 2.5 mg Freq: EVERY 8 HOURS PRN Route: PO  PRN Comment: pain  Start: 07/26/17 1834   End: 08/01/17 1420    1308-Auto Hold       1522-Unhold          1023 (2.5 mg)-Given        1420-Med Discontinued           Dose: 1 packet Freq: EVERY 8 HOURS Route: PER FEEDING   Start: 07/29/17 1400   End: 08/02/17 1210   Admin Instructions: Infuse via syringe down feeding tube. Flush feeding tube with 15-30 mL of water after administration. Do not mix with other medications.  No mixing or dilution is required. SUPPLIED BY NUTRITION DEPARTMENT.      1402 (1 packet)-Given       2145-Hold        0600-Hold       1418 (1 packet)-Given       (2201)-Not Given        (0504)-Not Given       1459 (1 packet)-Given       2250 (1 packet)-Given        0557 (1 packet)-Given       (1400)-Not Given [C]       1544 (1 packet)-Given       2234 (1 packet)-Given        0503 (1 packet)-Given       1210-Med Discontinued          Dose: 10 mg Freq: AT BEDTIME Route: PO  Start: 07/26/17 2200   End: 08/01/17 0958   Admin Instructions: Autosub for Lovastatin 20mg is Simvastatin 10mg     1308-Auto Hold       1522-Unhold       2103 (10 mg)-Given        2128 (10 mg)-Given        2101 (10 mg)-Given        2213 (10 mg)-Given        0958-Med Discontinued           Dose: 3 mL Freq: EVERY 8 HOURS Route: IK  Start: 08/01/17 1215   End: 08/01/17 1526   Admin Instructions: And Q1H PRN, to lock peripheral IV dormant line.          (5161)-Not Given [C]       1526-Med Discontinued      Medications 07/28/17 07/29/17 07/30/17 07/31/17 08/01/17 08/02/17 08/03/17

## 2017-07-27 NOTE — PLAN OF CARE
Problem: Goal Outcome Summary  Goal: Goal Outcome Summary  Outcome: No Change  Pt on full vent support with only a short CPAP trial due to anxiety. Lungs clear, diminished. Pt reports pain 10/10 abdominal pain, grimacing with any activity. Dilaudid IV ordered and pt much more comfortable. TF started but pt to be NPO after midnight for VATS in am. Consent signed. POA papers signed and in chart. No visitors today. Cont to monitor.

## 2017-07-27 NOTE — PROGRESS NOTES
THORACIC SURGERY    72 yo woman on vent  Large left pleural effusion, minimal drainage with chest in placed    Fluid drained suspicious for empyema    Reviewed findings and options    Recommend left VATS, possible left thoracotomy    Operation, goals and risks discussed in details  Will do tomorrow    PAN ANDRADE MD [facility] ONCOLOGY THORACIC SURGERY  CELL:  [phone]  OFFICE: [phone]

## 2017-07-27 NOTE — CONSULTS
[facility]    Infectious Disease Consultation     Date of Admission:  7/26/2017  Date of Consult (When I saw the patient): 07/27/17    Assessment & Plan   Ara Stanley is a 71 year old female who was admitted on 7/26/2017.     Impression:  1 71 y.o female admitted in the hospital last month.   2 Initial presentation was of acute abdomen taken to OR for cholecystectomy.   3 Taken back to the OR 4 days later with concern for peritonitis, found to have gastric perforation of an ulcer which was repaired. C albicans in cx  4 Post-operative course has been complicated with multiple intubations, trials of weaning off the vent and now with trach. HCAP. Sputum stento  6 Mild renal insuff.   7. Developed C diif at [place].   8. Readmitted for empyema.   9. On Micafungin and oral vancomycin for C diff. Repeat cultures from the drain are pending.   10. Cultures from the pleural fluid has stayed negative.           Recommendations:   For now ok to continue on the Micafungin.   Continue oral vancomycin for recent C diff diagnosis.   Will follow up on the cultures from the pleural fluid, multiple cultures from thoracocentesis done previously are negative.     Celso Birch MD    Reason for Consult   Reason for consult: I was asked by Dr. Sorenson to evaluate this patient for empyema, intraabdominal abscess, C diff colitis.    Primary Care Physician   Shar James    Chief Complaint   Empyema     History is obtained from the patient and medical records    History of Present Illness   Ara Stanley is a 71 year old female who was transferred from [place] for evaluation and management of loculated pleural effusion concerning for empyema.  She was hospitalized recently from 6/2-6/19 for abdominal pain, s/p cholecystectomy, perforated gastric ulcer, later developed multiple abscess with drains placed. Transferred to [place] on Levaquin and Fluconazole ( candida from abdominal abscesses)  for  drain management and vent weaning. Was treated with IV levofloxacin until 6/29 I believe Levaquin was for Stenotrophomonas pneumonia.   Had thoracentesis on 7/13, borderline exudate, cultures negative. Diagnosed with Cdiff 7/19 (started on PO vanc), also evaluated for bilateral pleural effusions. CT 7/20 showed perisplenic fluid collection and mesenteric fluid collection. Drains placed in 2 pockets on 7/21, growing candida albicans and was restarted on micafungin. On 7/24 repeat thoracentesis was attempted, but fluid was loculated and only 30mL were removed.       Past Medical History   I have reviewed this patient's medical history and updated it with pertinent information if needed.   Past Medical History:   Diagnosis Date     Asthma      COPD (chronic obstructive pulmonary disease) (H)        Past Surgical History   I have reviewed this patient's surgical history and updated it with pertinent information if needed.  Past Surgical History:   Procedure Laterality Date     ARTHROPLASTY HIP  6/4/2012    Procedure:ARTHROPLASTY HIP; Surgeon:DAVIAN FENG; Location: OR     ENT SURGERY      tonsils     GYN SURGERY      hyst     LAPAROSCOPIC CHOLECYSTECTOMY N/A 6/2/2017    Procedure: LAPAROSCOPIC CHOLECYSTECTOMY;;  Surgeon: Lavell Carpenter MD;  Location:  OR     LAPAROSCOPY DIAGNOSTIC (GENERAL) N/A 6/6/2017    Procedure: LAPAROSCOPY DIAGNOSTIC (GENERAL);  DIAGNOSTIC LAPAROSCOPY, REPAIR OF PERFORATED GASTRIC ULCER, ABDOMINAL LAVAGE;  Surgeon: Estelita Peck MD;  Location:  OR     LAPAROTOMY EXPLORATORY N/A 6/2/2017    Procedure: LAPAROTOMY EXPLORATORY;  EXPLORATORY LAPAROSCOPY, CHOLECYCTECTOMY;  Surgeon: Lavell Carpenter MD;  Location:  OR     ORTHOPEDIC SURGERY      hip pinning   femur fracture knee surgery      REMOVE HARDWARE HIP NAILING  6/4/2012    Procedure:REMOVE HARDWARE HIP NAILING; REMOVAL OF GAMMA NAIL AND SCREWS FROM RIGHT HIP, RIGHT TOTAL HIP ARTHROPLASTY(GAMMA NAIL EQUIPMENT,  [employer] TOTAL HIP)^; Surgeon:DAVIAN FENG; Location: OR     TRACHEOSTOMY N/A 2017    Procedure: TRACHEOSTOMY;  TRACHEOSTOMY.;  Surgeon: Selwyn Varela MD;  Location:  OR       Prior to Admission Medications   Prior to Admission Medications   Prescriptions Last Dose Informant Patient Reported? Taking?   ACETAMINOPHEN PO 2017 at 1449 Self Yes Yes   Si,000 mg by Per J Tube route every 8 hours    ALPRAZOLAM PO 2017 at 1300  Yes Yes   Si.25 mg by Per J Tube route 3 times daily   BACLOFEN PO 2017 at 0813 Self Yes Yes   Sig: Take 5 mg by mouth every morning RX is 5 mg (1/2 of 10 mg tab) 2 x daily but she takes daily due to drowsiness.   Cyclobenzaprine HCl (FLEXERIL PO) 2017 at 2226  Yes Yes   Si mg by Per J Tube route 3 times daily as needed for muscle spasms   HYDRALAZINE HCL PO 2017 at 1256  Yes Yes   Si mg by Per G Tube route every 6 hours   LACTOBACILLUS RHAMNOSUS, GG, PO 2017 at 1256  Yes Yes   Sig: Take 1 capsule by mouth 3 times daily (with meals)   LISINOPRIL PO 2017 at 0817  Yes Yes   Si mg by Per J Tube route daily   LOVASTATIN PO unknown Self Yes Yes   Sig: Take 20 mg by mouth every morning   Lidocaine 4 % PTCH 2017 at 0831  Yes Yes   Sig: Externally apply 1 patch topically daily Remove for 12 hours   MAGNESIUM OXIDE PO 2017 at 0816  Yes Yes   Sig: Take 400 mg by mouth daily   METOPROLOL TARTRATE PO 2017 at 0817  Yes Yes   Sig: Take 50 mg by mouth 2 times daily   Mirtazapine (REMERON PO) 2017 at 2141  Yes Yes   Si.5 mg by Per G Tube route At Bedtime   OXYCODONE HCL PO 2017 at 0514  Yes Yes   Si.5 mg by Per J Tube route every 8 hours as needed   QUETIAPINE FUMARATE PO 2017 at 1449  Yes Yes   Si.5 mg by Per J Tube route 4 times daily as needed (hallucination or agitation)   Sertraline HCl (ZOLOFT PO) 2017 at 0815  Yes Yes   Si mg by Per J Tube route daily   TRAZODONE  HCL PO 2017 at 2226 Self Yes Yes   Si mg by Per J Tube route nightly as needed for sleep    albuterol (2.5 MG/3ML) 0.083% neb solution 2017 at 1740  Yes Yes   Sig: Take 1 vial by nebulization every 2 hours as needed for wheezing   budesonide (PULMICORT) 0.5 MG/2ML neb solution 2017 at 0800  Yes Yes   Sig: Take 0.5 mg by nebulization 2 times daily   formoterol (PERFOROMIST) 20 MCG/2ML neb solution 2017 at 0800  Yes Yes   Sig: Take 20 mcg by nebulization every 12 hours   ipratropium - albuterol 0.5 mg/2.5 mg/3 mL (DUONEB) 0.5-2.5 (3) MG/3ML neb solution 2017 at 1122 Self Yes Yes   Sig: Take 1 vial by nebulization 4 times daily    micafungin (MYCAMINE) 100 MG injection 2017 at 1256  Yes Yes   Sig: Inject 100 mg into the vein daily For 14 days. Stop on 2017   omeprazole (PRILOSEC) 2 mg/mL SUSP 2017 at 0515  Yes Yes   Sig: Take 40 mg by mouth 2 times daily   vancomycin (VANOCIN) 50 mg/mL LIQD solution 2017 at 1256  Yes Yes   Si mg by Enteral route 4 times daily At 6am, 12 noon, 6 pm, and 10 pm      Facility-Administered Medications: None     Allergies   Allergies   Allergen Reactions     Iodine I 131 Tositumomab Anaphylaxis     Can tolerate topical iodine if it is wahed off after surgery      Penicillins Rash and Anaphylaxis     Swollen  lymph nodes, flushed overheating      Sulfa Drugs Nausea and Vomiting, Diarrhea and Rash       Immunization History   There is no immunization history for the selected administration types on file for this patient.    Social History   I have reviewed this patient's social history and updated it with pertinent information if needed. Ara Stanley  reports that she quit smoking about 25 years ago. Her smoking use included Cigarettes. She has a 10.00 pack-year smoking history. She does not have any smokeless tobacco history on file. She reports that she does not drink alcohol or use illicit drugs.    Family History   I have  reviewed this patient's family history and updated it with pertinent information if needed.   No family history on file.    Review of Systems   The 10 point Review of Systems is negative other than noted in the HPI or here.     Physical Exam   Temp: 98.6  F (37  C) Temp src: Oral BP: 134/76   Heart Rate: 75 Resp: 16 SpO2: 100 % O2 Device: Mechanical Ventilator    Vital Signs with Ranges  Temp:  [97.8  F (36.6  C)-98.8  F (37.1  C)] 98.6  F (37  C)  Heart Rate:  [71-92] 75  Resp:  [13-28] 16  BP: (112-156)/() 134/76  FiO2 (%):  [40 %] 40 %  SpO2:  [96 %-100 %] 100 %  147 lbs 14.86 oz    GENERAL APPEARANCE:  alert and no distress  EYES: Eyes grossly normal to inspection, PERRL and conjunctivae and sclerae normal  HENT: ear canals and TM's normal and nose and mouth without ulcers or lesions  NECK: no adenopathy, no asymmetry, masses, or scars and thyroid normal to palpation  RESP: lungs clear to auscultation - no rales, rhonchi or wheezes  CV: regular rates and rhythm, normal S1 S2, no S3 or S4 and no murmur, click or rub  LYMPHATICS: normal ant/post cervical and supraclavicular nodes  ABDOMEN: drains in place   MS: extremities normal- no gross deformities noted  SKIN: no suspicious lesions or rashes      Data   Lab Results   Component Value Date    WBC 13.3 (H) 07/27/2017    HGB 7.2 (L) 07/27/2017    HCT 22.1 (L) 07/27/2017     (H) 07/27/2017     07/27/2017    POTASSIUM 4.4 07/27/2017    CHLORIDE 100 07/27/2017    CO2 32 07/27/2017    BUN 67 (H) 07/27/2017    CR 0.86 07/27/2017    GLC 77 07/27/2017    SED 34 (H) 06/04/2012    NTBNPI 425 07/01/2008    TROPI  06/04/2017     <0.015  The 99th percentile for upper reference range is 0.045 ug/L.  Troponin values in   the range of 0.045 - 0.120 ug/L may be associated with risks of adverse   clinical events.      AST 17 07/26/2017    ALT 17 07/26/2017    ALKPHOS 108 07/26/2017    BILITOTAL 0.3 07/26/2017    INR 1.12 07/26/2017       Recent Labs  Lab  07/27/17  1120 07/26/17  1840   CULT Pending Culture in progress     Recent Labs   Lab Test  07/27/17   1120  07/26/17   1840  06/19/17   0520  06/16/17   1145  06/15/17   1947  06/15/17   1143  06/15/17   0855  06/14/17   0911  06/09/17   1023   CULT  Pending  Culture in progress  No growth  Moderate growth Stenotrophomonas maltophilia*  Heavy growth Stenotrophomonas maltophilia  Light growth Candida albicans / dubliniensis Candida albicans and Candida   dubliniensis are not routinely speciated Susceptibility testing not routinely   done  *  Canceled, Test credited Unsatisfactory Specimen - Specimen frozen prior to   receipt in lab.  FROZE SAMPLE IN TRANSIT Notification of test   cancellation was given to OSMAN TIJERINA AT [facility].  RN WILL RECOLLECT AND REORDER.  [facility] 6.15.17 1600    No growth  Moderate growth Candida albicans / dubliniensis Candida albicans and Candida   dubliniensis are not routinely speciated Susceptibility testing not routinely   done  Critical Value/Significant Value, preliminary result only, called to and read   back by Saira Nicole RN on 6.15.2017 at 0815. KVO  Susceptibility testing requested by . [email or web address]. susceptibility on   Candida albicans and dubliniensis. [id number]  *  Canceled, Test credited  Incorrectly ordered by PCU/Clinic  Test reordered as correct code    No growth

## 2017-07-27 NOTE — PROGRESS NOTES
"Care Coordination:    In ICU rounds this AM, it was noted that patient's daughter (Sima) has been calling the unit and identifying herself as POA without documentation of this status.  The staff states \"patient does not want the daughter to be POA.\"      This was an issue during pt's last admission, and I wrote the following note 6/13/17:        SW and I met with patient in room, introduced self and role.  I introduced the subject, \"I understand your daughter Charlene has been calling and identifying herself as POA.\"  Pt furrowed her brow.  I asked, \"do you want your daughter to act as POA?\"  Pt shook her head \"no.\"  I explained health care POA designation can only be achieved via legal documentation on an advanced directives form.  I asked if she had one.  She shook her head, \"no.\"  I showed her a blank copy of one, and asked if she would like to make one.  She shook her head, \"yes.\"  I asked if she would be able to identify someone to make her health care decisions for her, if she was not able to do so.  She mouthed \"Courtney.  She's a nurse.\"  (We repeated this back to her to confirm.)  I provided the blank POA document to pt.  She filled it out, and asked that we \"call Courtney now\" to tell her.  I encouraged pt to consider an alternate agent for the second line, and pt chose her son Lane.  I contacted Courtney and updated her, she accepts this designation.  SW contacted the notary, who will witness pt's signature.  (I will update this note once that is done.)    Pt did sign HCD with ale.  I notified friend Courtney, who will be pt's Health Care agent.  I notified son Lane via voicemail, who is the alternate agent.  The original was sealed in an envelope on pt's chart.  Copies were made for Courtney and Lane and placed in pt's room (Courtney will  and get one to Lane).  A third copy was sent to medical records.    Liane Lara RN, BSN  [facility] Care Coordinator   Mobile Phone: [phone]    "

## 2017-07-27 NOTE — PROGRESS NOTES
Pt. Readmitted with a PICC line placed at [facility]; x-ray shows tip SVC.

## 2017-07-27 NOTE — PROGRESS NOTES
[facility] ICU RESPIRATORY NOTE  Date of Admission: 7/26/17  Date of Intubation (most recent): Tracheostomy 6/13/17  Reason for Mechanical Ventilation:  Vent dependent  Number of Days on Mechanical Ventilation:  Met Criteria for Pressure Support Trial: no  Length of Pressure Support Trial:  Reason for Stopping Pressure Support Trial:  Reason for No Pressure Support Trial: per MD  Ventilation Mode: CMV/AC  FiO2 (%): 40 %  Rate Set (breaths/minute): 14 breaths/min  Tidal Volume Set (mL): 450 mL  PEEP (cm H2O): 5 cmH2O  Oxygen Concentration (%): 40 %  Resp: 14      Significant Events Today: none    ABG Results: no recent ABG    Plan: pt remains on full vent support. RT will continue to follow.  7/27/2017  Zelda Camp

## 2017-07-27 NOTE — CONSULTS
CLINICAL NUTRITION SERVICES  -  ASSESSMENT NOTE      Recommendations Ordered by Registered Dietitian (RD):  Today:  Begin TF Fibersource HN at 25 mL/hr = 720 kcals, 32 gm pro, 486 mL H20, 9 gm fiber, 99 gm CHO  Free H20 60 mL every 4 hrs   Future/Additional Recommendations:   Recommend increase TF as tolerated to eventual goal TF Fibersource HN at 50 mL/hr = 1440 kcals, 65 gm pro, 972 mL H20, 18 gm fiber, 197 gm CHO.  ProSource 3 packets per day = 120 kcals, 33 gm pro  Total:  1560 kcals (23 kcal/kg and 93% energy needs), 98 gm pro (1.5 gm/kg).  Free H20 flushes were 180 mL every 4 hrs = 1080 mL.  Total Fluid (TF + flushes); 2052 mL (31 mL/kg).   Malnutrition:  Patient does not meet two of the above criteria necessary for diagnosing malnutrition        REASON FOR ASSESSMENT  Ara Stanley is a 71 year old female seen by Registered Dietitian for Admission Nutrition Risk Screen - TF or Parenteral Nutrition and Provider Order - Registered Dietitian to Assess and Order TF per Medical Nutrition protocol      NUTRITION HISTORY  - Information obtained from EPIC records and paper chart.  Am familiar with pt from recent [facility] admission.  At the time of discharge in June, pt was on TF Isosource 1.5 at 45 mL/hr = 1620 kcals, 73 gm pro, 190 gm CHO, 820 mL h20, 16 gm fiber.  Per records from [place], pt was on TF Fibersource HN at 50 mL/hr = 1440 kcals, 65 gm pro, 972 mL H20, 18 gm fiber, 197 gm CHO.  ProSource 3 packets per day = 120 kcals, 33 gm pro  Total:  1560 kcals (23 kcal/kg and 93% energy needs), 98 gm pro (1.5 gm/kg).  Free H20 flushes were 180 mL every 4 hrs = 1080 mL.  Total Fluid (TF + flushes); 2052 mL (31 mL/kg).  Note pt was also on probiotic, Culturelle.    CURRENT NUTRITION ORDERS  Diet Order:     NPO   Was asked to resume TF via existing nasoduodenal FT at 1/2 usual dose for today.    Current Intake/Tolerance:  n/a    PHYSICAL FINDINGS  Observed  Muscle Wasting - clavicle  Obtained from  "Chart/Interdisciplinary Team  None noted  No appreciable edema per RN    ANTHROPOMETRICS  Height: 5' 6\"  Weight:  67 kg (147 lbs 14.86 oz)  Body mass index is 23.88 kg/(m^2).  Weight Status:  Normal BMI  IBW:  59.1 kg  % IBW:  113%  Weight History:  Weight has recently fluctuated due to fluids.  Pt had previously stated a usual weight of 155 lbs before she started getting sick.  Note she had thoracentesis 1 week ago.    Wt Readings from Last 20 Encounters:   07/27/17 67.1 kg (147 lb 14.9 oz)   06/20/17 76.1 kg (167 lb 12.3 oz)   06/04/12 65.8 kg (145 lb)       LABS  Labs reviewed  BUN 67 (H)  Cr 0.86 (NL) - Pt with h/o CKD    MEDICATIONS  Medications reviewed    Dosing Weight: 67 kg (admit wt)    ASSESSED NUTRITION NEEDS PER APPROVED PRACTICE GUIDELINES:  Estimated Energy Needs:  0492-2374 kcals (25-30 Kcal/Kg)  Justification: maintenance  Estimated Protein Needs:   grams protein (1.1-1.5 g pro/Kg)  Justification: Repletion, hypercatabolism with acute illness and CKD  Estimated Fluid Needs: 9888-9600 mL (1 mL/Kcal)  Justification: maintenance and per provider pending fluid status    MALNUTRITION:  % Weight Loss:  Weight loss does not meet criteria for malnutrition   % Intake:  No decreased intake noted  Subcutaneous Fat Loss:  None observed  Muscle Loss:  Clavicle bone region mild depletion  Fluid Retention:  None noted    Malnutrition Diagnosis: Patient does not meet two of the above criteria necessary for diagnosing malnutrition    NUTRITION DIAGNOSIS:  Inadequate enteral nutrition infusion related to TF on hold with transfer as evidenced by meeting 0% estimated needs.      NUTRITION INTERVENTIONS  Recommendations / Nutrition Prescription  Today:  Begin TF Fibersource HN at 25 mL/hr = 720 kcals, 32 gm pro, 486 mL H20, 9 gm fiber, 99 gm CHO  Free H20 60 mL every 4 hrs    Implementation  Nutrition education: Not appropriate at this time due to patient condition  Collaboration and Referral of Nutrition care - " Discussed pt during ICU interdisciplinary rounds this morning.  Enteral Nutrition - Initiate TF at 25 mL/hr for today.  Feeding tube flush - Ordered std H20 flushes for now.    Nutrition Goals  TF goal Fibersource HN at 50 mL/hr + 3 packets ProSource will meet % estimated needs in 48-72 hrs.    Recommend increase TF as tolerated to eventual goal TF Fibersource HN at 50 mL/hr = 1440 kcals, 65 gm pro, 972 mL H20, 18 gm fiber, 197 gm CHO.  ProSource 3 packets per day = 120 kcals, 33 gm pro  Total:  1560 kcals (23 kcal/kg and 93% energy needs), 98 gm pro (1.5 gm/kg).  Free H20 flushes were 180 mL every 4 hrs = 1080 mL.  Total Fluid (TF + flushes); 2052 mL (31 mL/kg).      MONITORING AND EVALUATION:  Progress towards goals will be monitored and evaluated per protocol and Practice Guidelines    Kayleigh Roman, RD, LD, CNSC

## 2017-07-27 NOTE — PLAN OF CARE
Problem: Goal Outcome Summary  Goal: Goal Outcome Summary  OT and PT: Orders rec'd, however patient needs activity orders. Please update if appropriate. Holding until activity orders written.    Addendum: Noted patient will have a VATS tomorrow, potential thoracotomy. Holding evals until after surgery.

## 2017-07-27 NOTE — PROGRESS NOTES
[facility]    Critical Care Service  Progress Note    Date of Service (when I saw the patient): 07/27/2017     Assessment & Plan   Ara Stanley is a 71 year old female who was recently hospitalized at [facility] for lap alexandre and returned to operating room for worsening status and was found to have gastric perforation which was repaired, and ultimately requiring tracheostomy for respiratory failure who was admitted on 7/26/2017 from [place] with loculated pleural effusion for evaluation by thoracic surgery for potential VATS.     Main Plans for Today   Thoracic surgery consult  SW consult for assistance with POA paperwork. Completed and in chart    Infectious Disease consult for assistance with intra-abdominal infections     Neuro  1. Anxiety   2. Depression   3. Acute on chronic pain  Plan:  -- Scheduled acetaminophen and baclofen for pain.  PRN flexeril, oxycodone and dilaudid for breakthrough pain   -- Continue PTA zoloft and mirtazapine  --Continue scheduled xanax, and PRN seroquel and trazodone for now. Per patient, Trazodone works best at HS, periodically needs additional seroquel for anxiety   -- Delirium prevention as able; maintain sleep schedule     CV  1. HTN  Plan:  -- Resume PTA meds     Resp:  1. Chronic hypercapnic respiratory failure   2. Loculated pleural effusions; initially borderline exudate but most recent thoracentesis with much higher WBC and 94% neutrophils. Concerning for abscess or empyema.   Plan:  -- Thoracic surgery consulted.  Plan for VATS 7/28; consent signed and in chart.   --Continue current vent settings   --Speech pathology consulted for swallowing on vent; ok'd for ice chips and has been tolerating.  Patient requesting iced tea.  Will not advance beyond ice chips at this time.     GI/Nutrition  1. S/p laparoscopic cholecystectomy, gastric ulcer perforation with omental patch, and persistent yeast in abdominal fluid cultures previous admission.   2. Bilateral  FUNMI drains with purulent output   Plan:  -- NPO. Ice chips ok.   -- PPI  --Attempt to obtain f/u appt information for gen/surg regarding abdominal drains. If patient has not followed up, will consult gen surg in house     Renal  1. WOODY past admission. Now resolved.   Plan:  -- Monitor creatinine, lytes.  Creatinine appears to be back to original baseline 0.7-0.9    ID  1. Has had multiple fluid collections growing candida, on micafungin (previously on fluconazole).   2. Leukocytosis  3. Loculated pleural effusions   Plan:  --Continue Micafungin   --ID consult  --oral vanco for history of c. Diff; enteric isolation   --Procal elevated, but not grossly.  Flat in last 24 hours. WBC remain unchanged in 24 hours.  Afebrile. No systemic antibiotics at this point.   --Abdominal wounds cultured per nursing due to purulent discharge.  Follow culture results     Endocrine  1. No acute issues   Plan:  --  Insulin gtt per protocol if indicated  -- Keep BG  <180 for optimal healing    Heme:  1. Anemia of chronic illness  Plan:  -- Monitor hemoglobin. Has received transfusions past admission for anemia.  Hbg 7.3 on admit>7.2 today. Monitor daily   -- Transfuse to keep > 7.0    MSK  1. Chronic hip/back pain   Plan:  --Continue PTA baclofen.  OOB as able   --PT/OT     Skin  1. Multiple surgical incisions   Plan:  --Wound cares per POC.    --Follow cultures from abdominal wounds.     General cares:  DVT Prophylaxis: Heparin SQ  GI Prophylaxis: PPI  Restraints: Restraints for medical healing needed: NO  Family update by me today: No  Current lines are required for patient management  Access:    Isabella Gutierrez    Time Spent on this Encounter   Billing:  I spent 60 minutes bedside and on the inpatient unit today managing the critical care of Ara Stanley in relation to the issues listed in this note.    Interval History   Transferred to [facility] from [place] due to loculated pleural effusion; thoracic surgery consult.  Stable  throughout day.  Pain improved with IV dilaudid.  POA paperwork completed with help of social work; notarized and on file in chart.      Physical Exam   Temp: 98.3  F (36.8  C) Temp src: Oral Temp  Min: 97.8  F (36.6  C)  Max: 98.8  F (37.1  C) BP: 154/70   Heart Rate: 74 Resp: 13 SpO2: 99 % O2 Device: Mechanical Ventilator    Vitals:    07/26/17 1700 07/26/17 1900 07/27/17 0400   Weight: 67 kg (147 lb 11.3 oz) 76 kg (167 lb 8.8 oz) 67.1 kg (147 lb 14.9 oz)     I/O last 3 completed shifts:  In: 760 [P.O.:240; I.V.:100; NG/GT:420]  Out: 1195 [Urine:1175; Drains:20]    GEN: awake, alert, NAD   EYES: PERRL, Anicteric sclera.   HEENT:  Normocephalic, atraumatic, trachea midline,trach secure  CV: RRR, no gallops, rubs. Murmur noted   PULM/CHEST: Clear breath sounds on left without rhonchi, crackles or wheeze. Course, diminished in right middle/lower lobes.  symmetric chest rise  GI: normal bowel sounds, soft, non-tender, no rebound tenderness or guarding, no masses. Drains with purulent discharge.    : tanner catheter in place, urine yellow and clear  EXTREMITIES: No peripheral edema, moving all extremities, peripheral pulses intact  NEURO: Cranial nerves II-XII grossly intact, no motor-sensory deficits noted  SKIN: Multiple surgical incisions, covered, and dry   PSYCH:  Affect: appropriate, minimally anxious, mentation at baseline, not altered, no hallucinations  Imaging personally reviewed: CT scan on file from 7/20  ECG: SR     Medications        [START ON 7/28/2017] pneumococcal  0.5 mL Intramuscular Prior to discharge     heparin  5,000 Units Subcutaneous Q8H     chlorhexidine  15 mL Mouth/Throat Q12H     acetaminophen  1,000 mg Per J Tube Q8H     ALPRAZolam (XANAX) tablet 0.25 mg  0.25 mg Per J Tube TID     baclofen (LIORESAL) half-tab 5 mg  5 mg Per J Tube QAM     budesonide  0.5 mg Nebulization BID     arformoterol  15 mcg Nebulization Q12H     hydrALAZINE (APRESOLINE) tablet 25 mg  25 mg Per G Tube Q6H      lidocaine  1 patch Transdermal Daily     lisinopril (PRINIVIL/ZESTRIL) tablet 20 mg  20 mg Per J Tube Daily     simvastatin  10 mg Oral At Bedtime     metoprolol (LOPRESSOR) tablet 50 mg  50 mg Oral BID     mirtazapine (REMERON) tablet TABS 22.5 mg  22.5 mg Per G Tube At Bedtime     pantoprazole  40 mg Per J Tube BID     sertraline (ZOLOFT) tablet 150 mg  150 mg Per J Tube Daily     vancomycin  125 mg Oral 4x Daily     lidocaine   Transdermal Q24h     lidocaine   Transdermal Q8H     micafungin  100 mg Intravenous Q24H     nystatin  500,000 Units Oral 4x Daily       Data     Recent Labs  Lab 07/27/17  0425 07/26/17  1840   WBC 13.3* 13.7*   HGB 7.2* 7.3*   MCV 87 88   * 535*   INR  --  1.12    140   POTASSIUM 4.4 4.6   CHLORIDE 100 100   CO2 32 33*   BUN 67* 72*   CR 0.86 0.90   ANIONGAP 7 7   CARYL 8.7 8.9   GLC 77 78   ALBUMIN  --  1.7*   PROTTOTAL  --  7.0   BILITOTAL  --  0.3   ALKPHOS  --  108   ALT  --  17   AST  --  17     Recent Results (from the past 24 hour(s))   XR Chest Port 1 View    Narrative    CHEST ONE VIEW PORTABLE   7/26/2017 6:30 PM     INDICATION: Evaluate placement of enteric tube and PICC (transferred  from referring facility).    COMPARISON: 6/18/2017.      Impression    IMPRESSION: Near complete opacification of the left hemithorax  increased from the prior study likely due to a large pleural effusion  and associated volume loss.    Right PICC tip in SVC. Enteric tube extends off of the margin of the  film. Another tube extends cephalad from the margin of the film  presumably representing the end of the enteric tube in the right upper  quadrant. This may be in the distal stomach or proximal duodenum.  Pigtail catheter projecting over the left lower ribs. A portion of a  locking loop catheter is visualized in the right upper quadrant of the  abdomen.    LEONARD CAMACHO MD

## 2017-07-27 NOTE — PROGRESS NOTES
SW:  D: SW referral during rounds to meet with pt regarding daughter's claims to be pt's POA. No POA or ACD are on chart.  I: SW met with pt and Care Coordinator in pt room to discuss issue of POA. Pt unable to talk but clearly indicated with expressions and mouthing words that she does not want her daughter involved in decision making. Advance Care Directive in short form presented and pt agreeable to completing and appointing her friend Courtney to be her Health care Agent. She is a RN and works nights so she was contacted right away while available to answer questions.  Form completed, notarized and placed on chart. Staff notified of ACD.  P: SW to follow as needed

## 2017-07-27 NOTE — PLAN OF CARE
Problem: Individualization  Goal: Patient Preferences  Outcome: No Change  Pt admitted from [place] for respiratory difficulty r/t jamin plueral effusions. Pt had thoracentesis 1 wk ago and was scheduled to have repeat Friday. Had jamin abd drains placed for abcesses.  Umbilicus with purulent drainage. Culture sent and thus far negative. Temp max 98.8 ax. LCTA. Minimal secretions. UOP borderline but TF on hold and Picc SL. Labs unchanged this am. Flexiseal placed as pt with current C diff with po vanco. Pre existing rash noted on torso, chest and thighs. Per flowsheets, pt had thigh rash prior to dc to [facility]. MD notified. Also with oral thrush, nystatin ordered. Prn muscle relaxers, oxycodone and anxiolytics effective for pain/anxiety. Pt slept well during night.  Per chart, dtr, Charlene, emergency contact. Charlene phoned unit and verbally informed [facility] that she is medical POA. Upon discussion with pt (whispers around trach and writes on board), pt adamant that she is NOT to make medical decisions and is to be given NO information. In addition, no information to be given to sonAlejandro. SonLane IS allowed info. As well as sister Helen, and sister in law, Helen.  Pt requests that friend, Courtney (RN at [employer]), to be decision maker. Pt states that dtr has paperwork and that Courtney can obtain it from her. Unable to phone Courtney to confirm thus far. Release of information signed by pt and faxed to [facility] to obtain records on file regarding poa, and emergency info. Social work consulted to assist with this. Also awaiting imaging disk from [place] for MD to review. SR. Pt able to move self in bed. Cont current plan of care.

## 2017-07-28 NOTE — ADDENDUM NOTE
Addendum  created 07/28/17 1610 by Daquan Jacobs APRN CRNA    Anesthesia Event edited, Anesthesia Intra Flowsheets edited, Anesthesia Intra SmartForms edited, Anesthesia Staff edited, Flowsheet data copied forward

## 2017-07-28 NOTE — PROGRESS NOTES
[facility] ICU RESPIRATORY NOTE  Date of Admission: 7/26/17  Date of Intubation (most recent): Tracheostomy 6/13/17  Reason for Mechanical Ventilation:  Vent dependent  Number of Days on Mechanical Ventilation:  Met Criteria for Pressure Support Trial: no  Length of Pressure Support Trial:  Reason for Stopping Pressure Support Trial:  Reason for No Pressure Support Trial: per MD  Ventilation Mode: CMV/AC  FiO2 (%): 40 %  Rate Set (breaths/minute): 14 breaths/min  Tidal Volume Set (mL): 450 mL  PEEP (cm H2O): 5 cmH2O  Pressure Support (cm H2O): 10 cmH2O  Oxygen Concentration (%): 40 %  Resp: 16      Significant Events Today: none    ABG Results: no recent ABG    Plan: pt remains on full vent support. RT will continue to follow.  7/27/2017  Carlso Sanches RT

## 2017-07-28 NOTE — PROGRESS NOTES
SW:  D: Patient's [place] is a [place] managed by [facility] [employer].  Her  is Kayleigh Jimenez(sp)  [phone].  Kayleigh left a message indicating she is aware patient transferred back to [facility] from [place].  She anticipates patient will return there however is available should this not be the plan and if patient is in need of community resources she is available as a resource.

## 2017-07-28 NOTE — PROGRESS NOTES
Care Coordination:    Requested estimated timing for d/c back to [facility] after her VATS.  In rounds, NP/MD estimates earliest Sunday 7/30 vs. Monday 7/31.  [facility] rep Beverly ([phone]) states earliest [facility] can take pt back is Monday 7/31.      Liane Lara RN, BSN  [facility] Care Coordinator   Mobile Phone: [phone]

## 2017-07-28 NOTE — ANESTHESIA PREPROCEDURE EVALUATION
Procedure: Procedure(s):  THORACOSCOPIC WEDGE RESECTION LUNG  THORACOTOMY  Preop diagnosis: LEFT PLEURAL EFFUSION     Allergies   Allergen Reactions     Iodine I 131 Tositumomab Anaphylaxis     Can tolerate topical iodine if it is wahed off after surgery      Penicillins Rash and Anaphylaxis     Swollen  lymph nodes, flushed overheating      Sulfa Drugs Nausea and Vomiting, Diarrhea and Rash     Past Medical History:   Diagnosis Date     Asthma      COPD (chronic obstructive pulmonary disease) (H)      Past Surgical History:   Procedure Laterality Date     ARTHROPLASTY HIP  6/4/2012    Procedure:ARTHROPLASTY HIP; Surgeon:DAVIAN FENG; Location: OR     ENT SURGERY      tonsils     GYN SURGERY      hyst     LAPAROSCOPIC CHOLECYSTECTOMY N/A 6/2/2017    Procedure: LAPAROSCOPIC CHOLECYSTECTOMY;;  Surgeon: Lavell Carpenter MD;  Location:  OR     LAPAROSCOPY DIAGNOSTIC (GENERAL) N/A 6/6/2017    Procedure: LAPAROSCOPY DIAGNOSTIC (GENERAL);  DIAGNOSTIC LAPAROSCOPY, REPAIR OF PERFORATED GASTRIC ULCER, ABDOMINAL LAVAGE;  Surgeon: Estelita Peck MD;  Location:  OR     LAPAROTOMY EXPLORATORY N/A 6/2/2017    Procedure: LAPAROTOMY EXPLORATORY;  EXPLORATORY LAPAROSCOPY, CHOLECYCTECTOMY;  Surgeon: Lavell Carpenter MD;  Location:  OR     ORTHOPEDIC SURGERY      hip pinning   femur fracture knee surgery      REMOVE HARDWARE HIP NAILING  6/4/2012    Procedure:REMOVE HARDWARE HIP NAILING; REMOVAL OF GAMMA NAIL AND SCREWS FROM RIGHT HIP, RIGHT TOTAL HIP ARTHROPLASTY(GAMMA NAIL EQUIPMENT, [employer] TOTAL HIP)^; Surgeon:DAVIAN FENG; Location: OR     TRACHEOSTOMY N/A 6/13/2017    Procedure: TRACHEOSTOMY;  TRACHEOSTOMY.;  Surgeon: Selwyn Varela MD;  Location:  OR     Prior to Admission medications    Medication Sig Start Date End Date Taking? Authorizing Provider   albuterol (2.5 MG/3ML) 0.083% neb solution Take 1 vial by nebulization every 2 hours as needed for wheezing   Yes  Unknown, Entered By History   ALPRAZOLAM PO 0.25 mg by Per J Tube route 3 times daily   Yes Unknown, Entered By History   budesonide (PULMICORT) 0.5 MG/2ML neb solution Take 0.5 mg by nebulization 2 times daily   Yes Unknown, Entered By History   Cyclobenzaprine HCl (FLEXERIL PO) 5 mg by Per J Tube route 3 times daily as needed for muscle spasms   Yes Unknown, Entered By History   formoterol (PERFOROMIST) 20 MCG/2ML neb solution Take 20 mcg by nebulization every 12 hours   Yes Unknown, Entered By History   HYDRALAZINE HCL PO 25 mg by Per G Tube route every 6 hours   Yes Unknown, Entered By History   LACTOBACILLUS RHAMNOSUS, GG, PO Take 1 capsule by mouth 3 times daily (with meals)   Yes Unknown, Entered By History   Lidocaine 4 % PTCH Externally apply 1 patch topically daily Remove for 12 hours   Yes Unknown, Entered By History   LISINOPRIL PO 20 mg by Per J Tube route daily   Yes Unknown, Entered By History   MAGNESIUM OXIDE PO Take 400 mg by mouth daily   Yes Unknown, Entered By History   METOPROLOL TARTRATE PO Take 50 mg by mouth 2 times daily   Yes Unknown, Entered By History   micafungin (MYCAMINE) 100 MG injection Inject 100 mg into the vein daily For 14 days. Stop on 8/9/2017   Yes Unknown, Entered By History   Mirtazapine (REMERON PO) 22.5 mg by Per G Tube route At Bedtime   Yes Unknown, Entered By History   omeprazole (PRILOSEC) 2 mg/mL SUSP Take 40 mg by mouth 2 times daily   Yes Unknown, Entered By History   QUETIAPINE FUMARATE PO 12.5 mg by Per J Tube route 4 times daily as needed (hallucination or agitation)   Yes Unknown, Entered By History   vancomycin (VANOCIN) 50 mg/mL LIQD solution 125 mg by Enteral route 4 times daily At 6am, 12 noon, 6 pm, and 10 pm   Yes Unknown, Entered By History   OXYCODONE HCL PO 2.5 mg by Per J Tube route every 8 hours as needed   Yes Unknown, Entered By History   Sertraline HCl (ZOLOFT PO) 150 mg by Per J Tube route daily   Yes Unknown, Entered By History   LOVASTATIN PO  Take 20 mg by mouth every morning   Yes Unknown, Entered By History   ipratropium - albuterol 0.5 mg/2.5 mg/3 mL (DUONEB) 0.5-2.5 (3) MG/3ML neb solution Take 1 vial by nebulization 4 times daily    Yes Unknown, Entered By History   ACETAMINOPHEN PO 1,000 mg by Per J Tube route every 8 hours    Yes Unknown, Entered By History   BACLOFEN PO Take 5 mg by mouth every morning RX is 5 mg (1/2 of 10 mg tab) 2 x daily but she takes daily due to drowsiness.   Yes Unknown, Entered By History   TRAZODONE HCL PO 50 mg by Per J Tube route nightly as needed for sleep    Yes Unknown, Entered By History     Current Facility-Administered Medications Ordered in Epic   Medication Dose Route Frequency Last Rate Last Dose     pneumococcal (PREVNAR 13) injection 0.5 mL  0.5 mL Intramuscular Prior to discharge         lidocaine (XYLOCAINE) 2 % topical gel   Topical Q4H PRN         No Pre Procedure Antibiotic Needed  1 each As instructed Continuous         HYDROmorphone (PF) (DILAUDID) injection 0.3-0.5 mg  0.3-0.5 mg Intravenous Q2H PRN   0.5 mg at 07/28/17 0804     heparin sodium PF injection 5,000 Units  5,000 Units Subcutaneous Q8H   5,000 Units at 07/28/17 0625     naloxone (NARCAN) injection 0.1-0.4 mg  0.1-0.4 mg Intravenous Q2 Min PRN         ondansetron (ZOFRAN-ODT) ODT tab 4 mg  4 mg Oral Q6H PRN        Or     ondansetron (ZOFRAN) injection 4 mg  4 mg Intravenous Q6H PRN         prochlorperazine (COMPAZINE) injection 5 mg  5 mg Intravenous Q6H PRN        Or     prochlorperazine (COMPAZINE) tablet 5 mg  5 mg Oral Q6H PRN        Or     prochlorperazine (COMPAZINE) Suppository 12.5 mg  12.5 mg Rectal Q12H PRN         metoclopramide (REGLAN) tablet 5 mg  5 mg Oral Q6H PRN        Or     metoclopramide (REGLAN) injection 5 mg  5 mg Intravenous Q6H PRN         senna-docusate (SENOKOT-S;PERICOLACE) 8.6-50 MG per tablet 1-2 tablet  1-2 tablet Oral BID PRN         bisacodyl (DULCOLAX) Suppository 10 mg  10 mg Rectal Daily PRN          polyethylene glycol (MIRALAX/GLYCOLAX) Packet 17 g  17 g Oral Daily PRN         naloxone (NARCAN) injection 0.1-0.4 mg  0.1-0.4 mg Intravenous Q2 Min PRN         chlorhexidine (PERIDEX) 0.12 % solution 15 mL  15 mL Mouth/Throat Q12H   15 mL at 07/28/17 0804     ipratropium - albuterol 0.5 mg/2.5 mg/3 mL (DUONEB) neb solution 3 mL  3 mL Nebulization Q4H PRN         albuterol neb solution 2.5 mg  2.5 mg Nebulization Q2H PRN         alteplase (CATHFLO ACTIVASE) injection 1-2 mg  1-2 mg Intravenous Once PRN         acetaminophen (TYLENOL) tablet 1,000 mg  1,000 mg Per J Tube Q8H   1,000 mg at 07/28/17 0430     ALPRAZolam (XANAX) tablet 0.25 mg  0.25 mg Per J Tube TID   0.25 mg at 07/28/17 0844     baclofen (LIORESAL) half-tab 5 mg  5 mg Per J Tube QAM   5 mg at 07/28/17 0845     budesonide (PULMICORT) neb solution 0.5 mg  0.5 mg Nebulization BID   0.5 mg at 07/28/17 0803     cyclobenzaprine (FLEXERIL) tablet 5 mg  5 mg Per J Tube TID PRN   5 mg at 07/27/17 0315     arformoterol (BROVANA) neb solution 15 mcg  15 mcg Nebulization Q12H   15 mcg at 07/28/17 0803     hydrALAZINE (APRESOLINE) tablet 25 mg  25 mg Per G Tube Q6H   25 mg at 07/28/17 0804     lidocaine (LIDODERM) 5 % Patch 1 patch  1 patch Transdermal Daily   1 patch at 07/28/17 0806     lisinopril (PRINIVIL/ZESTRIL) tablet 20 mg  20 mg Per J Tube Daily   20 mg at 07/28/17 0845     simvastatin (ZOCOR) tablet 10 mg  10 mg Oral At Bedtime   10 mg at 07/27/17 2144     metoprolol (LOPRESSOR) tablet 50 mg  50 mg Oral BID   50 mg at 07/28/17 0845     mirtazapine (REMERON) tablet TABS 22.5 mg  22.5 mg Per G Tube At Bedtime   22.5 mg at 07/27/17 2144     pantoprazole (PROTONIX) suspension 40 mg  40 mg Per J Tube BID   40 mg at 07/28/17 0845     oxyCODONE (ROXICODONE) IR half-tab 2.5 mg  2.5 mg Oral Q8H PRN   2.5 mg at 07/27/17 0314     QUEtiapine (SEROquel) half-tab 12.5 mg  12.5 mg Per J Tube 4x Daily PRN         sertraline (ZOLOFT) tablet 150 mg  150 mg Per J Tube  Daily   150 mg at 07/28/17 0844     traZODone (DESYREL) tablet 50 mg  50 mg Per J Tube At Bedtime PRN   50 mg at 07/28/17 0028     vancomycin (FIRST-VANCOMYCIN) solution 125 mg  125 mg Oral 4x Daily   125 mg at 07/28/17 0625     lidocaine (LIDODERM) patch REMOVAL   Transdermal Q24h         lidocaine (LIDODERM) patch in PLACE   Transdermal Q8H         micafungin (MYCAMINE) 100 mg in NaCl 0.9 % 100 mL intermittent infusion  100 mg Intravenous Q24H   100 mg at 07/27/17 1215     nystatin (MYCOSTATIN) suspension 500,000 Units  500,000 Units Oral 4x Daily   500,000 Units at 07/28/17 0845     No current [facility]-ordered outpatient prescriptions on file.     Wt Readings from Last 1 Encounters:   07/27/17 67.1 kg (147 lb 14.9 oz)     Temp Readings from Last 1 Encounters:   07/28/17 37.2  C (98.9  F) (Oral)     BP Readings from Last 6 Encounters:   07/28/17 151/73   06/20/17 155/80   06/02/17 106/71   07/04/13 110/79   06/07/12 102/57     Pulse Readings from Last 4 Encounters:   06/16/17 93   06/02/17 96   06/06/12 108     Resp Readings from Last 1 Encounters:   07/28/17 13     SpO2 Readings from Last 1 Encounters:   07/28/17 100%     Recent Labs   Lab Test  07/28/17   0445  07/27/17   0425   NA  137  139   POTASSIUM  4.3  4.4   CHLORIDE  97  100   CO2  30  32   ANIONGAP  10  7   GLC  69*  77   BUN  55*  67*   CR  0.85  0.86   CARYL  8.4*  8.7     Recent Labs   Lab Test  07/28/17   0445  07/27/17   0425   WBC  16.0*  13.3*   HGB  6.2*  7.2*   PLT  640*  557*     Recent Labs   Lab Test  07/27/17   2150  07/26/17   1840   INR  1.16*  1.12          Anesthesia Evaluation     . Pt has had prior anesthetic.     No history of anesthetic complications          ROS/MED HX    ENT/Pulmonary: Comment: Hypercarbic respiratory failure - trach in situ    (+)asthma COPD, , recent URI . .   (-) sleep apnea   Neurologic:      (-) seizures and CVA   Cardiovascular:     (+) Dyslipidemia, hypertension----. : . CHF etiology: stress induced  cardiomyopathy . . :. dysrhythmias a-fib, . Previous cardiac testing Echodate:6/2017results:Left Ventricle  The left ventricle is normal in size. There is mild concentric left  ventricular hypertrophy. Hyperdynamic left ventricular function. The visual  ejection fraction is estimated at >70%. Grade I or early diastolic  dysfunction. No regional wall motion abnormalities noted. There is no thrombus  seen in the left ventricle.     Right Ventricle  The right ventricle is normal size. The right ventricular systolic function is  normal.     Atria  The left atrium is mildly dilated. Right atrial size is normal. There is no  color Doppler evidence of an atrial shunt.     Mitral Valve  There is trace mitral regurgitation.        Tricuspid Valve  There is trace tricuspid regurgitation.     Aortic Valve  There is mild trileaflet aortic sclerosis. There is trace to mild aortic  regurgitation. No aortic stenosis is present.     Pulmonic Valve  There is no pulmonic valvular stenosis.     Vessels  The aortic root is normal size. The ascending aorta is Mildly dilated.  Descending aortic velocity normal. Inferior vena cava not well visualized for  estimation of right atrial pressure.     Pericardium  A pericardial fat pad is noted.        Rhythm  The rhythm was sinus tachycardia.date: results: date: results: date: results:          METS/Exercise Tolerance:     Hematologic:     (+) Anemia (Hgb 6.2 reciving 1 u PRBC will recheck prior to OR), History of Transfusion no previous transfusion reaction -     (-) other hematologic disorder   Musculoskeletal:   (+) arthritis, , , -       GI/Hepatic: Comment: Gastric perforation, s/p repair    (+) cholecystitis/cholelithiasis,      (-) GERD   Renal/Genitourinary:     (+) chronic renal disease, type: ARF,       Endo:      (-) Type II DM and thyroid disease   Psychiatric:     (+) psychiatric history anxiety and depression      Infectious Disease:   (+) Other Infectious Disease Cdiff, candida       Malignancy:         Other:                     Physical Exam  Normal systems: cardiovascular    Airway   Comment: Trach in situ    Dental     Cardiovascular       Pulmonary (+) decreased breath sounds       Other findings: NGT   Left CT                    Anesthesia Plan      History & Physical Review  History and physical reviewed and following examination; no interval change.    ASA Status:  4 .    NPO Status:  > 8 hours    Plan for General and Trach with Intravenous induction. Maintenance will be Inhalation.    PONV prophylaxis:  Ondansetron (or other 5HT-3)  Additional equipment: Fiberoptic bronchoscope and Double Lumen ETT precedex gtt  Ketamine with induction       Postoperative Care  Postoperative pain management:  IV analgesics and Multi-modal analgesia.      Consents  Anesthetic plan, risks, benefits and alternatives discussed with:  Patient..                          .

## 2017-07-28 NOTE — ANESTHESIA POSTPROCEDURE EVALUATION
Patient: Ara Stanley    Procedure(s):  LEFT VIDEO ASSISTED THORASCOPY WITH LEFT LUNG DECORDICATION - Wound Class: I-Clean   - Wound Class: I-Clean    Diagnosis:LEFT PLEURAL EFFUSION   Diagnosis Additional Information: No value filed.    Anesthesia Type:  General, Trach    Note:  Anesthesia Post Evaluation    Patient location during evaluation: ICU  Patient participation: Unable to evaluate secondary to administered sedation  Level of consciousness: responsive to light touch  Pain management: adequate  Airway patency: patent  Cardiovascular status: hemodynamically stable  Respiratory status: ventilator  Hydration status: balanced  PONV: none     Anesthetic complications: None    Comments: Hand off given to MD        Last vitals:  Vitals:    07/28/17 1230 07/28/17 1300 07/28/17 1526   BP: 151/74     Resp: 15 13    Temp:      SpO2: 99% 99% 96%         Electronically Signed By: Fernando Weber MD  July 28, 2017  3:37 PM

## 2017-07-28 NOTE — PLAN OF CARE
Problem: Goal Outcome Summary  Goal: Goal Outcome Summary  SLP: Order received for SLP consult for communication and swallowing. Consulted with RT and NP. Patient not appropriate or available for SLP this date due to VATS procedure scheduled for this morning. RT suggests holding off until Monday. Will reschedule for 7-.

## 2017-07-28 NOTE — BRIEF OP NOTE
[facility] Brief Operative Note    Pre-operative diagnosis: LEFT PLEURAL EFFUSION    Post-operative diagnosis left parapneumonic pleural effusion     Procedure: Procedure(s):  LEFT VIDEO ASSISTED THORASCOPY WITH LEFT LUNG DECORDICATION - Wound Class: I-Clean   - Wound Class: I-Clean, tracheostomy replacement with 6 Shiley, cuffed, nonfenestrated   Surgeon(s): Surgeon(s) and Role:     * Selwyn Varela MD - Primary     * Willa Barry PA-C - Assisting   Estimated blood loss: 50 cc   Specimens:   ID Type Source Tests Collected by Time Destination   1 : LEFT PLEURAL DEBRIS Fluid Pleural/Thoracic Cavity AFB CULTURE NON BLOOD, AFB STAIN NON BLOOD, ANAEROBIC BACTERIAL CULTURE, FLUID CULTURE AEROBIC BACTERIAL, FUNGUS CULTURE, GRAM STAIN, BRONCHIAL CULTURE AEROBIC BACTERIAL Selwyn Varela MD 7/28/2017  1:55 PM       Findings: No merrill pus-- Gram stain revealed no organisms-- Pleural debris sent for stains and cultures (aerobic and anaerobic)-- no bleeding- lung re-expanded well at conclusion of procedure

## 2017-07-28 NOTE — PROGRESS NOTES
[facility]    Infectious Disease Progress Note    Date of Service (when I saw the patient): 07/28/2017     Assessment & Plan   Ara Stanley is a 71 year old female who was admitted on 7/26/2017.     Impression:  1 71 y.o female admitted in the hospital last month.   2 Initial presentation was of acute abdomen taken to OR for cholecystectomy.   3 Taken back to the OR 4 days later with concern for peritonitis, found to have gastric perforation of an ulcer which was repaired. C albicans in cx  4 Post-operative course has been complicated with multiple intubations, trials of weaning off the vent and now with trach. HCAP. Sputum stento  6 Mild renal insuff.   7. Developed C diif at [place].   8. Readmitted for empyema. Though multiple cultures does not have any bacteria in them from pleural fluid.   9. On Micafungin and oral vancomycin for C diff. Repeat cultures from the drain are pending.   10. Cultures from the pleural fluid has stayed negative.           Recommendations:   For now ok to continue on the Micafungin.   Continue oral vancomycin for recent C diff diagnosis.   Will follow up on the cultures from the pleural fluid, multiple cultures from thoracocentesis done previously are negative. If possible would like to avoid systemic antibiotics given PCN allergy and current Cdiff, as is afebrile on the current antibiotics.     Celso Birch MD    Interval History   Afebrile   No new microbiological data       Physical Exam   Temp: 98.9  F (37.2  C) Temp src: Oral BP: 144/68   Heart Rate: 75 Resp: 13 SpO2: 99 % O2 Device: Mechanical Ventilator    Vitals:    07/26/17 1700 07/26/17 1900 07/27/17 0400   Weight: 67 kg (147 lb 11.3 oz) 76 kg (167 lb 8.8 oz) 67.1 kg (147 lb 14.9 oz)     Vital Signs with Ranges  Temp:  [98.2  F (36.8  C)-98.9  F (37.2  C)] 98.9  F (37.2  C)  Heart Rate:  [68-87] 75  Resp:  [11-26] 13  BP: (113-161)/(52-99) 144/68  FiO2 (%):  [40 %] 40 %  SpO2:  [96 %-100 %] 99  %    Constitutional:   Lungs: Clear to auscultation bilaterally, no crackles or wheezing  Cardiovascular: Regular rate and rhythm, normal S1 and S2, and no murmur noted  Abdomen: Normal bowel sounds, soft, non-distended, non-tender  Skin: No rashes, no cyanosis, no edema  Other:    Medications     no pre procedure antibiotic needed         pneumococcal  0.5 mL Intramuscular Prior to discharge     heparin  5,000 Units Subcutaneous Q8H     chlorhexidine  15 mL Mouth/Throat Q12H     acetaminophen  1,000 mg Per J Tube Q8H     ALPRAZolam (XANAX) tablet 0.25 mg  0.25 mg Per J Tube TID     baclofen (LIORESAL) half-tab 5 mg  5 mg Per J Tube QAM     budesonide  0.5 mg Nebulization BID     arformoterol  15 mcg Nebulization Q12H     hydrALAZINE (APRESOLINE) tablet 25 mg  25 mg Per G Tube Q6H     lidocaine  1 patch Transdermal Daily     lisinopril (PRINIVIL/ZESTRIL) tablet 20 mg  20 mg Per J Tube Daily     simvastatin  10 mg Oral At Bedtime     metoprolol (LOPRESSOR) tablet 50 mg  50 mg Oral BID     mirtazapine (REMERON) tablet TABS 22.5 mg  22.5 mg Per G Tube At Bedtime     pantoprazole  40 mg Per J Tube BID     sertraline (ZOLOFT) tablet 150 mg  150 mg Per J Tube Daily     vancomycin  125 mg Oral 4x Daily     lidocaine   Transdermal Q24h     lidocaine   Transdermal Q8H     micafungin  100 mg Intravenous Q24H     nystatin  500,000 Units Oral 4x Daily       Data   All microbiology laboratory data reviewed.  Recent Labs   Lab Test  07/28/17 0445 07/27/17   0425  07/26/17   1840   WBC  16.0*  13.3*  13.7*   HGB  6.2*  7.2*  7.3*   HCT  18.7*  22.1*  22.9*   MCV  86  87  88   PLT  640*  557*  535*     Recent Labs   Lab Test  07/28/17   0445  07/27/17   0425  07/26/17   1840   CR  0.85  0.86  0.90     Recent Labs   Lab Test  06/04/12   0527   SED  34*     Recent Labs   Lab Test  07/27/17   1120  07/26/17   1840  06/19/17   0520  06/16/17   1145  06/15/17   1947  06/15/17   1143  06/15/17   0855  06/14/17   0911  06/09/17   1026    CULT  Pending  Culture in progress  No growth  Moderate growth Stenotrophomonas maltophilia*  Heavy growth Stenotrophomonas maltophilia  Light growth Candida albicans / dubliniensis Candida albicans and Candida   dubliniensis are not routinely speciated Susceptibility testing not routinely   done  *  Canceled, Test credited Unsatisfactory Specimen - Specimen frozen prior to   receipt in lab.  FROZE SAMPLE IN TRANSIT Notification of test   cancellation was given to OSMAN TIJERINA AT [facility].  RN WILL RECOLLECT AND REORDER.  [facility] 6.15.17 1600    No growth  Moderate growth Candida albicans / dubliniensis Candida albicans and Candida   dubliniensis are not routinely speciated Susceptibility testing not routinely   done  Critical Value/Significant Value, preliminary result only, called to and read   back by Saira Nicole RN on 6.15.2017 at 0815. KVO  Susceptibility testing requested by . [id number]. susceptibility on   Candida albicans and dubliniensis. [id number]  *  Canceled, Test credited  Incorrectly ordered by PCU/Clinic  Test reordered as correct code    No growth

## 2017-07-28 NOTE — PLAN OF CARE
Problem: Goal Outcome Summary  Goal: Goal Outcome Summary  Outcome: No Change  VSS overnight. Remains on full vent support; tolerating well. Prn dilaudid given for abdominal pain. Intermittently disoriented to time and forgetful. TF clamped at midnight for VATS procedure today. Intermittently refusing turns. Will continue to monitor.

## 2017-07-28 NOTE — PROGRESS NOTES
Patient had Left VAT performed in surgery, returned at 1530. Vital signs stable, chest tubes x 3, intact. Patient sleepy, follows commands. Pain management plan effective. Patient daughter called for update.

## 2017-07-28 NOTE — PROGRESS NOTES
Patient with AM Hgb of 6.2 which is one unit drop in 24 hours with no overt evidence of bleeding. Hemodynamically fine. Will transfuse 1 u PRBC.      Lavell Fair MD

## 2017-07-28 NOTE — ADDENDUM NOTE
Addendum  created 07/28/17 1703 by Daquan Jacobs APRN CRNA    Anesthesia Intra Meds edited, Orders acknowledged in Narrator

## 2017-07-28 NOTE — PROGRESS NOTES
[facility]: Comprehensive   Intensive Care Daily Note          Assessment and Plan:     Summary Statement:  Ara Stanley is a 71 year old female admitted on 7/26/2017 for loculated pleural effusion. My assessment and plan for this patient is as follows:    Neurology:   Pain management continues to be an issue with pt c/o diffuse abdominal pain. Added opiates yesterday, improvement with dosing but continues to have pain    Lidocaine patches to back    Anxiety/depression, on meds from referring hospital. May consider psych consult                 Cardiovascular/Hemodynamics:   Hypertension, on PTA meds (lisinopril, metoprolol, hydralazine)    Hyperlipidemia on simvastatin           Pulmonary:   COPD, on budesonide, formoterol, albuterol/ipratropium    Loculated pleural effusion, VATS planned for today    Speech path consult to consider passy jeramy valve for phonation           GI and Nutrition:   Elevated BUN and normal creatinine; pattern often concerning for GIB but no evidence of blood from rectal tube    Abdominal fluid collections with candida, on micafungin    Cdiff on oral vanc    Ho cholecystectomy    Prior gastric ulceration with perf           Renal, Fluid and Electrolytes:   Elevated BUN and normal creatinine; pattern often concerning for GIB but no evidence of blood from rectal tube    Electrolytes otherwise normal           Infectious Disease:   micafungin for candidal abscesses    Vancomycin po for Cdiff    Concern for empyema or complicated pleural effusion, not currently on antibiotics, planning for VATS today    Hold systemic antibiotics unless gram stain or cultures from VATS are suggestive of active bacterial infection           Hematology and Oncology:   Anemia; has had recurrent issues with anemia and ongoing concern for blood loss. Although BUN/CR are concerning for potential GIB, no evidence of GIB currently    Responded appropriately to transfusion    Leukocytosis  "concerning for infection. Tx for Cdiff, tiff for candida.     Reactive thrombocytosis             Endocrinology:   No acute issues           Intensive Care: Central line: Central line present, needed and to be continued  Arterial line: No arterial line present  Restraint:Not needed   Others:   Skin: no acute issues    FASTHUG reviewed and addressed as appropriate   Top 3 main goals for today VATS  Speech evaluation  Improve pain control       Billin min spent for critical care exclusive of time for procedure           Hospital Course: date:  procedure, complication, diagnosis, treatment       Admitted from [place] on  for management of pleural effusion    Thoracic surgery consult     ID consult : continue PO vanc, micafungin    Transfused 1 unit PRBC          Key events/ Interval history - last 24 hours:    Afebrile  Consult by ID  Consult by thoracic   Transfused 1U PRBC for Hgb   BUN elevated  cheduled for surgery today for VATS  WBC elevated compared to yesterday            Problem list:     Pleural effusion    Chronic hypoxic respiratory failure    Cdiff    Candida abscesses    Pain    Anxiety and depression    COPD            Medications:   I have reviewed this patient's current medications            Physical Exam:   Blood pressure 147/69, temperature 98.8  F (37.1  C), temperature source Oral, resp. rate 14, height 1.676 m (5' 6\"), weight 67.1 kg (147 lb 14.9 oz), SpO2 99 %.      Vital Sign Ranges  Temperature Temp  Av.5  F (36.9  C)  Min: 98.2  F (36.8  C)  Max: 98.9  F (37.2  C)   Blood pressure Systolic (24hrs), Av , Min:113 , Max:161        Diastolic (24hrs), Av, Min:52, Max:99      Pulse No Data Recorded   Respirations Resp  Av.3  Min: 11  Max: 28   Pulse oximetry SpO2  Av.7 %  Min: 96 %  Max: 100 %         Intake/Output Summary (Last 24 hours) at 17 1218  Last data filed at 17 0800   Gross per 24 hour   Intake              770 ml   Output         "     1395 ml   Net             -625 ml         General Appearance:   Comfortable, no pain or respiratory distress   HEENT:   Tracheostomy is present and the stoma appears benign  Nasogastic tube is present   Chest and Lungs:   Symmetrical and normal breath sounds, no wheeze, ronchi, crackles, rub or bronchial breath sounds   Cardiovascular:   Regular rate and rhythm  Normal S1,S2, and no gallop, rub or murmur   Abdomen:   Non-distended, soft, normal bowel sounds  Drain sites appear benign   :   Crawford in place   Lymphatics:   Lymph nodes not enlarged   Musculoskeletal:   Full range of motion   Extremities and Skin:   Skin is intact   Neuro:   Alert and oriented x 3   Dressings:   Wound covered by dressing: abdomen   Drains and Tubes:   All drain and tube sites are all benign, no signs of infection   Intravascular Access and Device:   All vascular access sites appear benign   Additional exam findings:   None             Data:   All lab results reviewed past 24 hours  All imaging results reviewed past 24 hours  All cardiac studies reviewed by me.  All imaging studies reviewed by me.

## 2017-07-28 NOTE — ANESTHESIA CARE TRANSFER NOTE
Patient: Ara Stanley    Procedure(s):  LEFT VIDEO ASSISTED THORASCOPY WITH LEFT LUNG DECORDICATION - Wound Class: I-Clean   - Wound Class: I-Clean    Diagnosis: LEFT PLEURAL EFFUSION   Diagnosis Additional Information: No value filed.    Anesthesia Type:   General, Trach     Note:  Airway :Tracheostomy and Ventilator  Patient transferred to:ICU      Neuromuscular blockade reversed after TOF 4/4, oropharynx suctioned with soft flexible catheter. Double lumen tube retracted under guidance of Dr. Varela in OR, new tracheostomy tube placed by Dr. Varela and secured to neck with soft trach collar by PA in OR.    Patient transported by CRNA, anesthesiologist, and circulating RN to ICU room. Patient ventilated by CRNA with ambu via ETT with O2 at 8 liters per minute during transport.     On arrival to ICU, trach tube remained secured in place, position unchanged, patient placed on ICU ventilator by respiratory therapist, oral mucosa intact and unchanged at handoff of care, trach obturator and upper and lower dentures in ICU room inside clear bags hanging from IV pole at bedside and shown to RN at handoff.     At anesthesia handoff of care, clinical monitors and alarms on and functioning, report on patient's clinical status given to ICU RN, ICU staff questions answered. Chest tubes placed to suction by ICU RN and OR RN. Patient alert, oriented, communicating with gestures in ICU bed, given IV fentanyl for pain by CRNA at handoff, patient nonverbally rated pain 5/10 at handoff.    Electronically Signed By: OTTO Hagan CRNA  July 28, 2017  3:34 PM

## 2017-07-29 NOTE — PROGRESS NOTES
THORACIC SURGERY POD # 1    Stable  No bleeding  Serous CT output    Gram stain negative, all other cultures pending    CXR left pleural space well drained    CT suction  As per intensivist    PAN ANDRADE MD [facility] ONCOLOGY THORACIC SURGERY  CELL:  [phone]  OFFICE: [phone]

## 2017-07-29 NOTE — PROGRESS NOTES
07/29/17 1200   Quick Adds   Type of Visit Initial PT Evaluation   Living Environment   Lives With alone   Living Arrangements house  ([facility])   Home Accessibility stairs to enter home   Number of Stairs to Enter Home 3   Number of Stairs Within Home 17   Stair Railings at Home none   Transportation Available car   Self-Care   Dominant Hand right   Usual Activity Tolerance moderate   Current Activity Tolerance poor   Regular Exercise no   Equipment Currently Used at Home walker, rolling   Functional Level Prior   Ambulation 4-->completely dependent   Transferring 4-->completely dependent   Fall history within last six months no   Which of the above functional risks had a recent onset or change? ambulation;transferring   Prior Functional Level Comment Pt admitted from [facility]. Pt reports prior to this admit, she was ambulating with a FWW at [facility].   General Information   Onset of Illness/Injury or Date of Surgery - Date 07/26/17   Referring Physician Dr. Sorenson   Patient/Family Goals Statement To get out of bed.   Pertinent History of Current Problem (include personal factors and/or comorbidities that impact the POC) Patient is a 71 year old female admitted on 7/26/2017 for loculated pleural effusion. underwent left VAT.   Precautions/Limitations fall precautions   Pain Assessment   Patient Currently in Pain Yes, see Vital Sign flowsheet   Range of Motion (ROM)   ROM Quick Adds No deficits were identified   Strength   Strength Comments Pt with gross bilateral lower extremity strength 4-/5   Bed Mobility   Bed Mobility Comments Mod assist supine to sit with HOB elevated   Transfer Skills   Transfer Comments Sit to/from stand mod assist x 2.   Gait   Gait Comments NT   Balance   Balance Comments Good in sitting, poor in standign   General Therapy Interventions   Planned Therapy Interventions balance training;bed mobility training;gait training;strengthening;transfer training;risk factor education;home  "program guidelines   Clinical Impression   Criteria for Skilled Therapeutic Intervention yes, treatment indicated   PT Diagnosis Impaired Transfers   Influenced by the following impairments Decreased strength, decreased balance, decreased endurance   Functional limitations due to impairments Difficulty with bed mobility, transfers, ambulation   Clinical Presentation Evolving/Changing   Clinical Presentation Rationale Pt with several medical comorbidities.   Clinical Decision Making (Complexity) Moderate complexity   Therapy Frequency` 5 times/week   Predicted Duration of Therapy Intervention (days/wks) 1 week   Anticipated Equipment Needs at Discharge walker   Anticipated Discharge Disposition Long Term Care Facility   Risk & Benefits of therapy have been explained Yes   Patient, Family & other staff in agreement with plan of care Yes   [facility] TM \"6 Clicks\"   2016, Trustees of [facility], under license to [employer].  All rights reserved.   6 Clicks Short Forms Basic Mobility Inpatient Short Form   [facility]  \"6 Clicks\" V.2 Basic Mobility Inpatient Short Form   1. Turning from your back to your side while in a flat bed without using bedrails? 2 - A Lot   2. Moving from lying on your back to sitting on the side of a flat bed without using bedrails? 2 - A Lot   3. Moving to and from a bed to a chair (including a wheelchair)? 2 - A Lot   4. Standing up from a chair using your arms (e.g., wheelchair, or bedside chair)? 2 - A Lot   5. To walk in hospital room? 1 - Total   6. Climbing 3-5 steps with a railing? 1 - Total   Basic Mobility Raw Score (Score out of 24.Lower scores equate to lower levels of function) 10   Total Evaluation Time   Total Evaluation Time (Minutes) 20     "

## 2017-07-29 NOTE — PLAN OF CARE
Problem: Goal Outcome Summary  Goal: Goal Outcome Summary  Physical Therapy: Orders received, evaluation completed and treatment initiated. Pt is a 71 year old female admitted on 7/26/2017 for loculated pleural effusion, underwent left VAT procedure. Prior to this admit, patient was at [facility]. Pt reports she was ambulatory with FWW at [place].      Discharge Planner PT   Patient plan for discharge: [facility].  Current status: Pt requires mod assist x 2 for bed mobility, demonstrates good sitting balance once EOB. Pt requires mod assist x 2 for sit to/from stand transfer and stand pivot transfer bed to chair. Pt with complaints of abdominal discomfort with all mobility.  Barriers to return to prior living situation: None  Recommendations for discharge: LT  Rationale for recommendations: Pt with several medical comorbidities, including tracheostomy, will benefit from continued care at [facility].       Entered by: Charlene Hendrickson 07/29/2017 12:40 PM

## 2017-07-29 NOTE — PROGRESS NOTES
[facility]: Comprehensive   Intensive Care Daily Note          Assessment and Plan:     Summary Statement:  Ara Stanley is a 71 year old female admitted on 2017 for loculated pleural effusion. My assessment and plan for this patient is as follows:    Neurology:   Transition to PCA  Lidocaine patches    Anxiety/depression, on meds from referring hospital. Consider psych consult    OT/PT             Cardiovascular/Hemodynamics:   Hypertension, on Lisinopril,metop, hydralazine    Hyperlipidemia on simvastatin       Pulmonary:   COPD: ICS/LABA nebs, PRN albuterol    Loculated effusion s/p VATS, chest tubes in place    Speech path for consideration of passy jeramy while on vent           GI and Nutrition:   c diff on vanc    Candida in abdominal fluid collections    Consider surgery consult on Monday- monitor output from drains, continue tiff    prior gastric perf           Renal, Fluid and Electrolytes:   Bun still elevated, but no evidence of GIB    otherwise within normal limits           Infectious Disease:   Micafungin for candidal abscess    Vancomycin PO for Cdiff    No systemic antibiotics for now, following cultures from VATS           Hematology and Oncology:   Anemia    Leukocytosis worsened today- has had variable leukocytosis at referring hospital as well         Endocrinology:   No acute issues           Intensive Care: Central line: No central line present  Arterial line: No arterial line present  Restraint:Not needed   Others:   Skin: FUNMI drains with mild erythema    FASTHUG TF; PCA, no sedation, HOB elevated, Pantoprazole, monitor CBG   Top 3 main goals for today Pain control   Speech path  Follow cultures       Billin min spent for critical care exclusive of time for procedure           Hospital Course: date:  procedure, complication, diagnosis, treatment       Admitted from [place] on  for management of pleural effusion    Thoracic surgery consult     ID  "consult : continue PO vanc, micafungin    Transfused 1 unit PRBC     Vats     Transition to PCA          Key events/ Interval history - last 24 hours:    WBC increased to 19 from 16  tmax 37.3  VATS with decortication, 2 chest tubes placed  Pain control a significant issue  PS this morning going well  Reactive thrombocytosis  Breathing feels better this morning  Goal: to [place], [place] on  for 's service/            Problem list:     Pleural effusion    Chronic hypoxic respiratory failure    Cdiff    Candida abscesses    Pain    Anxiety and depression    COPD            Medications:   I have reviewed this patient's current medications            Physical Exam:   Blood pressure 91/63, temperature 98.7  F (37.1  C), temperature source Oral, resp. rate 18, height 1.676 m (5' 6\"), weight 66.3 kg (146 lb 2.6 oz), SpO2 98 %.      Vital Sign Ranges  Temperature Temp  Av.4  F (36.9  C)  Min: 97.6  F (36.4  C)  Max: 99.1  F (37.3  C)   Blood pressure Systolic (24hrs), Av , Min:87 , Max:152        Diastolic (24hrs), Av, Min:46, Max:99      Pulse No Data Recorded   Respirations Resp  Av.3  Min: 10  Max: 31   Pulse oximetry SpO2  Av.9 %  Min: 94 %  Max: 100 %         Intake/Output Summary (Last 24 hours) at 17 0952  Last data filed at 17 0800   Gross per 24 hour   Intake              840 ml   Output             2025 ml   Net            -1185 ml         General Appearance:   Chronically ill   HEENT:   Head is atraumatic  Tracheostomy is present and the stoma appears benign   Chest and Lungs:   Symmetrical chest shape and movements with each tidal breath  Abnormal lung auscultation: expiratory wheeze   Cardiovascular:   Regular rate and rhythm  Normal S1,S2, and no gallop, rub or murmur   Abdomen:   Soft  Tender without peritoneal signs   :   Crawford in place   Lymphatics:   Lymph nodes not enlarged   Musculoskeletal:   Full range of motion   Extremities and " Skin:   Skin is intact  Warm, well perfused  Skin temperature of extremities is normal   Neuro:   Alert and oriented x 3   Dressings:   Wound covered by dressing: thorax and abdomen   Drains and Tubes:   Chest tube(s) present on suction without evidence of an airleak  Konrad-Meneses tube(s) present: abdomen   Intravascular Access and Device:   All vascular access sites appear benign   Additional exam findings:   None             Data:   All lab results reviewed past 24 hours  All imaging results reviewed past 24 hours  All cardiac studies reviewed by me.  All imaging studies reviewed by me.

## 2017-07-29 NOTE — PROGRESS NOTES
[facility] ICU RESPIRATORY NOTE  Date of Admission: 7/26/17  Date of Intubation (most recent): Tracheostomy 6/13/17  Reason for Mechanical Ventilation:  Vent dependent  Number of Days on Mechanical Ventilation:  Met Criteria for Pressure Support Trial: no  Length of Pressure Support Trial:  Reason for Stopping Pressure Support Trial:  Reason for No Pressure Support Trial: per MD  Ventilation Mode: CMV/AC  FiO2 (%): 40 %  Rate Set (breaths/minute): 14 breaths/min  Tidal Volume Set (mL): 450 mL  PEEP (cm H2O): 5 cmH2O  Pressure Support (cm H2O): 10 cmH2O  Oxygen Concentration (%): 40 %  Resp: 16        Significant Events Today: none     ABG Results: no recent ABG     Plan: pt remains on full vent support. RT will continue to follow.    7/29/2017  Aleksandra York RT

## 2017-07-29 NOTE — PROGRESS NOTES
BRIEF NUTRITION NOTE:    Following for ability to resume patient's usual TF schedule.  A full Nutrition Assessment was completed 7/27.  See note for details.    NEW FINDINGS:  7/28:  Procedure - Left VATS  7/29:  RN just restarted the TF Fibersource HN at 25 mL/hr today at 10 am.  BGs running lower - 56, 64, 76, 80  Stool:  400 mL (Colace held)    INTERVENTIONS:  Collaboration with other providers - Discussed with Dr. Sorenson who approved increasing TF towards goal and increasing H20 flushes as per her usual regimen.  Enteral Nutrition - Modify rate --> Entered order in EPIC to increase TF Fibersource HN after 6 hrs to 40 mL/hr and then after 6 hrs to goal 50 mL/hr = 1440 kcals, 65 gm pro, 972 mL H20, 18 gm fiber, 197 gm CHO.  Medical food supplement therapy - Ordered ProSource 3 packets per day = 120 kcals, 33 gm pro  Total:  1560 kcals (23 kcal/kg and 93% energy needs), 98 gm pro (1.5 gm/kg).  Feeding tube flush - Increased  H20 flushes to 180 mL every 4 hrs = 1080 mL.  Total Fluid (TF + flushes); 2052 mL (31 mL/kg).    Kayleigh Roman RD, CNSC

## 2017-07-29 NOTE — PROGRESS NOTES
[facility] ICU RESPIRATORY NOTE  Date of Admission: 7/26/2017  Date of Intubation (most recent): chronic trach   Reason for Mechanical Ventilation: chronic vent  Number of Days on Mechanical Ventilation: day 4  Met Criteria for Pressure Support Trial: yes  Length of Pressure Support Trial: all day      Significant Events Today: nebs given and suctioned with cough            Plan:    Continue to provide support.

## 2017-07-30 NOTE — PROGRESS NOTES
THORACIC SURGERY POD # 2    Stable  No air leak  Decreasing serous CT output    CXR left pleural adequately drained    Continue CT suction    PAN ANDRADE MD [facility] ONCOLOGY THORACIC SURGERY  CELL:  [phone]  OFFICE: [phone]

## 2017-07-30 NOTE — PROGRESS NOTES
Given hypotension and tachycardia, sent labs (including lactate (normal), VBG (unchanged), CBC (stable), and procalcitonin (very elevated)    In the setting of known abdominal fluid collections, hemodynamic instability, and history of resistant organisms, restarting systemic antibiotics (meropenem and dapto).    Blood cultures ordered

## 2017-07-30 NOTE — PLAN OF CARE
Problem: Goal Outcome Summary  Goal: Goal Outcome Summary  Outcome: Improving  Pt remains vented with trach. She was put on pressure support and cpap today with no issues. CT x3 with minimal drainage. Dressings changed. Pt started on Dilaudid PCA today for pain control. Pt rates pain 8/10 which is her goal pain level. Pt was able to sit in chair today with 2-3 assist and tolerated it well. Physical therapy will continue to work with her daily. Pts b/p and UOP decreased throughout day MD notified.  Per orders albumin 250ml given. Cont to monitor.  TF resumed today at goal with water flush. Pts JUANA Ramirez visited pt today and was updated on her condition.

## 2017-07-30 NOTE — PROGRESS NOTES
[facility] ICU RESPIRATORY NOTE  Date of Admission: 7/26/17  Date of Intubation (most recent): Tracheostomy 6/13/17  Reason for Mechanical Ventilation:  Vent dependent  Number of Days on Mechanical Ventilation:  Met Criteria for Pressure Support Trial: Yes  Length of Pressure Support Trial: PS 10/5 for 11 hours and tolerated well.  Reason for Stopping Pressure Support Trial: to rest for the night.    ABG Results:  No lab results found in last 7 days.      ETT appearance on chest x-ray: trach site clean and intact, inner cannula and dressing changed.    Plan:  Continue full support at night and PS trial during the day.  7/29/2017  Cynthia Weiss

## 2017-07-30 NOTE — PLAN OF CARE
Problem: Goal Outcome Summary  Goal: Goal Outcome Summary  Outcome: Declining  Pt remains on vent with trach and a strong cough sats in the 90's. 1200 pts 's and b/p 70/40's. MD notified.  EKG done Afib with RVR. Albumin, lopressor, and dilt given see MAR. Pt remains in afib with a rate in the 120-130's and mildly improved hypotension. Labs and blood cultures drawn and antibiotics started.  Pt at 1800 now NSR but still hypotensive. 1830 repeat lactate drawn. Cont to monitor. Pt is alert and oriented X4 c/o of chronic back pain and incision site discomfort. PCA dilaudid ordered for pain control.

## 2017-07-30 NOTE — OP NOTE
Surgeon / Clinician: Selwyn Varela MD    Surgeon:  Selwyn Varela MD    First Assistant:  Willa Barry PA-C.    Preoperative Diagnosis:  Left multiloculated organized pleural effusion.    Postoperative Diagnosis:  Left multiloculated organized pleural effusion with trapped left lung.    Procedure:  Left video assisted thoracoscopy with total decortication, left lung.    Anesthesia:  General endotracheal.    Indication:  A 71-year-old woman with complicated medical history.  She was readmitted for failure to wean from mechanical ventilation.  There was a very large left pleural effusion.  She had placement of a drain that retrieved only 30 mL.  Chemistry and analysis of the fluid is consistent with an empyema.  Based on the findings by imaging and the fact that the percutaneous catheter did not achieve any drainage, a video thoracoscopy is indicated for definitive treatment.    Procedure in Detail:  The patient was brought to the operating room, placed in supine position.  The tracheostomy tube was removed and a double-lumen tube was placed.  Then, the patient was placed in a right lateral decubitus position.  The left chest was prepared and draped in sterile fashion using ChloraPrep.  The ventilation of the left lung was continued as first thoracoscopic incision was made at the level of the second intercostal space and the pleural space was entered.  Some cloudy fluid was drained.  The video thoracoscope was introduced.  On examination, there is a large amount of fibrin and somewhat purulent material in the pleural cavity.  Some of the fluid was drained.  Then the 2 other thoracoscopic incisions were made in usual fashion.  Then the lung was freed up down to the hilum.  The fissure was entered.  There was some debris over the surface ___ and parietal pleura, which was removed.  Then a total decortication of lung was performed removing all the visceral pleural thickening and trapping of the lung.  At  completion of procedure, there was excellent reexpansion of the entire left lung.  Hemostasis verified as excellent.  Through a separate stab wound, two 28 straight chest tubes were placed anteriorly and posteriorly and a 28 right angled chest tube was placed over the surface of diaphragm.  The chest tubes were sutured to the skin with #2 silk suture.  Then, 30 mL of Marcaine 0.5% without epinephrine was injected as intercostal blocks.  Thoracoscopic incisions were closed in usual fashion.     ESTIMATED BLOOD LOSS:  Minimal.      Counts:  Needle and sponge count is correct.    Willa Barry PA-C, was the first assistant during the procedure.  Her role as first assistant was essential and necessary accomplishing the steps of the procedure as described above, providing handling the scope, exposure and retraction.          Selwyn Varela MD    D:  07/29/2017 10:10 T:  07/29/2017 15:35  Document:  [id number]

## 2017-07-30 NOTE — PROGRESS NOTES
[facility]: Comprehensive   Intensive Care Daily Note          Assessment and Plan:     Summary Statement:  Ara Stanley is a 71 year old female admitted on 7/26/2017 for loculated pleural effusion. My assessment and plan for this patient is as follows:    Neurology:   PCA pain control- reports ongoing pain. No increase in PCA dose today.    Anxiety and depression: scheduled alprazolam, sertraline, cyclobenzaprine, PRN quetiapine, HS trazodone.     PT/OT           Cardiovascular/Hemodynamics:   HTN, lisin metop hydral    Hyperlipidemia, on home simvastatin    afib with RVR, confirmed on EKG- trop negative, lactate normal, vbg essentially unchanged. Not clearly related to new sepsis or SIRS, pt says she has had Afib in the past. 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 Bolus of albumin x1    Prior echo (6/2/2017) shows garde 1 or early diastolic dysfunction and left artrial enlargement.            Pulmonary:   Copd, nebs with arformoterol, budesonide, albuterol and ipratropium    Loculated effusion s/p VATs decortication. 3 chest tubes in place, has improvement in respiratory comfort. Hope for de-escalation of chest tubes tomorrow    Speech path           GI and Nutrition:   Cdiff on PO vanc    cadida abd abscess- FUNMI with ongoing output. Will reuqest surgical consult monday    Gastric perf with prior exlap           Renal, Fluid and Electrolytes:   BUN elevated, but no evidence of GIB    Renal function stable           Infectious Disease:   1 colony VRE from wound culture, haven't been treating with low/stable procalcitonin    Cultures NGTD from vats    Tayla for h/o candida from abacesses    vanc for cdiff    Have been considering broadening antibiotics-             Hematology and Oncology:   Leukocytosis has been variable    Reactive thrombocytosis                "  Endocrinology:   No issues           Intensive Care: Central line: Central line present, needed and to be continued  Arterial line: No arterial line present  Restraint:Not needed   Others:   Skin: FUNMI drains, surgical incision    FASTHUG TF, PCA, no sedation, heparin, HOB elevated, Pantoprazole, glucose OK.   Top 3 main goals for today Control heart rate  F/u proclacitonin, consider broadening antibiotics  Pain control     Billin min spent for critical care exclusive of time for procedure           Hospital Course: date:  procedure, complication, diagnosis, treatment       Admitted from [place] on  for management of pleural effusion    Thoracic surgery consult     ID consult : continue PO vanc, micafungin    Transfused 1 unit PRBC     Vats     Transition to PCA          Key events/ Interval history - last 24 hours:    Sat in chair with PT  PS via trach  Decreasing serous output from chest tube  afib with rvr and associated hypotension            Problem list:     Pain    Loculated pleural effusion    Anxiety    afib with rvr    Leukocytosis    Elevated BUN    Hypotension            Medications:   I have reviewed this patient's current medications            Physical Exam:   Blood pressure (!) 87/59, temperature 98.7  F (37.1  C), temperature source Oral, resp. rate 10, height 1.676 m (5' 6\"), weight 66.3 kg (146 lb 2.6 oz), SpO2 97 %.      Vital Sign Ranges  Temperature Temp  Av.5  F (36.9  C)  Min: 98  F (36.7  C)  Max: 98.8  F (37.1  C)   Blood pressure Systolic (24hrs), Av , Min:79 , Max:121        Diastolic (24hrs), Av, Min:45, Max:72      Pulse No Data Recorded   Respirations Resp  Av.4  Min: 10  Max: 29   Pulse oximetry SpO2  Av.9 %  Min: 97 %  Max: 100 %         Intake/Output Summary (Last 24 hours) at 17 1151  Last data filed at 17 1100   Gross per 24 hour   Intake             2875 ml   Output              910 ml   Net             1965 ml "         General Appearance:   Comfortable, no pain or respiratory distress   HEENT:   Tracheostomy is present and the stoma appears benign  Nasogastic tube is present   Chest and Lungs:   Symmetrical and normal breath sounds, no wheeze, ronchi, crackles, rub or bronchial breath sounds   Cardiovascular:   tachycardic   Abdomen:   Non-distended, soft, normal bowel sounds  Multiple drains in place   :   C[place] in place   Lymphatics:   Lymph nodes not enlarged   Musculoskeletal:   Full range of motion   Extremities and Skin:   Skin is intact, multiple drains   Neuro:   Alert and oriented x 3   Dressings:   Wound covered by dressing: thorax and abdomen   Drains and Tubes:   Chest tube(s) present on suction without evidence of an airleak  Konrad-Meneses tube(s) present: abdomen   Intravascular Access and Device:   All vascular access sites appear benign   Additional exam findings:   None             Data:   All lab results reviewed past 24 hours  All imaging results reviewed past 24 hours  All cardiac studies reviewed by me.  All imaging studies reviewed by me.

## 2017-07-30 NOTE — PROGRESS NOTES
07/30/17 0918   Quick Adds   Type of Visit Initial Occupational Therapy Evaluation   Living Environment   Lives With alone   Living Arrangements house  ([facility] for 6 weeks. )   Number of Stairs to Enter Home 3   Number of Stairs Within Home 17   Self-Care   Dominant Hand right   Equipment Currently Used at Home walker, rolling   Activity/Exercise/Self-Care Comment PT was independnet prior to hospitalizations.   Functional Level Prior   Prior Functional Level Comment Pt admitted from [facility]. Pt reports prior to this admit, she was ambulating with a FWW at [facility].   General Information   Onset of Illness/Injury or Date of Surgery - Date 07/26/17   Referring Physician Willa Barry, PA-C   Patient/Family Goals Statement does not want to go back to [facility]   Additional Occupational Profile Info/Pertinent History of Current Problem Ara Stanley is a 71 year old female who was recently hospitalized at [facility] for lap alexandre and returned to operating room for worsening status and was found to have gastric perforation which was repaired, and ultimately requiring tracheostomy for respiratory failure who was admitted on 7/26/2017 from [facility] with loculated pleural effusion for evaluation by thoracic surgery, s/p VATS with decortication   Precautions/Limitations fall precautions  (trach, NG tube)   Cognitive Status Examination   Orientation orientation to person, place and time   Level of Consciousness alert   Able to Follow Commands WNL/WFL   Personal Safety (Cognitive) mild impairment   Memory intact   Pain Assessment   Patient Currently in Pain Yes, see Vital Sign flowsheet  (10/10 L sided pain)   Range of Motion (ROM)   ROM Comment R UE AROM WFL, L UE elbow to hand AROM WFL, L shjoulder limtied due to pain s/p VATS    Strength   Strength Comments B UE MMT NT due to pain   Bed Mobility Skill: Scooting/Bridging   Level of [place]: Scooting/Bridging maximum assist (25% patients effort)   Physical  Assist/Nonphysical Assist: Scooting/Bridging 2 persons;verbal cues   Bed Mobility Skill: Supine to Sit   Level of [place]: Supine/Sit maximum assist (25% patients effort)   Physical Assist/Nonphysical Assist: Supine/Sit 2 persons;verbal cues   Transfer Skill: Bed to Chair/Chair to Bed   Level of [place]: Bed to Chair maximum assist (25% patients effort)   Physical Assist/Nonphysical Assist: Bed to Chair 2 persons  (plus 1 to manage Vent)   Assistive Device - Transfer Skill Bed to Chair Chair to Bed Rehab Eval standard walker   Transfer Skill: Sit to Stand   Level of [place]: Sit/Stand maximum assist (25% patients effort)   Physical Assist/Nonphysical Assist: Sit/Stand 2 persons;verbal cues   Assistive Device for Transfer: Sit/Stand standard walker   Transfer Skill: Toilet Transfer   Level of [place]: Toilet unable to perform   Upper Body Dressing   Level of [place]: Dress Upper Body maximum assist (25% patients effort)   Physical Assist/Nonphysical Assist: Dress Upper Body 1 person assist   Lower Body Dressing   Level of [place]: Dress Lower Body dependent (less than 25% patients effort)   Physical Assist/Nonphysical Assist: Dress Lower Body 1 person assist   Toileting   Level of [place]: Toilet dependent (less than 25% patients effort)   Grooming   Level of [place]: Grooming minimum assist (75% patients effort)   Physical Assist/Nonphysical Assist: Grooming 1 person assist   Eating/Self Feeding   Level of [place]: Eating independent   Physical Assist/Nonphysical Assist: Eating (ice chips)   Activities of Daily Living Analysis   Impairments Contributing to Impaired Activities of Daily Living balance impaired;pain;strength decreased;ROM decreased   General Therapy Interventions   Planned Therapy Interventions ADL retraining;ROM;transfer training;home program guidelines;visual perception   Clinical Impression   Criteria for Skilled Therapeutic Interventions Met yes,  "treatment indicated   OT Diagnosis decreased ADL's and functional mobility   Influenced by the following impairments impaired balance, activity tolerance strength, decreased L UE AROM, pain   Assessment of Occupational Performance 5 or more Performance Deficits   Identified Performance Deficits impaired independence with basic ADLs ie dressing, toilet transfer, etc   Clinical Decision Making (Complexity) High complexity   Therapy Frequency 5 times/wk   Predicted Duration of Therapy Intervention (days/wks) 1 week   Anticipated Discharge Disposition Long Term Care Facility;Acute Rehabilitation Facility  (LTACH)   Risks and Benefits of Treatment have been explained. Yes   Patient, Family & other staff in agreement with plan of care Yes   [facility] AM-PAC  \"6 Clicks\" Daily Activity Inpatient Short Form   1. Putting on and taking off regular lower body clothing? 1 - Total   2. Bathing (including washing, rinsing, drying)? 2 - A Lot   3. Toileting, which includes using toilet, bedpan or urinal? 2 - A Lot   4. Putting on and taking off regular upper body clothing? 2 - A Lot   5. Taking care of personal grooming such as brushing teeth? 3 - A Little   6. Eating meals? 4 - None   Daily Activity Raw Score (Score out of 24.Lower scores equate to lower levels of function) 14   Total Evaluation Time   Total Evaluation Time (Minutes) 10     "

## 2017-07-30 NOTE — PLAN OF CARE
Problem: Goal Outcome Summary  Goal: Goal Outcome Summary  OT: Eval completed and treatment initiated. Pt readmitted from [facility] for VATS procedure, pt vented/trach and per chart pt was ambulating with A with ww. Prior to hospitalizations pt independent with ADL/IADls and functional mobility.   Discharge Planner OT   Patient plan for discharge: pt does not want to return to [facility], wants to stay here.  Current status: pt requires MAX A X 2 for supine to sit, to scoot toward EOB and to transfer to chair with ww and max Cues for tech and pt with increased pain in bottom and L side, 10/10 pain. MAX A for LE ADL's and MARICRUZ/SBA for UE grooming tasks.   Barriers to return to prior living situation: increased level of A with ADLs and functional mobility, decreased balance, strength, activity tolerance and pain  Recommendations for discharge: LTACH  Rationale for recommendations: daily therapy to increase ADL and functional mobility independence and safety.        Entered by: Diann Santillan 07/30/2017 1:22 PM

## 2017-07-31 NOTE — PROGRESS NOTES
[facility]    Infectious Disease Progress Note    Date of Service (when I saw the patient): 07/31/2017     Assessment & Plan   Ara Stanley is a 71 year old female who was admitted on 7/26/2017.     Impression:  1 71 y.o female admitted in the hospital last month.   2 Initial presentation was of acute abdomen taken to OR for cholecystectomy.   3 Taken back to the OR 4 days later with concern for peritonitis, found to have gastric perforation of an ulcer which was repaired. C albicans in cx  4 Post-operative course has been complicated with multiple intubations, trials of weaning off the vent and now with trach. HCAP. Sputum stento  6 Mild renal insuff.   7. Developed C diif at [place].   8. Readmitted for empyema. Though multiple cultures does not have any bacteria in them from pleural fluid.   9.  Repeat cultures from the abdominal purulent discharge has VRE and CoNS   10. Cultures from the pleural fluid has stayed negative.   11. Started on Daptomycin and Meropenem with concern for sepsis          Recommendations:   VRE in the periumbilical discharge, no candida or GNR. Currently on Daptomycin and Meropenem.   Consider repeating cultures from the drain fluid.   Micafungin from Candida from the intraabdominal fluid, no more candida seen in cultures here.     Continue oral vancomycin for recent C diff diagnosis.       Celso Birch MD    Interval History   Afebrile   No new microbiological data       Physical Exam   Temp: 97.2  F (36.2  C) Temp src: Oral BP: 98/77   Heart Rate: 85 Resp: 21 SpO2: 98 % O2 Device: Mechanical Ventilator    Vitals:    07/26/17 1900 07/27/17 0400 07/29/17 0500   Weight: 76 kg (167 lb 8.8 oz) 67.1 kg (147 lb 14.9 oz) 66.3 kg (146 lb 2.6 oz)     Vital Signs with Ranges  Temp:  [97.2  F (36.2  C)-97.9  F (36.6  C)] 97.2  F (36.2  C)  Heart Rate:  [] 85  Resp:  [10-23] 21  BP: ()/() 98/77  FiO2 (%):  [40 %] 40 %  SpO2:  [90 %-100 %] 98  %    Constitutional: vent   Lungs: Clear to auscultation bilaterally, no crackles or wheezing  Cardiovascular: Regular rate and rhythm, normal S1 and S2, and no murmur noted  Abdomen: Normal bowel sounds, soft, non-distended, non-tender  Skin: No rashes, no cyanosis, no edema  Other:    Medications     NaCl 10 mL/hr at 07/30/17 0820     - MEDICATION INSTRUCTIONS -         meropenem  1 g Intravenous Q8H     DAPTOmycin (CUBICIN) intermittent infusion  6 mg/kg Intravenous Q24H     HYDROmorphone   Intravenous PCA     docusate sodium  100 mg Oral BID     protein modular  1 packet Per Feeding Tube Q8H     sodium chloride (PF)  3 mL Intracatheter Q8H     bacitracin   Topical TID     pneumococcal  0.5 mL Intramuscular Prior to discharge     heparin  5,000 Units Subcutaneous Q8H     chlorhexidine  15 mL Mouth/Throat Q12H     acetaminophen  1,000 mg Per J Tube Q8H     ALPRAZolam (XANAX) tablet 0.25 mg  0.25 mg Per J Tube TID     baclofen (LIORESAL) half-tab 5 mg  5 mg Per J Tube QAM     budesonide  0.5 mg Nebulization BID     arformoterol  15 mcg Nebulization Q12H     hydrALAZINE (APRESOLINE) tablet 25 mg  25 mg Per G Tube Q6H     lidocaine  1 patch Transdermal Daily     lisinopril (PRINIVIL/ZESTRIL) tablet 20 mg  20 mg Per J Tube Daily     simvastatin  10 mg Oral At Bedtime     metoprolol (LOPRESSOR) tablet 50 mg  50 mg Oral BID     mirtazapine (REMERON) tablet TABS 22.5 mg  22.5 mg Per G Tube At Bedtime     pantoprazole  40 mg Per J Tube BID     sertraline (ZOLOFT) tablet 150 mg  150 mg Per J Tube Daily     vancomycin  125 mg Oral 4x Daily     lidocaine   Transdermal Q24h     lidocaine   Transdermal Q8H     micafungin  100 mg Intravenous Q24H     nystatin  500,000 Units Oral 4x Daily       Data   All microbiology laboratory data reviewed.  Recent Labs   Lab Test  07/31/17   0500  07/31/17   0415  07/30/17   1250   WBC  10.8  11.1*  15.8*   HGB  6.5*  6.5*  7.7*   HCT  20.8*  20.6*  24.4*   MCV  88  88  88   PLT  467*  471*   487*     Recent Labs   Lab Test  07/31/17   0415  07/30/17   1250  07/30/17   0310   CR  0.89  0.87  0.92     Recent Labs   Lab Test  06/04/12   0527   SED  34*     Recent Labs   Lab Test  07/30/17   1720  07/30/17   1538  07/28/17   1355  07/27/17   1120  07/26/17   1840  06/19/17   0520  06/16/17   1145  06/15/17   1947  06/15/17   1143   CULT  No growth after 11 hours  No growth after 13 hours  Culture negative after 3 days  Culture received and in progress.  Positive AFB results are called as soon as   detected.  Final report to follow in 7 to 8 weeks.    Culture negative monitoring continues  Culture negative monitoring continues  Canceled, Test credited Test reordered as correct code REORDERED AS A TISSUE   CULTURE    Canceled, Test credited Test reordered as correct code REORDERED AS A TISSUE   CULTURE    Light growth Coagulase negative Staphylococcus  These bacteria are part of normal skin silvino, but on occasion, may be true   pathogens.  Clinical correlation must be applied to interpreting this   microbiology result.  Susceptibility testing not routinely done  *  Light growth Normal skin silvino  Single colony Enterococcus faecium (VRE)  Critical Value/Significant Value called to and read back by Estelita Wells RN at [facility]   [id number]  *  No growth  Moderate growth Stenotrophomonas maltophilia*  Heavy growth Stenotrophomonas maltophilia  Light growth Candida albicans / dubliniensis Candida albicans and Candida   dubliniensis are not routinely speciated Susceptibility testing not routinely   done  *  Canceled, Test credited Unsatisfactory Specimen - Specimen frozen prior to   receipt in lab.  FROZE SAMPLE IN TRANSIT Notification of test   cancellation was given to OSMAN TIJERINA AT [facility].  RN WILL RECOLLECT AND REORDER.  [facility] 6.15.17 1600

## 2017-07-31 NOTE — PLAN OF CARE
"Problem: Goal Outcome Summary  Goal: Goal Outcome Summary  Discharge Planner PT   Patient plan for discharge: LTACH  Current status: Sup>sit Min A with use of bed rail and HOB elevated. Stood x 4 with Min A x2 and significant use of the bed behind the LEs for support. Sling transfer bed>chair. Pt tolerates well, BPs improved. Pt admits she gets \"anxious\" about moving, but \"I know I can do it.\"   Barriers to return to prior living situation: Level of assist, weakness, balance and fatigue deficits  Recommendations for discharge: LTACH  Rationale for recommendations:  Pt with several medical comorbidities, including tracheostomy, will benefit from continued care at LTAC.       Entered by: Theresa Hilliard 07/31/2017 9:43 AM             "

## 2017-07-31 NOTE — PROGRESS NOTES
[facility]    Critical Care Service  Progress Note    Date of Service (when I saw the patient): 07/31/2017     Assessment & Plan   Ara Stanley is a 71 year old female who was recently hospitalized at [facility] for lap alexandre and returned to operating room for worsening status and was found to have gastric perforation which was repaired, and ultimately requiring tracheostomy for respiratory failure who was admitted on 7/26/2017 from [place] with loculated pleural effusion for evaluation by thoracic surgery for potential VATS.     Active problems  Ongoing respiratory failure requiring mechanical ventilation  Presumed infection given increased WBC and procalcitonin     Main Plans for Today   General Surgery Consult; CT scan CAP  SW consult for assistance with discharge planning   2 chest tubes removed by Thoracic surgery   Continue systemic antibiotics; follow cultures   Repeat procal  UA/UC     Neuro  1. Anxiety   2. Depression   3. Acute on chronic pain  Plan:  -- Scheduled acetaminophen, lidocaine patches and baclofen for pain.  Dilaudid PCA pump given acute post-surgical pain.  Taper off when chest tubes removed. PRN flexeril and low dose oxycodone.    -- Continue PTA zoloft and mirtazapine  --Continue scheduled xanax, and PRN seroquel and trazodone for now. Per patient, Trazodone works best at HS, periodically needs additional seroquel for anxiety   -- Delirium prevention as able; maintain sleep schedule     CV  1. HTN  2. Paroxysmal A-Fib; RVR into 140s on 7/30 with hypotension.  Now SR.  Converted with diltiazem bolus, fluids, metoprolol  Plan:  -- Continue PTA meds     Resp:  1. Chronic hypercapnic respiratory failure   2. Loculated pleural effusion s/p VATS with 3 chest tubes; initially concerning for abscess or empyema; cultures post VATS with no growth to date.   3. COPD   Plan:  -- Thoracic surgery: managing chest tubes.  Apical and posterior tubes removed today. One tube remaining;  serosang output.  Per Thoracis, will likely remove remaining tube 8/1.   --Continue current vent settings; daily weaning trials   --Speech pathology consulted for swallowing on vent; PMV, etc.   --COPD; continue PTA nebs     GI/Nutrition  1. S/p laparoscopic cholecystectomy, gastric ulcer perforation with omental patch, and persistent yeast in abdominal fluid cultures previous admission.   2. Right FUNMI draint with purulent output; placed at outside hospital for reported abscess on abdominal CT   Plan:  -- TF at goal.  Speech therapy for swallowing trials   -- PPI  --General surgery consult today for presumed infection, abdomen potential source.  Per gen surg will obtain CT CAP to assess for abscess, potential sources of infection.      Renal/Lytes/Fluid   1. WOODY past admission. Now resolved.   Plan:  -- Monitor creatinine, lytes.  Creatinine appears to be back to original baseline 0.7-0.9  --Na 131; decrease enteric free water to 60 Q 4 hours     ID  1. Has had multiple fluid collections growing candida, on micafungin (previously on fluconazole).   2. Leukocytosis  3. Loculated pleural effusions   Plan:  --Continue Micafungin; PTA med 2/2 multiple fluid collections growing candida.     --Procal grossly elevated 7/30; 72.24. Initiated on systemic antibiotics.  Will recheck procal today. Continue systemic antibiotics for now   --oral vanco for history of c. Diff; enteric isolation   --Mild leukocytosis in the past 48 hours, but improving today.  Afebrile. Does not appear septic   --Abdominal wounds cultured per nursing due to purulent discharge. Isolated VRE and Coag neg strep in abdominal wound/FUNMI insertion site   ----ID consult     Endocrine  1. No acute issues   Plan:  --  Insulin gtt per protocol if indicated  -- Keep BG  <180 for optimal healing    Heme:  1. Anemia of chronic illness; baseline hemoglobin since last admission around 7-8  Plan:  -- Monitor hemoglobin. Hgb 6.5 this AM; received one unit PRBC today.   Has received transfusions past admission for anemia.    -- Transfuse to keep > 7.0    MSK  1. Chronic hip/back pain   Plan:  --Continue PTA baclofen.  OOB as able   --PT/OT     Skin  1. Multiple surgical incisions   2. Excoriated rectum   Plan:  --Wound cares per POC.    --Follow cultures from abdominal wounds.     General cares:  DVT Prophylaxis: Heparin SQ  GI Prophylaxis: PPI  Restraints: Restraints for medical healing needed: NO  Family update by me today: No  Current lines are required for patient management  Access: right PICC    Isabella Gutierrez    Time Spent on this Encounter   Billing:  I spent 60 minutes bedside and on the inpatient unit today managing the critical care of Ara Stanley in relation to the issues listed in this note.    Interval History   Stable overnight. A-fib yesterday with RVR; since resolved.  Somewhat more hypotensive then usual. Gen surg consulted given concern for intra-abdominal infection. Will obtain CT scan today.     Physical Exam   Temp: 97.2  F (36.2  C) Temp src: Oral Temp  Min: 97.2  F (36.2  C)  Max: 97.9  F (36.6  C) BP: 98/77   Heart Rate: 85 Resp: 21 SpO2: 98 % O2 Device: Mechanical Ventilator    Vitals:    07/26/17 1900 07/27/17 0400 07/29/17 0500   Weight: 76 kg (167 lb 8.8 oz) 67.1 kg (147 lb 14.9 oz) 66.3 kg (146 lb 2.6 oz)     I/O last 3 completed shifts:  In: 3160 [I.V.:530; NG/GT:1180]  Out: 990 [Urine:640; Drains:30; Stool:100; Chest Tube:220]    GEN: awake, alert, NAD   EYES: PERRL, Anicteric sclera.   HEENT:  Normocephalic, atraumatic, trachea midline,trach secure  CV: RRR, no gallops, rubs. Murmur noted   PULM/CHEST: Coarse breath sounds on left without rhonchi, crackles or wheeze. Course, diminished in right middle/lower lobes.  Symmetric chest rise  GI: normal bowel sounds, soft, minimally-tender, no rebound tenderness or guarding, no masses. Drains with purulent discharge.    : tanner catheter in place, urine yellow and clear  EXTREMITIES: No  peripheral edema, moving all extremities, peripheral pulses intact  NEURO: Cranial nerves II-XII grossly intact, no motor-sensory deficits noted  SKIN: Multiple surgical incisions, covered, and dry   PSYCH:  Affect: appropriate, minimally anxious, mentation at baseline, not altered, no hallucinations  Imaging personally reviewed: CXR   ECG: SR     Medications     NaCl 10 mL/hr at 07/30/17 0820     - MEDICATION INSTRUCTIONS -         meropenem  1 g Intravenous Q8H     DAPTOmycin (CUBICIN) intermittent infusion  6 mg/kg Intravenous Q24H     HYDROmorphone   Intravenous PCA     docusate sodium  100 mg Oral BID     protein modular  1 packet Per Feeding Tube Q8H     sodium chloride (PF)  3 mL Intracatheter Q8H     bacitracin   Topical TID     pneumococcal  0.5 mL Intramuscular Prior to discharge     heparin  5,000 Units Subcutaneous Q8H     chlorhexidine  15 mL Mouth/Throat Q12H     acetaminophen  1,000 mg Per J Tube Q8H     ALPRAZolam (XANAX) tablet 0.25 mg  0.25 mg Per J Tube TID     baclofen (LIORESAL) half-tab 5 mg  5 mg Per J Tube QAM     budesonide  0.5 mg Nebulization BID     arformoterol  15 mcg Nebulization Q12H     hydrALAZINE (APRESOLINE) tablet 25 mg  25 mg Per G Tube Q6H     lidocaine  1 patch Transdermal Daily     lisinopril (PRINIVIL/ZESTRIL) tablet 20 mg  20 mg Per J Tube Daily     simvastatin  10 mg Oral At Bedtime     metoprolol (LOPRESSOR) tablet 50 mg  50 mg Oral BID     mirtazapine (REMERON) tablet TABS 22.5 mg  22.5 mg Per G Tube At Bedtime     pantoprazole  40 mg Per J Tube BID     sertraline (ZOLOFT) tablet 150 mg  150 mg Per J Tube Daily     vancomycin  125 mg Oral 4x Daily     lidocaine   Transdermal Q24h     lidocaine   Transdermal Q8H     micafungin  100 mg Intravenous Q24H     nystatin  500,000 Units Oral 4x Daily       Data     Recent Labs  Lab 07/31/17  0500 07/31/17  0415 07/30/17  1250 07/30/17  0310  07/27/17  2150  07/26/17  1840   WBC 10.8 11.1* 15.8* 16.2*  < >  --   < > 13.7*   HGB  6.5* 6.5* 7.7* 7.3*  < >  --   < > 7.3*   MCV 88 88 88 88  < >  --   < > 88   * 471* 487* 509*  < >  --   < > 535*   INR  --   --   --   --   --  1.16*  --  1.12   NA  --  131* 134 134  < >  --   < > 140   POTASSIUM  --  4.0 4.0 3.7  < >  --   < > 4.6   CHLORIDE  --  97 98 100  < >  --   < > 100   CO2  --  27 25 25  < >  --   < > 33*   BUN  --  48* 47* 49*  < >  --   < > 72*   CR  --  0.89 0.87 0.92  < >  --   < > 0.90   ANIONGAP  --  7 11 9  < >  --   < > 7   CARYL  --  7.5* 7.8* 7.7*  < >  --   < > 8.9   GLC  --  94 108* 125*  < >  --   < > 78   ALBUMIN  --   --  1.5*  --   --   --   --  1.7*   PROTTOTAL  --   --  6.8  --   --   --   --  7.0   BILITOTAL  --   --  0.2  --   --   --   --  0.3   ALKPHOS  --   --  115  --   --   --   --  108   ALT  --   --  16  --   --   --   --  17   AST  --   --  25  --   --   --   --  17   TROPI  --   --  <0.015The 99th percentile for upper reference range is 0.045 ug/L.  Troponin values in the range of 0.045 - 0.120 ug/L may be associated with risks of adverse clinical events.  --   --   --   --   --    < > = values in this interval not displayed.  Recent Results (from the past 24 hour(s))   XR Chest Port 1 View    Narrative    CHEST PORTABLE ONE VIEW July 31, 2017 5:33 AM     HISTORY: Postoperative left VATS, decortication.    COMPARISON: 7/30/2017.    FINDINGS: ET tube, left chest tubes, and feeding tube all remain in  position unchanged from yesterday. Pleural-based density along the  left lung unchanged in the interval likely the result of recent  decortication. No pneumothorax. Mild opacity right lung base slightly  more prominent today.      Impression    IMPRESSION:   1. Support hardware unchanged including tracheostomy in place.  2. Mild opacity right base minimally increased and pleural-based  opacity about the left lung unchanged in the interval.    SHAYE JOSEPH MD

## 2017-07-31 NOTE — PROGRESS NOTES
"Patient requested to speak with Care Coordinator this morning. Patient explained she came from [facility] and she does not want to return there. She said she was upset with the care she received and \"felt like the staff did more playing around then working.\" \"Nobody knew how to even move me.\"  I explained that the liaison person from [place], Beverly would be willing to come and talk to her. She was not interested in doing this. Beverly from [place] was informed. I asked if she would like a referral sent to [facility] and she was in agreement with this. She asked that I call Courtney her POA with this information. Ruy liaison from [facility] was called and given referral. Will contact Courtney and continue to follow for further discharge needs.   "

## 2017-07-31 NOTE — PLAN OF CARE
Problem: Individualization  Goal: Patient Preferences  Outcome: No Change  Neuro-  Pt is alert and oriented yet forgetful.  Follows. AXEL LORENZO.    CV-  NSR  BP WNL  Lactic WNL  Resp-  Tolerated CMV on nights.  CT output minimal  And Serous.  FUNMI drainage purulent and minimal.  AM Hgb  Checked twice and results called to tele hub.   L/S are course.  Able to suction small amounts of tan thick secretions.    Gi/-  Urine output did  over nights.  >40ml/hr this am.

## 2017-07-31 NOTE — PLAN OF CARE
Problem: Goal Outcome Summary  Goal: Goal Outcome Summary  Outcome: Improving  [id number]  Lungs: Began weaning 5/5.  's RR up to 31.  I: ABG's drawn. Cont to suction creamy sputum.  CV: Once Propofol off, MAP>65.   GI: Cont to complain of 8-10 pain in abdomen, lower back and chest tube site.  I: Encouraged to use PCA.  Courtney here and visited with pt.

## 2017-07-31 NOTE — PLAN OF CARE
Problem: Goal Outcome Summary  Goal: Goal Outcome Summary     Discharge Planner SLP   Patient plan for discharge: [facility]  Current status: Patient was seen this pm for a Passy Greg Valve (PMV) speaking valve evaluation with RT present.  Patient states she was using a PMV at [facility] for a few hours at a time daily prior to re-admission at [facility].  She reports a video swallow study was completed at [facility], but only limited blue dye po was provided with SLP due to swallowing issues.  Plan to review study details as able.  Patient is currently NPO for a procedure today.    Patient tolerated PMV placement in line while on pressure support for 15 minutes this pm during today's evaluation.  Good voicing was achieved and only mild SOB was noted at the end of the trial with stable saturation levels and vital signs.  Recommend PMV use with SLP and RT supervision only at this time.  Plan to continue Tx daily until discharge as able.  Will evaluate swallow function as indicated during speaking valve trials.  Barriers to return to prior living situation: Medical needs, respiratory needs, weakness  Recommendations for discharge: [facility] with continued SLP services  Rationale for recommendations: Continue SLP eval and Tx to improve communication for daily needs and improve swallow function for safe return to a po diet       Entered by: Aleksandra Loza 07/31/2017 3:57 PM

## 2017-07-31 NOTE — PROGRESS NOTES
"Thoracic Surgery POD #3:  BP 92/63  Temp 97.2  F (36.2  C) (Oral)  Resp 21  Ht 1.676 m (5' 6\")  Wt 66.3 kg (146 lb 2.6 oz)  SpO2 98%  BMI 23.59 kg/m2  Trach in place- vented- alert and oriented  CTs:  Serous output, no air leak, no bleeding  CXR:  Stable, left pleural space well-drained  S:  Painful around chest tube sites.  O:  Apical and Posterior CTs: DCed without complication.  Occlusive dressing applied.  P:  Anticipate removing final chest tube tomorrow    Willa Barry PA-C with Dr. Selwyn Varela  [facility]  Cell [phone]      "

## 2017-07-31 NOTE — CONSULTS
"[facility] General Surgery Consultation    Ara Stanley MRN# [id number]   YOB: 1945 Age: 71 year old      Date of Admission:  7/26/2017  Date of Consult: 7/31/2017         Assessment and Plan:   Patient is a 71 year old female who is well known to me who underwent a lap alexandre on 6/2 and then had a perforated gastric ulcer on 6/6 and underwent laparoscopic exploration and lavage with repair of perforated gastric ulcer. She had a difficult post operative course with renal failure, respiratory failure, etc and was [facility] She returned last week with respiratory failure and underwent a VATS. She has had a peritoneal drain placed in the interim which is draining purulent fluid. Her last CT was done at an outside facility and showed two large fluid collections of which a drain was placed. She is mildly tender on exam. She is having bowel function.     At this point I would recommend repeat imaging (CT abd/pelvis with IV contrast) and possible further drain placement.            Requesting Physician:      Dr. Gutierrez        Chief Complaint:   Abdominal pain       History of Present Illness:   Patient is a 71 year old female who is well known to me who underwent a lap alexandre on 6/2 and then had a perforated gastric ulcer on 6/6 and underwent laparoscopic exploration and lavage with repair of perforated gastric ulcer. She had a difficult post operative course with renal failure, respiratory failure, etc and was [facility]. She returned last week with respiratory failure and underwent a VATS. She has had a peritoneal drain placed in the interim which is draining purulent fluid. Her last CT was done at an outside facility and showed two large fluid collections of which a drain was placed.          Physical Exam:   Blood pressure 92/63, temperature 97.2  F (36.2  C), temperature source Oral, resp. rate 21, height 5' 6\" (1.676 m), weight " 146 lb 2.6 oz (66.3 kg), SpO2 98 %.  146 lbs 2.64 oz  General: alert, sitting up in bed  Psych: Alert and Oriented.  Normal affect  Neurological: grossly intact  Eyes: Sclera clear  Respiratory:  Trach in place, on vent, chest tube in place  Cardiovascular:  Regular Rate and Rhythm   GI: IR drain in place with purulent fluid in bulb. Abdomen mildly tender near drain site, no rebound or guarding.    Lymphatic/Hematologic/Immune:  No femoral or cervical lymphadenopathy.  Integumentary:  No rashes         Past Medical History:     Past Medical History:   Diagnosis Date     Asthma      COPD (chronic obstructive pulmonary disease) (H)             Past Surgical History:     Past Surgical History:   Procedure Laterality Date     ARTHROPLASTY HIP  6/4/2012    Procedure:ARTHROPLASTY HIP; Surgeon:DAVIAN FENG; Location: OR     ENT SURGERY      tonsils     GYN SURGERY      hyst     LAPAROSCOPIC CHOLECYSTECTOMY N/A 6/2/2017    Procedure: LAPAROSCOPIC CHOLECYSTECTOMY;;  Surgeon: Lavell Carpenter MD;  Location:  OR     LAPAROSCOPY DIAGNOSTIC (GENERAL) N/A 6/6/2017    Procedure: LAPAROSCOPY DIAGNOSTIC (GENERAL);  DIAGNOSTIC LAPAROSCOPY, REPAIR OF PERFORATED GASTRIC ULCER, ABDOMINAL LAVAGE;  Surgeon: Estelita Peck MD;  Location:  OR     LAPAROTOMY EXPLORATORY N/A 6/2/2017    Procedure: LAPAROTOMY EXPLORATORY;  EXPLORATORY LAPAROSCOPY, CHOLECYCTECTOMY;  Surgeon: Lavell Carpenter MD;  Location:  OR     ORTHOPEDIC SURGERY      hip pinning   femur fracture knee surgery      REMOVE HARDWARE HIP NAILING  6/4/2012    Procedure:REMOVE HARDWARE HIP NAILING; REMOVAL OF GAMMA NAIL AND SCREWS FROM RIGHT HIP, RIGHT TOTAL HIP ARTHROPLASTY(GAMMA NAIL EQUIPMENT, [employer] TOTAL HIP)^; Surgeon:DAVIAN FENG; Location: OR     TRACHEOSTOMY N/A 6/13/2017    Procedure: TRACHEOSTOMY;  TRACHEOSTOMY.;  Surgeon: Selwyn Varela MD;  Location:  OR            Current Medications:           meropenem   1 g Intravenous Q8H     DAPTOmycin (CUBICIN) intermittent infusion  6 mg/kg Intravenous Q24H     HYDROmorphone   Intravenous PCA     docusate sodium  100 mg Oral BID     protein modular  1 packet Per Feeding Tube Q8H     sodium chloride (PF)  3 mL Intracatheter Q8H     bacitracin   Topical TID     pneumococcal  0.5 mL Intramuscular Prior to discharge     heparin  5,000 Units Subcutaneous Q8H     chlorhexidine  15 mL Mouth/Throat Q12H     acetaminophen  1,000 mg Per J Tube Q8H     ALPRAZolam (XANAX) tablet 0.25 mg  0.25 mg Per J Tube TID     baclofen (LIORESAL) half-tab 5 mg  5 mg Per J Tube QAM     budesonide  0.5 mg Nebulization BID     arformoterol  15 mcg Nebulization Q12H     hydrALAZINE (APRESOLINE) tablet 25 mg  25 mg Per G Tube Q6H     lidocaine  1 patch Transdermal Daily     lisinopril (PRINIVIL/ZESTRIL) tablet 20 mg  20 mg Per J Tube Daily     simvastatin  10 mg Oral At Bedtime     metoprolol (LOPRESSOR) tablet 50 mg  50 mg Oral BID     mirtazapine (REMERON) tablet TABS 22.5 mg  22.5 mg Per G Tube At Bedtime     pantoprazole  40 mg Per J Tube BID     sertraline (ZOLOFT) tablet 150 mg  150 mg Per J Tube Daily     vancomycin  125 mg Oral 4x Daily     lidocaine   Transdermal Q24h     lidocaine   Transdermal Q8H     micafungin  100 mg Intravenous Q24H     nystatin  500,000 Units Oral 4x Daily       lidocaine (buffered or not buffered), lidocaine 4%, sodium chloride (PF), - MEDICATION INSTRUCTIONS -, naloxone, lidocaine, ondansetron **OR** ondansetron, prochlorperazine **OR** prochlorperazine **OR** prochlorperazine, metoclopramide **OR** metoclopramide, senna-docusate, bisacodyl, polyethylene glycol, ipratropium - albuterol 0.5 mg/2.5 mg/3 mL, albuterol, alteplase, cyclobenzaprine (FLEXERIL) tablet 5 mg, oxyCODONE (ROXICODONE) IR half-tab 2.5 mg, QUEtiapine (SEROquel) half-tab 12.5 mg, traZODone (DESYREL) tablet 50 mg         Home Medications:     Prior to Admission medications    Medication Sig Last Dose  Taking? Auth Provider   albuterol (2.5 MG/3ML) 0.083% neb solution Take 1 vial by nebulization every 2 hours as needed for wheezing 7/25/2017 at 1740 Yes Unknown, Entered By History   ALPRAZOLAM PO 0.25 mg by Per J Tube route 3 times daily 7/26/2017 at 1300 Yes Unknown, Entered By History   budesonide (PULMICORT) 0.5 MG/2ML neb solution Take 0.5 mg by nebulization 2 times daily 7/26/2017 at 0800 Yes Unknown, Entered By History   Cyclobenzaprine HCl (FLEXERIL PO) 5 mg by Per J Tube route 3 times daily as needed for muscle spasms 7/23/2017 at 2226 Yes Unknown, Entered By History   formoterol (PERFOROMIST) 20 MCG/2ML neb solution Take 20 mcg by nebulization every 12 hours 7/26/2017 at 0800 Yes Unknown, Entered By History   HYDRALAZINE HCL PO 25 mg by Per G Tube route every 6 hours 7/26/2017 at 1256 Yes Unknown, Entered By History   LACTOBACILLUS RHAMNOSUS, GG, PO Take 1 capsule by mouth 3 times daily (with meals) 7/26/2017 at 1256 Yes Unknown, Entered By History   Lidocaine 4 % PTCH Externally apply 1 patch topically daily Remove for 12 hours 7/26/2017 at 0831 Yes Unknown, Entered By History   LISINOPRIL PO 20 mg by Per J Tube route daily 7/26/2017 at 0817 Yes Unknown, Entered By History   MAGNESIUM OXIDE PO Take 400 mg by mouth daily 7/26/2017 at 0816 Yes Unknown, Entered By History   METOPROLOL TARTRATE PO Take 50 mg by mouth 2 times daily 7/26/2017 at 0817 Yes Unknown, Entered By History   micafungin (MYCAMINE) 100 MG injection Inject 100 mg into the vein daily For 14 days. Stop on 8/9/2017 7/26/2017 at 1256 Yes Unknown, Entered By History   Mirtazapine (REMERON PO) 22.5 mg by Per G Tube route At Bedtime 7/25/2017 at 2141 Yes Unknown, Entered By History   omeprazole (PRILOSEC) 2 mg/mL SUSP Take 40 mg by mouth 2 times daily 7/26/2017 at 0515 Yes Unknown, Entered By History   QUETIAPINE FUMARATE PO 12.5 mg by Per J Tube route 4 times daily as needed (hallucination or agitation) 7/26/2017 at 1449 Yes Unknown, Entered  By History   vancomycin (VANOCIN) 50 mg/mL LIQD solution 125 mg by Enteral route 4 times daily At 6am, 12 noon, 6 pm, and 10 pm 7/26/2017 at 1256 Yes Unknown, Entered By History   OXYCODONE HCL PO 2.5 mg by Per J Tube route every 8 hours as needed 7/26/2017 at 0514 Yes Unknown, Entered By History   Sertraline HCl (ZOLOFT PO) 150 mg by Per J Tube route daily 7/26/2017 at 0815 Yes Unknown, Entered By History   LOVASTATIN PO Take 20 mg by mouth every morning unknown Yes Unknown, Entered By History   ipratropium - albuterol 0.5 mg/2.5 mg/3 mL (DUONEB) 0.5-2.5 (3) MG/3ML neb solution Take 1 vial by nebulization 4 times daily  7/26/2017 at 1122 Yes Unknown, Entered By History   ACETAMINOPHEN PO 1,000 mg by Per J Tube route every 8 hours  7/26/2017 at 1449 Yes Unknown, Entered By History   BACLOFEN PO Take 5 mg by mouth every morning RX is 5 mg (1/2 of 10 mg tab) 2 x daily but she takes daily due to drowsiness. 7/26/2017 at 0813 Yes Unknown, Entered By History   TRAZODONE HCL PO 50 mg by Per J Tube route nightly as needed for sleep  7/23/2017 at 2226 Yes Unknown, Entered By History            Allergies:     Allergies   Allergen Reactions     Iodine I 131 Tositumomab Anaphylaxis     Can tolerate topical iodine if it is wahed off after surgery      Penicillins Rash and Anaphylaxis     Swollen  lymph nodes, flushed overheating      Sulfa Drugs Nausea and Vomiting, Diarrhea and Rash            Family History:   No family history on file.        Social History:   Ara Stanley  reports that she quit smoking about 25 years ago. Her smoking use included Cigarettes. She has a 10.00 pack-year smoking history. She does not have any smokeless tobacco history on file. She reports that she does not drink alcohol or use illicit drugs.          Review of Systems:   The 10 point Review of Systems is negative other than noted in the HPI.         Labs/Imaging   All new lab and imaging data was reviewed.   I have personally reviewed  the imaging studies  .  TIME SPENT:  40 minutes was spent with patient and greater than 50% of the time was spent counseling and coordination of care.      Estelita Peck MD

## 2017-07-31 NOTE — PROVIDER NOTIFICATION
Sae CASTILLO called with crit labs    Results for CANDELARIO BEN FELICIA (MRN [id number]) as of 7/31/2017 06:19   Ref. Range 7/31/2017 05:00   Hemoglobin Latest Ref Range: 11.7 - 15.7 g/dL 6.5 (LL)

## 2017-08-01 NOTE — PLAN OF CARE
Problem: Goal Outcome Summary  Goal: Goal Outcome Summary  Discharge Planner PT   Patient plan for discharge: LTACH  Current status: Pt transfers supine>sit minAx1 with HOB elevated and assist to manage cords/tubing. Pt demonstrates good balance while seated EOB. Pt stood x2 for about 1 minutes each time with modAx2 for balance and weakness. Pt transfers via sling sit>chair with assist for tubing/cords. Pt BP remained good throughout transfers.   Barriers to return to prior living situation: Level of assist, fatigue, weakness and balance   Recommendations for discharge: LTACH  Rationale for recommendations: Pt with several medical comorbidities, including tracheostomy, will benefit from continued care at LTAC.       Entered by: Ruben Luong 08/01/2017 11:35 AM

## 2017-08-01 NOTE — PLAN OF CARE
Problem: Goal Outcome Summary  Goal: Goal Outcome Summary  Discharge Planner SLP   Patient plan for discharge: [facility]  Current status: Patient seen for Passy Greg Valve trials (PMV) with RT present for cuff deflation. Patient was able to tolerate the valve for approximately 12 minutes, with stable vitals while on CPAP/PS at 40%. She was able to achieve good voicing and 100% intelligible in conversation. She stated towards the end of the session that she was having increased difficulty catching her breath, so the valve was removed. This maybe related to fluid collection adjacent to her stomach. Patient scheduled for a CT guided drainage this afternoon. Recommend: 1. Continue with daily ST for PMV.  PMV use with SLP and RT supervision only at this time.  Barriers to return to prior living situation: Trach/vent  Recommendations for discharge: [facility]  Rationale for recommendations: Continue ST at [facility] for communication and swallowing due to excellent participation, motivation and progress.        Entered by: Pallavi Sullivan 08/01/2017 10:10 AM

## 2017-08-01 NOTE — PROGRESS NOTES
[facility]    Critical Care Service  Progress Note    Date of Service (when I saw the patient): 08/01/2017     Assessment & Plan   Ara Stanley is a 71 year old female who [facility]andre and returned to operating room for worsening status and was found to have gastric perforation which was repaired, and ultimately requiring tracheostomy for respiratory failure who was admitted on 7/26/2017 from [facility] with loculated pleural effusion for evaluation by thoracic surgery for potential VATS.     Active problems  Ongoing respiratory failure requiring mechanical ventilation  Presumed infection given increased WBC and procalcitonin     Main Plans for Today   CT guided drainage of large abdominal fluid collection   Discontinue PCA pump  Discontinue rectal tube  Lasix for pleural/pericardial effusions     Neuro  1. Anxiety   2. Depression   3. Acute on chronic pain  Plan:  -- Scheduled acetaminophen, lidocaine patches and baclofen for pain. PRN flexeril and low dose oxycodone.  PCA pump discontinued when final chest tube pulled today  -- Continue PTA zoloft and mirtazapine  --Continue scheduled xanax, and PRN seroquel and trazodone for now. Per patient, Trazodone works best at , periodically needs additional seroquel for anxiety   -- Delirium prevention as able; maintain sleep schedule     CV  1. HTN  2. Paroxysmal A-Fib; RVR into 140s on 7/30 with hypotension.  Now SR.  Converted with diltiazem bolus, fluids, metoprolol  3. Small pericardial effusion; new on CT scan 7/31  Plan:  -- Continue PTA meds   --IV Lasix, 40mg, x 2 for diuresis given pericardial and pleural effusions     Resp:  1. Chronic hypercapnic respiratory failure   2. Loculated pleural effusion s/p VATS with 3 chest tubes; initially concerning for abscess or empyema; cultures post VATS with no growth to date.    3. Moderate right sided pleural effusion; increased in interval since previous scan    4. COPD   Plan:  -- Thoracic surgery: managing chest tubes.  Apical and posterior tubes removed today. One tube remaining; serosang output.  Per Thoracis, will likely remove remaining tube 8/1.   --Continue current vent settings; daily weaning trials   --Speech pathology consulted for swallowing on vent; PMV, etc. Tolerating daily PMV and swallowing trials   --COPD; continue PTA nebs   --Lasix as indicated above     GI/Nutrition  1. S/p laparoscopic cholecystectomy, gastric ulcer perforation with omental patch, and persistent yeast in abdominal fluid cultures previous admission.   2. Persistent abdominal fluid collection.  Right FUNMI drain with purulent output; placed at outside hospital for reported abscess on abdominal CT.    Plan:  -- TF at goal.  Speech therapy for swallowing trials   -- PPI  --General surgery consult today for presumed infection, abdomen potential source.      Renal/Lytes/Fluid   1. WOODY past admission. Now resolved.   Plan:  -- Monitor creatinine, lytes.  Creatinine appears to be back to original baseline 0.7-0.9  --Na 131; decreased enteric free water to 60 Q 4 hours with no improvement.  Monitor; no changes     ID  1. Has had multiple fluid collections growing candida, on micafungin (previously on fluconazole).   2. Leukocytosis  3. Loculated pleural effusions   Plan:  --Switched back to Fluconazole per ID  --Procal grossly elevated 0.52 on admission. 72.24 on 7/30; 61.38 on 7/31.   Initiated on systemic antibiotics.   Continue systemic antibiotics for now pending abdominal wound culture  --oral vanco initiated 7/20 at [facility] for c. Diff infection; enteric isolation. C.diff recheck on 8/1 negative.  Stop date 8/2  --Mild leukocytosis in the past 48 hours.  Afebrile. Does not appear septic   --Abdominal wounds cultured per nursing due to purulent discharge. Isolated VRE and Coag neg strep in abdominal wound/FUNMI insertion site   ----ID consult   --CT guided abscess drainage 8/1; fluid from  abdomen cultures pending     Endocrine  1. No acute issues   Plan:  --  Insulin gtt per protocol if indicated  -- Keep BG  <180 for optimal healing    Heme:  1. Anemia of chronic illness; baseline hemoglobin since last admission around 7-8  Plan:  -- Monitor hemoglobin. 7.7 this AM; received one unit PRBC 7/31.  Has received transfusions past admission for anemia.    -- Transfuse to keep > 7.0    MSK  1. Chronic hip/back pain   Plan:  --Continue PTA baclofen.  OOB as able   --PT/OT     Skin  1. Multiple surgical incisions   2. Excoriated rectum   Plan:  --Wound cares per POC.    --Follow cultures from abdominal wounds.   --Remove rectal tube     General cares:  DVT Prophylaxis: Heparin SQ  GI Prophylaxis: PPI  Restraints: Restraints for medical healing needed: NO  Family update by me today: No  Current lines are required for patient management  Access: right PICC    Isabella Gutierrez    Time Spent on this Encounter   Billing:  I spent 60 minutes bedside and on the inpatient unit today managing the critical care of Ara Stanley in relation to the issues listed in this note.    Interval History   CT scan abdomen/pelvis yesterday with persistent abdominal fluid collection, small new pericardial effusion, moderate right pleural effusion.  CT guided abscess drainage of fluid collection today.  Working with  for discharge planning.      Physical Exam   Temp: 99.6  F (37.6  C) Temp src: Axillary Temp  Min: 99.4  F (37.4  C)  Max: 99.6  F (37.6  C) BP: 119/62   Heart Rate: 86 Resp: 16 SpO2: 95 % O2 Device: Mechanical Ventilator    Vitals:    07/26/17 1900 07/27/17 0400 07/29/17 0500   Weight: 76 kg (167 lb 8.8 oz) 67.1 kg (147 lb 14.9 oz) 66.3 kg (146 lb 2.6 oz)     I/O last 3 completed shifts:  In: 2880 [I.V.:750; NG/GT:930]  Out: 2355 [Urine:1805; Drains:30; Stool:450; Chest Tube:70]    GEN: awake, alert, NAD   EYES: PERRL, Anicteric sclera.   HEENT:  Normocephalic, atraumatic, trachea midline,trach secure  CV:  RRR, no gallops, rubs. Murmur noted   PULM/CHEST: Coarse breath sounds on left without rhonchi, crackles or wheeze. Course, diminished in right middle/lower lobes.  Symmetric chest rise. Chest tube dressing CDI   GI: normal bowel sounds, soft, minimally-tender, no rebound tenderness or guarding, no masses. Drain with purulent discharge.    : tanner catheter in place, urine yellow and clear  EXTREMITIES: No peripheral edema, moving all extremities, peripheral pulses intact  NEURO: Cranial nerves II-XII grossly intact, no motor-sensory deficits noted  SKIN: Multiple surgical incisions, covered, and dry   PSYCH:  Affect: appropriate, minimally anxious, mentation at baseline, not altered, no hallucinations  Imaging personally reviewed: CXR   ECG: SR     Medications     NaCl 10 mL/hr at 08/01/17 0759     - MEDICATION INSTRUCTIONS -         meropenem  1 g Intravenous Q8H     DAPTOmycin (CUBICIN) intermittent infusion  6 mg/kg Intravenous Q24H     HYDROmorphone   Intravenous PCA     docusate sodium  100 mg Oral BID     protein modular  1 packet Per Feeding Tube Q8H     sodium chloride (PF)  3 mL Intracatheter Q8H     bacitracin   Topical TID     pneumococcal  0.5 mL Intramuscular Prior to discharge     heparin  5,000 Units Subcutaneous Q8H     chlorhexidine  15 mL Mouth/Throat Q12H     acetaminophen  1,000 mg Per J Tube Q8H     ALPRAZolam (XANAX) tablet 0.25 mg  0.25 mg Per J Tube TID     baclofen (LIORESAL) half-tab 5 mg  5 mg Per J Tube QAM     budesonide  0.5 mg Nebulization BID     arformoterol  15 mcg Nebulization Q12H     hydrALAZINE (APRESOLINE) tablet 25 mg  25 mg Per G Tube Q6H     lidocaine  1 patch Transdermal Daily     lisinopril (PRINIVIL/ZESTRIL) tablet 20 mg  20 mg Per J Tube Daily     simvastatin  10 mg Oral At Bedtime     metoprolol (LOPRESSOR) tablet 50 mg  50 mg Oral BID     mirtazapine (REMERON) tablet TABS 22.5 mg  22.5 mg Per G Tube At Bedtime     pantoprazole  40 mg Per J Tube BID     sertraline  (ZOLOFT) tablet 150 mg  150 mg Per J Tube Daily     vancomycin  125 mg Oral 4x Daily     lidocaine   Transdermal Q24h     lidocaine   Transdermal Q8H     micafungin  100 mg Intravenous Q24H     nystatin  500,000 Units Oral 4x Daily       Data     Recent Labs  Lab 08/01/17  0445 07/31/17  0500 07/31/17  0415 07/30/17  1250  07/27/17  2150  07/26/17  1840   WBC 12.3* 10.8 11.1* 15.8*  < >  --   < > 13.7*   HGB 7.7* 6.5* 6.5* 7.7*  < >  --   < > 7.3*   MCV 84 88 88 88  < >  --   < > 88   * 467* 471* 487*  < >  --   < > 535*   INR  --   --   --   --   --  1.16*  --  1.12   *  --  131* 134  < >  --   < > 140   POTASSIUM 4.3  --  4.0 4.0  < >  --   < > 4.6   CHLORIDE 97  --  97 98  < >  --   < > 100   CO2 26  --  27 25  < >  --   < > 33*   BUN 46*  --  48* 47*  < >  --   < > 72*   CR 0.82  --  0.89 0.87  < >  --   < > 0.90   ANIONGAP 8  --  7 11  < >  --   < > 7   CARYL 7.9*  --  7.5* 7.8*  < >  --   < > 8.9   *  --  94 108*  < >  --   < > 78   ALBUMIN  --   --   --  1.5*  --   --   --  1.7*   PROTTOTAL  --   --   --  6.8  --   --   --  7.0   BILITOTAL  --   --   --  0.2  --   --   --  0.3   ALKPHOS  --   --   --  115  --   --   --  108   ALT  --   --   --  16  --   --   --  17   AST  --   --   --  25  --   --   --  17   TROPI  --   --   --  <0.015The 99th percentile for upper reference range is 0.045 ug/L.  Troponin values in the range of 0.045 - 0.120 ug/L may be associated with risks of adverse clinical events.  --   --   --   --    < > = values in this interval not displayed.  Recent Results (from the past 24 hour(s))   CT Abdomen Pelvis w/o Contrast    Narrative    CT ABDOMEN AND PELVIS WITHOUT CONTRAST  7/31/2017  6:14 PM    HISTORY: Evaluate for fluid collections.    TECHNIQUE: Helical axial scans from the dome of liver through the  pubic symphysis without contrast. Radiation dose for this scan was  reduced using automated exposure control, adjustment of the mA and/or  kV according to patient  size, or iterative reconstruction technique.    COMPARISON: CT abdomen and pelvis without contrast from [facility] dated 7/20/2017.    FINDINGS: Moderate-sized right pleural effusion with adjacent  atelectatic change appears similar to the prior exam. Interval  placement of a left-sided chest tube with marked decrease in size of  left pleural effusion, although there is probably still some loculated  fluid anteriorly. Improved left lower lobe consolidation but mild  patchy infiltrate remains. Interval development of a small  circumferential pericardial effusion. Vascular calcifications,  including coronary artery calcifications. A feeding tube is again seen  with its tip in the fourth duodenum.    Interval placement of a pigtail catheter in the right anterior  abdomen, interposed between the right lobe of the liver and the  anterior abdominal wall. Significant decrease in the amount of upper  abdominal ascites since the prior exam. However, a collection of fluid  inferior and lateral to the greater curvature of the stomach remains.  This has decreased in size since the prior exam but still measures up  to 11 x 6.4 x 5 cm (image 49 of series 2 and image 14 of series 3).  The remainder of the bowel and mesentery in the upper abdomen are  unremarkable. The liver, spleen, pancreas, bilateral adrenal glands  and kidneys bilaterally show no definite abnormality without contrast.    Scans through the pelvis show interval placement of a device in the  rectum and a Crawford catheter in the bladder. No definite free fluid.  Lower pelvic scans are partially obscured by metallic artifact from  right hip arthroplasty.      Impression    IMPRESSION:  1. Persistent moderate-sized right-sided pleural effusion.  2. Interval placement of left-sided chest tube with decreased size of  left pleural effusion. Improved left basilar consolidation.  3. Vascular calcifications, including coronary artery calcifications.  4. Interval  development of small pericardial effusion.  5. Interval placement of pigtail drainage catheter right upper  quadrant anterior to the liver with decreased upper abdominal ascites.  6. Persistent intra-abdominal fluid adjacent to the greater curvature  of the stomach which is likely residual ascites, although a walled off  fluid collection is not completely excluded. This has moderately  decreased in size since the prior exam.    VERONICA JOHNSON MD   XR Chest Port 1 View    Narrative    XR CHEST PORT 1 VW  8/1/2017 6:14 AM     HISTORY:  post-op L VATS, decortication    COMPARISON: Film performed on 7/31/2017    FINDINGS:  Tracheostomy tube is again noted. Feeding tube is in place.  Left chest tube is seen.Two of the chest tubes seen on the previous  study has been removed. No pneumothorax. No change in the left basilar  opacity. Right lung is clear.      Impression    IMPRESSION: Interval removal of 2 left chest tubes. No pneumothorax.  No other change.    CAROL CONTRERAS MD

## 2017-08-01 NOTE — PROGRESS NOTES
Alert, oriented at times. Forgetful at times.  LS coarse.  CMV 40% 14/450/5.  Thick creamy secretions.  SR.  TF at 50 ml/hr.  Flexiseal in place.  Adequate UOP. Pain managed with PCA pump and PRNs.  CT in place, no output overnight.  Incision CDI.  Minimal purulent output via right FUNMI.  C.Diff neg per sample sent on 8/1.

## 2017-08-01 NOTE — PROGRESS NOTES
[facility] ICU RESPIRATORY NOTE  Date of Admission: 7/26/17  Date of Intubation (most recent): Tracheostomy 6/13/17  Reason for Mechanical Ventilation: Vent dependent   Number of Days on Mechanical Ventilation:   Met Criteria for Pressure Support Trial: Yes   Length of Pressure Support Trial: PS 10/2 for 10hrs and tolerated well.   and rest for the night    Significant Events Today: None     Ventilation Mode: CMV/AC  FiO2 (%): 40 %  Rate Set (breaths/minute): 14 breaths/min  Tidal Volume Set (mL): 450 mL  PEEP (cm H2O): 5 cmH2O  Pressure Support (cm H2O): 10 cmH2O  Oxygen Concentration (%): 40 %  Resp: 16     ABG Results:  No lab results found in last 7 days.    ETT appearance on chest x-ray: Trach in placed     Plan:  Will continue on full vent support and PS trial during the day.    Cynthia Correia  8/1/2017

## 2017-08-01 NOTE — PLAN OF CARE
Problem: Infection, Risk/Actual (Adult)  Goal: Identify Related Risk Factors and Signs and Symptoms  Related risk factors and signs and symptoms are identified upon initiation of Human Response Clinical Practice Guideline (CPG)   Outcome: Therapy, progress toward functional goals is gradual  Able to drain fld from abd per CT , new drain placed per Radiologist, pt. Binh procedure well.  Generalized pain, mod. Relief with dilaudid pca,  flexiseal most bothersome today but 500 cc output last 8h.liq. Green.  ST called, pt. Is not to have ice chips, only swabs.  VSS, mod. SS out of new drain, cults. Sent from CT.  U/o adequate.  Last chest tube removed 1300 per PA.

## 2017-08-01 NOTE — PROCEDURES
RADIOLOGY PROCEDURE NOTE  Patient name: Ara Stanley  MRN: [id number]  : 1945    Pre-procedure diagnosis: abscess  Post-procedure diagnosis: Same    Procedure Date/Time: 2017  2:16 PM  Procedure: CT guided drain  Estimated blood loss: None  Specimen(s) collected with description: aspirate for culture  The patient tolerated the procedure well with no immediate complications.  Significant findings:serous fluid    See imaging dictation for procedural details.    Provider name: Marc Benavides  Assistant(s):None

## 2017-08-01 NOTE — PROGRESS NOTES
"Thoracic Surgery POD #4:  /61  Temp 98.9  F (37.2  C) (Oral)  Resp 16  Ht 1.676 m (5' 6\")  Wt 66.3 kg (146 lb 2.6 oz)  SpO2 97%  BMI 23.59 kg/m2  CXR:  Same, no PTX  CT:  Minimal serous output  S:  No specific complaints.   O:  Basilar CT:  DCed without complication.  Occlusive dressing applied.  P:  As per ID, Gen Surg, Intensivist, et al    Willa Barry PA-C with Dr. Selwyn Varela  [facility]  Cell [phone]      "

## 2017-08-01 NOTE — PROGRESS NOTES
[facility]    Infectious Disease Progress Note    Date of Service (when I saw the patient): 08/01/2017     Assessment & Plan   Ara Stanley is a 71 year old female who was admitted on 7/26/2017.     Impression:  1 71 y.o female admitted in the hospital last month.   2 Initial presentation was of acute abdomen taken to OR for cholecystectomy.   3 Taken back to the OR 4 days later with concern for peritonitis, found to have gastric perforation of an ulcer which was repaired. C albicans in cx  4 Post-operative course has been complicated with multiple intubations, trials of weaning off the vent and now with trach. HCAP. Sputum stento  6 Mild renal insuff.   7. Developed C diif at [place].   8. Readmitted for empyema. Though multiple cultures does not have any bacteria in them from pleural fluid.   9.  Repeat cultures from the abdominal purulent discharge has VRE and CoNS   10. Cultures from the pleural fluid has stayed negative.   11. Started on Daptomycin and Meropenem with concern for sepsis          Recommendations:   VRE in the periumbilical discharge, no candida or GNR. Currently on Daptomycin and Meropenem.   Cultures from the drain fluid pending. More drainage pending.   Micafungin for Candida from the intraabdominal fluid, no more candida seen in cultures here. Will switch back to fluc as previously seen albicans/dublinensis was susceptible to fluc.   Continue oral vancomycin for recent C diff diagnosis.       Celso Birch MD    Interval History   T max 99.6   Repeat CT has a large collection  Cultures from the drain ordered and are pending       Physical Exam   Temp: 99.6  F (37.6  C) Temp src: Axillary BP: 119/62   Heart Rate: 86 Resp: 16 SpO2: 95 % O2 Device: Mechanical Ventilator    Vitals:    07/26/17 1900 07/27/17 0400 07/29/17 0500   Weight: 76 kg (167 lb 8.8 oz) 67.1 kg (147 lb 14.9 oz) 66.3 kg (146 lb 2.6 oz)     Vital Signs with Ranges  Temp:  [99.4  F (37.4  C)-99.6  F  (37.6  C)] 99.6  F (37.6  C)  Heart Rate:  [70-96] 86  Resp:  [11-32] 16  BP: ()/(46-86) 119/62  FiO2 (%):  [40 %] 40 %  SpO2:  [95 %-100 %] 95 %    Constitutional: vent, awake, mouthing words   Lungs: Clear to auscultation bilaterally, no crackles or wheezing  Cardiovascular: Regular rate and rhythm, normal S1 and S2, and no murmur noted  Abdomen: Normal bowel sounds, soft, non-distended, non-tender, drain in place, no drainage around it   Skin: No rashes, no cyanosis, no edema  Other:    Medications     NaCl 10 mL/hr at 08/01/17 0759     - MEDICATION INSTRUCTIONS -         meropenem  1 g Intravenous Q8H     DAPTOmycin (CUBICIN) intermittent infusion  6 mg/kg Intravenous Q24H     HYDROmorphone   Intravenous PCA     docusate sodium  100 mg Oral BID     protein modular  1 packet Per Feeding Tube Q8H     sodium chloride (PF)  3 mL Intracatheter Q8H     bacitracin   Topical TID     pneumococcal  0.5 mL Intramuscular Prior to discharge     heparin  5,000 Units Subcutaneous Q8H     chlorhexidine  15 mL Mouth/Throat Q12H     acetaminophen  1,000 mg Per J Tube Q8H     ALPRAZolam (XANAX) tablet 0.25 mg  0.25 mg Per J Tube TID     baclofen (LIORESAL) half-tab 5 mg  5 mg Per J Tube QAM     budesonide  0.5 mg Nebulization BID     arformoterol  15 mcg Nebulization Q12H     hydrALAZINE (APRESOLINE) tablet 25 mg  25 mg Per G Tube Q6H     lidocaine  1 patch Transdermal Daily     lisinopril (PRINIVIL/ZESTRIL) tablet 20 mg  20 mg Per J Tube Daily     simvastatin  10 mg Oral At Bedtime     metoprolol (LOPRESSOR) tablet 50 mg  50 mg Oral BID     mirtazapine (REMERON) tablet TABS 22.5 mg  22.5 mg Per G Tube At Bedtime     pantoprazole  40 mg Per J Tube BID     sertraline (ZOLOFT) tablet 150 mg  150 mg Per J Tube Daily     vancomycin  125 mg Oral 4x Daily     lidocaine   Transdermal Q24h     lidocaine   Transdermal Q8H     micafungin  100 mg Intravenous Q24H     nystatin  500,000 Units Oral 4x Daily       Data   All microbiology  laboratory data reviewed.  Recent Labs   Lab Test  08/01/17   0445  07/31/17   0500  07/31/17   0415   WBC  12.3*  10.8  11.1*   HGB  7.7*  6.5*  6.5*   HCT  23.3*  20.8*  20.6*   MCV  84  88  88   PLT  474*  467*  471*     Recent Labs   Lab Test  08/01/17   0445  07/31/17   0415  07/30/17   1250   CR  0.82  0.89  0.87     Recent Labs   Lab Test  06/04/12   0527   SED  34*     Recent Labs   Lab Test  07/31/17   1600  07/30/17   1720  07/30/17   1538  07/28/17   1355  07/27/17   1120  07/26/17   1840  06/19/17   0520  06/16/17   1145  06/15/17   1947   CULT  Pending  No growth after 2 days  No growth after 2 days  Culture negative after 3 days  Culture received and in progress.  Positive AFB results are called as soon as   detected.  Final report to follow in 7 to 8 weeks.    Culture negative monitoring continues  Culture negative monitoring continues  Canceled, Test credited Test reordered as correct code REORDERED AS A TISSUE   CULTURE    Canceled, Test credited Test reordered as correct code REORDERED AS A TISSUE   CULTURE    Light growth Coagulase negative Staphylococcus  These bacteria are part of normal skin silvino, but on occasion, may be true   pathogens.  Clinical correlation must be applied to interpreting this   microbiology result.  Susceptibility testing not routinely done  *  Light growth Normal skin silvino  Single colony Enterococcus faecium (VRE)  Critical Value/Significant Value called to and read back by Estelita Wells RN at 0416   [id number]  *  No growth  Moderate growth Stenotrophomonas maltophilia*  Heavy growth Stenotrophomonas maltophilia  Light growth Candida albicans / dubliniensis Candida albicans and Candida   dubliniensis are not routinely speciated Susceptibility testing not routinely   done  *

## 2017-08-01 NOTE — PLAN OF CARE
Problem: Goal Outcome Summary  Goal: Goal Outcome Summary  Discharge Planner OT   Patient plan for discharge: Return to LTACH  Current status: Pt tolerated and participated well in B UE HEP for strengthening and endurance purposes.   Barriers to return to prior living situation: Current level of A required for ADL/transfers  Recommendations for discharge: LTACH  Rationale for recommendations: Pt limited by impaired balance, pain, decreased activity tolerance, and B UE weakness; thus, would benefit from continued OT intervention. Pt has multiple comorbidities, including trach, requiring continual cares.        Entered by: Catalina Gonzalez 08/01/2017 11:56 AM

## 2017-08-01 NOTE — PROGRESS NOTES
Surgery Note    I have reviewed Ara's CT abd/pelvis from yesterday. She has a large fluid collection adjacent to the stomach which persists from prior scan on 7/20. I spoke with Dr. Benavides with Radiology who feels this is amenable to CT guided drainage. As she continues to have purulent drainage from the previously placed abdominal drain, has a leukocytosis (though this is multifactorial with c.diff, recent VATS, etc) - it would be reasonable to pursue CT guided drainage of this more anterior collection.     Estelita Peck MD  [facility]  [phone]

## 2017-08-02 NOTE — PROGRESS NOTES
Met with patient to discuss potential transfer to [facility]. Patient was in agreement with discharge. Spoke with patients JUANA Ramirez and updated patients condition and plan of care. Await  Final decision from [facility] regarding transfer     Stretcher transportation set for  8/3 at 12noon via [facility]. POA notified.

## 2017-08-02 NOTE — PLAN OF CARE
Problem: Goal Outcome Summary  Goal: Goal Outcome Summary  Discharge Planner PT   Patient plan for discharge: LTACH  Current status: Pt transfers minAx1 supine>sit via log roll with assist to manage cords/tubing. Pt able to stand 2 times; 2 minutes the first time and 1 minute the second time at EOB with modA x2 for balance.Pt transfers EOB>chair via lift and assist of 2 for management of tubing.   Barriers to return to prior living situation: Level of assist, fatigue, weakness and balance   Recommendations for discharge: LTACH  Rationale for recommendations: Pt with several medical comorbidities, including tracheostomy, will benefit from continued care at LTAC.   Entered by: Ruben Luong 08/02/2017 11:51 AM

## 2017-08-02 NOTE — PROGRESS NOTES
Patient was on PS 10/5 for 12 hrs today. Pt placed back on full vent support at 2000 to rest overnight.   Will cont to monitor.  8/1/2017  Pallavi Hammond RRT

## 2017-08-02 NOTE — PLAN OF CARE
Problem: Goal Outcome Summary  Goal: Goal Outcome Summary  Outcome: Improving  Tolerated PS until 2200 then full support overnight. VSS on 40%. C/o pain to buttocks/generalized discomfort relieved somewhat with PRN oxycodone. Will continue to monitor.

## 2017-08-02 NOTE — PROGRESS NOTES
Passy Greg Valve was performed today. Pt was not able to tolerate the valve, but did tolerate well deflated cuff for 15 minute.     Oneil Aleman RT.

## 2017-08-02 NOTE — PROGRESS NOTES
"General Surgery Progress Note    Subjective: pt comfortable in bed, denies abdominal pain this morning.     Objective: BP 91/50  Temp 97.8  F (36.6  C) (Axillary)  Resp 15  Ht 5' 6\" (1.676 m)  Wt 147 lb 7.8 oz (66.9 kg)  SpO2 100%  BMI 23.81 kg/m2  Gen: opens eyes to voice  CV: RRR  Pulm:mech vent  Abd: soft, right FUNMI with small amount of purulent drainage, left FUNMI with serosanguinous drainage  Ext: WWP    Assessment/Plan:   Ara Stanley  is a 71 year old female with intraabdominal fluid collections s/p IR drainage. Gram stain negative for drain placed 8/1. Right sided drain gram stain w GPC. Abdominal exam benign.   - continue abdominal drains until <20ml/24hr period or per IR recommendations  - may benefit from follow up sinogram prior to drain removal to assess resolution of fluid collections  - pt can follow up with me in clinic 1 week following d/c    Estelita Peck MD  [facility]  [phone]              "

## 2017-08-02 NOTE — PLAN OF CARE
Problem: Goal Outcome Summary  Goal: Goal Outcome Summary  Discharge Planner SLP   Patient plan for discharge: [facility]  Current status:  Patient was seen for Passy Greg Valve (PMV) with RT present for cuff deflation, ventilator monitoring on PS. Patient tolerated cuff deflation and was able to achieve good voicing. PMV placed and she demonstrated difficulty tolerating it, so it was removed. Continue the session with the cuff deflated. She stated she was more fatigued and anxious today. Likely the cause of her not tolerating the valve today, compared to yesterday and previous sessions. Recommend: 1. Continue daily trials of the PMV with ST and RT only at this time. 2. No ice chips due to silent aspiration noted on a video swallow study completed at [place] prior to her current hospitalization.    Barriers to return to prior living situation: Vent, TF and deconditioning.  Recommendations for discharge: [facility]  Rationale for recommendations: Continue ST at [facility] for tolerance of the PMV and dysphagia. Patient has good participation during therapy sessions.         Entered by: Pallavi Sullivan 08/02/2017 2:46 PM

## 2017-08-02 NOTE — PROGRESS NOTES
Count includes the [facility] ICU RESPIRATORY NOTE  Date of Admission: 7/26/17  Date of Intubation (most recent): Tracheostomy 6/13/17  Reason for Mechanical Ventilation: Vent dependent   Number of Days on Mechanical Ventilation:   Met Criteria for Pressure Support Trial: Yes   Length of Pressure Support Trial: PS 10/5 since 11:21 am today to present.   Significant Events Today: Passy Greg Valve was performed today. Pt was not able to tolerate the valve, but did tolerate well deflated cuff for 15 minute  ABG Results: none today  ETT appearance on chest x-ray: Trach in placed   Plan: Pt will remains on full ventilator support and wean daily.     Oneil Aleman, RT.

## 2017-08-02 NOTE — PROGRESS NOTES
[facility]    Infectious Disease Progress Note    Date of Service (when I saw the patient): 08/02/2017     Assessment & Plan   Ara Stanley is a 71 year old female who was admitted on 7/26/2017.     Impression:  1 71 y.o female admitted in the hospital last month.   2 Initial presentation was of acute abdomen taken to OR for cholecystectomy.   3 Taken back to the OR 4 days later with concern for peritonitis, found to have gastric perforation of an ulcer which was repaired. C albicans in cx  4 Post-operative course has been complicated with multiple intubations, trials of weaning off the vent and now with trach. HCAP. Sputum stento  6 Mild renal insuff.   7. Developed C diif at [place].   8. Readmitted for empyema. Though multiple cultures does not have any bacteria in them from pleural fluid.   9.  Repeat cultures from the abdominal purulent discharge has VRE and CoNS   10. Cultures from the pleural fluid has stayed negative.   11. Started on Daptomycin and Meropenem with concern for sepsis          Recommendations:   VRE in the periumbilical discharge, no candida or GNR. Currently on Daptomycin and Meropenem.   Cultures from the drain has GPC.   Candida from the intraabdominal fluid prior cultures, no more candida seen in cultures here. Back on fluc as albicans/dublinensis was susceptible to fluc.   Continue oral vancomycin for recent C diff diagnosis.       Celso Birch MD    Interval History   Afebrile again   Repeat CT has a large collection        Physical Exam   Temp: 98.8  F (37.1  C) Temp src: Oral BP: 91/50   Heart Rate: 80 Resp: 15 SpO2: 100 % O2 Device: Mechanical Ventilator    Vitals:    07/27/17 0400 07/29/17 0500 08/02/17 0600   Weight: 67.1 kg (147 lb 14.9 oz) 66.3 kg (146 lb 2.6 oz) 66.9 kg (147 lb 7.8 oz)     Vital Signs with Ranges  Temp:  [97.8  F (36.6  C)-98.8  F (37.1  C)] 98.8  F (37.1  C)  Heart Rate:  [] 80  Resp:  [13-21] 15  BP: ()/(44-66) 91/50  FiO2  (%):  [40 %] 40 %  SpO2:  [92 %-100 %] 100 %    Constitutional: vent, awake, mouthing words   Lungs: Clear to auscultation bilaterally, no crackles or wheezing  Cardiovascular: Regular rate and rhythm, normal S1 and S2, and no murmur noted  Abdomen: Normal bowel sounds, soft, non-distended, non-tender, drain in place, no drainage around it   Skin: No rashes, no cyanosis, no edema  Other:    Medications     NaCl 10 mL/hr at 08/01/17 0759     - MEDICATION INSTRUCTIONS -         fluconazole  400 mg Intravenous Q24H     sodium chloride (PF)  10 mL Irrigation Q8H     meropenem  1 g Intravenous Q8H     DAPTOmycin (CUBICIN) intermittent infusion  6 mg/kg Intravenous Q24H     docusate sodium  100 mg Oral BID     sodium chloride (PF)  3 mL Intracatheter Q8H     bacitracin   Topical TID     pneumococcal  0.5 mL Intramuscular Prior to discharge     heparin  5,000 Units Subcutaneous Q8H     chlorhexidine  15 mL Mouth/Throat Q12H     acetaminophen  1,000 mg Per J Tube Q8H     ALPRAZolam (XANAX) tablet 0.25 mg  0.25 mg Per J Tube TID     baclofen (LIORESAL) half-tab 5 mg  5 mg Per J Tube QAM     budesonide  0.5 mg Nebulization BID     arformoterol  15 mcg Nebulization Q12H     hydrALAZINE (APRESOLINE) tablet 25 mg  25 mg Per G Tube Q6H     lidocaine  1 patch Transdermal Daily     lisinopril (PRINIVIL/ZESTRIL) tablet 20 mg  20 mg Per J Tube Daily     metoprolol (LOPRESSOR) tablet 50 mg  50 mg Oral BID     mirtazapine (REMERON) tablet TABS 22.5 mg  22.5 mg Per G Tube At Bedtime     pantoprazole  40 mg Per J Tube BID     sertraline (ZOLOFT) tablet 150 mg  150 mg Per J Tube Daily     vancomycin  125 mg Oral 4x Daily     lidocaine   Transdermal Q24h     lidocaine   Transdermal Q8H     nystatin  500,000 Units Oral 4x Daily       Data   All microbiology laboratory data reviewed.  Recent Labs   Lab Test  08/02/17   0520  08/01/17   0445  07/31/17   0500   WBC  12.2*  12.3*  10.8   HGB  8.1*  7.7*  6.5*   HCT  24.8*  23.3*  20.8*   MCV   85  84  88   PLT  518*  474*  467*     Recent Labs   Lab Test  08/02/17   0520  08/01/17   0445  07/31/17   0415   CR  0.92  0.82  0.89     Recent Labs   Lab Test  06/04/12   0527   SED  34*     Recent Labs   Lab Test  08/01/17   1402  07/31/17   1600  07/30/17   1720  07/30/17   1538  07/28/17   1355  07/27/17   1120  07/26/17   1840  06/19/17   0520  06/16/17   1145   CULT  Pending  Pending  On day 1, isolated in broth only: Gram positive cocci  Culture in progress  *  No growth after 3 days  No growth after 3 days  No growth  Culture negative after 4 days  Culture received and in progress.  Positive AFB results are called as soon as   detected.  Final report to follow in 7 to 8 weeks.    Culture negative monitoring continues  Canceled, Test credited Test reordered as correct code REORDERED AS A TISSUE   CULTURE    Canceled, Test credited Test reordered as correct code REORDERED AS A TISSUE   CULTURE    Light growth Coagulase negative Staphylococcus  These bacteria are part of normal skin silvino, but on occasion, may be true   pathogens.  Clinical correlation must be applied to interpreting this   microbiology result.  Susceptibility testing not routinely done  *  Light growth Normal skin silvino  Single colony Enterococcus faecium (VRE)  Critical Value/Significant Value called to and read back by Estelita Wells RN at 0416   [id number]  *  No growth  Moderate growth Stenotrophomonas maltophilia*

## 2017-08-02 NOTE — PLAN OF CARE
Problem: Respiratory Insufficiency (Adult)  Goal: Identify Related Risk Factors and Signs and Symptoms  Related risk factors and signs and symptoms are identified upon initiation of Human Response Clinical Practice Guideline (CPG)   Outcome: Therapy, progress toward functional goals as expected  VSS,  Mod. Pain, no stool since flexiseal dc'd.  More tired today on the PM valve.  Plan for transfer to LTAC tommorow.

## 2017-08-02 NOTE — PROGRESS NOTES
[facility]    Critical Care Service  Progress Note    Date of Service (when I saw the patient): 08/02/2017     Assessment & Plan   Ara Stanley is a 71 year old female who was recently hospitalized at [facility] for lap alexandre and returned to operating room for worsening status and was found to have gastric perforation which was repaired, and ultimately requiring tracheostomy for respiratory failure who was admitted on 7/26/2017 from [place] with loculated pleural effusion for evaluation by thoracic surgery for potential VATS.     Active problems  Ongoing respiratory failure requiring mechanical ventilation; persistent pleural effusions   Presumed infection given increased WBC and procalcitonin     Main Plans for Today   Coordinating to return to LTACH   Coordination with ID for discharge plan      Neuro  1. Anxiety   2. Depression   3. Acute on chronic pain  Plan:  -- Scheduled acetaminophen, lidocaine patches and baclofen for pain. PRN flexeril and low dose oxycodone.    -- Continue PTA zoloft and mirtazapine  --Continue scheduled xanax, and PRN seroquel and trazodone for now. Per patient, Trazodone works best at HS, periodically needs additional seroquel for anxiety   -- Delirium prevention as able; maintain sleep schedule     CV  1. HTN  2. Paroxysmal A-Fib; RVR into 140s on 7/30 with hypotension.  Now SR.  Converted with diltiazem bolus, fluids, metoprolol  3. Small pericardial effusion; new on CT scan 7/31  Plan:  -- Continue PTA meds   --IV Lasix, 40mg, x 2 for diuresis given pericardial and pleural effusions. Received second dose of lasix this AM; is net volume negative     Resp:  1. Chronic hypercapnic respiratory failure   2. Loculated pleural effusion s/p VATS with 3 chest tubes; initially concerning for abscess or empyema; cultures post VATS with no growth to date.    3. Moderate right sided pleural effusion; increased in interval since previous scan   4. COPD   Plan:  -- Thoracic  surgery: managing chest tubes.  Apical and posterior tubes removed today. One tube remaining; serosang output.  Per Thoracis, will likely remove remaining tube 8/1.   --Continue current vent settings; daily weaning trials   --Speech pathology consulted for swallowing on vent; PMV, etc. Tolerating daily PMV and swallowing trials   --COPD; continue PTA nebs   --Lasix as indicated above; therapy completed today.  Monitor closely for improvement. CXR with slight improvement     GI/Nutrition  1. S/p laparoscopic cholecystectomy, gastric ulcer perforation with omental patch, and persistent yeast in abdominal fluid cultures previous admission.   2. Persistent abdominal fluid collection.  Right FUNMI drain with purulent output; placed at outside hospital for reported abscess on abdominal CT.  Left FUNMI drain placed 8/1 in radiology department; serosanguinous output; cultures with no growth to date.   Plan:  -- TF at goal.  Speech therapy for swallowing trials. Very anxious to eat, drink, etc.  Continues to make small gains with speech therapy    -- PPI  --FUNMI drains with small/moderate output.  Follow-up with general surgery, Dr. Chaudhry, one week after discharge.      Renal/Lytes/Fluid   1. WOODY; resolved.    Plan:  -- Monitor creatinine, lytes.  Creatinine appears to be back to original baseline 0.7-0.9. Phos and BUN creeping up.   --Na 132; continue enteric free water to 60 Q 4 hours with no improvement.  Monitor; no changes     ID  1. Has had multiple fluid collections growing candida, on micafungin (previously on fluconazole).   2. Leukocytosis  3. Loculated pleural effusions   4. Recent c.diff infection   Plan:  --Switched back to Fluconazole per ID  --Procal grossly elevated 0.52 on admission. 72.24 on 7/30; 61.38 on 7/31.   Initiated on systemic antibiotics.   Continue systemic antibiotics for now pending abdominal wound culture. Will determine ID plan in conjunction with ID MD prior to discharge   --oral vanco  "initiated 7/20 at [facility] for c. Diff infection; enteric isolation. C.diff recheck on 8/1 negative.  Stop date 8/2  --Mild leukocytosis in the past 48 hours.  Afebrile. Does not appear septic   --Abdominal wounds cultures from right IJ with isolated VRE and Coag neg strep in abdominal wound/FUNMI insertion site. Left FUNMI placed 8/1 in IR with no growth to date   ----ID consult   --CT guided abscess drainage 8/1; fluid from abdomen cultures pending     Endocrine  1. No acute issues   Plan:  --  Insulin gtt per protocol if indicated  -- Keep BG  <180 for optimal healing    Heme:  1. Anemia of chronic illness; baseline hemoglobin since last admission around 7-8  Plan:  -- Monitor hemoglobin. 8.1 this AM; received one unit PRBC 7/31.  Has received transfusions past admission for anemia.    -- Transfuse to keep > 7.0    MSK  1. Chronic hip/back pain   Plan:  --Continue PTA baclofen.  OOB as able   --PT/OT     Skin  1. Multiple surgical incisions   2. Excoriated rectum   Plan:  --Wound cares per POC.    --Follow cultures from abdominal wounds.   --Remove rectal tube     General cares:  DVT Prophylaxis: Heparin SQ  GI Prophylaxis: PPI  Restraints: Restraints for medical healing needed: NO  Family update by me today: No  Current lines are required for patient management  Access: right PICC    Isabella Gutierrez    Time Spent on this Encounter   Billing:  I spent 60 minutes bedside and on the inpatient unit today managing the critical care of Ara Stanley in relation to the issues listed in this note.    Interval History   Informed patient of nearing readiness to return to LTACH. Patient tearful saying, \"I have no choices\"  Discussion with patient about her rights and what choices she does have in her recovery process.  Patient states, \"I just want to get better.\"  Will proceed with discharge to [facility] tomorrow AM.  Follow culture results; plan for antimicrobials going forward to be developed by ID prior to discharge. "     Physical Exam   Temp: 98.8  F (37.1  C) Temp src: Oral Temp  Min: 97.8  F (36.6  C)  Max: 98.8  F (37.1  C) BP: 91/50   Heart Rate: 80 Resp: 15 SpO2: 100 % O2 Device: Mechanical Ventilator    Vitals:    07/27/17 0400 07/29/17 0500 08/02/17 0600   Weight: 67.1 kg (147 lb 14.9 oz) 66.3 kg (146 lb 2.6 oz) 66.9 kg (147 lb 7.8 oz)     I/O last 3 completed shifts:  In: 1670 [I.V.:350; NG/GT:420]  Out: 2230 [Urine:1435; Drains:180; Stool:600; Chest Tube:15]    GEN: awake, alert, NAD   EYES: PERRL, Anicteric sclera.   HEENT:  Normocephalic, atraumatic, trachea midline,trach secure  CV: RRR, no gallops, rubs. Murmur noted   PULM/CHEST: Coarse breath sounds on left without rhonchi, crackles or wheeze. Course, diminished in right middle/lower lobes.  Symmetric chest rise. Chest tube dressing CDI   GI: normal bowel sounds, soft, minimally-tender, no rebound tenderness or guarding, no masses. Drain with purulent discharge.  Left drain with SS discharge   : tanner catheter in place, urine yellow and clear  EXTREMITIES: No peripheral edema, moving all extremities, peripheral pulses intact  NEURO: Cranial nerves II-XII grossly intact, no motor-sensory deficits noted  SKIN: Multiple surgical incisions, covered, and dry   PSYCH:  Affect: tearful, minimally anxious, mentation at baseline, not altered, no hallucinations  Imaging personally reviewed: CXR   ECG: SR     Medications     NaCl 10 mL/hr at 08/01/17 0759     - MEDICATION INSTRUCTIONS -         fluconazole  400 mg Intravenous Q24H     sodium chloride (PF)  10 mL Irrigation Q8H     meropenem  1 g Intravenous Q8H     DAPTOmycin (CUBICIN) intermittent infusion  6 mg/kg Intravenous Q24H     docusate sodium  100 mg Oral BID     sodium chloride (PF)  3 mL Intracatheter Q8H     bacitracin   Topical TID     pneumococcal  0.5 mL Intramuscular Prior to discharge     heparin  5,000 Units Subcutaneous Q8H     chlorhexidine  15 mL Mouth/Throat Q12H     acetaminophen  1,000 mg Per J  Tube Q8H     ALPRAZolam (XANAX) tablet 0.25 mg  0.25 mg Per J Tube TID     baclofen (LIORESAL) half-tab 5 mg  5 mg Per J Tube QAM     budesonide  0.5 mg Nebulization BID     arformoterol  15 mcg Nebulization Q12H     hydrALAZINE (APRESOLINE) tablet 25 mg  25 mg Per G Tube Q6H     lidocaine  1 patch Transdermal Daily     lisinopril (PRINIVIL/ZESTRIL) tablet 20 mg  20 mg Per J Tube Daily     metoprolol (LOPRESSOR) tablet 50 mg  50 mg Oral BID     mirtazapine (REMERON) tablet TABS 22.5 mg  22.5 mg Per G Tube At Bedtime     pantoprazole  40 mg Per J Tube BID     sertraline (ZOLOFT) tablet 150 mg  150 mg Per J Tube Daily     vancomycin  125 mg Oral 4x Daily     lidocaine   Transdermal Q24h     lidocaine   Transdermal Q8H     nystatin  500,000 Units Oral 4x Daily       Data     Recent Labs  Lab 08/02/17  0520 08/01/17  0445 07/31/17  0500 07/31/17  0415 07/30/17  1250  07/27/17  2150  07/26/17  1840   WBC 12.2* 12.3* 10.8 11.1* 15.8*  < >  --   < > 13.7*   HGB 8.1* 7.7* 6.5* 6.5* 7.7*  < >  --   < > 7.3*   MCV 85 84 88 88 88  < >  --   < > 88   * 474* 467* 471* 487*  < >  --   < > 535*   INR  --   --   --   --   --   --  1.16*  --  1.12   * 131*  --  131* 134  < >  --   < > 140   POTASSIUM 4.6 4.3  --  4.0 4.0  < >  --   < > 4.6   CHLORIDE 100 97  --  97 98  < >  --   < > 100   CO2 25 26  --  27 25  < >  --   < > 33*   BUN 48* 46*  --  48* 47*  < >  --   < > 72*   CR 0.92 0.82  --  0.89 0.87  < >  --   < > 0.90   ANIONGAP 7 8  --  7 11  < >  --   < > 7   CARYL 8.0* 7.9*  --  7.5* 7.8*  < >  --   < > 8.9   * 104*  --  94 108*  < >  --   < > 78   ALBUMIN  --   --   --   --  1.5*  --   --   --  1.7*   PROTTOTAL  --   --   --   --  6.8  --   --   --  7.0   BILITOTAL  --   --   --   --  0.2  --   --   --  0.3   ALKPHOS  --   --   --   --  115  --   --   --  108   ALT  --   --   --   --  16  --   --   --  17   AST  --   --   --   --  25  --   --   --  17   TROPI  --   --   --   --  <0.015The 99th percentile  for upper reference range is 0.045 ug/L.  Troponin values in the range of 0.045 - 0.120 ug/L may be associated with risks of adverse clinical events.  --   --   --   --    < > = values in this interval not displayed.  Recent Results (from the past 24 hour(s))   CT Abdomen Peritonium Abscess Drainage    Narrative    CT ABDOMEN PERITONEUM ABSCESS DRAIN W CATH PLACE  August 1, 2017 2:22  PM     HISTORY: Loculated intra-abdominal fluid collection. Percutaneous  drainage requested by primary service.    COMPARISON: 7/31/2017.     MEDICATIONS: 6mL Lidocaine 1%, 1mg Versed, 100mcg Fentanyl given over  30 minutes. RN: Dhara MONDRAGON    TECHNIQUE: Informed consent was obtained from the patient. A timeout  was performed. Patient received conscious sedation using Versed and  fentanyl with constant monitoring by the physician and nursing staff.  Total sedation time was 30 minutes. Noncontrast CT images were  obtained through the upper abdomen. Radiation dose for this scan was  reduced using automated exposure control, adjustment of the mA and/or  kV according to patient size, or iterative reconstruction technique. A  route for drain placement was chosen. The skin was prepped and draped  in a sterile manner. 1% lidocaine was used for local anesthesia. Using  CT guidance, the loculated fluid collection was accessed with a 5  Cymraes Yueh needle. Serous, yellow material was aspirated. A specimen  was submitted for culture and Gram stain. The tract was dilated over a  guidewire and a 10 Cymraes locking pigtail catheter was placed. The  catheter was secured to the skin with an adhesive fixation device.  There were no immediate complications and patient left the CT suite in  satisfactory condition.       Impression    IMPRESSION: Successful CT guided abscess drain placement.     ALBERTA AJ MD   XR Chest Port 1 View    Narrative    XR CHEST PORT 1 VW  8/2/2017 5:24 AM     HISTORY:  post-op L VATS, decortication    COMPARISON: Film dated  8/1/2017    FINDINGS:  Tracheostomy tube is in place. Feeding tube is again seen.  The left chest tube has been removed. No pneumothorax. Left pleural  thickening and left basilar opacity are unchanged. Right basilar  opacity is probably due to a right pleural effusion.      Impression    IMPRESSION: Left chest tube removed. No pneumothorax.    CAROL CONTRERAS MD

## 2017-08-02 NOTE — PROGRESS NOTES
CLINICAL NUTRITION SERVICES - REASSESSMENT NOTE      Recommendations Ordered by Registered Dietitian (RD): Change TF regimen to a renal formula in light of high Phos levels ---> Nepro at 40 mL/hr to provide:  1728 kcal (26 kcal per kg), 78 g protein (1.2 g per kg), 155 g CHO, 12 g fiber, 700 mL H2O   Discontinue the ProSource (no longer needed)        EVALUATION OF PROGRESS TOWARD GOALS   Diet:  Clear Liquid diet (started yesterday 8/1)  Nutrition Support:  Patient continues on goal TF regimen as follows ~    Nutrition Support Enteral:  Type of Feeding Tube: Nasoduodenal FT   Enteral Frequency:  Continuous  Enteral Regimen: Fibersource HN at 50 mL/hr   Total Enteral Provisions: 1440 kcal, 65 g protein, 972 mL H2O, 18 g fiber, 197 g CHO   Free Water Flush: 60 mL every 4 hours  Patient also receiving ProSource 1 pkt TID= 120 kcal, 33 g protein   Total (TF + ProSource)= 1560 kcal (23 kcal per kg or 93% energy needs) and 98 g proten (1.5 g per kg)    Intake/Tolearnce:    Na 132 (L)  BUN 48 (H), Phos 5.6 (H) and trending upward - WOODY resolving, H/O CKD    Glucose 112  Stool 600 mL (+ cdiff) - Noted plans to d/c rectal tube, Colace on hold   Drains 180 mL    Dosing Weight: 67 kg (admit wt)      ASSESSED NUTRITION NEEDS PER APPROVED PRACTICE GUIDELINES:  Estimated Energy Needs:  2779-8825 kcals (25-30 Kcal/Kg)  Justification: maintenance  Estimated Protein Needs:   grams protein (1.1-1.5 g pro/Kg)  Justification: Repletion, hypercatabolism with acute illness and CKD      NEW FINDINGS:   CT guided abscess drainage from abdomen -- cultures pending     Previous Goals (7/31):   Fibersource HN at 50 mL/hr + ProSource TID will continue to meet % needs - Met  Phos level will improve - Not met    Previous Nutrition Diagnosis (7/31):   No nutrition diagnosis identified at this time  Evaluation: Declining    CURRENT NUTRITION DIAGNOSIS  Altered nutrition-related lab value (high Phos) related to resolving WOODY on CKD, not  on a renal formula as evidenced by Phos level of 5.6 (trending upward)    INTERVENTIONS  Recommendations / Nutrition Prescription  Change TF regimen to a renal formula in light of high Phos levels ---> Nepro at 40 mL/hr to provide:  1728 kcal (26 kcal per kg), 78 g protein (1.2 g per kg), 155 g CHO, 12 g fiber, 700 mL H2O   Discontinue the ProSource (no longer needed)     Implementation  EN Composition and Medical Food Supplement:  Above TF orders written  Collaboration and Referral of Nutrition care:  Patient discussed today during interdisciplinary bedside rounds     Goals  Nepro at 40 mL/hr will meet % needs  Phos level will improve with change in TF formula    MONITORING AND EVALUATION:  Progress towards goals will be monitored and evaluated per protocol and Practice Guidelines    Bisi Brumfield, RD, LD, CNSC   Clinical Dietitian - [facility]

## 2017-08-02 NOTE — PLAN OF CARE
Problem: Goal Outcome Summary  Goal: Goal Outcome Summary  Discharge Planner OT   Patient plan for discharge: Return to LTACH  Current status:  Worked on AAROM to both UEs within limits of tubing on left.  Pt completed 5 B UE exercises x 10 reps.  Complaining of shoulder pain in right shoulder with any shoulder flexion or horizontal abduction exercise.  Pain located along the clavicle near the humerus joint.  Barriers to return to prior living situation: Current level of A required for ADL/transfers  Recommendations for discharge: LTACH  Rationale for recommendations: Pt limited by impaired balance, pain, decreased activity tolerance, and B UE weakness; thus, would benefit from continued OT intervention. Pt has multiple comorbidities, including trach, requiring continual cares.        Entered by: Eric Franco 08/02/2017 3:54 PM

## 2017-08-02 NOTE — PROGRESS NOTES
Pt was placed on PS 10/5, 40% and tolerating well. RT will continue to monitor the pt.     Oneil Aleman RT.

## 2017-08-02 NOTE — PROGRESS NOTES
Records reviewed.  Have contacted Clinical Liaison Ruy from [facility].  They will be able to accept when pt is ready for transition to an LTACH.  They will need to send prior authorization to pt's insurance.  This can be done the same day.

## 2017-08-02 NOTE — PROGRESS NOTES
As noted by Myrna, Care Coordinator, planning discharge to [facility] tomorrow at 12 noon.  The clinical Liaison from [facility], Ruy, was updated.  He will give the numbers for Nsg and Physician to call report in the AM.

## 2017-08-03 NOTE — PLAN OF CARE
Problem: Goal Outcome Summary  Goal: Goal Outcome Summary  Physical Therapy Discharge Summary     Reason for therapy discharge:    Discharged to LTACH     Progress towards therapy goal(s). See goals on Care Plan in [facility] electronic health record for goal details.  Goals not met.  Barriers to achieving goals:   discharge from facility.     Therapy recommendation(s):    Continued therapy is recommended.  Rationale/Recommendations:  Patient would benefit from continued skilled PT to progress her independence with all functional mobility and increase her tolerance for activity. .

## 2017-08-03 NOTE — DISCHARGE INSTRUCTIONS
Nepro at 40 mL/hr to provide:  1728 kcal (26 kcal per kg), 78 g protein (1.2 g per kg), 155 g CHO, 12 g fiber, 700 mL H2O

## 2017-08-03 NOTE — PLAN OF CARE
Problem: Goal Outcome Summary  Goal: Goal Outcome Summary  OT: Patient set to discharge to LTACH this PM. Defer further OT to LTACH.     Occupational Therapy Discharge Summary     Reason for therapy discharge:    Discharged to LTACH     Progress towards therapy goal(s). See goals on Care Plan in [facility] electronic health record for goal details.  Goals not met.  Barriers to achieving goals:   discharge from facility.     Therapy recommendation(s):    Continued therapy is recommended.  Rationale/Recommendations:  To maximize I in ADL/IADL.

## 2017-08-03 NOTE — PROGRESS NOTES
Surgery Update    I spoke with Radiology, Dr. Benavides who felt a sinogram would not be of much benefit. Pt underwent noncontrast CT of abd/pelvis. Images are not available yet - I will review when available. Anticipate leaving both drains in place for discharge. Pt can follow up with me in 1-2 weeks for drain removal. Drain outputs should be recorded daily. When <10ml/24hrs can remove.     Estelita Peck MD  [facility]  [phone]

## 2017-08-03 NOTE — PLAN OF CARE
Problem: Goal Outcome Summary  Goal: Goal Outcome Summary  Outcome: Improving  Tolerated PS until 2300 then full support overnight. VSS on 40%. C/o pain to buttocks/generalized discomfort relieved somewhat with PRN oxycodone. Will continue to monitor.

## 2017-08-03 NOTE — PROGRESS NOTES
Transport time for transition to [facility] was for 3:00pm.  Reportedly, [facility] noted it may be later.  Care coordinator met briefly with pt prior to her scan this AM.  Also called Courtney Marie, close friend and POA.  Courtney had asked that the documents she brought in for her to be able to pay pt's bills be notorized.  Pt's Nurse Dhara had taken care of this.  Courtney is unable to make it today and does not plan to visit pt until Sunday, so requested copies be mailed to her.  This was done.

## 2017-08-03 NOTE — PROGRESS NOTES
Pt continues to communicate well with mouthing words and writing board while trached. On PS trials all day. Pt able to assist with her own oral care. Day shift staff removed rectal tube this morning. Pt had large incontinent watery brown stool at 1830 and was fully cleaned up; mono area rash remains and rectal area tender and red but intact. Lungs clear, intermittent secretions inline and from trach site. Pt repositioned frequent for chronic back pain. Oxycodone given and lidocaine patches remain. Tube feed formula changed and continues infusing. BP 110s-130s, HR 70-80s, afebrile.   Pt understanding of plan to tfr to LTAC soon. Continue with plan of care.

## 2017-08-03 NOTE — PROGRESS NOTES
CM  I:  SW received update that patient will not be ready for transport at noon today; Staff requesting either 3 or 4pm.  ESME placed call [facility] who requests a 3 pm .  ESME then placed call to [facility] who agreed to 3 pm, with the understanding at that time they may also be responding to 911 calls, thus there is a chance they may be late.  ESME updated staff.      P:  No further SW interventions anticipated at this time.  Will be available if needs arise.    ANTOINETTE Hammer

## 2017-08-03 NOTE — DISCHARGE SUMMARY
"                                      [facility] Discharge Summary    Ara Stanley MRN# [id number]   YOB: 1945 Age: 71 year old     Date of Admission:  7/26/2017  Date of Discharge:  8/3/2017  Admitting Physician:  Scarlet Sorenson MD  Discharge Physician:  Jonna Dukes MD   Discharging Service:  Intensive Care       Primary Provider: Shar James          Admission Diagnoses:   Loculated pleural effusion; trapped left lung           Discharge Diagnosis:   -->COPD  -->Persistent  hypercapneic respiratory failure, recurrent.  status post tracheostomy  -->Bilateral pleural effusions  -->Persistent abdominal fluid collection; bilateral FUNMI drains   -->VRE isolated in abdominal drain culture   -->Status post cholecystectomy, laparoscopic  -->Anxiety   -->Anemia   -->Leukocytosis                [facility]:   [facility]           Condition on Discharge:   Discharge condition: Stable   Discharge vitals: Blood pressure 97/50, temperature 98  F (36.7  C), temperature source Oral, resp. rate 14, height 1.676 m (5' 6\"), weight 66.9 kg (147 lb 7.8 oz), SpO2 99 %.     Code status on discharge: Full Code           Procedures / Labs / Imaging:   Significant culture results:  Wound culture Aerobic Bacterial          7/27/2017  Specimen Description Abdominal Wound      Special Requests Specimen collected in eSwab transport (white cap)      Culture Micro -- (A)      Light growth Coagulase negative Staphylococcus   These bacteria are part of normal skin silvino, but on occasion, may be true    pathogens.  Clinical correlation must be applied to interpreting this    microbiology result.   Susceptibility testing not routinely done         Micro Report Status FINAL 07/29/2017     *     Wound Culture Aerobic Bacterial [[id number]] (Abnormal)  Collected: 07/26/17 1840      Order Status: Completed Lab Status: Final result Updated: 07/29/17 0718     Specimen: Wound from Abdomen       Specimen " Description Umbilicus Wound      Culture Micro -- (A)      Light growth Normal skin silvino   Single colony Enterococcus faecium (VRE)   Critical Value/Significant Value called to and read back by Estelita Wells RN at 0416    [id number]         Micro Report Status FINAL 07/29/2017      Organism: Single colony Enterococcus faecium (VRE)                 Medications Prior to Admission:     Prescriptions Prior to Admission   Medication Sig Dispense Refill Last Dose     albuterol (2.5 MG/3ML) 0.083% neb solution Take 1 vial by nebulization every 2 hours as needed for wheezing   7/25/2017 at 1740     ALPRAZOLAM PO 0.25 mg by Per J Tube route 3 times daily   7/26/2017 at 1300     budesonide (PULMICORT) 0.5 MG/2ML neb solution Take 0.5 mg by nebulization 2 times daily   7/26/2017 at 0800     Cyclobenzaprine HCl (FLEXERIL PO) 5 mg by Per J Tube route 3 times daily as needed for muscle spasms   7/23/2017 at 2226     formoterol (PERFOROMIST) 20 MCG/2ML neb solution Take 20 mcg by nebulization every 12 hours   7/26/2017 at 0800     HYDRALAZINE HCL PO 25 mg by Per G Tube route every 6 hours   7/26/2017 at 1256     LACTOBACILLUS RHAMNOSUS, GG, PO Take 1 capsule by mouth 3 times daily (with meals)   7/26/2017 at 1256     Lidocaine 4 % PTCH Externally apply 1 patch topically daily Remove for 12 hours   7/26/2017 at 0831     LISINOPRIL PO 20 mg by Per J Tube route daily   7/26/2017 at 0817     MAGNESIUM OXIDE PO Take 400 mg by mouth daily   7/26/2017 at 0816     METOPROLOL TARTRATE PO Take 50 mg by mouth 2 times daily   7/26/2017 at 0817     Mirtazapine (REMERON PO) 22.5 mg by Per G Tube route At Bedtime   7/25/2017 at 2141     omeprazole (PRILOSEC) 2 mg/mL SUSP Take 40 mg by mouth 2 times daily   7/26/2017 at 0515     QUETIAPINE FUMARATE PO 12.5 mg by Per J Tube route 4 times daily as needed (hallucination or agitation)   7/26/2017 at 1449     Sertraline HCl (ZOLOFT PO) 150 mg by Per J Tube route daily   7/26/2017 at 0815     LOVASTATIN  PO Take 20 mg by mouth every morning   unknown     ipratropium - albuterol 0.5 mg/2.5 mg/3 mL (DUONEB) 0.5-2.5 (3) MG/3ML neb solution Take 1 vial by nebulization 4 times daily    7/26/2017 at 1122     ACETAMINOPHEN PO 1,000 mg by Per J Tube route every 8 hours    7/26/2017 at 1449     BACLOFEN PO Take 5 mg by mouth every morning RX is 5 mg (1/2 of 10 mg tab) 2 x daily but she takes daily due to drowsiness.   7/26/2017 at 0813     TRAZODONE HCL PO 50 mg by Per J Tube route nightly as needed for sleep    7/23/2017 at 2226     [DISCONTINUED] micafungin (MYCAMINE) 100 MG injection Inject 100 mg into the vein daily For 14 days. Stop on 8/9/2017 7/26/2017 at 1256     [DISCONTINUED] vancomycin (VANOCIN) 50 mg/mL LIQD solution 125 mg by Enteral route 4 times daily At 6am, 12 noon, 6 pm, and 10 pm   7/26/2017 at 1256     [DISCONTINUED] OXYCODONE HCL PO 2.5 mg by Per J Tube route every 8 hours as needed   7/26/2017 at 0514             Discharge Medications:     Current Facility-Administered Medications   Medication     potassium chloride SA (K-DUR/KLOR-CON M) CR tablet 20-40 mEq     potassium chloride (KLOR-CON) Packet 20-40 mEq     potassium chloride 10 mEq in 100 mL intermittent infusion     potassium chloride 10 mEq in 100 mL intermittent infusion with 10 mg lidocaine     potassium chloride 20 mEq in 50 mL intermittent infusion     magnesium sulfate 2 g in NS intermittent infusion (PharMEDium or FV Cmpd)     magnesium sulfate 4 g in 100 mL sterile water (premade)     potassium phosphate 10 mmol in D5W 250 mL intermittent infusion     potassium phosphate 15 mmol in D5W 250 mL intermittent infusion     potassium phosphate 20 mmol in D5W 500 mL intermittent infusion     potassium phosphate 20 mmol in D5W 250 mL intermittent infusion     potassium phosphate 25 mmol in D5W 500 mL intermittent infusion     fluconazole (DIFLUCAN) intermittent infusion 400 mg in NaCl     lidocaine 1 % 1 mL     lidocaine (LMX4) cream     sodium  chloride (PF) 0.9% PF flush 3 mL     fentaNYL (PF) (SUBLIMAZE) injection 25-50 mcg     midazolam (VERSED) injection 0.5-1 mg     flumazenil (ROMAZICON) injection 0.2 mg     sodium chloride (PF) 0.9% PF flush 10 mL     oxyCODONE (ROXICODONE) IR half-tab 2.5 mg     meropenem (MERREM) 1 g vial to attach to  mL bag     DAPTOmycin (CUBICIN) 400 mg in NaCl 0.9 % 100 mL intermittent infusion     docusate sodium (COLACE) capsule 100 mg     0.9% sodium chloride infusion     lidocaine 1 % 1 mL     lidocaine (LMX4) cream     sodium chloride (PF) 0.9% PF flush 3 mL     sodium chloride (PF) 0.9% PF flush 3 mL     Medication Instruction: Keep IV Fluids at TKO if using PCA     bacitracin ointment     naloxone (NARCAN) injection 0.1-0.4 mg     pneumococcal (PREVNAR 13) injection 0.5 mL     lidocaine (XYLOCAINE) 2 % topical gel     heparin sodium PF injection 5,000 Units     ondansetron (ZOFRAN-ODT) ODT tab 4 mg    Or     ondansetron (ZOFRAN) injection 4 mg     prochlorperazine (COMPAZINE) injection 5 mg    Or     prochlorperazine (COMPAZINE) tablet 5 mg    Or     prochlorperazine (COMPAZINE) Suppository 12.5 mg     metoclopramide (REGLAN) tablet 5 mg    Or     metoclopramide (REGLAN) injection 5 mg     senna-docusate (SENOKOT-S;PERICOLACE) 8.6-50 MG per tablet 1-2 tablet     bisacodyl (DULCOLAX) Suppository 10 mg     polyethylene glycol (MIRALAX/GLYCOLAX) Packet 17 g     chlorhexidine (PERIDEX) 0.12 % solution 15 mL     ipratropium - albuterol 0.5 mg/2.5 mg/3 mL (DUONEB) neb solution 3 mL     albuterol neb solution 2.5 mg     alteplase (CATHFLO ACTIVASE) injection 1-2 mg     acetaminophen (TYLENOL) tablet 1,000 mg     ALPRAZolam (XANAX) tablet 0.25 mg     baclofen (LIORESAL) half-tab 5 mg     budesonide (PULMICORT) neb solution 0.5 mg     cyclobenzaprine (FLEXERIL) tablet 5 mg     arformoterol (BROVANA) neb solution 15 mcg     hydrALAZINE (APRESOLINE) tablet 25 mg     lidocaine (LIDODERM) 5 % Patch 1 patch     lisinopril  (PRINIVIL/ZESTRIL) tablet 20 mg     metoprolol (LOPRESSOR) tablet 50 mg     mirtazapine (REMERON) tablet TABS 22.5 mg     pantoprazole (PROTONIX) suspension 40 mg     QUEtiapine (SEROquel) half-tab 12.5 mg     sertraline (ZOLOFT) tablet 150 mg     traZODone (DESYREL) tablet 50 mg     lidocaine (LIDODERM) patch REMOVAL     lidocaine (LIDODERM) patch in PLACE     nystatin (MYCOSTATIN) suspension 500,000 Units                  Brief History of Illness:   Ara Stanley is an 71 year old female who initially presented to [facility] on 6/2/2017 with abdominal pain resulting in a laparoscopic cholecystectomy followed by worsening respiratory failure with return to the operating room where she was found to have a gastric perforation which was repaired with omental patch and then ultimately subsequently failed extubation and had a tracheostomy performed.  She had persistently grown yeast from her sputum as well as her abdominal fluid therefore decision not to move forward with a PEG tube.  She was initially discharged to [facility] on 6/19. During LTACH stay she had a CT scan showing persistent abdominal fluid collection and large left sided pleural effusion. Drains were placed in both collections.  The pleural effusion was found to be largely loculated and patient returned to [facility] on 7/26 for a thoracic surgery consult due to worsening respiratory status and loculated pleural effusion vs empyema.  She underwent VATS procedure 7/28. Also, given persistent abdominal fluid collection and leukocytosis, an additional FUNMI drain placed in abdomen under CT guidance 8/1.   She remains on broad-spectrum antimicrobials given her persistent fluid collections and leukocytosis.  She is discharged to [facility] with two FUNMI drains.  During this hospitalization she completed a course of oral vancomycin for C. Diff infection diagnosed 7/20. Repeat C. Diff culture is negative.          Hospital Course:   7/26 Admission  to [facility] ICU; thoracic surgery consult  7/28 Received one unit PRBC for hgb 6.2  7/28 VATS procedure with total decortication of left lung; placement of 3 chest tubes  7/30 Patient developed brief period of hypotension and AFib with RVR.  Cultures/procalcitonin sent. Procal elevated.  Broad spectrum antibiotics started (see ID note)  7/31 Apical and posterior chest tubes removed.    7/31 CT chest/abdomen/pelvis: persistent large fluid collection adjacent to stomach, bilateral pleural effusions, small/mod pericardial effusion   8/1 CT guided drainage and FUNMI drain placement into fluid collection.  Remaining chest tube removed  8/3 Non-contrast CT to assess fluid collections.             Consultations:   Consultations this admission include Infectious Diseases, General Surgery and Thoracic Surgery.      Most recent ID Impression (8/2):  Impression:  1 71 y.o female admitted in the hospital last month.   2 Initial presentation was of acute abdomen taken to OR for cholecystectomy.   3 Taken back to the OR 4 days later with concern for peritonitis, found to have gastric perforation of an ulcer which was repaired. C albicans in cx  4 Post-operative course has been complicated with multiple intubations, trials of weaning off the vent and now with trach. HCAP. Sputum stento  6 Mild renal insuff.   7. Developed C diif at [place].   8. Readmitted for empyema. Though multiple cultures does not have any bacteria in them from pleural fluid.   9.  Repeat cultures from the abdominal purulent discharge has VRE and CoNS   10. Cultures from the pleural fluid has stayed negative.   11. Started on Daptomycin and Meropenem with concern for sepsis      Recommendations:   VRE in the periumbilical discharge, no candida or GNR. Currently on Daptomycin and Meropenem.   Cultures from the drain has GPC.   Candida from the intraabdominal fluid prior cultures, no more candida seen in cultures here. Back on fluc as albicans/dublinensis was  susceptible to fluc.     Most recent Surgical impression (8/3)  Assessment/Plan:   Ara Stanley  is a 71 year old female with bilateral abdominal drains in place after intraabdominal abscess following repair of perforated gastric ulcer in June 2017. Improving overall.   - CT sinogram right drain prior to discharge to assess residual fluid collection on right and possible d/c of drain vs repositioning. Continue left sided abdominal drain.   -Patient discharging with left and right FUNMI drains.  Ok to remove drains in hospital when there is less than 10ml output in 24 hour period.    --If able, patient may follow up with Dr. Chaudhry one week post discharge.               Physical Exam   GEN: awake, alert, NAD   EYES: PERRL, Anicteric sclera.   HEENT:  Normocephalic, atraumatic, trachea midline,trach secure  CV: RRR, no gallops, rubs. Murmur noted   PULM/CHEST: Coarse breath sounds on left without rhonchi, crackles or wheeze. Course, diminished in right middle/lower lobes.  Symmetric chest rise. Chest tube dressing CDI   GI: normal bowel sounds, soft, minimally-tender, no rebound tenderness or guarding, no masses. Drain with purulent discharge.  Left drain with SS discharge   : tanner catheter in place, urine yellow and clear  EXTREMITIES: No peripheral edema, moving all extremities, peripheral pulses intact  NEURO: Cranial nerves II-XII grossly intact, no motor-sensory deficits noted  SKIN: Multiple surgical incisions, covered, and dry   PSYCH:  Affect: tearful, minimally anxious, mentation at baseline, not altered, no hallucinations  Imaging personally reviewed: CXR   ECG: SR          Significant Results:   WBC 14.8; Hgb 7.8 day of discharge              Pending Results:   Abdominal abscess cultures

## 2017-08-03 NOTE — PROGRESS NOTES
[facility]    Infectious Disease Progress Note    Date of Service (when I saw the patient): 08/03/2017     Assessment & Plan   Ara Stanley is a 71 year old female who was admitted on 7/26/2017.     Impression:  1 71 y.o female admitted in the hospital last month.   2 Initial presentation was of acute abdomen taken to OR for cholecystectomy.   3 Taken back to the OR 4 days later with concern for peritonitis, found to have gastric perforation of an ulcer which was repaired. C albicans in cx  4 Post-operative course has been complicated with multiple intubations, trials of weaning off the vent and now with trach. HCAP. Sputum stento  6 Mild renal insuff.   7. Developed C diif at [facility].   8. Readmitted for empyema. Though multiple cultures does not have any bacteria in them from pleural fluid.   9.  Repeat cultures from the abdominal purulent discharge has VRE and CoNS, please note this is skin culture and nor from the drain.   10. Cultures from the pleural fluid has stayed negative.   11. Started on Daptomycin and Meropenem with concern for sepsis          Recommendations:   VRE in the periumbilical discharge, no candida or GNR. Currently on Daptomycin and Meropenem. This was not from the intraabdominal fluid but superficial drainage from the umbilicus.   Cultures from the drain has GPC. This failed to grow in the subculture, thus no ID available.   Candida from the intraabdominal fluid prior cultures, no more candida seen in cultures here. Back on fluc as albicans/dublinensis was susceptible to fluc.   Continue oral vancomycin for recent C diff diagnosis.     Recommendations for discharge:   The pleural fluid cultures have remained sterile.   Since intraabdominal fluid collection has not been clearly identified as abscess, but gram stain had GPC which did not grow favor finishing another 7 days of Daptomycin thinking might be infected fluid. This will finish greater than 2 weeks course  for possible infected fluid which has been drained.   Continue meropenem another 7 days and stop.   Continue fluc another 7 days and stop.       Celso Birch MD    Interval History   Afebrile again   Physical Exam   Temp: 98.8  F (37.1  C) Temp src: Oral BP: 97/50   Heart Rate: 69 Resp: 14 SpO2: 99 % O2 Device: Mechanical Ventilator    Vitals:    07/27/17 0400 07/29/17 0500 08/02/17 0600   Weight: 67.1 kg (147 lb 14.9 oz) 66.3 kg (146 lb 2.6 oz) 66.9 kg (147 lb 7.8 oz)     Vital Signs with Ranges  Temp:  [97.9  F (36.6  C)-98.8  F (37.1  C)] 98.8  F (37.1  C)  Heart Rate:  [65-86] 69  Resp:  [13-23] 14  BP: ()/(50-69) 97/50  FiO2 (%):  [40 %] 40 %  SpO2:  [94 %-100 %] 99 %    Constitutional: vent, awake, mouthing words   Lungs: Clear to auscultation bilaterally, no crackles or wheezing  Cardiovascular: Regular rate and rhythm, normal S1 and S2, and no murmur noted  Abdomen: Normal bowel sounds, soft, non-distended, non-tender, drain in place, no drainage around it   Skin: No rashes, no cyanosis, no edema  Other:    Medications     NaCl 10 mL/hr at 08/03/17 0754     - MEDICATION INSTRUCTIONS -         docusate  100 mg Per J Tube BID     fluconazole  400 mg Intravenous Q24H     sodium chloride (PF)  10 mL Irrigation Q8H     meropenem  1 g Intravenous Q8H     DAPTOmycin (CUBICIN) intermittent infusion  6 mg/kg Intravenous Q24H     sodium chloride (PF)  3 mL Intracatheter Q8H     bacitracin   Topical TID     pneumococcal  0.5 mL Intramuscular Prior to discharge     heparin  5,000 Units Subcutaneous Q8H     chlorhexidine  15 mL Mouth/Throat Q12H     acetaminophen  1,000 mg Per J Tube Q8H     ALPRAZolam (XANAX) tablet 0.25 mg  0.25 mg Per J Tube TID     baclofen (LIORESAL) half-tab 5 mg  5 mg Per J Tube QAM     budesonide  0.5 mg Nebulization BID     arformoterol  15 mcg Nebulization Q12H     hydrALAZINE (APRESOLINE) tablet 25 mg  25 mg Per G Tube Q6H     lidocaine  1 patch Transdermal Daily     lisinopril  (PRINIVIL/ZESTRIL) tablet 20 mg  20 mg Per J Tube Daily     metoprolol (LOPRESSOR) tablet 50 mg  50 mg Oral BID     mirtazapine (REMERON) tablet TABS 22.5 mg  22.5 mg Per G Tube At Bedtime     pantoprazole  40 mg Per J Tube BID     sertraline (ZOLOFT) tablet 150 mg  150 mg Per J Tube Daily     lidocaine   Transdermal Q24h     lidocaine   Transdermal Q8H     nystatin  500,000 Units Oral 4x Daily       Data   All microbiology laboratory data reviewed.  Recent Labs   Lab Test  08/03/17   0430  08/02/17   0520  08/01/17   0445   WBC  14.8*  12.2*  12.3*   HGB  7.8*  8.1*  7.7*   HCT  24.0*  24.8*  23.3*   MCV  85  85  84   PLT  549*  518*  474*     Recent Labs   Lab Test  08/03/17   0430  08/02/17   0520  08/01/17   0445   CR  0.83  0.92  0.82     Recent Labs   Lab Test  06/04/12   0527   SED  34*     Recent Labs   Lab Test  08/01/17   1402  07/31/17   1600  07/30/17   1720  07/30/17   1538  07/28/17   1355  07/27/17   1120  07/26/17   1840  06/19/17   0520  06/16/17   1145   CULT  Culture negative monitoring continues  Culture negative monitoring continues  On day 1, isolated in broth only: Gram positive cocci Organism nonviable on   subculture  *  No growth after 4 days  No growth after 4 days  No growth  Culture negative after 4 days  Culture received and in progress.  Positive AFB results are called as soon as   detected.  Final report to follow in 7 to 8 weeks.    Culture negative monitoring continues  Canceled, Test credited Test reordered as correct code REORDERED AS A TISSUE   CULTURE    Canceled, Test credited Test reordered as correct code REORDERED AS A TISSUE   CULTURE    Light growth Coagulase negative Staphylococcus  These bacteria are part of normal skin silvino, but on occasion, may be true   pathogens.  Clinical correlation must be applied to interpreting this   microbiology result.  Susceptibility testing not routinely done  *  Light growth Normal skin silvino  Single colony Enterococcus faecium  (VRE)  Critical Value/Significant Value called to and read back by Estelita Wells RN at 0416   [id number]  *  No growth  Moderate growth Stenotrophomonas maltophilia*

## 2017-08-03 NOTE — PROGRESS NOTES
[facility] ICU RESPIRATORY NOTE  Date of Admission: 7/26/17  Date of Intubation (most recent): 6/13/17  Reason for Mechanical Ventilation: vent dependent   Number of Days on Mechanical Ventilation:   Met Criteria for Pressure Support Trial:Yes   Length of Pressure Support Trial: 26 hrs PS 10/5  Reason for No Pressure Support Trial: Rest overnight     Significant Events Today: None     Ventilation Mode: CMV/AC  FiO2 (%): 40 %  Rate Set (breaths/minute): 14 breaths/min  Tidal Volume Set (mL): 450 mL  PEEP (cm H2O): 5 cmH2O  Pressure Support (cm H2O): 10 cmH2O  Oxygen Concentration (%): 40 %  Resp: 15  ABG Results:   No lab results found in last 7 days.    ETT appearance on chest x-ray: In good position     Plan:   Pt will remains on full vent support and assess for daily weaning.    Cynthia Correia  8/3/2017

## 2017-08-03 NOTE — PLAN OF CARE
Problem: Goal Outcome Summary  Goal: Goal Outcome Summary  Speech Language Therapy Discharge Summary     Reason for therapy discharge:  Discharged to [facility].         Progress towards therapy goal(s). See goals on Care Plan in [facility] electronic health record for goal details.  Goals not met.  Barriers to achieving goals:   discharge from facility.     Therapy recommendation(s):    Continued therapy is recommended.  Rationale/Recommendations:  Continue ST for continued trials of the Passy Almena Valve and dysphagia when tolerating valve for at least 30 minutes. History of silent aspiration on thin liquids per video completed at [place]. .

## 2017-08-03 NOTE — PROGRESS NOTES
"General Surgery Progress Note    Subjective: pt resting comfortably this morning. Afebrile, VSS. Improved loose stools, rectal tube out. Left FUNMI with 50ml serosanguinous drainage, right drain with 17ml purulent appearing drainage. WBC up slightly.     Objective: BP 97/50 (BP Location: Left arm)  Temp 98  F (36.7  C) (Oral)  Resp 14  Ht 5' 6\" (1.676 m)  Wt 147 lb 7.8 oz (66.9 kg)  SpO2 99%  BMI 23.81 kg/m2  Gen: opens eyes to voice  CV: RRR  Pulm: nonlabored breathing  Abd: soft, tender near drains, right FUNMI w purulent drainage, left FUNMI w serosanguinous drainage  Ext: WWP    Assessment/Plan:   Ara Stanley  is a 71 year old female with bilateral abdominal drains in place after intraabdominal abscess following repair of perforated gastric ulcer in June 2017. Improving overall.   - CT sinogram right drain prior to discharge to assess residual fluid collection on right and possible d/c of drain vs repositioning. Continue left sided abdominal drain.   - may d/c today to LTACH per notes    Estelita Peck MD  [facility]  [phone]              "

## 2017-08-03 NOTE — PLAN OF CARE
Problem: Goal Outcome Summary  Goal: Goal Outcome Summary  Discharge Planner PT   Patient plan for discharge: LTACH  Current status: Pt transfers supine to sit via log roll minAx1 along with assist to manage lines. Pt performed 3 sit<>stands modAx2 for 2 minutes and 30 seconds, 2 minutes, and 1 minute respectively. Pt transferred EOB> chair via universal sling with assist for line management. Pt planning on d/c to LTACH later today.   Barriers to return to prior living situation: Level of assist, fatigue, weakness and balance   Recommendations for discharge: LTACH  Rationale for recommendations: Pt with several medical comorbidities, including tracheostomy, will benefit from continued care at LTAC.   Entered by: Ruben Luong 08/03/2017 12:04 PM

## 2017-08-25 NOTE — TELEPHONE ENCOUNTER
Interventional Radiology     Received a call from HANANE Grayson at [facility] regarding Ara Stanley. She had a hepatic drain placed at an OSH and a drain placed by IR during her last admission at  on 8/1/17. [facility] is calling to ask for imaging to determine if the drains can be removed. They originally requested bilateral abscessagrams. The outputs have been 2-10 mL per drain daily for awhile. She is afebrile.     It was felt that a CT abd pelvis with IV contrast if the patient could tolerate it (Gfr of 42) would be sufficient. It wasn't felt that the patient had a fistula at the time of placement and the left drain was serous and did not grow out bacteria. The patient is also vent dependent and difficult to transport.     Above order given to Renuka which was approved by the MD at [facility]. We will touch base on Monday 8/28 regarding pushing or transporting images.     Thanks [facility] Interventional Radiology CNP ([phone])

## 2017-08-29 NOTE — TELEPHONE ENCOUNTER
Interventional Radiology     Renuka was called today to follow up on the CT scan which was supposed to be done over the weekend.     Unfortunately, the patient suffered a GI bleed and was transferred to an acute care hospital for treatment later on Friday. She is not back to [facility] yet.     We will follow up once she has stabilized. Thanks [facility] Interventional Radiology CNP ([phone])

## 2017-09-08 NOTE — TELEPHONE ENCOUNTER
Interventional Radiology     Received a CD of a CT abdomen/pelvis from [facility] which was loaded into [facility] PACs. Reviewed the CT with Dr Benavides. The Hepatic drain can be removed. The L abdominal fluid collection is unchanged in size since last CT. The drain needs to be repositioned or replaced deeper into the fluid collection.     Discussed with Dr Montejo at [facility] on 9/7/17 (cell ) who was willing to remove the RUQ drain and agreed with the plan to reposition/replace the L abdominal drain. He was given IR scheduling number for the staff to set up the procedure. Order has been entered.     [facility] contact number is .    Thanks [facility] Interventional Radiology CNP ([phone])

## 2017-09-14 NOTE — PROGRESS NOTES
Post procedure. Paramedics with pt. Stable. Discharge instructions given to transport and will give to facility. Discharged with transport staff to monitor.

## 2017-09-14 NOTE — PROGRESS NOTES
Interventional Radiology Pre-Procedure Sedation Assessment   Time of Assessment: 10:52 AM    Expected Level: Moderate Sedation    Indication: Sedation is required for the following type of Procedure: Left abdominal drain check, possible exchange, reposition AND chest tube placement     Will also remove the Right drain. Per CT and last week the right drain was supposed to be removed at [facility].     Sedation and procedural consent: Risks, benefits and alternatives were discussed with Patient    PO Intake: Appropriately NPO for procedure    ASA Class: Class 2 - MILD SYSTEMIC DISEASE, NO ACUTE PROBLEMS, NO FUNCTIONAL LIMITATIONS.    Lungs: R base decreased, Left clear distant Paramedics from [facility] are with patient and managing the portable ventilator    Heart: Normal heart sounds and rate, murmur    History and physical reviewed and no updates needed. I have reviewed the lab findings, diagnostic data, medications, and the plan for sedation. I have determined this patient to be an appropriate candidate for the planned sedation and procedure and have reassessed the patient IMMEDIATELY PRIOR to sedation and procedure.    Sarah Lawson, APRN CNP

## 2017-09-14 NOTE — IP AVS SNAPSHOT
[place] [address]    [place] [address]    Phone:  [phone]                                       After Visit Summary   9/14/2017    Ara Stanley    MRN: [id number]           After Visit Summary Signature Page     I have received my discharge instructions, and my questions have been answered. I have discussed any challenges I see with this plan with the nurse or doctor.    ..........................................................................................................................................  Patient/Patient Representative Signature      ..........................................................................................................................................  Patient Representative Print Name and Relationship to Patient    ..................................................               ................................................  Date                                            Time    ..........................................................................................................................................  Reviewed by Signature/Title    ...................................................              ..............................................  Date                                                            Time

## 2017-09-14 NOTE — DISCHARGE INSTRUCTIONS
Abdominal abscess drain Exchange Discharge Instructions     After you go home:      You may resume your normal diet    Drink plenty of fluids, especially water    Have an adult stay with you for 6 hours if you received sedation       For 24 hours - due to the sedation you received:    Relax and take it easy    Do NOT make any important or legal decisions    Do NOT drive or operate machines at home or at work    Do NOT drink alcohol    Care of Tube Site:      For the first 48 hrs, check your puncture site every couple hours while you are awake     Check the tube site twice a day for signs of infection    Keep the dressing around the tube dry    Change the dressing every 2-3 days if it is gauze/tape. If there is a Stay Fix dressing intact - this should be changed weekly.    Change the dressing if it becomes wet or dirty    You may shower but do not get the dressing wet. Place a waterproof cover over the dressing (such as plastic wrap).     No tub baths, whirlpools or swimming until the tube is removed     Activity:      You may go back to normal activity in 24 hours    Wait 48 hours before lifting, straining, exercise or other strenuous activity    Medicines:      You may resume all medications    Resume your Warfarin/Coumadin at your regular dose today. Follow up with your provider to have your INR rechecked    Resume your Platelet Inhibitors and Aspirin tomorrow at your regular dose    For minor pain, you may take Acetaminophen (Tylenol) or Ibuprofen (Advil)               Call the provider who ordered this procedure if:      The site is red, swollen, hot or tender    There is foul-smelling drainage from the tube site    You have pain that is getting worse or that does not improve with pain medication    You have chills or a fever greater than 101 F (38 C)    Fluid stops draining or is leaking around the tube onto your skin    The tube falls out     Any questions or concerns    Call  911 or go to the Emergency Room  if you have:      Severe pain or trouble breathing    Bleeding that you cannot control    Other Instructions:      If the tube falls out - cover the opening with gauze & tape    Record drainage output amounts & bring sheet to return appointments    Take your temperature daily            Chest Tube Insertion Discharge Instructions     After you go home:      You may resume your normal diet    Have an adult stay with you for 6 hours if you received sedation       For 24 hours - due to the sedation you received:    Relax and take it easy    Do NOT make any important or legal decisions    Do NOT drive or operate machines at home or at work    Do NOT drink alcohol    Care of Chest Tube Site:      Check your dressing & tube every couple hours while you are awake     Keep the dressing around the chest tube dry    No showers, tub baths, whirlpools or swimming until the tube has been removed     Activity       You may go back to normal activity in 24 hours     No lifting, straining, exercise or other strenuous activity while tube is in place    Medicines:      You may resume all medications    For minor pain, you may take Acetaminophen (Tylenol) or Ibuprofen (Advil)                Call the provider who ordered this procedure if:      Blood or fluid is draining from around the site    The site is red, swollen, hot or tender    Chills or a fever greater than 101 F (38 C)    Pain that is getting worse    Shortness of breath    Any questions or concerns    Call  911 or go to the Emergency Room if:      Severe chest pain or trouble breathing    The chest tube falls out    Increased blood in your sputum (phlegm)    Bleeding that you cannot control    Other Information:      Keep the stopcock open.    If instructed, turn the stopcock off at _______ on ____________.    At _______ on ____________, return to [facility] to have a chest xray done. We may remove your tube at this time.    Special Instructions:        If you have questions  call:          [facility] Radiology Dept @ [phone]

## 2017-09-14 NOTE — IP AVS SNAPSHOT
MRN:[id number]                      After Visit Summary   9/14/2017    Ara Stanley    MRN: [id number]           Visit Information        Department      9/14/2017  9:53 AM [facility]          Review of your medicines      UNREVIEWED medicines. Ask your doctor about these medicines        Dose / Directions    ACETAMINOPHEN PO        Dose:  1000 mg   1,000 mg by Per J Tube route every 8 hours   Refills:  0       albuterol (2.5 MG/3ML) 0.083% neb solution        Dose:  1 vial   Take 1 vial by nebulization every 2 hours as needed for wheezing   Refills:  0       ALPRAZOLAM PO        Dose:  0.25 mg   0.25 mg by Per J Tube route 3 times daily   Refills:  0       bacitracin 500 UNIT/GM Oint   Used for:  Open wound        Apply topically 2 times daily Apply to chest tube site BID for 7 days   Refills:  0       BACLOFEN PO        Dose:  5 mg   Take 5 mg by mouth every morning RX is 5 mg (1/2 of 10 mg tab) 2 x daily but she takes daily due to drowsiness.   Refills:  0       budesonide 0.5 MG/2ML neb solution   Commonly known as:  PULMICORT        Dose:  0.5 mg   Take 0.5 mg by nebulization 2 times daily   Refills:  0       chlorhexidine 0.12 % solution   Commonly known as:  PERIDEX   Used for:  On mechanically assisted ventilation (H)        Dose:  15 mL   Take 15 mLs by mouth every 12 hours   Refills:  0       DAPTOmycin 400 mg   Indication:  Abscess   Used for:  Infection in abdomen (H)        Dose:  6 mg/kg   Inject 400 mg into the vein every 24 hours   Refills:  0       docusate 50 MG/5ML liquid   Commonly known as:  COLACE   Used for:  Constipation, unspecified constipation type        Dose:  100 mg   10 mLs (100 mg) by Per J Tube route 2 times daily   Quantity:  300 mL   Refills:  0       FLEXERIL PO        Dose:  5 mg   5 mg by Per J Tube route 3 times daily as needed for muscle spasms   Refills:  0       fluconazole 400-0.9 MG/200ML-% infusion   Commonly known as:   DIFLUCAN   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   Used for:  Candida infection        Dose:  400 mg   Inject 200 mLs (400 mg) into the vein every 24 hours   Quantity:  3000 mL   Refills:  0       formoterol 20 MCG/2ML neb solution   Commonly known as:  PERFOROMIST        Dose:  20 mcg   Take 20 mcg by nebulization every 12 hours   Refills:  0       heparin sodium PF 5000 UNIT/0.5ML injection   Used for:  Deep vein thrombosis (DVT) prophylaxis prescribed at discharge        Dose:  5000 Units   Inject 0.5 mLs (5,000 Units) Subcutaneous every 8 hours   Refills:  0       HYDRALAZINE HCL PO        Dose:  25 mg   25 mg by Per G Tube route every 6 hours   Refills:  0       * ipratropium - albuterol 0.5 mg/2.5 mg/3 mL 0.5-2.5 (3) MG/3ML neb solution   Commonly known as:  DUONEB        Dose:  1 vial   Take 1 vial by nebulization 4 times daily   Refills:  0       * ipratropium - albuterol 0.5 mg/2.5 mg/3 mL 0.5-2.5 (3) MG/3ML neb solution   Commonly known as:  DUONEB   Used for:  On mechanically assisted ventilation (H)        Dose:  3 mL   Take 1 vial (3 mLs) by nebulization every 4 hours as needed for shortness of breath / dyspnea   Quantity:  360 mL   Refills:  0       LACTOBACILLUS RHAMNOSUS (GG) PO        Dose:  1 capsule   Take 1 capsule by mouth 3 times daily (with meals)   Refills:  0       Lidocaine 4 % Ptch        Dose:  1 patch   Externally apply 1 patch topically daily Remove for 12 hours   Refills:  0       LISINOPRIL PO        Dose:  20 mg   20 mg by Per J Tube route daily   Refills:  0       LOVASTATIN PO        Dose:  20 mg   Take 20 mg by mouth every morning   Refills:  0       MAGNESIUM OXIDE PO        Dose:  400 mg   Take 400 mg by mouth daily   Refills:  0       meropenem 1 G vial   Commonly known as:  MERREM   Indication:  Abscess   Used for:  Infection in abdomen (H)        Dose:  1 g   Inject 1,000 mg (1 g) into the vein every 8 hours   Quantity:  30 each   Refills:  0       METOPROLOL  TARTRATE PO        Dose:  50 mg   Take 50 mg by mouth 2 times daily   Refills:  0       nystatin 454889 UNIT/ML suspension   Commonly known as:  MYCOSTATIN   Used for:  Candida infection        Dose:  785860 Units   Take 5 mLs (500,000 Units) by mouth 4 times daily   Quantity:  280 mL   Refills:  0       omeprazole 2 mg/mL Susp   Commonly known as:  priLOSEC        Dose:  40 mg   Take 40 mg by mouth 2 times daily   Refills:  0       ondansetron 4 MG ODT tab   Commonly known as:  ZOFRAN-ODT   Used for:  Nausea        Dose:  4 mg   Take 1 tablet (4 mg) by mouth every 6 hours as needed for nausea or vomiting   Quantity:  30 tablet   Refills:  0       oxyCODONE 5 MG IR tablet   Commonly known as:  ROXICODONE   Used for:  Generalized abdominal pain        Dose:  2.5 mg   Take 0.5 tablets (2.5 mg) by mouth every 3 hours as needed (pain)   Quantity:  30 tablet   Refills:  0       polyethylene glycol Packet   Commonly known as:  MIRALAX/GLYCOLAX   Used for:  Constipation, unspecified constipation type        Dose:  17 g   17 g by Per J Tube route daily as needed for constipation   Quantity:  7 packet   Refills:  0       QUETIAPINE FUMARATE PO        Dose:  12.5 mg   12.5 mg by Per J Tube route 4 times daily as needed (hallucination or agitation)   Refills:  0       REMERON PO        Dose:  22.5 mg   22.5 mg by Per G Tube route At Bedtime   Refills:  0       senna-docusate 8.6-50 MG per tablet   Commonly known as:  SENOKOT-S;PERICOLACE   Used for:  Constipation, unspecified constipation type        Dose:  1-2 tablet   Take 1-2 tablets by mouth 2 times daily as needed (constipation )   Quantity:  100 tablet   Refills:  0       TRAZODONE HCL PO        Dose:  50 mg   50 mg by Per J Tube route nightly as needed for sleep   Refills:  0       ZOLOFT PO        Dose:  150 mg   150 mg by Per J Tube route daily   Refills:  0       * Notice:  This list has 2 medication(s) that are the same as other medications prescribed for you. Read  the directions carefully, and ask your doctor or other care provider to review them with you.             Protect others around you: Learn how to safely use, store and throw away your medicines at www.disposemymeds.org.         Follow-ups after your visit         Care Instructions        Further instructions from your care team         Abdominal abscess drain Exchange Discharge Instructions     After you go home:      You may resume your normal diet    Drink plenty of fluids, especially water    Have an adult stay with you for 6 hours if you received sedation       For 24 hours - due to the sedation you received:    Relax and take it easy    Do NOT make any important or legal decisions    Do NOT drive or operate machines at home or at work    Do NOT drink alcohol    Care of Tube Site:      For the first 48 hrs, check your puncture site every couple hours while you are awake     Check the tube site twice a day for signs of infection    Keep the dressing around the tube dry    Change the dressing every 2-3 days if it is gauze/tape. If there is a Stay Fix dressing intact - this should be changed weekly.    Change the dressing if it becomes wet or dirty    You may shower but do not get the dressing wet. Place a waterproof cover over the dressing (such as plastic wrap).     No tub baths, whirlpools or swimming until the tube is removed     Activity:      You may go back to normal activity in 24 hours    Wait 48 hours before lifting, straining, exercise or other strenuous activity    Medicines:      You may resume all medications    Resume your Warfarin/Coumadin at your regular dose today. Follow up with your provider to have your INR rechecked    Resume your Platelet Inhibitors and Aspirin tomorrow at your regular dose    For minor pain, you may take Acetaminophen (Tylenol) or Ibuprofen (Advil)               Call the provider who ordered this procedure if:      The site is red, swollen, hot or tender    There is  foul-smelling drainage from the tube site    You have pain that is getting worse or that does not improve with pain medication    You have chills or a fever greater than 101 F (38 C)    Fluid stops draining or is leaking around the tube onto your skin    The tube falls out     Any questions or concerns    Call  911 or go to the Emergency Room if you have:      Severe pain or trouble breathing    Bleeding that you cannot control    Other Instructions:      If the tube falls out - cover the opening with gauze & tape    Record drainage output amounts & bring sheet to return appointments    Take your temperature daily            Chest Tube Insertion Discharge Instructions     After you go home:      You may resume your normal diet    Have an adult stay with you for 6 hours if you received sedation       For 24 hours - due to the sedation you received:    Relax and take it easy    Do NOT make any important or legal decisions    Do NOT drive or operate machines at home or at work    Do NOT drink alcohol    Care of Chest Tube Site:      Check your dressing & tube every couple hours while you are awake     Keep the dressing around the chest tube dry    No showers, tub baths, whirlpools or swimming until the tube has been removed     Activity       You may go back to normal activity in 24 hours     No lifting, straining, exercise or other strenuous activity while tube is in place    Medicines:      You may resume all medications    For minor pain, you may take Acetaminophen (Tylenol) or Ibuprofen (Advil)                Call the provider who ordered this procedure if:      Blood or fluid is draining from around the site    The site is red, swollen, hot or tender    Chills or a fever greater than 101 F (38 C)    Pain that is getting worse    Shortness of breath    Any questions or concerns    Call  911 or go to the Emergency Room if:      Severe chest pain or trouble breathing    The chest tube falls out    Increased blood in  "your sputum (phlegm)    Bleeding that you cannot control    Other Information:      Keep the stopcock open.    If instructed, turn the stopcock off at _______ on ____________.    At _______ on ____________, return to [facility] to have a chest xray done. We may remove your tube at this time.    Special Instructions:        If you have questions call:          [facility] Radiology Dept @ [phone]       Additional Information About Your Visit        MyChart Information     [facility] lets you send messages to your doctor, view your test results, renew your prescriptions, schedule appointments and more. To sign up, go to [email or web address] . Click on \"Log in\" on the left side of the screen, which will take you to the Welcome page. Then click on \"Sign up Now\" on the right side of the page.     You will be asked to enter the access code listed below, as well as some personal information. Please follow the directions to create your username and password.     Your access code is: [email or web address]  Expires: 2017 11:49 AM     Your access code will  in 90 days. If you need help or a new code, please call your [facility] clinic or [phone].        Care EveryWhere ID     This is your Care EveryWhere ID. This could be used by other organizations to access your [facility] medical records  [id number]        Your Vitals Were     Blood Pressure Respirations Pulse Oximetry             100/57 14 96%          Primary Care Provider Office Phone # Fax #    Shar James [phone]      Equal Access to Services     [facility] : Hadii ashlie Lewis, lonnyda reba, qaybta veronica austin . So [facility] [phone].    ATENCIÓN: Si habla español, tiene a young disposición servicios gratuitos de asistencia lingüística. Llame al [phone].    We comply with applicable federal civil rights laws and [place] laws. We do not discriminate on the basis " of race, color, national origin, age, disability sex, sexual orientation or gender identity.            Thank you!     Thank you for choosing [facility] for your care. Our goal is always to provide you with excellent care. Hearing back from our patients is one way we can continue to improve our services. Please take a few minutes to complete the written survey that you may receive in the mail after you visit with us. Thank you!             Medication List: This is a list of all your medications and when to take them. Check marks below indicate your daily home schedule. Keep this list as a reference.      Medications           Morning Afternoon Evening Bedtime As Needed    ACETAMINOPHEN PO   1,000 mg by Per J Tube route every 8 hours                                albuterol (2.5 MG/3ML) 0.083% neb solution   Take 1 vial by nebulization every 2 hours as needed for wheezing                                ALPRAZOLAM PO   0.25 mg by Per J Tube route 3 times daily                                bacitracin 500 UNIT/GM Oint   Apply topically 2 times daily Apply to chest tube site BID for 7 days                                BACLOFEN PO   Take 5 mg by mouth every morning RX is 5 mg (1/2 of 10 mg tab) 2 x daily but she takes daily due to drowsiness.                                budesonide 0.5 MG/2ML neb solution   Commonly known as:  PULMICORT   Take 0.5 mg by nebulization 2 times daily                                chlorhexidine 0.12 % solution   Commonly known as:  PERIDEX   Take 15 mLs by mouth every 12 hours                                DAPTOmycin 400 mg   Inject 400 mg into the vein every 24 hours                                docusate 50 MG/5ML liquid   Commonly known as:  COLACE   10 mLs (100 mg) by Per J Tube route 2 times daily                                FLEXERIL PO   5 mg by Per J Tube route 3 times daily as needed for muscle spasms                                fluconazole 400-0.9 MG/200ML-% infusion    Commonly known as:  DIFLUCAN   Inject 200 mLs (400 mg) into the vein every 24 hours                                formoterol 20 MCG/2ML neb solution   Commonly known as:  PERFOROMIST   Take 20 mcg by nebulization every 12 hours                                heparin sodium PF 5000 UNIT/0.5ML injection   Inject 0.5 mLs (5,000 Units) Subcutaneous every 8 hours                                HYDRALAZINE HCL PO   25 mg by Per G Tube route every 6 hours                                * ipratropium - albuterol 0.5 mg/2.5 mg/3 mL 0.5-2.5 (3) MG/3ML neb solution   Commonly known as:  DUONEB   Take 1 vial by nebulization 4 times daily                                * ipratropium - albuterol 0.5 mg/2.5 mg/3 mL 0.5-2.5 (3) MG/3ML neb solution   Commonly known as:  DUONEB   Take 1 vial (3 mLs) by nebulization every 4 hours as needed for shortness of breath / dyspnea                                LACTOBACILLUS RHAMNOSUS (GG) PO   Take 1 capsule by mouth 3 times daily (with meals)                                Lidocaine 4 % Ptch   Externally apply 1 patch topically daily Remove for 12 hours                                LISINOPRIL PO   20 mg by Per J Tube route daily                                LOVASTATIN PO   Take 20 mg by mouth every morning                                MAGNESIUM OXIDE PO   Take 400 mg by mouth daily                                meropenem 1 G vial   Commonly known as:  MERREM   Inject 1,000 mg (1 g) into the vein every 8 hours                                METOPROLOL TARTRATE PO   Take 50 mg by mouth 2 times daily                                nystatin [id number] UNIT/ML suspension   Commonly known as:  MYCOSTATIN   Take 5 mLs (500,000 Units) by mouth 4 times daily                                omeprazole 2 mg/mL Susp   Commonly known as:  priLOSEC   Take 40 mg by mouth 2 times daily                                ondansetron 4 MG ODT tab   Commonly known as:  ZOFRAN-ODT   Take 1 tablet (4 mg) by  mouth every 6 hours as needed for nausea or vomiting                                oxyCODONE 5 MG IR tablet   Commonly known as:  ROXICODONE   Take 0.5 tablets (2.5 mg) by mouth every 3 hours as needed (pain)                                polyethylene glycol Packet   Commonly known as:  MIRALAX/GLYCOLAX   17 g by Per J Tube route daily as needed for constipation                                QUETIAPINE FUMARATE PO   12.5 mg by Per J Tube route 4 times daily as needed (hallucination or agitation)                                REMERON PO   22.5 mg by Per G Tube route At Bedtime                                senna-docusate 8.6-50 MG per tablet   Commonly known as:  SENOKOT-S;PERICOLACE   Take 1-2 tablets by mouth 2 times daily as needed (constipation )                                TRAZODONE HCL PO   50 mg by Per J Tube route nightly as needed for sleep                                ZOLOFT PO   150 mg by Per J Tube route daily                                * Notice:  This list has 2 medication(s) that are the same as other medications prescribed for you. Read the directions carefully, and ask your doctor or other care provider to review them with you.

## 2017-09-15 NOTE — TELEPHONE ENCOUNTER
Interventional Radiology     Received a call from the Infectious disease laboratory at the  regarding the 24 hour culture results on Ara Stanley. The initial results is positive for a light growth of gram negative rods, looking like pseudomonas.     The results were called to Ara's nurse Dhara at [facility] and then faxed over to them also at . All of the results were faxed there that were resulted. The rest will be sent when there are results.     Thanks [facility] Interventional Radiology CNP ([phone])

## 2017-09-15 NOTE — PROGRESS NOTES
Ara had already gone back to [facility] before I had a chance to make sure the post orders were also sent back to them.     Discussed with RN at [facility] [id number]. There was 1700 mL out of chest tube so far and 125 mL in the FUNMI from the left abdominal drain. Reviewed drain care orders and cm suction for the chest tube which was hooked up to a pleurevac. The FUNMI drain to be flushed daily with 10 mL NS, record outputs, change gauze dressing every 2-3 days. Chest tube attached to pleurevac with 20 cm suction. Chest tube management per pulmonary. I had discussed with Andressa Mcneil NP pulmonary at [facility] on 9/12. Per conversation they will determine when the chest tube can be removed and remove it themselves.     I will fax the results from the diagnostic tests once they are all available.     Will follow.     Thanks [facility] Interventional Radiology CNP ([phone])

## 2017-09-19 PROBLEM — J86.9 EMPYEMA (H): Status: ACTIVE | Noted: 2017-01-01

## 2017-09-19 NOTE — IP AVS SNAPSHOT
"` `     [facility] INTENSIVE CARE UNIT: [phone]                                              INTERAGENCY TRANSFER FORM - NURSING   2017                    Hospital Admission Date: 2017  BEN PALOMINO   : 1945  Sex: Female        Attending Provider: Jonna Cavanaugh MD     Allergies:  Iodine I 131 Tositumomab, Penicillins, Cefepime, Sulfa Drugs    Infection:  VRE-Contact Isolation   Service:  INTERNAL MED    Ht:  1.676 m (5' 5.98\")   Wt:  81.5 kg (179 lb 10.8 oz)   Admission Wt:  93.2 kg (205 lb 7.5 oz)    BMI:  29.01 kg/m 2   BSA:  1.95 m 2            Patient PCP Information     Provider PCP Type    DO Blake Griffin      Current Code Status     Date Active Code Status Order ID Comments User Context       Prior      Code Status History     Date Active Date Inactive Code Status Order ID Comments User Context    2017  3:29 PM  Full Code [id number]  Isabella Gutierrez APRN [facility] Outpatient    2017  6:24 PM 2017  3:29 PM Full Code [id number]  Jonna Cavanaugh MD Inpatient    2017  4:37 PM 2017  6:24 PM Full Code [id number]  Isabella Gutierrez APRN CNP Outpatient    2017  6:10 PM 2017  4:37 PM Full Code [id number]  Scarlet Sorenson MD Inpatient    2017  2:48 PM 2017  6:10 PM Full Code [id number]  Stu Esquivel MD Outpatient    2017  7:23 PM 2017  2:48 PM Full Code [id number]  Estelita Peck MD Inpatient    2012 12:45 PM 2012  3:06 PM Full Code [id number]  Feli Gilmore, RN Inpatient      Advance Directives        Does patient have a scanned Advance Directive/ACP document in EPIC?           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  6/4/2012    COPD (chronic obstructive pulmonary disease) (H) Medium  6/4/2012    Hyperlipidemia Medium  6/4/2012    Hypertension Medium  6/4/2012    Anemia due to blood loss, acute Medium  6/6/2012    Abdominal pain Medium  6/2/2017    Acute hypercapnic respiratory failure (H) Medium  7/26/2017      Immunizations     None         END      ASSESSMENT     Discharge Profile Flowsheet     EXPECTED DISCHARGE     GI WDL  ex 09/28/17 1440    Expected Discharge Date  09/27/17 ([facility] (needs ride)) 09/26/17 1532   Abdominal Appearance  obese 09/28/17 0902    DISCHARGE NEEDS ASSESSMENT     Last Bowel Movement  09/28/17 09/28/17 0902    Concerns To Be Addressed  discharge planning concerns 09/27/17 1455   GI Signs/Symptoms  diarrhea 09/28/17 0902    Concerns Comments  LTACH 09/27/17 1455   Passing flatus  yes 09/25/17 1322    Patient/family verbalizes understanding of discharge plan recommendations?  Yes 09/28/17 1425   COMMUNICATION ASSESSMENT      Medical Team notified of plan?  yes 09/28/17 1425   Patient's communication style  spoken language (English or Bilingual) 07/27/17 0157    Anticipated Changes Related to Illness  inability to care for self 09/20/17 1508   FINAL RESOURCES      Transportation Available  car;family or friend will provide 09/20/17 1437   Resources List  LTACH 09/25/17 1436    # of Referrals Placed by CTS  Post Acute Facilities;Transportation 09/28/17 1425   SKIN      Equipment Used at Home  standard walker;bath bench;grab bar;raised toilet;dressing device 06/06/12 1425   Inspection of bony prominences  Full 09/28/17 1249    FUNCTIONAL LEVEL CURRENT     Inspection under devices  Full 09/28/17 1249    Ambulation  3 - assistive equipment and person 09/27/17 0105   Skin WDL  ex 09/28/17 1249    Transferring  3 - assistive equipment and person 09/27/17 0105   Skin Color/Characteristics  pale 09/28/17 1249    Toileting  3 - assistive equipment and person 09/27/17 0105   Skin Temperature  warm  "09/28/17 0650    Bathing  3 - assistive equipment and person 09/27/17 0105   Skin Moisture  dry;flaky 09/28/17 1249    Dressing  3 - assistive equipment and person 09/27/17 0105   Skin Elasticity  slow return to original state 09/23/17 0925    Eating  3 - assistive equipment and person 09/27/17 0105   Skin Integrity  drain/device(s);incision(s) 09/28/17 1249    Communication  2 - difficulty speaking (not related to language barrier) 09/27/17 0105   SAFETY      Swallowing  2 - difficulty swallowing liquids/foods 09/27/17 0105   Safety WDL  WDL 09/28/17 1440    Change in Functional Status Since Onset of Current Illness/Injury  yes (illness began 3 months ago ) 09/20/17 1233   Safety Equipment  manual resuscitator/PEEP valve in room 09/27/17 1201    GASTROINTESTINAL (ADULT,PEDIATRIC,OB)     All Alarms  alarm(s) activated and audible 09/28/17 0650                 Assessment WDL (Within Defined Limits) Definitions           Safety WDL     Effective: 09/28/15    Row Information: <b>WDL Definition:</b> Bed in low position, wheels locked; call light in reach; upper side rails up x 2; ID band on<br> <font color=\"gray\"><i>Item=AS safety wdl>>List=AS safety wdl>>Version=F14</i></font>      Skin WDL     Effective: 09/28/15    Row Information: <b>WDL Definition:</b> Warm; dry; intact; elastic; without discoloration; pressure points without redness<br> <font color=\"gray\"><i>Item=AS skin wdl>>List=AS skin wdl>>Version=F14</i></font>      Vitals     Vital Signs Flowsheet     VITAL SIGNS     Total  1 09/28/17 0916    Temp  98.5  F (36.9  C) 09/28/17 0645   ANALGESIA SIDE EFFECTS MONITORING      Temp src  Axillary 09/28/17 0645   Side Effects Monitoring: Respiratory Quality  V 09/28/17 0127    Resp  18 09/28/17 1550   Side Effects Monitoring: Respiratory Depth  V 09/28/17 0127    Heart Rate  99 09/28/17 1550   Side Effects Monitoring: Sedation Level  2 09/28/17 0127    BP  135/66 09/28/17 1550   HEIGHT AND WEIGHT      BP Location  " "Left arm 09/28/17 1030   Height  1.676 m (5' 5.98\") 09/24/17 0610    OXYGEN THERAPY     Weight  81.5 kg (179 lb 10.8 oz) 09/28/17 0718    SpO2  99 % 09/28/17 1550   Weight Method  Bed scale 09/20/17 0501    O2 Device  Mechanical Ventilator 09/28/17 1423   BSA (Calculated - sq m)  2.02 09/24/17 0610    FiO2 (%)  35 % 09/28/17 1423   BMI (Calculated)  31.39 09/24/17 0610    Oxygen Delivery  -- (30LPM) 09/28/17 1131   POSITIONING      PAIN/COMFORT     Body Position  supine (weight shifted) 09/28/17 1438    Patient Currently in Pain  RUKHSANA 09/28/17 0127   Head of Bed (HOB)  HOB at 30 degrees 09/28/17 1438    Preferred Pain Scale  CPOT (Critical-Care Pain Observation Tool) 09/28/17 0916   Positioning/Transfer Devices  pillows 09/28/17 0905    Patient's Stated Pain Goal  4 09/24/17 1611   Chair  Upright in chair 09/26/17 1311    0-10 Pain Scale  5 09/24/17 2353   DAILY CARE      Pain Location  Back 09/25/17 0044   Activity Management  bedrest 09/27/17 1751    Pain Orientation  Right 09/24/17 2243   Activity Assistance Provided  assistance, 1 person 09/27/17 1751    Pain Descriptors  Aching 09/21/17 1546   ECG      Pain Intervention(s)  Repositioned 09/28/17 0127   ECG Rhythm  Sinus rhythm 09/28/17 1438    Response to Interventions  Decrease in pain 09/28/17 0127   Ectopy  None 09/27/17 1751    CRITICAL-CARE PAIN OBSERVATION TOOL (CPOT)     Ectopy Frequency  Occasional 09/24/17 0610    Facial Expression  0 09/28/17 0916   Lead Monitored  Lead II;V 1 09/28/17 0916    Body Movements  1 09/28/17 0916   POINT OF CARE TESTING      Compliance w/ventilator (intubated patients)  0 09/28/17 0916   Puncture Site  fingertip 09/22/17 0308    Vocalization (extubated patients)  0 09/28/17 0916   Bedside Glucose (mg/dl )   146 mg/dl 09/22/17 0308    Muscle Tension  0 09/28/17 0916                 Patient Lines/Drains/Airways Status    Active LINES/DRAINS/AIRWAYS     Name: Placement date: Placement time: Site: Days: Last dressing " change:    Airway - Adult/Peds 8 [place]       8        Airway - Adult/Peds 6 [place]       6        Surgical Airway Shiley 6 Cuffed 07/28/17   1508   6   62     Closed/Suction Drain 2 Right Abdomen Bulb 07/21/17      Abdomen   69     NG/OG Tube Nasoenteric feeding tube 10 fr Left nostril 06/15/17   1430   Left nostril   105     NG/OG Tube Nasogastric Left nostril 09/19/17   1835   Left nostril   8     Rectal Tube With balloon 09/25/17   0800      3     Urethral Catheter Coude 06/09/17   0630   Coude   111     Urethral Catheter 09/19/17   1838      8     Peripheral IV 09/24/17 Right Lower forearm 09/24/17   1250   Lower forearm   4     Rash 06/18/17 2000 Bilateral thigh macular 06/18/17   2000    101     Rash 07/26/17 1700 other (see comments) other (see comments) macular 07/26/17   1700    63     Incision/Surgical Site 06/04/12 Lateral;Right Leg 06/04/12   0927    1942     Incision/Surgical Site 06/02/17 Abdomen 06/02/17   1716    117     Incision/Surgical Site 06/06/17 Abdomen 06/06/17   1809    113     Incision/Surgical Site 06/13/17 Neck 06/13/17   1700    106     Incision/Surgical Site 07/28/17 Left Chest 07/28/17   1500    62     Incision/Surgical Site 09/26/17 Right;Upper Abdomen 09/26/17   0800    2     Incision/Surgical Site Left;Lower;Quadrant Abdomen                     Patient Lines/Drains/Airways Status    Active PICC/CVC     Name: Placement date: Placement time: Site: Days: Additional Info Last dressing change:    PICC Triple Lumen 06/04/17 Right Basilic 06/04/17   1214   Basilic   116 External Cath Length (cm): 4 cm            Size (Fr): 5 Fr            Orientation: Right            Extremity Circumference (cm): 24 cm            Catheter Brand: Bard            Dressing Intervention: Transparent;Securing device;Gauze            Description: Valved;Power PICC            Total Catheter Length (cm) Trimmed: 45 cm            Site Prep: Chlorhexidine            Local Anesthetic: Injectable            Inserted  by: COLE Hernandez RN            Insertion attempts with ultrasound: 1            Patient Tolerance: Tolerated well            Placement Verification: Blood Return            Difficulty with threading line: No            Tip location: SVC            Full barrier precautions done: Yes            Consent Signed: Yes            Time Out performed: Yes            Lot #: [id number]               Intake/Output Detail Report     Date Intake               Output         Net    Shift P.O. I.V. Other NG/GT IV Piggyback Colloid Enteral Blood Components Total Urine Emesis/NG output Drains Stool Chest Tube Total       Day 09/27/17 0700 - 09/27/17 1459 -- -- -- 60 -- -- 360 -- 420 875 -- -- 200 -- 1075 -655    Nneka 09/27/17 1500 - 09/27/17 2259 -- -- -- 60 -- -- 360 -- 420 1150 -- -- 300 -- 1450 -1030    Noc 09/27/17 2300 - 09/28/17 0659 -- -- -- 60 -- -- 360 -- 420 725 -- -- -- -- 725 -305    Day 09/28/17 0700 - 09/28/17 1459 -- -- -- 120 -- -- 360 -- 480 525 -- -- -- -- 525 -45    Nneka 09/28/17 1500 - 09/28/17 2259 -- -- -- -- -- -- 45 -- 45 -- -- -- -- -- -- 45      Last Void/BM       Most Recent Value    Urine Occurrence     Stool Occurrence 1 at 09/24/2017 1200      Case Management/Discharge Planning     Case Management/Discharge Planning Flowsheet     REFERRAL INFORMATION     Concerns Comments  LTACH 09/27/17 1455    Did the Initial Social Work Assessment result in a Social Work Case?  Yes 09/27/17 1454   DISCHARGE PLANNING      Admission Type  inpatient 09/27/17 1454   Patient/family verbalizes understanding of discharge plan recommendations?  Yes 09/28/17 1425    Arrived From  another healthcare institution, not defined 09/27/17 1454   Medical Team notified of plan?  yes 09/28/17 1425    Referral Source  interdisciplinary rounds 09/27/17 1454   Anticipated Changes Related to Illness  inability to care for self 09/20/17 1508    New Steerage to  Clinics?  No 09/27/17 1454   Transportation Available  car;family or friend will  "provide 09/20/17 1437    # of Referrals Placed by CTS  Post Acute Facilities;Transportation 09/28/17 1425   Skilled Nursing Facility  [facility] 06/07/12 0924    Reason For Consult  transportation 09/28/17 1425   Skilled Nursing Facility Phone Number  [phone] 06/07/12 0924    Record Reviewed  clinical discipline documentation;history and physical 09/28/17 1425   Equipment Used at Home  standard walker;bath bench;grab bar;raised toilet;dressing device 06/06/12 1425    CTS Assigned to Case  Radha Lacy RN CTS 09/28/17 1425   PATIENT PLACEMENT INFORMATION      Referral Information Comments  now to [facility] 09/26/17 1539   Did the patient choose [place]?  Yes 09/25/17 1436    Primary Care MD Name  Shar Jay 07/27/17 1308   Placement Choice Reason  chosen facility reputation 09/25/17 1436    LIVING ENVIRONMENT     Facility 1 Name  [facility] 09/25/17 1436    Lives With  alone 09/25/17 1436   FINAL RESOURCES      Living Arrangements  [facility] 09/20/17 1437   Resources List  [facility] 09/25/17 1436    COPING/STRESS     ABUSE RISK SCREEN      Major Change/Loss/Stressor  death of a loved one;hospitalization 09/20/17 1513   QUESTION TO PATIENT:  Has a member of your family or a partner(now or in the past) intimidated, hurt, manipulated, or controlled you in any way?  no 09/20/17 1510    Patient Personal Strengths  expressive of needs;motivated;positive attitude;strong support system 06/05/12 1104   QUESTION TO PATIENT: Do you feel safe going back to the place where you are living?  yes (Does night [facility], \"they are rough\" not abused ) 09/20/17 1510    EXPECTED DISCHARGE     OBSERVATION: Is there reason to believe there has been maltreatment of a vulnerable adult (ie. Physical/Sexual/Emotional abuse, self neglect, lack of adequate food, shelter, medical care, or financial exploitation)?  no 09/20/17 1510    Expected Discharge Date  09/27/17 ([facility] (needs ride)) 09/26/17 1532  "  (R) MENTAL HEALTH SUICIDE RISK      ASSESSMENT/CONCERNS TO BE ADDRESSED     Are you depressed or being treated for depression?  Yes 09/20/17 1512    Concerns To Be Addressed  discharge planning concerns 09/27/17 1444

## 2017-09-19 NOTE — IP AVS SNAPSHOT
"[facility] INTENSIVE CARE UNIT: [phone]                                              INTERAGENCY TRANSFER FORM - PHYSICIAN ORDERS   2017                    Hospital Admission Date: 2017  BEN PALOMINO   : 1945  Sex: Female        Attending Provider: Jonna Cavanaugh MD     Allergies:  Iodine I 131 Tositumomab, Penicillins, Cefepime, Sulfa Drugs    Infection:  VRE-Contact Isolation   Service:  INTERNAL MED    Ht:  1.676 m (5' 5.98\")   Wt:  85.4 kg (188 lb 4.4 oz)   Admission Wt:  93.2 kg (205 lb 7.5 oz)    BMI:  30.4 kg/m 2   BSA:  1.99 m 2            Patient PCP Information     Provider PCP Type    Shar James DO [facility]      ED Clinical Impression     Diagnosis Description Comment Added By Time Added    Rash [R21] Rash [R21]  Isabella Gutierrez APRN CNP 2017  3:17 PM    Adjustment insomnia [F51.02] Adjustment insomnia [F51.02]  Isabella Gutierrez APRN CNP 2017  3:18 PM    Insomnia due to medical condition [G47.01] Insomnia due to medical condition [G47.01]  Isabella Gutierrez APRN CNP 2017  3:18 PM    Protein-calorie malnutrition (H) [E46] Protein-calorie malnutrition (H) [E46]  Isabella Gutierrez APRN CNP 2017  3:19 PM    C. difficile colitis [A04.7] C. difficile colitis [A04.7]  Isabella Gutierrez APRN CNP 2017  3:20 PM    Deep vein thrombosis (DVT) prophylaxis prescribed at discharge [Z78.9] Deep vein thrombosis (DVT) prophylaxis prescribed at discharge [Z78.9]  Isabella Gutierrez APRN CNP 2017 10:23 AM    Essential hypertension [I10] Essential hypertension [I10]  Isabella Gutierrez APRN CNP 2017 10:23 AM    H/O chest tube placement [Z98.890] H/O chest tube placement [Z98.890]  Isabella Gutierrez APRN CNP 2017 10:25 AM    Edema due to hypervolemia [R60.9, E87.70] Edema due to hypervolemia [R60.9, E87.70]  Isabella Gutierrez APRN CNP 2017 10:26 AM      Hospital Problems as of 2017              " Priority Class Noted POA    Empyema (H) Medium  9/19/2017 Yes      Non-Hospital Problems as of 9/27/2017              Priority Class Noted    Osteoarthritis of hip Medium  6/4/2012    Non union subtrochanteric fracture right hip Medium  6/4/2012    COPD (chronic obstructive pulmonary disease) (H) Medium  6/4/2012    Hyperlipidemia Medium  6/4/2012    Hypertension Medium  6/4/2012    Anemia due to blood loss, acute Medium  6/6/2012    Abdominal pain Medium  6/2/2017    Acute hypercapnic respiratory failure (H) Medium  7/26/2017      Code Status History     Date Active Date Inactive Code Status Order ID Comments User Context    9/26/2017  3:29 PM  Full Code [id number]  Isabella Gutierrez APRN [facility] Outpatient    9/19/2017  6:24 PM 9/26/2017  3:29 PM Full Code [id number]  Jonna Cavanaugh MD Inpatient    8/2/2017  4:37 PM 9/19/2017  6:24 PM Full Code [id number]  Isabella Gutierrez APRN CNP Outpatient    7/26/2017  6:10 PM 8/2/2017  4:37 PM Full Code [id number]  Scarlet Sorenson MD Inpatient    6/19/2017  2:48 PM 7/26/2017  6:10 PM Full Code [id number]  Stu Esquivel MD Outpatient    6/2/2017  7:23 PM 6/19/2017  2:48 PM Full Code [id number]  Estelita Peck MD Inpatient    6/4/2012 12:45 PM 6/7/2012  3:06 PM Full Code [id number]  Feli Gilmore, RN Inpatient         Medication Review      START taking        Dose / Directions Comments    diphenhydrAMINE 50 MG/ML injection   Commonly known as:  BENADRYL   Used for:  Rash        Dose:  25-50 mg   Inject 0.5-1 mLs (25-50 mg) into the vein every 12 hours as needed for itching   Quantity:  0.5 mL   Refills:  0        FIRST-VANCOMYCIN 50 50 MG/ML Soln   Indication:  Clostridium difficile Bacteria,     Used for:  C. difficile colitis        Dose:  125 mg   Take 2.5 mLs (125 mg) by mouth 4 times daily for 10 days   Refills:  0    Treatment of recurrent c. Diff.  Consider tapering off dose given recurrent infection       furosemide 10 MG/ML  injection   Commonly known as:  LASIX   Used for:  Edema due to hypervolemia        Dose:  40 mg   Inject 4 mLs (40 mg) into the vein 2 times daily   Quantity:  60 mL   Refills:  0    - Recommend continuing to monitor electrolytes and creatinine at least three times weekly    - Noted that creatinine has been increasingly slowly.       heparin sodium PF 5000 UNIT/0.5ML injection   Used for:  Deep vein thrombosis (DVT) prophylaxis prescribed at discharge        Dose:  5000 Units   Inject 0.5 mLs (5,000 Units) Subcutaneous every 8 hours   Refills:  0        protein modular   Used for:  Protein-calorie malnutrition (H)        Dose:  1 packet   1 packet by Per Feeding Tube route every 12 hours   Refills:  0        QUEtiapine 25 MG tablet   Commonly known as:  SEROquel   Used for:  Adjustment insomnia, Insomnia due to medical condition        Dose:  25-50 mg   Take 1-2 tablets (25-50 mg) by mouth nightly as needed (insomnia; delirium)   Quantity:  60 tablet   Refills:  0          CONTINUE these medications which may have CHANGED, or have new prescriptions. If we are uncertain of the size of tablets/capsules you have at home, strength may be listed as something that might have changed.        Dose / Directions Comments    bacitracin 500 UNIT/GM Oint   This may have changed:  additional instructions   Used for:  H/O chest tube placement        Apply topically 2 times daily for 7 days Apply to chest tube insertion site with dressing changes twice daily   Refills:  0        metoprolol 25 MG tablet   Commonly known as:  LOPRESSOR   This may have changed:  how much to take   Used for:  Essential hypertension        Dose:  25 mg   Take 1 tablet (25 mg) by mouth 2 times daily   Quantity:  60 tablet   Refills:  0          CONTINUE these medications which have NOT CHANGED        Dose / Directions Comments    ACETAMINOPHEN PO        Dose:  1000 mg   1,000 mg by Per J Tube route every 8 hours as needed for pain   Refills:  0         albuterol (2.5 MG/3ML) 0.083% neb solution        Dose:  1 vial   Take 1 vial by nebulization every 2 hours as needed for wheezing   Refills:  0        ALPRAZOLAM PO        Dose:  0.25 mg   0.25 mg by Per J Tube route 3 times daily as needed for anxiety   Refills:  0        ATORVASTATIN CALCIUM PO        Dose:  10 mg   Take 10 mg by mouth daily   Refills:  0        budesonide 0.5 MG/2ML neb solution   Commonly known as:  PULMICORT        Dose:  0.5 mg   Take 0.5 mg by nebulization 2 times daily   Refills:  0        BUSPAR PO        Dose:  7.5 mg   Take 7.5 mg by mouth 2 times daily   Refills:  0        calcium carbonate 500 MG chewable tablet   Commonly known as:  TUMS        Dose:  1 chew tab   Take 1 chew tab by mouth 3 times daily   Refills:  0        chlorhexidine 0.12 % solution   Commonly known as:  PERIDEX   Used for:  On mechanically assisted ventilation (H)        Dose:  15 mL   Take 15 mLs by mouth every 12 hours   Refills:  0        * HYDROMORPHONE HCL PO        Dose:  1 mg   Take 1 mg by mouth every 4 hours as needed for other (Pain rated 3 to 7)   Refills:  0        * HYDROMORPHONE HCL IJ        Dose:  0.6 mg   Inject 0.6 mg as directed every 4 hours as needed (Pain rated 8 to 10)   Refills:  0        * ipratropium - albuterol 0.5 mg/2.5 mg/3 mL 0.5-2.5 (3) MG/3ML neb solution   Commonly known as:  DUONEB        Dose:  1 vial   Take 1 vial by nebulization 4 times daily   Refills:  0        * ipratropium - albuterol 0.5 mg/2.5 mg/3 mL 0.5-2.5 (3) MG/3ML neb solution   Commonly known as:  DUONEB   Used for:  On mechanically assisted ventilation (H)        Dose:  3 mL   Take 1 vial (3 mLs) by nebulization every 4 hours as needed for shortness of breath / dyspnea   Quantity:  360 mL   Refills:  0        ondansetron 4 MG ODT tab   Commonly known as:  ZOFRAN-ODT   Used for:  Nausea        Dose:  4 mg   Take 1 tablet (4 mg) by mouth every 6 hours as needed for nausea or vomiting   Quantity:  30 tablet    Refills:  0        pantoprazole Susp suspension   Commonly known as:  PROTONIX        Dose:  40 mg   Take 40 mg by mouth daily   Refills:  0        TRAZODONE HCL PO        Dose:  50 mg   50 mg by Per J Tube route nightly as needed for sleep   Refills:  0        VITAMIN D (CHOLECALCIFEROL) PO        Dose:  1000 Units   Take 1,000 Units by mouth daily   Refills:  0        ZOLOFT PO        Dose:  150 mg   150 mg by Per J Tube route daily   Refills:  0        * Notice:  This list has 4 medication(s) that are the same as other medications prescribed for you. Read the directions carefully, and ask your doctor or other care provider to review them with you.      STOP taking     HYDRALAZINE HCL PO           nystatin 481998 UNIT/ML suspension   Commonly known as:  MYCOSTATIN           piperacillin-tazobactam 4-0.5 GM vial   Commonly known as:  ZOSYN                     Further instructions from your care team       Isosource 1.5 (or equivalent) at 45 mL/hr via NG tube = 1620 kcals, 73 gm pro, 820 mL H20, 16 gm fiber, 190 gm CHO  ProSource 2 packets per day = 80 kcals, 22 gm pro    Summary of Visit     Reason for your hospital stay       Management and drainage of right sided pleural fluid collection/empyema.             After Care     Activity - Up with nursing assistance           Advance Diet as Tolerated       Follow this diet upon discharge: Adult Formula Drip Feeding: Continuous Isosource 1.5; Nasogastric tube; Goal Rate: 45; mL/hr; Medication - Tube Feeding Flush Frequency: At least 15-30 mL water before and after medication administration and with tube clogging  Advance Diet as Tolerated: Clear Liquid Diet; Nectar Thickened Liquids (pre-thickened or use instant food thickener); Dysphagia       Daily weights       Call Provider for weight gain of more than 2 pounds per day or 5 pounds per week.       Fall precautions           Crawford catheter       To straight gravity drainage. Change catheter every 2 weeks and PRN  for leaking or decreased uring output with signs of bladder distention. DO NOT change catheter without a specific MD order IF diagnosis of benign prostatic hypertrophy (BPH), neurogenic bladder, or other urological conditions       General info for SNF       Length of Stay Estimate: Short Term Care: Estimated # of Days 31-90  Condition at Discharge: Stable  Level of care:skilled   Rehabilitation Potential: Good  Admission H&P remains valid and up-to-date: Yes  Recent Chemotherapy: N/A  Use Nursing Home Standing Orders: Yes       Glucose monitor nursing POCT       Before meals and at bedtime       Intake and output       Every shift       Mantoux instructions       Give two-step Mantoux (PPD) Per Facility Policy Yes       Tracheostomy care by nursing       Standard Cares       Wound care       Site:   Chest tube insertion site.   Instructions:  Apply bacitracin to site twice daily for 7 days.  Keep covered with clean 4x4 gauze dressing for 7 days. Change daily and PRN       Wound care (specify)       Site:   Abdominal drain removal site  Instructions:  Keep site covered with clean 4x4.  Change daily and PRN.  Keep covered for 7-10 days             Procedures     Ventilator       Trach dome during day and as tolerated at 35% FiO2.   Pressure support PRN 10/5 or titrate for comfort   Continue vent weaning             Referrals     Occupational Therapy Adult Consult       Evaluate and treat as clinically indicated.    Reason:  Extended critical illness with deconditioning       Physical Therapy Adult Consult       Evaluate and treat as clinically indicated.    Reason:  Extended critical illness with deconditioning       Speech Language Path Adult Consult       Evaluate and treat as clinically indicated.    Reason:  Continue to work on safe swallow techniques and advance diet as tolerated.  Continue working with PMV             Other Orders     Contact Isolation       VRE and enteric isolation             Follow-Up  Appointment Instructions     Future Labs/Procedures    Follow Up and recommended labs and tests     Comments:    Follow up with IR at discretion of care team at [facility]. No formal follow up requested from discharging providers      Follow-Up Appointment Instructions     Follow Up and recommended labs and tests       Follow up with IR at discretion of care team at [facility]. No formal follow up requested from discharging providers             Statement of Approval     Ordered          09/27/17 1045  I have reviewed and agree with all the recommendations and orders detailed in this document.  EFFECTIVE NOW     Approved and electronically signed by:  Isabella Gutierrez APRN CNP

## 2017-09-19 NOTE — H&P
[facility]ensive [facility] Unit  Comprehensive Daily ICU Note        Ara Stanley MRN# [id number]   Age: 72 year old YOB: 1945     Date of Admission: (Not on file)    Primary care provider: Shar James     CODE STATUS: FULL      Problem List:   Empyema  Multiple other medical problems as listed in PMH below         Treatment goals for next 24 hours:     Thoracic surgery evaluation   Chest tube drainage  ID consultation in morning  Jonna Cavanaugh MD        Subjective/ Last 24 hours:   HPI: Patient who originally was admitted to hospital on 6/2 with abdominal pain. Initially had laproscopic cholecystectomy.  4 days later went back to OR and found to have perforated gastric ulcer.  Developed critical illness with WOODY and septic shock, as well as multiple abscesses related to this and underlying severe malnutrition.  Transferred to [place] on 6/19 for long term ventilator weaning in late June. While at [place] had issues with continued draining fluid collections that grew candida, and had drains placed.  Transferred back to ICU on 7/26 for evaluation of loculated pleural effusions.  Had a VATS procedure done on the left side. Subsequently had an additional FUNMI drain placed into one of her abdominal fluid collections. Transferred to [facility] on 8/3 for continued treatment.  Per sign out from [facility] physician she has made some progress with ventilator weaning but has continued to struggle with multiple medical issues.  [facility].  Hemoglobin was as low as 5.  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  One of her abdominal drains was able to be removed, but fluid collection on the left did not resolve. She has also developed a large pleural effusion on the right along with pseudomonal pneumonia for which she just completed a course of Piperacillin/Tazobactam. On 9/14 returned to [facility] where she had  replacement of her left abdominal drain and a chest tube placed on the right.  Unfortunately, despite a lot of drainage, the pleural effusion has not resolved and pulmonologist at [facility] is concerned she will need another thoracotomy. Fluid is growing pseudomonas. Transfer is requested for a thoracic surgery evaluation.  Patient herself notes that she has had significant chest and right sided back pain since yesterday.  Hasn't noticed any other new respiratory changes. No new fevers or chills. She thinks her rehab is going OK. She remains frustrated by the what she sees as the delay in diagnosis of her gastric ulcer in June. Patient tells me she knows she has been in restraints.  Says she has no plans to pull at her feeding tube. She says she doesn't recall any conversations about how she is doing overall or her overall prognosis. Per Dr. Montejo's signout there have been conversations of this nature.     Past Medical History:   Diagnosis Date     Abdominal abscess (H)      Acute blood loss anemia      Acute hypercapnic respiratory failure (H)      WOODY (acute kidney injury) (H)      Alcohol abuse      Alcohol withdrawal (H)      Asthma      Atrial fibrillation (H)      Cholecystitis      Closed right hip fracture (H)      Clostridium difficile colitis      COPD (chronic obstructive pulmonary disease) (H)      Depression a     Dyslipidemia      Empyema (H)      GI bleed      Herpes zoster     right thoracic region     Hypertension      Mild reactive airways disease      Osteoarthritis      Perforated ulcer (H)      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 Had coil embolism     Septic shock (H)      Takotsubo cardiomyopathy      Ventilator dependence (H)      Past Surgical History:   Procedure Laterality Date     ARTHROPLASTY HIP  6/4/2012    Procedure:ARTHROPLASTY HIP; Surgeon:DAVIAN FENG; Location: OR     ENT SURGERY      tonsils     GYN SURGERY      hy      LAPAROSCOPIC CHOLECYSTECTOMY N/A 6/2/2017    Procedure: LAPAROSCOPIC CHOLECYSTECTOMY;;  Surgeon: Lavell Carpenter MD;  Location:  OR     LAPAROSCOPY DIAGNOSTIC (GENERAL) N/A 6/6/2017    Procedure: LAPAROSCOPY DIAGNOSTIC (GENERAL);  DIAGNOSTIC LAPAROSCOPY, REPAIR OF PERFORATED GASTRIC ULCER, ABDOMINAL LAVAGE;  Surgeon: Estelita Peck MD;  Location:  OR     LAPAROTOMY EXPLORATORY N/A 6/2/2017    Procedure: LAPAROTOMY EXPLORATORY;  EXPLORATORY LAPAROSCOPY, CHOLECYCTECTOMY;  Surgeon: Lavell Carpenter MD;  Location:  OR     ORTHOPEDIC SURGERY      hip pinning   femur fracture knee surgery      REMOVE HARDWARE HIP NAILING  6/4/2012    Procedure:REMOVE HARDWARE HIP NAILING; REMOVAL OF GAMMA NAIL AND SCREWS FROM RIGHT HIP, RIGHT TOTAL HIP ARTHROPLASTY(GAMMA NAIL EQUIPMENT, [employer] TOTAL HIP)^; Surgeon:DAVIAN FENG; Location: OR     THORACOSCOPIC DECORTICATION LUNG  7/28/2017    Procedure: THORACOSCOPIC DECORTICATION LUNG;;  Surgeon: Selwyn Varela MD;  Location:  OR     THORACOSCOPIC WEDGE RESECTION LUNG Left 7/28/2017    Procedure: THORACOSCOPIC WEDGE RESECTION LUNG;  LEFT VIDEO ASSISTED THORASCOPY WITH LEFT LUNG DECORDICATION;  Surgeon: Selwyn Varela MD;  Location:  OR     TRACHEOSTOMY N/A 6/13/2017    Procedure: TRACHEOSTOMY;  TRACHEOSTOMY.;  Surgeon: Selwyn Varela MD;  Location:  OR     Social History   Substance Use Topics     Smoking status: Former Smoker     Packs/day: 0.50     Years: 20.00     Types: Cigarettes     Quit date: 5/31/1992     Smokeless tobacco: Not on file     Alcohol use Yes      Comment: alcoholic, currently not drinking     Family History: non contributory to the current problem    ROS:  Chest pain as described in HPI.  Otherwise no new fevers or respiratory complaints. No new episodes of bleeding since recent admission. No new dysuria.  Energy level the same.            Mechanical Ventilation/Vitalsigns/IsandOs:      FiO2 (%):  [35 %] 35 %  SpO2:  [98 %] 98 %      No Data Recorded    Chest tube: minimal fluid in cavity, no air lake         Physical Examination:     General: Stated age, chronically ill  HEENT: trach tube with a cap, phonates well  Lungs: LCTAB anteriorly  CVS: RRR, large bandage over the right chest  Abdomen: large bandage over the left side of the abdomen  Extremities/musculoskeletal: diffuse anasarca, foot drop bilaterally  Neurology: alert and oriented to person and place, time not assessed, non  focal  Skin: fragile  Psychiatry: calm and appropriate but does at one point refer to her dead  as needing to  the children  Exam of Line sites:  Right midline IV looks OK     Hospital Procedures     Chest CT          Feeding/Glucose:   Dysphagia diet    Blood glucose/Insulin requirement last 24 hours: none         Medications:             Labs/Diagnostic studies:     EKG:  No new    Echo:  No new    ROUTINE ICU LABS (Last four results)  CMPNo lab results found in last 7 days.  CBCNo lab results found in last 7 days.  INR  Recent Labs  Lab 09/14/17  1030   INR 1.41*     Arterial Blood GasNo lab results found in last 7 days.    Labs at [facility] showed Creatinine of 1.5, WBC and Hemoglobin near baseline, INR  1.4      Cultures:    Recent Labs  Lab 09/14/17  1120   CULT Light growthPseudomonas aeruginosa*  Critical Value/Significant Value, preliminary result only, called to and read back byRoxanne Tamez N.P at 1429 on 09.15.17 by evelia  Culture negative monitoring continues     Blood culture:  Invalid input(s): BC   Urine culture:  No results for input(s): URC in the last 168 hours.          Imaging:     CXR:  Outpatient film showed re accumulation of the pleural effusion on the right    CT:  To be done tonight         Assessment and Plan:     Summary:  Ara Stanley is a 72 year old female admitted on 9/19 for evaluation of empyema.  I have personally reviewed the daily labs, imaging  studies, cultures and discussed the case with referring physician and consulting physicians. My assessment and plan as follows:    Neurology and Psychiatry:  1. Reports of confusion from nursing staff at [facility].    2.  She complained of severe pain in the right chest area last night.  - Trial off restraints.  Order if we observe necessary  - Will order Hydromorphone    Pulmonary:   1. Prolonged respiratory failure after hospitalization in June.  Now to the point where she is on trach dome during the day but pressure support at night.  [facility] staff did not feel she was yet ready for decannulation  2. Empyema and recent pseudomonal pneuomina: pleural fluid growing pseudomonas on cultures drawn on 9/14.  Some drainage in chest tube here.   - Continue trach dome during the day and pressure support at night.     Cardiovascular system:   No acute issues    Renal/Electrolytes:  Creatinine up from baseline per report  - Monitor I/O and recheck creatinine here  - IVF overnight    ID:  Multiple abscesses and infections since June. Most recently had a pseudomonal pneumonia with parapneumonic effusion.  Pleural fluid still growing pseudomonas. Has completed a course of antibiotics  - Thoracic surgery consult  - Given that she does not look overtly toxic and ID service here follows her at [facility] will ask them to give an opinion tomorrow on whether or not to resume antibiotics    GI/:  No acute issues but recent large GI bleed.  - tube feeds    Nutrition:   Malnourished  - Tube feeds and dysphagia diet    Musculoskeletal/Rheumatology:  Deconditioned  - PT    Endocrine:   No acute issues  - glucose checks    Heme/Onc:  Chronic anemia    ICU prophylaxis:      DVT: heparin     VAP: As above     Stress ulcer: PPI     Restraints needed: will assess     Lines   Central Line/PICC - placed at OSH needed: y   Arterial line - placed n needed: n   Crawford catheter.  Needed: y   Chest tube and abdominal drains    Prognosis:    Very very  guarded long term      Family update: spoke with patient herself    Billing: total time spend providing critical care was 40 min, excluding procedure time.    Jonna Cavanaugh MD  [phone]

## 2017-09-19 NOTE — IP AVS SNAPSHOT
"    [facility] INTENSIVE CARE UNIT: [phone]                                              INTERAGENCY TRANSFER FORM - LAB / IMAGING / EKG / EMG RESULTS   2017                    Hospital Admission Date: 2017  BEN PALOMINO   : 1945  Sex: Female        Attending Provider: Jonna Cavanaugh MD     Allergies:  Iodine I 131 Tositumomab, Penicillins, Cefepime, Sulfa Drugs    Infection:  VRE-Contact Isolation   Service:  INTERNAL MED    Ht:  1.676 m (5' 5.98\")   Wt:  81.5 kg (179 lb 10.8 oz)   Admission Wt:  93.2 kg (205 lb 7.5 oz)    BMI:  29.01 kg/m 2   BSA:  1.95 m 2            Patient PCP Information     Provider PCP Type    Shar Jamse DO General         Lab Results - 3 Days      Fluid Culture Aerobic Bacterial [[id number]] (Abnormal)  Resulted: 17 1232, Result status: Preliminary result    Ordering provider: Isabella Gutierrez APRN CNP  17 0813 Resulting lab: [facility]    Specimen Information    Type Source Collected On   Abdominal Fluid Abdominal Fluid 17 0930          Components       Value Reference Range Flag Lab   Specimen Description Abdominal Fluid      Culture Micro --  A 75   Result:         Light growth  Pseudomonas aeruginosa     Culture Micro --   75   Result:         Critical Value/Significant Value, preliminary result only, called to and read back by  CARLEY EASLEY RN 17 1102. LEONOR     Culture Micro --  A 75   Result:         Light growth  Enterococcus faecium  Probable VRE await confirmation  Susceptibility testing in progress              Basic metabolic panel [[id number]] (Abnormal)  Resulted: 17 1035, Result status: Final result    Ordering provider: Isabella Gutierrez APRN CNP  17 0949 Resulting lab: [facility]    Specimen Information    Type Source Collected On   Blood  17 0945          Components       Value Reference Range Flag Lab   Sodium 135 133 - 144 " mmol/L  FrStHsLb   Potassium 3.8 3.4 - 5.3 mmol/L  FrStHsLb   Chloride 98 94 - 109 mmol/L  FrStHsLb   Carbon Dioxide 26 20 - 32 mmol/L  FrStHsLb   Anion Gap 11 3 - 14 mmol/L  FrStHsLb   Glucose 107 70 - 99 mg/dL H FrStHsLb   Urea Nitrogen 87 7 - 30 mg/dL H FrStHsLb   Creatinine 1.98 0.52 - 1.04 mg/dL H FrStHsLb   GFR Estimate 25 >60 mL/min/1.7m2 L FrStHsLb   Comment:  Non  GFR Calc   GFR Estimate If Black 30 >60 mL/min/1.7m2 L FrStHsLb   Comment:  African American GFR Calc   Calcium 8.7 8.5 - 10.1 mg/dL  FrStHsLb            Magnesium (AM Draw) [[id number]]  Resulted: 09/28/17 1035, Result status: Final result    Ordering provider: Isabella Gutierrez APRN CNP  09/28/17 0949 Resulting lab: [facility]    Specimen Information    Type Source Collected On   Blood  09/28/17 0945          Components       Value Reference Range Flag Lab   Magnesium 2.1 1.6 - 2.3 mg/dL  FrStHsLb            CBC with platelets [[id number]] (Abnormal)  Resulted: 09/28/17 1014, Result status: Final result    Ordering provider: Isabella Gutierrez APRN CNP  09/28/17 0949 Resulting lab: [facility]    Specimen Information    Type Source Collected On   Blood  09/28/17 0945          Components       Value Reference Range Flag Lab   WBC 11.8 4.0 - 11.0 10e9/L H FrStHsLb   RBC Count 2.69 3.8 - 5.2 10e12/L L FrStHsLb   Hemoglobin 7.8 11.7 - 15.7 g/dL L FrStHsLb   Hematocrit 23.5 35.0 - 47.0 % L FrStHsLb   MCV 87 78 - 100 fl  FrStHsLb   MCH 29.0 26.5 - 33.0 pg  FrStHsLb   MCHC 33.2 31.5 - 36.5 g/dL  FrStHsLb   RDW 17.1 10.0 - 15.0 % H FrStHsLb   Platelet Count 335 150 - 450 10e9/L  FrStHsLb            Blood culture [[id number]]  Resulted: 09/28/17 0111, Result status: Preliminary result    Ordering provider: Ricky Bryan MD  09/24/17 0999 Resulting lab: [facility]    Specimen Information    Type Source Collected On   Blood Central Line 09/24/17 2181   Comment:  Unspecified Site           Components       Value Reference Range Flag Lab   Specimen Description Blood Unspecified Site      Special Requests Aerobic and anaerobic bottles received   75   Culture Micro No growth after 4 days   226            Blood culture [[id number]]  Resulted: 09/28/17 0111, Result status: Preliminary result    Ordering provider: Ricky Bryan MD  09/24/17 0945 Resulting lab: [facility]    Specimen Information    Type Source Collected On   Blood  09/24/17 1005   Comment:  Right Hand          Components       Value Reference Range Flag Lab   Specimen Description Blood Right Hand      Culture Micro No growth after 4 days   226            Glucose by meter [[id number]] (Abnormal)  Resulted: 09/28/17 0036, Result status: Final result    Ordering provider: Jonna Cavanaugh MD  09/28/17 0032 Resulting lab: POINT OF CARE TEST, GLUCOSE    Specimen Information    Type Source Collected On     09/28/17 0032          Components       Value Reference Range Flag Lab   Glucose 115 70 - 99 mg/dL H 170            Glucose by meter [[id number]] (Abnormal)  Resulted: 09/27/17 2126, Result status: Final result    Ordering provider: Jonna Cavanaugh MD  09/27/17 2121 Resulting lab: POINT OF CARE TEST, GLUCOSE    Specimen Information    Type Source Collected On     09/27/17 2121          Components       Value Reference Range Flag Lab   Glucose 123 70 - 99 mg/dL H 170            Glucose by meter [[id number]] (Abnormal)  Resulted: 09/27/17 1555, Result status: Final result    Ordering provider: Jonna Cavanaugh MD  09/27/17 1547 Resulting lab: POINT OF CARE TEST, GLUCOSE    Specimen Information    Type Source Collected On     09/27/17 1547          Components       Value Reference Range Flag Lab   Glucose 128 70 - 99 mg/dL H 170            Glucose by meter [[id number]] (Abnormal)  Resulted: 09/27/17 1256, Result status: Final result    Ordering provider: Jonna Cavanaugh MD  09/27/17 1254 Resulting lab:  POINT OF CARE TEST, GLUCOSE    Specimen Information    Type Source Collected On     09/27/17 1254          Components       Value Reference Range Flag Lab   Glucose 133 70 - 99 mg/dL H 170            Basic metabolic panel [[id number]] (Abnormal)  Resulted: 09/27/17 0852, Result status: Final result    Ordering provider: Isabella Gutierrez APRN CNP  09/27/17 0811 Resulting lab: [facility]    Specimen Information    Type Source Collected On   Blood  09/27/17 0809          Components       Value Reference Range Flag Lab   Sodium 137 133 - 144 mmol/L  FrStHsLb   Potassium 4.0 3.4 - 5.3 mmol/L  FrStHsLb   Chloride 101 94 - 109 mmol/L  FrStHsLb   Carbon Dioxide 25 20 - 32 mmol/L  FrStHsLb   Anion Gap 11 3 - 14 mmol/L  FrStHsLb   Glucose 127 70 - 99 mg/dL H FrStHsLb   Urea Nitrogen 81 7 - 30 mg/dL H FrStHsLb   Creatinine 1.97 0.52 - 1.04 mg/dL H FrStHsLb   GFR Estimate 25 >60 mL/min/1.7m2 L FrStHsLb   Comment:  Non  GFR Calc   GFR Estimate If Black 30 >60 mL/min/1.7m2 L FrStHsLb   Comment:  African American GFR Calc   Calcium 8.5 8.5 - 10.1 mg/dL  FrStHsLb            CBC with platelets [[id number]] (Abnormal)  Resulted: 09/27/17 0839, Result status: Final result    Ordering provider: Isabella Gutierrez APRN CNP  09/27/17 0811 Resulting lab: [facility]    Specimen Information    Type Source Collected On   Blood  09/27/17 0809          Components       Value Reference Range Flag Lab   WBC 12.0 4.0 - 11.0 10e9/L H FrStHsLb   RBC Count 2.88 3.8 - 5.2 10e12/L L FrStHsLb   Hemoglobin 8.3 11.7 - 15.7 g/dL L FrStHsLb   Hematocrit 25.2 35.0 - 47.0 % L FrStHsLb   MCV 88 78 - 100 fl  FrStHsLb   MCH 28.8 26.5 - 33.0 pg  FrStHsLb   MCHC 32.9 31.5 - 36.5 g/dL  FrStHsLb   RDW 16.7 10.0 - 15.0 % H FrStHsLb   Platelet Count 329 150 - 450 10e9/L  FrStHsLb            Glucose by meter [[id number]] (Abnormal)  Resulted: 09/27/17 0757, Result status: Final result    Ordering provider: Mao  Jonna Delacruz MD  09/27/17 0747 Resulting lab: POINT OF CARE TEST, GLUCOSE    Specimen Information    Type Source Collected On     09/27/17 0747          Components       Value Reference Range Flag Lab   Glucose 124 70 - 99 mg/dL H 170            Glucose by meter [[id number]] (Abnormal)  Resulted: 09/27/17 0406, Result status: Final result    Ordering provider: Jonna Cavanaugh MD  09/27/17 0402 Resulting lab: POINT OF CARE TEST, GLUCOSE    Specimen Information    Type Source Collected On     09/27/17 0402          Components       Value Reference Range Flag Lab   Glucose 133 70 - 99 mg/dL H 170            Glucose by meter [[id number]] (Abnormal)  Resulted: 09/26/17 2355, Result status: Final result    Ordering provider: Jonna Cavanaugh MD  09/26/17 2353 Resulting lab: POINT OF CARE TEST, GLUCOSE    Specimen Information    Type Source Collected On     09/26/17 2353          Components       Value Reference Range Flag Lab   Glucose 119 70 - 99 mg/dL H 170            Glucose by meter [[id number]] (Abnormal)  Resulted: 09/26/17 2106, Result status: Final result    Ordering provider: Jonna Cavanaugh MD  09/26/17 2100 Resulting lab: POINT OF CARE TEST, GLUCOSE    Specimen Information    Type Source Collected On     09/26/17 2100          Components       Value Reference Range Flag Lab   Glucose 132 70 - 99 mg/dL H 170            Glucose by meter [[id number]] (Abnormal)  Resulted: 09/26/17 1636, Result status: Final result    Ordering provider: Jonna Cavanaugh MD  09/26/17 1632 Resulting lab: POINT OF CARE TEST, GLUCOSE    Specimen Information    Type Source Collected On     09/26/17 1632          Components       Value Reference Range Flag Lab   Glucose 107 70 - 99 mg/dL H 170            Glucose by meter [[id number]] (Abnormal)  Resulted: 09/26/17 1206, Result status: Final result    Ordering provider: Jonna Cavanaugh MD  09/26/17 1202 Resulting lab: POINT OF CARE TEST, GLUCOSE     Specimen Information    Type Source Collected On     09/26/17 1202          Components       Value Reference Range Flag Lab   Glucose 125 70 - 99 mg/dL H 170            Glucose by meter [[id number]] (Abnormal)  Resulted: 09/26/17 0830, Result status: Final result    Ordering provider: Jonna Cavanaugh MD  09/26/17 0827 Resulting lab: POINT OF CARE TEST, GLUCOSE    Specimen Information    Type Source Collected On     09/26/17 0827          Components       Value Reference Range Flag Lab   Glucose 132 70 - 99 mg/dL H 170            Phosphorus (Add on or recollect) [[id number]]  Resulted: 09/26/17 0529, Result status: Final result    Ordering provider: Isabella Gutierrez APRN CNP  09/26/17 0000 Resulting lab: [facility]    Specimen Information    Type Source Collected On   Blood  09/26/17 0505          Components       Value Reference Range Flag Lab   Phosphorus 3.1 2.5 - 4.5 mg/dL  FrStHsLb            Basic metabolic panel [[id number]] (Abnormal)  Resulted: 09/26/17 0529, Result status: Final result    Ordering provider: Isabella Gutierrez APRN CNP  09/26/17 0000 Resulting lab: [facility]    Specimen Information    Type Source Collected On   Blood  09/26/17 0505          Components       Value Reference Range Flag Lab   Sodium 135 133 - 144 mmol/L  FrStHsLb   Potassium 4.1 3.4 - 5.3 mmol/L  FrStHsLb   Chloride 101 94 - 109 mmol/L  FrStHsLb   Carbon Dioxide 24 20 - 32 mmol/L  FrStHsLb   Anion Gap 10 3 - 14 mmol/L  FrStHsLb   Glucose 123 70 - 99 mg/dL H FrStHsLb   Urea Nitrogen 73 7 - 30 mg/dL H FrStHsLb   Creatinine 1.90 0.52 - 1.04 mg/dL H FrStHsLb   GFR Estimate 26 >60 mL/min/1.7m2 L FrStHsLb   Comment:  Non  GFR Calc   GFR Estimate If Black 31 >60 mL/min/1.7m2 L FrStHsLb   Comment:  African American GFR Calc   Calcium 8.6 8.5 - 10.1 mg/dL  FrStHsLb            Magnesium (Add on or recollect) [[id number]]  Resulted: 09/26/17 0529, Result status: Final result     Ordering provider: Isabella Gutierrez APRN CNP  09/26/17 0000 Resulting lab: [facility]    Specimen Information    Type Source Collected On   Blood  09/26/17 0505          Components       Value Reference Range Flag Lab   Magnesium 2.1 1.6 - 2.3 mg/dL  FrStHsLb            CBC with platelets [[id number]] (Abnormal)  Resulted: 09/26/17 0513, Result status: Final result    Ordering provider: Isabella Gutierrez APRN CNP  09/26/17 0000 Resulting lab: [facility]    Specimen Information    Type Source Collected On   Blood  09/26/17 0505          Components       Value Reference Range Flag Lab   WBC 12.0 4.0 - 11.0 10e9/L H FrStHsLb   RBC Count 2.87 3.8 - 5.2 10e12/L L FrStHsLb   Hemoglobin 8.4 11.7 - 15.7 g/dL L FrStHsLb   Hematocrit 25.2 35.0 - 47.0 % L FrStHsLb   MCV 88 78 - 100 fl  FrStHsLb   MCH 29.3 26.5 - 33.0 pg  FrStHsLb   MCHC 33.3 31.5 - 36.5 g/dL  FrStHsLb   RDW 16.6 10.0 - 15.0 % H FrStHsLb   Platelet Count 308 150 - 450 10e9/L  FrStHsLb            Glucose by meter [[id number]] (Abnormal)  Resulted: 09/26/17 0421, Result status: Final result    Ordering provider: Jonna Cavanaugh MD  09/26/17 0417 Resulting lab: POINT OF CARE TEST, GLUCOSE    Specimen Information    Type Source Collected On     09/26/17 0417          Components       Value Reference Range Flag Lab   Glucose 116 70 - 99 mg/dL H 170            Glucose by meter [[id number]] (Abnormal)  Resulted: 09/26/17 0021, Result status: Final result    Ordering provider: Jonna Cavanaugh MD  09/26/17 0018 Resulting lab: POINT OF CARE TEST, GLUCOSE    Specimen Information    Type Source Collected On     09/26/17 0018          Components       Value Reference Range Flag Lab   Glucose 111 70 - 99 mg/dL H 170            Glucose by meter [[id number]] (Abnormal)  Resulted: 09/25/17 2101, Result status: Final result    Ordering provider: Jonna Cvaanaugh MD  09/25/17 2058 Resulting lab: POINT OF CARE TEST, GLUCOSE     Specimen Information    Type Source Collected On     09/25/17 2058          Components       Value Reference Range Flag Lab   Glucose 126 70 - 99 mg/dL H 170            Glucose by meter [[id number]] (Abnormal)  Resulted: 09/25/17 1701, Result status: Final result    Ordering provider: Jonna Cavanaugh MD  09/25/17 1659 Resulting lab: POINT OF CARE TEST, GLUCOSE    Specimen Information    Type Source Collected On     09/25/17 1659          Components       Value Reference Range Flag Lab   Glucose 115 70 - 99 mg/dL H 170            Gram stain [[id number]]  Resulted: 09/25/17 1607, Result status: Final result    Ordering provider: Isabella Gutierrez APRN CNP  09/25/17 0813 Resulting lab: [facility]    Specimen Information    Type Source Collected On   Abdominal Fluid  09/25/17 0930          Components       Value Reference Range Flag Lab   Specimen Description Abdominal Fluid      Gram Stain No organisms seen   226   Gram Stain --   226   Result:         Many  WBC'S seen  predominantly PMN's              Procalcitonin [[id number]]  Resulted: 09/25/17 1407, Result status: Final result    Ordering provider: Isabella Gutierrez APRN CNP  09/25/17 1117 Resulting lab: [facility]    Specimen Information    Type Source Collected On   Blood  09/25/17 1315          Components       Value Reference Range Flag Lab   Procalcitonin 0.74 ng/ml  [facility]   Comment:         0.50-1.99 ng/ml  Moderate risk of systemic infection.  Recommendation:   Recommend antibiotics. Evaluate culture results and clinical features to   target antibacterial therapy. Obtain blood cultures and other relevant   cultures if not done.  If empiric antibiotics were started, recheck PCT in: 2 days to guide   antibiotic de-escalation.  Discontinue or de-escalate antibiotics when PCT   concentration is <80% of peak or abs PCT <0.5. If empiric antibiotics were NOT   started, recheck PCT in 6-24 hours to re-evaluate need for  antibiotics.              Magnesium (AM Draw) [[id number]] (Abnormal)  Resulted: 09/25/17 1349, Result status: Final result    Ordering provider: Jonna Cavanaugh MD  09/25/17 1135 Resulting lab: [facility]    Specimen Information    Type Source Collected On   Blood  09/25/17 1320          Components       Value Reference Range Flag Lab   Magnesium 2.4 1.6 - 2.3 mg/dL H FrStHsLb            Glucose by meter [[id number]] (Abnormal)  Resulted: 09/25/17 1246, Result status: Final result    Ordering provider: Jonna Cavanaugh MD  09/25/17 1242 Resulting lab: POINT OF CARE TEST, GLUCOSE    Specimen Information    Type Source Collected On     09/25/17 1242          Components       Value Reference Range Flag Lab   Glucose 107 70 - 99 mg/dL H 170            Glucose by meter [[id number]]  Resulted: 09/25/17 0941, Result status: Final result    Ordering provider: Jonna Cavanaugh MD  09/25/17 0938 Resulting lab: POINT OF CARE TEST, GLUCOSE    Specimen Information    Type Source Collected On     09/25/17 0938          Components       Value Reference Range Flag Lab   Glucose 88 70 - 99 mg/dL  170            Hepatic panel [[id number]] (Abnormal)  Resulted: 09/25/17 0814, Result status: Final result    Ordering provider: Jonna Cavnaaugh MD  09/25/17 0420 Resulting lab: [facility]    Specimen Information    Type Source Collected On     09/25/17 0420          Components       Value Reference Range Flag Lab   Bilirubin Direct 0.2 0.0 - 0.2 mg/dL  FrStHsLb   Bilirubin Total 0.4 0.2 - 1.3 mg/dL  FrStHsLb   Albumin 2.1 3.4 - 5.0 g/dL L FrStHsLb   Protein Total 6.7 6.8 - 8.8 g/dL L FrStHsLb   Alkaline Phosphatase 123 40 - 150 U/L  FrStHsLb   ALT 13 0 - 50 U/L  FrStHsLb   AST 18 0 - 45 U/L  FrStHsLb            Prealbumin [[id number]] (Abnormal)  Resulted: 09/25/17 0453, Result status: Final result    Ordering provider: Jonna Cavanaugh MD  09/25/17 0420 Resulting lab:  [facility]    Specimen Information    Type Source Collected On     09/25/17 0420          Components       Value Reference Range Flag Lab   Prealbumin 10 15 - 45 mg/dL L FrStHsLb            Phosphorus [[id number]]  Resulted: 09/25/17 0452, Result status: Final result    Ordering provider: Jonna Cavanaugh MD  09/25/17 0420 Resulting lab: [facility]    Specimen Information    Type Source Collected On     09/25/17 0420          Components       Value Reference Range Flag Lab   Phosphorus 4.1 2.5 - 4.5 mg/dL  FrStHsLb            Basic metabolic panel [[id number]] (Abnormal)  Resulted: 09/25/17 0452, Result status: Final result    Ordering provider: Jonna Cavanaugh MD  09/25/17 0359 Resulting lab: [facility]    Specimen Information    Type Source Collected On   Blood  09/25/17 0420          Components       Value Reference Range Flag Lab   Sodium 135 133 - 144 mmol/L  FrStHsLb   Potassium 4.3 3.4 - 5.3 mmol/L  FrStHsLb   Chloride 102 94 - 109 mmol/L  FrStHsLb   Carbon Dioxide 24 20 - 32 mmol/L  FrStHsLb   Anion Gap 9 3 - 14 mmol/L  FrStHsLb   Glucose 98 70 - 99 mg/dL  FrStHsLb   Urea Nitrogen 66 7 - 30 mg/dL H FrStHsLb   Creatinine 1.78 0.52 - 1.04 mg/dL H FrStHsLb   GFR Estimate 28 >60 mL/min/1.7m2 L FrStHsLb   Comment:  Non  GFR Calc   GFR Estimate If Black 34 >60 mL/min/1.7m2 L FrStHsLb   Comment:  African American GFR Calc   Calcium 8.2 8.5 - 10.1 mg/dL L FrStHsLb            Magnesium [[id number]] (Abnormal)  Resulted: 09/25/17 0452, Result status: Final result    Ordering provider: Jonna Cavanaugh MD  09/25/17 0420 Resulting lab: [facility]    Specimen Information    Type Source Collected On     09/25/17 0420          Components       Value Reference Range Flag Lab   Magnesium 1.5 1.6 - 2.3 mg/dL L FrStHsLb            CBC with platelets [[id number]] (Abnormal)  Resulted: 09/25/17 0358, Result status: Final result     Ordering provider: Ricky Bryan MD  09/24/17 2000 Resulting lab: [facility]    Specimen Information    Type Source Collected On   Blood  09/25/17 0335          Components       Value Reference Range Flag Lab   WBC 11.3 4.0 - 11.0 10e9/L H FrStHsLb   RBC Count 2.87 3.8 - 5.2 10e12/L L FrStHsLb   Hemoglobin 8.3 11.7 - 15.7 g/dL L FrStHsLb   Hematocrit 25.2 35.0 - 47.0 % L FrStHsLb   MCV 88 78 - 100 fl  FrStHsLb   MCH 28.9 26.5 - 33.0 pg  FrStHsLb   MCHC 32.9 31.5 - 36.5 g/dL  FrStHsLb   RDW 16.4 10.0 - 15.0 % H FrStHsLb   Platelet Count 321 150 - 450 10e9/L  FrStHsLb            Glucose by meter [[id number]] (Abnormal)  Resulted: 09/25/17 0345, Result status: Final result    Ordering provider: Jonna Cavanaugh MD  09/25/17 0342 Resulting lab: POINT OF CARE TEST, GLUCOSE    Specimen Information    Type Source Collected On     09/25/17 0342          Components       Value Reference Range Flag Lab   Glucose 112 70 - 99 mg/dL H 170            Testing Performed By     Lab - Abbreviation Name Director Address Valid Date Range    14 - FrStHsLb [facility] Unknown [address] [place] [place] [address] 05/08/15 1057 - Present    75 - Unknown [facility] Unknown [address] [facility] [address] 01/15/15 1019 - Present    170 - Unknown POINT OF CARE TEST, GLUCOSE Unknown Unknown 10/31/11 1114 - Present    226 - Unknown [facility] Unknown [address] [facility] [address] 12/19/14 0955 - Present            Unresulted Labs (24h ago through future)    Start       Ordered    09/25/17 0600  Magnesium  EVERY MONDAY,   Routine     Comments:  Every Monday while on enteral tube feeding.    09/20/17 1630    09/25/17 0600  Phosphorus  EVERY MONDAY,   Routine     Comments:  Every Monday while on enteral tube feeding.    09/20/17 1630    09/25/17 0600  Prealbumin  EVERY MONDAY,   Routine     Comments:  Every Monday while on enteral tube feeding.  "   09/20/17 1630    09/25/17 0600  Basic metabolic panel  EVERY MONDAY,   Routine     Comments:  Every Monday while on enteral tube feeding.    09/25/17 0355    Unscheduled  Magnesium  (Magnesium Replacement - Adult - \"High\" - Replacement for all levels less than or equal to 2 mg/dL)  CONDITIONAL (SPECIFY),   Routine     Comments:  Obtain Magnesium Level for these conditions:  *IF no magnesium result within 24 hrs before initiation of order set, draw magnesium level with next lab collect.    *2 HOURS AFTER last magnesium replacement dose when magnesium replacement given for level less than 1.6  *Next morning after magnesium dose.     Repeat Magnesium Replacement if necessary.    09/22/17 0803         Imaging Results - 3 Days      XR Chest Port 1 View [[id number]]  Resulted: 09/28/17 0758, Result status: Final result    Ordering provider: Isabella Gutierrez APRN CNP  09/28/17 0005 Resulted by: Seth Mills MD    Performed: 09/28/17 0539 - 09/28/17 0543 Resulting lab: RADIOLOGY RESULTS    Narrative:       CHEST PORTABLE ONE VIEW September 28, 2017 5:43 AM     HISTORY: Evaluate for effusion.    COMPARISON: 9/27/2017.      Impression:       IMPRESSION: The patient is rotated to the left which slightly limits  evaluation. Tracheostomy tube and enteric tube remain in place.    Small layering bilateral pleural effusions and bibasilar atelectasis  and/or consolidation is unchanged. Left greater than right perihilar  interstitial opacities are unchanged and may represent asymmetric  vascular congestion versus pneumonia. Cardiac silhouette remains  enlarged.    SETH MILLS MD      XR Chest Port 1 View [[id number]]  Resulted: 09/27/17 0730, Result status: Final result    Ordering provider: Isabella Gutierrez APRN CNP  09/27/17 0005 Resulted by: Shankar Adler MD    Performed: 09/27/17 0459 - 09/27/17 0519 Resulting lab: RADIOLOGY RESULTS    Narrative:       CHEST PORTABLE ONE VIEW September 27, 2017 5:19 AM "     HISTORY: Evaluate for effusion.    COMPARISON: 9/26/2017.      Impression:       IMPRESSION: There is an increasing right pleural effusion and a  persistent left pleural effusion. ET tube and feeding tube remain in  place. Cardiomegaly and mild vascular congestion also noted.    BELLA CABRERA MD      XR Chest Port 1 View [[id number]]  Resulted: 09/26/17 0853, Result status: Final result    Ordering provider: Isabella Gutierrez APRN CNP  09/26/17 0005 Resulted by: Phillip Castellanos MD    Performed: 09/26/17 0546 - 09/26/17 0611 Resulting lab: RADIOLOGY RESULTS    Narrative:       CHEST PORTABLE ONE VIEW September 26, 2017 6:11 AM     HISTORY: Evaluate for effusion.    COMPARISON: 9/25/2017.      Impression:       IMPRESSION: Right basilar chest tube is no longer identified. No  pneumothorax visualized. Tracheostomy tube and enteric tube remain in  place.    Probable moderate left and small right pleural effusions do not appear  significantly changed. Interstitial opacities throughout the lungs  possibly representing interstitial edema are also unchanged. Cardiac  silhouette remains enlarged.    PHILLIP CASTELLANOS MD      CT Chest Abodmen w/o Contrast [[id number]]  Resulted: 09/25/17 1616, Result status: Final result    Ordering provider: Isabella Gutierrez APRN CNP  09/25/17 0740 Resulted by: Eric Kelly MD    Performed: 09/25/17 1214 - 09/25/17 1227 Resulting lab: RADIOLOGY RESULTS    Narrative:       CT CHEST AND ABDOMEN WITHOUT CONTRAST  9/25/2017 12:27 PM     HISTORY: Interval exam to assess bilateral pleural effusions/empyema  and abdominal fluid collection status post drain repositioning.    COMPARISON: 9/21/2017    TECHNIQUE: Volumetric helical acquisition of CT images of the chest  and abdomen without contrast. Radiation dose for this scan was reduced  using automated exposure control, adjustment of the mA and/or kV  according to patient size, or iterative reconstruction technique.    FINDINGS:   Chest:  Right pigtail catheter in place with minimal residual partially  loculated fluid present. There is associated compressive atelectasis  and/or infiltrate dependently; the remainder of the lungs appear clear  of infiltrate. Extensive left lower lobe atelectasis and/or infiltrate  noted with trace left pleural fluid. No pericardial effusion. There  are moderate atherosclerotic changes of the visualized aorta and its  branches. There is no evidence of aortic aneurysm. Question some mild  cardiomegaly.    Abdomen and pelvis: A large bore catheter remains in place in a fluid  collection in the left abdomen measuring 12.0 x 8.0 cm, minimally  decreased in size from previous at 14.7 x 9.8 cm. No dilated bowel  noted in the abdomen. There are extensive atherosclerotic changes of  the visualized aorta and its branches. There is no evidence of aortic  aneurysm. No hydronephrosis. Adrenal glands, liver, spleen, and  pancreas grossly negative for worrisome focal lesion.      Impression:       IMPRESSION:  1. Minimal reduction in size in complex fluid collection in the left  abdomen despite multiple attempts at upsizing and repositioning the  catheter under fluoroscopy.  2. Trace pleural fluid bilaterally. Bibasilar atelectasis and/or  infiltrate, left worse than right, similar to previous.    YEVGENIY BAI MD      XR Chest Port 1 View [[id number]]  Resulted: 09/25/17 0821, Result status: Final result    Ordering provider: Ricky Bryan MD  09/25/17 0005 Resulted by: Seth Castellanos MD    Performed: 09/25/17 0430 - 09/25/17 0455 Resulting lab: RADIOLOGY RESULTS    Narrative:       CHEST ONE VIEW PORTABLE   9/25/2017 4:55 AM     HISTORY: Evaluate for effusion.    COMPARISON: 9/24/2017.      Impression:       IMPRESSION: Tracheostomy tube and enteric tube remain in place.  Unchanged left basilar infiltrate or atelectasis, likely with moderate  left pleural effusion. No significant right pleural effusion. No  pneumothorax. Slight  increase of diffuse interstitial edema pattern.  Pigtail drain again seen at the right lung base.    PHILLIP MILLS MD      Testing Performed By     Lab - Abbreviation Name Director Address Valid Date Range    104 - Rad Rslts RADIOLOGY RESULTS Unknown Unknown 05 1553 - Present               ECG/EMG Results      ECHO COMPLETE WITH OPTISON [[id number]]  Resulted: 17 1419, Result status: Edited Result - FINAL    Ordering provider: Isabella Gutierrez APRN CNP  17 1335 Resulted by: Perez Knight MD    Performed: 17 1508 - 17 1510 Resulting lab: RADIOLOGY RESULTS    Narrative:       [id number]  [id number]  [id number]  [id number]^PEREZ^ISABELLA^ARASH           [facility]  Echocardiography Laboratory  [address] [place], [place] [id number]        Name: BEN PALOMINO  MRN: [id number]  : 1945  Study Date: 2017 02:19 PM  Age: 72 yrs  Gender: Female  Patient Location: [facility]  Reason For Study: Other, Please Specify in Comments  Ordering Physician: ISABELLA GUTIERREZ  Performed By: Swetha Landry RDCS     BSA: 2.0 m2  Height: 67 in  Weight: 204 lb  HR: 73  BP: 100/55 mmHg  _____________________________________________________________________________  __        Procedure  Complete Portable Echo Adult. Contrast Optison.  _____________________________________________________________________________  __        Interpretation Summary     The anterior wall was just about seen and probably contracts normally. The  other walls contract normally.  The visual ejection fraction is estimated at 50-55%.  The right ventricle is moderately dilated.  With contrast the right ventricular function may be severely reduced and also  in limited apical views.  The study was technically difficult.  _____________________________________________________________________________  __        Left Ventricle  The left ventricle is normal in size. There is mild concentric left  ventricular  hypertrophy. E by E prime ratio is greater than 15, that likely  suggests increased left ventricular filling pressures. The visual ejection  fraction is estimated at 50-55%. Grade III or advanced diastolic dysfunction.  The anterior wall was just about seen and probably contracts normally. The  other walls contract normally.     Right Ventricle  The right ventricle is moderately dilated. Right ventricular function cannot  be assessed due to poor image quality. With contrast the right ventricular  function may be severely reduced and also in limited apical views.     Atria  Normal left atrial size. Right atrium not well visualized. The right atrium  may be severely dilated. There is no color Doppler evidence of an atrial  shunt.     Mitral Valve  There is mild to moderate (1-2+) mitral regurgitation.        Tricuspid Valve  There is mild to moderate (1-2+) tricuspid regurgitation. The right  ventricular systolic pressure is approximated at 20.3 mmHg plus the right  atrial pressure.     Aortic Valve  The aortic valve is trileaflet. There is moderate trileaflet aortic sclerosis.  There is trace aortic regurgitation. No hemodynamically significant valvular  aortic stenosis.     Pulmonic Valve  There is trace pulmonic valvular regurgitation.     Vessels  The ascending aorta is Mildly dilated. Inferior vena cava not well visualized  for estimation of right atrial pressure.     Pericardium  There is no pericardial effusion.        Rhythm  Sinus rhythm was noted.  _____________________________________________________________________________  __  MMode/2D Measurements & Calculations     IVSd: 1.2 cm  LVIDd: 4.6 cm  LVIDs: 3.2 cm  LVPWd: 1.4 cm  FS: 29.6 %  EDV(Teich): 98.0 ml  ESV(Teich): 42.5 ml  LV mass(C)d: 238.7 grams  LV mass(C)dI: 117.1 grams/m2  Ao root diam: 3.3 cm  LA dimension: 4.4 cm  asc Aorta Diam: 4.0 cm  LA/Ao: 1.3        Doppler Measurements & Calculations  MV E max truman: 125.6 cm/sec  MV A max truman: 84.4  cm/sec  MV E/A: 1.5  MV dec time: 0.17 sec  TR max truman: 225.5 cm/sec  TR max P.3 mmHg  Medial E/e': 16.0              _____________________________________________________________________________  __        Report approved by: Pedro Waller 2017 03:58 PM       1    Type Source Collected On     17 1419          View Image (below)        Echocardiogram Complete [[id number]]  Resulted: 17 1508, Result status: In process    Ordering provider: Isabella Gutierrez APRN CNP  17 1335 Performed: 17 1508 - 17 1508    Resulting lab: RADIANT                   Encounter-Level Documents:     There are no encounter-level documents.      Order-Level Documents:     There are no order-level documents.

## 2017-09-19 NOTE — IP AVS SNAPSHOT
` `     [facility] INTENSIVE CARE UNIT: [phone]            Medication Administration Report for Ara Stanley as of 09/28/17 1634   Legend:    Given Hold Not Given Due Canceled Entry Other Actions    Time Time (Time) Time  Time-Action       Inactive    Active    Linked        Medications 09/22/17 09/23/17 09/24/17 09/25/17 09/26/17 09/27/17 09/28/17    acetaminophen (TYLENOL) tablet 1,000 mg  Dose: 1,000 mg Freq: EVERY 8 HOURS PRN Route: PER J TUBE  PRN Comment: pain  Start: 09/20/17 1224   Admin Instructions: Maximum acetaminophen dose from all sources = 75 mg/kg/day not to exceed 4 gram     0904 (1,000 mg)-Given       2100 (1,000 mg)-Given        0803 (1,000 mg)-Given       1623 (1,000 mg)-Given        0415 (1,000 mg)-Given       1242 (1,000 mg)-Given        0919 (1,000 mg)-Given              albuterol neb solution 2.5 mg  Dose: 1 vial Freq: EVERY 2 HOURS PRN Route: NEBULIZATION  PRN Reason: wheezing  Start: 09/20/17 1224              ALPRAZolam (XANAX) tablet 0.25 mg  Dose: 0.25 mg Freq: 3 TIMES DAILY PRN Route: PER J TUBE  PRN Reason: anxiety  Start: 09/20/17 1224   Admin Instructions: Avoid taking with grapefruit juice      1307 (0.25 mg)-Given       2014 (0.25 mg)-Given         0919 (0.25 mg)-Given        0932 (0.25 mg)-Given        0201 (0.25 mg)-Given        0523 (0.25 mg)-Given       1443 (0.25 mg)-Given           atorvastatin (LIPITOR) tablet 10 mg  Dose: 10 mg Freq: EVERY EVENING Route: PO  Start: 09/20/17 2000 2100 (10 mg)-Given        2014 (10 mg)-Given        2028 (10 mg)-Given        2051 (10 mg)-Given        2052 (10 mg)-Given        2103 (10 mg)-Given        [ ] 2000           bacitracin ointment  Freq: 2 TIMES DAILY Route: Top  Start: 09/20/17 1224   Admin Instructions: Apply to chest tube site.     (8581)-Not Given               0810 ( )-Given       2015 ( )-Given        0826 ( )-Given       2044 ( )-Given        0922 ( )-Given       2056 ( )-Given        1013 (  )-Given       2054 ( )-Given [C]        0803 ( )-Given       2104 ( )-Given        0839 ( )-Given       [ ] 2100           budesonide (PULMICORT) neb solution 0.5 mg  Dose: 0.5 mg Freq: 2 TIMES DAILY Route: NEBULIZATION  Start: 09/20/17 1224    0725 (0.5 mg)-Given       2028 (0.5 mg)-Given        0827 (0.5 mg)-Given       1956 (0.5 mg)-Given        0745 (0.5 mg)-Given       1915 (0.5 mg)-Given        0727 (0.5 mg)-Given       2007 (0.5 mg)-Given        0730 (0.5 mg)-Given       1934 (0.5 mg)-Given        0738 (0.5 mg)-Given       1916 (0.5 mg)-Given        0718 (0.5 mg)-Given       [ ] 1900           busPIRone (BUSPAR) tablet 7.5 mg  Dose: 7.5 mg Freq: 2 TIMES DAILY Route: PO  Start: 09/20/17 1224    0905 (7.5 mg)-Given       2100 (7.5 mg)-Given        0803 (7.5 mg)-Given       2014 (7.5 mg)-Given        0826 (7.5 mg)-Given       2028 (7.5 mg)-Given        0919 (7.5 mg)-Given       2051 (7.5 mg)-Given        0932 (7.5 mg)-Given       2052 (7.5 mg)-Given        0802 (7.5 mg)-Given       2103 (7.5 mg)-Given        0825 (7.5 mg)-Given [C]       [ ] 2100           calcium carbonate (TUMS) chewable tablet 500 mg  Dose: 500 mg Freq: 3 TIMES DAILY Route: PO  Start: 09/20/17 1224    0906 (500 mg)-Given       1804 (500 mg)-Given       2210 (500 mg)-Given        0803 (500 mg)-Given       1556 (500 mg)-Given       2130 (500 mg)-Given        0826 (500 mg)-Given       1514 (500 mg)-Given       2100 (500 mg)-Given        0919 (500 mg)-Given       1642 (500 mg)-Given       2116 (500 mg)-Given        0931 (500 mg)-Given       1724 (500 mg)-Given       2110 (500 mg)-Given        0802 (500 mg)-Given       1532 (500 mg)-Given       2103 (500 mg)-Given        0825 (500 mg)-Given [C]       [ ] 1600       [ ] 2200           chlorhexidine (PERIDEX) 0.12 % solution 15 mL  Dose: 15 mL Freq: EVERY 12 HOURS Route: MT  Start: 09/19/17 2000   Admin Instructions: Do not swallow.  Discontinue when extubated.     0930 (15 mL)-Given       2100 (15  mL)-Given        0803 (15 mL)-Given       2015 (15 mL)-Given        0826 (15 mL)-Given       2039 (15 mL)-Given        0909 (15 mL)-Given       2048 (15 mL)-Given        0931 (15 mL)-Given       2054 (15 mL)-Given        0800 (15 mL)-Given       2103 (15 mL)-Given        0826 (15 mL)-Given       [ ] 2000           cholecalciferol (vitamin D) tablet 1,000 Units  Dose: 1,000 Units Freq: DAILY Route: PO  Start: 09/20/17 1224    0906 (1,000 Units)-Given        0804 (1,000 Units)-Given        0826 (1,000 Units)-Given        0920 (1,000 Units)-Given        0931 (1,000 Units)-Given        0803 (1,000 Units)-Given        0825 (1,000 Units)-Given [C]           diphenhydrAMINE (BENADRYL) injection 25-50 mg  Dose: 25-50 mg Freq: EVERY 12 HOURS PRN Route: IV  PRN Reason: itching  Start: 09/25/17 1415        0058 (25 mg)-Given             FIRST-VANCOMYCIN 50 solution 125 mg  Dose: 125 mg Freq: 4 TIMES DAILY Route: PO  Indications of Use: CLOSTRIDIUM DIFFICILE  Indications Comment:    Start: 09/24/17 1330      1451 (125 mg)-Given       1700 (125 mg)-Given       2100 (125 mg)-Given        0919 (125 mg)-Given       1240 (125 mg)-Given       1700 (125 mg)-Given       2126 (125 mg)-Given        0931 (125 mg)-Given       1257 (125 mg)-Given       1724 (125 mg)-Given       2102 (125 mg)-Given        1055 (125 mg)-Given       1254 (125 mg)-Given       1744 (125 mg)-Given       2103 (125 mg)-Given        0825 (125 mg)-Given [C]       [ ] 1300       [ ] 1800       [ ] 2200           furosemide (LASIX) injection 40 mg  Dose: 40 mg Freq: 2 TIMES DAILY. Route: IV  Start: 09/27/17 2000         2104 (40 mg)-Given        0825 (40 mg)-Given       [ ] 2000           glucose 40 % gel 15-30 g  Dose: 15-30 g Freq: EVERY 15 MIN PRN Route: PO  PRN Reason: low blood sugar  Start: 09/19/17 3161   Admin Instructions: Give 15 g for BG 51 to 69 mg/dL IF patient is conscious and able to swallow. Give 30 g for BG less than or equal to 50 mg/dL IF patient is  conscious and able to swallow. Do NOT give glucose gel via enteral tube.  IF patient has enteral tube: give apple juice 120 mL (4 oz or 15 g of CHO) via enteral tube for BG 51 to 69 mg/dL.  Give apple juice 240 mL (8 oz or 30 g of CHO) via enteral tube for BG less than or equal to 50 mg/dL.    ~Oral gel is preferable for conscious and able to swallow patient.   ~IF gel unavailable or patient refuses may provide apple juice 120 mL (4 oz or 15 g of CHO). Document juice on I and O flowsheet.              Or  dextrose 50 % injection 25-50 mL  Dose: 25-50 mL Freq: EVERY 15 MIN PRN Route: IV  PRN Reason: low blood sugar  Start: 09/19/17 1851   Admin Instructions: Use if have IV access, BG less than 70 mg/dL and meet dose criteria below:  Dose if conscious and alert (or disorientated) and NPO = 25 mL  Dose if unconscious / not alert = 50 mL  Vesicant.              Or  glucagon injection 1 mg  Dose: 1 mg Freq: EVERY 15 MIN PRN Route: SC  PRN Reason: low blood sugar  PRN Comment: May repeat x 1 only  Start: 09/19/17 1851   Admin Instructions: May give SQ or IM. ONLY use glucagon IF patient has NO IV access AND is UNABLE to swallow AND blood glucose is LESS than or EQUAL to 50 mg/dL.               heparin sodium PF injection 5,000 Units  Dose: 5,000 Units Freq: EVERY 8 HOURS Route: SC  Start: 09/19/17 2200   Admin Instructions: High concentration HEParin. Not for line flush or cath care.     0630 (5,000 Units)-Given       1501 (5,000 Units)-Given       2232 (5,000 Units)-Given        0607 (5,000 Units)-Given       1300 (5,000 Units)-Given       2129 (5,000 Units)-Given        0623 (5,000 Units)-Given       (1300)-Not Given [C]       2100 (5,000 Units)-Given        0630 (5,000 Units)-Given       1342 (5,000 Units)-Given       2115 (5,000 Units)-Given        0623 (5,000 Units)-Given       1425 (5,000 Units)-Given       2110 (5,000 Units)-Given        0652 (5,000 Units)-Given       1531 (5,000 Units)-Given       2104 (5,000  Units)-Given        0523 (5,000 Units)-Given       1446 (5,000 Units)-Given       [ ] 2200           HYDROmorphone (DILAUDID) half-tab 1 mg  Dose: 1 mg Freq: EVERY 4 HOURS PRN Route: PO  PRN Reason: moderate to severe pain  Start: 09/25/17 1405       1642 (1 mg)-Given       2115 (1 mg)-Given        0931 (1 mg)-Given             HYDROmorphone (PF) (DILAUDID) injection 0.3-0.5 mg  Dose: 0.3-0.5 mg Freq: EVERY 1 HOUR PRN Route: IV  PRN Reason: moderate to severe pain  Start: 09/24/17 2108      2240 (0.5 mg)-Given        0909 (0.5 mg)-Given       1342 (0.5 mg)-Given         1558 (0.5 mg)-Given            ipratropium - albuterol 0.5 mg/2.5 mg/3 mL (DUONEB) neb solution 3 mL  Dose: 3 mL Freq: EVERY 4 HOURS PRN Route: NEBULIZATION  PRN Reason: shortness of breath / dyspnea  Start: 09/20/17 1224              ipratropium - albuterol 0.5 mg/2.5 mg/3 mL (DUONEB) neb solution 3 mL  Dose: 1 vial Freq: 4 TIMES DAILY Route: NEBULIZATION  Start: 09/20/17 1224    0725 (3 mL)-Given       1228 (3 mL)-Given       1605 (3 mL)-Given       2028 (3 mL)-Given        0827 (3 mL)-Given       1223 (3 mL)-Given       1614 (3 mL)-Given       1956 (3 mL)-Given        0745 (3 mL)-Given       1133 (3 mL)-Given       1548 (3 mL)-Given       1915 (3 mL)-Given        0734 (3 mL)-Given       (1207)-Not Given       1543 (3 mL)-Given       2007 (3 mL)-Given        0735 (3 mL)-Given       1143 (3 mL)-Given       1515 (3 mL)-Given       1934 (3 mL)-Given        0738 (3 mL)-Given       1050 (3 mL)-Given       1558 (3 mL)-Given       1916 (3 mL)-Given        0718 (3 mL)-Given       1129 (3 mL)-Given       1422 (3 mL)-Given       [ ] 1900           levofloxacin (LEVAQUIN) infusion 500 mg  Dose: 500 mg Freq: EVERY 48 HOURS Route: IV  Indications of Use: ABSCESS  Last Dose: 500 mg (09/28/17 1104)  Start: 09/28/17 1015   Admin Instructions: Irritant. Administer at a rate of no greater than 100 mL/hr           1104 (500 mg)-New Bag           magnesium sulfate 2 g  in NS intermittent infusion (PharMEDium or FV Cmpd)  Dose: 2 g Freq: DAILY PRN Route: IV  PRN Reason: magnesium supplementation  Start: 09/22/17 0803   Admin Instructions: For Serum Mg++ 1.6 - 2 mg/dL  Give 2 g and recheck magnesium level next AM.               magnesium sulfate 4 g in 100 mL sterile water (premade)  Dose: 4 g Freq: EVERY 4 HOURS PRN Route: IV  PRN Reason: magnesium supplementation  Start: 09/22/17 0803   Admin Instructions: For serum Mg++ less than 1.6 mg/dL  Give 4 g and recheck magnesium level 2 hours after dose, and next AM.        0909 (4 g)-New Bag              metoprolol (LOPRESSOR) injection 5 mg  Dose: 5 mg Freq: EVERY 6 HOURS PRN Route: IV  PRN Reason: other  PRN Comment: HR > 110  Start: 09/27/17 0159              metoprolol (LOPRESSOR) tablet 25 mg  Dose: 25 mg Freq: 2 TIMES DAILY Route: PO  Start: 09/27/17 0900         1055 (25 mg)-Given       2103 (25 mg)-Given        0825 (25 mg)-Given [C]       [ ] 2100           midazolam (VERSED) injection 1-3 mg  Dose: 1-3 mg Freq: EVERY 2 HOURS PRN Route: IV  PRN Reason: sedation  Start: 09/24/17 2108              naloxone (NARCAN) injection 0.1-0.4 mg  Dose: 0.1-0.4 mg Freq: EVERY 2 MIN PRN Route: IV  PRN Reason: opioid reversal  Start: 09/19/17 1819   Admin Instructions: For respiratory rate LESS than or EQUAL to 8.  Partial reversal dose:  0.1 mg titrated q 2 minutes for Analgesia Side Effects Monitoring Sedation Level of 3 (frequently drowsy, arousable, drifts to sleep during conversation).Full reversal dose:  0.4 mg bolus for Analgesia Side Effects Monitoring Sedation Level of 4 (somnolent, minimal or no response to stimulation).               ondansetron (ZOFRAN) injection 4 mg  Dose: 4 mg Freq: EVERY 6 HOURS PRN Route: IV  PRN Reasons: nausea,vomiting  Start: 09/19/17 1825   Admin Instructions: Irritant.               pantoprazole (PROTONIX) suspension 40 mg  Dose: 40 mg Freq: DAILY Route: ORAL OR FEED  Start: 09/22/17 0915    1209 (67  mg)-Given        0804 (40 mg)-Given        0826 (40 mg)-Given        0920 (40 mg)-Given        0931 (40 mg)-Given        0803 (40 mg)-Given        0825 (40 mg)-Given [C]           protein modular (PROSource TF) 1 packet  Dose: 1 packet Freq: EVERY 12 HOURS Route: PER FEEDING   Start: 09/21/17 2100   Admin Instructions: Infuse via syringe down feeding tube. Flush feeding tube with 15-30 mL of water after administration. Do not mix with other medications.  No mixing or dilution is required. SUPPLIED BY NUTRITION DEPARTMENT.     0914 (1 packet)-Given       2210 (1 packet)-Given        0804 (1 packet)-Given       2038 (1 packet)-Given        0840 (1 packet)-Given       2040 (1 packet)-Given        0921 (1 packet)-Given       2052 (1 packet)-Given        0936 (1 packet)-Given       2053 (1 packet)-Given        0803 (1 packet)-Given       2110 (1 packet)-Given        1039 (1 packet)-Given       [ ] 2100           QUEtiapine (SEROquel) tablet 25-50 mg  Dose: 25-50 mg Freq: AT BEDTIME PRN Route: PO  PRN Comment: insomnia; delirium  Start: 09/27/17 0830              sertraline (ZOLOFT) tablet 150 mg  Dose: 150 mg Freq: DAILY Route: PER J TUBE  Start: 09/20/17 1224    0904 (150 mg)-Given        0803 (150 mg)-Given        0826 (150 mg)-Given        0919 (150 mg)-Given        0931 (150 mg)-Given        0803 (150 mg)-Given        0824 (150 mg)-Given [C]           traZODone (DESYREL) tablet 50 mg  Dose: 50 mg Freq: AT BEDTIME PRN Route: PER J TUBE  PRN Reason: sleep  Start: 09/20/17 1224    2210 (50 mg)-Given           0217 (50 mg)-Given            Discontinued Medications  Medications 09/22/17 09/23/17 09/24/17 09/25/17 09/26/17 09/27/17 09/28/17         Dose: 60 mg Freq: 2 TIMES DAILY. Route: IV  Start: 09/26/17 0800   End: 09/27/17 0825        0828 (60 mg)-Given       1620 (60 mg)-Given        0825-Med Discontinued  1056 (60 mg)-Given [C]              Dose: 60 mg Freq: EVERY 8 HOURS Route: IV  Start: 09/22/17 1430   End:  09/26/17 0738    1458 (60 mg)-Given       2223 (60 mg)-Given        (0607)-Not Given       0758 (60 mg)-Given       1556 (60 mg)-Given        0026 (60 mg)-Given       0826 (60 mg)-Given       1514 (60 mg)-Given       2312 (60 mg)-Given        0909 (60 mg)-Given       1641 (60 mg)-Given       2355 (60 mg)-Given        0738-Med Discontinued           Dose: 1-4 Units Freq: EVERY 4 HOURS Route: SC  Start: 09/19/17 1900   End: 09/28/17 1016   Admin Instructions: Correction Scale - LOW INSULIN RESISTANCE DOSING     Do Not give Correction Insulin if BG less than 140.  For  - 239 give 1 unit.  For  - 339 give 2 units.  For BG  340 - 439  give 3 units  For BG greater than or equal to 440 give 4 units  Check blood glucose Q4H and administer based on blood glucose.   Notify provider if glucose greater than or equal to 350 mg/dL after administration of correction dose.  If given at mealtime, must be administered 5 min before meal or immediately after.     (0140)-Not Given       0258 (1 Units)-Given              (0948)-Not Given       (1224)-Not Given       (1600)-Not Given [C]       (2107)-Not Given        (0043)-Not Given       (0400)-Not Given       (0803)-Not Given [C]       (1126)-Not Given [C]       (1609)-Not Given [C]       (2040)-Not Given        (0027)-Not Given       (0658)-Not Given       (0840)-Not Given [C]       (1248)-Not Given [C]       (1523)-Not Given [C]       (2044)-Not Given       (2341)-Not Given        (0343)-Not Given       (0939)-Not Given       (1251)-Not Given       (1659)-Not Given       (2059)-Not Given [C]        (0019)-Not Given [C]       (0417)-Not Given       (0828)-Not Given       (1222)-Not Given       (1633)-Not Given       (2102)-Not Given        (0101)-Not Given       (0606)-Not Given       (0802)-Not Given       (1254)-Not Given       (1548)-Not Given       (2125)-Not Given        (0124)-Not Given       (0517)-Not Given       1016-Med Discontinued               Dose: 25 mg  Freq: AT BEDTIME Route: PO  Start: 09/26/17 2200   End: 09/26/17 0749        0749-Med Discontinued           Dose: 25-50 mg Freq: AT BEDTIME Route: PO  Start: 09/26/17 2200   End: 09/27/17 0823        2052 (25 mg)-Given               0823-Med Discontinued     Medications 09/22/17 09/23/17 09/24/17 09/25/17 09/26/17 09/27/17 09/28/17

## 2017-09-20 NOTE — CONSULTS
CONSULT NOTE    DATE OF ADMISSION:  9/19/2017      REASON FOR CONSULT:  Empyema, right pleural effusion    REQUESTING PROVIDER: Dr Cavanaugh.    HISTORY OF PRESENT ILLNESS: Ms Stanley is a 72 year old female known to our service from hospital stay in July who returned to [facility] from [facility] on 9/19/2017 with persistent right pleural effusion despite IR-placed chest tube.  For full details on recent complicated hospital stays, please see the comprehensive H&P by Dr Cavanaugh.  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  She returned from [facility] due to loculated pleural effusions and ultimately underwent a left VATS with decortication 7/28/2017 with Dr Varela.  Gram stain from pleural debris showed moderate WBCs on gram stain, but cultures remained sterile.  Transferred to [facility] 8/3/2017 on Daptomycin, meropenem, and fluconazole x 7 additional days.  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  Also treated for pseudomonal pneumonia but recently found to have large right pleural effusion.  IR placed chest tube 9/14/2017, removing 1700 mL slightly cloudy yellow fluid.  Transudative.  Cultures show light growth pseudomonas.  Pleural effusion remained despite chest tube, prompting transfer back to [facility] for Thoracic Surgery evaluation.    Patient seen and examined in ICU where she is resting comfortably.  Pressures normal.  Tolerating trach dome, 6L.  Afebrile, TMax 99.2 over 24 hours.  HR and respiratory rate stable. Over 2700 out since tPA administration started through chest tubes.    PAST MEDICAL HISTORY:   Past Medical History:   Diagnosis Date     Abdominal  abscess (H)      Acute blood loss anemia      Acute hypercapnic respiratory failure (H)      WOODY (acute kidney injury) (H)      Alcohol abuse      Alcohol withdrawal (H)      Asthma      Atrial fibrillation (H)      Cholecystitis      Closed right hip fracture (H)      Clostridium difficile colitis      COPD (chronic obstructive pulmonary disease) (H)      Depression a     Dyslipidemia      Empyema (H)      GI bleed      Herpes zoster     right thoracic region     Hypertension      Mild reactive airways disease      Osteoarthritis      Perforated ulcer (H)      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 Had coil embolism     Septic shock (H)      Takotsubo cardiomyopathy      Ventilator dependence (H)        SURGICAL HISTORY:   Past Surgical History:   Procedure Laterality Date     ARTHROPLASTY HIP  6/4/2012    Procedure:ARTHROPLASTY HIP; Surgeon:DAVIAN FENG; Location: OR     ENT SURGERY      tonsils     GYN SURGERY      hyst     LAPAROSCOPIC CHOLECYSTECTOMY N/A 6/2/2017    Procedure: LAPAROSCOPIC CHOLECYSTECTOMY;;  Surgeon: Lavell Carpenter MD;  Location:  OR     LAPAROSCOPY DIAGNOSTIC (GENERAL) N/A 6/6/2017    Procedure: LAPAROSCOPY DIAGNOSTIC (GENERAL);  DIAGNOSTIC LAPAROSCOPY, REPAIR OF PERFORATED GASTRIC ULCER, ABDOMINAL LAVAGE;  Surgeon: Estelita Peck MD;  Location:  OR     LAPAROTOMY EXPLORATORY N/A 6/2/2017    Procedure: LAPAROTOMY EXPLORATORY;  EXPLORATORY LAPAROSCOPY, CHOLECYCTECTOMY;  Surgeon: Lavell Carpenter MD;  Location:  OR     ORTHOPEDIC SURGERY      hip pinning   femur fracture knee surgery      REMOVE HARDWARE HIP NAILING  6/4/2012    Procedure:REMOVE HARDWARE HIP NAILING; REMOVAL OF GAMMA NAIL AND SCREWS FROM RIGHT HIP, RIGHT TOTAL HIP ARTHROPLASTY(GAMMA NAIL EQUIPMENT, [employer] TOTAL HIP)^; Surgeon:DAVIAN FENG; Location: OR     THORACOSCOPIC DECORTICATION LUNG  7/28/2017    Procedure: THORACOSCOPIC  DECORTICATION LUNG;;  Surgeon: Selwyn Varela MD;  Location:  OR     THORACOSCOPIC WEDGE RESECTION LUNG Left 2017    Procedure: THORACOSCOPIC WEDGE RESECTION LUNG;  LEFT VIDEO ASSISTED THORASCOPY WITH LEFT LUNG DECORDICATION;  Surgeon: Selwyn Varela MD;  Location:  OR     TRACHEOSTOMY N/A 2017    Procedure: TRACHEOSTOMY;  TRACHEOSTOMY.;  Surgeon: Selwyn Varela MD;  Location:  OR       FAMILY HISTORY:  Both parents had hypertension.  Mother has DM.  Father  in his sleep.    SOCIAL HISTORY:   Social History     Social History     Marital status:      Spouse name: N/A     Number of children: 3     Years of education: N/A     Occupational History     Not on file.     Social History Main Topics     Smoking status: Former Smoker     Packs/day: 0.50     Years: 20.00     Types: Cigarettes     Quit date: 1992     Smokeless tobacco: Not on file     Alcohol use Yes      Comment: alcoholic, currently not drinking     Drug use: No     Sexual activity: Not on file     Other Topics Concern     Not on file     Social History Narrative       ALLERGIES:     Allergies   Allergen Reactions     Iodine I 131 Tositumomab Anaphylaxis     Can tolerate topical iodine if it is wahed off after surgery      Penicillins Rash and Anaphylaxis     Swollen  lymph nodes, flushed overheating      Sulfa Drugs Nausea and Vomiting, Diarrhea and Rash       MEDICATIONS:  Prescriptions Prior to Admission   Medication Sig Dispense Refill Last Dose     HYDROMORPHONE HCL PO Take 1 mg by mouth every 4 hours as needed for other (Pain rated 3 to 7)    at prn     HYDROMORPHONE HCL IJ Inject 0.6 mg as directed every 4 hours as needed (Pain rated 8 to 10)    at prn     calcium carbonate (TUMS) 500 MG chewable tablet Take 1 chew tab by mouth 3 times daily   2017 at 1423     piperacillin-tazobactam (ZOSYN) 4-0.5 GM vial Inject 4.5 g into the vein every 8 hours   2017 at 1425     VITAMIN D,  CHOLECALCIFEROL, PO Take 1,000 Units by mouth daily   9/19/2017 at 0930     BusPIRone HCl (BUSPAR PO) Take 7.5 mg by mouth 2 times daily   9/19/2017 at 0930     pantoprazole (PROTONIX) SUSP suspension Take 40 mg by mouth daily   9/19/2017 at 0929     ATORVASTATIN CALCIUM PO Take 10 mg by mouth daily   9/18/2017 at pm     ondansetron (ZOFRAN-ODT) 4 MG ODT tab Take 1 tablet (4 mg) by mouth every 6 hours as needed for nausea or vomiting 30 tablet   at prn     bacitracin 500 UNIT/GM OINT Apply topically 2 times daily Apply to chest tube site BID for 7 days (Patient taking differently: Apply topically 3 times daily Apply to chest tube site BID for 7 days)   9/19/2017 at 1423     chlorhexidine (PERIDEX) 0.12 % solution Take 15 mLs by mouth every 12 hours   9/19/2017 at 0932     nystatin (MYCOSTATIN) 131405 UNIT/ML suspension Take 5 mLs (500,000 Units) by mouth 4 times daily 280 mL  9/19/2017 at 1708     ipratropium - albuterol 0.5 mg/2.5 mg/3 mL (DUONEB) 0.5-2.5 (3) MG/3ML neb solution Take 1 vial (3 mLs) by nebulization every 4 hours as needed for shortness of breath / dyspnea 360 mL   at prn     albuterol (2.5 MG/3ML) 0.083% neb solution Take 1 vial by nebulization every 2 hours as needed for wheezing   9/13/2017 at Unknown time     ALPRAZOLAM PO 0.25 mg by Per J Tube route 3 times daily as needed for anxiety     at prn     budesonide (PULMICORT) 0.5 MG/2ML neb solution Take 0.5 mg by nebulization 2 times daily   9/19/2017 at 0805     HYDRALAZINE HCL PO 25 mg by Per G Tube route every 6 hours   9/19/2017 at 1202     METOPROLOL TARTRATE PO Take 50 mg by mouth 2 times daily   9/19/2017 at Unknown time     Sertraline HCl (ZOLOFT PO) 150 mg by Per J Tube route daily   9/19/2017 at 0932     ipratropium - albuterol 0.5 mg/2.5 mg/3 mL (DUONEB) 0.5-2.5 (3) MG/3ML neb solution Take 1 vial by nebulization 4 times daily    9/19/2017 at 1105     ACETAMINOPHEN PO 1,000 mg by Per J Tube route every 8 hours as needed for pain     at  prn     TRAZODONE HCL PO 50 mg by Per J Tube route nightly as needed for sleep     at prn         REVIEW OF SYSTEMS:  14 point comprehensive review of systems undertaken with pertinent positives and negatives as above and otherwise unremarkable.    PHYSICAL EXAM:  VITALS: /50  Temp 99.2  F (37.3  C) (Oral)  Resp 21  Wt 91.8 kg (202 lb 6.1 oz)  SpO2 96%  BMI 32.67 kg/m2  General Appearance:  Pleasant, cooperative, alert.    HEENT: Normocephalic, atraumatic. NG in place  Lungs: Clear to auscultation bilaterally   Heart: Regular rate and rhythm.    Musculoskeletal:  Moving x 4 spontaneously with CMS intact x4.    Neuro: Alert and oriented x3.       LABORATORY DATA:  Recent Labs   Lab Test  09/20/17   0030  08/03/17   0430  08/02/17   0520   NA  131*  133  132*   POTASSIUM  4.6  4.6  4.6   CHLORIDE  99  100  100   CO2  24  27  25   BUN  45*  44*  48*   CR  1.49*  0.83  0.92     Recent Labs   Lab Test  09/20/17   0030  08/03/17   0430  08/02/17   0520   WBC  12.6*  14.8*  12.2*   HGB  7.6*  7.8*  8.1*   PLT  273  549*  518*     Recent Labs   Lab Test  09/20/17   0030  09/14/17   1030  07/27/17   2150   INR  1.35*  1.41*  1.16*       IMAGING:  Recent Results (from the past 24 hour(s))   XR Abdomen Port 1 View    Narrative    PORTABLE ABDOMEN ONE VIEW   9/19/2017 7:08 PM    HISTORY: Nasogastric placement.    COMPARISON: 6/15/2017      Impression    IMPRESSION: A feeding tube is present. The distal tip is in the distal  stomach and has not reached the duodenum.     MICHAEL VILLASENOR MD   CT Chest w/o Contrast    Narrative    CT CHEST WITHOUT CONTRAST  9/19/2017 9:32 PM    HISTORY: Empyema. Thorascopic decortication and wedge resection of the  left lung.     COMPARISON: A CT on 6/13/2017.    TECHNIQUE: Routine transverse CT imaging of the chest was performed  without contrast. Radiation dose for this scan was reduced using  automated exposure control, adjustment of the mA and/or kV according  to patient size, or  iterative reconstruction technique.    FINDINGS: The heart size is normal. No enlarged lymph node or other  abnormal mediastinal mass is seen. There is calcification in the  vascular structures. There has been decrease in lung volume on the  left since the previous CT. This is consistent with a history of wedge  resection. There is mild consolidation of the right lung base with  mild atelectasis in the left lung base. The lungs are otherwise clear.  No pneumothorax is demonstrated. There is a right-sided chest tube.  There is a moderate-sized right pleural effusion. There are  degenerative changes in the spine. No other osseous abnormality is  seen. No chest wall pathology is seen. The endotracheal tube has been  replaced with a tracheostomy. There is a nasogastric tube or feeding  tube entering the stomach. The distal tip is not included on this  study. Within the visualized upper abdomen, again seen is a small to  moderate amount of ascites.      Impression    IMPRESSION:  1. Surgical changes of the chest with a history of a left lung wedge  resection.  2. Moderate-sized right pleural effusion with no pneumothorax seen. A  right-sided chest tube is present.  3. Mild consolidation of the right lung base with atelectasis of the  left lung base.    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     PLAN:   Right pleural effusion.  Light growth pseudomonas on fluid sent for analysis 9/14.  Afebrile, VSS.  Plan is to trial a course of tPA through the chest tube to break up  loculations and improve drainage.  First administration of tPA this afternoon.  Will complete 6 cycles tPA and reassess.       TOTAL TIME SPENT:  Greater than 70 minutes of which 100% was spent on floor in chart review, consultation, and coordination of care.     PAN ANDRADE MD, [facility]     As dictated by MITALI MARISCAL MS, PA-C

## 2017-09-20 NOTE — PROGRESS NOTES
09/20/17 1400   Quick Adds   Type of Visit Initial PT Evaluation   Living Environment   Lives With alone   Living Arrangements house   Home Accessibility stairs to enter home   Number of Stairs to Enter Home 3   Number of Stairs Within Home 17   Stair Railings at Home none   Transportation Available car;family or friend will provide   Self-Care   Usual Activity Tolerance moderate   Current Activity Tolerance poor   Regular Exercise no   Functional Level Prior   Ambulation 4-->completely dependent   Transferring 4-->completely dependent   Fall history within last six months no   Which of the above functional risks had a recent onset or change? ambulation;transferring   Prior Functional Level Comment Pt has been at [facility] for past several months. Reports she has been working with therapy daily on standing and exercises. Reports overhead lift used for all transfers and non-ambulatory since the summer.   General Information   Onset of Illness/Injury or Date of Surgery - Date 09/19/17   Referring Physician Dr. Varela   Patient/Family Goals Statement To decrease pain   Pertinent History of Current Problem (include personal factors and/or comorbidities that impact the POC) Patient is a 72 year old female with significant medical history, admitted from [facility] for empyema.   Precautions/Limitations fall precautions   Pain Assessment   Patient Currently in Pain Yes, see Vital Sign flowsheet   Range of Motion (ROM)   ROM Comment Unable to get ankles to neutral position. Pt with limited ROM at hamstring as well.   Strength   Strength Comments Gross LE strength 2+/5   Bed Mobility   Bed Mobility Comments Max assist x 2 for bed mobility and repositioning   Transfer Skills   Transfer Comments Pt declines   Gait   Gait Comments N/A   General Therapy Interventions   Planned Therapy Interventions balance training;bed mobility training;gait training;strengthening;transfer training;home program guidelines;progressive  "activity/exercise   Clinical Impression   Criteria for Skilled Therapeutic Intervention yes, treatment indicated   PT Diagnosis Impaired transfers   Influenced by the following impairments Decreased strength, decreased endurance, decreased balance, pain   Functional limitations due to impairments Difficulty with bed mobility, transfers, ambulation   Clinical Presentation Evolving/Changing   Clinical Presentation Rationale Pt in ICU due to medical condition, with signicant co-morbidities.   Clinical Decision Making (Complexity) Moderate complexity   Therapy Frequency` 5 times/week   Predicted Duration of Therapy Intervention (days/wks) 1 week   Anticipated Discharge Disposition Long Term Care Facility   Risk & Benefits of therapy have been explained Yes   Patient, Family & other staff in agreement with plan of care Yes   [facility] TM \"6 Clicks\"   2016, Trustees of [facility], under license to [employer].  All rights reserved.   6 Clicks Short Forms Basic Mobility Inpatient Short Form   [facility]  \"6 Clicks\" V.2 Basic Mobility Inpatient Short Form   1. Turning from your back to your side while in a flat bed without using bedrails? 2 - A Lot   2. Moving from lying on your back to sitting on the side of a flat bed without using bedrails? 2 - A Lot   3. Moving to and from a bed to a chair (including a wheelchair)? 1 - Total   4. Standing up from a chair using your arms (e.g., wheelchair, or bedside chair)? 2 - A Lot   5. To walk in hospital room? 1 - Total   6. Climbing 3-5 steps with a railing? 1 - Total   Basic Mobility Raw Score (Score out of 24.Lower scores equate to lower levels of function) 9   Total Evaluation Time   Total Evaluation Time (Minutes) 15     "

## 2017-09-20 NOTE — PLAN OF CARE
Problem: Goal Outcome Summary  Goal: Goal Outcome Summary  Outcome: No Change  Pt alert, forgetful at times. CT with good output following tPA administration. TF reordered, but not arrived from dietary at this time. UO down, MD informed, bolus given and orders received for restarting IV fluids. PT on trach dome, suctioned via trach and patient self suctioning orally. Good cough.

## 2017-09-20 NOTE — PLAN OF CARE
Problem: Goal Outcome Summary  Goal: Goal Outcome Summary  Outcome: No Change  Pt transferred from [facility] at 1825 tonight for thoracic surgery consult with Dr. Mcqueen d/t persistent reccummulation of pleural fluid.    Neuro: A/O x 2-3 however, having visual hallucinations and frequent illogical speech.  Moves all extremities, speech is spontaneous, PERRL.    Cardiac:  SR/D with PAC's.  Stable BP.    Resp: Trached, Bivona # 6.  Sats 94-99% trach dome 35% 6 liters.  LS are coarse/crackles throughout.  Right chest tube in place.    GI:  Currently NPO for possible surgery.  Otherwise report from [facility] was that Pt has NG and receives tube-feed and DD1 with nectar thickened liquids. Last BM was 9/18.  LLQ abdominal persistent fluid collection.  FUNMI draining to bulb suction purulent drainage.    :  Transferred with Crawford cath in place with straw colored urine.    Labs: Right pleural fluid is growing pseudomonas.    Plan: Consult with Dr. Mcqueen for thoracic surgery consultation.

## 2017-09-20 NOTE — PROGRESS NOTES
Pt was on trach dome during the day and placed on CPAP/PS 5/5 35% for the night, pt didn't tolerate the setting and placed to full vent support per MD order.   Ventilation Mode: CMV/AC  FiO2 (%): 35 %  Rate Set (breaths/minute): 12 breaths/min  Tidal Volume Set (mL): 400 mL  PEEP (cm H2O): 5 cmH2O  Pressure Support (cm H2O): 10 cmH2O  Oxygen Concentration (%): 35 %  Resp: 23    one Neb treatment given during this shift, BBS coarse/ expiratory wheezes. RT will continue to follow.  9/20/2017  Zelda Camp

## 2017-09-20 NOTE — PROGRESS NOTES
THORACIC SURGERY    First dose of intrapleural lytics instilled through pleural catheter.    PAN ANDRADE MD [facility] ONCOLOGY THORACIC SURGERY  CELL:  [phone]  OFFICE: [phone]

## 2017-09-20 NOTE — PROGRESS NOTES
[facility]  Comprehensive Daily ICU Note        Ara Stanley MRN# [id number]   Age: 72 year old YOB: 1945     Date of Admission: (Not on file)    Primary care provider: Shar James     CODE STATUS: FULL      Problem List:   Empyema  Multiple other medical problems as listed in PMH below         Treatment goals for next 24 hours:     Trial of Tpa/mucolytic in chest tube x 2 days  Continue trach dome during the day  Antibiotics as directed by CHRISTIANO Cavanaugh MD        Subjective/ Last 24 hours:   HPI: Patient who originally was admitted to hospital on 6/2 with abdominal pain. Initially had laproscopic cholecystectomy.  4 days later went back to OR and found to have perforated gastric ulcer.  Developed critical illness with WOODY and septic shock, as well as multiple abscesses related to this and underlying severe malnutrition.  Transferred to [place] on 6/19 for long term ventilator weaning in late June. While at [place] had issues with continued draining fluid collections that grew candida, and had drains placed.  Transferred back to ICU on 7/26 for evaluation of loculated pleural effusions.  Had a VATS procedure done on the left side. Subsequently had an additional FUNMI drain placed into one of her abdominal fluid collections. Transferred to [facility] on 8/3 for continued treatment.  Per sign out from [facility] physician she has made some progress with ventilator weaning but has continued to struggle with multiple medical issues.  She was hospitalized at [facility] for GI bleed.  Hemoglobin was as low as 5.  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  One of her abdominal drains was able to be removed, but fluid collection on the left did not resolve. She has also developed a large pleural effusion on the right along with pseudomonal pneumonia for which she just completed a course of Piperacillin/Tazobactam. On 9/14 returned  to [facility] where she had replacement of her left abdominal drain and a chest tube placed on the right.  Unfortunately, despite a lot of drainage, the pleural effusion has not resolved and pulmonologist at [facility] is concerned she will need another thoracotomy. Fluid is growing pseudomonas. Transfer is requested for a thoracic surgery evaluation.  Patient herself notes that she has had significant chest and right sided back pain since yesterday.  Hasn't noticed any other new respiratory changes. No new fevers or chills. She thinks her rehab is going OK. She remains frustrated by the what she sees as the delay in diagnosis of her gastric ulcer in June. Patient tells me she knows she has been in restraints.  Says she has no plans to pull at her feeding tube. She says she doesn't recall any conversations about how she is doing overall or her overall prognosis. Per Dr. Montejo's signout there have been conversations of this nature.     Thoracic surgeon opinion is that conservative management makes the most sense so will start TPA/mucolytic in chest tube BID.  Ms. Stanley feels about the same. She tells me she doesn't want to go back to [facility]. We discussed the fact that [facility] is the only other option and she wasn't happy with their care either. She still chooses [facility].  She says her energy level is OK,  She is having chest pain on the right, no more shortness of breath than baseline.    Past Medical History:   Diagnosis Date     Abdominal abscess (H)      Acute blood loss anemia      Acute hypercapnic respiratory failure (H)      WOODY (acute kidney injury) (H)      Alcohol abuse      Alcohol withdrawal (H)      Asthma      Atrial fibrillation (H)      Cholecystitis      Closed right hip fracture (H)      Clostridium difficile colitis      COPD (chronic obstructive pulmonary disease) (H)      Depression a     Dyslipidemia      Empyema (H)      GI bleed      Herpes zoster     right thoracic region      Hypertension      Mild reactive airways disease      Osteoarthritis      Perforated ulcer (H)      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 Had coil embolism     Septic shock (H)      Takotsubo cardiomyopathy      Ventilator dependence (H)      Past Surgical History:   Procedure Laterality Date     ARTHROPLASTY HIP  6/4/2012    Procedure:ARTHROPLASTY HIP; Surgeon:DAVIAN FENG; Location: OR     ENT SURGERY      tonsils     GYN SURGERY      hyst     LAPAROSCOPIC CHOLECYSTECTOMY N/A 6/2/2017    Procedure: LAPAROSCOPIC CHOLECYSTECTOMY;;  Surgeon: Lavell Carpenter MD;  Location:  OR     LAPAROSCOPY DIAGNOSTIC (GENERAL) N/A 6/6/2017    Procedure: LAPAROSCOPY DIAGNOSTIC (GENERAL);  DIAGNOSTIC LAPAROSCOPY, REPAIR OF PERFORATED GASTRIC ULCER, ABDOMINAL LAVAGE;  Surgeon: Estelita Peck MD;  Location:  OR     LAPAROTOMY EXPLORATORY N/A 6/2/2017    Procedure: LAPAROTOMY EXPLORATORY;  EXPLORATORY LAPAROSCOPY, CHOLECYCTECTOMY;  Surgeon: Lavell Carpenter MD;  Location:  OR     ORTHOPEDIC SURGERY      hip pinning   femur fracture knee surgery      REMOVE HARDWARE HIP NAILING  6/4/2012    Procedure:REMOVE HARDWARE HIP NAILING; REMOVAL OF GAMMA NAIL AND SCREWS FROM RIGHT HIP, RIGHT TOTAL HIP ARTHROPLASTY(GAMMA NAIL EQUIPMENT, [employer] TOTAL HIP)^; Surgeon:DAVIAN FENG; Location: OR     THORACOSCOPIC DECORTICATION LUNG  7/28/2017    Procedure: THORACOSCOPIC DECORTICATION LUNG;;  Surgeon: Selwyn Varela MD;  Location:  OR     THORACOSCOPIC WEDGE RESECTION LUNG Left 7/28/2017    Procedure: THORACOSCOPIC WEDGE RESECTION LUNG;  LEFT VIDEO ASSISTED THORASCOPY WITH LEFT LUNG DECORDICATION;  Surgeon: Selwyn Varela MD;  Location:  OR     TRACHEOSTOMY N/A 6/13/2017    Procedure: TRACHEOSTOMY;  TRACHEOSTOMY.;  Surgeon: Selwyn Varela MD;  Location:  OR     Social History   Substance Use Topics     Smoking status:  Former Smoker     Packs/day: 0.50     Years: 20.00     Types: Cigarettes     Quit date: 5/31/1992     Smokeless tobacco: Not on file     Alcohol use Yes      Comment: alcoholic, currently not drinking              Mechanical Ventilation/Vitalsigns/IsandOs:     Temp:  [96.7  F (35.9  C)-99.2  F (37.3  C)] 99.2  F (37.3  C)  Heart Rate:  [57-71] 71  Resp:  [11-25] 21  BP: ()/() 107/50  FiO2 (%):  [35 %-36 %] 35 %  SpO2:  [93 %-100 %] 95 %      Ventilation Mode: CMV/AC  FiO2 (%): 35 %  Rate Set (breaths/minute): 12 breaths/min  Tidal Volume Set (mL): 400 mL  PEEP (cm H2O): 5 cmH2O  Pressure Support (cm H2O): 10 cmH2O  Oxygen Concentration (%): 35 %  Resp: 21    Chest tube: minimal fluid in cavity, no air leak      Intake/Output Summary (Last 24 hours) at 09/20/17 1504  Last data filed at 09/20/17 1500   Gross per 24 hour   Intake           1122.5 ml   Output             2204 ml   Net          -1081.5 ml   Net: +200ml           Physical Examination:     General: Stated age, chronically ill  HEENT: trach tube with a cap, phonates well, weak cough  Lungs: LCTAB anteriorly  CVS: RRR, large bandage over the right chest  Abdomen: large bandage over the left side of the abdomen  Extremities/musculoskeletal: diffuse anasarca, foot drop bilaterally  Neurology: alert and oriented to person and place, time not assessed, non focal  Skin: fragile  Psychiatry: calm and appropriate but seems forgetful  Exam of Line sites:  Right midline IV looks OK     Hospital Procedures   Chest CT   AXR  CXRs         Feeding/Glucose:   Dysphagia diet    Blood glucose/Insulin requirement last 24 hours: none         Medications:       atorvastatin (LIPITOR) tablet 10 mg  10 mg Oral Daily     budesonide  0.5 mg Nebulization BID     busPIRone (BUSPAR) tablet 7.5 mg  7.5 mg Oral BID     calcium carbonate  500 mg Oral TID     hydrALAZINE (APRESOLINE) tablet 25 mg  25 mg Per G Tube Q6H     ipratropium - albuterol 0.5 mg/2.5 mg/3 mL  1 vial  Nebulization 4x Daily     metoprolol (LOPRESSOR) tablet 50 mg  50 mg Oral BID     nystatin  500,000 Units Oral 4x Daily     sertraline (ZOLOFT) tablet 150 mg  150 mg Per J Tube Daily     VITAMIN D3 (CHOLECALCIFEROL) PO  1,000 Units Oral Daily     bacitracin   Topical BID     alteplase (ACTIVASE) 10 mg and dornase 5 mg in NS syringe for chest tube instillation  50 mL Chest Tube BID     chlorhexidine  15 mL Mouth/Throat Q12H     insulin aspart  1-4 Units Subcutaneous Q4H     heparin  5,000 Units Subcutaneous Q8H     pantoprazole  40 mg Intravenous QAM AC         NaCl 10 mL/hr at 09/20/17 1427               Labs/Diagnostic studies:     EKG:  No new    Echo:  No new    ROUTINE ICU LABS (Last four results)  CMP    Recent Labs  Lab 09/20/17  0030   *   POTASSIUM 4.6   CHLORIDE 99   CO2 24   ANIONGAP 8   GLC 75   BUN 45*   CR 1.49*   GFRESTIMATED 34*   GFRESTBLACK 42*   CARYL 8.3*   MAG 1.8   PHOS 4.5   PROTTOTAL 6.9   ALBUMIN 1.2*   BILITOTAL 0.3   ALKPHOS 187*   AST 30   ALT 22     CBC    Recent Labs  Lab 09/20/17  0030   WBC 12.6*   RBC 2.57*   HGB 7.6*   HCT 23.4*   MCV 91   MCH 29.6   MCHC 32.5   RDW 16.4*        INR    Recent Labs  Lab 09/20/17  0030 09/14/17  1030   INR 1.35* 1.41*     Arterial Blood GasNo lab results found in last 7 days.    Cultures:    Recent Labs  Lab 09/14/17  1120   CULT Light growthPseudomonas aeruginosa*  Critical Value/Significant Value, preliminary result only, called to and read back byRoxanne Tamez N.P at 1429 on 09.15.17 by mp  Culture negative monitoring continues     Blood culture:  Invalid input(s): BC   Urine culture:  No results for input(s): URC in the last 168 hours.          Imaging:     CXR:  Outpatient film showed re accumulation of the pleural effusion on the right    CT:  Free flowing pleural effusion         Assessment and Plan:     Summary:  Ara Stanley is a 72 year old female admitted on 9/19 for evaluation of empyema.  I have personally  reviewed the daily labs, imaging studies, cultures and discussed the case with referring physician and consulting physicians. My assessment and plan as follows:    Neurology and Psychiatry:  1. Reports of confusion from nursing staff at [facility].    2.  She complained of severe pain in the right chest area last night and pain again this morning.   - Continue off restraints.  Order if we observe necessary  - Hydromorphone    Pulmonary:   1. Prolonged respiratory failure after hospitalization in June.  Now to the point where she is on trach dome during the day but pressure support at night.  [facility] staff did not feel she was yet ready for decannulation  2. Empyema and recent pseudomonal pneuomina: pleural fluid growing pseudomonas on cultures drawn on 9/14.  Some drainage in chest tube here.  Dr. Varela feels conservative strategy is the best option to try first.  - Continue trach dome during the day and pressure support at night.   - tPA/Mucolytic BID and follow for improvement    Cardiovascular system:   No acute issues    Renal/Electrolytes:  Creatinine up from last baseline. Unclear how much of this is worsening function vs. Increased muscle mass?  Stable so far. UOP adequate.   - Monitor I/O  - Hydration     ID:  Multiple abscesses and infections since June. Most recently had a pseudomonal pneumonia with empyema. Pleural fluid still growing pseudomonas. Has completed a course of Piperacillin/Tazobactam already.  ID recommends resuming antibiotics with Cefepime.   - Chest tube management as above.   - Cefepime.    GI/:  No acute issues but recent large GI bleed.  - tube feeds    Nutrition:   Malnourished  - Tube feeds and dysphagia diet    Musculoskeletal/Rheumatology:  Deconditioned  - PT    Endocrine:   No acute issues  - glucose checks    Heme/Onc:  Chronic anemia  - Check iron studies.     ICU prophylaxis:      DVT: heparin     VAP: As above     Stress ulcer: PPI     Restraints needed: will assess      Lines   Central Line/PICC - placed at OSH needed: y   Arterial line - placed n needed: n   Crawford catheter.  Needed: y   Chest tube and abdominal drains    Prognosis:    Very very guarded long term. Patient relates that she understands the odds are slim of recovery but she wants to keep fighting.    Family update: spoke with patient herself        Jonna Cavanaugh MD  [phone]

## 2017-09-20 NOTE — PROGRESS NOTES
"SPIRITUAL HEALTH SERVICES  Spiritual Assessment Progress Note  [facility] ICU  Pt was able to talk with  - with louis pereira.  She talked about her relief in designating her good friend as her POA.  She processed her family dynamics and believes not having her children in this role is best.  Pt processed the death of her  and became teary as she shared how he \"talks with her\".    Maureen is important to pt and helps get her through her challenges.       listened and provided encouragement, support and prayer.    SH will continue to follow.                                                                                                                                             Blanca Crawford M.Div., [facility]  Staff    Pager [phone]  "

## 2017-09-20 NOTE — CONSULTS
BRIEF NUTRITION NOTE:    Was asked to initiate TF via NG FT.  [place] back from [facility] later this pm to verify patient's usual TF regimen.  Ususal TF is Osmolite 1.2 at 50 mL/hr = 1440 kcals, 66 gm pro, 190 gm CHo, 984 mL H20, no fiber.  Free H20 was 40 mL every 2 hrs (480 mL).  She was also on DD1 with nectar thick liquids diet.  We do not carry a standard TF without fiber so will substitute lowest fiber containing standard product as below.    INTERVENTIONS:  Begin TF Isosource 1.5 at 20 mL/hr;  After 12 hrs increase to 40 mL/hr = 1440 kcals, 65 gm pro, 169 gm CHO, 730 mL H20, 14 gm fiber  Free H20 30 mL every 4 hrs (180 mL).    A full Nutrition Assessment will be completed 9/21.    Kayleigh Roman, RD, LD, [facility]

## 2017-09-20 NOTE — PLAN OF CARE
Problem: Goal Outcome Summary  Goal: Goal Outcome Summary  Physical Therapy: Order received, evaluation completed and treatment initiated. Pt is a 72 year old female admitted from [facility] for empyema. Pt reports that she receives therapy at [facility] and works primarily on sitting balance and sit to/from stand transfers. Pt reports she has been non-ambulatory for some time. Reports an overhead lift is utilized for all transfers at [facility].     Discharge Planner PT   Patient plan for discharge: LTACH  Current status: Pt with complaints of pain this afternoon, limited participation in session. Pt requires assist x 2 for bed mobility and repositioning, declines EOB activity. Participated in LE strengthening exercises with cues.  Barriers to return to prior living situation: None to LTACH  Recommendations for discharge: LTACH  Rationale for recommendations: Pt will benefit from continued therapy and medical management at discharge.       Entered by: Charlene Hendrickson 09/20/2017 2:50 PM

## 2017-09-20 NOTE — PHARMACY-ADMISSION MEDICATION HISTORY
Admission Medication History    Admission medication history interview status for the 9/19/2017 admission is complete. See EPIC admission navigator for prior to admission medications     Medication history source reliability:Good    Actions taken by pharmacist (provider contacted, etc): Contacted [facility] to clarify some meds     Additional medication history information not noted on PTA med list :None    Medication reconciliation/reorder completed by provider prior to medication history? No    Time spent in this activity: 30 minutes    Prior to Admission medications    Medication Sig Last Dose Taking? Auth Provider   HYDROMORPHONE HCL PO Take 1 mg by mouth every 4 hours as needed for other (Pain rated 3 to 7)  at prn Yes Unknown, Entered By History   HYDROMORPHONE HCL IJ Inject 0.6 mg as directed every 4 hours as needed (Pain rated 8 to 10)  at prn Yes Unknown, Entered By History   calcium carbonate (TUMS) 500 MG chewable tablet Take 1 chew tab by mouth 3 times daily 9/19/2017 at 1423 Yes Unknown, Entered By History   piperacillin-tazobactam (ZOSYN) 4-0.5 GM vial Inject 4.5 g into the vein every 8 hours 9/19/2017 at 1425 Yes Unknown, Entered By History   VITAMIN D, CHOLECALCIFEROL, PO Take 1,000 Units by mouth daily 9/19/2017 at 0930 Yes Unknown, Entered By History   BusPIRone HCl (BUSPAR PO) Take 7.5 mg by mouth 2 times daily 9/19/2017 at 0930 Yes Unknown, Entered By History   pantoprazole (PROTONIX) SUSP suspension Take 40 mg by mouth daily 9/19/2017 at 0929 Yes Unknown, Entered By History   ATORVASTATIN CALCIUM PO Take 10 mg by mouth daily 9/18/2017 at pm Yes Unknown, Entered By History   ondansetron (ZOFRAN-ODT) 4 MG ODT tab Take 1 tablet (4 mg) by mouth every 6 hours as needed for nausea or vomiting  at prn Yes Isabella Gutierrez, OTTO CNP   bacitracin 500 UNIT/GM OINT Apply topically 2 times daily Apply to chest tube site BID for 7 days  Patient taking differently: Apply topically 3 times daily Apply  to chest tube site BID for 7 days 9/19/2017 at 1423 Yes Isabella Gutierrez APRN CNP   chlorhexidine (PERIDEX) 0.12 % solution Take 15 mLs by mouth every 12 hours 9/19/2017 at 0932 Yes Isabella Gutierrez APRN CNP   nystatin (MYCOSTATIN) 256519 UNIT/ML suspension Take 5 mLs (500,000 Units) by mouth 4 times daily 9/19/2017 at 1708 Yes Isabella Gutierrez APRN CNP   ipratropium - albuterol 0.5 mg/2.5 mg/3 mL (DUONEB) 0.5-2.5 (3) MG/3ML neb solution Take 1 vial (3 mLs) by nebulization every 4 hours as needed for shortness of breath / dyspnea  at prn Yes Isabella Gutierrez APRN CNP   albuterol (2.5 MG/3ML) 0.083% neb solution Take 1 vial by nebulization every 2 hours as needed for wheezing  Yes Unknown, Entered By History   ALPRAZOLAM PO 0.25 mg by Per J Tube route 3 times daily as needed for anxiety   at prn Yes Unknown, Entered By History   budesonide (PULMICORT) 0.5 MG/2ML neb solution Take 0.5 mg by nebulization 2 times daily 9/19/2017 at 0805 Yes Unknown, Entered By History   HYDRALAZINE HCL PO 25 mg by Per G Tube route every 6 hours 9/19/2017 at 1202 Yes Unknown, Entered By History   METOPROLOL TARTRATE PO Take 50 mg by mouth 2 times daily 9/19/2017 at Unknown time Yes Unknown, Entered By History   Sertraline HCl (ZOLOFT PO) 150 mg by Per J Tube route daily 9/19/2017 at 0932 Yes Unknown, Entered By History   ipratropium - albuterol 0.5 mg/2.5 mg/3 mL (DUONEB) 0.5-2.5 (3) MG/3ML neb solution Take 1 vial by nebulization 4 times daily  9/19/2017 at 1105 Yes Unknown, Entered By History   ACETAMINOPHEN PO 1,000 mg by Per J Tube route every 8 hours as needed for pain   at prn Yes Unknown, Entered By History   TRAZODONE HCL PO 50 mg by Per J Tube route nightly as needed for sleep   at prn Yes Unknown, Entered By History       Gil Bermudez, PharmD

## 2017-09-20 NOTE — PROGRESS NOTES
"Care Coordination:    Was notified pt stated she does not want to go back to any LTACH.     Met with patient in room, introduced self and role.  Pt states she came from [facility].  She states she does not want to go back, and volunteered that \"if I must go to an LTACH, I want to go to [facility].\"  I reinforced that there are only 2 LTACHs to choose from, so appreciate this information.  I shared that there are TCU-level vent facilities, [facility] and [facility] (she had not heard of them), but I reinforced that she will likely continue to require LTACH level of care.    Will continue to follow.  Pt OK's referral to [facility] IF LTACH is recommended.  (Not done yet, pt just admitted.)     Of note, pt has health care directive on file (Friend Courtney is health care agent with alternate of [address]).      Liane Lara RN, BSN  [facility] Care Coordinator   Mobile Phone: [phone]    "

## 2017-09-20 NOTE — CONSULTS
[facility]    Infectious Disease Consultation     Date of Admission:  9/19/2017  Date of Consult (When I saw the patient): 09/20/17    Assessment & Plan   Ara Stanley is a 72 year old female who was admitted on 9/19/2017.     Impression:  1 72 y.o female admitted in the hospital last month.   2 Initial presentation was of acute abdomen taken to OR for cholecystectomy. Still has persistent fluid collection with drain in place.   3 Taken back to the OR 4 days later with concern for peritonitis, found to have gastric perforation of an ulcer which was repaired. C albicans in cx  4 Post-operative course has been complicated with multiple intubations, trials of weaning off the vent and now with trach. HCAP. Sputum stento, Pseudomonas   6  Renal insuff.   7 C diff history  8  Multiple readmission to [facility] and [facility].   9 Left sided empyema in July. Cultures positive for pseudomonas, recently on zosyn.   10 PCN listed as allergy but has tolerated zosyn, meropenem in past.   11 Readmitted this time again with empyema but right side and cultures positive for Pseudomonas.        Recommendations:   CT surgery consult  Start cefepime     Celso Birch MD    Reason for Consult   Reason for consult: I was asked by Dr. Cavanaugh to evaluate this patient for empyema.    Primary Care Physician   Shar James    Chief Complaint   Admitted with concern for Empyema     History is obtained from the patient and medical records    History of Present Illness    Per HPI   Ara Stanley is a 72 year old female originally was admitted to hospital on 6/2 with abdominal pain. Initially had laproscopic cholecystectomy.  4 days later went back to OR and found to have perforated gastric ulcer.  Developed critical illness with WOODY and septic shock, as well as multiple abscesses related to this and underlying severe malnutrition.  Transferred to [facility] on 6/19 for long term ventilator weaning in late June.  While at [facility] had issues with continued draining fluid collections that grew candida, and had drains placed.  Transferred back to ICU on 7/26 for evaluation of loculated pleural effusions.  Had a VATS procedure done on the left side. Subsequently had an additional FUNMI drain placed into one of her abdominal fluid collections. Transferred to [facility] on 8/3 for continued treatment.  Per sign out from [facility] physician she has made some progress with ventilator weaning but has continued to struggle with multiple medical issues.  She was hospitalized at [facility] for GI bleed.  Hemoglobin was as low as 5.  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  One of her abdominal drains was able to be removed, but fluid collection on the left did not resolve. She has also developed a large pleural effusion on the right along with pseudomonal pneumonia for which she just completed a course of Piperacillin/Tazobactam. On 9/14 returned to [facility] where she had replacement of her left abdominal drain and a chest tube placed on the right.  Unfortunately, despite a lot of drainage, the pleural effusion has not resolved and pulmonologist at [facility] is concerned she will need another thoracotomy. Fluid is growing pseudomonas. Transfer is requested for a thoracic surgery evaluation.  Patient herself notes that she has had significant chest and right sided back pain since yesterday.  Hasn't noticed any other new respiratory changes. No new fevers or chills. She thinks her rehab is going OK.     Past Medical History   I have reviewed this patient's medical history and updated it with pertinent information if needed.   Past Medical History:   Diagnosis Date     Abdominal abscess (H)      Acute blood loss anemia      Acute hypercapnic respiratory failure (H)      WOODY (acute kidney injury) (H)      Alcohol abuse      Alcohol withdrawal (H)      Asthma      Atrial fibrillation (H)       Cholecystitis      Closed right hip fracture (H)      Clostridium difficile colitis      COPD (chronic obstructive pulmonary disease) (H)      Depression a     Dyslipidemia      Empyema (H)      GI bleed      Herpes zoster     right thoracic region     Hypertension      Mild reactive airways disease      Osteoarthritis      Perforated ulcer (H)      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 Had coil embolism     Septic shock (H)      Takotsubo cardiomyopathy      Ventilator dependence (H)        Past Surgical History   I have reviewed this patient's surgical history and updated it with pertinent information if needed.  Past Surgical History:   Procedure Laterality Date     ARTHROPLASTY HIP  6/4/2012    Procedure:ARTHROPLASTY HIP; Surgeon:DAVIAN FENG; Location: OR     ENT SURGERY      tonsils     GYN SURGERY      hyst     LAPAROSCOPIC CHOLECYSTECTOMY N/A 6/2/2017    Procedure: LAPAROSCOPIC CHOLECYSTECTOMY;;  Surgeon: Lavell Carpenter MD;  Location:  OR     LAPAROSCOPY DIAGNOSTIC (GENERAL) N/A 6/6/2017    Procedure: LAPAROSCOPY DIAGNOSTIC (GENERAL);  DIAGNOSTIC LAPAROSCOPY, REPAIR OF PERFORATED GASTRIC ULCER, ABDOMINAL LAVAGE;  Surgeon: Estelita Peck MD;  Location:  OR     LAPAROTOMY EXPLORATORY N/A 6/2/2017    Procedure: LAPAROTOMY EXPLORATORY;  EXPLORATORY LAPAROSCOPY, CHOLECYCTECTOMY;  Surgeon: Lavell Carpenter MD;  Location:  OR     ORTHOPEDIC SURGERY      hip pinning   femur fracture knee surgery      REMOVE HARDWARE HIP NAILING  6/4/2012    Procedure:REMOVE HARDWARE HIP NAILING; REMOVAL OF GAMMA NAIL AND SCREWS FROM RIGHT HIP, RIGHT TOTAL HIP ARTHROPLASTY(GAMMA NAIL EQUIPMENT, [employer] TOTAL HIP)^; Surgeon:DAVIAN FENG; Location: OR     THORACOSCOPIC DECORTICATION LUNG  7/28/2017    Procedure: THORACOSCOPIC DECORTICATION LUNG;;  Surgeon: Selwyn Varela MD;  Location:  OR     THORACOSCOPIC WEDGE RESECTION  LUNG Left 2017    Procedure: THORACOSCOPIC WEDGE RESECTION LUNG;  LEFT VIDEO ASSISTED THORASCOPY WITH LEFT LUNG DECORDICATION;  Surgeon: Selwyn Varela MD;  Location:  OR     TRACHEOSTOMY N/A 2017    Procedure: TRACHEOSTOMY;  TRACHEOSTOMY.;  Surgeon: Selwyn Varela MD;  Location:  OR       Prior to Admission Medications   Prior to Admission Medications   Prescriptions Last Dose Informant Patient Reported? Taking?   ACETAMINOPHEN PO  at prn Nursing Home Yes Yes   Si,000 mg by Per J Tube route every 8 hours as needed for pain    ALPRAZOLAM PO  at prn Nursing Home Yes Yes   Si.25 mg by Per J Tube route 3 times daily as needed for anxiety    ATORVASTATIN CALCIUM PO 2017 at pm Nursing Home Yes Yes   Sig: Take 10 mg by mouth daily   BusPIRone HCl (BUSPAR PO) 2017 at 0930 Nursing [place] Yes Yes   Sig: Take 7.5 mg by mouth 2 times daily   HYDRALAZINE HCL PO 2017 at 1202 Nursing Home Yes Yes   Si mg by Per G Tube route every 6 hours   HYDROMORPHONE HCL IJ  at prn Nursing Home Yes Yes   Sig: Inject 0.6 mg as directed every 4 hours as needed (Pain rated 8 to 10)   HYDROMORPHONE HCL PO  at prn Nursing Home Yes Yes   Sig: Take 1 mg by mouth every 4 hours as needed for other (Pain rated 3 to 7)   METOPROLOL TARTRATE PO 2017 at Unknown time Nursing Home Yes Yes   Sig: Take 50 mg by mouth 2 times daily   Sertraline HCl (ZOLOFT PO) 2017 at 0932 Nursing [place] Yes Yes   Si mg by Per J Tube route daily   TRAZODONE HCL PO  at prn Nursing Home Yes Yes   Si mg by Per J Tube route nightly as needed for sleep    VITAMIN D, CHOLECALCIFEROL, PO 2017 at 0930 Nursing Home Yes Yes   Sig: Take 1,000 Units by mouth daily   albuterol (2.5 MG/3ML) 0.083% neb solution  prison Yes Yes   Sig: Take 1 vial by nebulization every 2 hours as needed for wheezing   bacitracin 500 UNIT/GM OINT 2017 at 1423 prison No Yes   Sig: Apply topically 2 times daily  Apply to chest tube site BID for 7 days   Patient taking differently: Apply topically 3 times daily Apply to chest tube site BID for 7 days   budesonide (PULMICORT) 0.5 MG/2ML neb solution 9/19/2017 at 0805 [facility] Yes Yes   Sig: Take 0.5 mg by nebulization 2 times daily   calcium carbonate (TUMS) 500 MG chewable tablet 9/19/2017 at 1423 [facility] Yes Yes   Sig: Take 1 chew tab by mouth 3 times daily   chlorhexidine (PERIDEX) 0.12 % solution 9/19/2017 at 0932 [facility] No Yes   Sig: Take 15 mLs by mouth every 12 hours   ipratropium - albuterol 0.5 mg/2.5 mg/3 mL (DUONEB) 0.5-2.5 (3) MG/3ML neb solution 9/19/2017 at 1105 Nursing Home Yes Yes   Sig: Take 1 vial by nebulization 4 times daily    ipratropium - albuterol 0.5 mg/2.5 mg/3 mL (DUONEB) 0.5-2.5 (3) MG/3ML neb solution  at prn Nursing Home No Yes   Sig: Take 1 vial (3 mLs) by nebulization every 4 hours as needed for shortness of breath / dyspnea   nystatin (MYCOSTATIN) 192325 UNIT/ML suspension 9/19/2017 at 1708 Nursing Home No Yes   Sig: Take 5 mLs (500,000 Units) by mouth 4 times daily   ondansetron (ZOFRAN-ODT) 4 MG ODT tab  at prn Nursing Home No Yes   Sig: Take 1 tablet (4 mg) by mouth every 6 hours as needed for nausea or vomiting   pantoprazole (PROTONIX) SUSP suspension 9/19/2017 at 0929 Nursing Home Yes Yes   Sig: Take 40 mg by mouth daily   piperacillin-tazobactam (ZOSYN) 4-0.5 GM vial 9/19/2017 at 1425 Nursing Home Yes Yes   Sig: Inject 4.5 g into the vein every 8 hours      Facility-Administered Medications: None     Allergies   Allergies   Allergen Reactions     Iodine I 131 Tositumomab Anaphylaxis     Can tolerate topical iodine if it is wahed off after surgery      Penicillins Rash and Anaphylaxis     Swollen  lymph nodes, flushed overheating      Sulfa Drugs Nausea and Vomiting, Diarrhea and Rash       Immunization History   There is no immunization history for the selected administration types on file for this patient.    Social  History   I have reviewed this patient's social history and updated it with pertinent information if needed. Ara Stanley  reports that she quit smoking about 25 years ago. Her smoking use included Cigarettes. She has a 10.00 pack-year smoking history. She does not have any smokeless tobacco history on file. She reports that she drinks alcohol. She reports that she does not use illicit drugs.    Family History   I have reviewed this patient's family history and updated it with pertinent information if needed.   No family history on file.    Review of Systems   The 10 point Review of Systems is negative other than noted in the HPI or here.     Physical Exam   Temp: 99.1  F (37.3  C) Temp src: Oral BP: 106/49   Heart Rate: 67 Resp: 22 SpO2: 95 % O2 Device: Trach dome Oxygen Delivery: Other (Comments) (20 LPM)  Vital Signs with Ranges  Temp:  [96.7  F (35.9  C)-99.1  F (37.3  C)] 99.1  F (37.3  C)  Heart Rate:  [57-70] 67  Resp:  [11-25] 22  BP: ()/() 106/49  FiO2 (%):  [35 %-36 %] 35 %  SpO2:  [93 %-100 %] 95 %  202 lbs 6.12 oz    GENERAL APPEARANCE: awake some what confused   EYES: Eyes grossly normal to inspection, PERRL and conjunctivae and sclerae normal  HENT: ear canals and TM's normal and nose and mouth without ulcers or lesions  NECK: no adenopathy, no asymmetry, masses, or scars and thyroid normal to palpation  RESP: right sided chest tube   CV: regular rates and rhythm, normal S1 S2, no S3 or S4 and no murmur, click or rub  LYMPHATICS: normal ant/post cervical and supraclavicular nodes  ABDOMEN: soft, nontender, without hepatosplenomegaly or masses and bowel sounds normal  MS: extremities normal- no gross deformities noted  SKIN: no suspicious lesions or rashes      Data   All laboratory data reviewed    Recent Labs  Lab 09/14/17  1120   CULT Light growthPseudomonas aeruginosa*  Critical Value/Significant Value, preliminary result only, called to and read back byRoxanne Tamez  N.P at 1429 on 09.15.17 by mp  Culture negative monitoring continues     Recent Labs   Lab Test  09/14/17   1120  08/01/17   1402  07/31/17   1600  07/30/17   1720  07/30/17   1538  07/28/17   1355  07/27/17   1120  07/26/17   1840  06/19/17   0520   CULT  Light growth  Pseudomonas aeruginosa  *  Critical Value/Significant Value, preliminary result only, called to and read back by  Roxanne Tamez N.P at 1429 on 09.15.17 by mp    Culture negative monitoring continues  No anaerobes isolated  No growth  On day 1, isolated in broth only: Gram positive cocci Organism nonviable on   subculture  *  No growth  No growth  Culture negative after 4 weeks  No anaerobes isolated  No growth  Culture received and in progress.  Positive AFB results are called as soon as   detected.  Final report to follow in 7 to 8 weeks.    Canceled, Test credited Test reordered as correct code REORDERED AS A TISSUE   CULTURE    Canceled, Test credited Test reordered as correct code REORDERED AS A TISSUE   CULTURE    Light growth Coagulase negative Staphylococcus  These bacteria are part of normal skin silvino, but on occasion, may be true   pathogens.  Clinical correlation must be applied to interpreting this   microbiology result.  Susceptibility testing not routinely done  *  Light growth Normal skin silvino  Single colony Enterococcus faecium (VRE)  Critical Value/Significant Value called to and read back by Estelita Wells RN at 0416   [id number]  *  No growth

## 2017-09-21 NOTE — PLAN OF CARE
Problem: Goal Outcome Summary  Goal: Goal Outcome Summary  Outcome: Declining  Pt with increasing confusion and hallucinations. At times is able to be reoriented, but will also fixate on hallucinations.Less confused after 2 hour nap, but still hallucinating. Pt not tolerating turns well, fatigues easily and color changes to dusky when laying flat for cares. Sats remain >90's but HR at times in 40's during cares. LS sourse, self clearing secretions. CT with good output following tPA instillation.  Some drainage around chest tube site, dressing changed. Pt had echo and CT today. Lasix and albumin for decreased U.O. MD updated on patient status.

## 2017-09-21 NOTE — CONSULTS
CLINICAL NUTRITION SERVICES  -  ASSESSMENT NOTE      Recommendations Ordered by Registered Dietitian (RD):   Change TF goal to Isosource 1.5 at 45 mL/hr = 1620 kcals, 73 gm pro, 820 mL H20, 16 gm fiber, 190 gm CHO  ProSource 2 packets per day = 80 kcals, 22 gm pro  Total:  1700 kcals (25 kcal/kg), 95 gm pro (1.4 gm/kg)  Free H20 30 mL every 4 hrs   Malnutrition:  Non-Severe malnutrition  In Context of:  Chronic illness or disease        REASON FOR ASSESSMENT  Ara Stanley is a 72 year old female seen by Registered Dietitian for Admission Nutrition Risk Screen - TF or Parenteral Nutrition and Provider Order - Registered Dietitian to Assess and Order TF per Medical Nutrition protocol      NUTRITION HISTORY  - Information obtained from patient, EPIC records, paper chart, and [facility] facility.  - Patient is on a DD1 with nectar thick liquids diet at [facility].  - Typical food/fluid intake is unknown.  - Diet history:  Am familiar with patient from past [facility] admissions.  She has been followed by SLP in the past due to dysphagia.       - Tube feeding history:  Pt was on TPN from 6/7-6/15.  She has been on various TF products in the past, including Isosource 1.5 at 45 mL/hr, Fibersource HN at 50 mL/hr + ProSource 3 packets per day, and Nepro at 40 mL/hr.  While at [facility], pt was on the following TF regimen (per staff report to me yesterday):  Osmolite 1.2 at 50 mL/hr = 1440 kcals (21 kcal/kg), 66 gm pro (1 gm/kg), 984 mL H20, 211 gm CHO, no fiber.  Free H20 was 40 mL every 2 hrs (480 mL).  - Supplements:  Unknown   - No food allergies/intolerances.    CURRENT NUTRITION ORDERS  Diet Order:     Dysphagia - DD1 with nectar thick liquids      Yesterday this RD started pt on TF via existing NG tube - Isosource 1.5 at 20 mL/hr with plan to increase to 40 mL/hr after 12 hrs = 1440 kcals, 65 gm pro, 169 gm CHO, 730 mL H20, 14 gm fiber  Free H20 30 mL every 4 hrs (180 mL).    9/19:  Abd x-ray - FT tip is in distal  stomach    Current Intake/Tolerance:  Pt only taking sips of thickened liquids per RN as has poor appetite.  TF currently running at 20 mL/hr (started at [facility]).  Stool:  x3 yest, x2 today (fecal incontinence).    PHYSICAL FINDINGS  Observed  Muscle Wasting - clavicle  Obtained from Chart/Interdisciplinary Team  Edema - diffuse anasarca, 3+ moderate generalized edema, hands, feet  Muscle Wasting - h/o malnutrition    ANTHROPOMETRICS  Height:  5 ft 6 in  Admit Wt:  91.8 kg  Body mass index is 33.02 kg/(m^2).  Weight Status:  Obesity Grade I BMI 30-34.9  IBW:  59.1 kg  % IBW:  155%  Weight History:   Pt had reported usual body weight before she started getting sick at 155 lbs (70.4 kg)  Admit weight up due to fluids.    Wt Readings from Last 20 Encounters:   09/21/17 92.8 kg (204 lb 9.4 oz)   08/02/17 66.9 kg (147 lb 7.8 oz)   06/20/17 76.1 kg (167 lb 12.3 oz)   06/04/12 65.8 kg (145 lb)     LABS  Labs reviewed  BUN 45 (H)  Cr 1.64 (H) - Pt with WOODY on CKD  BGM:  74, 62, 82, 74, 93  9/20:  Alb 1.2 (L) - Likely due to persistent inflammation in this chronic critically ill patient.  Could also be in part due to inadequate protein delivery in pt with CKD which limits tolerance to aggressive protein intake.    MEDICATIONS  Medications reviewed  IVF NaCl at 75 mL/hr running - for fluid delivery      Dosing Weight:  67.2 kg (adjusted wt for overwt)    ASSESSED NUTRITION NEEDS PER APPROVED PRACTICE GUIDELINES:  Estimated Energy Needs:  4364-4069 kcals (25-30 Kcal/Kg)  Justification: overweight  Estimated Protein Needs:   grams protein (1.2-1.5 g pro/Kg)  Justification: Repletion, hypercatabolism with critical illness and CKD  Estimated Fluid Needs:  1700-2000mL (1 mL/Kcal)  Justification: maintenance and per provider pending fluid status    MALNUTRITION:  % Weight Loss:  None noted  % Intake:  No decreased intake noted  Subcutaneous Fat Loss:  None observed  Muscle Loss:  Clavicle bone region mild depletion  Fluid  Retention:  Moderate - Could be partly nutritional    Malnutrition Diagnosis: Non-Severe malnutrition  In Context of:  Chronic illness or disease    NUTRITION DIAGNOSIS:  Inadequate enteral nutrition infusion related to low TF rate and dysphagia with poor appetite as evidenced by poor oral intake and current TF meeting only ~ 40% estimated needs      NUTRITION INTERVENTIONS  Recommendations / Nutrition Prescription  Change TF goal to Isosource 1.5 at 45 mL/hr = 1620 kcals, 73 gm pro, 820 mL H20, 16 gm fiber, 190 gm CHO  ProSource 2 packets per day = 80 kcals, 22 gm pro  Total:  1700 kcals (25 kcal/kg), 95 gm pro (1.4 gm/kg)  Free H20 30 mL every 4 hrs    Implementation  Nutrition education: Provided education to patient yesterday on current diet order and the plan to resume TF  Collaboration and Referral of Nutrition care - Discussed pt during ICU interdisciplinary rounds this am as well as earlier with Dr. Cavanaugh and Isabella Olmos CNP who requested [facility]  Enteral Nutrition - Modify goal rate --> Entered slightly higher goal rate in EPIC as above  Medical food supplement therapy - Added ProSouce as above  Feeding tube flush - Keep the same H20 flushes ordered yest    Nutrition Goals  TF Isosource 1.5 at 45 mL/hr + ProSource 2 pkts per day will meet % estimated needs while minimal oral intake.    MONITORING AND EVALUATION:  Progress towards goals will be monitored and evaluated per protocol and Practice Guidelines    Kayleigh Roman, RD, LD, CNSC

## 2017-09-21 NOTE — PROGRESS NOTES
[facility]    Critical Care Service  Progress Note    Date of Service (when I saw the patient): 09/21/2017     Assessment & Plan   Ara Stanley is a 72 year old female with complex past medical history and 4 month critical illness who was admitted on 9/19/2017 for management of right sided empyema. She was originally admitted 6/2 to [facility] with abdominal pain and had a lap alexandre, followed by abdominal perforation, ongoing respiratory failure requiring trach and PEG, and multiple pleural and abdominal infections.  Has remained in health care institution since initial admission 6/2/17. Now readmitted with large right pleural effusion/empyema, with cultures + for pseudomonas.       Main Plans for Today   IV lasix trial   Monitor urine output closely   Repeat BMP this evening   Increase protein intake  Continue lytics in chest tube for total of 6 doses then repeat Chest CT   Palliative Care consult   #Imaging of abdominal CT from 8/25 reviewed with ID Dr. Birch.  Plan to repeat abdominal CT, involve general surgery if fluid collection persists.     Neuro  1. Anxiety/Depression   2. Chronic pain   3. Intermittent confusion   Plan:  -- Continue PTA Buspar and Zoloft. PRN trazodone at HS   --PRN dilaudid; acetaminophen for pain.  PTA med includes hydromorphone PO, PRN 1mg, Q4 hours   -- Delirium prevention as able; attempt to maintain day/night schedule, sleep hygiene, minimize interruptions at HS to allow for uninterrupted sleep.  Patient with intermitted confusion/mild delirium today.  Per patient's POA, this is more pronounced with lack of sleep.      CV  1. HTN  Plan:  -- holding PTA metoprolol and hydralazine as normotensive without     Resp:  1.  Prolonged respiratory failure after hospitalization in June.  Now to the point where she is on trach dome during the day but pressure support at night.  [facility] staff did not feel she was yet ready for decannulation  2.  Empyema and recent  pseudomonal pneuomina: pleural fluid growing pseudomonas on cultures drawn on 9/14.  Some drainage in chest tube here.  Dr. Varela feels conservative strategy is the best option to try first.  -- Continue trach dome.  If tolerated 24 hour trach dome, ok to leave on.  If not tolerating, place on PS at HS.  Has been using PS 10/5 at HS   - tPA/Mucolytic BID and follow for improvement.  Has had good CT output and CXR shows some improvement.  Continue for total of 6 doses; repeat chest CT Saturday AM, per Dr. Varela    GI/Nutrition  1. S/p exploratory laparotomy, followed by acute abdomen with gastric perforation in June 2017, with subsequent persistent abdominal fluid collections with FUNMI drain placement (Dr. Peck). One FUNMI drain in LLQ remaining   2. Recent C.diff infection   3. PEG placement s/p prolonged respiratory failure   4. Recent acute GI bleed; admitted and managed at [facility]   Plan:  --Repeat C.diff lab if ongoing loose stools; enteric iso  --Resume PTA tube feeding; add additional protein given hypoalbuminemia, low protein intake, general anasarca   --Continue PTA diet   --Persistent pseudomonas in abdominal fluid cultures.  Outpatient management by IR (drain placed in IR 8/1). Drain replaced and repositioned in left abdominal fluid collection on 9/14 at [facility]. Right drain removed.    --Flush FUNMI with 10ml NS daily.  Record I&Os Q shift.  IR JOHN [facility], ([phone])     Renal  1. Acute vs chronic kidney injury.  Creatinine increased over baseline from last admission.  Admitted to [facility] end of August for acute GI bleed and appears to have sustained WOODY at that time.  Creatinine at time of discharge from [facility] 1.3-1.5 range.  Creatinine on admission 1.49>1.64 this AM.    2. Generalized anasarca and 50lb weight gain since last admission   Plan:  -- 20mg IV lasix this AM with little response.  Give additional 40mg IV lasix x 1.  If no response, increase to 80mg.  May need to  "consider Nephrology consult if UOP does not improve.   --Repeat BMP this evening     ID  1. Multiple abscesses and infections since June. Most recently had a pseudomonal pneumonia with empyema. Pleural fluid still growing pseudomonas. Persistent abdominal fluid collection; most recently with pseudomonas. Has completed a course of Piperacillin/Tazobactam already.  ID recommends resuming antibiotics with Cefepime.  Plan:   --Continue Cefepime   --ID following   --Repeat C. Diff culture     Endocrine  1. No active issues   Plan:  -- BG checks per ICU protocol     Heme:  1. acute on chronic blood loss anemia. Recent GI bleed with hemoglobin as low as 5 upon admission to [facility].   Plan:  -- Monitor hemoglobin.  -- Transfuse to keep > 7.0    Skin  1. Generalized anasarca   Plan:  --attempt diuresis     General cares:  DVT Prophylaxis: Heparin SQ  GI Prophylaxis: PPI  Restraints: Restraints for medical healing needed: NO  Family update by me today: Yes ; POA updated   Current lines are required for patient management  Access: PICC on right arm     Isabella Gutierrez    Time Spent on this Encounter   Billing:  I spent 45 minutes bedside and on the inpatient unit today managing the critical care of Ara Stanley in relation to the issues listed in this note.    Interval History   Course reviewed. No acute events overnight.  Very little sleep overnight. Some confusion this AM; disoriented to place, states she is in a \"southern state\" but able to clearly recall recent events.  No SOB, coughing and mobilizing secretions.  Agreeable to palliative care consult.  Per POA, patient has never been offered Palliative Care consult.      Physical Exam   Temp: 98.6  F (37  C) Temp src: Oral Temp  Min: 98.6  F (37  C)  Max: 99.6  F (37.6  C) BP: 98/55   Heart Rate: 70 Resp: 13 SpO2: 96 % O2 Device: Trach dome Oxygen Delivery: Other (Comments) (20 lpm)  Vitals:    09/19/17 1849 09/20/17 0400 09/21/17 0600   Weight: 93.2 kg " (205 lb 7.5 oz) 91.8 kg (202 lb 6.1 oz) 92.8 kg (204 lb 9.4 oz)     I/O last 3 completed shifts:  In: 2984.92 [P.O.:120; I.V.:1604.92; Other:50; IV Piggyback:500]  Out: 2800 [Urine:500; Chest Tube:2300]    GEN: awake, alert, intermittent confusion   EYES: PERRL, Anicteric sclera.   HEENT:  Normocephalic, atraumatic, trachea midline, trach secured, on trach dome   CV: RRR, no gallops, rubs, or murmurs  PULM/CHEST: Clear breath sounds bilaterally without rhonchi, crackles or wheeze, symmetric chest rise. Right chest tube dressing with small amount of moist drainage.   GI: normal bowel sounds, soft, non-tender, no rebound tenderness or guarding, no masses  : tanner catheter in place, urine yellow and clear  EXTREMITIES: Significant anasarca, flank edema, groin edema, peripheral edema. Moving all extremities, peripheral pulses intact  NEURO: Cranial nerves II-XII grossly intact, no motor-sensory deficits noted  SKIN: scattered ecchymosis, drains in right chest and right abdomen.    PSYCH:  Affect: appropriate. Intermittent confusion/delirium   Imaging personally reviewed: CXR; slightly improved right sided effusion   ECG:SR     Medications     NaCl 75 mL/hr at 09/21/17 0201       sodium chloride 0.9%  500 mL Intravenous Once     atorvastatin (LIPITOR) tablet 10 mg  10 mg Oral QPM     budesonide  0.5 mg Nebulization BID     busPIRone (BUSPAR) tablet 7.5 mg  7.5 mg Oral BID     calcium carbonate  500 mg Oral TID     ipratropium - albuterol 0.5 mg/2.5 mg/3 mL  1 vial Nebulization 4x Daily     nystatin  500,000 Units Oral 4x Daily     sertraline (ZOLOFT) tablet 150 mg  150 mg Per J Tube Daily     cholecalciferol  1,000 Units Oral Daily     bacitracin   Topical BID     alteplase (ACTIVASE) 10 mg and dornase 5 mg in NS syringe for chest tube instillation  50 mL Chest Tube BID     ceFEPIme (MAIXPIME) IV  2 g Intravenous Q8H     pantoprazole  40 mg Oral QAM     albumin human  12.5 g Intravenous Q8H     chlorhexidine  15 mL  Mouth/Throat Q12H     insulin aspart  1-4 Units Subcutaneous Q4H     heparin  5,000 Units Subcutaneous Q8H       Data     Recent Labs  Lab 09/21/17  0430 09/20/17  0030 09/14/17  1030   WBC 10.7 12.6*  --    HGB 7.2* 7.6*  --    MCV 88 91  --     273  --    INR  --  1.35* 1.41*    131*  --    POTASSIUM 4.5 4.6  --    CHLORIDE 101 99  --    CO2 21 24  --    BUN 45* 45*  --    CR 1.64* 1.49*  --    ANIONGAP 11 8  --    CARYL 8.3* 8.3*  --    GLC 74 75  --    ALBUMIN  --  1.2*  --    PROTTOTAL  --  6.9  --    BILITOTAL  --  0.3  --    ALKPHOS  --  187*  --    ALT  --  22  --    AST  --  30  --      Recent Results (from the past 24 hour(s))   XR Chest Port 1 View    Narrative    XR CHEST PORT 1 VW  9/21/2017 5:47 AM     HISTORY:  followup pleural effusion after getting lytics    COMPARISON: Film performed on 8/to/2017    FINDINGS:  Cardiac silhouette is mildly enlarged. Feeding tube is in  place. Left pleural fluid is unchanged. Drainage catheter is seen at  the right lung base. Decreased right pleural effusion.      Impression    IMPRESSION:  1. Right pleural drain. Decreased right pleural effusion.  2. No significant change in the left pleural thickening orally  associated left basilar infiltrate/atelectasis.

## 2017-09-21 NOTE — PROGRESS NOTES
Pt was on trach dome during the day and placed on full support for the night. Trach care done. neb treatment given as ordered.  Ventilation Mode: CMV/AC  FiO2 (%): 35 %  Rate Set (breaths/minute): 12 breaths/min  Tidal Volume Set (mL): 400 mL  PEEP (cm H2O): 5 cmH2O  Pressure Support (cm H2O): 10 cmH2O  Oxygen Concentration (%): 35 %  Resp: 20  Will continue to follow.  9/21/2017  Zelda Camp

## 2017-09-21 NOTE — PLAN OF CARE
Problem: Goal Outcome Summary  Goal: Goal Outcome Summary  Outcome: No Change  Pt is alert and oriented, forgetful/confused statements made at times.  Lung sounds are coarse throughout, vent used overnight, secretions are very thick.  Bowel sounds are active, 2 loose stools this shift, tube feedings restarted.  Low urine output throughout shift, albumin started, plan to hold diuresis at this time.  Chest tube output improved after TPA instillation again, blood tinged drainage after the TPA, Dr. Varela aware.

## 2017-09-21 NOTE — PROGRESS NOTES
THORACIC SURGERY    Excellent drainage with first 2 doses of lytics  (1900 ml after first)  CXR this am  r pleural space well drained    Continue CT suction and lytics for a total of 6 doses then repeat CT chest    Will follow      PAN ANDRADE MD [facility] ONCOLOGY THORACIC SURGERY  CELL:  [phone]  OFFICE: [phone]

## 2017-09-21 NOTE — PROGRESS NOTES
Patient is trach dome 20 LPM and 35% since 11:35 Am. Scheduled neb tx given inline. BBS diminished, and aeration increased post neb tx.  SpO2 mid to high 90`s. Suctioned multiple times for moderate amount thick tan secretions. Will continue to follow.

## 2017-09-21 NOTE — PROGRESS NOTES
[facility]    Infectious Disease Progress Note    Date of Service (when I saw the patient): 09/21/2017     Assessment & Plan   Ara Stanley is a 72 year old female who was admitted on 9/19/2017.     Impression:  1 72 y.o female admitted in the hospital last month.   2 Initial presentation was of acute abdomen taken to OR for cholecystectomy. Still has persistent fluid collection with drain in place.   3 Taken back to the OR 4 days later with concern for peritonitis, found to have gastric perforation of an ulcer which was repaired. C albicans in cx  4 Post-operative course has been complicated with multiple intubations, trials of weaning off the vent and now with trach. HCAP. Sputum stento, Pseudomonas   6  Renal insuff.   7 C diff history  8  Multiple readmission to [facility].   9 Left sided empyema in July. Cultures positive for pseudomonas, recently on zosyn.   10 PCN listed as allergy but has tolerated zosyn, meropenem in past.   11 Readmitted this time again with empyema but right side and cultures positive for Pseudomonas.          Recommendations:   Continue on cefepime.   Given History of C diff also on oral Vanco BID  Probably a 2- 4 weeks course based on imaging and interventions.   Still continues to have significant abdominal distention and tenderness along with fluid which has been present for months now, ? Surgery evaluation/ ? IR management.   Patient has been in and out of multiple facilities in past months, functional status to me appears to be worsening, ? Goals of care discussion, ? Family involvement.          Celso Birch MD    Interval History   Afebrile   WBC normal   Patient some times lucid other time a little confused     Physical Exam   Temp: 98.6  F (37  C) Temp src: Oral BP: 98/55   Heart Rate: 70 Resp: 13 SpO2: 96 % O2 Device: Trach dome Oxygen Delivery: Other (Comments) (20 lpm)  Vitals:    09/19/17 1849 09/20/17 0400 09/21/17 0600   Weight: 93.2 kg  (205 lb 7.5 oz) 91.8 kg (202 lb 6.1 oz) 92.8 kg (204 lb 9.4 oz)     Vital Signs with Ranges  Temp:  [98.6  F (37  C)-99.6  F (37.6  C)] 98.6  F (37  C)  Heart Rate:  [57-74] 70  Resp:  [13-32] 13  BP: ()/(48-89) 98/55  FiO2 (%):  [35 %-40 %] 40 %  SpO2:  [95 %-100 %] 96 %    Constitutional: Awake, alert, cooperative  Diffuse anasarca   Lungs: right sided chest tube  Cardiovascular: Regular rate and rhythm, normal S1 and S2, and no murmur noted  Abdomen: drain in place, distended painful  Skin: No rashes, no cyanosis, no edema  Other:    Medications     NaCl 75 mL/hr at 09/21/17 0201       atorvastatin (LIPITOR) tablet 10 mg  10 mg Oral QPM     budesonide  0.5 mg Nebulization BID     busPIRone (BUSPAR) tablet 7.5 mg  7.5 mg Oral BID     calcium carbonate  500 mg Oral TID     ipratropium - albuterol 0.5 mg/2.5 mg/3 mL  1 vial Nebulization 4x Daily     nystatin  500,000 Units Oral 4x Daily     sertraline (ZOLOFT) tablet 150 mg  150 mg Per J Tube Daily     cholecalciferol  1,000 Units Oral Daily     bacitracin   Topical BID     alteplase (ACTIVASE) 10 mg and dornase 5 mg in NS syringe for chest tube instillation  50 mL Chest Tube BID     ceFEPIme (MAIXPIME) IV  2 g Intravenous Q8H     pantoprazole  40 mg Oral QAM     albumin human  12.5 g Intravenous Q8H     chlorhexidine  15 mL Mouth/Throat Q12H     insulin aspart  1-4 Units Subcutaneous Q4H     heparin  5,000 Units Subcutaneous Q8H       Data   All microbiology laboratory data reviewed.  Recent Labs   Lab Test  09/21/17 0430 09/20/17 0030 08/03/17 0430   WBC  10.7  12.6*  14.8*   HGB  7.2*  7.6*  7.8*   HCT  21.8*  23.4*  24.0*   MCV  88  91  85   PLT  282  273  549*     Recent Labs   Lab Test  09/21/17 0430  09/20/17   0030  08/03/17   0430   CR  1.64*  1.49*  0.83     Recent Labs   Lab Test  06/04/12   0527   SED  34*     Recent Labs   Lab Test  09/14/17   1120  08/01/17   1402  07/31/17   1600  07/30/17   1720  07/30/17   1538  07/28/17   1355   07/27/17   1120  07/26/17   1840  06/19/17   0520   CULT  Light growth  Pseudomonas aeruginosa  *  Critical Value/Significant Value, preliminary result only, called to and read back by  Roxanne Tamez N.P at 1429 on 09.15.17 by mp    Culture negative monitoring continues  No anaerobes isolated  No growth  On day 1, isolated in broth only: Gram positive cocci Organism nonviable on   subculture  *  No growth  No growth  Culture negative after 4 weeks  No anaerobes isolated  No growth  Culture received and in progress.  Positive AFB results are called as soon as   detected.  Final report to follow in 7 to 8 weeks.    Canceled, Test credited Test reordered as correct code REORDERED AS A TISSUE   CULTURE    Canceled, Test credited Test reordered as correct code REORDERED AS A TISSUE   CULTURE    Light growth Coagulase negative Staphylococcus  These bacteria are part of normal skin silvino, but on occasion, may be true   pathogens.  Clinical correlation must be applied to interpreting this   microbiology result.  Susceptibility testing not routinely done  *  Light growth Normal skin silvino  Single colony Enterococcus faecium (VRE)  Critical Value/Significant Value called to and read back by Estelita Wells RN at 0416   [id number]  *  No growth

## 2017-09-21 NOTE — PROGRESS NOTES
Staff ICU Followup Note:    Improved drainage after lytics instilled into chest tube. Tolerating trach dome during the day  - Continue lytics  - Trial of trach dome at night.  - Discharge planning. Now wants [place] again.     Jonna Cavanaugh MD

## 2017-09-21 NOTE — PLAN OF CARE
Problem: Goal Outcome Summary  Goal: Goal Outcome Summary  Discharge Planner PT   Patient plan for discharge: not stated           Current status: Patient complaining of pain in right trunk at 9/10.  Agreeable to minimal exercises and nursing encouraged.  Performed gentle L/E AAROM; h/c stretch bilaterally.  Notable h/c tightness on right over left.  Deferred transfers secondary to pain and lethargy.  Barriers to return to prior living situation: Deconditioned state, decreased functional mobility  Recommendations for discharge: Long-term care  Rationale for recommendations: Per chart, patient has been either hospitalized or in [facility] for the last 4 month.  Given deconditioned state and lack of mobility, anticipate patient will need to return to [facility].       Entered by: Verito Painting 09/21/2017 12:54 PM

## 2017-09-22 NOTE — PROGRESS NOTES
Pt was on trach dome during the day and placed on full support for the night. Trach care done. neb treatment given as ordered.  Ventilation Mode: CMV/AC  FiO2 (%): 35 %  Rate Set (breaths/minute): 12 breaths/min  Tidal Volume Set (mL): 400 mL  PEEP (cm H2O): 5 cmH2O  Pressure Support (cm H2O): 10 cmH2O  Oxygen Concentration (%): 35 %  Resp: 20  Will continue to follow.    9/22/2017  Aleksandra York RT

## 2017-09-22 NOTE — PROGRESS NOTES
THORACIC SURGERY    Less drainage  r pleural effusion near completely drained on CT abdomen done yesterday    continue lytics BID and CT suction    Will follow    PAN ANDRADE MD [facility] ONCOLOGY THORACIC SURGERY  CELL:  [phone]  OFFICE: [phone]

## 2017-09-22 NOTE — PROGRESS NOTES
[facility]    Infectious Disease Progress Note    Date of Service (when I saw the patient): 09/22/2017     Assessment & Plan   Ara Stanley is a 72 year old female who was admitted on 9/19/2017.     Impression:  1 72 y.o female admitted in the hospital last month.   2 Initial presentation was of acute abdomen taken to OR for cholecystectomy. Still has persistent fluid collection with drain in place.   3 Taken back to the OR 4 days later with concern for peritonitis, found to have gastric perforation of an ulcer which was repaired. C albicans in cx  4 Post-operative course has been complicated with multiple intubations, trials of weaning off the vent and now with trach. HCAP. Sputum stento, Pseudomonas   6  Renal insuff.   7 C diff history  8  Multiple readmission to [place] and [facility].   9 Left sided empyema in July. Cultures positive for pseudomonas, recently on zosyn.   10 PCN listed as allergy but has tolerated zosyn, meropenem in past.   11 Readmitted this time again with empyema but right side and cultures positive for Pseudomonas.          Recommendations:   Continue on cefepime.   Given History of C diff also on oral Vanco BID  Probably a 2- 4 weeks course based on imaging and interventions.   Still continues to have significant abdominal distention and tenderness along with fluid which has been present for months now, ? Surgery evaluation/ ? IR management.   Patient has been in and out of multiple facilities in past months, functional status to me appears to be worsening, ? Goals of care discussion, ? Family involvement.   If issues on the w/e please call Dr. Vela.         Be Webster MD    Interval History   Just returned from IR where had repositioning of intra-abd drain tube.  Afebrile.  Creat 1.65.  No new pos cxs.  WBC 17k.    Physical Exam   Temp: 98.6  F (37  C) Temp src: Axillary BP: 106/52   Heart Rate: 77 Resp: 16 SpO2: 99 % O2 Device: Trach dome Oxygen  Delivery: Other (Comments) (20lpm)  Vitals:    09/20/17 0400 09/21/17 0600 09/22/17 0600   Weight: 91.8 kg (202 lb 6.1 oz) 92.8 kg (204 lb 9.4 oz) 93.1 kg (205 lb 4 oz)     Vital Signs with Ranges  Temp:  [98.4  F (36.9  C)-98.8  F (37.1  C)] 98.6  F (37  C)  Heart Rate:  [67-87] 77  Resp:  [12-34] 16  BP: ()/(44-70) 106/52  FiO2 (%):  [35 %] 35 %  SpO2:  [92 %-100 %] 99 %    Constitutional: Awake, alert, cooperative  Diffuse anasarca   Lungs: right sided chest tube  Cardiovascular: Regular rate and rhythm, normal S1 and S2, and no murmur noted  Abdomen: drain in place, distended painful  Skin: No rashes, no cyanosis, no edema  Other:    Medications     NaCl 10 mL/hr at 09/21/17 1118       pantoprazole  40 mg Oral or Feeding Tube Daily     ceFEPIme (MAIXPIME) IV  2 g Intravenous Q12H     metolazone  5 mg Oral Once     furosemide  20 mg Intravenous BID     protein modular  1 packet Per Feeding Tube Q12H     vancomycin  125 mg Oral BID     atorvastatin (LIPITOR) tablet 10 mg  10 mg Oral QPM     budesonide  0.5 mg Nebulization BID     busPIRone (BUSPAR) tablet 7.5 mg  7.5 mg Oral BID     calcium carbonate  500 mg Oral TID     ipratropium - albuterol 0.5 mg/2.5 mg/3 mL  1 vial Nebulization 4x Daily     nystatin  500,000 Units Oral 4x Daily     sertraline (ZOLOFT) tablet 150 mg  150 mg Per J Tube Daily     cholecalciferol  1,000 Units Oral Daily     bacitracin   Topical BID     alteplase (ACTIVASE) 10 mg and dornase 5 mg in NS syringe for chest tube instillation  50 mL Chest Tube BID     chlorhexidine  15 mL Mouth/Throat Q12H     insulin aspart  1-4 Units Subcutaneous Q4H     heparin  5,000 Units Subcutaneous Q8H       Data   All microbiology laboratory data reviewed.  Recent Labs   Lab Test  09/22/17   0445  09/21/17   0430  09/20/17   0030   WBC  17.2*  10.7  12.6*   HGB  7.0*  7.2*  7.6*   HCT  21.4*  21.8*  23.4*   MCV  90  88  91   PLT  299  282  273     Recent Labs   Lab Test  09/22/17   0445  09/21/17   182   09/21/17   0430   CR  1.65*  1.63*  1.64*     Recent Labs   Lab Test  06/04/12   0527   SED  34*     Recent Labs   Lab Test  09/14/17   1120  08/01/17   1402  07/31/17   1600  07/30/17   1720  07/30/17   1538  07/28/17   1355  07/27/17   1120  07/26/17   1840  06/19/17   0520   CULT  No anaerobes isolated  Since this specimen was not transported in the proper anaerobic transport media, the   absence of anaerobes in this culture does not rule out the presence of anaerobes in this   specimen.    Light growth  Pseudomonas aeruginosa  *  Critical Value/Significant Value, preliminary result only, called to and read back by  Roxanne Tamez N.P at 0222 on 09.15.17 by evelia    No anaerobes isolated  No growth  On day 1, isolated in broth only: Gram positive cocci Organism nonviable on   subculture  *  No growth  No growth  Culture negative for acid fast bacilli  Assayed at [facility] [address]  Culture negative after 4 weeks  No anaerobes isolated  No growth  Canceled, Test credited Test reordered as correct code REORDERED AS A TISSUE   CULTURE    Canceled, Test credited Test reordered as correct code REORDERED AS A TISSUE   CULTURE    Light growth Coagulase negative Staphylococcus  These bacteria are part of normal skin silvino, but on occasion, may be true   pathogens.  Clinical correlation must be applied to interpreting this   microbiology result.  Susceptibility testing not routinely done  *  Light growth Normal skin silvino  Single colony Enterococcus faecium (VRE)  Critical Value/Significant Value called to and read back by Estelita Wells RN at 0416   [id number]  *  No growth

## 2017-09-22 NOTE — PLAN OF CARE
Problem: Goal Outcome Summary  Goal: Goal Outcome Summary  PT-Attempted to see. Nurse requested to hold for a.m. And check back in p.m.

## 2017-09-22 NOTE — PROGRESS NOTES
Complex patient. We were consulted about intra-abdominal fluid. This has been drained percutaneously by interventional radiology. Last tube check/change appears to have been 9/14/2017. I would ask IR if they wish to manipulate this tube that is in/near this fluid collection.

## 2017-09-22 NOTE — PROGRESS NOTES
[facility]    Critical Care Service  Progress Note    Date of Service (when I saw the patient): 09/22/2017     Assessment & Plan   Ara Stanley is a 72 year old female with complex past medical history and 4 month critical illness who was admitted on 9/19/2017 for management of right sided empyema. She was originally admitted 6/2 to [facility] with abdominal pain and had a lap alexandre, followed by abdominal perforation, ongoing respiratory failure requiring trach and PEG, and multiple pleural and abdominal infections.  Has remained in health care institution since initial admission 6/2/17. Now readmitted with large right pleural effusion/empyema, with cultures + for pseudomonas.       Main Plans for Today   Additional dose of IV lasix; if poor response, then nephrology consult   General surgery consult for increasing abdominal fluid collection   Continue lytics in chest tube for total of 6 doses then repeat Chest CT, non-contrast, Saturday AM   Palliative Care consult   Cardiology consult     Neuro  1. Anxiety/Depression   2. Chronic pain   3. Intermittent confusion/delirium    Plan:  -- Continue PTA Buspar and Zoloft. PRN trazodone at HS   --PRN dilaudid; acetaminophen for pain.  PTA med includes hydromorphone PO, PRN 1mg, Q4 hours   -- Delirium prevention as able; attempt to maintain day/night schedule, sleep hygiene, minimize interruptions at HS to allow for uninterrupted sleep.  Patient with increased delirium today.  Per patient's POA, this is more pronounced with lack of sleep.  Sleep has been poor since admission     CV  1. HTN  2. New diastolic dysfunction on echo 9/21; EF 50-55%, RV dilation   Plan:  -- holding PTA metoprolol and hydralazine as normotensive without   --Cardiology consult given changes on echocardiogram and 50lb weight gain in 6 weeks.     Resp:  1.  Prolonged respiratory failure after hospitalization in June.  Now to the point where she is on trach dome during the day  but pressure support at night.  [facility] staff did not feel she was yet ready for decannulation  2.  Empyema and recent pseudomonal pneuomina: pleural fluid growing pseudomonas on cultures drawn on 9/14.   -- Continue trach dome as tolerated. If not tolerating, place on PS at HS.  Has been using PS 10/5 at HS.     --tPA/Mucolytic BID and follow for improvement.  Has had good CT output and CXR/CT shows good improvement.  Continue for total of 6 doses; repeat chest CT Saturday AM, per Dr. Varela. Abdominal CT shows evidence of increasing left sided pleural effusion (9/21)     GI/Nutrition  1. S/p exploratory laparotomy, followed by acute abdomen with gastric perforation in June 2017, with subsequent persistent abdominal fluid collections with FUNMI drain placement (Dr. Peck). One FUNMI drain in LLQ remaining placed by IR.   2. Recent C.diff infection   3. PEG placement s/p prolonged respiratory failure   4. Recent acute GI bleed; admitted and managed at [facility]   5. Elevated LDH, alk phos.    Plan:  --Repeat C.diff lab if ongoing loose stools; enteric iso  --Resume PTA tube feeding; add additional protein given hypoalbuminemia, low protein intake, general anasarca.   --Hold diet pending surgery consult     --General surgery consult: Persistent pseudomonas in abdominal fluid cultures.  Outpatient management by IR (drain placed in IR 8/1). Drain replaced and repositioned in left abdominal fluid collection on 9/14 at [facility]. Right drain removed.  CT 9/21 shows increased fluid collection since drain replaced and since previous abdominal CT 8/25   --Flush FUNMI with 10ml NS daily.  Record I&Os Q shift.  IR JOHN [facility], ([phone])   --Obtain RUQ abdominal  US     Renal  1. Acute vs chronic kidney injury.  Creatinine increased over baseline from last admission.  Admitted to [facility] end of August for acute GI bleed and appears to have sustained WOODY at that time.  Creatinine at time of discharge  from [facility] 1.3-1.5 range.  Creatinine on admission 1.49>1.64>1.65 this AM.    2. Generalized anasarca and 50lb weight gain since last admission   Plan:  -- 80mg IV lasix this AM with little response.  Received 60mg IV lasix yesterday. Will place nephrology consult if UOP does not improve.   --Repeat BMP this evening     ID  1. Multiple abscesses and infections since June. Most recently had a pseudomonal pneumonia with empyema. Pleural fluid still growing pseudomonas. Persistent abdominal fluid collection; most recently with pseudomonas. Has completed a course of Piperacillin/Tazobactam already.   Plan:   --Continue Cefepime   --ID following   --Repeat C. Diff culture; oral vancomycin started by ID for prophylaxis, pending culture results    Endocrine  1. No active issues   Plan:  -- BG checks per ICU protocol     Heme:  1. acute on chronic blood loss anemia. Recent GI bleed with hemoglobin as low as 5 upon admission to [facility].   Plan:  -- Monitor hemoglobin.  -- Transfuse to keep > 7.0    Skin  1. Generalized anasarca   Plan:  --attempt diuresis     General cares:  DVT Prophylaxis: Heparin SQ  GI Prophylaxis: PPI  Restraints: Restraints for medical healing needed: NO  Family update by me today: Yes ; POA updated   Current lines are required for patient management  Access: PICC on right arm     Isabella Gutierrez    Time Spent on this Encounter   Billing:  I spent 45 minutes bedside and on the inpatient unit today managing the critical care of Ara Stanley in relation to the issues listed in this note.    Interval History   Course reviewed. Increasing confusion and hallucinations overnight. Increasing respiratory distress and tachypnea and patient requested to be placed on vent.  Little response to lasix yesterday; weight up, volume up.  Will repeat today and if no response then nephrology consult.      Physical Exam   Temp: 98.8  F (37.1  C) Temp src: Oral Temp  Min: 98.4  F (36.9  C)  Max: 98.8  F (37.1   C) BP: 104/53   Heart Rate: 87 Resp: 22 SpO2: 99 % O2 Device: Trach dome Oxygen Delivery: Other (Comments) (20lpm)  Vitals:    09/20/17 0400 09/21/17 0600 09/22/17 0600   Weight: 91.8 kg (202 lb 6.1 oz) 92.8 kg (204 lb 9.4 oz) 93.1 kg (205 lb 4 oz)     I/O last 3 completed shifts:  In: 2999.5 [I.V.:879.5; Other:100; NG/GT:180; IV Piggyback:250]  Out: 2195 [Urine:970; Drains:5; Chest Tube:1220]    GEN: awake, lethargic, confused    EYES: PERRL, Anicteric sclera.   HEENT:  Normocephalic, atraumatic, trachea midline, trach secured, on full vent support   CV: RRR, no gallops, rubs, or murmurs  PULM/CHEST: Coarse breath sounds bilaterally without crackles or wheeze. Symmetric chest rise. Right chest tube dressing with small amount of moist drainage.   GI: normal bowel sounds, soft, non-tender, no rebound tenderness or guarding, no masses. Severe generalized anasarca of abdomen   : tanner catheter in place, urine yellow and clear  EXTREMITIES: Significant anasarca, flank edema, groin edema, peripheral edema. Moving all extremities, peripheral pulses intact  NEURO: Cranial nerves II-XII grossly intact, no motor-sensory deficits noted  SKIN: scattered ecchymosis, drains in right chest and right abdomen.    PSYCH:  Affect: appropriate. Intermittent confusion/delirium   Imaging personally reviewed: Abdominal CT  ECG:SR     Medications     NaCl 10 mL/hr at 09/21/17 1118       furosemide  80 mg Intravenous Once     protein modular  1 packet Per Feeding Tube Q12H     vancomycin  125 mg Oral BID     atorvastatin (LIPITOR) tablet 10 mg  10 mg Oral QPM     budesonide  0.5 mg Nebulization BID     busPIRone (BUSPAR) tablet 7.5 mg  7.5 mg Oral BID     calcium carbonate  500 mg Oral TID     ipratropium - albuterol 0.5 mg/2.5 mg/3 mL  1 vial Nebulization 4x Daily     nystatin  500,000 Units Oral 4x Daily     sertraline (ZOLOFT) tablet 150 mg  150 mg Per J Tube Daily     cholecalciferol  1,000 Units Oral Daily     bacitracin   Topical  BID     alteplase (ACTIVASE) 10 mg and dornase 5 mg in NS syringe for chest tube instillation  50 mL Chest Tube BID     ceFEPIme (MAIXPIME) IV  2 g Intravenous Q8H     pantoprazole  40 mg Oral QAM     chlorhexidine  15 mL Mouth/Throat Q12H     insulin aspart  1-4 Units Subcutaneous Q4H     heparin  5,000 Units Subcutaneous Q8H       Data     Recent Labs  Lab 09/22/17  0445 09/21/17  1820 09/21/17  0430 09/20/17  0030   WBC 17.2*  --  10.7 12.6*   HGB 7.0*  --  7.2* 7.6*   MCV 90  --  88 91     --  282 273   INR  --   --   --  1.35*    135 133 131*   POTASSIUM 4.6 4.6 4.5 4.6   CHLORIDE 104 104 101 99   CO2 21 22 21 24   BUN 49* 49* 45* 45*   CR 1.65* 1.63* 1.64* 1.49*   ANIONGAP 10 9 11 8   CARYL 8.2* 8.2* 8.3* 8.3*   * 121* 74 75   ALBUMIN 1.9*  --   --  1.2*   PROTTOTAL 6.6*  --   --  6.9   BILITOTAL 0.4  --   --  0.3   ALKPHOS 154*  --   --  187*   ALT 21  --   --  22   AST 29  --   --  30     Recent Results (from the past 24 hour(s))   CT Abdomen Pelvis w/o Contrast    Narrative    CT ABDOMEN AND PELVIS WITHOUT CONTRAST  9/21/2017 5:52 PM    HISTORY: Patient has persistent abdominal fluid collections. IR  repositioned left lower quadrant drain 9/14. Last uploaded CT 8/25  from outside hospital. Please evaluate fluid collections, drain  placement, etc.    TECHNIQUE: Scans obtained from the diaphragm through the pelvis  without oral or IV contrast. Radiation dose for this scan was reduced  using automated exposure control, adjustment of the mA and/or kV  according to patient size, or iterative reconstruction technique.    COMPARISON: CT chest dated 9/19/2017 from [facility],  CT abdomen and pelvis from [facility] dated  8/25/2017 and CT abdomen and pelvis dated 8/3/2017 from [facility].    FINDINGS: Right pleural fluid collection has nearly resolved since the  prior study. There is a right chest drainage tube, which is unchanged  since the  prior CT chest. Bibasilar infiltrates versus atelectasis are  again noted. This is less on the right than on the prior study. On the  left, this appears slightly more extensive than the prior chest CT.  There is a small left pleural fluid collection. The heart is upper  normal size. There are coronary artery and aortic calcifications.  Enteric feeding tube is projected over the mediastinum and stomach  with the weighted tip in the gastric antral region.    No aggressive osseous lesions are seen. There are degenerative changes  in the spine. Left lateral transverse process fracture of L3 is again  noted and not significantly changed in alignment. Right hip  arthroplasty causes streak artifact limiting evaluation of the right  side of the pelvis.    Extensive edema is seen through the cutaneous adipose tissues, most  consistent with anasarca, appears to have worsened since the prior CT  abdomen dated 8/25/2017.    The gallbladder is surgically absent. The liver, bilateral adrenal  glands and kidneys are grossly unremarkable other than stranding in  the adipose tissues most consistent with anasarca. There is a drainage  tube and a cystic structure seen anterior to the pancreas peripheral  to the greater curvature of the stomach. Large amount of fluid is  still seen within this collection. This has not significantly  decreased in size since the prior study dated 8/25/2007. It may  actually be slightly increased in size. Small amount of gas is seen  within collection adjacent to the drainage tube.    There is a fluid collection noted just peripheral to the spleen which  could be subcapsular fluid collection, but could also be external to  the spleen (this is similar to the prior study dated 8/25/2017).  Spleen is otherwise unremarkable.    No hydronephrosis, nephrolithiasis, hydroureter or ureteral calculus  is identified. Urinary bladder is decompressed from a Crawford catheter.    The bowel appears grossly of normal  caliber. It is difficult to  evaluate for inflammatory change given the significant stranding  throughout the mesenteric adipose tissues likely from anasarca.  Nonaneurysmal atherosclerosis is noted. No definite adenopathy is  seen. Other than the air within the likely fluid collection drained by  the drainage tube, no free air or extraluminal air seen in the  peritoneal cavity.      Impression    IMPRESSION:  1. Large fluid collection peripheral to the greater curvature of the  stomach may be slightly increased in size since the prior studies  dated 8/25/2017 and 9/19/2017. It is new or significantly increased in  size as compared to the prior study dated 8/3/2017. It now contains a  small amount of gas which could be iatrogenic. There appears to be a  pigtail drainage tube within this fluid collection.  2. Fluid collection anterior and lateral to the spleen similar in  appearance to the prior studies.  3. Worsening anasarca.  4. Significantly decreased size right pleural fluid collection since  recent prior CT chest likely due to drainage from the drainage tube  which is seen in the posterior aspect of the right hemithorax.  5. Worsening left basilar infiltrate or atelectasis.  6. Right basilar infiltrate versus atelectasis is noted.  7. Mesenteric edema from anasarca limits evaluation of the abdomen and  pelvis for inflammatory change.    DAVIAN ADLER MD

## 2017-09-22 NOTE — CONSULTS
[facility]    Cardiology Consultation     Date of Admission:  9/19/2017    Primary Care Physician   Shar James     REASON FOR CONSULTATION:  Echo with new diastolic dysfunction and RV dilatation.      REFERRING PHYSICIAN:  Intensivist team.      IMPRESSION:   1.  Cor pulmonale.   2.  Recurrent pleural effusions and empyema.   3.  Pseudomonas pneumonia.   4.  Anemia, most recent hemoglobin of 7.   5.  Hypoalbuminemia, recent level 1.9.   6.  Paroxysmal atrial fibrillation, now back to sinus rhythm.      Essentially, Ara Stanley is a very unfortunate lady with a tracheostomy and history of multisystem failure.  There are multiple reasons for her to develop right heart failure.  Pulmonary flow is definitely compromised with her history of recurrent pleural effusions and empyema.  Heart failure would be exacerbated by the presence of anemia with hemoglobin of 7.  In addition, she has Pseudomonas pneumonia.  Albumin of 1.9 would certainly promote fluid retention.  With her prolonged bed rest, I suppose pulmonary embolism could be a consideration, but she is not a candidate for anticoagulation.      I would recommend diuresis for now.  Her blood pressure is on the low side and she has chronic renal impairment.  We will do what we can for this lady whose prognosis is obviously very guarded.  It will be our pleasure to follow her in her hospital course with you as necessary.      HISTORY OF PRESENT ILLNESS:  A lady who has tracheostomy and is unable to give me a history.  She is sedated.  From excellent notes, I see that she was initially admitted in the early part of this summer for laparoscopic cholecystectomy.  After this procedure, she had a GI bleed and a gastric perforation which was surgically repaired.  Unfortunately, multiple complications ensued including multiple pockets of fluid retention in the abdomen, recurrent pleural effusions.  She had a VATS procedure on 07/28.  She has had  multiple abdominal drains.  She has developed nosocomial pneumonia and empyema.  She is quite anemic.  She developed acute renal failure and subsequent chronic renal failure.  She is in septic shock.  She had a gastric artery pseudoaneurysm radiologically repaired.       Past Medical History   I have reviewed this patient's medical history and updated it with pertinent information if needed.   Past Medical History:   Diagnosis Date     Abdominal abscess (H)      Acute blood loss anemia      Acute hypercapnic respiratory failure (H)      WOODY (acute kidney injury) (H)      Alcohol abuse      Alcohol withdrawal (H)      Asthma      Atrial fibrillation (H)      Cholecystitis      Closed right hip fracture (H)      Clostridium difficile colitis      COPD (chronic obstructive pulmonary disease) (H)      Depression a     Dyslipidemia      Empyema (H)      GI bleed      Herpes zoster     right thoracic region     Hypertension      Mild reactive airways disease      Osteoarthritis      Perforated ulcer (H)      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 Had coil embolism     Septic shock (H)      Takotsubo cardiomyopathy      Ventilator dependence (H)        Past Surgical History   I have reviewed this patient's surgical history and updated it with pertinent information if needed.  Past Surgical History:   Procedure Laterality Date     ARTHROPLASTY HIP  6/4/2012    Procedure:ARTHROPLASTY HIP; Surgeon:DAVIAN FENG; Location: OR     ENT SURGERY      tonsils     GYN SURGERY      hyst     LAPAROSCOPIC CHOLECYSTECTOMY N/A 6/2/2017    Procedure: LAPAROSCOPIC CHOLECYSTECTOMY;;  Surgeon: Lavell Carpenter MD;  Location:  OR     LAPAROSCOPY DIAGNOSTIC (GENERAL) N/A 6/6/2017    Procedure: LAPAROSCOPY DIAGNOSTIC (GENERAL);  DIAGNOSTIC LAPAROSCOPY, REPAIR OF PERFORATED GASTRIC ULCER, ABDOMINAL LAVAGE;  Surgeon: Estelita Peck MD;  Location:  OR     LAPAROTOMY EXPLORATORY  N/A 2017    Procedure: LAPAROTOMY EXPLORATORY;  EXPLORATORY LAPAROSCOPY, CHOLECYCTECTOMY;  Surgeon: Lavell Carpenter MD;  Location:  OR     ORTHOPEDIC SURGERY      hip pinning   femur fracture knee surgery      REMOVE HARDWARE HIP NAILING  2012    Procedure:REMOVE HARDWARE HIP NAILING; REMOVAL OF GAMMA NAIL AND SCREWS FROM RIGHT HIP, RIGHT TOTAL HIP ARTHROPLASTY(GAMMA NAIL EQUIPMENT, [employer] TOTAL HIP)^; Surgeon:DAVIAN FENG; Location: OR     THORACOSCOPIC DECORTICATION LUNG  2017    Procedure: THORACOSCOPIC DECORTICATION LUNG;;  Surgeon: Selwyn Varela MD;  Location:  OR     THORACOSCOPIC WEDGE RESECTION LUNG Left 2017    Procedure: THORACOSCOPIC WEDGE RESECTION LUNG;  LEFT VIDEO ASSISTED THORASCOPY WITH LEFT LUNG DECORDICATION;  Surgeon: Selwyn Varela MD;  Location:  OR     TRACHEOSTOMY N/A 2017    Procedure: TRACHEOSTOMY;  TRACHEOSTOMY.;  Surgeon: Selwyn Varela MD;  Location:  OR       Prior to Admission Medications   Prior to Admission Medications   Prescriptions Last Dose Informant Patient Reported? Taking?   ACETAMINOPHEN PO  at prn Nursing Home Yes Yes   Si,000 mg by Per J Tube route every 8 hours as needed for pain    ALPRAZOLAM PO  at prn Nursing Home Yes Yes   Si.25 mg by Per J Tube route 3 times daily as needed for anxiety    ATORVASTATIN CALCIUM PO 2017 at pm Nursing Home Yes Yes   Sig: Take 10 mg by mouth daily   BusPIRone HCl (BUSPAR PO) 2017 at 0930 Nursing Home Yes Yes   Sig: Take 7.5 mg by mouth 2 times daily   HYDRALAZINE HCL PO 2017 at 1202 Nursing Home Yes Yes   Si mg by Per G Tube route every 6 hours   HYDROMORPHONE HCL IJ  at prn Nursing Home Yes Yes   Sig: Inject 0.6 mg as directed every 4 hours as needed (Pain rated 8 to 10)   HYDROMORPHONE HCL PO  at prn Nursing Home Yes Yes   Sig: Take 1 mg by mouth every 4 hours as needed for other (Pain rated 3 to 7)   METOPROLOL TARTRATE PO  2017 at Unknown time Nursing Home Yes Yes   Sig: Take 50 mg by mouth 2 times daily   Sertraline HCl (ZOLOFT PO) 2017 at 0932 [facility] Yes Yes   Si mg by Per J Tube route daily   TRAZODONE HCL PO  at prn Nursing Home Yes Yes   Si mg by Per J Tube route nightly as needed for sleep    VITAMIN D, CHOLECALCIFEROL, PO 2017 at 0930 [facility] Home Yes Yes   Sig: Take 1,000 Units by mouth daily   albuterol (2.5 MG/3ML) 0.083% neb solution  [facility] Yes Yes   Sig: Take 1 vial by nebulization every 2 hours as needed for wheezing   bacitracin 500 UNIT/GM OINT 2017 at 1423 [facility] No Yes   Sig: Apply topically 2 times daily Apply to chest tube site BID for 7 days   Patient taking differently: Apply topically 3 times daily Apply to chest tube site BID for 7 days   budesonide (PULMICORT) 0.5 MG/2ML neb solution 2017 at 0805 [facility] Yes Yes   Sig: Take 0.5 mg by nebulization 2 times daily   calcium carbonate (TUMS) 500 MG chewable tablet 2017 at 1423 [facility] Yes Yes   Sig: Take 1 chew tab by mouth 3 times daily   chlorhexidine (PERIDEX) 0.12 % solution 2017 at 0932 [facility] No Yes   Sig: Take 15 mLs by mouth every 12 hours   ipratropium - albuterol 0.5 mg/2.5 mg/3 mL (DUONEB) 0.5-2.5 (3) MG/3ML neb solution 2017 at 1105 [facility] Home Yes Yes   Sig: Take 1 vial by nebulization 4 times daily    ipratropium - albuterol 0.5 mg/2.5 mg/3 mL (DUONEB) 0.5-2.5 (3) MG/3ML neb solution  at prn [facility] No Yes   Sig: Take 1 vial (3 mLs) by nebulization every 4 hours as needed for shortness of breath / dyspnea   nystatin (MYCOSTATIN) 462781 UNIT/ML suspension 2017 at 1708 [facility] No Yes   Sig: Take 5 mLs (500,000 Units) by mouth 4 times daily   ondansetron (ZOFRAN-ODT) 4 MG ODT tab  at prn Nursing Home No Yes   Sig: Take 1 tablet (4 mg) by mouth every 6 hours as needed for nausea or vomiting   pantoprazole (PROTONIX) SUSP suspension 2017 at 0908  Nursing Home Yes Yes   Sig: Take 40 mg by mouth daily   piperacillin-tazobactam (ZOSYN) 4-0.5 GM vial 9/19/2017 at 1425 Nursing Home Yes Yes   Sig: Inject 4.5 g into the vein every 8 hours      Facility-Administered Medications: None     Allergies   Allergies   Allergen Reactions     Iodine I 131 Tositumomab Anaphylaxis     Can tolerate topical iodine if it is wahed off after surgery      Penicillins Rash and Anaphylaxis     Swollen  lymph nodes, flushed overheating      Sulfa Drugs Nausea and Vomiting, Diarrhea and Rash       Social History   I have reviewed this patient's social history and updated it with pertinent information if needed. Ara Stanley  reports that she quit smoking about 25 years ago. Her smoking use included Cigarettes. She has a 10.00 pack-year smoking history. She does not have any smokeless tobacco history on file. She reports that she drinks alcohol. She reports that she does not use illicit drugs.    Family History   I have reviewed this patient's family history and updated it with pertinent information if needed.   No family history on file.    Review of Systems   The 10 point Review of Systems is negative other than noted in the HPI or here.     Physical Exam   Temp: 98.8  F (37.1  C) Temp src: Oral BP: 143/70   Heart Rate: 92 Resp: 21 SpO2: 98 % O2 Device: Mechanical Ventilator Oxygen Delivery: Other (Comments) (30LPM)  Vital Signs with Ranges  Temp:  [98  F (36.7  C)-99  F (37.2  C)] 98.8  F (37.1  C)  Heart Rate:  [80-96] 92  Resp:  [11-31] 21  BP: ()/(47-89) 143/70  FiO2 (%):  [35 %] 35 %  SpO2:  [92 %-100 %] 98 %  196 lbs 10.41 oz    GENERAL:  Intubated and sedated, afebrile.   VITAL SIGNS:  Blood pressure 104/53, heart rate is in the 80s, sinus rhythm.   HEENT:  Unremarkable.   NECK:  JVP is elevated by 15 cm.  No thyromegaly.   CARDIAC:  [place] not palpable.  Heart sounds unremarkable.   CHEST:  Symmetrical expansion without the use of accessory muscles.  Ventilated  breath sounds quiet at both bases.   ABDOMEN:  Distended.  Nontender.   EXTREMITIES:  1-2+ bilateral ankle edema.  Diminished foot pulses.   CENTRAL NERVOUS SYSTEM:  Examination not possible.      LABORATORY INVESTIGATIONS:  Albumin 1.9.  Hemoglobin 7, white cell count 17.2, platelet count of 299.  Creatinine at 1.65, GFR 30-40, potassium 4.6, sodium 135.  Carbon oxide 21.  EKG from 07/30 showed atrial fibrillation with rapid ventricular response.  There is delayed R-wave progression.  No acute changes.  Echocardiogram showed normal left ventricular systolic function with advanced diastolic dysfunction.  Right ventricle was moderately dilated and possibly severely hypokinetic.  There is 1-2+ mitral regurgitation, 1- 2+ tricuspid regurgitation with RV systolic pressure estimate of 20.3 plus right atrial pressure.  Abdominal and chest CT done yesterday after pleural fluid drainage.  There is still a large fluid collection peripheral to the greater curvature of the stomach.  A fluid collection near the spleen, worsening anasarca.  There is left basilar and right basilar infiltrate with mesenteric ischemia.     Data   Results for orders placed or performed during the hospital encounter of 09/19/17 (from the past 24 hour(s))   CBC Differential (AM Draw)   Result Value Ref Range    WBC 12.7 (H) 4.0 - 11.0 10e9/L    RBC Count 2.36 (L) 3.8 - 5.2 10e12/L    Hemoglobin 6.8 (LL) 11.7 - 15.7 g/dL    Hematocrit 21.0 (L) 35.0 - 47.0 %    MCV 89 78 - 100 fl    MCH 28.8 26.5 - 33.0 pg    MCHC 32.4 31.5 - 36.5 g/dL    RDW 16.6 (H) 10.0 - 15.0 %    Platelet Count 341 150 - 450 10e9/L    Diff Method Automated Method     % Neutrophils 82.6 %    % Lymphocytes 7.1 %    % Monocytes 4.1 %    % Eosinophils 5.8 %    % Basophils 0.2 %    % Immature Granulocytes 0.2 %    Nucleated RBCs 0 0 /100    Absolute Neutrophil 10.5 (H) 1.6 - 8.3 10e9/L    Absolute Lymphocytes 0.9 0.8 - 5.3 10e9/L    Absolute Monocytes 0.5 0.0 - 1.3 10e9/L    Absolute  Eosinophils 0.7 0.0 - 0.7 10e9/L    Absolute Basophils 0.0 0.0 - 0.2 10e9/L    Abs Immature Granulocytes 0.0 0 - 0.4 10e9/L    Absolute Nucleated RBC 0.0    Comprehensive metabolic panel   Result Value Ref Range    Sodium 136 133 - 144 mmol/L    Potassium 4.4 3.4 - 5.3 mmol/L    Chloride 103 94 - 109 mmol/L    Carbon Dioxide 23 20 - 32 mmol/L    Anion Gap 10 3 - 14 mmol/L    Glucose 109 (H) 70 - 99 mg/dL    Urea Nitrogen 63 (H) 7 - 30 mg/dL    Creatinine 1.87 (H) 0.52 - 1.04 mg/dL    GFR Estimate 26 (L) >60 mL/min/1.7m2    GFR Estimate If Black 32 (L) >60 mL/min/1.7m2    Calcium 8.1 (L) 8.5 - 10.1 mg/dL    Bilirubin Total 0.4 0.2 - 1.3 mg/dL    Albumin 2.1 (L) 3.4 - 5.0 g/dL    Protein Total 6.6 (L) 6.8 - 8.8 g/dL    Alkaline Phosphatase 143 40 - 150 U/L    ALT 16 0 - 50 U/L    AST 16 0 - 45 U/L   Glucose by meter   Result Value Ref Range    Glucose 110 (H) 70 - 99 mg/dL   Blood culture   Result Value Ref Range    Specimen Description Blood Unspecified Site     Special Requests Aerobic and anaerobic bottles received     Culture Micro No growth after 15 hours    Blood culture   Result Value Ref Range    Specimen Description Blood Right Hand     Culture Micro No growth after 15 hours    Procalcitonin   Result Value Ref Range    Procalcitonin 0.71 ng/ml   Fibrinogen activity   Result Value Ref Range    Fibrinogen 378 200 - 420 mg/dL   INR   Result Value Ref Range    INR 1.58 (H) 0.86 - 1.14   Partial thromboplastin time   Result Value Ref Range    PTT 46 (H) 22 - 37 sec   CBC (1200)   Result Value Ref Range    WBC 12.7 (H) 4.0 - 11.0 10e9/L    RBC Count 2.25 (L) 3.8 - 5.2 10e12/L    Hemoglobin 6.6 (LL) 11.7 - 15.7 g/dL    Hematocrit 20.1 (L) 35.0 - 47.0 %    MCV 89 78 - 100 fl    MCH 29.3 26.5 - 33.0 pg    MCHC 32.8 31.5 - 36.5 g/dL    RDW 16.5 (H) 10.0 - 15.0 %    Platelet Count 337 150 - 450 10e9/L   ABO/Rh type and screen   Result Value Ref Range    Units Ordered 2     ABO A     RH(D) Pos     Antibody Screen Neg      Test Valid Only At [facility]        Specimen Expires 09/27/2017     Crossmatch Red Blood Cells    Blood component   Result Value Ref Range    Unit Number [id number]     Blood Component Type Red Blood Cells Leukocyte Reduced     Division Number 00     Status of Unit Released to care unit     Blood Product Code [id number]     Unit Status ISS    Blood component   Result Value Ref Range    Unit Number [id number]     Blood Component Type Red Blood Cells Leukocyte Reduced     Division Number 00     Status of Unit Released to care unit     Blood Product Code [id number]     Unit Status ISS    XR Chest Port 1 View    Narrative    PORTABLE CHEST ONE VIEW   9/24/2017 11:52 AM     HISTORY: Chest tube.    COMPARISON: Earlier the same day.      Impression    IMPRESSION: Pigtail drain again seen at the right lung base. There is  increased blunting of the right costophrenic angle that may be due to  positioning versus increase of right pleural effusion. Moderate left  pleural effusion with associated atelectasis versus pneumonia is  unchanged. Tracheostomy tube and enteric tube remain in place. Cardiac  silhouette remains enlarged. New safety pin is seen over the left  chest, presumably external to the patient.    PHILLIP MILLS MD   Type and Screen (AM Draw)   Result Value Ref Range    ABO A     RH(D) Pos     Antibody Screen Canceled, Test credited     Test Valid Only At [facility]        Specimen Expires 09/27/2017     Blood Bank Comment Canceled, Test credited  Duplicate request      Glucose by meter   Result Value Ref Range    Glucose 90 70 - 99 mg/dL   Glucose by meter   Result Value Ref Range    Glucose 123 (H) 70 - 99 mg/dL   CBC with platelets   Result Value Ref Range    WBC 12.1 (H) 4.0 - 11.0 10e9/L    RBC Count 2.88 (L) 3.8 - 5.2 10e12/L    Hemoglobin 8.3 (L) 11.7 - 15.7 g/dL    Hematocrit 25.2 (L) 35.0 - 47.0 %    MCV 88 78 - 100 fl    MCH 28.8 26.5 - 33.0 pg    MCHC 32.9 31.5 - 36.5  g/dL    RDW 16.4 (H) 10.0 - 15.0 %    Platelet Count 237 150 - 450 10e9/L   Glucose by meter   Result Value Ref Range    Glucose 112 (H) 70 - 99 mg/dL   Glucose by meter   Result Value Ref Range    Glucose 111 (H) 70 - 99 mg/dL   CBC with platelets   Result Value Ref Range    WBC 11.3 (H) 4.0 - 11.0 10e9/L    RBC Count 2.87 (L) 3.8 - 5.2 10e12/L    Hemoglobin 8.3 (L) 11.7 - 15.7 g/dL    Hematocrit 25.2 (L) 35.0 - 47.0 %    MCV 88 78 - 100 fl    MCH 28.9 26.5 - 33.0 pg    MCHC 32.9 31.5 - 36.5 g/dL    RDW 16.4 (H) 10.0 - 15.0 %    Platelet Count 321 150 - 450 10e9/L   Glucose by meter   Result Value Ref Range    Glucose 112 (H) 70 - 99 mg/dL   Basic metabolic panel   Result Value Ref Range    Sodium 135 133 - 144 mmol/L    Potassium 4.3 3.4 - 5.3 mmol/L    Chloride 102 94 - 109 mmol/L    Carbon Dioxide 24 20 - 32 mmol/L    Anion Gap 9 3 - 14 mmol/L    Glucose 98 70 - 99 mg/dL    Urea Nitrogen 66 (H) 7 - 30 mg/dL    Creatinine 1.78 (H) 0.52 - 1.04 mg/dL    GFR Estimate 28 (L) >60 mL/min/1.7m2    GFR Estimate If Black 34 (L) >60 mL/min/1.7m2    Calcium 8.2 (L) 8.5 - 10.1 mg/dL   Magnesium   Result Value Ref Range    Magnesium 1.5 (L) 1.6 - 2.3 mg/dL   Phosphorus   Result Value Ref Range    Phosphorus 4.1 2.5 - 4.5 mg/dL   Prealbumin   Result Value Ref Range    Prealbumin 10 (L) 15 - 45 mg/dL   Hepatic panel   Result Value Ref Range    Bilirubin Direct 0.2 0.0 - 0.2 mg/dL    Bilirubin Total 0.4 0.2 - 1.3 mg/dL    Albumin 2.1 (L) 3.4 - 5.0 g/dL    Protein Total 6.7 (L) 6.8 - 8.8 g/dL    Alkaline Phosphatase 123 40 - 150 U/L    ALT 13 0 - 50 U/L    AST 18 0 - 45 U/L   XR Chest Port 1 View    Narrative    CHEST ONE VIEW PORTABLE   9/25/2017 4:55 AM     HISTORY: Evaluate for effusion.    COMPARISON: 9/24/2017.      Impression    IMPRESSION: Tracheostomy tube and enteric tube remain in place.  Unchanged left basilar infiltrate or atelectasis, likely with moderate  left pleural effusion. No significant right pleural effusion.  No  pneumothorax. Slight increase of diffuse interstitial edema pattern.  Pigtail drain again seen at the right lung base.    MD WILLIAM JENNINGS MD, [facility]             D: 2017 10:39   T: 2017 11:16   MT: TS      Name:     BEN PALOMINO   MRN:      [id number]        Account:       [id number]   :      1945           Consult Date:  2017      Document: [id number]

## 2017-09-22 NOTE — PROGRESS NOTES
Pt remianed on full ventilatory support this morning, and set her on Trach dome 30 L 35%, tolerated well for 2 hours and PT came and work with, thus set her back to get full support. Pt could able to more trach dome on PMV if it was not for PT. Will assess and do another trial tomorrow morning.9/22/2017  Yadi Camilo

## 2017-09-22 NOTE — PLAN OF CARE
Problem: Goal Outcome Summary  Goal: Goal Outcome Summary  Outcome: Declining  Pt is alert, disorienting, and hallucinating at time. Lung sounds are coarse with crackles throughout, pt struggling to breath and requested to be placed on the vent, respiratory rate in the upper 30's on the vent.  Tolerating tube feeding, 3 stools this shift. Urine output adequate.

## 2017-09-22 NOTE — PLAN OF CARE
Problem: Goal Outcome Summary  Goal: Goal Outcome Summary  Discharge Planner PT   Patient plan for discharge: Didn't state  Current status: On PMV and trach dome when therapist arrived. RT placed her back on vent in order to have more respiratory support for sitting. Patient transferred sup to/from sit with max assist of 2. Tends to lean back and to the left. C/o pain whenever she tried to sit in fully upright position. Tolerated sitting for about 12 min with max assist to maintain balance.   Barriers to return to prior living situation: Assist needed for all mobility, on vent, very limited endurance.  Recommendations for discharge: LTACH  Rationale for recommendations: On vent, assist needed.        Entered by: Cinthia Myers 09/22/2017 5:23 PM

## 2017-09-22 NOTE — PROGRESS NOTES
Interventional Radiology Intra-procedural Nursing Note    Patient Name: Ara Stanley  Medical Record Number: [id number]  Today's Date: September 22, 2017    Start Time: 1106  End of procedure time: 1120  Procedure: Abscess Tube Exchange  Report given to: Kvng BREWER, ICU  Time pt departs:  1125  : n/a    Other Notes: Patient into IR suite 2 via cart with trach and ventilator. Patient able to nod and answer questions by mouthing words. Patient prepped and draped with 2% chlorhexidine to drain site. Dr. Irizarry in room, timeout and procedure started. New 14F Flexima placed to abscess, hooked up to bulb suction. Patient tolerated procedure well. No sedation given. Patient back to floor with flying squad and RT. Report called to floor.    Chel Smith RN

## 2017-09-22 NOTE — CONSULTS
"[facility]    Palliative Care Consultation     Ara Stanley  MRN# [id number]  Date of Admission:  9/19/2017  Date of Service (when I saw the patient): 09/22/17  Reason for consult: Consulted by Isabella Gutierrez for Goals of care    Assessment & Plan   Ara Stanley is a 72 year old female with complex past medical history and 4 month critical illness who was admitted on 9/19/2017 for management of right sided empyema. She was originally admitted 6/2 to [facility] with abdominal pain and had a lap alexandre, followed by abdominal perforation, ongoing respiratory failure requiring trach and PEG, and multiple pleural and abdominal infections.  Has remained in health care institution since initial admission 6/2/17. Now readmitted with large right pleural effusion/empyema, with cultures + for pseudomonas.      Symptoms/Recommendations   1. Goals of Care. Met with Ara this afternoon. She is able to carry a conversation with me, but I do not feel she has full capacity to grasp the severity of her illness. During our conversation she mentioned several times that she was \"never going to give up,\" and \"I have so much to live for.\" She did reference her  twice during the conversation (he is home watching her dogs), so I am concerned that she is not fully able to make decisions for herself. I left a message for her friend and JUANA Valdez to schedule a meeting if at all possible later today or Monday. Will await call back.     Support/Coping  -left message for pt's friend/JUANA aVldez, awaiting call back  -Will involve Palliative LICSW, Adilia López, and/or Palliative , Irasema Reyez    Decisional Support, Goals of Care, Counseling & Coordination  Decisional Capacity Intact?  -No  Health Care Directive on File?  -Yes - short form HCD  Code Status/Resuscitation Preferences?  -FULL  Plan of Care?  -continue with current medical management, ongoing GOC discussions     Discussion  Introduced the " "scope of our practice to Ms Stanley. Discussed our potential roles for symptom management, support/coping, and decisional support (aka goals of care).     Met with Ara this afternoon. She begins the conversation by telling me she has been quite ill, believes it all started back in May. She tells me she doesn't understand why this is happening to her, but is not going to give up. She tells me she still has a lot to live for and is not ready to throw in the towel. I asked her what makes life worth living to her and she tells me her , her 3 dogs, and her children. She continues to tell me there are some \"family dynamics.\" I asked what her relationship is like with her children and she tells me she is not in contact with her eldest son any longer. She then tells me her other son and daughter have bene very involved in her care. She also tells me she does not trust her daughter and therefore made her friend Courtney her POA. She tells me she and Courtney have been friends for 25 years. They are neighbors and both are dog lovers. I asked if Courtney was watching her dogs while she has been sick, and Ara tells me her  watches them (I was informed that the pt's   earlier this year). Courtney continued to tell me she just doesn't understand why she is so sick, and feels she must have done something during her life to deserve this. She tells me she knows she hasn't been an carmen, but also doesn't think she has been the devil and just can't understand why this is happening to her. She continues on to tell me she doesn't like to dwell in the past as it doesn't help and she takes things one day at a time. I asked Ara how she makes sense of everything that is happening, what she understands about her medical condition. She is unable to tell me, but understands that she is quite sick and could die at any time. I shared my worry with her that considering how sick she is, it is possible that she may not recover from " this illness. I further explained that I worry she will never be able to live independently as she previously had. I asked Ara if she was unable to live in her home, and had to live in a nursing home because she required ongoing 24 hour care, would that be an acceptable quality of life for her. She tells me that it would be. She tells me as long as she can communicate with her people, that in her opinion would be an acceptable quality of life though she explains she would continue to strive to get better. I asked if she was in a state that she could not communicate with people if that would be an acceptable quality of life and she said no. Ara then stated she was feeling very tired. We agreed to continue our conversation on Monday, hopefully with Courtney pham.     Thank you for involving us in the care of this patient and family. We will continue to follow. Please do not hesitate to contact me with questions or concerns or the on-call provider for our team if evening or weekend.    Rain MENJIVAR, [facility]  Palliative Medicine   Pager [phone]    Attestation:  Total time on the floor involved in the patient's care: 70 minutes  Total time spent in counseling/care coordination: >50%    Chief Complaint   Goals of Care.    History is obtained from the patient, staff, and extensive chart review.     Adopted from H&P  HPI: Patient who originally was admitted to hospital on 6/2 with abdominal pain. Initially had laproscopic cholecystectomy.  4 days later went back to OR and found to have perforated gastric ulcer.  Developed critical illness with WOODY and septic shock, as well as multiple abscesses related to this and underlying severe malnutrition.  Transferred to [facility] on 6/19 for long term ventilator weaning in late June. While at [facility] had issues with continued draining fluid collections that grew candida, and had drains placed.  Transferred back to ICU on 7/26 for evaluation of loculated pleural effusions.   Had a VATS procedure done on the left side. Subsequently had an additional FUNMI drain placed into one of her abdominal fluid collections. Transferred to [facility] on 8/3 for continued treatment.  Per sign out from [facility] physician she has made some progress with ventilator weaning but has continued to struggle with multiple medical issues.  She was hospitalized at [facility] for GI bleed.  Hemoglobin was as low as 5.  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  One of her abdominal drains was able to be removed, but fluid collection on the left did not resolve. She has also developed a large pleural effusion on the right along with pseudomonal pneumonia for which she just completed a course of Piperacillin/Tazobactam. On 9/14 returned to [facility] where she had replacement of her left abdominal drain and a chest tube placed on the right.  Unfortunately, despite a lot of drainage, the pleural effusion has not resolved and pulmonologist at [facility] is concerned she will need another thoracotomy. Fluid is growing pseudomonas. Transfer is requested for a thoracic surgery evaluation.  Patient herself notes that she has had significant chest and right sided back pain since yesterday.  Hasn't noticed any other new respiratory changes. No new fevers or chills. She thinks her rehab is going OK. She remains frustrated by the what she sees as the delay in diagnosis of her gastric ulcer in June. Patient tells me she knows she has been in restraints.  Says she has no plans to pull at her feeding tube. She says she doesn't recall any conversations about how she is doing overall or her overall prognosis. Per Dr. Montejo's signout there have been conversations of this nature.     Past Medical History    I have reviewed this patient's medical history and updated it with pertinent information if needed.   Past Medical History:   Diagnosis Date     Abdominal abscess (H)      Acute  blood loss anemia      Acute hypercapnic respiratory failure (H)      WOODY (acute kidney injury) (H)      Alcohol abuse      Alcohol withdrawal (H)      Asthma      Atrial fibrillation (H)      Cholecystitis      Closed right hip fracture (H)      Clostridium difficile colitis      COPD (chronic obstructive pulmonary disease) (H)      Depression a     Dyslipidemia      Empyema (H)      GI bleed      Herpes zoster     right thoracic region     Hypertension      Mild reactive airways disease      Osteoarthritis      Perforated ulcer (H)      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 Had coil embolism     Septic shock (H)      Takotsubo cardiomyopathy      Ventilator dependence (H)        Past Surgical History   I have reviewed this patient's surgical history and updated it with pertinent information if needed.  Past Surgical History:   Procedure Laterality Date     ARTHROPLASTY HIP  6/4/2012    Procedure:ARTHROPLASTY HIP; Surgeon:DAVIAN FENG; Location: OR     ENT SURGERY      tonsils     GYN SURGERY      hyst     LAPAROSCOPIC CHOLECYSTECTOMY N/A 6/2/2017    Procedure: LAPAROSCOPIC CHOLECYSTECTOMY;;  Surgeon: Lavell Carpenter MD;  Location:  OR     LAPAROSCOPY DIAGNOSTIC (GENERAL) N/A 6/6/2017    Procedure: LAPAROSCOPY DIAGNOSTIC (GENERAL);  DIAGNOSTIC LAPAROSCOPY, REPAIR OF PERFORATED GASTRIC ULCER, ABDOMINAL LAVAGE;  Surgeon: Estelita Peck MD;  Location:  OR     LAPAROTOMY EXPLORATORY N/A 6/2/2017    Procedure: LAPAROTOMY EXPLORATORY;  EXPLORATORY LAPAROSCOPY, CHOLECYCTECTOMY;  Surgeon: Lavell Carpenter MD;  Location:  OR     ORTHOPEDIC SURGERY      hip pinning   femur fracture knee surgery      REMOVE HARDWARE HIP NAILING  6/4/2012    Procedure:REMOVE HARDWARE HIP NAILING; REMOVAL OF GAMMA NAIL AND SCREWS FROM RIGHT HIP, RIGHT TOTAL HIP ARTHROPLASTY(GAMMA NAIL EQUIPMENT, [employer] TOTAL HIP)^; Surgeon:DAVIAN FENG; Location:  OR     THORACOSCOPIC DECORTICATION LUNG  7/28/2017    Procedure: THORACOSCOPIC DECORTICATION LUNG;;  Surgeon: Selwyn Varela MD;  Location:  OR     THORACOSCOPIC WEDGE RESECTION LUNG Left 7/28/2017    Procedure: THORACOSCOPIC WEDGE RESECTION LUNG;  LEFT VIDEO ASSISTED THORASCOPY WITH LEFT LUNG DECORDICATION;  Surgeon: Selwyn Varela MD;  Location:  OR     TRACHEOSTOMY N/A 6/13/2017    Procedure: TRACHEOSTOMY;  TRACHEOSTOMY.;  Surgeon: Selwyn Varela MD;  Location:  OR       Social History   Living situation: Has a home in [place], for the past 4 months she has been living at [facility] and in and out of hospital     Family system: recently , has 3 children (oldest son has not been involved, another son and daughter who have been involved, though pt states she does not trust her daughter), grandchildren and great-grandchildren    Self-identified support system: Friend Courtney and family    Employment/education: did not assess    Activities/interests: is a dog lover, states she has 3 dogs at home    Use of community resources: did not assess    Pentecostalism affiliation: did not assess    Involvement in vicky community: did not assess    Impact of illness on patient:     Family History   I have reviewed this patient's family history and updated it with pertinent information if needed.     Allergies   Allergies   Allergen Reactions     Iodine I 131 Tositumomab Anaphylaxis     Can tolerate topical iodine if it is wahed off after surgery      Penicillins Rash and Anaphylaxis     Swollen  lymph nodes, flushed overheating      Sulfa Drugs Nausea and Vomiting, Diarrhea and Rash       Medications   Current Facility-Administered Medications Ordered in Epic   Medication Dose Route Frequency Last Rate Last Dose     magnesium sulfate 2 g in NS intermittent infusion (PharMEDium or FV Cmpd)  2 g Intravenous Daily PRN         magnesium sulfate 4 g in 100 mL sterile water (premade)  4 g  Intravenous Q4H PRN         pantoprazole (PROTONIX) suspension 40 mg  40 mg Oral or Feeding Tube Daily   40 mg at 09/22/17 1209     ceFEPIme (MAXIPIME) 2 g vial to attach to  ml bag for ADULTS or 50 ml bag for PEDS  2 g Intravenous Q12H         furosemide (LASIX) injection 60 mg  60 mg Intravenous Q8H         albumin human 25 % injection 12.5 g  12.5 g Intravenous Q8H         protein modular (PROSource TF) 1 packet  1 packet Per Feeding Tube Q12H   1 packet at 09/22/17 0914     vancomycin (FIRST-VANCOMYCIN) solution 125 mg  125 mg Oral BID   125 mg at 09/22/17 0904     acetaminophen (TYLENOL) tablet 1,000 mg  1,000 mg Per J Tube Q8H PRN   1,000 mg at 09/22/17 0904     albuterol neb solution 2.5 mg  1 vial Nebulization Q2H PRN         atorvastatin (LIPITOR) tablet 10 mg  10 mg Oral QPM   10 mg at 09/21/17 2106     budesonide (PULMICORT) neb solution 0.5 mg  0.5 mg Nebulization BID   0.5 mg at 09/22/17 0725     busPIRone (BUSPAR) tablet 7.5 mg  7.5 mg Oral BID   7.5 mg at 09/22/17 0905     calcium carbonate (TUMS) chewable tablet 500 mg  500 mg Oral TID   500 mg at 09/22/17 0906     ipratropium - albuterol 0.5 mg/2.5 mg/3 mL (DUONEB) neb solution 3 mL  1 vial Nebulization 4x Daily   3 mL at 09/22/17 1228     ipratropium - albuterol 0.5 mg/2.5 mg/3 mL (DUONEB) neb solution 3 mL  3 mL Nebulization Q4H PRN   3 mL at 09/20/17 2332     nystatin (MYCOSTATIN) suspension 500,000 Units  500,000 Units Oral 4x Daily   500,000 Units at 09/22/17 0906     sertraline (ZOLOFT) tablet 150 mg  150 mg Per J Tube Daily   150 mg at 09/22/17 0904     traZODone (DESYREL) tablet 50 mg  50 mg Per J Tube At Bedtime PRN   50 mg at 09/21/17 2106     cholecalciferol (vitamin D) tablet 1,000 Units  1,000 Units Oral Daily   1,000 Units at 09/22/17 0906     bacitracin ointment   Topical BID         ALPRAZolam (XANAX) tablet 0.25 mg  0.25 mg Per J Tube TID PRN   0.25 mg at 09/21/17 2106     alteplase (ACTIVASE) 10 mg, dornase alpha (PULMOZYME)  5 mg in sodium chloride (PF) 0.9% PF 50 mL for chest tube instillation in syringe  50 mL Chest Tube BID   50 mL at 09/22/17 0925     naloxone (NARCAN) injection 0.1-0.4 mg  0.1-0.4 mg Intravenous Q2 Min PRN         chlorhexidine (PERIDEX) 0.12 % solution 15 mL  15 mL Mouth/Throat Q12H   15 mL at 09/22/17 0930     ondansetron (ZOFRAN) injection 4 mg  4 mg Intravenous Q6H PRN   4 mg at 09/19/17 1902     HYDROmorphone (PF) (DILAUDID) injection 0.2 mg  0.2 mg Intravenous Q1H PRN   0.2 mg at 09/22/17 1138     glucose 40 % gel 15-30 g  15-30 g Oral Q15 Min PRN        Or     dextrose 50 % injection 25-50 mL  25-50 mL Intravenous Q15 Min PRN   25 mL at 09/20/17 0826    Or     glucagon injection 1 mg  1 mg Subcutaneous Q15 Min PRN         insulin aspart (NovoLOG) inj (RAPID ACTING)  1-4 Units Subcutaneous Q4H   1 Units at 09/22/17 0258     0.9% sodium chloride infusion   Intravenous Continuous 10 mL/hr at 09/21/17 1118       heparin sodium PF injection 5,000 Units  5,000 Units Subcutaneous Q8H   5,000 Units at 09/22/17 0630     No current [facility]-ordered outpatient prescriptions on file.       Review of Systems   The comprehensive review of systems is negative other than noted in the assessment/plan.    Physical Exam   Temp: 98.6  F (37  C) Temp src: Axillary BP: 106/52   Heart Rate: 77 Resp: 16 SpO2: 99 % O2 Device: Trach dome Oxygen Delivery: Other (Comments) (20lpm)  Vitals:    09/20/17 0400 09/21/17 0600 09/22/17 0600   Weight: 91.8 kg (202 lb 6.1 oz) 92.8 kg (204 lb 9.4 oz) 93.1 kg (205 lb 4 oz)     CONSTITUTIONAL: NAD, A&O to person and place, unclear if she understands the gravity of her situation. Calm and cooperative.  HEENT: NCAT, MMM  NECK: Supple  CARDIOVASCULAR: exam deferred  RESPIRATORY: NL respiratory effort on trachdome, congestion and cough noted, pt suctioning frequently during our conversation  GASTROINTESTINAL: exam deferred  MUSCULOSKELETAL: generalized anasarca. Moving freely in bed   SKIN: Warm and  intact. No concerning lesions or rashes on exposed skin surfaces   NEUROLOGIC: Appropriately responsive during interview  PSYCH: Affect congruent     Data   Results for orders placed or performed during the hospital encounter of 17 (from the past 24 hour(s))   ECHO COMPLETE WITH OPTISON    Narrative    [id number]  [facility]  [id number]  [id number]^PEREZ^FIORELLA^ARASH           [facility]  Echocardiography Laboratory  [address] [place] [id number]        Name: BEN PALOMINO  MRN: [id number]  : 1945  Study Date: 2017 02:19 PM  Age: 72 yrs  Gender: Female  Patient Location: [facility]  Reason For Study: Other, Please Specify in Comments  Ordering Physician: FIORELLA TODD  Performed By: Swetha Landyr RDCS     BSA: 2.0 m2  Height: 67 in  Weight: 204 lb  HR: 73  BP: 100/55 mmHg  _____________________________________________________________________________  __        Procedure  Complete Portable Echo Adult. Contrast Optison.  _____________________________________________________________________________  __        Interpretation Summary     The anterior wall was just about seen and probably contracts normally. The  other walls contract normally.  The visual ejection fraction is estimated at 50-55%.  The right ventricle is moderately dilated.  With contrast the right ventricular function may be severely reduced and also  in limited apical views.  The study was technically difficult.  _____________________________________________________________________________  __        Left Ventricle  The left ventricle is normal in size. There is mild concentric left  ventricular hypertrophy. E by E prime ratio is greater than 15, that likely  suggests increased left ventricular filling pressures. The visual ejection  fraction is estimated at 50-55%. Grade III or advanced diastolic dysfunction.  The anterior wall was just about seen and probably contracts normally. The  other walls contract  normally.     Right Ventricle  The right ventricle is moderately dilated. Right ventricular function cannot  be assessed due to poor image quality. With contrast the right ventricular  function may be severely reduced and also in limited apical views.     Atria  Normal left atrial size. Right atrium not well visualized. The right atrium  may be severely dilated. There is no color Doppler evidence of an atrial  shunt.     Mitral Valve  There is mild to moderate (1-2+) mitral regurgitation.        Tricuspid Valve  There is mild to moderate (1-2+) tricuspid regurgitation. The right  ventricular systolic pressure is approximated at 20.3 mmHg plus the right  atrial pressure.     Aortic Valve  The aortic valve is trileaflet. There is moderate trileaflet aortic sclerosis.  There is trace aortic regurgitation. No hemodynamically significant valvular  aortic stenosis.     Pulmonic Valve  There is trace pulmonic valvular regurgitation.     Vessels  The ascending aorta is Mildly dilated. Inferior vena cava not well visualized  for estimation of right atrial pressure.     Pericardium  There is no pericardial effusion.        Rhythm  Sinus rhythm was noted.  _____________________________________________________________________________  __  MMode/2D Measurements & Calculations     IVSd: 1.2 cm  LVIDd: 4.6 cm  LVIDs: 3.2 cm  LVPWd: 1.4 cm  FS: 29.6 %  EDV(Teich): 98.0 ml  ESV(Teich): 42.5 ml  LV mass(C)d: 238.7 grams  LV mass(C)dI: 117.1 grams/m2  Ao root diam: 3.3 cm  LA dimension: 4.4 cm  asc Aorta Diam: 4.0 cm  LA/Ao: 1.3        Doppler Measurements & Calculations  MV E max truman: 125.6 cm/sec  MV A max truman: 84.4 cm/sec  MV E/A: 1.5  MV dec time: 0.17 sec  TR max truman: 225.5 cm/sec  TR max P.3 mmHg  Medial E/e': 16.0              _____________________________________________________________________________  __        Report approved by: Pedro Waller 2017 03:58 PM      Glucose by meter   Result Value Ref Range     Glucose 114 (H) 70 - 99 mg/dL   CT Abdomen Pelvis w/o Contrast    Narrative    CT ABDOMEN AND PELVIS WITHOUT CONTRAST  9/21/2017 5:52 PM    HISTORY: Patient has persistent abdominal fluid collections. IR  repositioned left lower quadrant drain 9/14. Last uploaded CT 8/25  from outside hospital. Please evaluate fluid collections, drain  placement, etc.    TECHNIQUE: Scans obtained from the diaphragm through the pelvis  without oral or IV contrast. Radiation dose for this scan was reduced  using automated exposure control, adjustment of the mA and/or kV  according to patient size, or iterative reconstruction technique.    COMPARISON: CT chest dated 9/19/2017 from [facility],  CT abdomen and pelvis from [facility] dated  8/25/2017 and CT abdomen and pelvis dated 8/3/2017 from [facility].    FINDINGS: Right pleural fluid collection has nearly resolved since the  prior study. There is a right chest drainage tube, which is unchanged  since the prior CT chest. Bibasilar infiltrates versus atelectasis are  again noted. This is less on the right than on the prior study. On the  left, this appears slightly more extensive than the prior chest CT.  There is a small left pleural fluid collection. The heart is upper  normal size. There are coronary artery and aortic calcifications.  Enteric feeding tube is projected over the mediastinum and stomach  with the weighted tip in the gastric antral region.    No aggressive osseous lesions are seen. There are degenerative changes  in the spine. Left lateral transverse process fracture of L3 is again  noted and not significantly changed in alignment. Right hip  arthroplasty causes streak artifact limiting evaluation of the right  side of the pelvis.    Extensive edema is seen through the cutaneous adipose tissues, most  consistent with anasarca, appears to have worsened since the prior CT  abdomen dated 8/25/2017.    The  gallbladder is surgically absent. The liver, bilateral adrenal  glands and kidneys are grossly unremarkable other than stranding in  the adipose tissues most consistent with anasarca. There is a drainage  tube and a cystic structure seen anterior to the pancreas peripheral  to the greater curvature of the stomach. Large amount of fluid is  still seen within this collection. This has not significantly  decreased in size since the prior study dated 8/25/2007. It may  actually be slightly increased in size. Small amount of gas is seen  within collection adjacent to the drainage tube.    There is a fluid collection noted just peripheral to the spleen which  could be subcapsular fluid collection, but could also be external to  the spleen (this is similar to the prior study dated 8/25/2017).  Spleen is otherwise unremarkable.    No hydronephrosis, nephrolithiasis, hydroureter or ureteral calculus  is identified. Urinary bladder is decompressed from a Crawford catheter.    The bowel appears grossly of normal caliber. It is difficult to  evaluate for inflammatory change given the significant stranding  throughout the mesenteric adipose tissues likely from anasarca.  Nonaneurysmal atherosclerosis is noted. No definite adenopathy is  seen. Other than the air within the likely fluid collection drained by  the drainage tube, no free air or extraluminal air seen in the  peritoneal cavity.      Impression    IMPRESSION:  1. Large fluid collection peripheral to the greater curvature of the  stomach may be slightly increased in size since the prior studies  dated 8/25/2017 and 9/19/2017. It is new or significantly increased in  size as compared to the prior study dated 8/3/2017. It now contains a  small amount of gas which could be iatrogenic. There appears to be a  pigtail drainage tube within this fluid collection.  2. Fluid collection anterior and lateral to the spleen similar in  appearance to the prior studies.  3. Worsening  anasarca.  4. Significantly decreased size right pleural fluid collection since  recent prior CT chest likely due to drainage from the drainage tube  which is seen in the posterior aspect of the right hemithorax.  5. Worsening left basilar infiltrate or atelectasis.  6. Right basilar infiltrate versus atelectasis is noted.  7. Mesenteric edema from anasarca limits evaluation of the abdomen and  pelvis for inflammatory change.    DAVIAN ADLER MD   Basic metabolic panel   Result Value Ref Range    Sodium 135 133 - 144 mmol/L    Potassium 4.6 3.4 - 5.3 mmol/L    Chloride 104 94 - 109 mmol/L    Carbon Dioxide 22 20 - 32 mmol/L    Anion Gap 9 3 - 14 mmol/L    Glucose 121 (H) 70 - 99 mg/dL    Urea Nitrogen 49 (H) 7 - 30 mg/dL    Creatinine 1.63 (H) 0.52 - 1.04 mg/dL    GFR Estimate 31 (L) >60 mL/min/1.7m2    GFR Estimate If Black 38 (L) >60 mL/min/1.7m2    Calcium 8.2 (L) 8.5 - 10.1 mg/dL   Glucose by meter   Result Value Ref Range    Glucose 165 (H) 70 - 99 mg/dL   Glucose by meter   Result Value Ref Range    Glucose 146 (H) 70 - 99 mg/dL   Comprehensive metabolic panel   Result Value Ref Range    Sodium 135 133 - 144 mmol/L    Potassium 4.6 3.4 - 5.3 mmol/L    Chloride 104 94 - 109 mmol/L    Carbon Dioxide 21 20 - 32 mmol/L    Anion Gap 10 3 - 14 mmol/L    Glucose 123 (H) 70 - 99 mg/dL    Urea Nitrogen 49 (H) 7 - 30 mg/dL    Creatinine 1.65 (H) 0.52 - 1.04 mg/dL    GFR Estimate 31 (L) >60 mL/min/1.7m2    GFR Estimate If Black 37 (L) >60 mL/min/1.7m2    Calcium 8.2 (L) 8.5 - 10.1 mg/dL    Bilirubin Total 0.4 0.2 - 1.3 mg/dL    Albumin 1.9 (L) 3.4 - 5.0 g/dL    Protein Total 6.6 (L) 6.8 - 8.8 g/dL    Alkaline Phosphatase 154 (H) 40 - 150 U/L    ALT 21 0 - 50 U/L    AST 29 0 - 45 U/L   CBC (1200)   Result Value Ref Range    WBC 17.2 (H) 4.0 - 11.0 10e9/L    RBC Count 2.39 (L) 3.8 - 5.2 10e12/L    Hemoglobin 7.0 (L) 11.7 - 15.7 g/dL    Hematocrit 21.4 (L) 35.0 - 47.0 %    MCV 90 78 - 100 fl    MCH 29.3 26.5 - 33.0 pg     MCHC 32.7 31.5 - 36.5 g/dL    RDW 16.2 (H) 10.0 - 15.0 %    Platelet Count 299 150 - 450 10e9/L   Magnesium (1200)   Result Value Ref Range    Magnesium 1.7 1.6 - 2.3 mg/dL   Phosphorus   Result Value Ref Range    Phosphorus 4.6 (H) 2.5 - 4.5 mg/dL   Procalcitonin   Result Value Ref Range    Procalcitonin 0.45 ng/ml   CRP inflammation   Result Value Ref Range    CRP Inflammation 170.0 (H) 0.0 - 8.0 mg/L   Lactate Dehydrogenase   Result Value Ref Range    Lactate Dehydrogenase 337 (H) 81 - 234 U/L   Glucose by meter   Result Value Ref Range    Glucose 118 (H) 70 - 99 mg/dL   Creatinine fluid   Result Value Ref Range    Creatinine Fluid Source Abdominal Fluid     Creatinine Fluid 1.6 mg/dL   Glucose by meter   Result Value Ref Range    Glucose 102 (H) 70 - 99 mg/dL   US Abdomen Limited    Narrative    ULTRASOUND ABDOMEN LIMITED   9/22/2017 12:46 PM     HISTORY: Assess liver/bile duct for signs of pathology.    COMPARISON: CT 9/21/2017.    FINDINGS: Visualized portions of the pancreas are unremarkable. Trace  ascites is noted in the upper abdomen. Liver size within normal  limits. No intrahepatic biliary ductal dilatation. Common bile duct  measures 5 mm. The gallbladder is absent. No liver mass demonstrated.  Right kidney measures 10.4 cm in length and demonstrates abnormal  increased echogenicity. No hydronephrosis.    Percutaneous drain present in the left side of the abdomen.      Impression    IMPRESSION: No biliary dilation. The gallbladder is absent. Small  amount of ascites noted in the upper abdomen. Abnormal increased  echogenicity of the right kidney likely related to medical renal  disease.

## 2017-09-23 NOTE — PROGRESS NOTES
[facility]    Cardiology Progress Note     Assessment & Plan   Ara Stanley is a 72 year old female who was admitted on 9/19/2017.     Summary:     1.  Cor pulmonale with right ventricular dysfunction  2.  Elevated left-sided filling pressures  3.  Recurrent pleural effusions and empyema.   4.  Pseudomonas pneumonia.   5.  Anemia, most recent hemoglobin of 7.   6.  Hypoalbuminemia, recent level 1.9.   7.  Paroxysmal atrial fibrillation, now back to sinus rhythm.  8.  Acute kidney injury  9. Hypertension  10. Hyperlipidema    - agree with provision of IV Lasix       --- goal diuresis: negative 2L daily      --- monitor renal function, UOP lytes carefully   - avoid excess hypertension  - rate control atrial arrhythmia rates as needed  - agree with withholding CTA chest imaging given risk of renal dysfunction  - agree with withholding anticoagulation  - rest of cares per ICU    Thank you for this consultation.  We will sign off.      Adithya Ibarra MD    Interval History   Patient reports feeling comfortable.  Is in good spirits.      Physical Exam   Temp: (P) 97.9  F (36.6  C) Temp src: (P) Oral BP: 93/61   Heart Rate: 97 Resp: 20 SpO2: 99 % O2 Device: Mechanical Ventilator    Vitals:    09/20/17 0400 09/21/17 0600 09/22/17 0600   Weight: 91.8 kg (202 lb 6.1 oz) 92.8 kg (204 lb 9.4 oz) 93.1 kg (205 lb 4 oz)     Vital Signs with Ranges  Temp:  [97.9  F (36.6  C)-98.6  F (37  C)] (P) 97.9  F (36.6  C)  Heart Rate:  [] 97  Resp:  [10-34] 20  BP: ()/(47-97) 93/61  FiO2 (%):  [35 %] 35 %  SpO2:  [93 %-100 %] 99 %  I/O last 3 completed shifts:  In: 1435 [I.V.:260; NG/GT:180]  Out: 2682 [Urine:1760; Drains:282; Chest Tube:640]  Patient Active Problem List   Diagnosis     Osteoarthritis of hip     Non union subtrochanteric fracture right hip     COPD (chronic obstructive pulmonary disease) (H)     Hyperlipidemia     Hypertension     Anemia due to blood loss, acute     Abdominal pain      Acute hypercapnic respiratory failure (H)     Empyema (H)       General:  Unwell appearing friendly, sensorium clear, cognition intact, friendly, cooperative.  HEENT:  No major abnormalities.  Vessels:  Elevated JVP.  No carotid bruits.  Normal carotid upstrokes.  Heart:  Normal S1, split S2. + systolic flow murmur.  No S3 or S4.  + RV lift    Lungs:  Clear to auscultation bilaterally.  + rhonchus breath sounds bilaterally  Adequate air movement.  Breathing comfortably.  Abdomen:  Soft, nontender, nondistended.   Extremities:  + anasarca, Normal perfusion.    Medications     NaCl 10 mL/hr at 09/21/17 1118       pantoprazole  40 mg Oral or Feeding Tube Daily     ceFEPIme (MAIXPIME) IV  2 g Intravenous Q12H     furosemide  60 mg Intravenous Q8H     albumin human  12.5 g Intravenous Q8H     protein modular  1 packet Per Feeding Tube Q12H     FIRST-VANCOMYCIN 50  125 mg Oral BID     atorvastatin (LIPITOR) tablet 10 mg  10 mg Oral QPM     budesonide  0.5 mg Nebulization BID     busPIRone (BUSPAR) tablet 7.5 mg  7.5 mg Oral BID     calcium carbonate  500 mg Oral TID     ipratropium - albuterol 0.5 mg/2.5 mg/3 mL  1 vial Nebulization 4x Daily     nystatin  500,000 Units Oral 4x Daily     sertraline (ZOLOFT) tablet 150 mg  150 mg Per J Tube Daily     cholecalciferol  1,000 Units Oral Daily     bacitracin   Topical BID     alteplase (ACTIVASE) 10 mg and dornase 5 mg in NS syringe for chest tube instillation  50 mL Chest Tube BID     chlorhexidine  15 mL Mouth/Throat Q12H     insulin aspart  1-4 Units Subcutaneous Q4H     heparin  5,000 Units Subcutaneous Q8H       Data     Recent Labs  Lab 09/23/17  0455 09/22/17  1738 09/22/17  0445 09/21/17  1820 09/21/17  0430 09/20/17  0030   WBC 11.0  --  17.2*  --  10.7 12.6*   HGB 7.0* 7.3* 7.0*  --  7.2* 7.6*   MCV 90  --  90  --  88 91     --  299  --  282 273   INR  --   --   --   --   --  1.35*     --  135 135 133 131*   POTASSIUM 4.3  --  4.6 4.6 4.5 4.6   CHLORIDE  105  --  104 104 101 99   CO2 22  --  21 22 21 24   BUN 57*  --  49* 49* 45* 45*   CR 1.77*  --  1.65* 1.63* 1.64* 1.49*   ANIONGAP 10  --  10 9 11 8   CARYL 8.0*  --  8.2* 8.2* 8.3* 8.3*   *  --  123* 121* 74 75   ALBUMIN  --   --  1.9*  --   --  1.2*   PROTTOTAL  --   --  6.6*  --   --  6.9   BILITOTAL  --   --  0.4  --   --  0.3   ALKPHOS  --   --  154*  --   --  187*   ALT  --   --  21  --   --  22   AST  --   --  29  --   --  30     Recent Results (from the past 24 hour(s))   IR Abscess Tube Change    [facility]    INTERVENTIONAL RADIOLOGY IR ABSCESS TUBE CHANGE9/22/2017 11:20 AM    HISTORY:  Left lower extremity abscess with drain in place. Decreased  output from the drain.    MEDICATION: 7 mL 1% lidocaine.     CONTRAST: 45 mL Isovue     FLUOROSCOPY TIME: .9 minutes    TOTAL FLUOROSCOPY DOSE: 39 mGy (air kerma)    TECHNIQUE/FINDINGS:  Informed consent was obtained from the patient. A  timeout was performed.  A  image was obtained which shows a  locking pigtail catheter in the left lower quadrant. Contrast was  injected which shows the tube is patent, however there is not filling  of the large abscess. The existing catheter was cut and removed over a  wire. Using combination of catheter and wire multiple septations were  broken up within the fluid collection. Over the wire a new 14 Bulgarian  pigtail catheter was advanced into the fluid collection. Until there  is return of yellow gelatinous fluid. The pigtail was locked and  connected to a FUNMI bulb. A sterile dressing was applied.      Impression    IMPRESSION:  Successful left lower quadrant drain exchange and upsize.   US Abdomen Limited    Narrative    ULTRASOUND ABDOMEN LIMITED   9/22/2017 12:46 PM     HISTORY: Assess liver/bile duct for signs of pathology.    COMPARISON: CT 9/21/2017.    FINDINGS: Visualized portions of the pancreas are unremarkable. Trace  ascites is noted in the upper abdomen. Liver size within normal  limits. No intrahepatic  biliary ductal dilatation. Common bile duct  measures 5 mm. The gallbladder is absent. No liver mass demonstrated.  Right kidney measures 10.4 cm in length and demonstrates abnormal  increased echogenicity. No hydronephrosis.    Percutaneous drain present in the left side of the abdomen.      Impression    IMPRESSION: No biliary dilation. The gallbladder is absent. Small  amount of ascites noted in the upper abdomen. Abnormal increased  echogenicity of the right kidney likely related to medical renal  disease.    ALBERTA AJ MD

## 2017-09-23 NOTE — PROGRESS NOTES
Patient alert. Nods appropriately. Agitated overnight but says she was just tired. Afebrile. Pupils ERRL.  CMV 35% trach vent. . Peep 5. Rate 12.  Lung sounds coarse throughout. O2 sats >96  A fib, HR between .  Blood pressure WNL.  Crawford in place. U/0 >100/HR  24 hour urine test in place. Started at midnight.  3 BM. C Diff + on recheck.  3-4+ pitting edema throughout.  Tube feed @45 goal rate.   IV benadryl given overnight for complaints of not being able to sleep.  No concerns at this time. Plan of care reviewed with patient. Will give report to oncoming nurse.

## 2017-09-23 NOTE — PROGRESS NOTES
Pt was on trach dome during the day for 2hrs and then placed on full support. Neb treatments given as ordered.    Ventilation Mode: CMV/AC  FiO2 (%): 35 %  Rate Set (breaths/minute): 12 breaths/min  Tidal Volume Set (mL): 400 mL  PEEP (cm H2O): 5 cmH2O  Pressure Support (cm H2O): 10 cmH2O  Oxygen Concentration (%): 35 %  Resp: 20  Will continue to follow.     9/23/2017  Aleksandra York RT

## 2017-09-23 NOTE — PLAN OF CARE
Problem: Goal Outcome Summary  Goal: Goal Outcome Summary  Outcome: Improving  Transitioned to trach dome during the shift. Continues with moderate secretions suctioning frequently. HR in and out of Afib rate controlled. BP stable. +3pitting edema. TF at goal. Diuresing with lasix. 24hr urine collection will finish tonight. Being treated for Cdiff, x2 BMs this shift. Updated on care plan, all questions answered.

## 2017-09-23 NOTE — PROGRESS NOTES
THORACIC SURGERY    6 doses of lytics done  Less drainage    CT chest this am    Continue CT suction for now    PAN ANDRADE MD [facility] ONCOLOGY THORACIC SURGERY  CELL:  [phone]  OFFICE: [phone]

## 2017-09-23 NOTE — PLAN OF CARE
Problem: Respiratory Insufficiency (Adult)  Goal: Identify Related Risk Factors and Signs and Symptoms  Related risk factors and signs and symptoms are identified upon initiation of Human Response Clinical Practice Guideline (CPG)   Outcome: Improving  Improving throughout day.  Some anxiety but tolerated trach dome for several hours.  Lungs sounds clear.  Dangled at bedside for approx 15mins and tolerated well.  CT output as charted.  Minimal abd FUNMI drain output after replacement in IR.  Converted to DOMINGA rates 120-160 at 930PM.  Slowed but not converted with 1x metoprolol.

## 2017-09-23 NOTE — PROGRESS NOTES
[facility]  Comprehensive Daily ICU Note        Ara Stanley MRN# [id number]   Age: 72 year old YOB: 1945     Date of Admission: (Not on file)    Primary care provider: Shar James     CODE STATUS: FULL      Problem List:     Empyema undergoing intrapleural lytic therapy         Treatment goals for next 24 hours:     Trial of Tpa/ fibrinolyteic in chest tube   Continue trach dome during the day  Antibiotics as directed by ID          Subjective/ Last 24 hours:   Interval history    Continues with intrapleural lytics.   No other major events.            Mechanical Ventilation/Vitalsigns/IsandOs:     Temp: 99  F (37.2  C) Temp src: Axillary BP: 106/51   Heart Rate: 93 Resp: 18 SpO2: 99 % O2 Device: Trach dome Oxygen Delivery: Other (Comments) (30)  Ventilation Mode: CPAP/PS  FiO2 (%): 35 %  Rate Set (breaths/minute): 12 breaths/min  Tidal Volume Set (mL): 400 mL  PEEP (cm H2O): 5 cmH2O  Pressure Support (cm H2O): 5 cmH2O  Oxygen Concentration (%): 35 %  Resp: 18    Chest tube: minimal fluid in cavity, no air leak      Intake/Output Summary (Last 24 hours) at 09/23/17 1744  Last data filed at 09/23/17 1600   Gross per 24 hour   Intake             2515 ml   Output             4164 ml   Net            -1649 ml              Physical Examination:     General: Stated age, chronically ill  HEENT: trach tube with a cap, phonates well, weak cough  Lungs: LCTAB anteriorly  CVS: RRR, large bandage over the right chest  Abdomen: large bandage over the left side of the abdomen  Extremities/musculoskeletal: diffuse anasarca, foot drop bilaterally  Neurology: alert and oriented to person and place, time not assessed, non focal  Skin: fragile  Psychiatry: calm and appropriate but seems forgetful  Exam of Line sites:  Right midline IV looks OK     Hospital Procedures     9/14- Right sided chest tube, pseudomonas.   9/19- transferred from [facility] to [facility] ICU to evaluate for thoracic  surgery intervention.         Feeding/Glucose:   Dysphagia diet    Blood glucose/Insulin requirement last 24 hours: none         Medications:       pantoprazole  40 mg Oral or Feeding Tube Daily     ceFEPIme (MAIXPIME) IV  2 g Intravenous Q12H     furosemide  60 mg Intravenous Q8H     albumin human  12.5 g Intravenous Q8H     protein modular  1 packet Per Feeding Tube Q12H     FIRST-VANCOMYCIN 50  125 mg Oral BID     atorvastatin (LIPITOR) tablet 10 mg  10 mg Oral QPM     budesonide  0.5 mg Nebulization BID     busPIRone (BUSPAR) tablet 7.5 mg  7.5 mg Oral BID     calcium carbonate  500 mg Oral TID     ipratropium - albuterol 0.5 mg/2.5 mg/3 mL  1 vial Nebulization 4x Daily     nystatin  500,000 Units Oral 4x Daily     sertraline (ZOLOFT) tablet 150 mg  150 mg Per J Tube Daily     cholecalciferol  1,000 Units Oral Daily     bacitracin   Topical BID     alteplase (ACTIVASE) 10 mg and dornase 5 mg in NS syringe for chest tube instillation  50 mL Chest Tube BID     chlorhexidine  15 mL Mouth/Throat Q12H     insulin aspart  1-4 Units Subcutaneous Q4H     heparin  5,000 Units Subcutaneous Q8H         NaCl 10 mL/hr at 09/21/17 1118               Labs/Diagnostic studies:         Recent Labs  Lab 09/23/17  1300 09/23/17  0455 09/22/17  0445 09/21/17  1820 09/21/17  0430 09/20/17  0030   NA  --  137 135 135 133 131*   POTASSIUM 4.7 4.3 4.6 4.6 4.5 4.6   CHLORIDE  --  105 104 104 101 99   CO2  --  22 21 22 21 24   ANIONGAP  --  10 10 9 11 8   GLC  --  109* 123* 121* 74 75   BUN  --  57* 49* 49* 45* 45*   CR  --  1.77* 1.65* 1.63* 1.64* 1.49*   GFRESTIMATED  --  28* 31* 31* 31* 34*   GFRESTBLACK  --  34* 37* 38* 37* 42*   ACRYL  --  8.0* 8.2* 8.2* 8.3* 8.3*   MAG  --   --  1.7  --   --  1.8   PHOS  --   --  4.6*  --   --  4.5   PROTTOTAL  --   --  6.6*  --   --  6.9   ALBUMIN  --   --  1.9*  --   --  1.2*   BILITOTAL  --   --  0.4  --   --  0.3   ALKPHOS  --   --  154*  --   --  187*   AST  --   --  29  --   --  30   ALT  --    --  21  --   --  22     CBC    Recent Labs  Lab 09/23/17  0455 09/22/17  1738 09/22/17  0445 09/21/17  0430 09/20/17  0030   WBC 11.0  --  17.2* 10.7 12.6*   RBC 2.39*  --  2.39* 2.48* 2.57*   HGB 7.0* 7.3* 7.0* 7.2* 7.6*   HCT 21.4*  --  21.4* 21.8* 23.4*   MCV 90  --  90 88 91   MCH 29.3  --  29.3 29.0 29.6   MCHC 32.7  --  32.7 33.0 32.5   RDW 16.3*  --  16.2* 15.9* 16.4*     --  299 282 273     INR    Recent Labs  Lab 09/20/17  0030   INR 1.35*     Arterial Blood GasNo lab results found in last 7 days.    Cultures:  No results for input(s): CULT in the last 168 hours.  Blood culture:  Invalid input(s): BC   Urine culture:  No results for input(s): URC in the last 168 hours.          Imaging:     CXR:  Outpatient film showed re accumulation of the pleural effusion on the right    Chest CT: 1. Surgical changes of the chest with a history of a left lung wedge resection. 2. Moderate-sized right pleural effusion with no pneumothorax seen. A  right-sided chest tube is present. 3. Mild consolidation of the right lung base with atelectasis of the left lung base.         Assessment and Plan:     Summary:  Ara Stanley is a 72 year old female admitted on 9/19 for evaluation of empyema.  I have personally reviewed the daily labs, imaging studies, cultures and discussed the case with referring physician and consulting physicians. My assessment and plan as follows:    Neurology and Psychiatry:  Pain control with   Hydromorphone    Pulmonary:   1. Prolonged respiratory failure after hospitalization in June.  Now to the point where she is on trach dome during the day but pressure support at night.   - pleural effusion to resolveprior to decannulation  - Continue trach do me during the day and pressure support at night.    2. Empyema and recent pseudomonal pneuomina: pleural fluid growing pseudomonas on cultures drawn on 9/14.    - tPA/Mucolytic BID and follow for improvement    Cardiovascular system:   No acute  issues    Renal/Electrolytes:  Creatinine up from last baseline. Unclear how much of this is worsening function vs. Increased muscle mass?  Stable so far. UOP adequate.   - Monitor I/O  - UA, Urine eosinophils    ID:  Multiple abscesses and infections since June. Most recently had a pseudomonal pneumonia with empyema. Pleural fluid still growing pseudomonas. Has completed a course of Piperacillin/Tazobactam already.  -  Now on Cefipine  - Chest tube management as above.     GI/:  No acute issues but recent large GI bleed.  - tube feeds    Nutrition:   Malnourished  - Tube feeds and dysphagia diet    Musculoskeletal/Rheumatology:  Deconditioned  - PT    Endocrine:   No acute issues  - glucose checks    Heme/Onc:  - Chronic anemia  - Check iron studies.     ICU prophylaxis:      DVT: heparin     VAP: As above     Stress ulcer: PPI     Restraints needed: will assess     Lines   Central Line/PICC - placed at OSH needed: y   Arterial line - placed n needed: n   Crawford catheter.  Needed: y   Chest tube and abdominal drains    Family update: spoke with patient herself    cct 30 minutes      Ricky Bryan

## 2017-09-24 NOTE — PROGRESS NOTES
Patient remained on full vent support throughout the night:  Ventilation Mode: CMV/AC  FiO2 (%): 35 %  Rate Set (breaths/minute): 12 breaths/min  Tidal Volume Set (mL): 400 mL  PEEP (cm H2O): 5 cmH2O  Pressure Support (cm H2O): 5 cmH2O  Oxygen Concentration (%): 35 %  Resp: 22    Will continue to monitor as ordered.    Cynthia Correia RT  9/24/2017

## 2017-09-24 NOTE — PLAN OF CARE
Problem: Goal Outcome Summary  Goal: Goal Outcome Summary  Awake and alert. Please see event note for chest tube drainage, as of this evening now draining ~30ml/hr has decreased and now less bloody more serosang. On trach dome for a few hrs back on PS this evening. OOB to chair via lift today, hair washed. Vitals stable, good urine output. Tolerating TF, x2 BMs.  Evening Hgb 8.3. No visitors today, patient updated on plan of care and all questions answered.

## 2017-09-24 NOTE — PROGRESS NOTES
Patient alert. Nods appropriately.calm overnight. Afebrile. Pupils ERRL.  CMV 35% trach vent. . Peep 5. Rate 12.  Lung sounds coarse throughout. O2 sats >96  NSR HR 80-90  Blood pressure WNL.  Crawford in place. U/0 >100/HR  24 hour urine test completed and sent   3 BM. Watery brown incontinenet  3-4+ pitting edema throughout.  Tube feed @45 goal rate.   Chesttube output approximately 500 serous.  IV benadryl given overnight for complaints of not being able to sleep.  No concerns at this time. Plan of care reviewed with patient. Will give report to oncoming nurse.

## 2017-09-24 NOTE — SIGNIFICANT EVENT
Notified MD of large bloody drainage in chest tube ~750ml in one hour. Resuscitated with 500ml Albumin, 1L NS, gave Vit K and DDAVP, 2 RBC. Chest tube output slowed down. Dr. Bryan at bedside to assess.

## 2017-09-24 NOTE — PROGRESS NOTES
THORACIC SURGERY    CT output 1200 ml last 24 hrs  CXR r pleural space well drained    Continue CT suction for now    PAN ANDRADE MD [facility] ONCOLOGY THORACIC SURGERY  CELL:  [phone]  OFFICE: [phone]

## 2017-09-24 NOTE — SIGNIFICANT EVENT
Called by RN about bloodier return form Chest tube after being unclamped post IP lytics    Discussed with Thoracic Surgery     systolic HR 80's    Plan    D/C Intrapleural lytic  Get 2 large bore IV's. Has Midline in place.  Transfuse 2 PRBC's  Coags  CBC Q 4, Hct in pleural fluid.  ddAVP  Keep chest tube to suction.   Volume resuscitation as needed     Ricky Bryan

## 2017-09-24 NOTE — PROGRESS NOTES
"Pt was on trach dome 30L 35% from 0800 to 1245. Pt was then placed back on full vent support due to increased amount of  bloody drainage from chest tube. Pt was then placed on PS 5/5 at 1645. Pt was trialed on trach dome again, but pt stated, \"My breathing feels heavy.\" Placed back on PS 5/5, tolerating well. Trach cares done. All nebs given as ordered.  Ventilation Mode: CPAP/PS  FiO2 (%): 35 %  Rate Set (breaths/minute): 12 breaths/min  Tidal Volume Set (mL): 400 mL  PEEP (cm H2O): 5 cmH2O  Pressure Support (cm H2O): 5 cmH2O  Oxygen Concentration (%): 35 %  Resp: 21   Will cont PS as tolerated and assess for trach dome trial in the morning.  9/24/2017  Pallavi Hammond RRT        "

## 2017-09-24 NOTE — PROGRESS NOTES
[facility]  Comprehensive Daily ICU Note        Ara Stanley MRN# [id number]   Age: 72 year old YOB: 1945     Date of Admission: (Not on file)    Primary care provider: Shar James     CODE STATUS: FULL      Problem List:     Empyema undergoing intrapleural lytic therapy         Treatment goals for next 24 hours:     Trial of  fibrinolyteic in chest tube   Continue trach dome during the day  Antibiotics as directed by ID  Panculture          Subjective/ Last 24 hours:   Interval history    No major change           Mechanical Ventilation/Vitalsigns/IsandOs:     Temp: 98.1  F (36.7  C) Temp src: Oral BP: 119/55   Heart Rate: 84 Resp: 21 SpO2: 98 % O2 Device: Trach dome Oxygen Delivery: Other (Comments) (30L)  Ventilation Mode: CMV/AC  FiO2 (%): 35 %  Rate Set (breaths/minute): 12 breaths/min  Tidal Volume Set (mL): 400 mL  PEEP (cm H2O): 5 cmH2O  Pressure Support (cm H2O): 5 cmH2O  Oxygen Concentration (%): 35 %  Resp: 21    Chest tube: minimal fluid in cavity, no air leak      Intake/Output Summary (Last 24 hours) at 09/24/17 0939  Last data filed at 09/24/17 0800   Gross per 24 hour   Intake             1670 ml   Output             3804 ml   Net            -2134 ml              Physical Examination:     General: Stated age, chronically ill  HEENT: trach tube with a cap, phonates well, weak cough  Lungs: LCTAB anteriorly  CVS: RRR, large bandage over the right chest  Abdomen: large bandage over the left side of the abdomen  Extremities/musculoskeletal: diffuse anasarca, foot drop bilaterally  Neurology: alert and oriented to person and place, time not assessed, non focal  Skin: fragile  Psychiatry: calm and appropriate but seems forgetful  Exam of Line sites:  Right midline IV looks OK     Hospital Procedures     9/14- Right sided chest tube for  Pseudomonas empyema.   9/19- transferred from [facility] to [facility] ICU to evaluate for thoracic surgery intervention.          Feeding/Glucose:   Dysphagia diet    Blood glucose/Insulin requirement last 24 hours: none         Medications:       pantoprazole  40 mg Oral or Feeding Tube Daily     ceFEPIme (MAIXPIME) IV  2 g Intravenous Q12H     furosemide  60 mg Intravenous Q8H     protein modular  1 packet Per Feeding Tube Q12H     FIRST-VANCOMYCIN 50  125 mg Oral BID     atorvastatin (LIPITOR) tablet 10 mg  10 mg Oral QPM     budesonide  0.5 mg Nebulization BID     busPIRone (BUSPAR) tablet 7.5 mg  7.5 mg Oral BID     calcium carbonate  500 mg Oral TID     ipratropium - albuterol 0.5 mg/2.5 mg/3 mL  1 vial Nebulization 4x Daily     nystatin  500,000 Units Oral 4x Daily     sertraline (ZOLOFT) tablet 150 mg  150 mg Per J Tube Daily     cholecalciferol  1,000 Units Oral Daily     bacitracin   Topical BID     alteplase (ACTIVASE) 10 mg and dornase 5 mg in NS syringe for chest tube instillation  50 mL Chest Tube BID     chlorhexidine  15 mL Mouth/Throat Q12H     insulin aspart  1-4 Units Subcutaneous Q4H     heparin  5,000 Units Subcutaneous Q8H         NaCl 10 mL/hr at 09/23/17 2100               Labs/Diagnostic studies:         Recent Labs  Lab 09/24/17  0840 09/23/17  1300 09/23/17  0455 09/22/17  0445 09/21/17  1820  09/20/17  0030     --  137 135 135  < > 131*   POTASSIUM 4.4 4.7 4.3 4.6 4.6  < > 4.6   CHLORIDE 103  --  105 104 104  < > 99   CO2 23  --  22 21 22  < > 24   ANIONGAP 10  --  10 10 9  < > 8   *  --  109* 123* 121*  < > 75   BUN 63*  --  57* 49* 49*  < > 45*   CR 1.87*  --  1.77* 1.65* 1.63*  < > 1.49*   GFRESTIMATED 26*  --  28* 31* 31*  < > 34*   GFRESTBLACK 32*  --  34* 37* 38*  < > 42*   CARYL 8.1*  --  8.0* 8.2* 8.2*  < > 8.3*   MAG  --   --   --  1.7  --   --  1.8   PHOS  --   --   --  4.6*  --   --  4.5   PROTTOTAL 6.6*  --   --  6.6*  --   --  6.9   ALBUMIN 2.1*  --   --  1.9*  --   --  1.2*   BILITOTAL 0.4  --   --  0.4  --   --  0.3   ALKPHOS 143  --   --  154*  --   --  187*   AST 16  --   --  29  --   --   30   ALT 16  --   --  21  --   --  22   < > = values in this interval not displayed.  CBC    Recent Labs  Lab 09/24/17  0840 09/23/17  0455 09/22/17  1738 09/22/17  0445 09/21/17  0430   WBC 12.7* 11.0  --  17.2* 10.7   RBC 2.36* 2.39*  --  2.39* 2.48*   HGB 6.8* 7.0* 7.3* 7.0* 7.2*   HCT 21.0* 21.4*  --  21.4* 21.8*   MCV 89 90  --  90 88   MCH 28.8 29.3  --  29.3 29.0   MCHC 32.4 32.7  --  32.7 33.0   RDW 16.6* 16.3*  --  16.2* 15.9*    300  --  299 282     INR    Recent Labs  Lab 09/20/17  0030   INR 1.35*     Arterial Blood GasNo lab results found in last 7 days.    Cultures:  No results for input(s): CULT in the last 168 hours.  Blood culture:  Invalid input(s): BC   Urine culture:  No results for input(s): URC in the last 168 hours.          Imaging:     CXR:  Outpatient film showed re accumulation of the pleural effusion on the right    Chest CT: 1. Surgical changes of the chest with a history of a left lung wedge resection. 2. Moderate-sized right pleural effusion with no pneumothorax seen. A  right-sided chest tube is present. 3. Mild consolidation of the right lung base with atelectasis of the left lung base.         Assessment and Plan:     Summary:  Ara Stanley is a 72 year old female admitted on 9/19 for evaluation of empyema.  I have personally reviewed the daily labs, imaging studies, cultures and discussed the case with referring physician and consulting physicians. My assessment and plan as follows:    Neurology and Psychiatry:  Pain control with   Hydromorphone    Pulmonary:   1. Prolonged respiratory failure after hospitalization in June.  Now is on trach dome during the day but pressure support at night.   - Continue trach do me during the day and pressure support at night.    - Could proceed with weaning at LTACH  2. Empyema and recent pseudomonal pneuomina: pleural fluid growing pseudomonas on cultures drawn on 9/14.    - tPA/Mucolytic BID and follow for improvement  - Increasing  WBC.    Cardiovascular system:   No acute issues    Renal/Electrolytes:  Creatinine up from last baseline. Unclear how much of this is worsening function vs. Increased muscle mass?  Stable so far. UOP adequate.   - Monitor I/O      ID:  Multiple abscesses and infections since June. Most recently had a pseudomonal pneumonia with empyema. Pleural fluid still growing pseudomonas. Has completed a course of Piperacillin/Tazobactam already.  -  Now on Cefipine  - Chest tube management as above.     GI/:  No acute issues but recent large GI bleed.  - tube feeds    Nutrition:   Malnourished  - Tube feeds and dysphagia diet    Musculoskeletal/Rheumatology:  Deconditioned  - PT    Endocrine:   No acute issues  - glucose checks    Heme/Onc:  - Chronic anemia  - Check iron studies.     ICU prophylaxis:      DVT: heparin     VAP: As above     Stress ulcer: PPI     Restraints needed: will assess     Lines   Central Line/PICC - placed at OSH needed: y   Arterial line - placed n needed: n   Crawford catheter.  Needed: y   Chest tube and abdominal drains    Family update: spoke with patient herself    cct 30 minutes      Ricky Bryan

## 2017-09-24 NOTE — PLAN OF CARE
Problem: Goal Outcome Summary  Goal: Goal Outcome Summary  PT-Attempted to see. Nurse requested to hold today as patient recently had increased bloody drainage from chest tube. Will reschedule for tomorrow.

## 2017-09-25 NOTE — PLAN OF CARE
Problem: Goal Outcome Summary  Goal: Goal Outcome Summary  Discharge Planner PT   Patient plan for discharge: LTACH     Current status: Pt on vent upon therapist arrival. Pt agreeable and tolerated supine LE exercises in bed. VSS throughout session with pre /53, HR 92 and post /56, HR 94. Deferred OOB mobility during session d/t patient needing to go to CT scan.     Barriers to return to prior living situation: Level of assist, limited endurance, oxygen needs, chest tube     Recommendations for discharge: LTACH     Rationale for recommendations: Patient would continue to benefit from skilled physical therapy services to improve safety and tolerance of functional mobility.        Entered by: Veena Meraz 09/25/2017 12:39 PM

## 2017-09-25 NOTE — PROGRESS NOTES
Brief Palliative Care Note.    Chart reviewed. Notified by pt's nurse that her POA Courtney will be at the hospital tomorrow (9/26). Updated ICU NP Isabella, we will both plan to meet with Courtney tomorrow.     Rain MENJIVAR, JOHN  Pager: [phone]  Palliative Medicine  September 25, 2017

## 2017-09-25 NOTE — PROGRESS NOTES
THORACIC SURGERY    Pleural space well drained on today's CXR  Minimal CT output last 16 hrs (last dose lytics yesterday am)    CT chest today    If pleural space well drained, IR to d/c CT    PAN ANDRADE MD [facility] ONCOLOGY THORACIC SURGERY  CELL:  [phone]  OFFICE: [phone]

## 2017-09-25 NOTE — PROGRESS NOTES
Patient remains PS 5/5 35% throughout the night, tolerated well.     Ventilation Mode: CPAP/PS  FiO2 (%): 35 %  Rate Set (breaths/minute): 12 breaths/min  Tidal Volume Set (mL): 400 mL  PEEP (cm H2O): 5 cmH2O  Pressure Support (cm H2O): 5 cmH2O  Oxygen Concentration (%): 35 %  Resp: 16    No lab results found in last 7 days.    Patient will remains PS 5/5 as tolerated..    Cynthia Correia RT  9/25/2017

## 2017-09-25 NOTE — PROGRESS NOTES
[facility]    Critical Care Service  Progress Note    Date of Service (when I saw the patient): 09/25/2017     Assessment & Plan   Ara Stanley is a 72 year old female with complex past medical history and 4 month critical illness who was admitted on 9/19/2017 for management of right sided empyema. She was originally admitted 6/2 to [facility] with abdominal pain and had a lap alexandre, followed by abdominal perforation, ongoing respiratory failure requiring trach and PEG, and multiple pleural and abdominal infections.  Has remained in health care institution since initial admission 6/2/17. Now readmitted with large right pleural effusion/empyema, with cultures + for pseudomonas.       Main Plans for Today   Repeat CT chest abdomen to assess fluid collections  Continue to diurese. Goal -2L daily   [facility]   Care conference with Palliative Care and POA if available     Neuro  1. Anxiety/Depression   2. Acute delirium   3. Chronic pain   Plan:  -- Continue PTA Buspar and Zoloft. PRN trazodone at HS   --PRN dilaudid; acetaminophen for pain.  PTA med includes hydromorphone PO, PRN 1mg, Q4 hours   -- Patient with increased delirium today. Delirium prevention as able; attempt to maintain day/night schedule, sleep hygiene, minimize interruptions at HS to allow for uninterrupted sleep.     CV  1. HTN  2. New diastolic dysfunction on echo 9/21; EF 50-55%, RV dilation   Plan:  -- holding PTA metoprolol and hydralazine as normotensive without   --Cardiology consult given changes on echocardiogram and 50lb weight gain in 6 weeks. Continue diuresis. Continue      Resp:  1.  Prolonged respiratory failure after hospitalization in June.  Now to the point where she is on trach dome during the day but pressure support at night.  [facility] staff did not feel she was yet ready for decannulation; has not made significant progress this admission   2.  Empyema and recent pseudomonal pneuomina:  pleural fluid growing pseudomonas on cultures drawn on 9/14.   -- Continue trach dome as tolerated. If not tolerating, place on PS at HS.  Has been using PS 10/5 at HS.     --completed course of lytics in chest tube.  Had several additional doses over the weekend.  Episode of merrill blood from chest tube 9/24 and received 2 u PRBC, DDAVP, Vitamin K.  Bleeding resolved.  Now with serous output.  Repeat chest CT today. If significantly decreased, will have IR pull CT.     GI/Nutrition  1. S/p exploratory laparotomy, followed by acute abdomen with gastric perforation in June 2017, with subsequent persistent abdominal fluid collections with FUNMI drain placement (Dr. Peck). One FUNMI drain in LLQ remaining placed by IR.   2. Recurrent C.diff infection   3. PEG placement s/p prolonged respiratory failure   4. Recent acute GI bleed; admitted and managed at [facility]   5. Elevated LDH, alk phos.    Plan:  --Oral Vancomycin x 14 days for recurrent c. Diff infection.  Enteric iso. flexiseal today given large volume liquid stool   --Continue tube feeding; + additional protein given hypoalbuminemia, low protein intake, general anasarca.      --Continue IR placed FUNMI drain in abdominal fluid collection. Drain repositioned 9/22. Reevaluate fluid collection today with abdominal CT.  CT 9/21 shows increased fluid collection since drain replaced and since previous abdominal CT 8/25   --Flush J--Obtain RUQ abdominal  US P with 10ml NS daily.  Record I&Os Q shift.  IR JOHN Rosales [facility], ([phone])   --RUQ abdominal  US obtained given elevated LDH, alk phos.  No abnormalities.  Gallbladder surgically removed.      Renal  1. Acute vs chronic kidney injury.  Creatinine increased over baseline from last admission.  Admitted to [facility] end of August for acute GI bleed and appears to have sustained WOODY at that time.  Creatinine at time of discharge from [facility] 1.3-1.5 range.  Creatinine on admission 1.49>1.64>1.65 this  AM.    2. Generalized anasarca and 50lb weight gain since last admission   Plan:  --Continue diuresis with 60mg IV Q 8 hours. Daily weights. Starting to make some progress with diuresis   --Electrolyte replacement per protocol   --Repeat BMP daily; monitor closely for increasing creatinine/volume contraction     ID  1. Multiple abscesses and infections since June. Most recently had a pseudomonal pneumonia with empyema. Pleural fluid still growing pseudomonas. Persistent abdominal fluid collection; most recently with pseudomonas. Has completed a course of Piperacillin/Tazobactam 9/4 prior to admission.  Cefepime initiated on admission    2. C. Diff infection; recurrent   Plan:   --Stop Cefepime given diffuse rash.  Trial off antibiotics.   (Hx of penicillin allergy)    --Check procalcitonin.   --ID following   --oral vancomycin x 14 days for recurrent c.diff     Endocrine  1. No active issues   Plan:  -- BG checks per ICU protocol     Heme:  1. acute on chronic blood loss anemia. Recent GI bleed with hemoglobin as low as 5 upon admission to [facility].   2. Recent bleeding event from chest tube following tPA administration. Received 2 units PRBC 9/24. Hgb stable in mid-8 range   Plan:  -- Monitor hemoglobin. Transfuse to keep > 7.0    Skin  1. Generalized anasarca   2. Diffuse rash; new   Plan:  --continue diuresis   --Stop Cefepime.  Monitor for evolution of rash.  Diphenhydramine available, but will try to avoid given delirium     General cares:  DVT Prophylaxis: Heparin SQ  GI Prophylaxis: PPI  Restraints: Restraints for medical healing needed: NO  Family update by me today: Yes ; POA updated   Current lines are required for patient management  Access: PICC on right arm     Isabella Gutierrez    Time Spent on this Encounter   Billing:  I spent 45 minutes bedside and on the inpatient unit today managing the critical care of Ara Stanley in relation to the issues listed in this note.    Interval History    Course reviewed. Delirious and somewhat paranoid today. States breathing feels much better. Denies abdominal pain.  States she is sleeping well at night.  Will repeat CT chest and abdomen.  Contact IR about pulling chest tube if empyema significantly improved.  Begin working toward discharge planning.     Physical Exam   Temp: 98.8  F (37.1  C) Temp src: Oral Temp  Min: 98  F (36.7  C)  Max: 99  F (37.2  C) BP: 143/70   Heart Rate: 92 Resp: 21 SpO2: 98 % O2 Device: Mechanical Ventilator Oxygen Delivery: Other (Comments) (30LPM)  Vitals:    09/22/17 0600 09/24/17 0400 09/25/17 0600   Weight: 93.1 kg (205 lb 4 oz) 88 kg (194 lb 0.1 oz) 89.2 kg (196 lb 10.4 oz)     I/O last 3 completed shifts:  In: 3935 [I.V.:440; Other:50; NG/GT:230; IV Piggyback:1000]  Out: 3785 [Urine:2650; Drains:10; Chest Tube:1125]    GEN: awake, alert, delirious    EYES: PERRL, Anicteric sclera.   HEENT:  Normocephalic, atraumatic, trachea midline, trach secured, CPAP   CV: RRR, no gallops, rubs, or murmurs  PULM/CHEST: Clear breath sounds bilaterally without crackles or wheeze. Symmetric chest rise. Right chest tube dressing with small amount of dried drainage. CT output serous in tube.   GI: normal bowel sounds, soft, non-tender, no rebound tenderness or guarding, no masses. Severe generalized anasarca of abdomen   : tanner catheter in place, urine yellow and clear  EXTREMITIES: Significant anasarca, slightly improved in interval.  Flank edema, groin edema, peripheral edema. Moving all extremities, peripheral pulses intact  NEURO: Cranial nerves II-XII grossly intact, no motor-sensory deficits noted  SKIN: scattered ecchymosis, drains in right chest and right abdomen.    PSYCH:  Affect: Delirious; conversant, paranoid   Imaging personally reviewed: CXR   ECG:SR     Medications        FIRST-VANCOMYCIN 50  125 mg Oral 4x Daily     pantoprazole  40 mg Oral or Feeding Tube Daily     ceFEPIme (MAIXPIME) IV  2 g Intravenous Q12H     furosemide   60 mg Intravenous Q8H     protein modular  1 packet Per Feeding Tube Q12H     atorvastatin (LIPITOR) tablet 10 mg  10 mg Oral QPM     budesonide  0.5 mg Nebulization BID     busPIRone (BUSPAR) tablet 7.5 mg  7.5 mg Oral BID     calcium carbonate  500 mg Oral TID     ipratropium - albuterol 0.5 mg/2.5 mg/3 mL  1 vial Nebulization 4x Daily     nystatin  500,000 Units Oral 4x Daily     sertraline (ZOLOFT) tablet 150 mg  150 mg Per J Tube Daily     cholecalciferol  1,000 Units Oral Daily     bacitracin   Topical BID     chlorhexidine  15 mL Mouth/Throat Q12H     insulin aspart  1-4 Units Subcutaneous Q4H     heparin  5,000 Units Subcutaneous Q8H       Data     Recent Labs  Lab 09/25/17  0420 09/25/17  0335 09/24/17  1750 09/24/17  1125 09/24/17  0840 09/23/17  1300 09/23/17  0455  09/20/17  0030   WBC  --  11.3* 12.1* 12.7* 12.7*  --  11.0  < > 12.6*   HGB  --  8.3* 8.3* 6.6* 6.8*  --  7.0*  < > 7.6*   MCV  --  88 88 89 89  --  90  < > 91   PLT  --  321 237 337 341  --  300  < > 273   INR  --   --   --  1.58*  --   --   --   --  1.35*     --   --   --  136  --  137  < > 131*   POTASSIUM 4.3  --   --   --  4.4 4.7 4.3  < > 4.6   CHLORIDE 102  --   --   --  103  --  105  < > 99   CO2 24  --   --   --  23  --  22  < > 24   BUN 66*  --   --   --  63*  --  57*  < > 45*   CR 1.78*  --   --   --  1.87*  --  1.77*  < > 1.49*   ANIONGAP 9  --   --   --  10  --  10  < > 8   CARYL 8.2*  --   --   --  8.1*  --  8.0*  < > 8.3*   GLC 98  --   --   --  109*  --  109*  < > 75   ALBUMIN 2.1*  --   --   --  2.1*  --   --   < > 1.2*   PROTTOTAL 6.7*  --   --   --  6.6*  --   --   < > 6.9   BILITOTAL 0.4  --   --   --  0.4  --   --   < > 0.3   ALKPHOS 123  --   --   --  143  --   --   < > 187*   ALT 13  --   --   --  16  --   --   < > 22   AST 18  --   --   --  16  --   --   < > 30   < > = values in this interval not displayed.  Recent Results (from the past 24 hour(s))   XR Chest Port 1 View    Narrative    PORTABLE CHEST ONE VIEW    9/24/2017 11:52 AM     HISTORY: Chest tube.    COMPARISON: Earlier the same day.      Impression    IMPRESSION: Pigtail drain again seen at the right lung base. There is  increased blunting of the right costophrenic angle that may be due to  positioning versus increase of right pleural effusion. Moderate left  pleural effusion with associated atelectasis versus pneumonia is  unchanged. Tracheostomy tube and enteric tube remain in place. Cardiac  silhouette remains enlarged. New safety pin is seen over the left  chest, presumably external to the patient.    PHILLIP MILLS MD   XR Chest Port 1 View    Narrative    CHEST ONE VIEW PORTABLE   9/25/2017 4:55 AM     HISTORY: Evaluate for effusion.    COMPARISON: 9/24/2017.      Impression    IMPRESSION: Tracheostomy tube and enteric tube remain in place.  Unchanged left basilar infiltrate or atelectasis, likely with moderate  left pleural effusion. No significant right pleural effusion. No  pneumothorax. Slight increase of diffuse interstitial edema pattern.  Pigtail drain again seen at the right lung base.

## 2017-09-25 NOTE — PROGRESS NOTES
Care Transition Initial Assessment - RN        Met with: Patient.    DATA   Active Problems:    Empyema (H)       Cognitive Status: alert and oriented.        Contact information and PCP information verified: Yes    Lives With: alone  Living Arrangements: house       Insurance concerns: No Insurance issues identified    ASSESSMENT  Patient currently receives the following services:  Vent support, ICU cares        Identified issues/concerns regarding health management: Trach and Acute care needs      PLAN  Financial costs for the patient include none .  Patient given options and choices for discharge yes .  Patient/family is agreeable to the plan?  Yes:   Patient anticipates discharging to LTACH .        Patient anticipates needs for home equipment: Yes  Discharge Planner   Discharge Plans in progress: yes  Barriers to discharge plan: none  Plan/Disposition: LTACH   Appointments:         Care  (CTS) will continue to follow as needed.

## 2017-09-25 NOTE — PROGRESS NOTES
Interventional radiology,    Abscess drain still continues to have low output <5cc.  Consulted with Dr. Diann Irizarry.  Abscess drain has been upsized x2 with most recent done on 9/22.  Also, had maceration of thick substance with wire with previous exchanges done with Dr. Benavides and Dr. Irizarry.  Fluid is gelatinous.   At this point, pt will need surgical consideration.  Drain can not upsized anymore and clearly is ineffective. IR will not be able to offer treatment options.  If questions, please do not hesitate to call.  Ana Bray RN  IR nurse clinician  [phone]

## 2017-09-25 NOTE — PROGRESS NOTES
CLINICAL NUTRITION SERVICES - REASSESSMENT NOTE    EVALUATION OF PROGRESS TOWARD GOALS   Diet:  Clear Liquid, nectar thick   Nutrition Support:  Patient's goal TF regimen was changed on 9/21 and continues as follows ~    Nutrition Support Enteral:  Type of Feeding Tube: NG  Enteral Frequency:  Continuous  Enteral Regimen: Isosource 1.5 at 45 mL/hr  Total Enteral Provisions: 1620 kcal, 73 g protein, 820 mL H2O, 16 g fiber, 190 g CHO  Free Water Flush: 30 mL every 4 hours  Also receiving ProSource 1 pkt BID= 80 kcal, 22 g protein   Total = 1700 kcal (25 kcal/kg), 95 g protein (1.4 g/kg)    Intake/Tolerance:    No oral intake recorded on flowsheets  BUN 66 (H), Cr 1.78 (H), Mg 1.5 (L), K and Phos normal  BGM < 150 on Low SSI  BM x 5 yesterday and x 3 on 9/23 - Positive c.diff   I/O 3935/87694, weight down to 89.2 kg, IVF d/c'd 9/21 - on Lasix     Dosing Weight:  67.2 kg (adjusted wt for overwt)     ASSESSED NUTRITION NEEDS PER APPROVED PRACTICE GUIDELINES:  Estimated Energy Needs:  0360-9581 kcals (25-30 Kcal/Kg)  Justification: overweight  Estimated Protein Needs:   grams protein (1.2-1.5 g pro/Kg)  Justification: Repletion, hypercatabolism with critical illness and CKD  Estimated Fluid Needs:  1700-2000mL (1 mL/Kcal)      NEW FINDINGS:   Plan is for Palliative to meet with POA tomorrow    9/22:  Abscess tube exchange in IR  Noted IR unable to do anymore abscess tube exchanges, will need surgical eval       Previous Goals (9/21):   TF Isosource 1.5 at 45 mL/hr + ProSource 2 pkts per day will meet % estimated needs while minimal oral intake  Evaluation: Met    Previous Nutrition Diagnosis (9/21):   Inadequate enteral nutrition infusion related to low TF rate and dysphagia with poor appetite as evidenced by poor oral intake and current TF meeting only ~ 40% estimated needs  Evaluation: Resolved      CURRENT NUTRITION DIAGNOSIS  No nutrition diagnosis identified at this time     INTERVENTIONS  Recommendations  / Nutrition Prescription  Continue Isosource 1.5 at 45 mL/hr + ProSource 1 pkt BID as above     Implementation  Collaboration and Referral of Nutrition care:  Patient discussed today during interdisciplinary care rounds     Goals  Isosource 1.5 at 45 mL/hr + ProSource BID will continue to meet % needs    MONITORING AND EVALUATION:  Progress towards goals will be monitored and evaluated per protocol and Practice Guidelines    Bisi Brumfield RD, LD, CNSC   Clinical Dietitian - [facility]

## 2017-09-25 NOTE — PROGRESS NOTES
[facility]    Infectious Disease Progress Note    Date of Service (when I saw the patient): 09/25/2017     Assessment & Plan   Ara Stanley is a 72 year old female who was admitted on 9/19/2017.     Impression:  1 72 y.o female admitted in the hospital last month.   2 Initial presentation was of acute abdomen taken to OR for cholecystectomy. Still has persistent fluid collection with drain in place.   3 Taken back to the OR 4 days later with concern for peritonitis, found to have gastric perforation of an ulcer which was repaired. C albicans in cx  4 Post-operative course has been complicated with multiple intubations, trials of weaning off the vent and now with trach. HCAP. Sputum stento, Pseudomonas   6  Renal insuff.   7 C diff history  8  Multiple readmission to [facility].   9 Left sided empyema in July. Cultures positive for pseudomonas, recently on zosyn.   10 PCN listed as allergy but has tolerated zosyn, meropenem in past.   11 Readmitted this time again with empyema but right side and cultures positive for Pseudomonas.          Recommendations:   Diffuse rash all over upper and lower extremities and also torso, ? Cefepime.   Discussed with Pharmacy ICU, if already on appropriate antibiotics ( counting zosyn given outpatiently) for pseudomonas for about 3 -4 weeks will stop given rash and positive C diff.      Still continues to have significant abdominal distention and tenderness along with fluid which has been present for months now, ? Surgery evaluation/ ? IR management.     Patient has been in and out of multiple facilities in past months, functional status to me appears to be worsening, ? Goals of care discussion, ? Family involvement. Noted plans for today.            Celso Birch MD    Interval History   Afebrile.  Creat 1.78.  No new pos cxs.  WBC 11k.    Physical Exam   Temp: 98.8  F (37.1  C) Temp src: Oral BP: 143/70   Heart Rate: 92 Resp: 21 SpO2: 98 % O2  Device: Mechanical Ventilator Oxygen Delivery: Other (Comments) (30LPM)  Vitals:    09/22/17 0600 09/24/17 0400 09/25/17 0600   Weight: 93.1 kg (205 lb 4 oz) 88 kg (194 lb 0.1 oz) 89.2 kg (196 lb 10.4 oz)     Vital Signs with Ranges  Temp:  [98  F (36.7  C)-99  F (37.2  C)] 98.8  F (37.1  C)  Heart Rate:  [80-96] 92  Resp:  [11-31] 21  BP: ()/(47-89) 143/70  FiO2 (%):  [35 %] 35 %  SpO2:  [92 %-100 %] 98 %    Constitutional: Awake, alert, cooperative  Diffuse anasarca   Lungs: right sided chest tube  Cardiovascular: Regular rate and rhythm, normal S1 and S2, and no murmur noted  Abdomen: drain in place, distended painful  Skin: diffuse rash   Other:    Medications        FIRST-VANCOMYCIN 50  125 mg Oral 4x Daily     pantoprazole  40 mg Oral or Feeding Tube Daily     ceFEPIme (MAIXPIME) IV  2 g Intravenous Q12H     furosemide  60 mg Intravenous Q8H     protein modular  1 packet Per Feeding Tube Q12H     atorvastatin (LIPITOR) tablet 10 mg  10 mg Oral QPM     budesonide  0.5 mg Nebulization BID     busPIRone (BUSPAR) tablet 7.5 mg  7.5 mg Oral BID     calcium carbonate  500 mg Oral TID     ipratropium - albuterol 0.5 mg/2.5 mg/3 mL  1 vial Nebulization 4x Daily     nystatin  500,000 Units Oral 4x Daily     sertraline (ZOLOFT) tablet 150 mg  150 mg Per J Tube Daily     cholecalciferol  1,000 Units Oral Daily     bacitracin   Topical BID     chlorhexidine  15 mL Mouth/Throat Q12H     insulin aspart  1-4 Units Subcutaneous Q4H     heparin  5,000 Units Subcutaneous Q8H       Data   All microbiology laboratory data reviewed.  Recent Labs   Lab Test  09/25/17   0335  09/24/17   1750  09/24/17   1125   WBC  11.3*  12.1*  12.7*   HGB  8.3*  8.3*  6.6*   HCT  25.2*  25.2*  20.1*   MCV  88  88  89   PLT  321  237  337     Recent Labs   Lab Test  09/25/17   0420  09/24/17   0840  09/23/17   0455   CR  1.78*  1.87*  1.77*     Recent Labs   Lab Test  06/04/12   0527   SED  34*     Recent Labs   Lab Test  09/24/17   1005   09/24/17   0954  09/14/17   1120  08/01/17   1402  07/31/17   1600  07/30/17   1720  07/30/17   1538  07/28/17   1355  07/27/17   1120   CULT  No growth after 15 hours  No growth after 15 hours  No anaerobes isolated  Since this specimen was not transported in the proper anaerobic transport media, the   absence of anaerobes in this culture does not rule out the presence of anaerobes in this   specimen.    Light growth  Pseudomonas aeruginosa  *  Critical Value/Significant Value, preliminary result only, called to and read back by  Roxanne Tamez N.P at 1429 on 09.15.17 by mp    No anaerobes isolated  No growth  On day 1, isolated in broth only: Gram positive cocci Organism nonviable on   subculture  *  No growth  No growth  Culture negative for acid fast bacilli  Assayed at [facility] [address]  Culture negative after 4 weeks  No anaerobes isolated  No growth  Canceled, Test credited Test reordered as correct code REORDERED AS A TISSUE   CULTURE    Canceled, Test credited Test reordered as correct code REORDERED AS A TISSUE   CULTURE    Light growth Coagulase negative Staphylococcus  These bacteria are part of normal skin silvino, but on occasion, may be true   pathogens.  Clinical correlation must be applied to interpreting this   microbiology result.  Susceptibility testing not routinely done  *

## 2017-09-25 NOTE — PROGRESS NOTES
Met with patient for discharge planning. Pt  Requires discharge to an LTACH, which pt came from. Pt doesn't want to go back to [facility] and her preference would be [facility]. With pt's permission, I put in a referral to [facility] via [facility].

## 2017-09-26 NOTE — PROGRESS NOTES
"Care Coordination:    Pt previously expressed 9/20,  Pt states she came from [facility].  She states she does not want to go back, and volunteered that \"if I must go to an LTACH, I want to go to [facility].\"      Thus, referral to [facility] was appropriately sent.  However, after this conversation confusion/delirium was noted.      Discussed during rounds, and pt's POA Courtney was in pt's room.  She states pt made that statement because pt recalls medically necessary restraints at [facility], and that's what she doesn't want to return, but noting the restraints would have been necessary at either facility that she does like the staff better at [facility].  [facility] medically accepts pt but did not confirm bed.      Called [facility] rep Ruy [phone].  They may be able to accept pt later today.  Discharge orders will be prepared.      Ruy then confirmed that the bed will be available tomorrow 9/27/17.  Started HE transfer form (on chart) but did not set ride time.    Liane Lara RN, BSN  [facility] Care Coordinator   Mobile Phone: [phone]    "

## 2017-09-26 NOTE — PLAN OF CARE
Problem: Individualization  Goal: Patient Preferences  Outcome: No Change  Alert but remains confused to situation and at times difficult to reorient, pt unable to sleep, medicated for pain, itching and insomnia.  Pt awake all night.  At times inattentive,picks at trach.  Frequent reminders to allow trach suctioning for thick creamy sputum.  Liquid stool via flexi seal.  Tolerating tube feeding.

## 2017-09-26 NOTE — PLAN OF CARE
Problem: Patient Care Overview  Goal: Plan of Care/Patient Progress Review  PT pt very sleepy through session, not participating actively.  To LTACH in am.  Physical Therapy Discharge Summary     Reason for therapy discharge:    Discharged to LTACH     Progress towards therapy goal(s). See goals on Care Plan in [facility] electronic health record for goal details.  Goals not met.  Barriers to achieving goals:   limited tolerance for therapy.     Therapy recommendation(s):    Continued therapy is recommended.  Rationale/Recommendations:  to maximize functional mob and decrease burden of care.

## 2017-09-26 NOTE — DISCHARGE SUMMARY
"                                      Formerly [facility], 1928–1983 Discharge Summary    Ara Stanley MRN# [id number]   YOB: 1945 Age: 72 year old     Date of Admission:  9/19/2017  Date of Discharge:  9/27/2017  Admitting Physician:  Jonna Cavanaugh MD  Discharge Physician:  Lavell Fair MD   Discharging Service:  Intensive Care     Home clinic:   Primary Provider: Shar James          Admission Diagnoses:   New right empyema  Empyema (H)          Discharge Diagnosis:   -->COPD  -->Persistent  hypercapneic respiratory failure, recurrent.  status post tracheostomy  -->Bilateral pleural effusions  -->Persistent abdominal fluid collection   -->Psuedomonas in abdominal fluid collection   -->Status post cholecystectomy, laparoscopic  -->Anxiety   -->Anemia   -->Leukocytosis   -->Delirium              Discharge Disposition:   Discharged to rehabilitation facility. [facility]           Condition on Discharge:   Discharge condition: Stable   Discharge vitals: 149/84; HR SR with rate in 80s-90s; SpO2 98 on trach dome at 35%    Code status on discharge: Full Code           Procedures / Labs / Imaging:   Blood or human products:  -- 2 units PRBC for hgb 6.6 secondary to bleeding from chest tube after receiving lytics   Invasive procedures:   --Repositioning of FUNMI drain in abdominal abscess. 14F Flexima placed to abscess, hooked up to bulb suction. Drain removed 9/26.  Per IR 9/25: \"drain still continues to have low output <5cc.  Consulted with Dr. Diann Irizarry.  Abscess drain has been upsized x2 with most recent done on 9/22.  Also, had maceration of thick substance with wire with previous exchanges done with Dr. Benavides and Dr. Irizarry.  Fluid is gelatinous.   At this point, pt will need surgical consideration.  Drain can not upsized anymore and clearly is ineffective. IR will not be able to offer treatment options.\"   --8 doses of local tPA administered in preexisting right pigtail chest tube. Pigtail " chest tube removed 9/25    Lab results:  Lab Results   Component Value Date    PH 7.28 (L) 06/12/2017    PO2 126 (H) 06/12/2017    PCO2 44 06/12/2017    HCO3 21 06/12/2017          Lab Results   Component Value Date     09/27/2017    Lab Results   Component Value Date    CHLORIDE 101 09/27/2017    Lab Results   Component Value Date    BUN 81 09/27/2017      Lab Results   Component Value Date    POTASSIUM 4.0 09/27/2017    Lab Results   Component Value Date    CO2 25 09/27/2017    Lab Results   Component Value Date    CR 1.97 09/27/2017        Lab Results   Component Value Date    WBC 12.0 (H) 09/27/2017    HGB 8.3 (L) 09/27/2017    HCT 25.2 (L) 09/27/2017    MCV 88 09/27/2017     09/27/2017     Significant culture results:   Urine culture negative  Abdominal fluid culture positive for pseudomonas   Fluid Culture Aerobic Bacterial [] (Abnormal) Collected: 09/25/17 0930     Order Status: Completed Lab Status: Preliminary result Updated: 09/27/17 1012     Specimen: Abdominal Fluid from Abdominal Fluid       Specimen Description Abdominal Fluid      Culture Micro Light growth   Non lactose fermenting gram negative rods    (A)       Critical Value/Significant Value, preliminary result only, called to and read back by   CARLEY EASLEY RN 9/26/17 1102. LEONOR          Light growth   Gram positive cocci    (A)          Imaging performed:     Chest x-ray  Chest CT  Abdomen CT     Cardiology:  Echocardiogram 9/21:   --The visual ejection fraction is estimated at 50-55%.  The right ventricle is moderately dilated.  With contrast the right ventricular function may be severely reduced and also  in limited apical views. LV shows Grade III or advanced diastolic dysfunction.           Medications Prior to Admission:     Prescriptions Prior to Admission   Medication Sig Dispense Refill Last Dose     HYDROMORPHONE HCL PO Take 1 mg by mouth every 4 hours as needed for other (Pain rated 3 to 7)    at prn      HYDROMORPHONE HCL IJ Inject 0.6 mg as directed every 4 hours as needed (Pain rated 8 to 10)    at prn     calcium carbonate (TUMS) 500 MG chewable tablet Take 1 chew tab by mouth 3 times daily   9/19/2017 at 1423     VITAMIN D, CHOLECALCIFEROL, PO Take 1,000 Units by mouth daily   9/19/2017 at 0930     BusPIRone HCl (BUSPAR PO) Take 7.5 mg by mouth 2 times daily   9/19/2017 at 0930     pantoprazole (PROTONIX) SUSP suspension Take 40 mg by mouth daily   9/19/2017 at 0929     ATORVASTATIN CALCIUM PO Take 10 mg by mouth daily   9/18/2017 at pm     ondansetron (ZOFRAN-ODT) 4 MG ODT tab Take 1 tablet (4 mg) by mouth every 6 hours as needed for nausea or vomiting 30 tablet   at prn     chlorhexidine (PERIDEX) 0.12 % solution Take 15 mLs by mouth every 12 hours   9/19/2017 at 0932     ipratropium - albuterol 0.5 mg/2.5 mg/3 mL (DUONEB) 0.5-2.5 (3) MG/3ML neb solution Take 1 vial (3 mLs) by nebulization every 4 hours as needed for shortness of breath / dyspnea 360 mL   at prn     albuterol (2.5 MG/3ML) 0.083% neb solution Take 1 vial by nebulization every 2 hours as needed for wheezing   9/13/2017 at Unknown time     ALPRAZOLAM PO 0.25 mg by Per J Tube route 3 times daily as needed for anxiety     at prn     budesonide (PULMICORT) 0.5 MG/2ML neb solution Take 0.5 mg by nebulization 2 times daily   9/19/2017 at 0805     Sertraline HCl (ZOLOFT PO) 150 mg by Per J Tube route daily   9/19/2017 at 0932     ipratropium - albuterol 0.5 mg/2.5 mg/3 mL (DUONEB) 0.5-2.5 (3) MG/3ML neb solution Take 1 vial by nebulization 4 times daily    9/19/2017 at 1105     ACETAMINOPHEN PO 1,000 mg by Per J Tube route every 8 hours as needed for pain     at prn     TRAZODONE HCL PO 50 mg by Per J Tube route nightly as needed for sleep     at prn     [DISCONTINUED] piperacillin-tazobactam (ZOSYN) 4-0.5 GM vial Inject 4.5 g into the vein every 8 hours   9/19/2017 at 1425     [DISCONTINUED] bacitracin 500 UNIT/GM OINT Apply topically 2 times daily  Apply to chest tube site BID for 7 days (Patient taking differently: Apply topically 3 times daily Apply to chest tube site BID for 7 days)   9/19/2017 at 1423     [DISCONTINUED] nystatin (MYCOSTATIN) 620925 UNIT/ML suspension Take 5 mLs (500,000 Units) by mouth 4 times daily 280 mL  9/19/2017 at 1708     [DISCONTINUED] HYDRALAZINE HCL PO 25 mg by Per G Tube route every 6 hours   9/19/2017 at 1202     [DISCONTINUED] METOPROLOL TARTRATE PO Take 50 mg by mouth 2 times daily   9/19/2017 at Unknown time     Physical Exam:   GEN: awake, lethargic, delirious    EYES: PERRL, Anicteric sclera.   HEENT:  Normocephalic, atraumatic, trachea midline, trach secured, on trach dome past 24 hours    CV: RRR, no gallops, rubs, or murmurs  PULM/CHEST: Clear breath sounds bilaterally without crackles or wheeze. Symmetric chest rise. Dressing over site of right chest tube.  Dressing CDI   GI: normal bowel sounds, soft, non-tender, no rebound tenderness or guarding, no masses. Improving generalized anasarca of flanks and groin    : tanner catheter in place, urine yellow and clear  EXTREMITIES: Improving anasarcal.  Flank edema, groin edema, peripheral edema. Moving all extremities, peripheral pulses intact  NEURO: Cranial nerves II-XII grossly intact, no motor-sensory deficits noted  SKIN: FUNMI drain in abdomen, insertion site clean and dry, no erythema.  Scattered scarring     PSYCH:  Affect: Delirious; lethargic    Imaging personally reviewed: CXR          Discharge Medications:     Current Discharge Medication List      START taking these medications    Details   diphenhydrAMINE (BENADRYL) 50 MG/ML injection Inject 0.5-1 mLs (25-50 mg) into the vein every 12 hours as needed for itching  Qty: 0.5 mL    Associated Diagnoses: Rash      QUEtiapine (SEROQUEL) 25 MG tablet Take 1-2 tablets (25-50 mg) by mouth At Bedtime  Qty: 60 tablet    Associated Diagnoses: Insomnia due to medical condition      protein modular (PROSOURCE TF) 1 packet by  Per Feeding Tube route every 12 hours    Associated Diagnoses: Protein-calorie malnutrition (H)      Vancomycin HCl (FIRST-VANCOMYCIN 50) 50 MG/ML SOLN Take 2.5 mLs (125 mg) by mouth 4 times daily for 10 days    Comments: Treatment of recurrent c. Diff.  Consider tapering off dose given recurrent infection  Associated Diagnoses: C. difficile colitis         CONTINUE these medications which have NOT CHANGED    Details   HYDROMORPHONE HCL PO Take 1 mg by mouth every 4 hours as needed for other (Pain rated 3 to 7)      HYDROMORPHONE HCL IJ Inject 0.6 mg as directed every 4 hours as needed (Pain rated 8 to 10)      calcium carbonate (TUMS) 500 MG chewable tablet Take 1 chew tab by mouth 3 times daily      VITAMIN D, CHOLECALCIFEROL, PO Take 1,000 Units by mouth daily      BusPIRone HCl (BUSPAR PO) Take 7.5 mg by mouth 2 times daily      pantoprazole (PROTONIX) SUSP suspension Take 40 mg by mouth daily      ATORVASTATIN CALCIUM PO Take 10 mg by mouth daily      ondansetron (ZOFRAN-ODT) 4 MG ODT tab Take 1 tablet (4 mg) by mouth every 6 hours as needed for nausea or vomiting  Qty: 30 tablet    Associated Diagnoses: Nausea      chlorhexidine (PERIDEX) 0.12 % solution Take 15 mLs by mouth every 12 hours    Associated Diagnoses: On mechanically assisted ventilation (H)      !! ipratropium - albuterol 0.5 mg/2.5 mg/3 mL (DUONEB) 0.5-2.5 (3) MG/3ML neb solution Take 1 vial (3 mLs) by nebulization every 4 hours as needed for shortness of breath / dyspnea  Qty: 360 mL    Associated Diagnoses: On mechanically assisted ventilation (H)      albuterol (2.5 MG/3ML) 0.083% neb solution Take 1 vial by nebulization every 2 hours as needed for wheezing      ALPRAZOLAM PO 0.25 mg by Per J Tube route 3 times daily as needed for anxiety       budesonide (PULMICORT) 0.5 MG/2ML neb solution Take 0.5 mg by nebulization 2 times daily      Sertraline HCl (ZOLOFT PO) 150 mg by Per J Tube route daily      !! ipratropium - albuterol 0.5 mg/2.5 mg/3  mL (DUONEB) 0.5-2.5 (3) MG/3ML neb solution Take 1 vial by nebulization 4 times daily       ACETAMINOPHEN PO 1,000 mg by Per J Tube route every 8 hours as needed for pain       TRAZODONE HCL PO 50 mg by Per J Tube route nightly as needed for sleep        !! - Potential duplicate medications found. Please discuss with provider.      STOP taking these medications       piperacillin-tazobactam (ZOSYN) 4-0.5 GM vial Comments:   Reason for Stopping:         bacitracin 500 UNIT/GM OINT Comments:   Reason for Stopping:         nystatin (MYCOSTATIN) 951665 UNIT/ML suspension Comments:   Reason for Stopping:         HYDRALAZINE HCL PO Comments:   Reason for Stopping:         METOPROLOL TARTRATE PO Comments:   Reason for Stopping:                     Consultations:   Consultation during this admission received for Thoracic Surgery, Infectious Diseases, Palliative Care and Interventional Radiology              Brief History of Illness:      Ara Stanley is a 72 year old female who initially presented to [facility] on 6/2/2017 with abdominal pain resulting in a laparoscopic cholecystectomy followed by worsening respiratory failure with return to the operating room where she was found to have a gastric perforation which was repaired with omental patch and then ultimately subsequently failed extubation and had a tracheostomy performed.  She had persistently grown yeast from her sputum as well as her abdominal fluid therefore decision not to move forward with a PEG tube.  She was initially discharged to [facility] on 6/19. During LTACH stay she had a CT scan showing persistent abdominal fluid collection and large left sided pleural effusion. Drains were placed in both collections.  The pleural effusion was found to be largely loculated and patient returned to [facility] on 7/26 for a thoracic surgery consult due to worsening respiratory status and loculated pleural effusion vs empyema.  She underwent VATS  procedure 7/28. Also, given persistent abdominal fluid collection and leukocytosis, an additional FUNMI drain placed in abdomen under CT guidance 8/1.   She remained on broad-spectrum antimicrobials, including a course of oral vancomycin for C. Diff infection diagnosed 7/20. Given her persistent fluid collections and leukocytosis she largely remained on broad spectrum antibiotics until early September.  She was discharged to [facility] 8/3 with two FUNMI drains.  After discharge from Critical access hospital on 8/3 and return to [facility] she was hospitalized in late August at [facility] for acute GI bleed and during that time apparently sustained an acute kidney injury. Additionally, CT imaging was obtained at [facility] and she was found to have a large right sided pleural collection.  She was taken to [facility] IR on 9/14 and had placement of a right sided pigtail chest tube.    She is readmitted to Critical access hospital 9/19 with large right pleural effusion/empyema, with cultures + for pseudomonas.  Additionally, since discharge from Critical access hospital in late July, Ms. Stanley has gained apx 50lbs and has gross anasarca.           Hospital Course:   Admitted 9/19 for Thoracic surgery consult and management of right sided   9/20: Initiation of intrapleural lytics instilled through pigtail pleural catheter. Large volume of serosanguinous output from chest tube. Initiated on Cefepime    9/21: Initiated oral Vancomycin for new onset of diarrhea.  Initiation of IV lasix given hypervolemia and gross anasarca. Palliative Care consult placed   9/22: C. Diff culture positive. General surgery consult placed due to increasing size of abdominal fluid collection.  General Surgery recommends repositioning with IR. Patient taken to IR for drain repositioning   9/25: Repeat CT scan; empyema significantly improved.  Pigtail drain removed. Diffuse rash noted. Cefepime stopped.  Will trial off antibiotics and monitor closely.   9/26: FUNMI bulb continues to drain poorly.   Drain removed   9/27: Discharge to [facility]     --Diuresis with IV lasix has been ongoing throughout this admission. Creatinine continues to climb slowly.  Dose of IV lasix titrated down, but medication has been continued given severe anasarca and hypervolemia and poor urine output without diuretic.    --Sleep hygiene has been a problem with poor sleep at night.  Patient has had an increase in delirium.  Behavior remains calm and appropriate.   --Upon admission to ICU patient's antihypertensive medication were not continued due patient being normotensive/borderline hypotensive.  Upon discharge, metoprolol was resumed at half of PTA dose. Resumed at 25mg, PO, BID.   --Patient had several self-limiting runs of A-Fib overnight, night of 9/27. Metoprolol resumed at half PTA dose.                   Significant Results:   See above              Pending Results:   None           Discharge Instructions and Follow-Up:   Discharge diet: Continuous Isosource 1.5; via NJ tube, 45ml/hr.  2 packets of prosource protein daily.   By mouth: Nectar thick liquids, DD1 with pureed consistency.     Discharge activity: Activity as tolerated   Discharge follow-up: No formal follow up is requested    Outpatient therapy: PT/OT/Speech therapy at [facility]     Home Care agency: None    Supplies and equipment: None   Lines and drains: None    Wound care: See discharge orders.  Gauze 4x4 over drain insertion sites x 7 days   Other instructions: None

## 2017-09-26 NOTE — PROGRESS NOTES
[facility]    Critical Care Service  Progress Note    Date of Service (when I saw the patient): 09/26/2017     Assessment & Plan   Ara Stanley is a 72 year old female with complex past medical history and 4 month critical illness who was admitted on 9/19/2017 for management of right sided empyema. She was originally admitted 6/2 to [facility] with abdominal pain and had a lap alexandre, followed by abdominal perforation, ongoing respiratory failure requiring trach and PEG, and multiple pleural and abdominal infections.  Has remained in health care institution since initial admission 6/2/17. Now readmitted with large right pleural effusion/empyema, with cultures + for pseudomonas.       Main Plans for Today   Decrease lasix to BID.  Goal -2L daily.   [facility]   Care conference with Palliative Care and POA if available   Initiate Seroquel at HS due to poor sleep at night   Continue working with speech therapy for diet and safe swallow    Neuro  1. Anxiety/Depression   2. Acute delirium   3. Chronic pain   Plan:  -- Continue PTA Buspar and Zoloft. PRN trazodone at HS   --PRN dilaudid; acetaminophen for pain.  PTA med includes hydromorphone PO, PRN 1mg, Q4 hours   -- Patient with increased delirium today. Delirium prevention as able; attempt to maintain day/night schedule, sleep hygiene, minimize interruptions at HS to allow for uninterrupted sleep.   --Initiate Seroquel tonight at HS due to poor sleep at night.     CV  1. HTN  2. New diastolic dysfunction on echo 9/21; EF 50-55%, RV dilation   Plan:  -- holding PTA metoprolol and hydralazine as normotensive without   --Cardiology consult given changes on echocardiogram and 50lb weight gain in 6 weeks. Continue diuresis.  --Weight down 15 lbs since admission.  BP increased slightly.  Continue to hold PTA antihypertensives.     Resp:  1.  Prolonged respiratory failure after hospitalization in June.  Now to the point where  she is on trach dome during the day but pressure support at night.  [facility] staff did not feel she was yet ready for decannulation; has not made significant progress this admission   2.  Empyema and recent pseudomonal pneuomina: pleural fluid growing pseudomonas on cultures drawn on 9/14.   Plan:  -- On trach dome overnight. Continue trach dome as tolerated. If not tolerating, place on PS at HS.  Has been using PS 10/5 at HS.    --Chest tube removed 9/25 by IR. Completed course of lytics in chest tube.  Had several additional doses over the weekend.  Episode of merrill blood from chest tube 9/24 and received 2 u PRBC, DDAVP, Vitamin K.     GI/Nutrition  1. S/p exploratory laparotomy, followed by acute abdomen with gastric perforation in June 2017, with subsequent persistent abdominal fluid collections with FUNMI drain placement and repositioning by gen surg and IR. One FUNMI drain in LLQ remaining and repositioned by IR 9/22.   2. Recurrent C.diff infection   3. Feeding through [place]   4. Recent acute GI bleed; admitted and managed at [facility]   5. Elevated LDH, alk phos.    Plan:  --Oral Vancomycin x 14 days for recurrent c. Diff infection. Consider tapered dosing of vancomycin given recurrent infection.  --Enteric iso.  Flexiseal today given large volume liquid stool   --Continue tube feeding; + additional protein given hypoalbuminemia, low protein intake, general anasarca.      --Continue IR placed FUNMI drain in abdominal fluid collection. Drain repositioned 9/22. Reevaluate fluid collection 9/25,  abdominal CT, with little change in volume/size  --Flush J with 10ml NS Q shift.  Record I&Os Q shift. (IR [facility], [phone])  --RUQ abdominal  US obtained given elevated LDH, alk phos.  No abnormalities.  Gallbladder surgically removed.      Renal  1. Acute vs chronic kidney injury.  Creatinine increased over baseline from last admission.  Creatinine rising with continued lasix administration.  Admitted to  [facility] end of August for acute GI bleed and appears to have sustained WOODY at that time.  Creatinine at time of discharge from [facility] 1.3-1.5 range.  Creatinine on admission 1.49>1.64>1.65 this AM.    2. Generalized anasarca and 50lb weight gain since last admission   Plan:  --Decrease Lasix to BID, 60mg IV. Daily weights. Starting to make some progress with diuresis. Down 15 lbs since admission.   --Electrolyte replacement per protocol   --Repeat BMP daily; monitor closely for increasing creatinine/volume contraction     ID  1. Multiple abscesses and infections since June. Most recently had a pseudomonal pneumonia with empyema. Pleural fluid still growing pseudomonas. Persistent abdominal fluid collection; most recently with pseudomonas. Has completed a course of Piperacillin/Tazobactam 9/4 prior to admission.  Cefepime initiated on admission    2. C. Diff infection; recurrent   Plan:   --Stop Cefepime given diffuse rash.  Trial off antibiotics.   (Hx of penicillin allergy)    --Procalcitonin 0.74 (0.45 on admission); mild leukocytosis. Monitor for fevers, increasing leukocytosis.    --ID following   --oral vancomycin x 14 days for recurrent c.diff. Consider tapering dose after 14 days given recurrent infection.    Endocrine  1. No active issues   Plan:  -- BG checks per ICU protocol     Heme:  1. acute on chronic blood loss anemia. Recent GI bleed with hemoglobin as low as 5 upon admission to [facility].   2. Recent bleeding event from chest tube following tPA administration. Received 2 units PRBC 9/24. Hgb stable in mid-8 range   Plan:  -- Monitor hemoglobin. Transfuse to keep > 7.0    Skin  1. Generalized anasarca   2. Diffuse rash; new   Plan:  --continue diuresis   --Stop Cefepime.  Monitor for evolution of rash.  Diphenhydramine available, but will try to avoid given delirium     General cares:  DVT Prophylaxis: Heparin SQ  GI Prophylaxis: PPI  Restraints: Restraints for medical healing  needed: NO  Family update by me today: Yes ; POA updated   Current lines are required for patient management  Access: PICC on right arm     Isabella Gutierrez    Time Spent on this Encounter   Billing:  I spent 45 minutes bedside and on the inpatient unit today managing the critical care of Ara Stanley in relation to the issues listed in this note.    Interval History   Course reviewed. No sleep overnight last night. Napping today.  Patient's POA meeting with Palliative Care provider today.  Plan for discharge back to [facility] today or tomorrow.     Physical Exam   Temp: 98.1  F (36.7  C) Temp src: Oral Temp  Min: 97.8  F (36.6  C)  Max: 98.2  F (36.8  C) BP: 132/73   Heart Rate: 87 Resp: 17 SpO2: 97 % O2 Device: Trach dome Oxygen Delivery: Other (Comments) (30%)  Vitals:    09/24/17 0400 09/25/17 0600 09/26/17 0513   Weight: 88 kg (194 lb 0.1 oz) 89.2 kg (196 lb 10.4 oz) 86.5 kg (190 lb 11.2 oz)     I/O last 3 completed shifts:  In: 1545 [I.V.:120; NG/GT:390]  Out: 2665 [Urine:2645; Drains:20]    GEN: awake, lethargic, delirious    EYES: PERRL, Anicteric sclera.   HEENT:  Normocephalic, atraumatic, trachea midline, trach secured, on trach dome past 24 hours    CV: RRR, no gallops, rubs, or murmurs  PULM/CHEST: Clear breath sounds bilaterally without crackles or wheeze. Symmetric chest rise. Dressing over site of right chest tube.  Dressing CDI   GI: normal bowel sounds, soft, non-tender, no rebound tenderness or guarding, no masses. Improving generalized anasarca of flanks and groin    : tanner catheter in place, urine yellow and clear  EXTREMITIES: Improving anasarcal.  Flank edema, groin edema, peripheral edema. Moving all extremities, peripheral pulses intact  NEURO: Cranial nerves II-XII grossly intact, no motor-sensory deficits noted  SKIN: FUNMI drain in abdomen, insertion site clean and dry, no erythema.  Scattered scarring     PSYCH:  Affect: Delirious; lethargic    Imaging personally reviewed: CXR    ECG:SR     Medications        furosemide  60 mg Intravenous BID     QUEtiapine  25-50 mg Oral At Bedtime     FIRST-VANCOMYCIN 50  125 mg Oral 4x Daily     pantoprazole  40 mg Oral or Feeding Tube Daily     protein modular  1 packet Per Feeding Tube Q12H     atorvastatin (LIPITOR) tablet 10 mg  10 mg Oral QPM     budesonide  0.5 mg Nebulization BID     busPIRone (BUSPAR) tablet 7.5 mg  7.5 mg Oral BID     calcium carbonate  500 mg Oral TID     ipratropium - albuterol 0.5 mg/2.5 mg/3 mL  1 vial Nebulization 4x Daily     sertraline (ZOLOFT) tablet 150 mg  150 mg Per J Tube Daily     cholecalciferol  1,000 Units Oral Daily     bacitracin   Topical BID     chlorhexidine  15 mL Mouth/Throat Q12H     insulin aspart  1-4 Units Subcutaneous Q4H     heparin  5,000 Units Subcutaneous Q8H       Data     Recent Labs  Lab 09/26/17  0505 09/25/17  0420 09/25/17  0335 09/24/17  1750 09/24/17  1125 09/24/17  0840  09/20/17  0030   WBC 12.0*  --  11.3* 12.1* 12.7* 12.7*  < > 12.6*   HGB 8.4*  --  8.3* 8.3* 6.6* 6.8*  < > 7.6*   MCV 88  --  88 88 89 89  < > 91     --  321 237 337 341  < > 273   INR  --   --   --   --  1.58*  --   --  1.35*    135  --   --   --  136  < > 131*   POTASSIUM 4.1 4.3  --   --   --  4.4  < > 4.6   CHLORIDE 101 102  --   --   --  103  < > 99   CO2 24 24  --   --   --  23  < > 24   BUN 73* 66*  --   --   --  63*  < > 45*   CR 1.90* 1.78*  --   --   --  1.87*  < > 1.49*   ANIONGAP 10 9  --   --   --  10  < > 8   CARYL 8.6 8.2*  --   --   --  8.1*  < > 8.3*   * 98  --   --   --  109*  < > 75   ALBUMIN  --  2.1*  --   --   --  2.1*  < > 1.2*   PROTTOTAL  --  6.7*  --   --   --  6.6*  < > 6.9   BILITOTAL  --  0.4  --   --   --  0.4  < > 0.3   ALKPHOS  --  123  --   --   --  143  < > 187*   ALT  --  13  --   --   --  16  < > 22   AST  --  18  --   --   --  16  < > 30   < > = values in this interval not displayed.  Recent Results (from the past 24 hour(s))   CT Chest Abodmen w/o Contrast     Narrative    CT CHEST AND ABDOMEN WITHOUT CONTRAST  9/25/2017 12:27 PM     HISTORY: Interval exam to assess bilateral pleural effusions/empyema  and abdominal fluid collection status post drain repositioning.    COMPARISON: 9/21/2017    TECHNIQUE: Volumetric helical acquisition of CT images of the chest  and abdomen without contrast. Radiation dose for this scan was reduced  using automated exposure control, adjustment of the mA and/or kV  according to patient size, or iterative reconstruction technique.    FINDINGS:   Chest: Right pigtail catheter in place with minimal residual partially  loculated fluid present. There is associated compressive atelectasis  and/or infiltrate dependently; the remainder of the lungs appear clear  of infiltrate. Extensive left lower lobe atelectasis and/or infiltrate  noted with trace left pleural fluid. No pericardial effusion. There  are moderate atherosclerotic changes of the visualized aorta and its  branches. There is no evidence of aortic aneurysm. Question some mild  cardiomegaly.    Abdomen and pelvis: A large bore catheter remains in place in a fluid  collection in the left abdomen measuring 12.0 x 8.0 cm, minimally  decreased in size from previous at 14.7 x 9.8 cm. No dilated bowel  noted in the abdomen. There are extensive atherosclerotic changes of  the visualized aorta and its branches. There is no evidence of aortic  aneurysm. No hydronephrosis. Adrenal glands, liver, spleen, and  pancreas grossly negative for worrisome focal lesion.      Impression    IMPRESSION:  1. Minimal reduction in size in complex fluid collection in the left  abdomen despite multiple attempts at upsizing and repositioning the  catheter under fluoroscopy.  2. Trace pleural fluid bilaterally. Bibasilar atelectasis and/or  infiltrate, left worse than right, similar to previous.    YEVGENIY BAI MD   XR Chest Port 1 View    Narrative    CHEST PORTABLE ONE VIEW September 26, 2017 6:11 AM     HISTORY:  Evaluate for effusion.    COMPARISON: 9/25/2017.      Impression    IMPRESSION: Right basilar chest tube is no longer identified. No  pneumothorax visualized. Tracheostomy tube and enteric tube remain in  place.    Probable moderate left and small right pleural effusions do not appear  significantly changed. Interstitial opacities throughout the lungs  possibly representing interstitial edema are also unchanged. Cardiac  silhouette remains enlarged.

## 2017-09-26 NOTE — PROGRESS NOTES
[facility] ICU RESPIRATORY NOTE  Date of Admission:  9/19/17  Date of Intubation (most recent):    Reason for Mechanical Ventilation:  Empyema  Number of Days on Mechanical Ventilation:  8  Met Criteria for Pressure Support Trial:  Yes  Length of Pressure Support Trial:  Pt has been on TD for 2 days  Reason for Stopping Pressure Support Trial:  Still on TD   Reason for No Pressure Support Trial:    Significant Events Today:  None    ABG Results:    ETT appearance on chest x-ray:    Plan:  Pt to remain on TD as tolerated

## 2017-09-26 NOTE — PROVIDER NOTIFICATION
MD Notification    Notified Person:  NP    Notified Persons Name: Isabella Reed    Notification Date/Time: 9/26/17 1100    Notification Interaction:  Talked with Physician    Purpose of Notification: Critical lab results received from ID lab: Non-lactose fermenting gram negative rods    Orders Received: no new orders received     Comments:

## 2017-09-26 NOTE — PROGRESS NOTES
[facility]    Palliative Care Progress Note    Ara Stanley  MRN# [id number]  Date of Admission:  9/19/2017  Date of Service (when I saw the patient): 09/26/2017    Assessment & Plan   Ara Stanley is a 72 year old female with complex past medical history and 4 month critical illness who was admitted on 9/19/2017 for management of right sided empyema. She was originally admitted 6/2 to [facility] with abdominal pain and had a lap alexandre, followed by abdominal perforation, ongoing respiratory failure requiring trach and PEG, and multiple pleural and abdominal infections.  Has remained in health care institution since initial admission 6/2/17. Now readmitted with large right pleural effusion/empyema, with cultures + for pseudomonas.      Symptoms/Recommendations  1. Goals of Care. Met with Ara's friend/POSAWYER Valdez this morning (Ara resting in bed during conversation). Courtney tells me Ara has waxing and waning periods of delirium, but feels she has enough lucid moments to make her wishes known. Courtnye feels that Ara is not yet ready to consider a de-escalation of cares despite her many medical problems. Courtney understands that Ara will never return to her previous level of functioning (and furthermore states that Ara wasn't getting around well before these events occurred anyway). Courtney tells me they are taking things one day at a time.     Support/Coping  -pt's friend/JUANA Valdez present at bedside   -Will involve Palliative LICSW, Adilia López, and/or Palliative , Irasema Reyez    Decisional Support, Goals of Care, Counseling & Coordination  Decisional Capacity Intact?  -No  Health Care Directive on File?  -Yes  Code Status/Resuscitation Preferences?  -FULL  Plan of Care?  -d/c to [facility] when medically stable    Thank you for involving us in the care of this patient and family. We will continue to follow. Please do not hesitate to contact me with questions or concerns or the  "on-call provider for our team if evening or weekend.    Rain MENJIVAR, [facility]  Palliative Medicine   Pager [phone]    Attestation:  Total time on the floor involved in the patient's care: 35 minutes  Total time spent in counseling/care coordination: >50%    Interval History   Met with Ara's friend and JUANA Courtney this morning. We discussed Ara's very rough medical course. Courtney tells me it has been very difficult for both Ara and her as she is helping with Ara's medical issues as well as dealing with her house, financial issues, and dogs. I asked if Ara's children have been helpful with managing any of this and she said no. Ara's oldest son is in [place], her daughter is \"in the wind\" and her son Lane is in [place]. She tells me that Ara's  had not worked for about 3 years prior to his death in March and they were not in good shape financially. Courtney says she knows eventually she will need to sell [place] house, but is taking it one day at a time. 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 She also understands that she will never return to her previous level of functioning (which actually was not very good). Courtney tells me Ara wasn't getting around well before this rough course began. She tells me Ara has had waxing and waning moments of delirium, but enough lucid moments to make her wishes known. She feels that Ara's wish is to continue to try to get better.     Medications   Current Facility-Administered Medications Ordered in Epic   Medication Dose Route Frequency Last Rate Last Dose     furosemide (LASIX) injection 60 mg  60 mg Intravenous BID   60 mg at 09/26/17 0828     QUEtiapine (SEROquel) tablet 25-50 mg  25-50 mg Oral At Bedtime         HYDROmorphone (DILAUDID) half-tab 1 mg  1 mg Oral Q4H PRN   1 mg at 09/26/17 0931     diphenhydrAMINE (BENADRYL) injection 25-50 mg  25-50 mg " Intravenous Q12H PRN   25 mg at 09/26/17 0058     FIRST-VANCOMYCIN 50 solution 125 mg  125 mg Oral 4x Daily   125 mg at 09/26/17 0931     HYDROmorphone (PF) (DILAUDID) injection 0.3-0.5 mg  0.3-0.5 mg Intravenous Q1H PRN   0.5 mg at 09/25/17 1342     midazolam (VERSED) injection 1-3 mg  1-3 mg Intravenous Q2H PRN         magnesium sulfate 2 g in NS intermittent infusion (PharMEDium or [facility] Cmpd)  2 g Intravenous Daily PRN         magnesium sulfate 4 g in 100 mL sterile water (premade)  4 g Intravenous Q4H PRN   4 g at 09/25/17 0909     pantoprazole (PROTONIX) suspension 40 mg  40 mg Oral or Feeding Tube Daily   40 mg at 09/26/17 0931     protein modular (PROSource TF) 1 packet  1 packet Per Feeding Tube Q12H   1 packet at 09/26/17 0936     acetaminophen (TYLENOL) tablet 1,000 mg  1,000 mg Per J Tube Q8H PRN   1,000 mg at 09/25/17 0919     albuterol neb solution 2.5 mg  1 vial Nebulization Q2H PRN         atorvastatin (LIPITOR) tablet 10 mg  10 mg Oral QPM   10 mg at 09/25/17 2051     budesonide (PULMICORT) neb solution 0.5 mg  0.5 mg Nebulization BID   0.5 mg at 09/26/17 0730     busPIRone (BUSPAR) tablet 7.5 mg  7.5 mg Oral BID   7.5 mg at 09/26/17 0932     calcium carbonate (TUMS) chewable tablet 500 mg  500 mg Oral TID   500 mg at 09/26/17 0931     ipratropium - albuterol 0.5 mg/2.5 mg/3 mL (DUONEB) neb solution 3 mL  1 vial Nebulization 4x Daily   3 mL at 09/26/17 0735     ipratropium - albuterol 0.5 mg/2.5 mg/3 mL (DUONEB) neb solution 3 mL  3 mL Nebulization Q4H PRN   3 mL at 09/20/17 2332     sertraline (ZOLOFT) tablet 150 mg  150 mg Per J Tube Daily   150 mg at 09/26/17 0931     traZODone (DESYREL) tablet 50 mg  50 mg Per J Tube At Bedtime PRN   50 mg at 09/26/17 0217     cholecalciferol (vitamin D) tablet 1,000 Units  1,000 Units Oral Daily   1,000 Units at 09/26/17 0931     bacitracin ointment   Topical BID         ALPRAZolam (XANAX) tablet 0.25 mg  0.25 mg Per J Tube TID PRN   0.25 mg at 09/26/17 0932      naloxone (NARCAN) injection 0.1-0.4 mg  0.1-0.4 mg Intravenous Q2 Min PRN         chlorhexidine (PERIDEX) 0.12 % solution 15 mL  15 mL Mouth/Throat Q12H   15 mL at 09/26/17 0931     ondansetron (ZOFRAN) injection 4 mg  4 mg Intravenous Q6H PRN   4 mg at 09/19/17 1902     glucose 40 % gel 15-30 g  15-30 g Oral Q15 Min PRN        Or     dextrose 50 % injection 25-50 mL  25-50 mL Intravenous Q15 Min PRN   25 mL at 09/20/17 0826    Or     glucagon injection 1 mg  1 mg Subcutaneous Q15 Min PRN         insulin aspart (NovoLOG) inj (RAPID ACTING)  1-4 Units Subcutaneous Q4H   1 Units at 09/22/17 0258     heparin sodium PF injection 5,000 Units  5,000 Units Subcutaneous Q8H   5,000 Units at 09/26/17 0623     No current Epic-ordered outpatient prescriptions on file.       Physical Exam   Temp: 98.1  F (36.7  C) Temp src: Oral BP: 133/73   Heart Rate: 92 Resp: 18 SpO2: 99 % O2 Device: Trach dome Oxygen Delivery: Other (Comments) (30%)  Vitals:    09/24/17 0400 09/25/17 0600 09/26/17 0513   Weight: 88 kg (194 lb 0.1 oz) 89.2 kg (196 lb 10.4 oz) 86.5 kg (190 lb 11.2 oz)     CONSTITUTIONAL: NAD, sleeping, appears comfortable  HEENT: NCAT, MMM, trach present  NECK: Supple  CARDIOVASCULAR: RRR  RESPIRATORY: NL respiratory effort on trach dome  GASTROINTESTINAL: exam deferred   MUSCULOSKELETAL: Moving freely in bed   SKIN: Warm and intact. No concerning lesions or rashes on exposed skin surfaces   NEUROLOGIC: sleeping, did not arouse during our conversation   PSYCH: sleeping comfortable, did not arouse during our conversation     Data   Results for orders placed or performed during the hospital encounter of 09/19/17 (from the past 24 hour(s))   CT Chest Abodmen w/o Contrast    Narrative    CT CHEST AND ABDOMEN WITHOUT CONTRAST  9/25/2017 12:27 PM     HISTORY: Interval exam to assess bilateral pleural effusions/empyema  and abdominal fluid collection status post drain repositioning.    COMPARISON: 9/21/2017    TECHNIQUE: Volumetric  helical acquisition of CT images of the chest  and abdomen without contrast. Radiation dose for this scan was reduced  using automated exposure control, adjustment of the mA and/or kV  according to patient size, or iterative reconstruction technique.    FINDINGS:   Chest: Right pigtail catheter in place with minimal residual partially  loculated fluid present. There is associated compressive atelectasis  and/or infiltrate dependently; the remainder of the lungs appear clear  of infiltrate. Extensive left lower lobe atelectasis and/or infiltrate  noted with trace left pleural fluid. No pericardial effusion. There  are moderate atherosclerotic changes of the visualized aorta and its  branches. There is no evidence of aortic aneurysm. Question some mild  cardiomegaly.    Abdomen and pelvis: A large bore catheter remains in place in a fluid  collection in the left abdomen measuring 12.0 x 8.0 cm, minimally  decreased in size from previous at 14.7 x 9.8 cm. No dilated bowel  noted in the abdomen. There are extensive atherosclerotic changes of  the visualized aorta and its branches. There is no evidence of aortic  aneurysm. No hydronephrosis. Adrenal glands, liver, spleen, and  pancreas grossly negative for worrisome focal lesion.      Impression    IMPRESSION:  1. Minimal reduction in size in complex fluid collection in the left  abdomen despite multiple attempts at upsizing and repositioning the  catheter under fluoroscopy.  2. Trace pleural fluid bilaterally. Bibasilar atelectasis and/or  infiltrate, left worse than right, similar to previous.    YEVGENIY BAI MD   Glucose by meter   Result Value Ref Range    Glucose 107 (H) 70 - 99 mg/dL   Procalcitonin   Result Value Ref Range    Procalcitonin 0.74 ng/ml   Magnesium (AM Draw)   Result Value Ref Range    Magnesium 2.4 (H) 1.6 - 2.3 mg/dL   Glucose by meter   Result Value Ref Range    Glucose 115 (H) 70 - 99 mg/dL   Glucose by meter   Result Value Ref Range    Glucose  126 (H) 70 - 99 mg/dL   Glucose by meter   Result Value Ref Range    Glucose 111 (H) 70 - 99 mg/dL   Glucose by meter   Result Value Ref Range    Glucose 116 (H) 70 - 99 mg/dL   Basic metabolic panel   Result Value Ref Range    Sodium 135 133 - 144 mmol/L    Potassium 4.1 3.4 - 5.3 mmol/L    Chloride 101 94 - 109 mmol/L    Carbon Dioxide 24 20 - 32 mmol/L    Anion Gap 10 3 - 14 mmol/L    Glucose 123 (H) 70 - 99 mg/dL    Urea Nitrogen 73 (H) 7 - 30 mg/dL    Creatinine 1.90 (H) 0.52 - 1.04 mg/dL    GFR Estimate 26 (L) >60 mL/min/1.7m2    GFR Estimate If Black 31 (L) >60 mL/min/1.7m2    Calcium 8.6 8.5 - 10.1 mg/dL   CBC with platelets   Result Value Ref Range    WBC 12.0 (H) 4.0 - 11.0 10e9/L    RBC Count 2.87 (L) 3.8 - 5.2 10e12/L    Hemoglobin 8.4 (L) 11.7 - 15.7 g/dL    Hematocrit 25.2 (L) 35.0 - 47.0 %    MCV 88 78 - 100 fl    MCH 29.3 26.5 - 33.0 pg    MCHC 33.3 31.5 - 36.5 g/dL    RDW 16.6 (H) 10.0 - 15.0 %    Platelet Count 308 150 - 450 10e9/L   Magnesium (Add on or recollect)   Result Value Ref Range    Magnesium 2.1 1.6 - 2.3 mg/dL   Phosphorus (Add on or recollect)   Result Value Ref Range    Phosphorus 3.1 2.5 - 4.5 mg/dL   XR Chest Port 1 View    Narrative    CHEST PORTABLE ONE VIEW September 26, 2017 6:11 AM     HISTORY: Evaluate for effusion.    COMPARISON: 9/25/2017.      Impression    IMPRESSION: Right basilar chest tube is no longer identified. No  pneumothorax visualized. Tracheostomy tube and enteric tube remain in  place.    Probable moderate left and small right pleural effusions do not appear  significantly changed. Interstitial opacities throughout the lungs  possibly representing interstitial edema are also unchanged. Cardiac  silhouette remains enlarged.    PHILLIP MILLS MD   Glucose by meter   Result Value Ref Range    Glucose 132 (H) 70 - 99 mg/dL

## 2017-09-26 NOTE — PLAN OF CARE
Problem: Goal Outcome Summary  Goal: Goal Outcome Summary  Outcome: No Change  VSS. Pt seems much more lethargic and sleepy today. CAM positive. Very little verbal response except moaning with repositioning. Sating well on trach dome at 35% with passy jeramy in place all day. Moderate amount of creamy sputum from inline suction. FUNMI drain removed. 3-4+ generalized and dependent edema. Good output from tanner and flexiseal this shift. TF infusing at goal.  Discharge to [facility] tomorrow. Courtney ARIAS updated on plan of care today.

## 2017-09-26 NOTE — PROGRESS NOTES
[facility]    Infectious Disease Progress Note    Date of Service (when I saw the patient): 09/26/2017     Assessment & Plan   Ara Stanley is a 72 year old female who was admitted on 9/19/2017.     Impression:  1 72 y.o female admitted in the hospital last month.   2 Initial presentation was of acute abdomen taken to OR for cholecystectomy. Still has persistent fluid collection with drain in place.   3 Taken back to the OR 4 days later with concern for peritonitis, found to have gastric perforation of an ulcer which was repaired. C albicans in cx  4 Post-operative course has been complicated with multiple intubations, trials of weaning off the vent and now with trach. HCAP. Sputum stento, Pseudomonas   6  Renal insuff.   7 C diff history  8  Multiple readmission to [facility] and [facility].   9 Left sided empyema in July. Cultures positive for pseudomonas, recently on zosyn.   10 PCN listed as allergy but has tolerated zosyn, meropenem in past.   11 Readmitted this time again with empyema but right side and cultures positive for Pseudomonas.          Recommendations:   Diffuse rash all over upper and lower extremities and also torso, ? Cefepime.   Discussed with Pharmacy ICU, if already on appropriate antibiotics ( counting zosyn given outpatiently) for pseudomonas for about 3 -4 weeks will stop given rash and positive C diff.      Still continues to have significant abdominal distention and tenderness along with fluid ( still there )which has been present for months now, ? Surgery evaluation/ ? IR management.     Patient has been in and out of multiple facilities in past months, functional status to me appears to be worsening, ? Goals of care discussion, ? Family involvement. Noted plans for today.            Celso Birch MD    Interval History   Afebrile.  Creat 1.9.  No new pos cxs.  WBC 12    Physical Exam   Temp: 98.1  F (36.7  C) Temp src: Oral BP: 132/73   Heart Rate: 87 Resp: 17 SpO2:  97 % O2 Device: Trach dome Oxygen Delivery: Other (Comments) (30%)  Vitals:    09/24/17 0400 09/25/17 0600 09/26/17 0513   Weight: 88 kg (194 lb 0.1 oz) 89.2 kg (196 lb 10.4 oz) 86.5 kg (190 lb 11.2 oz)     Vital Signs with Ranges  Temp:  [97.8  F (36.6  C)-98.2  F (36.8  C)] 98.1  F (36.7  C)  Heart Rate:  [86-93] 87  Resp:  [11-19] 17  BP: (104-146)/() 132/73  FiO2 (%):  [35 %] 35 %  SpO2:  [95 %-100 %] 97 %    Constitutional: lethargic   Diffuse anasarca   Lungs: right sided chest tube  Cardiovascular: Regular rate and rhythm, normal S1 and S2, and no murmur noted  Abdomen: drain in place, distended painful  Skin: diffuse rash   Other:    Medications        furosemide  60 mg Intravenous BID     QUEtiapine  25-50 mg Oral At Bedtime     FIRST-VANCOMYCIN 50  125 mg Oral 4x Daily     pantoprazole  40 mg Oral or Feeding Tube Daily     protein modular  1 packet Per Feeding Tube Q12H     atorvastatin (LIPITOR) tablet 10 mg  10 mg Oral QPM     budesonide  0.5 mg Nebulization BID     busPIRone (BUSPAR) tablet 7.5 mg  7.5 mg Oral BID     calcium carbonate  500 mg Oral TID     ipratropium - albuterol 0.5 mg/2.5 mg/3 mL  1 vial Nebulization 4x Daily     sertraline (ZOLOFT) tablet 150 mg  150 mg Per J Tube Daily     cholecalciferol  1,000 Units Oral Daily     bacitracin   Topical BID     chlorhexidine  15 mL Mouth/Throat Q12H     insulin aspart  1-4 Units Subcutaneous Q4H     heparin  5,000 Units Subcutaneous Q8H       Data   All microbiology laboratory data reviewed.  Recent Labs   Lab Test  09/26/17   0505  09/25/17   0335  09/24/17   1750   WBC  12.0*  11.3*  12.1*   HGB  8.4*  8.3*  8.3*   HCT  25.2*  25.2*  25.2*   MCV  88  88  88   PLT  308  321  237     Recent Labs   Lab Test  09/26/17   0505  09/25/17   0420  09/24/17   0840   CR  1.90*  1.78*  1.87*     Recent Labs   Lab Test  06/04/12   0527   SED  34*     Recent Labs   Lab Test  09/24/17   1005  09/24/17   0954  09/14/17   1120  08/01/17   1402  07/31/17    1600  07/30/17   1720  07/30/17   1538  07/28/17   1355  07/27/17   1120   CULT  No growth after 2 days  No growth after 2 days  No anaerobes isolated  Since this specimen was not transported in the proper anaerobic transport media, the   absence of anaerobes in this culture does not rule out the presence of anaerobes in this   specimen.    Light growth  Pseudomonas aeruginosa  *  Critical Value/Significant Value, preliminary result only, called to and read back by  Roxanne Tamez N.P at 1429 on 09.15.17 by mp    No anaerobes isolated  No growth  On day 1, isolated in broth only: Gram positive cocci Organism nonviable on   subculture  *  No growth  No growth  Culture negative for acid fast bacilli  Assayed at [employer] [employer] [address]  Culture negative after 4 weeks  No anaerobes isolated  No growth  Canceled, Test credited Test reordered as correct code REORDERED AS A TISSUE   CULTURE    Canceled, Test credited Test reordered as correct code REORDERED AS A TISSUE   CULTURE    Light growth Coagulase negative Staphylococcus  These bacteria are part of normal skin silvino, but on occasion, may be true   pathogens.  Clinical correlation must be applied to interpreting this   microbiology result.  Susceptibility testing not routinely done  *

## 2017-09-27 NOTE — PLAN OF CARE
Intermittent tachycardic afib overnight, blood pressure remained stable, rate high of 150's.  Dr. Goff notified, metoprolol 5 mg IV ordered, pt had converted out of rhythm before pharmacy verified, order remains an available PRN for rate greater than 110.  No changes in respiratory status, pt becomes anxious when in need of suctioning.  Good urine output, liquid stool continues.  Will continue to monitor.

## 2017-09-27 NOTE — PROGRESS NOTES
Patient on trach dome overnight.  Increased work of breathing this am, lung sounds coarse.  Suctioned small to moderate amount of secretions from trach.  Neb treatment given. RR remained 22-28bpm.  Placed back on vent in CMV per MD.  Work of breathing improved once back on vent.  Will continue to monitor.

## 2017-09-27 NOTE — PROVIDER NOTIFICATION
Pt w/increased coarse breath sounds from this morning.  RT present, suctioned via trach and given neb.  Pt given scheduled AM lasix.  Discussed w/A. SANTHOSH Pendleton. Pt cxr this morning w/known pleural effusions.  Remains Afebrile, VSS Pt placed on PS on vent.  Plan for transfer to [facility].

## 2017-09-27 NOTE — PLAN OF CARE
Problem: Goal Outcome Summary  Goal: Goal Outcome Summary  Outcome: No Change  Pt awake, minimal attempts at verbal responses.  Pt follows some commands but intermittent, WORTHY.  Lungs coarse, suctioned via trach x2.  On CMV 35%, cuff leak noted. Rhythm sinus 70'2, no afib noted, VSS.  Pt w/continued diarrhea via rectal tube.       Report called to [facility].  Plan for brovina trach to be exchanged at [facility] after transfer per Intensivist team.

## 2017-09-27 NOTE — PLAN OF CARE
Problem: Goal Outcome Summary  Goal: Goal Outcome Summary  SLP: Orders were received and chart reviewed. Per RT patient had increased work in breathing today and placed back on the vent. Patient is scheduled to discharge to [facility] this afternoon. Will defer evaluation with [facility] [place] Valve (which she has been tolerating on trach dome,) and swallowing to [facility].

## 2017-09-27 NOTE — PROGRESS NOTES
Care Coordination:    LT[facility] to-do list (x when completed)    Pt will go to [facility] per  Ruy ([phone])    _x_ Set ride time & completed [facility] transfer form: 5:00pm  _x_ Nurse-to-nurse report   _x_ MD-to-MD handoff     Left VM for JUANA Valdez at 1140, then at 1550 confirmed Courtney knew pt has d/c time of 5:00pm.      Liane Lara RN, BSN  [facility] Care Coordinator   Mobile Phone: [phone]

## 2017-09-27 NOTE — PROGRESS NOTES
Brief Palliative Progress Note.    Chart reviewed, spoke with ICU NP Isabella. Goals are clear at this time, pt scheduled to d/c back to [facility] today. We will sign off. Please do not hesitate to re-consult our team if symptom needs arise or pt has a change in status and further goals conversations are warranted.    Rain MENJIVAR CNP  Pager: [phone]  Palliative Medicine  September 27, 2017

## 2017-09-27 NOTE — PROGRESS NOTES
Pt stable on HFTD 35% 30 LPM throughout shift. BBS diminished, SpO2 98%. Will continue to follow.     Alvin Galeano RT

## 2017-09-27 NOTE — PROGRESS NOTES
[facility]    Infectious Disease Progress Note    Date of Service (when I saw the patient): 09/27/2017     Assessment & Plan   Aar Stanley is a 72 year old female who was admitted on 9/19/2017.     Impression:  1 72 y.o female admitted in the hospital last month.   2 Initial presentation was of acute abdomen taken to OR for cholecystectomy. Still has persistent fluid collection with drain in place.   3 Taken back to the OR 4 days later with concern for peritonitis, found to have gastric perforation of an ulcer which was repaired. C albicans in cx  4 Post-operative course has been complicated with multiple intubations, trials of weaning off the vent and now with trach. HCAP. Sputum stento, Pseudomonas   6  Renal insuff.   7 C diff history  8  Multiple readmission to [place] and [facility].   9 Left sided empyema in July. Cultures positive for pseudomonas, recently on zosyn.   10 PCN listed as allergy but has tolerated zosyn, meropenem in past.   11 Readmitted this time again with empyema but right side and cultures positive for Pseudomonas.          Recommendations:   Diffuse rash all over upper and lower extremities and also torso, ? Cefepime.   Still continues to have significant abdominal distention and tenderness along with fluid ( still there )which has been present for months now, gelatinous fluid not amenable to drainage and cultures positive for pseudomonas and GPC yet to be identified.       Given rash with cefepime and now cultures from the abdomen positive for Pseudomonas and another GPC means active infection in the abdomen, if not amenable to drainage, and surgery not indicated, antibiotics alone will not make it go away. Please discuss with POA this finding, I would recommend keeping here following up on the cultures, either aggressive management of the fluid in the abdomen which has been present for months or considering hospice care.         Patient has been in and out of  multiple facilities in past months, functional status to me appears to be worsening, ? Goals of care discussion, ? Family involvement. .            Celso Birch MD    Interval History   Afebrile.  Creat 1.9.  No new pos cxs.  WBC 12    Physical Exam   Temp: 98.6  F (37  C) Temp src: Oral BP: (!) 147/93   Heart Rate: 81 Resp: 15 SpO2: 96 % O2 Device: Mechanical Ventilator Oxygen Delivery: Other (Comments) (30lpm)  Vitals:    09/25/17 0600 09/26/17 0513 09/27/17 0400   Weight: 89.2 kg (196 lb 10.4 oz) 86.5 kg (190 lb 11.2 oz) 85.4 kg (188 lb 4.4 oz)     Vital Signs with Ranges  Temp:  [97.8  F (36.6  C)-99  F (37.2  C)] 98.6  F (37  C)  Heart Rate:  [] 81  Resp:  [14-27] 15  BP: ()/() 147/93  FiO2 (%):  [35 %] 35 %  SpO2:  [95 %-100 %] 96 %    Constitutional: lethargic   Diffuse anasarca   Lungs: right sided chest tube  Cardiovascular: Regular rate and rhythm, normal S1 and S2, and no murmur noted  Abdomen: drain in place, distended painful  Skin: diffuse rash   Other:    Medications        metoprolol  25 mg Oral BID     furosemide  40 mg Intravenous BID     FIRST-VANCOMYCIN 50  125 mg Oral 4x Daily     pantoprazole  40 mg Oral or Feeding Tube Daily     protein modular  1 packet Per Feeding Tube Q12H     atorvastatin (LIPITOR) tablet 10 mg  10 mg Oral QPM     budesonide  0.5 mg Nebulization BID     busPIRone (BUSPAR) tablet 7.5 mg  7.5 mg Oral BID     calcium carbonate  500 mg Oral TID     ipratropium - albuterol 0.5 mg/2.5 mg/3 mL  1 vial Nebulization 4x Daily     sertraline (ZOLOFT) tablet 150 mg  150 mg Per J Tube Daily     cholecalciferol  1,000 Units Oral Daily     bacitracin   Topical BID     chlorhexidine  15 mL Mouth/Throat Q12H     insulin aspart  1-4 Units Subcutaneous Q4H     heparin  5,000 Units Subcutaneous Q8H       Data   All microbiology laboratory data reviewed.  Recent Labs   Lab Test  09/27/17   0809  09/26/17   0505  09/25/17   0335   WBC  12.0*  12.0*  11.3*   HGB  8.3*  8.4*   8.3*   HCT  25.2*  25.2*  25.2*   MCV  88  88  88   PLT  329  308  321     Recent Labs   Lab Test  09/27/17   0809  09/26/17   0505  09/25/17   0420   CR  1.97*  1.90*  1.78*     Recent Labs   Lab Test  06/04/12   0527   SED  34*     Recent Labs   Lab Test  09/25/17   0930  09/24/17   1005  09/24/17   0954  09/14/17   1120  08/01/17   1402  07/31/17   1600  07/30/17   1720  07/30/17   1538  07/28/17   1355   CULT  Light growth  Pseudomonas aeruginosa  *  Critical Value/Significant Value, preliminary result only, called to and read back by  CARLEY EASLEY RN 9/26/17 1102. LEONOR    Light growth  Gram positive cocci  *  No growth after 3 days  No growth after 3 days  No anaerobes isolated  Since this specimen was not transported in the proper anaerobic transport media, the   absence of anaerobes in this culture does not rule out the presence of anaerobes in this   specimen.    Light growth  Pseudomonas aeruginosa  *  Critical Value/Significant Value, preliminary result only, called to and read back by  Roxanne Tamez N.P at 1429 on 09.15.17 by mp    No anaerobes isolated  No growth  On day 1, isolated in broth only: Gram positive cocci Organism nonviable on   subculture  *  No growth  No growth  Culture negative for acid fast bacilli  Assayed at [facility] [address]  Culture negative after 4 weeks  No anaerobes isolated  No growth  Canceled, Test credited Test reordered as correct code REORDERED AS A TISSUE   CULTURE    Canceled, Test credited Test reordered as correct code REORDERED AS A TISSUE   CULTURE

## 2017-09-28 NOTE — DISCHARGE INSTRUCTIONS
TF Isosource 1.5 (or equivalent) at 45 mL/hr x 24 hrs continuous via NG tube = 1620 kcals, 73 gm pro, 820 mL H20, 16 gm fiber, 190 gm CHO  ProSource 2 packets per day = 80 kcals, 22 gm pro

## 2017-09-28 NOTE — PLAN OF CARE
Problem: Goal Outcome Summary  Goal: Goal Outcome Summary  SLP: Attempted to see patient for an evaluation for Passy Greg Valve (PMV) and swallowing. Patient known to this writer and has been using the PMV on her last admit in August, 2017. She was then discharged to [facility]. Since her admit here RT has been placing the PMV on for communication as tolerated. When I went to her bedside she was on trach dome  FiO2 35%  with the PMV on. She was able to state in 1-3 word phrase that she was uncomfortable and wanted the valve off. Compared to when I saw her last she is below her baseline with the speaking valve. Per the nurse practitioner she is discharging back to [facility] today. Recommend:1. Re-evaluate her tolerance of the PMV at [facility] if discharged today. If here tomorrow will re-attempt the evaluation. 2. Keep NPO with TF only at this time and will need to re-assess her swallow function, when better able to tolerate the valve.

## 2017-09-28 NOTE — CONSULTS
[facility]  General Surgery Consultation         Jr Buckley MD    Ara Stanley MRN# [id number]   YOB: 1945 Age: 72 year old      Date of Admission:  9/19/2017  Date of Consult: 9/28/2017         Assessment and Plan:   72-year-old female with complicated surgical history now with loculated abdominal fluid collection and positive cultures.  Whether this fluid collection is contributing to her lack of clinical progress is unknown.  She appears to be hemodynamically stable at this point and I do not advocate surgical exploration for drainage of this collection.  If she is discharged to LTAC and has obvious clinical deterioration, we may even forced to try to surgically drain this collection.  Unfortunately, it appears that open surgical drainage is the only likely means of effectively draining this, which would obviously be difficult for this patient to tolerate.  Case was discussed with Dr. Birch and Dr. Hyatt.  Surgical co-morbities include multiple abdominal surgeries, diabetes, critical illness.            Code Status:   Full Code         Primary Care Physician:   Shar James [phone]         Requesting Physician:      Eyal         Chief Complaint:   Abdominal fluid    History is obtained from the patient         History of Present Illness:   Ara Stanley is a 72 year old female who presented with a large abdominal fluid collection.  Patient is known to our service from previous cholecystectomy.  This was followed by laparoscopic repair of gastric ulcer approximately one week later.  Patient has had a francis hospital course with numerous admissions for abdominal infection and pulmonary issues.  She is currently trached.  She has had a drain in this fluid collection four months but no longer has significant output.  Drainage has grown multiple organisms and we are asked to evaluate for possible surgical drainage.           Past Medical  History:   Ara Stanley  has a past medical history of Abdominal abscess (H); Acute blood loss anemia; Acute hypercapnic respiratory failure (H); WOODY (acute kidney injury) (H); Alcohol abuse; Alcohol withdrawal (H); Asthma; Atrial fibrillation (H); Cholecystitis; Closed right hip fracture (H); Clostridium difficile colitis; COPD (chronic obstructive pulmonary disease) (H); Depression (a); Dyslipidemia; Empyema (H); GI bleed; Herpes zoster; Hypertension; Mild reactive airways disease; Osteoarthritis; Perforated ulcer (H); Pseudoaneurysm (H); Septic shock (H); Takotsubo cardiomyopathy; and Ventilator dependence (H).         Past Surgical History:   Ara Stanley  has a past surgical history that includes GYN surgery; ENT surgery; orthopedic surgery; Remove hardware hip nailing (6/4/2012); Arthroplasty hip (6/4/2012); Laparotomy exploratory (N/A, 6/2/2017); Laparoscopic cholecystectomy (N/A, 6/2/2017); Laparoscopy diagnostic (general) (N/A, 6/6/2017); Tracheostomy (N/A, 6/13/2017); Thoracoscopic wedge resection lung (Left, 7/28/2017); and Thoracoscopic decortication lung (7/28/2017).         Home Medications:     Prior to Admission medications    Medication Sig Last Dose Taking? Auth Provider   Heparin Sodium, Porcine, (HEPARIN SODIUM PF) 5000 UNIT/0.5ML injection Inject 0.5 mLs (5,000 Units) Subcutaneous every 8 hours  Yes Isabella Gutierrez APRN CNP   QUEtiapine (SEROQUEL) 25 MG tablet Take 1-2 tablets (25-50 mg) by mouth nightly as needed (insomnia; delirium)  Yes Isabella Gutierrez APRN CNP   metoprolol (LOPRESSOR) 25 MG tablet Take 1 tablet (25 mg) by mouth 2 times daily  Yes Isabella Gutierrez APRN CNP   bacitracin 500 UNIT/GM OINT Apply topically 2 times daily for 7 days Apply to chest tube insertion site with dressing changes twice daily  Yes Isabella Gutierrez APRN CNP   furosemide (LASIX) 10 MG/ML injection Inject 4 mLs (40 mg) into the vein 2 times daily  Yes Isabella Gutierrez APRN CNP    diphenhydrAMINE (BENADRYL) 50 MG/ML injection Inject 0.5-1 mLs (25-50 mg) into the vein every 12 hours as needed for itching  Yes Isabella Gutierrez APRN CNP   protein modular (PROSOURCE TF) 1 packet by Per Feeding Tube route every 12 hours  Yes Isabella Gutierrez APRN CNP   Vancomycin HCl (FIRST-VANCOMYCIN 50) 50 MG/ML SOLN Take 2.5 mLs (125 mg) by mouth 4 times daily for 10 days  Yes Isabella Gutierrez APRN CNP   HYDROMORPHONE HCL PO Take 1 mg by mouth every 4 hours as needed for other (Pain rated 3 to 7)  at prn Yes Unknown, Entered By History   HYDROMORPHONE HCL IJ Inject 0.6 mg as directed every 4 hours as needed (Pain rated 8 to 10)  at prn Yes Unknown, Entered By History   calcium carbonate (TUMS) 500 MG chewable tablet Take 1 chew tab by mouth 3 times daily 9/19/2017 at 1423 Yes Unknown, Entered By History   VITAMIN D, CHOLECALCIFEROL, PO Take 1,000 Units by mouth daily 9/19/2017 at 0930 Yes Unknown, Entered By History   BusPIRone HCl (BUSPAR PO) Take 7.5 mg by mouth 2 times daily 9/19/2017 at 0930 Yes Unknown, Entered By History   pantoprazole (PROTONIX) SUSP suspension Take 40 mg by mouth daily 9/19/2017 at 0929 Yes Unknown, Entered By History   ATORVASTATIN CALCIUM PO Take 10 mg by mouth daily 9/18/2017 at pm Yes Unknown, Entered By History   ondansetron (ZOFRAN-ODT) 4 MG ODT tab Take 1 tablet (4 mg) by mouth every 6 hours as needed for nausea or vomiting  at prn Yes Isabella Guteirrez APRN CNP   chlorhexidine (PERIDEX) 0.12 % solution Take 15 mLs by mouth every 12 hours 9/19/2017 at 0932 Yes Isabella Gutierrez APRN CNP   ipratropium - albuterol 0.5 mg/2.5 mg/3 mL (DUONEB) 0.5-2.5 (3) MG/3ML neb solution Take 1 vial (3 mLs) by nebulization every 4 hours as needed for shortness of breath / dyspnea  at prn Yes Isabella Gutierrez, OTTO CNP   albuterol (2.5 MG/3ML) 0.083% neb solution Take 1 vial by nebulization every 2 hours as needed for wheezing  Yes Unknown, Entered By History   ALPRAZOLAM PO 0.25  mg by Per J Tube route 3 times daily as needed for anxiety   at prn Yes Unknown, Entered By History   budesonide (PULMICORT) 0.5 MG/2ML neb solution Take 0.5 mg by nebulization 2 times daily 9/19/2017 at 0805 Yes Unknown, Entered By History   Sertraline HCl (ZOLOFT PO) 150 mg by Per J Tube route daily 9/19/2017 at 0932 Yes Unknown, Entered By History   ipratropium - albuterol 0.5 mg/2.5 mg/3 mL (DUONEB) 0.5-2.5 (3) MG/3ML neb solution Take 1 vial by nebulization 4 times daily  9/19/2017 at 1105 Yes Unknown, Entered By History   ACETAMINOPHEN PO 1,000 mg by Per J Tube route every 8 hours as needed for pain   at prn Yes Unknown, Entered By History   TRAZODONE HCL PO 50 mg by Per J Tube route nightly as needed for sleep   at prn Yes Unknown, Entered By History            Current Medications:           levofloxacin  500 mg Intravenous Q48H     metoprolol  25 mg Oral BID     furosemide  40 mg Intravenous BID     FIRST-VANCOMYCIN 50  125 mg Oral 4x Daily     pantoprazole  40 mg Oral or Feeding Tube Daily     protein modular  1 packet Per Feeding Tube Q12H     atorvastatin (LIPITOR) tablet 10 mg  10 mg Oral QPM     budesonide  0.5 mg Nebulization BID     busPIRone (BUSPAR) tablet 7.5 mg  7.5 mg Oral BID     calcium carbonate  500 mg Oral TID     ipratropium - albuterol 0.5 mg/2.5 mg/3 mL  1 vial Nebulization 4x Daily     sertraline (ZOLOFT) tablet 150 mg  150 mg Per J Tube Daily     cholecalciferol  1,000 Units Oral Daily     bacitracin   Topical BID     chlorhexidine  15 mL Mouth/Throat Q12H     heparin  5,000 Units Subcutaneous Q8H     metoprolol, QUEtiapine, HYDROmorphone, diphenhydrAMINE, HYDROmorphone, midazolam, magnesium sulfate, magnesium sulfate, acetaminophen (TYLENOL) tablet 1,000 mg, albuterol, ipratropium - albuterol 0.5 mg/2.5 mg/3 mL, traZODone (DESYREL) tablet 50 mg, ALPRAZolam (XANAX) tablet 0.25 mg, naloxone, ondansetron, glucose **OR** dextrose **OR** glucagon         Allergies:     Allergies   Allergen  "Reactions     Iodine I 131 Tositumomab Anaphylaxis     Can tolerate topical iodine if it is wahed off after surgery      Penicillins Rash and Anaphylaxis     Swollen  lymph nodes, flushed overheating      Cefepime Rash     Diffuse rash developed 5 days after start of Cefepime.     Sulfa Drugs Nausea and Vomiting, Diarrhea and Rash            Social History:   Ara Stanley  reports that she quit smoking about 25 years ago. Her smoking use included Cigarettes. She has a 10.00 pack-year smoking history. She does not have any smokeless tobacco history on file. She reports that she drinks alcohol. She reports that she does not use illicit drugs.          Family History:   Ara Stanley family history is not on file.          Review of Systems:   The 10 point Review of Systems is negative other than noted in the HPI.  Unable to obtain full review of systems owing to the patient being on the ventilator.           Physical Exam:   Blood pressure 136/73, temperature 98.5  F (36.9  C), temperature source Axillary, resp. rate 18, height 1.676 m (5' 5.98\"), weight 81.5 kg (179 lb 10.8 oz), SpO2 100 %.  179 lbs 10.8 oz    Ventilated female with some responsiveness in no distress.  Pupils equal round and reactive to light.   No cervical lymphadenopathy or thyromegaly.   Lung fields clear, breathing comfortably with tracheostomy in place  Heart normal sinus rhythm.  No murmurs rubs or gallops.  Abdomen soft, nontender, nondistended.  Well-healed surgical scars and drain sites.  No peritoneal signs or rebound.  Skin warm, dry.  No obvious rashes or lesions.           Data:   All new lab and imaging data was reviewed including previous operative reports, recent labs, and most recent CT scan of the abdomen.  Recent Labs   Lab Test  09/28/17   0945  09/27/17   0809   09/24/17   1125   09/20/17   0030   WBC  11.8*  12.0*   < >  12.7*   < >  12.6*   HGB  7.8*  8.3*   < >  6.6*   < >  7.6*   MCV  87  88   < >  89   < >  " 91   PLT  335  329   < >  337   < >  273   INR   --    --    --   1.58*   --   1.35*    < > = values in this interval not displayed.      Recent Labs   Lab Test  09/28/17   0945  09/27/17   0809   NA  135  137   POTASSIUM  3.8  4.0   CHLORIDE  98  101   CO2  26  25   BUN  87*  81*   CR  1.98*  1.97*   ANIONGAP  11  11   CARYL  8.7  8.5   GLC  107*  127*

## 2017-09-28 NOTE — PROGRESS NOTES
[facility]    Infectious Disease Progress Note    Date of Service (when I saw the patient): 09/28/2017     Assessment & Plan   Ara Stanley is a 72 year old female who was admitted on 9/19/2017.     Impression:  1 72 y.o female admitted in the hospital last month.   2 Initial presentation was of acute abdomen taken to OR for cholecystectomy. Still has persistent fluid collection with drain in place.   3 Taken back to the OR 4 days later with concern for peritonitis, found to have gastric perforation of an ulcer which was repaired. C albicans in cx  4 Post-operative course has been complicated with multiple intubations, trials of weaning off the vent and now with trach. HCAP. Sputum stento, Pseudomonas   6  Renal insuff.   7 C diff history  8  Multiple readmission to [place] and [facility].   9 Left sided empyema in July. Cultures positive for pseudomonas, recently on zosyn.   10 PCN listed as allergy but has tolerated zosyn, meropenem in past.   11 Readmitted this time again with empyema but right side and cultures positive for Pseudomonas.          Recommendations:   Diffuse rash all over upper and lower extremities and also torso, ? Cefepime.   Still continues to have significant abdominal distention and tenderness along with fluid ( still there )which has been present for months now, gelatinous fluid not amenable to drainage and cultures positive for pseudomonas and GPC yet to be identified.       Cultures from the abdomen positive for Pseudomonas and another GPC could mean active infection in the abdomen, though given not much of systemic symptoms could represent colonization also, if not amenable to drainage, and surgery not indicated, antibiotics alone will not make it go away. Also given rash with Beta lactams and current active C diff makes long term antibiotics use not ideal in her case. Also if all the collection is a big gelatinous mass doubt systemic antibiotics are of any use  here.     Please discuss with POA this finding, My recommendations stay the same either aggressive management of the fluid in the abdomen which has been present for months or considering hospice care.     Discussed Dr. Buckley and Dr. Cuevas, poor candidate for surgery. Will be discharging back to [facility]. Watch for symptoms, if any fever or chills, worsening hemodynamic instability will have to consider surgery or comfort oriented care at that time                    Celso Birch MD    Interval History   Afebrile.  Creat 1.9.  No new pos cxs.  WBC 12    Physical Exam   Temp: 98.5  F (36.9  C) Temp src: Axillary BP: 140/69   Heart Rate: 87 Resp: 20 SpO2: 100 % O2 Device: Mechanical Ventilator Oxygen Delivery: Other (Comments) (30lpm)  Vitals:    09/26/17 0513 09/27/17 0400 09/28/17 0600   Weight: 86.5 kg (190 lb 11.2 oz) 85.4 kg (188 lb 4.4 oz) 81.5 kg (179 lb 10.8 oz)     Vital Signs with Ranges  Temp:  [97.8  F (36.6  C)-99  F (37.2  C)] 98.5  F (36.9  C)  Heart Rate:  [73-98] 87  Resp:  [11-30] 20  BP: (115-156)/(62-93) 140/69  FiO2 (%):  [35 %] 35 %  SpO2:  [95 %-100 %] 100 %    Constitutional: lethargic   Diffuse anasarca   Lungs: right sided chest tube  Cardiovascular: Regular rate and rhythm, normal S1 and S2, and no murmur noted  Abdomen: drain in place, distended painful  Skin: diffuse rash   Other:    Medications        metoprolol  25 mg Oral BID     furosemide  40 mg Intravenous BID     FIRST-VANCOMYCIN 50  125 mg Oral 4x Daily     pantoprazole  40 mg Oral or Feeding Tube Daily     protein modular  1 packet Per Feeding Tube Q12H     atorvastatin (LIPITOR) tablet 10 mg  10 mg Oral QPM     budesonide  0.5 mg Nebulization BID     busPIRone (BUSPAR) tablet 7.5 mg  7.5 mg Oral BID     calcium carbonate  500 mg Oral TID     ipratropium - albuterol 0.5 mg/2.5 mg/3 mL  1 vial Nebulization 4x Daily     sertraline (ZOLOFT) tablet 150 mg  150 mg Per J Tube Daily     cholecalciferol  1,000 Units Oral Daily      bacitracin   Topical BID     chlorhexidine  15 mL Mouth/Throat Q12H     insulin aspart  1-4 Units Subcutaneous Q4H     heparin  5,000 Units Subcutaneous Q8H       Data   All microbiology laboratory data reviewed.  Recent Labs   Lab Test  09/27/17   0809  09/26/17   0505  09/25/17   0335   WBC  12.0*  12.0*  11.3*   HGB  8.3*  8.4*  8.3*   HCT  25.2*  25.2*  25.2*   MCV  88  88  88   PLT  329  308  321     Recent Labs   Lab Test  09/27/17   0809  09/26/17   0505  09/25/17   0420   CR  1.97*  1.90*  1.78*     Recent Labs   Lab Test  06/04/12   0527   SED  34*     Recent Labs   Lab Test  09/25/17   0930  09/24/17   1005  09/24/17   0954  09/14/17   1120  08/01/17   1402  07/31/17   1600  07/30/17   1720  07/30/17   1538  07/28/17   1355   CULT  Light growth  Pseudomonas aeruginosa  *  Critical Value/Significant Value, preliminary result only, called to and read back by  CARLEY EASLEY RN 9/26/17 1102. LEONOR    Light growth  Gram positive cocci  *  No growth after 4 days  No growth after 4 days  No anaerobes isolated  Since this specimen was not transported in the proper anaerobic transport media, the   absence of anaerobes in this culture does not rule out the presence of anaerobes in this   specimen.    Light growth  Pseudomonas aeruginosa  *  Critical Value/Significant Value, preliminary result only, called to and read back by  Roxanne Tamez N.P at 1429 on 09.15.17 by evelia    No anaerobes isolated  No growth  On day 1, isolated in broth only: Gram positive cocci Organism nonviable on   subculture  *  No growth  No growth  Culture negative for acid fast bacilli  Assayed at [facility] [address]  Culture negative after 4 weeks  No anaerobes isolated  No growth  Canceled, Test credited Test reordered as correct code REORDERED AS A TISSUE   CULTURE    Canceled, Test credited Test reordered as correct code REORDERED AS A TISSUE   CULTURE

## 2017-09-28 NOTE — PROGRESS NOTES
[facility]    Critical Care Service  Progress Note    Date of Service (when I saw the patient): 09/28/2017     Assessment & Plan   Ara Stanley is a 72 year old female with complex past medical history and 4 month critical illness who was admitted on 9/19/2017 for management of right sided empyema. She was originally admitted 6/2 to [facility] with abdominal pain and had a lap alexandre, followed by abdominal perforation, ongoing respiratory failure requiring trach and PEG, and multiple pleural and abdominal infections.  Has remained in health care institution since initial admission 6/2/17. Now readmitted with large right pleural effusion/empyema, with cultures + for pseudomonas.       Main Plans for Today   Discharge pending plan regarding abdominal fluid collection.  Discussion held between attending Intensivist, General Surgery attending, and Infectious Diseases MD.  Plan to proceed conservatively and no general surgery intervention at this time.  Continue to monitor fluid collection. If patient becomes critically ill again as a result of this fluid collection, interventions will be discussed at that time.   Levofloxacin initiated by ID     Neuro  1. Anxiety/Depression   2. Acute delirium   3. Chronic pain   Plan:  -- Continue PTA Buspar and Zoloft. PRN trazodone at HS   --PRN dilaudid; acetaminophen for pain.  PTA med includes hydromorphone PO, PRN 1mg, Q4 hours   -- Patient with increased delirium today. Delirium prevention as able; attempt to maintain day/night schedule, sleep hygiene, minimize interruptions at HS to allow for uninterrupted sleep.   --Seroquel PRN at HS for poor sleep at night.     CV  1. HTN  2. New diastolic dysfunction on echo 9/21; EF 50-55%, RV dilation   Plan:  --Had been holding PTA metoprolol and hydralazine as normotensive without. Metoprolol resumed 9/27 at half PTA dose due to several episodes of A-Fib and borderline hypertension   --Cardiology consult given  changes on echocardiogram and 50lb weight gain in 6 weeks. Continue diuresis.  --Weight down 15 lbs since admission.     Resp:  1.  Prolonged respiratory failure after hospitalization in June.  Now to the point where she is on trach dome during the day but pressure support at night.  [facility] staff did not feel she was yet ready for decannulation; has not made significant progress this admission   2.  Empyema and recent pseudomonal pneuomina: pleural fluid growing pseudomonas on cultures drawn on 9/14.   Plan:  -- On trach dome overnight. Continue trach dome as tolerated. If not tolerating, place on PS at HS.  Has been using PS 10/5 at HS.    --Chest tube removed 9/25 by IR. Completed course of lytics in chest tube.  Had several additional doses over the weekend.  Episode of merrill blood from chest tube 9/24 and received 2 u PRBC, DDAVP, Vitamin K.   --CXR with persistent bilateral pleural effusions and interstitial infiltrates     GI/Nutrition  1. S/p exploratory laparotomy, followed by acute abdomen with gastric perforation in June 2017, with subsequent persistent abdominal fluid collections since that time with FUNMI drain placement and repositioning by gen surg and IR. All drains since removed   2. Recurrent C.diff infection   3. Feeding through [place]   4. Recent acute GI bleed; admitted and managed at [facility]   5. Elevated LDH, alk phos.    Plan:  --Oral Vancomycin x 14 days for recurrent c. Diff infection. Consider tapered dosing of vancomycin given recurrent infection.  --Enteric iso.  Flexiseal given large volume liquid stool   --Continue tube feeding; + additional protein given hypoalbuminemia, low protein intake, general anasarca.      --General surgery reconsulted 9/27 at request of ID to discuss evacuation of abdominal fluid collection, given failure of IR drainage, increasing size of collection, and now with Pseudomonas and gram + cocci in fluid culture. No surgical plan at this time.  Will not treat  with antibiotics. Continue to watch and treat conservatively   --RUQ abdominal  US obtained given elevated LDH, alk phos.  No abnormalities.  Gallbladder surgically removed.      Renal  1. Acute vs chronic kidney injury.  Creatinine increased over baseline from last admission.  Creatinine rising with continued lasix administration; but remains stable.  Admitted to [facility] end of August for acute GI bleed and appears to have sustained WOODY at that time.  Creatinine at time of discharge from [email or web address] 1.3-1.5 range.  Creatinine on admission 1.49>1.64>1.65 this AM.    2. Generalized anasarca and 50lb weight gain since last admission   Plan:  --Decrease Lasix to 40mg IV BID given increasing creatinine.  Creat elevated, but stable since decreasing dose. Daily weights. Making progress with diuresis. Down 12 kg since admission.   --Electrolyte replacement per protocol   --Repeat BMP daily; monitor closely for increasing creatinine/volume contraction     ID  1. Multiple abscesses and infections since June. Most recently had a pseudomonal pneumonia with empyema. Pleural fluid still growing pseudomonas. Persistent abdominal fluid collection; most recently with pseudomonas and now with pseudomonas plus gram + cocci. Has completed a course of Piperacillin/Tazobactam 9/4 prior to admission.  Cefepime initiated on admission    2. C. Diff infection; recurrent   Plan:   --Cefepime stopped 9/25 given diffuse rash.  Trialed off antibiotics.   (Hx of penicillin allergy)   --ID following.  Will continue to monitor wbc and fever curve. Will not empirically treat for abdominal fluid collection     --Procalcitonin 0.74 (0.45 on admission); mild, but not increasing, leukocytosis.   --Monitor for fevers, increasing leukocytosis.    --oral vancomycin x 14 days for recurrent c.diff. Consider tapering dose after 14 days given recurrent infection.    Endocrine  1. No active issues   Plan:  -- BG checks per ICU protocol  "    Heme:  1. acute on chronic blood loss anemia. Recent GI bleed with hemoglobin as low as 5 upon admission to [facility].   2. Recent bleeding event from chest tube following tPA administration. Received 2 units PRBC 9/24. Hgb stable in mid-8 range   Plan:  -- Monitor hemoglobin. Transfuse to keep > 7.0    Skin  1. Generalized anasarca   2. Diffuse rash; new   Plan:  --continue diuresis   --Cefepime stopped.  Monitor for evolution of rash.  Diphenhydramine available, but will try to avoid given delirium     General cares:  DVT Prophylaxis: Heparin SQ  GI Prophylaxis: PPI  Restraints: Restraints for medical healing needed: NO  Family update by me today: No   Current lines are required for patient management  Access: PICC on right arm     Isabella Gutierrez    Time Spent on this Encounter   Billing:  I spent 45 minutes bedside and on the inpatient unit today managing the critical care of Ara Stanley in relation to the issues listed in this note.    Interval History   Course reviewed. Sleeping much of last 24-36 hours.  Responds only \"yes\" or \"no\" to questions.  Had previously been delirious, but conversant.  Waiting for discussion between attending physician and ID and weigh in from general surgery regarding plan for abdominal fluid collection.     Physical Exam   Temp: 98.5  F (36.9  C) Temp src: Axillary Temp  Min: 97.8  F (36.6  C)  Max: 99  F (37.2  C) BP: 154/70   Heart Rate: 84 Resp: 28 SpO2: 99 % O2 Device: Trach dome    Vitals:    09/26/17 0513 09/27/17 0400 09/28/17 0600   Weight: 86.5 kg (190 lb 11.2 oz) 85.4 kg (188 lb 4.4 oz) 81.5 kg (179 lb 10.8 oz)     I/O last 3 completed shifts:  In: 1260 [NG/GT:180]  Out: 3250 [Urine:2750; Stool:500]    GEN: awake, lethargic, minimally conversant    EYES: PERRL, Anicteric sclera.   HEENT:  Normocephalic, atraumatic, trachea midline, trach secured, CPAP   CV: RRR, no gallops, rubs, or murmurs  PULM/CHEST: Clear diminished breath sounds bilaterally in " posterior fields with diffuse wheeze. Dressing over site of right chest tube.  Dressing CDI   GI: normal bowel sounds, soft, non-tender, no rebound tenderness or guarding, no masses. Improving generalized anasarca of flanks and groin. Tender over midline abdomen to palpation.  Abdominal drain site dry/scabbed.   : tanner catheter in place, urine yellow and clear  EXTREMITIES: Improving anasarcal.  Flank edema, groin edema, peripheral edema. Moving all extremities, peripheral pulses intact  NEURO: Cranial nerves II-XII grossly intact, no motor-sensory deficits noted  SKIN: Insertion sites clean and dry, no erythema. Minimal scattered rash on lower extremities.  Scattered scarring     PSYCH:  Affect: Delirious; lethargic    Imaging personally reviewed: CXR   ECG:SR     Medications        levofloxacin  500 mg Intravenous Q48H     metoprolol  25 mg Oral BID     furosemide  40 mg Intravenous BID     FIRST-VANCOMYCIN 50  125 mg Oral 4x Daily     pantoprazole  40 mg Oral or Feeding Tube Daily     protein modular  1 packet Per Feeding Tube Q12H     atorvastatin (LIPITOR) tablet 10 mg  10 mg Oral QPM     budesonide  0.5 mg Nebulization BID     busPIRone (BUSPAR) tablet 7.5 mg  7.5 mg Oral BID     calcium carbonate  500 mg Oral TID     ipratropium - albuterol 0.5 mg/2.5 mg/3 mL  1 vial Nebulization 4x Daily     sertraline (ZOLOFT) tablet 150 mg  150 mg Per J Tube Daily     cholecalciferol  1,000 Units Oral Daily     bacitracin   Topical BID     chlorhexidine  15 mL Mouth/Throat Q12H     heparin  5,000 Units Subcutaneous Q8H       Data     Recent Labs  Lab 09/28/17  0945 09/27/17  0809 09/26/17  0505 09/25/17  0420  09/24/17  1125 09/24/17  0840   WBC 11.8* 12.0* 12.0*  --   < > 12.7* 12.7*   HGB 7.8* 8.3* 8.4*  --   < > 6.6* 6.8*   MCV 87 88 88  --   < > 89 89    329 308  --   < > 337 341   INR  --   --   --   --   --  1.58*  --     137 135 135  --   --  136   POTASSIUM 3.8 4.0 4.1 4.3  --   --  4.4   CHLORIDE  98 101 101 102  --   --  103   CO2 26 25 24 24  --   --  23   BUN 87* 81* 73* 66*  --   --  63*   CR 1.98* 1.97* 1.90* 1.78*  --   --  1.87*   ANIONGAP 11 11 10 9  --   --  10   CARYL 8.7 8.5 8.6 8.2*  --   --  8.1*   * 127* 123* 98  --   --  109*   ALBUMIN  --   --   --  2.1*  --   --  2.1*   PROTTOTAL  --   --   --  6.7*  --   --  6.6*   BILITOTAL  --   --   --  0.4  --   --  0.4   ALKPHOS  --   --   --  123  --   --  143   ALT  --   --   --  13  --   --  16   AST  --   --   --  18  --   --  16   < > = values in this interval not displayed.  Recent Results (from the past 24 hour(s))   XR Chest Port 1 View    Narrative    CHEST PORTABLE ONE VIEW September 28, 2017 5:43 AM     HISTORY: Evaluate for effusion.    COMPARISON: 9/27/2017.      Impression    IMPRESSION: The patient is rotated to the left which slightly limits  evaluation. Tracheostomy tube and enteric tube remain in place.    Small layering bilateral pleural effusions and bibasilar atelectasis  and/or consolidation is unchanged. Left greater than right perihilar  interstitial opacities are unchanged and may represent asymmetric  vascular congestion versus pneumonia. Cardiac silhouette remains  enlarged.    PHILLIP MILLS MD

## 2017-09-28 NOTE — PROGRESS NOTES
CLINICAL NUTRITION SERVICES - REASSESSMENT NOTE    EVALUATION OF PROGRESS TOWARD GOALS   Diet:  CL with nectar thick liquids    Nutrition Support:  TF continues as below (Isosource 1.5 was substituted for Osmolite 1.2):    Nutrition Support Enteral:  Type of Feeding Tube: NG  Enteral Frequency:  Continuous  Enteral Regimen: Isosource 1.5 at 45 mL/hr  Total Enteral Provisions: 1620 kcal, 73 g protein, 820 mL H2O, 16 g fiber, 190 g CHO  Free Water Flush: 30 mL every 4 hours  Also receiving ProSource 1 pkt BID= 80 kcal, 22 g protein   Total = 1700 kcal (26 kcal/kg), 95 g protein (1.4 g/kg)    Intake/Tolerance:    No oral intake.  BM x1 large yesterday.  (+ C-Diff).  BUN 87 (H)  Cr 1.98 (H) - Upward trend in pt with h/o CKD  Na 135 (NL)  BGM:  133, 128, 123, 115 - acceptable.  Wt:  81.5 kg (down 10.3 kg since admit).  Pt with 4+ severe generalized edema.  I/O:  1260/3250.  Diuresing on Lasix.    Dosing Weight:  64.7 kg (re-adjusted wt for overwt)      ASSESSED NUTRITION NEEDS PER APPROVED PRACTICE GUIDELINES:  (Modified slightly using new DW)  Estimated Energy Needs:  6574-6269 kcals (25-30 Kcal/Kg)  Justification: overweight  Estimated Protein Needs:  78-97 grams protein (1.2-1.5 g pro/Kg)  Justification: Repletion, hypercatabolism with critical illness and CKD    NEW FINDINGS:   POA has indicated she believes that patient would want to continue full cares.    Pt will transfer to [facility] today.    Previous Goals (9/25):   Isosource 1.5 at 45 mL/hr + ProSource BID will continue to meet % needs  Evaluation: Met    Previous Nutrition Diagnosis (9/25):   No nutrition diagnosis identified at this time   Evaluation: No change      CURRENT NUTRITION DIAGNOSIS  No nutrition diagnosis identified at this time     INTERVENTIONS  Recommendations / Nutrition Prescription  Continue Isosource 1.5 at 45 mL/hr + ProSource 1 pkt BID as above     Implementation  Collaboration and Referral of Nutrition care - Discussed pt during ICU  interdisciplinary rounds this morning.  TF Discharge Orders - These were entered in EPIC.    Goals  TF Isosource 1.5 at 45 mL/hr + ProSource BID will meet % estimated needs while intake poor.    MONITORING AND EVALUATION:  Progress towards goals will be monitored and evaluated per protocol and Practice Guidelines  - Check renal function and fluid status and make TF adjustments accordingly (May need to back down on protein delivery)    Kayleigh Roman, JOSE, LD, CNSC

## 2017-09-28 NOTE — PROGRESS NOTES
Received order to discharge to [facility].  Ride arranged via [employer] for 5pm today.  Ride confirmed with Luc at  dispatch and PCS form updated and placed with discharge envelope. Jemima Redmondsion for [facility] updated with transport time.  Will continue to follow and assist with discharge planning. JUANA Valdez also updated with discharge plan.    2:50 PM   Updated Luc at  that patient may require vent support per bedside RN for transport and he confirmed that rig would come with vent in case needed.

## 2017-09-28 NOTE — PROGRESS NOTES
[facility] ICU RESPIRATORY NOTE  Date of Admission: 9/19/17  Date of Intubation (most recent): Trach  Reason for Mechanical Ventilation: Empyema  Number of Days on Mechanical Ventilation: 10  Met Criteria for Pressure Support Trial: yes  Length of Pressure Support Trial:  Reason for Stopping Pressure Support Trial:  Reason for No Pressure Support Trial: resting for the night    Significant Events Today: none during this shift    ABG Results: no ABG      Plan:  Pt remains full vent support for the night, RT will assess for trach dome in the morning.  9/28/2017  Zelda Camp

## 2017-12-28 NOTE — PROGRESS NOTES
SPIRITUAL HEALTH SERVICES  Progress Note  [facility] ICU    Unit asked SH to see pt because her   recently.  Pt isn't able to go to .  Pt was sleeping when I came to room but woke when I called her name.  She is unable to talk because of trach.  I couldn't lip read but she did indicate that she would like a prayer for her .  I provided that and wished her well as she gets ready to discharge to [facility].  She seemed tired so I kept the visit short.      Deya Romero  Chaplain Resident  Pager [phone]   GENERAL SURGERY DAILY PROGRESS NOTE:     Subjective: Patient seen and examined. Patient stable with no overnight events. Patient denies CP/ SOB/ Palpatations. Patient denies N/V. Reports  flatus and  BM.     MEDICATIONS  (STANDING):  acetaminophen  IVPB. 1000 milliGRAM(s) IV Intermittent once  ascorbic acid 250 milliGRAM(s) Oral daily  aspirin enteric coated 81 milliGRAM(s) Oral daily  atorvastatin 80 milliGRAM(s) Oral at bedtime  carvedilol 6.25 milliGRAM(s) Oral every 12 hours  enoxaparin Injectable 40 milliGRAM(s) SubCutaneous daily  ertapenem  IVPB      ertapenem  IVPB 1000 milliGRAM(s) IV Intermittent every 24 hours  insulin glargine Injectable (LANTUS) 24 Unit(s) SubCutaneous at bedtime  insulin lispro (HumaLOG) corrective regimen sliding scale   SubCutaneous three times a day before meals  insulin lispro (HumaLOG) corrective regimen sliding scale   SubCutaneous at bedtime  insulin lispro Injectable (HumaLOG) 10 Unit(s) SubCutaneous three times a day before meals  linezolid    Tablet 600 milliGRAM(s) Oral every 12 hours  lisinopril 10 milliGRAM(s) Oral daily  multivitamin 1 Tablet(s) Oral daily  sodium chloride 0.9% lock flush 3 milliLiter(s) IV Push every 8 hours    MEDICATIONS  (PRN):  acetaminophen   Tablet. 650 milliGRAM(s) Oral every 6 hours PRN Mild Pain (1 - 3)  oxyCODONE    IR 5 milliGRAM(s) Oral every 4 hours PRN Moderate Pain (4 - 6)    Vital Signs Last 24 Hrs  T(C): 36.8 (28 Dec 2017 05:18), Max: 37 (28 Dec 2017 01:21)  T(F): 98.2 (28 Dec 2017 05:18), Max: 98.6 (28 Dec 2017 01:21)  HR: 76 (28 Dec 2017 05:18) (76 - 96)  BP: 129/74 (28 Dec 2017 05:18) (116/59 - 147/67)  BP(mean): --  RR: 18 (28 Dec 2017 05:18) (18 - 18)  SpO2: 97% (28 Dec 2017 05:18) (96% - 100%)    I&O's Detail  26 Dec 2017 07:01  -  27 Dec 2017 07:00  --------------------------------------------------------  IN:    IV PiggyBack: 50 mL    Oral Fluid: 620 mL  Total IN: 670 mL  OUT:    Bulb: 10 mL    Voided: 800 mL  Total OUT: 810 mL  Total NET: -140 mL      27 Dec 2017 07:01  -  28 Dec 2017 06:24  --------------------------------------------------------  IN:    IV PiggyBack: 50 mL    Oral Fluid: 1320 mL  Total IN: 1370 mL  OUT:    Voided: 1375 mL  Total OUT: 1375 mL  Total NET: -5 mL        LABS:                        7.3    10.44 )-----------( 248      ( 27 Dec 2017 08:51 )             24.0     12-27    137  |  98  |  20  ----------------------------<  119<H>  4.3   |  26  |  1.25    Ca    8.9      27 Dec 2017 08:53  Phos  4.0     12-27  Mg     1.6     12-27    Physical Exam:  Gen:  Abd:  Wound: GENERAL SURGERY DAILY PROGRESS NOTE:     Subjective: Patient seen and examined. Patient stable with no overnight events. Patient denies CP/ SOB/ Palpatations. Patient denies N/V. Reports flatus and  BM.     MEDICATIONS  (STANDING):  acetaminophen  IVPB. 1000 milliGRAM(s) IV Intermittent once  ascorbic acid 250 milliGRAM(s) Oral daily  aspirin enteric coated 81 milliGRAM(s) Oral daily  atorvastatin 80 milliGRAM(s) Oral at bedtime  carvedilol 6.25 milliGRAM(s) Oral every 12 hours  enoxaparin Injectable 40 milliGRAM(s) SubCutaneous daily  ertapenem  IVPB      ertapenem  IVPB 1000 milliGRAM(s) IV Intermittent every 24 hours  insulin glargine Injectable (LANTUS) 24 Unit(s) SubCutaneous at bedtime  insulin lispro (HumaLOG) corrective regimen sliding scale   SubCutaneous three times a day before meals  insulin lispro (HumaLOG) corrective regimen sliding scale   SubCutaneous at bedtime  insulin lispro Injectable (HumaLOG) 10 Unit(s) SubCutaneous three times a day before meals  linezolid    Tablet 600 milliGRAM(s) Oral every 12 hours  lisinopril 10 milliGRAM(s) Oral daily  multivitamin 1 Tablet(s) Oral daily  sodium chloride 0.9% lock flush 3 milliLiter(s) IV Push every 8 hours    MEDICATIONS  (PRN):  acetaminophen   Tablet. 650 milliGRAM(s) Oral every 6 hours PRN Mild Pain (1 - 3)  oxyCODONE    IR 5 milliGRAM(s) Oral every 4 hours PRN Moderate Pain (4 - 6)    Vital Signs Last 24 Hrs  T(C): 36.8 (28 Dec 2017 05:18), Max: 37 (28 Dec 2017 01:21)  T(F): 98.2 (28 Dec 2017 05:18), Max: 98.6 (28 Dec 2017 01:21)  HR: 76 (28 Dec 2017 05:18) (76 - 96)  BP: 129/74 (28 Dec 2017 05:18) (116/59 - 147/67)  BP(mean): --  RR: 18 (28 Dec 2017 05:18) (18 - 18)  SpO2: 97% (28 Dec 2017 05:18) (96% - 100%)    I&O's Detail:   26 Dec 2017 07:01  -  27 Dec 2017 07:00  --------------------------------------------------------  IN:    IV PiggyBack: 50 mL    Oral Fluid: 620 mL  Total IN: 670 mL  OUT:    Bulb: 10 mL    Voided: 800 mL  Total OUT: 810 mL  Total NET: -140 mL      27 Dec 2017 07:01  -  28 Dec 2017 06:24  --------------------------------------------------------  IN:    IV PiggyBack: 50 mL    Oral Fluid: 1320 mL  Total IN: 1370 mL  OUT:    Voided: 1375 mL  Total OUT: 1375 mL  Total NET: -5 mL      LABS:                      7.3    10.44 )-----------( 248      ( 27 Dec 2017 08:51 )             24.0   12-27  137  |  98  |  20  ----------------------------<  119<H>  4.3   |  26  |  1.25  Ca    8.9      27 Dec 2017 08:53  Phos  4.0     12-27  Mg     1.6     12-27      Physical Exam  Gen: NAD, AAOx3  Abd: soft, NT, ND   Wound: clean dry intact wound  SHANTAL serious abdominal skin area breakdown unchanged

## 2020-10-21 NOTE — PROGRESS NOTES
[facility]    Hospitalist Progress Note    Date of Service (when I saw the patient): 06/04/2017    Assessment & Plan   Ara Stanley is a 71 year old female with a past medical history notable for hypertension, dyslipidemia, chronic obstructive pulmonary disease, depression/anxiety, history of stress-induced cardiomyopathy with most recent EF normal, questionable paroxysmal atrial fibrillation, osteoarthritis, smoking and GI bleeding in 03/2017 who originally presented to the emergency department at [facility] for acute abdomen and sepsis-like picture.  Eventually she was found to have acute cholecystitis and underwent cholecystectomy 6/2/17. Hospitalists consulted for medical co-management.    Sepsis secondary to acute cholecystitis, s/p diagnostic laparoscopy, laparoscopic cholecystectomy, abdominal lavage  Presented with several days of abdominal pain, initially unclear etiology with concern for mesenteric ischemia given elevated lactic acid and description of pain with history of paroxysmal atrial fibrillation. CT w/ contrast was performed w/ pre-medication due to contrast allergy which did not reveal evidence of mesenteric ischemia, and therefore underwent diagnostic laparoscopy, found to have findings of cholecystitis with laparoscopic cholecystectomy and abdominal lavage performed. Started on PCA 6/3/17 per surgery service.  - Continues to report diffuse abdominal pain and tenderness, which she reports has not changed since initial onset the Wednesday prior to admission. See below regarding possible UTI, otherwise does not appear to have clear etiology of this despite extensive abdominal imaging and diagnostic laparoscopy.   - AXR ordered per surgery for possible ileus has been NPO  - Continue IV fluids  - On antibiotics as below    Hypotension  Intermittently hypotensive into the 80s SBP, asymptomatic, has responded to fluid bolus. Afebrile. WBC decreasing as below.  Previous blood cultures remain no growth. Hemoglobin stable and no clear signs of blood loss.   - Lactic acid wnl 6/3/17  - Continue to bolus PRN  - Checking echocardiogram as below    Leukopenia  WBC count decreasing 10.3->3.3->1.8 since admission. Unclear etiology, ? Sepsis on admission   - WBC differential pending  - Heme/onc consulted     Possible UTI  UA abnormal with moderate LE, 7 WBC. Noted to have perinephric stranding on surgeons review of CT. Does not have CVA tenderness, fevers, n/v to strongly suggest pyelonephritis. No hydronephrosis noted on CT. Started on ciprofloxacin per surgery 6/3/17   - Okay to continue ciprofloxacin for now, changed to q24H dosing based on current renal function   - Follow-up UC and BC results    Acute kidney injury, oliguric, on chronic kidney disease stage III  Creatinine upon admission was 1.55, suspected to have possible contribution from sepsis at that time. UA with minimal WBC (7) and no RBC.   - Creatinine continues to increase despite IV fluids, UOP decreased significantly overnight.   - Received contrast load on admission, high suspicion at present for contrast nephropathy. Other considerations include ATN (may be multifactorial: possible sepsis, post-operative, intermittently hypotensive). No hydronephrosis noted on admission imaging.   - Crawford replaced and functioning normally per RN assessments   - Continue Crawford for strict I/O  - Check FENa  - Nephrology consulted, appreciate assistance  - Will change fluids from LR to NS at 100 cc/hr pending nephrology assessment   - Avoid nephrotoxins  - Continue holding prior to admission ACE-i  - Treat hypotension as above     Non anion gap metabolic acidosis  Per CareEverywhere her baseline bicarbonate does tend to be mildly low to low normal. No diarrhea noted.   - Bicarbonate decreasing, suspect secondary to worsening renal failure    Hypertension  - Current issues with hypotension as above. Continue holding prior to  admission ACE-i    Dyspnea, hypoxia  Placed on oxygen overnight for O2 sats in the mid 80s per discussion with RN, although has been difficult to get accurate readings on pulse oximetry  - Agree with CXR ordered by surgery team, will change to 2 view CXR  - Monitor O2 needs, wean if able    Chronic obstructive pulmonary disease  Continue prior to admission Breo Ellipta and Combivent.  DuoNeb available PRN   - No wheezes at present to suspect exacerbation   - CXR pending as above    Stress-induced cardiomyopathy  Diagnosed in 03/2017.  Coronary angiogram at that time was normal.  The repeat echocardiogram on 03/29 showed normalization of LV function with EF of 55% to 60%.  Due to acute kidney injury, prior to admission benazepril is on hold.   - Given dyspnea and new O2 needs, will obtain echocardiogram to evaluate for any recurrent EF depression which may help guide further fluid management     Dyslipidemia  Continue prior to admission lovastatin 20 mg p.o. at bedtime.     Questionable paroxysmal atrial fibrillation  Per most recent cardiology clinic evaluation 4/24/17, there was concern during her preceding hospitalization for possible atrial fibrillation on telemetry, although never documented on EKG and when re-evaluated at one point felt more consistent with sinus rhythm with premature atrial complexes. Per that note it was recommended she wear an event monitor, although there are no results of this available in CareEverywhere. EKG this admission with sinus rhythm with PACs  - Monitoring on telemetry  - No indication for anticoagulation at this time, would defer to planned outpatient monitoring and follow-up as outlined by her current cardiologist     Depression  Anxiety  Continue with prior to admission Zoloft 50 mg p.o. daily. She has significant anxiety, particularly surrounding the death of her  and subsequent stressors resulting from this.  has been involved here.   - May benefit from  "psychiatry consult once her medical issues have been stabilized     Tobacco use  The patient states she had quit smoking 15 years ago; however, after the death of her  in 03/2017 she started smoking again.  Nicotine patch has been offered, which was declined.     Alcohol use  The patient indicates  that she does not drink more than 1 beer and 1 mixed vodka with lemonade, and not everyday.  She might underplay the amount of her alcohol consumption.    - No clear signs of alcohol withdrawal presently, and if she is accurately reporting her alcohol use would be low risk to develop this.     Normocytic anemia  Hemoglobin was 10.8 g/dl upon admission.  Reviewing her chart shows hemoglobin of 8.5 in 03/2017 when she had GI bleed.  Upper endoscopy at that time showed minimal gastric and duodenal inflammatory changes and no real source of GI blood loss.   - Monitor hemoglobin, currently stable around 9.     DVT Prophylaxis: Heparin SQ.  Code Status: Full Code    Disposition: Discussed with Dr. Peck, given patient's complexity and evolving medical issues will transfer to Hospitalist team as primary, with surgeons continuing to follow. Disposition timing unclear, although with worsening renal function likely several days    Adonis Daigle    Interval History   Low urine output noted overnight despite Crawford reassessment and replacement. Also intermittently low blood pressures, which were fluid responsive. Continues to have diffuse abdominal pain described as \"all of the above\" when asked if it is shooting, stabbing, burning, aching, etc. Reports overall her pain is unchanged since when it first started last Wednesday. In addition reports pain in her shoulders, neck, back, chest that is grabbing and constant. Reports some intermittent cough, with a few episodes of brown sputum, most recently clear, although continues to feel \"congested\".     -Data reviewed today: I reviewed all new labs and imaging results over " the last 24 hours. I personally reviewed no images or EKG's today.    Physical Exam   Temp: 97.3  F (36.3  C) Temp src: Oral BP: (!) 80/45 Pulse: 103 Heart Rate: 108 Resp: 20 SpO2: 94 % O2 Device: Nasal cannula Oxygen Delivery: 3 LPM  Vitals:    06/02/17 2100   Weight: 63.4 kg (139 lb 12.4 oz)     Vital Signs with Ranges  Temp:  [97.3  F (36.3  C)-98  F (36.7  C)] 97.3  F (36.3  C)  Pulse:  [] 103  Heart Rate:  [] 108  Resp:  [12-20] 20  BP: ()/(45-64) 80/45  SpO2:  [85 %-96 %] 94 %  I/O last 3 completed shifts:  In: 3821 [P.O.:740; I.V.:2557; IV Piggyback:524]  Out: 330 [Urine:300; Emesis/NG output:30]    Constitutional: Elderly female, NAD  Respiratory: Clear to auscultation, no wheezes   Cardiovascular: RRR, no m/r/g. 1+ lower extremity edema bilaterally.  GI: Soft, diffusely tender to palpation, no clear rebound or guarding. BS hypoactive.   Skin/Integumen: Warm, dry  Other:     Medications     NaCl         bisacodyl  10 mg Rectal Once     HYDROmorphone   Intravenous PCA     sodium chloride 0.9%  500 mL Intravenous Once     ciprofloxacin  400 mg Intravenous Q12H     sodium chloride (PF)  3 mL Intracatheter Q8H     heparin  5,000 Units Subcutaneous Q12H     acetaminophen  975 mg Oral Q8H     senna-docusate  1-2 tablet Oral BID     sertraline  50 mg Oral Daily     fluticasone-vilanterol  1 puff Inhalation Daily     simvastatin  10 mg Oral QAM       Data     Recent Labs  Lab 06/04/17  0725 06/03/17  1535 06/03/17  0732 06/02/17  2041 06/02/17  1420 06/02/17  1015   WBC 1.8*  --  3.3*  --   --  10.3   HGB 9.3*  --  9.1*  --   --  10.8*   MCV 89  --  90  --   --  93     --  225 218  --  339   INR  --   --   --   --  1.11  --    * 134 137  --   --  137   POTASSIUM 4.4 4.7 4.7  --   --  4.7   CHLORIDE 103 107 109  --   --  104   CO2 15* 17* 18*  --   --  18*   BUN 47* 46* 46*  --   --  53*   CR 2.26* 1.77* 1.64*  --   --  1.55*   ANIONGAP 12 10 10  --   --  15*   CARYL 8.1* 7.8* 7.8*  --    --  9.4   GLC 75 117* 107*  --   --  50*   ALBUMIN 2.3*  --  2.7*  --   --  3.6   PROTTOTAL 5.2*  --  5.7*  --   --  7.1   BILITOTAL 0.3  --  0.3  --   --  0.4   ALKPHOS 65  --  67  --   --  96   ALT 33  --  38  --   --  42   AST 56*  --  52*  --   --  49*   LIPASE  --   --   --   --   --  514*   TROPI  --   --   --   --   --  <0.015The 99th percentile for upper reference range is 0.045 ug/L.  Troponin values in the range of 0.045 - 0.120 ug/L may be associated with risks of adverse clinical events.       No results found for this or any previous visit (from the past 24 hour(s)).   impaired postural control/decreased strength/decreased ROM

## 2021-11-09 NOTE — PROGRESS NOTES
"Care Coordination:    Noted potential plan to place trach.  Also noted mention of \"POA (daughter).\"  However, NO formal POA documentation is on file.  If present, daughter should bring in POA documentation.  Otherwise, pt's daughter can simply be identified by provider as surrogate agent.       When MD available to discuss, will ask if LTACH should be introduced to pt's daughter.      Liane Lara RN, BSN  [facility] Care Coordinator   Mobile Phone: [phone]    " Nov 9, 2021 at 5:34 pm (brought from [facility])/the EKG is unchanged from prior EKG

## 2023-04-10 NOTE — PROGRESS NOTES
[facility] ICU RESPIRATORY NOTE  Date of Admission:6/2/2017  Date of Intubation (most recent):6/9/2017  Reason for Mechanical Ventilation:Hypercapnic resp failure  Number of Days on Mechanical Ventilation:2  Met Criteria for Pressure Support Trial:No  Reason for No Pressure Support Trial:Per MD    Ventilation Mode: CMV/AC  FiO2 (%): 40 %  Rate Set (breaths/minute): 20 breaths/min  Tidal Volume Set (mL): 500 mL  PEEP (cm H2O): 5 cmH2O  Oxygen Concentration (%): 40 %  Resp: 21      Significant Events Today:None overnight    ABG Results:    Recent Labs  Lab 06/09/17  0947 06/09/17  0545 06/06/17  1739 06/05/17  1325 06/05/17  0903   PH 7.29* 7.05* 7.41 7.29* 7.33*   PCO2 43 89* 37 46* 39   PO2 305* 85 97 118* 114*   HCO3 21 24 23 22 21   O2PER 100  --   --  70 10L         ETT appearance on chest x-ray:In good position.    Plan:  Will cont full vent support for now and assess for weaning readiness daily.  6/10/2017  Pallavi Hammond RRT         DVT: Lovenox 40mg qd  Diet:: Regular diet  Dispo: Pending clinical course and PT eval

## 2023-06-13 NOTE — PLAN OF CARE
Problem: Goal Outcome Summary  Goal: Goal Outcome Summary  Outcome: No Change  A&Ox4.  AVSS.  Oxycodone for pain and Dilaudid for breakthrough pain.  Lap sites with steri-strips and band-aids CDI, bruising by umbilical area lap site.  Abdomen soft, hypoactive BS, (+) flatus.  Ambulate with 1 assist with walker.  No n/v, tolerated clear liquid overnight.  IS at 750.  Indwelling urinary catheter intact and patent with clear yellow urine, 200ml overnight.         No

## (undated) DEVICE — DRSG BANDAID 2X4" FABRIC LATEX FREE

## (undated) DEVICE — SU VICRYL 3-0 SH 27" J316H

## (undated) DEVICE — DRSG DRAIN 4X4" 7086

## (undated) DEVICE — DRAPE LEGGINGS 8421

## (undated) DEVICE — DRSG GAUZE 4X4" 3033

## (undated) DEVICE — ENDO TROCAR FIRST ENTRY KII FIOS Z-THRD 05X100MM CTF03

## (undated) DEVICE — DRSG TEGADERM 4X4 3/4" 1626

## (undated) DEVICE — PACK MINOR SBA15MIFSE

## (undated) DEVICE — SU VICRYL 2-0 TIE 12X18" J905T

## (undated) DEVICE — GLOVE PROTEXIS BLUE W/NEU-THERA 6.5  2D73EB65

## (undated) DEVICE — SU PROLENE 2-0 SHDA 48" 8533H

## (undated) DEVICE — DRSG STERI STRIP 1/2X4" R1547

## (undated) DEVICE — Device

## (undated) DEVICE — ENDO CANNULA 05MM VERSAONE UNIVERSAL UNVCA5STF

## (undated) DEVICE — SU PDS II 0 CTX 60" Z990G

## (undated) DEVICE — ANTIFOG SOLUTION W/FOAM PAD 31142527

## (undated) DEVICE — SU ETHILON 3-0 FS-1 18" 669H

## (undated) DEVICE — DRAIN CHEST TUBE 28FR STR 8028

## (undated) DEVICE — EVAC SYSTEM CLEAR FLOW SC082500

## (undated) DEVICE — PREP DURAPREP 26ML APL 8630

## (undated) DEVICE — SUCTION TIP YANKAUER W/O VENT K86

## (undated) DEVICE — GLOVE PROTEXIS W/NEU-THERA 6.5  2D73TE65

## (undated) DEVICE — SU MONOCRYL 3-0 PS-2 27" Y427H

## (undated) DEVICE — SU VICRYL 3-0 SH CR 8X18" J774

## (undated) DEVICE — BLADE KNIFE SURG 10 371110

## (undated) DEVICE — DRSG BANDAID 1X3" FABRIC CURITY LATEX FREE KC44101

## (undated) DEVICE — PREP CHLORAPREP W/ORANGE TINT 10.5ML 260715

## (undated) DEVICE — PREP SKIN SCRUB TRAY 4461A

## (undated) DEVICE — CLIP APPLIER ENDO 05MM MED/LG 176630

## (undated) DEVICE — PREP CHLORAPREP 26ML TINTED ORANGE  260815

## (undated) DEVICE — GLOVE PROTEXIS MICRO 6.0  2D73PM60

## (undated) DEVICE — SU VICRYL 4-0 PS-2 18" UND J496H

## (undated) DEVICE — SOL NACL 0.9% IRRIG 1000ML BOTTLE 07138-09

## (undated) DEVICE — GLOVE PROTEXIS W/NEU-THERA 7.5  2D73TE75

## (undated) DEVICE — TAPE DRSG UNIVERSAL CLOTH 3" WHITE LATEX 881-3

## (undated) DEVICE — SOL ADH LIQUID BENZOIN SWAB 0.6ML C1544

## (undated) DEVICE — ESU GROUND PAD UNIVERSAL W/O CORD

## (undated) DEVICE — SU SILK 2 REEL 60" SA8H

## (undated) DEVICE — ENDO POUCH GOLD 10MM ECATCH 173050G

## (undated) DEVICE — DRAIN JACKSON PRATT RESERVOIR 100ML SU130-1305

## (undated) DEVICE — SU VICRYL 2-0 CT-2 27" J333H

## (undated) DEVICE — SUCTION CANISTER MEDIVAC LINER 3000ML W/LID 65651-530

## (undated) DEVICE — DRAPE BREAST/CHEST 29420

## (undated) DEVICE — SU PROLENE 2-0 RB-1DA 36" 8559H

## (undated) DEVICE — NDL 22GA 1.5"

## (undated) DEVICE — SUCTION DRY CHEST DRAIN OASIS 3600-100

## (undated) DEVICE — ESU HOLDER LAP INST DISP PURPLE LONG 330MM H-PRO-330

## (undated) DEVICE — SU VICRYL 1 CT-2 27" J335H

## (undated) DEVICE — TUBING SUCTION MEDI-VAC SOFT 3/16"X20' N520A

## (undated) DEVICE — SU MONOCRYL 4-0 PS-2 18" UND Y496G

## (undated) DEVICE — SU VICRYL 0 UR-6 27" J603H

## (undated) DEVICE — SYR BULB IRRIG 50ML LATEX FREE 0035280

## (undated) DEVICE — APPLICATOR EVICEL RIGID TIP 35CM 3908

## (undated) DEVICE — LINEN TOWEL PACK X5 5464

## (undated) DEVICE — ESU ELEC BLADE 2.75" COATED/INSULATED E1455

## (undated) DEVICE — ENDO TROCAR THORACIC 10/12MM TT012

## (undated) DEVICE — DRAPE PEDS  LAP 29493

## (undated) DEVICE — TUBING HYDRO-SURG PLUS LAP IRRIGATOR SUCTION 0026870

## (undated) DEVICE — BLADE CLIPPER 4406

## (undated) DEVICE — JELLY LUBRICATING SURGILUBE 4OZ TUBE

## (undated) DEVICE — DRAPE IOBAN INCISE 23X17" 6650EZ

## (undated) DEVICE — SU VICRYL 2-0 SH 27" J317H

## (undated) DEVICE — ESU PENCIL W/HOLSTER E2350H

## (undated) DEVICE — BLADE KNIFE SURG 15 371115

## (undated) DEVICE — SPONGE LAP 18X18" X8435

## (undated) DEVICE — SYR 03ML LL W/O NDL 309657

## (undated) DEVICE — ESU ELEC BLADE 6" COATED/INSULATED E1455-6

## (undated) DEVICE — TUBING SUCTION 12"X1/4" N612

## (undated) DEVICE — DRAPE POUCH INSTRUMENT 1018

## (undated) DEVICE — SU ETHILON 3-0 FS-1 18" 663G

## (undated) DEVICE — ADPT 5 IN 1 360

## (undated) DEVICE — SUCTION IRR STRYKERFLOW II W/TIP 250-070-520

## (undated) DEVICE — GOWN IMPERVIOUS ZONED LG

## (undated) DEVICE — DRSG KERLIX 4 1/2"X4YDS ROLL 6715

## (undated) DEVICE — PACK LAP CHOLE SLC15LCFSD

## (undated) DEVICE — COVER CLAMP FABRIC RADIOPAQUE 9.5X150MM 072002PBX

## (undated) DEVICE — SU VICRYL 3-0 TIE 12X18" J904T

## (undated) DEVICE — DEVICE SUTURE GRASPER TROCAR CLOSURE 14GA PMITCSG

## (undated) DEVICE — DRAIN CHEST TUBE RIGHT ANGLED 28FR 8128

## (undated) DEVICE — SOL NACL 0.9% INJ 250ML BAG 2B1322Q

## (undated) DEVICE — DRAIN JACKSON PRATT 19FR ROUND SU130-1325

## (undated) DEVICE — SPONGE KITTNER 31001010

## (undated) DEVICE — ESU ELEC BLADE 6" COATED E1450-6

## (undated) DEVICE — ESU CORD MONOPOLAR 10'  E0510

## (undated) DEVICE — COVER TABLE POLY 65X90" 8186

## (undated) DEVICE — BLADE KNIFE SURG 11 371111

## (undated) DEVICE — DRAPE SHEET REV FOLD 3/4 9349

## (undated) DEVICE — SU NDL CUT REV MED 3/8 209014

## (undated) DEVICE — SPONGE RAY-TEC 4X8" 7318

## (undated) DEVICE — SPONGE BALL KERLIX ROUND XL W/O STRING LATEX 4935

## (undated) DEVICE — PACK MAJOR SBA15MAFSI

## (undated) DEVICE — ENDO TROCAR OPTICAL 05MM VERSAPORT PLUS W/FIX CAN ONB5STF

## (undated) DEVICE — SYR 10ML SLIP TIP W/O NDL

## (undated) RX ORDER — GLYCOPYRROLATE 0.2 MG/ML
INJECTION, SOLUTION INTRAMUSCULAR; INTRAVENOUS
Status: DISPENSED
Start: 2017-01-01

## (undated) RX ORDER — HYDROMORPHONE HYDROCHLORIDE 1 MG/ML
INJECTION, SOLUTION INTRAMUSCULAR; INTRAVENOUS; SUBCUTANEOUS
Status: DISPENSED
Start: 2017-01-01

## (undated) RX ORDER — FENTANYL CITRATE 50 UG/ML
INJECTION, SOLUTION INTRAMUSCULAR; INTRAVENOUS
Status: DISPENSED
Start: 2017-01-01

## (undated) RX ORDER — FLUMAZENIL 0.1 MG/ML
INJECTION, SOLUTION INTRAVENOUS
Status: DISPENSED
Start: 2017-01-01

## (undated) RX ORDER — BUPIVACAINE HYDROCHLORIDE 5 MG/ML
INJECTION, SOLUTION EPIDURAL; INTRACAUDAL
Status: DISPENSED
Start: 2017-01-01

## (undated) RX ORDER — ERTAPENEM 1 G/1
INJECTION, POWDER, LYOPHILIZED, FOR SOLUTION INTRAMUSCULAR; INTRAVENOUS
Status: DISPENSED
Start: 2017-01-01

## (undated) RX ORDER — DEXAMETHASONE SODIUM PHOSPHATE 4 MG/ML
INJECTION, SOLUTION INTRA-ARTICULAR; INTRALESIONAL; INTRAMUSCULAR; INTRAVENOUS; SOFT TISSUE
Status: DISPENSED
Start: 2017-01-01

## (undated) RX ORDER — IPRATROPIUM BROMIDE AND ALBUTEROL SULFATE 2.5; .5 MG/3ML; MG/3ML
SOLUTION RESPIRATORY (INHALATION)
Status: DISPENSED
Start: 2017-01-01

## (undated) RX ORDER — VECURONIUM BROMIDE 1 MG/ML
INJECTION, POWDER, LYOPHILIZED, FOR SOLUTION INTRAVENOUS
Status: DISPENSED
Start: 2017-01-01

## (undated) RX ORDER — LIDOCAINE HYDROCHLORIDE 20 MG/ML
INJECTION, SOLUTION EPIDURAL; INFILTRATION; INTRACAUDAL; PERINEURAL
Status: DISPENSED
Start: 2017-01-01

## (undated) RX ORDER — ONDANSETRON 2 MG/ML
INJECTION INTRAMUSCULAR; INTRAVENOUS
Status: DISPENSED
Start: 2017-01-01

## (undated) RX ORDER — NALOXONE HYDROCHLORIDE 0.4 MG/ML
INJECTION, SOLUTION INTRAMUSCULAR; INTRAVENOUS; SUBCUTANEOUS
Status: DISPENSED
Start: 2017-01-01

## (undated) RX ORDER — BUPIVACAINE HYDROCHLORIDE AND EPINEPHRINE 5; 5 MG/ML; UG/ML
INJECTION, SOLUTION EPIDURAL; INTRACAUDAL; PERINEURAL
Status: DISPENSED
Start: 2017-01-01